# Patient Record
Sex: MALE | Race: WHITE | NOT HISPANIC OR LATINO | Employment: OTHER | URBAN - METROPOLITAN AREA
[De-identification: names, ages, dates, MRNs, and addresses within clinical notes are randomized per-mention and may not be internally consistent; named-entity substitution may affect disease eponyms.]

---

## 2017-01-04 ENCOUNTER — PATIENT MESSAGE (OUTPATIENT)
Dept: FAMILY MEDICINE | Facility: CLINIC | Age: 32
End: 2017-01-04

## 2017-01-10 ENCOUNTER — TELEPHONE (OUTPATIENT)
Dept: FAMILY MEDICINE | Facility: CLINIC | Age: 32
End: 2017-01-10

## 2017-01-19 ENCOUNTER — OFFICE VISIT (OUTPATIENT)
Dept: NEUROLOGY | Facility: CLINIC | Age: 32
End: 2017-01-19
Payer: MEDICARE

## 2017-01-19 VITALS
SYSTOLIC BLOOD PRESSURE: 134 MMHG | DIASTOLIC BLOOD PRESSURE: 83 MMHG | WEIGHT: 277 LBS | HEIGHT: 71 IN | HEART RATE: 77 BPM | BODY MASS INDEX: 38.78 KG/M2 | RESPIRATION RATE: 20 BRPM

## 2017-01-19 DIAGNOSIS — F84.9 PERVASIVE DEVELOPMENTAL DISORDER, ACTIVE: ICD-10-CM

## 2017-01-19 DIAGNOSIS — R56.9 CONVULSIONS, UNSPECIFIED CONVULSION TYPE: ICD-10-CM

## 2017-01-19 PROCEDURE — 1159F MED LIST DOCD IN RCRD: CPT | Mod: S$GLB,,, | Performed by: PSYCHIATRY & NEUROLOGY

## 2017-01-19 PROCEDURE — 99999 PR PBB SHADOW E&M-EST. PATIENT-LVL III: CPT | Mod: PBBFAC,,, | Performed by: PSYCHIATRY & NEUROLOGY

## 2017-01-19 PROCEDURE — 99215 OFFICE O/P EST HI 40 MIN: CPT | Mod: S$GLB,,, | Performed by: PSYCHIATRY & NEUROLOGY

## 2017-01-19 RX ORDER — OXCARBAZEPINE 300 MG/1
300 TABLET, FILM COATED ORAL 2 TIMES DAILY
Qty: 60 TABLET | Refills: 11 | Status: SHIPPED | OUTPATIENT
Start: 2017-01-19 | End: 2018-01-30 | Stop reason: SDUPTHER

## 2017-01-19 NOTE — PROGRESS NOTES
"Date of service:  1/19/2017    Chief complaint:  Seizures    History of present illness:  The patient is a 31 y.o. male referred for management of epilepsy.  He previously saw Dr. Sunshine.  This is my first time seeing him.  He provides little in the way of history due to his cognitive issues, so most of the history was obtained from his caregiver.  Additional history is as noted below.  Briefly, though, this gentleman suffers from a developmental delay and has a history of a prior GTC seizure.  He had onset of new episodes involving behavioral outbursts.  He was admitted to the EMU, and these were found not to be epileptic in nature.  Since the last time he was seen, he has had no further episodes worrisome for seizures.    Prior history:  "First seizure suspected in 2006, onset was not witnessed but caused him to fall to the ground, period of unresponsiveness, had hit his head. Not clear if there was convulsive activity at that time. Mother also describes episodes where eyes roll back, he falls to the ground, has generalized convulsive activity and is disoriented / confused for a short time following. Frequency is approximately once every 2 months, last episode 2 months ago. More prominent since June 2014.      Over the past few months and in particular 6 weeks, has had increase in behavioral outbursts. Mother is not sure if this is directly result of subclinical seizure or not but describes shouting out, inability to follow through with instructions or direction, eyes rolling (brief, eyes flit back and to right, forceful eye closure lasting 1-2 seconds) without change in muscle tone or consciousness. Concerned his behaviors have been regressing, he mentions name of caregiver from many years ago often. Repeats phrases (you need to have dark hair), appears agitated, unable to sit still. In the past month has had increase in dose of Keppra; had been on 250 BID until 12/4, dose was increased to 500 BID. At the time " "of visit with Dr. Parker on 12/16 was on 1500 mg HS. Family desperate to switch Keppra if this will improve behaviors. "      Past Medical History   Diagnosis Date    Autistic disorder, active 5/6/2013    Early intervention counseling     Gout, chronic 11/12/2015    Grand mal seizure     Impulse control disorder, unspecified 5/6/2013    Mastoiditis     Moderate mental retardation 5/6/2013    Neurogenic bladder     HALEY (obstructive sleep apnea) 11/12/2015     CPAP.  Dr. Garber     Otitis media     Paraplegia     Pervasive developmental disorder, active 12/27/2015    Renal cancer 2010     Right    Scoliosis      paraplegia    Seizure disorder 11/12/2015    Stroke      one week old    Tardive dyskinesia        Past Surgical History   Procedure Laterality Date    Ankle fracture surgery      Back surgery  2000    Inner ear surgery       mastoid    Right partial nephrectomy Right 2010     Sees urology    Tympanostomy tube placement         Family History   Problem Relation Age of Onset    Cancer Father      lymphoma    Anesthesia problems Neg Hx     Clotting disorder Neg Hx        Social History     Social History    Marital status: Single     Spouse name: N/A    Number of children: N/A    Years of education: N/A     Occupational History    Not on file.     Social History Main Topics    Smoking status: Never Smoker    Smokeless tobacco: Never Used    Alcohol use No    Drug use: No    Sexual activity: No     Other Topics Concern    Not on file     Social History Narrative    Lives with his parents.  More dependent with ADLs.  Very active in the community.        New Opportunities jhoan.        Review of Systems:  A 14 system review is limited due to his imperfect reliability as a historian.  His family reports no significant changes since his prior visit.    Physical exam:  Visit Vitals    /83    Pulse 77    Resp 20    Ht 5' 10.5" (1.791 m)    Wt 125.6 kg (277 lb)    BMI " "39.18 kg/m2     Higher Cortical Function:   Patient is a well developed, obese man who is unable to sit still and behaves inappropriately.  He was not hostile, but imperfectly uncooperative.     Cranial Nerves II - XII:   EOMs were intact with normal smooth and no nystagmus.   PERRLA. D/C Funduscopic exam - disc were flat with normal A/V ratio and no exudates or hemorrhages. Visual fields were full to confrontation.   Motor - facial movement was symmetrical and normal.   Facial sensory - Light touch and pin prick sensations were normal.   Hearing was normal to finger rub.  Palate moved well and was symmetrical with normal palatal and oral sensation.   Tongue movement was full & the patient could say "la la la" and "Ka Ka Ka" without  difficulty. Patient repeated Taoism and Bahai without difficulty. Normal power and bulk was found in the massiter and rotator muscles of the neck.  Motor: Power, bulk and tone were normal in all extremities.  Sensory: Light touch, pin prick, vibration and position senses were normal in all extremities.   Coordination:   Rapid alternating movements and rapid finger tapping - normal.   Finger to nose - nl.   Arm roll - symmetrical.   Gait: Station, gait and tandem walking were done without difficulty and Romberg was negative.      Tremor: resting, postural, intentional - none    Data base:  Prior records were reviewed and are as summarized above.    Labs:  Trileptal (5/16): 5    MRI brain (12/15):  "Motion limited MRI of the brain.  There is trace asymmetry of the mesial temporal lobes left slightly smaller than the right allowing for motion limitation.  This may be artifactual or developmental variant with underlying left mesial temporal sclerosis not excluded.  Clinical correlation correlation with EEG analysis is advised.  No evidence for focal parietal signal abnormality, acute infarction or enhancing lesion."    vEEG (1/16):  "FINAL SUMMARY:  ELECTROENCEPHALOGRAM:  Interictal: " "This is a normal EEG during wakefulness, drowsiness, and sleep.  Ictal: During this recording, periods of eye flutter during wakefulness and   myoclonic jerks during sleep were recorded without associated epileptiform   activity on EEG.  CLINICAL SEIZURE:  Classification: Nonepileptic events. Based on clinical history and evaluation   by Psychiatry, these events were likely motor tics during wakefulness and   myoclonic jerks during sleep. There is no evidence of an epileptic process on   this recording. No seizures were recorded"    Assessment and plan:  The patient is a 31 y.o. 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  As far as medications go, we will continue his Trileptal.  We will check labs and a DEXA scan for medication monitoring purposes.  Medication side effects were discussed with the patient's family.  State law as it pertains to driving for individuals with seizures was not discussed as his cognitive issues preclude him from driving regardless of degree of seizure control.  The patient's was counseled on seizure safety. We will plan on seeing the patient back in a few weeks.    "

## 2017-01-19 NOTE — MR AVS SNAPSHOT
[facility]  [address] [place] [address]  Phone: [phone]  Fax: [phone]                  Hussein Nunes   2017 1:00 PM   Office Visit    Description:  Male : 1985   Provider:  Miguel Salinas Jr., MD   Department:  [facility]           Reason for Visit     Seizures           Diagnoses this Visit        Comments    Convulsions, unspecified convulsion type         Pervasive developmental disorder, active                To Do List           Future Appointments        Provider Department Dept Phone    2017 9:20 AM [facility] DEXA1 [facility] [phone]    2017 9:50 AM LAB, [place] [facility] [phone]      Goals (5 Years of Data)     None       These Medications        Disp Refills Start End    oxcarbazepine (TRILEPTAL) 300 MG Tab 60 tablet 11 2017    Take 1 tablet (300 mg total) by mouth 2 (two) times daily. - Oral    Pharmacy: [facility]/pharmacy #[id number] - [place], [place] - [address] Ph #: [phone]       folic acid-vit B6-vit B12 2.5-25-2 mg (FOLBIC) 2.5-25-2 mg Tab 30 tablet 0 2017     Take 1 tablet by mouth once daily. - Oral    Pharmacy: [facility]/pharmacy #[id number] - [place] - [address] Ph #: [phone]         [facility]     [facility] Care [facility] -  Assistance  Registered nurses in the [facility] provide clinical advisement, health education, appointment booking, and other advisory services.  Call for this free service at [phone].             Medications           Message regarding Medications     Verify the changes and/or additions to your medication regime listed below are the same as discussed with your clinician today.  If any of these changes or additions are incorrect, please notify your healthcare provider.             Verify that the below list of medications is an accurate representation of the medications you are currently taking.   "If none reported, the list may be blank. If incorrect, please contact your healthcare provider. Carry this list with you in case of emergency.           Current Medications     allopurinol (ZYLOPRIM) 100 MG tablet TAKE 1 TABLET BY MOUTH TWICE A DAY    busPIRone (BUSPAR) 5 MG Tab Take 1 tablet (5 mg total) by mouth 2 (two) times daily.    escitalopram oxalate (LEXAPRO) 20 MG tablet Take 1 tablet (20 mg total) by mouth once daily.    folic acid-vit B6-vit B12 2.5-25-2 mg (FOLBIC) 2.5-25-2 mg Tab Take 1 tablet by mouth once daily.    L. ACIDOPHILUS/BIFID. ANIMALIS (CHEWABLE PROBIOTIC ORAL) Take by mouth 2 (two) times daily.    lactulose (CHRONULAC) 10 gram/15 mL solution Take 30 mLs (20 g total) by mouth 4 (four) times a week.    oxcarbazepine (TRILEPTAL) 300 MG Tab Take 1 tablet (300 mg total) by mouth 2 (two) times daily.    polyethylene glycol (MIRALAX) 17 gram/dose powder Take 17 g by mouth once daily. No Sig Provided    sulfamethoxazole-trimethoprim 800-160mg (BACTRIM DS) 800-160 mg Tab TAKE 1 TABLET BY ORAL ROUTE DAILY    tamsulosin (FLOMAX) 0.4 mg Cp24 Every day    wheat dextrin 3 gram/3.5 gram PwPk Take by mouth once daily.           Clinical Reference Information           Vital Signs - Last Recorded  Most recent update: 1/19/2017  1:09 PM by Fe Navarrete LPN    BP Pulse Resp Ht Wt BMI    134/83 77 20 5' 10.5" (1.791 m) 125.6 kg (277 lb) 39.18 kg/m2      Blood Pressure          Most Recent Value    BP  134/83      Allergies as of 1/19/2017     Benadryl [Diphenhydramine Hcl]    Keppra [Levetiracetam]    Ativan [Lorazepam]    Abilify  [Aripiprazole]    Clonazepam    Cough Syrup W/decongestant    Diazepam    Haloperidol    Phenytoin Sodium Extended    Quetiapine    Risperidone    Thimerosal    Ziprasidone Hcl      Immunizations Administered on Date of Encounter - 1/19/2017     None      Orders Placed During Today's Visit     Future Labs/Procedures Expected by Expires    CBC auto differential  1/19/2017 " 3/20/2018    Comprehensive metabolic panel  1/19/2017 3/20/2018    DXA Bone Density Spine And Hip_Axial Skeleton  1/19/2017 1/19/2018    Oxcarbazepine level  1/19/2017 3/20/2018

## 2017-01-23 ENCOUNTER — PATIENT MESSAGE (OUTPATIENT)
Dept: FAMILY MEDICINE | Facility: CLINIC | Age: 32
End: 2017-01-23

## 2017-01-30 ENCOUNTER — OFFICE VISIT (OUTPATIENT)
Dept: PSYCHIATRY | Facility: CLINIC | Age: 32
End: 2017-01-30
Payer: COMMERCIAL

## 2017-01-30 VITALS
DIASTOLIC BLOOD PRESSURE: 78 MMHG | SYSTOLIC BLOOD PRESSURE: 138 MMHG | HEIGHT: 71 IN | HEART RATE: 85 BPM | BODY MASS INDEX: 39.26 KG/M2 | WEIGHT: 280.44 LBS

## 2017-01-30 DIAGNOSIS — F79 MENTAL RETARDATION: ICD-10-CM

## 2017-01-30 DIAGNOSIS — F84.0 AUTISTIC DISORDER, ACTIVE: Primary | Chronic | ICD-10-CM

## 2017-01-30 DIAGNOSIS — F84.9 PERVASIVE DEVELOPMENTAL DISORDER, ACTIVE: ICD-10-CM

## 2017-01-30 DIAGNOSIS — G24.01 TARDIVE DYSKINESIA: ICD-10-CM

## 2017-01-30 DIAGNOSIS — F63.9 IMPULSE CONTROL DISORDER: Chronic | ICD-10-CM

## 2017-01-30 DIAGNOSIS — F42.9 OBSESSIVE-COMPULSIVE DISORDER, UNSPECIFIED TYPE: ICD-10-CM

## 2017-01-30 DIAGNOSIS — F63.9 IMPULSE CONTROL DISEASE: ICD-10-CM

## 2017-01-30 DIAGNOSIS — F71 MODERATE MENTAL RETARDATION: Chronic | ICD-10-CM

## 2017-01-30 PROCEDURE — 99214 OFFICE O/P EST MOD 30 MIN: CPT | Mod: S$GLB,,, | Performed by: PSYCHIATRY & NEUROLOGY

## 2017-01-30 PROCEDURE — 99999 PR PBB SHADOW E&M-EST. PATIENT-LVL II: CPT | Mod: PBBFAC,,, | Performed by: PSYCHIATRY & NEUROLOGY

## 2017-01-30 RX ORDER — BUSPIRONE HYDROCHLORIDE 5 MG/1
5 TABLET ORAL 2 TIMES DAILY
Qty: 60 TABLET | Refills: 2 | Status: SHIPPED | OUTPATIENT
Start: 2017-01-30 | End: 2017-07-29 | Stop reason: SDUPTHER

## 2017-01-30 RX ORDER — ESCITALOPRAM OXALATE 20 MG/1
20 TABLET ORAL DAILY
Qty: 30 TABLET | Refills: 2 | Status: SHIPPED | OUTPATIENT
Start: 2017-01-30 | End: 2017-05-08 | Stop reason: SDUPTHER

## 2017-01-30 NOTE — PROGRESS NOTES
"ID: 31yo WM with PDD, Impulse Control d/o, anxiety d/o nos. Last saw RENNY Murillo 5/2016.     CC: anxiety    Interim Hx: presents on time with his mother and caregiver, Helen, who has been with him for 9mos.     Pt is doing well on lexapro alone- has started using the buspar 5mg po daily PRN anxiety- using now on days when he has public outing to limit intrusiveness which had been an issue in the past. Per pt's caregiver, Helen, this is much improved on buspar.     Pt continues to be active, involved in numerous day time activities.     On Psychiatric ROS:    Endorses good sleep- not taking anything for sleep (sleep better on busier days; when bored he tends to nap), denies anhedonia- going to bible study later today, denies feeling helpless/hopeless, denies dec energy, denies dec concentration, dec appetite- trying to lose weight (zyprexa led to 30-40lb wgt gain) through portion control, denies dec PMA, denies thoughts of SI/intent/plan.     Denies feeling +easily overwhelmed- controlled environment with caregivers and schedule, +ruminative thinking- chronic, denies feeling tense/"on edge"    PPHx: Denies h/o self injury- will pick at scabs in sort of obsessive way  Denies inpt psych hospitalization  Denies h/o suicide attempt     Current Psych Meds: lexapro 20mg po qam (anxiety and to suppress sex drive), **Trileptal 300mg po BID through neuro for sz d/o  Past Psych Meds: s/e's to mult prev meds to include zyprexa (wgt gain),     PMHx: obesity, onel w/ cipap, seizure d/o, scoliosis surgery at 11yo led to hemiparalysis- now walking but has neurogenic bladder and frequent catheterization, partial nephrectomy R kidney    SubstHx:   T- none  E- none  D- none  Caffeine- very rare, if ever    FamPHx: mAunt- schizophrenic, mcousin- schizophrenia    MSE: appears stated age, well groomed, obese, appropriate dress- shorts/tshirt, engages well with examiner at a childlike level, remembers my name and says hello mult times, " "perseverates on name. Good e/c. Speech reg rate and vol, nonpressured. Mood is "doing good" Affect congruent- sits calmly in chair, smiles. No physical evidence of emotion. Sensorium fully intact. Oriented to month and season but then wants to discuss birthdays. Narrative memory intact. Intellectual function is below avg based on vocab and basic fund of knowledge- PDD long h/o sp ed. Thought is perseverative on my name and then later on his birthday. No tangentiality or circumstantiality. No FOI/WES. Denies SI/HI. Denies A/VH. No evidence of delusions. Insight lacking and Judgment in keeping with insight.     Suicide Risk Assessment:   Protective- age, no prior attempts, no prior hospitalizations, no family h/o attempts, no ongoing substance abuse, no psychosis, denies SI/intent/plan, no h/o violence, seeking treatment, access to treatment, future oriented, good primary support, no access to firearms    Risk- race, gender, ongoing Axis I sxs    **Pt is at LOW imminent and long term risk of suicide given current risk factors.    Assessment:  30yo WM with PDD, Impulse Control d/o, anxiety d/o nos. Last saw RENNY Murillo 5/2016. Pt presents today with his mother and caregiver Helen who has been with the pt x 8mos. Currently the pt is doing very well. Of note pt is only on lexapro for anxiety, is also on trileptal 300mg po BID for seizure d/o through neuro and has HALEY on cipap. Has great family support, receives 88hrs per week of direct care from outside service provider, has assistance with all ADLs and IADLs but seems content with arrangement. No h/o violent or aggressive behavior. Had manic sxs with psychosis when on keppra but never before or since. Has recently stopped zyprexa due to significant weight gain and there has been no increase in behavior or anxiety. Per parent and caregiver there is some concern that a prev caregiver who was recently fired was motivated for the pt to be more sedated-  they both denied the " pt needed any change in medication. Hussein is doing well, has some instances of intrusive behavior with young attractive women and he becomes difficult to redirect- added a trial of PRN buspar for days he has public outings- this has been effective and helpful. Will cont w/o change. No acute safety concerns. rtc 3mos.     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 Cont lexapro 20mg po qhs   2. Cont Trileptal 300mg po BID per neuro  3. Cont buspar 5mg po daily PRN anxiety (for days with public outings)   4. RTC 4mos  5. rec some dietary changes and cont'd exercise    -Spent 30min face to face with the pt; >50% time spent in counseling   -Supportive therapy and psychoeducation provided  -R/B/SE's of medications discussed with the pt who expresses understanding and chooses to take medications as prescribed.   -Pt instructed to call clinic, 911 or go to nearest emergency room if sxs worsen or pt is in   crisis. The pt expresses understanding.

## 2017-02-01 ENCOUNTER — HOSPITAL ENCOUNTER (OUTPATIENT)
Dept: RADIOLOGY | Facility: HOSPITAL | Age: 32
Discharge: HOME | End: 2017-02-01
Attending: PSYCHIATRY & NEUROLOGY
Payer: MEDICARE

## 2017-02-01 DIAGNOSIS — R56.9 CONVULSIONS, UNSPECIFIED CONVULSION TYPE: ICD-10-CM

## 2017-02-01 PROCEDURE — 77080 DXA BONE DENSITY AXIAL: CPT | Mod: 26,,, | Performed by: RADIOLOGY

## 2017-02-01 PROCEDURE — 77080 DXA BONE DENSITY AXIAL: CPT | Mod: TC,PO

## 2017-02-02 ENCOUNTER — TELEPHONE (OUTPATIENT)
Dept: FAMILY MEDICINE | Facility: CLINIC | Age: 32
End: 2017-02-02

## 2017-05-08 ENCOUNTER — OFFICE VISIT (OUTPATIENT)
Dept: PSYCHIATRY | Facility: CLINIC | Age: 32
End: 2017-05-08
Payer: MEDICARE

## 2017-05-08 VITALS
SYSTOLIC BLOOD PRESSURE: 124 MMHG | WEIGHT: 274.5 LBS | BODY MASS INDEX: 38.43 KG/M2 | HEART RATE: 68 BPM | HEIGHT: 71 IN | DIASTOLIC BLOOD PRESSURE: 68 MMHG

## 2017-05-08 DIAGNOSIS — F63.9 IMPULSE CONTROL DISORDER: Chronic | ICD-10-CM

## 2017-05-08 DIAGNOSIS — G24.01 TARDIVE DYSKINESIA: ICD-10-CM

## 2017-05-08 DIAGNOSIS — F84.0 AUTISTIC DISORDER, ACTIVE: Primary | Chronic | ICD-10-CM

## 2017-05-08 DIAGNOSIS — F71 MODERATE MENTAL RETARDATION: Chronic | ICD-10-CM

## 2017-05-08 DIAGNOSIS — F84.9 PERVASIVE DEVELOPMENTAL DISORDER, ACTIVE: ICD-10-CM

## 2017-05-08 DIAGNOSIS — F63.9 IMPULSE CONTROL DISEASE: ICD-10-CM

## 2017-05-08 DIAGNOSIS — F42.9 OBSESSIVE-COMPULSIVE DISORDER, UNSPECIFIED TYPE: ICD-10-CM

## 2017-05-08 DIAGNOSIS — F79 MENTAL RETARDATION: ICD-10-CM

## 2017-05-08 PROCEDURE — 1160F RVW MEDS BY RX/DR IN RCRD: CPT | Mod: S$GLB,,, | Performed by: PSYCHIATRY & NEUROLOGY

## 2017-05-08 PROCEDURE — 99214 OFFICE O/P EST MOD 30 MIN: CPT | Mod: S$GLB,,, | Performed by: PSYCHIATRY & NEUROLOGY

## 2017-05-08 PROCEDURE — 99999 PR PBB SHADOW E&M-EST. PATIENT-LVL III: CPT | Mod: PBBFAC,,, | Performed by: PSYCHIATRY & NEUROLOGY

## 2017-05-08 RX ORDER — ALPRAZOLAM 0.25 MG/1
0.25 TABLET ORAL DAILY PRN
Qty: 30 TABLET | Refills: 0 | Status: SHIPPED | OUTPATIENT
Start: 2017-05-08 | End: 2018-04-23

## 2017-05-08 RX ORDER — ESCITALOPRAM OXALATE 20 MG/1
20 TABLET ORAL DAILY
Qty: 30 TABLET | Refills: 2 | Status: SHIPPED | OUTPATIENT
Start: 2017-05-08 | End: 2017-08-08 | Stop reason: ALTCHOICE

## 2017-05-08 NOTE — MR AVS SNAPSHOT
[facility]  [address]  [place] [address]  Phone: [phone]                  Hussein Nunes   2017 3:30 PM   Office Visit    Description:  Male : 1985   Provider:  Kathy Valentine MD   Department:  [facility]           Diagnoses this Visit        Comments    Tardive dyskinesia         Pervasive developmental disorder, active         Impulse control disease         Mental retardation                To Do List           Future Appointments        Provider Department Dept Phone    2017 3:30 PM Kathy Valentine MD [facility] [phone]      Goals (5 Years of Data)     None       These Medications        Disp Refills Start End    escitalopram oxalate (LEXAPRO) 20 MG tablet 30 tablet 2 2017     Take 1 tablet (20 mg total) by mouth once daily. - Oral    Pharmacy: [facility]/pharmacy #[id number]  [place] - [address] Ph #: [phone]       alprazolam (XANAX) 0.25 MG tablet 30 tablet 0 2017    Take 1 tablet (0.25 mg total) by mouth daily as needed for Anxiety. - Oral    Pharmacy: [facility]/pharmacy #[id number] [place], [place] - [address] Ph #: [phone]         [facility]     [facility] -  Assistance  Unless otherwise directed by your provider, please contact [facility], our nurse care line that is available for  assistance.     Registered nurses in the [facility] provide: appointment scheduling, clinical advisement, health education, and other advisory services.  Call: [phone] (toll free)               Medications           Message regarding Medications     Verify the changes and/or additions to your medication regime listed below are the same as discussed with your clinician today.  If any of these changes or additions are incorrect, please notify your healthcare provider.        START taking these NEW medications        Refills    alprazolam (XANAX) 0.25 MG  "tablet 0    Sig: Take 1 tablet (0.25 mg total) by mouth daily as needed for Anxiety.    Class: Normal    Route: Oral           Verify that the below list of medications is an accurate representation of the medications you are currently taking.  If none reported, the list may be blank. If incorrect, please contact your healthcare provider. Carry this list with you in case of emergency.           Current Medications     allopurinol (ZYLOPRIM) 100 MG tablet TAKE 1 TABLET BY MOUTH TWICE A DAY    busPIRone (BUSPAR) 5 MG Tab Take 1 tablet (5 mg total) by mouth 2 (two) times daily.    escitalopram oxalate (LEXAPRO) 20 MG tablet Take 1 tablet (20 mg total) by mouth once daily.    L. ACIDOPHILUS/BIFID. ANIMALIS (CHEWABLE PROBIOTIC ORAL) Take by mouth 2 (two) times daily.    lactulose (CHRONULAC) 10 gram/15 mL solution Take 30 mLs (20 g total) by mouth 4 (four) times a week.    oxcarbazepine (TRILEPTAL) 300 MG Tab Take 1 tablet (300 mg total) by mouth 2 (two) times daily.    polyethylene glycol (MIRALAX) 17 gram/dose powder Take 17 g by mouth once daily. No Sig Provided    sulfamethoxazole-trimethoprim 800-160mg (BACTRIM DS) 800-160 mg Tab TAKE 1 TABLET BY ORAL ROUTE DAILY    tamsulosin (FLOMAX) 0.4 mg Cp24 Every day    wheat dextrin 3 gram/3.5 gram PwPk Take by mouth once daily.    alprazolam (XANAX) 0.25 MG tablet Take 1 tablet (0.25 mg total) by mouth daily as needed for Anxiety.           Clinical Reference Information           Your Vitals Were     BP Pulse Height Weight BMI    124/68 68 5' 10.5" (1.791 m) 124.5 kg (274 lb 7.6 oz) 38.83 kg/m2      Blood Pressure          Most Recent Value    BP  124/68      Allergies as of 5/8/2017     Benadryl [Diphenhydramine Hcl]    Keppra [Levetiracetam]    Ativan [Lorazepam]    Abilify  [Aripiprazole]    Clonazepam    Cough Syrup W/decongestant    Diazepam    Haloperidol    Phenytoin Sodium Extended    Quetiapine    Risperidone    Thimerosal    Ziprasidone Hcl      Immunizations " Administered on Date of Encounter - 5/8/2017     None      Language Assistance Services     ATTENTION: Language assistance services are available, free of charge. Please call [phone].      ATENCIÓN: Si habcarlotta garcia, tiene a ragland disposición servicios gratuitos de asistencia lingüística. Llame al [phone].     CHÚ Ý: N?u b?n nói Ti?ng Vi?t, có các d?ch v? h? tr? ngôn ng? mi?n phí dành cho b?n. G?i s? [phone].         [facility] complies with applicable Federal civil rights laws and does not discriminate on the basis of race, color, national origin, age, disability, or sex.

## 2017-05-08 NOTE — PROGRESS NOTES
"ID: 31yo WM with PDD, Impulse Control d/o, anxiety d/o nos. Last saw RENNY Murillo 5/2016.     CC: anxiety    Interim Hx: presents on time with his mother and father whom I have never met.      Per pt's mom there have been "a couple of incidents" out in public when Hussein gets fixated on a female and wants a kiss- can become aggressive in this pursuit, without intent to harm anyone but his size and the nature of the topic make it uncomfortable and "a concern".     Previous trial on benzos were not well tolerated but were also when Hussein was a child- no recent trial per mom. Hussein is also only on 5mg of Buspar and there is room for a increase.     Pt continues to be active, involved in numerous day time activities. Has had recent inc in anxiety due to some movement with his caregivers- Helen currently pregnant with twins and having a difficult pregnancy- also now anticipating a maternity leave, "just hard because of course we're happy for her but we're so dependent on her."     On Psychiatric ROS:    Endorses good sleep- not taking anything for sleep (sleep better on busier days; when bored he tends to nap), denies anhedonia, denies feeling helpless/hopeless, denies dec energy, denies dec concentration, dec appetite- has lost 6lbs through portion control, denies dec PMA, denies thoughts of SI/intent/plan.     Denies feeling +easily overwhelmed- controlled environment with caregivers and schedule, +ruminative thinking- chronic, denies feeling tense/"on edge"    PPHx: Endorses h/o self injury- will pick at scabs in sort of obsessive way  Denies inpt psych hospitalization  Denies h/o suicide attempt     Current Psych Meds: lexapro 20mg po qam (anxiety and to suppress sex drive), **Trileptal 300mg po BID through neuro for sz d/o  Past Psych Meds: s/e's to mult prev meds to include zyprexa (wgt gain),     PMHx: obesity, onel w/ cipap, seizure d/o, scoliosis surgery at 11yo led to hemiparalysis- now walking but has " "neurogenic bladder and frequent catheterization, partial nephrectomy R kidney    SubstHx:   T- none  E- none  D- none  Caffeine- very rare, if ever    FamPHx: mAunt- schizophrenic, mcbetiin- schizophrenia    MSE: appears stated age, well groomed, obese, appropriate dress- shorts/tshirt, engages well with examiner at a childlike level, remembers my name and says hello mult times, perseverates on name. Good e/c. Speech reg rate and vol, nonpressured. Mood is "doing good" Affect congruent- sits calmly in chair, smiles. Oriented to month and season. Narrative memory intact. Intellectual function is below avg based on vocab and basic fund of knowledge- PDD long h/o sp ed. Thought is perseverative on my name and then later on his birthday. No tangentiality or circumstantiality. No FOI/WES. Denies SI/HI. Denies A/VH. No evidence of delusions. Insight lacking and Judgment in keeping with insight.     Suicide Risk Assessment:   Protective- age, no prior attempts, no prior hospitalizations, no family h/o attempts, no ongoing substance abuse, no psychosis, denies SI/intent/plan, no h/o violence, seeking treatment, access to treatment, future oriented, good primary support, no access to firearms    Risk- race, gender, ongoing Axis I sxs    **Pt is at LOW imminent and long term risk of suicide given current risk factors.    Assessment:  30yo WM with PDD, Impulse Control d/o, anxiety d/o nos. Last saw RENNY Murillo 5/2016. Pt presented initially with his mother and caregiver Helen who has been with the pt x 8mos. Currently the pt is doing very well. Of note pt is only on lexapro for anxiety, is also on trileptal 300mg po BID for seizure d/o through neuro and has HALEY on cipap. Has great family support, receives 88hrs per week of direct care from outside service provider, has assistance with all ADLs and IADLs but seems content with arrangement. No h/o violent or aggressive behavior. Had manic sxs with psychosis when on keppra but " never before or since. Has recently stopped zyprexa due to significant weight gain and there has been no increase in behavior or anxiety. Per parent and caregiver there is some concern that a prev caregiver who was recently fired was motivated for the pt to be more sedated-  they both denied the pt needed any change in medication. Hussein is doing well, has some instances of intrusive behavior with young attractive women and he becomes difficult to redirect- added a trial of PRN buspar for days he has public outings- this has been effective and helpful. Today the incidents have become more difficult to redirect- will order a trial of xanax 0.25-0.5mg po daily PRN and also inc buspar PRN to 10mg daily as needed for public outings. No acute safety concerns. rtc 3mos per pt/family preference.     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 Cont lexapro 20mg po qhs   2. Cont Trileptal 300mg po BID per neuro  3. Inc buspar to 10mg po BID PRN anxiety  4. Order xanax 0.25-0.5mg po daily PRN anxiety  5. RTC 3mos per family request  6. rec some dietary changes and cont'd exercise    -Spent 30min face to face with the pt; >50% time spent in counseling   -Supportive therapy and psychoeducation provided  -R/B/SE's of medications discussed with the pt who expresses understanding and chooses to take medications as prescribed.   -Pt instructed to call clinic, 911 or go to nearest emergency room if sxs worsen or pt is in   crisis. The pt expresses understanding.

## 2017-05-19 ENCOUNTER — PATIENT MESSAGE (OUTPATIENT)
Dept: NEUROLOGY | Facility: CLINIC | Age: 32
End: 2017-05-19

## 2017-05-19 ENCOUNTER — TELEPHONE (OUTPATIENT)
Dept: NEUROLOGY | Facility: CLINIC | Age: 32
End: 2017-05-19

## 2017-05-19 NOTE — TELEPHONE ENCOUNTER
----- Message from [place] sent at 5/18/2017  4:17 PM CDT -----  Contact: pt's mom Eli  Pt's mom needs a f/u appt with pt in June  Call Back#[phone]  Thanks

## 2017-05-22 ENCOUNTER — PATIENT MESSAGE (OUTPATIENT)
Dept: NEUROLOGY | Facility: CLINIC | Age: 32
End: 2017-05-22

## 2017-06-23 ENCOUNTER — OFFICE VISIT (OUTPATIENT)
Dept: NEUROLOGY | Facility: CLINIC | Age: 32
End: 2017-06-23
Payer: MEDICARE

## 2017-06-23 VITALS
HEIGHT: 70 IN | BODY MASS INDEX: 39.42 KG/M2 | SYSTOLIC BLOOD PRESSURE: 119 MMHG | HEART RATE: 84 BPM | WEIGHT: 275.38 LBS | DIASTOLIC BLOOD PRESSURE: 76 MMHG

## 2017-06-23 DIAGNOSIS — G40.909 SEIZURE DISORDER: Primary | Chronic | ICD-10-CM

## 2017-06-23 DIAGNOSIS — F84.9 PERVASIVE DEVELOPMENTAL DISORDER, ACTIVE: Chronic | ICD-10-CM

## 2017-06-23 DIAGNOSIS — R74.01 TRANSAMINITIS: ICD-10-CM

## 2017-06-23 DIAGNOSIS — F63.9 IMPULSE CONTROL DISORDER: Chronic | ICD-10-CM

## 2017-06-23 PROCEDURE — 99214 OFFICE O/P EST MOD 30 MIN: CPT | Mod: S$GLB,,, | Performed by: PSYCHIATRY & NEUROLOGY

## 2017-06-23 PROCEDURE — 99999 PR PBB SHADOW E&M-EST. PATIENT-LVL III: CPT | Mod: PBBFAC,,, | Performed by: PSYCHIATRY & NEUROLOGY

## 2017-06-23 NOTE — PROGRESS NOTES
"Date of service:  6/23/2017    Chief complaint:  Seizures    Interval history:  The patient is a 31 y.o. male seen previously for a history of epilepsy who had been suffering from a new event type that was found to be nonepileptic in nature.  Since he was last here, he has had no seizures.    History of present illness:  The patient is a 31 y.o. male referred for management of epilepsy.  He previously saw Dr. Sunshine.  This is my first time seeing him.  He provides little in the way of history due to his cognitive issues, so most of the history was obtained from his caregiver.  Additional history is as noted below.  Briefly, though, this gentleman suffers from a developmental delay and has a history of a prior GTC seizure.  He had onset of new episodes involving behavioral outbursts.  He was admitted to the EMU, and these were found not to be epileptic in nature.  Since the last time he was seen, he has had no further episodes worrisome for seizures.    Prior history:  "First seizure suspected in 2006, onset was not witnessed but caused him to fall to the ground, period of unresponsiveness, had hit his head. Not clear if there was convulsive activity at that time. Mother also describes episodes where eyes roll back, he falls to the ground, has generalized convulsive activity and is disoriented / confused for a short time following. Frequency is approximately once every 2 months, last episode 2 months ago. More prominent since June 2014.      Over the past few months and in particular 6 weeks, has had increase in behavioral outbursts. Mother is not sure if this is directly result of subclinical seizure or not but describes shouting out, inability to follow through with instructions or direction, eyes rolling (brief, eyes flit back and to right, forceful eye closure lasting 1-2 seconds) without change in muscle tone or consciousness. Concerned his behaviors have been regressing, he mentions name of caregiver from " "many years ago often. Repeats phrases (you need to have dark hair), appears agitated, unable to sit still. In the past month has had increase in dose of Keppra; had been on 250 BID until 12/4, dose was increased to 500 BID. At the time of visit with Dr. Parker on 12/16 was on 1500 mg HS. Family desperate to switch Keppra if this will improve behaviors. "      Past Medical History:   Diagnosis Date    Autistic disorder, active 5/6/2013    Early intervention counseling     Gout, chronic 11/12/2015    Grand mal seizure     Impulse control disorder, unspecified 5/6/2013    Mastoiditis     Moderate mental retardation 5/6/2013    Neurogenic bladder     HALEY (obstructive sleep apnea) 11/12/2015    CPAP.  Dr. Garber     Otitis media     Paraplegia     Pervasive developmental disorder, active 12/27/2015    Renal cancer 2010    Right    Scoliosis     paraplegia    Seizure disorder 11/12/2015    Stroke     one week old    Tardive dyskinesia        Past Surgical History:   Procedure Laterality Date    ANKLE FRACTURE SURGERY      BACK SURGERY  2000    INNER EAR SURGERY      mastoid    right partial nephrectomy Right 2010    Sees urology    TYMPANOSTOMY TUBE PLACEMENT         Family History   Problem Relation Age of Onset    Cancer Father      lymphoma    Anesthesia problems Neg Hx     Clotting disorder Neg Hx        Social History     Social History    Marital status: Single     Spouse name: N/A    Number of children: N/A    Years of education: N/A     Occupational History    Not on file.     Social History Main Topics    Smoking status: Never Smoker    Smokeless tobacco: Never Used    Alcohol use No    Drug use: No    Sexual activity: No     Other Topics Concern    Not on file     Social History Narrative    Lives with his parents.  More dependent with ADLs.  Very active in the community.        New Opportunities jhoan.        Review of Systems:  A 14 system review is limited due to his " "imperfect reliability as a historian.  His family reports no significant changes since his prior visit.    Physical exam:  /76   Pulse 84   Ht 5' 10" (1.778 m)   Wt 124.9 kg (275 lb 5.7 oz)   BMI 39.51 kg/m²    Higher Cortical Function:   Patient is a well developed, obese man who is unable to sit still and behaves inappropriately.  He was not hostile, but imperfectly uncooperative.     Cranial Nerves II - XII:   EOMs were intact with normal smooth and no nystagmus.   PERRLA. D/C Funduscopic exam - disc were flat with normal A/V ratio and no exudates or hemorrhages. Visual fields were full to confrontation.   Motor - facial movement was symmetrical and normal.   Facial sensory - Light touch and pin prick sensations were normal.   Hearing was normal to finger rub.  Palate moved well and was symmetrical with normal palatal and oral sensation.   Tongue movement was full & the patient could say "la la la" and "Ka Ka Ka" without  difficulty. Patient repeated Amish and [employer]ar[employer] without difficulty. Normal power and bulk was found in the massiter and rotator muscles of the neck.  Motor: Power, bulk and tone were normal in all extremities.  Sensory: Light touch, pin prick, vibration and position senses were normal in all extremities.   Coordination:   Rapid alternating movements and rapid finger tapping - normal.   Finger to nose - nl.   Arm roll - symmetrical.   Gait: Station, gait and tandem walking were done without difficulty and Romberg was negative.      Tremor: resting, postural, intentional - none    Data base:  Prior records were reviewed and are as summarized above.    Labs (2/17):  CMP: notable for mild transaminitis  CBC: unremarkable  Trileptal: 5    MRI brain (12/15):  "Motion limited MRI of the brain.  There is trace asymmetry of the mesial temporal lobes left slightly smaller than the right allowing for motion limitation.  This may be artifactual or developmental variant with underlying left " "mesial temporal sclerosis not excluded.  Clinical correlation correlation with EEG analysis is advised.  No evidence for focal parietal signal abnormality, acute infarction or enhancing lesion."    vEEG (1/16):  "FINAL SUMMARY:  ELECTROENCEPHALOGRAM:  Interictal: This is a normal EEG during wakefulness, drowsiness, and sleep.  Ictal: During this recording, periods of eye flutter during wakefulness and myoclonic jerks during sleep were recorded without associated epileptiform activity on EEG.  CLINICAL SEIZURE:  Classification: Nonepileptic events. Based on clinical history and evaluation by Psychiatry, these events were likely motor tics during wakefulness and myoclonic jerks during sleep. There is no evidence of an epileptic process on this recording. No seizures were recorded"    DEXA (1/17):  Normal    Assessment and plan:  The patient is a 31 y.o. 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  As far as medications go, we will continue his Trileptal.  We will check labs and a DEXA scan for medication monitoring purposes.  Medication side effects were discussed with the patient's family.  State law as it pertains to driving for individuals with seizures was not discussed as his cognitive issues preclude him from driving regardless of degree of seizure control.  The patient's was counseled on seizure safety.     Note is made of a mild transaminitis which does appear to be chronic.  I have asked that he follow up with his PCP about this.  We will repeat labs.    His parents had questions related to worsening behavioral issues.  They are working with a psychiatrist on this who is actively making medication changes.  I have encouraged them to continue with this process.    We will plan on seeing the patient back in a few months.    "

## 2017-06-28 ENCOUNTER — LAB VISIT (OUTPATIENT)
Dept: LAB | Facility: HOSPITAL | Age: 32
End: 2017-06-28
Attending: PSYCHIATRY & NEUROLOGY
Payer: MEDICARE

## 2017-06-28 DIAGNOSIS — G40.909 SEIZURE DISORDER: Chronic | ICD-10-CM

## 2017-06-28 DIAGNOSIS — R74.01 TRANSAMINITIS: ICD-10-CM

## 2017-06-28 LAB
ALBUMIN SERPL BCP-MCNC: 3.9 G/DL
ALP SERPL-CCNC: 61 U/L
ALT SERPL W/O P-5'-P-CCNC: 41 U/L
ANION GAP SERPL CALC-SCNC: 8 MMOL/L
AST SERPL-CCNC: 51 U/L
BASOPHILS # BLD AUTO: 0.04 K/UL
BASOPHILS NFR BLD: 0.4 %
BILIRUB SERPL-MCNC: 0.7 MG/DL
BUN SERPL-MCNC: 10 MG/DL
CALCIUM SERPL-MCNC: 9.8 MG/DL
CHLORIDE SERPL-SCNC: 102 MMOL/L
CO2 SERPL-SCNC: 29 MMOL/L
CREAT SERPL-MCNC: 0.8 MG/DL
DIFFERENTIAL METHOD: ABNORMAL
EOSINOPHIL # BLD AUTO: 0.2 K/UL
EOSINOPHIL NFR BLD: 1.7 %
ERYTHROCYTE [DISTWIDTH] IN BLOOD BY AUTOMATED COUNT: 12.7 %
EST. GFR  (AFRICAN AMERICAN): >60 ML/MIN/1.73 M^2
EST. GFR  (NON AFRICAN AMERICAN): >60 ML/MIN/1.73 M^2
GLUCOSE SERPL-MCNC: 81 MG/DL
HCT VFR BLD AUTO: 43.5 %
HGB BLD-MCNC: 15 G/DL
LYMPHOCYTES # BLD AUTO: 2.5 K/UL
LYMPHOCYTES NFR BLD: 27.5 %
MCH RBC QN AUTO: 32.9 PG
MCHC RBC AUTO-ENTMCNC: 34.5 %
MCV RBC AUTO: 95 FL
MONOCYTES # BLD AUTO: 0.9 K/UL
MONOCYTES NFR BLD: 10.5 %
NEUTROPHILS # BLD AUTO: 5.3 K/UL
NEUTROPHILS NFR BLD: 59.3 %
PLATELET # BLD AUTO: 242 K/UL
PMV BLD AUTO: 9.5 FL
POTASSIUM SERPL-SCNC: 4.5 MMOL/L
PROT SERPL-MCNC: 7.8 G/DL
RBC # BLD AUTO: 4.56 M/UL
SODIUM SERPL-SCNC: 139 MMOL/L
WBC # BLD AUTO: 8.94 K/UL

## 2017-06-28 PROCEDURE — 85025 COMPLETE CBC W/AUTO DIFF WBC: CPT

## 2017-06-28 PROCEDURE — 80183 DRUG SCRN QUANT OXCARBAZEPIN: CPT

## 2017-06-28 PROCEDURE — 80053 COMPREHEN METABOLIC PANEL: CPT

## 2017-06-28 PROCEDURE — 36415 COLL VENOUS BLD VENIPUNCTURE: CPT | Mod: PO

## 2017-06-29 LAB — OXCARBAZEPINE METABOLITE: 6 MCG/ML

## 2017-07-19 ENCOUNTER — PATIENT MESSAGE (OUTPATIENT)
Dept: FAMILY MEDICINE | Facility: CLINIC | Age: 32
End: 2017-07-19

## 2017-07-19 ENCOUNTER — TELEPHONE (OUTPATIENT)
Dept: FAMILY MEDICINE | Facility: CLINIC | Age: 32
End: 2017-07-19

## 2017-07-19 NOTE — TELEPHONE ENCOUNTER
----- Message from Emily Frausto sent at 7/19/2017  3:34 PM CDT -----  Contact: Eli Arredondo called regarding the patient's bowel being impacted. Need advice. Please contact [phone] (home)

## 2017-07-20 ENCOUNTER — TELEPHONE (OUTPATIENT)
Dept: FAMILY MEDICINE | Facility: CLINIC | Age: 32
End: 2017-07-20

## 2017-07-20 NOTE — TELEPHONE ENCOUNTER
Spoke with mrs. Benitez.   He has history of constipation. No regular bowel movement since Saturday used suppository. He pushed it out before it melted. They gave him an enema Tuesday and just got little pieces out. He is on miralax 3 x a week. And lactulose 4 x a week. Mother gave him a double dose of miralax yesterday.   She has an appointment with emory moon. Today she is hoping to get ultrasound to see if he is impacted.   He has a great appetite.

## 2017-07-20 NOTE — TELEPHONE ENCOUNTER
----- Message from Mateo Guan sent at 7/19/2017  4:30 PM CDT -----  Contact: self   Placed call to pod, patient miss call from your office please call back at [phone] ([facility])

## 2017-07-24 ENCOUNTER — TELEPHONE (OUTPATIENT)
Dept: FAMILY MEDICINE | Facility: CLINIC | Age: 32
End: 2017-07-24

## 2017-07-24 DIAGNOSIS — K59.09 CONSTIPATION, CHRONIC: Primary | ICD-10-CM

## 2017-07-24 NOTE — TELEPHONE ENCOUNTER
----- Message from Claudette Cochran sent at 7/24/2017  4:45 PM CDT -----  Contact: mother Eli Savage  Patient has some health issues last week and not much progress. He has only had a couple of bowel movements since last Wednesday. She wants to know what to try next. She is interested in having a referral put in to GI if possible without being seen again. Or does he have to see a np. Please call [phone] (home)   Thanks!

## 2017-07-24 NOTE — TELEPHONE ENCOUNTER
Spoke with pt. Mother.   We spoke last week of constipation.   Getting the run around about getting an appointment with gastro.   Gave enema and suppository and doubled up on miralax he finally had bm.   Then went 3 days nothing  Last large bm was Sunday with a lot of extra help lot of marcelle mother is concerned something wrong gastro wise.  Can you put a referral to our gastro dept for him.

## 2017-07-25 NOTE — TELEPHONE ENCOUNTER
Spoke with pt mother. She understands and will start taking miraalax every day and lactulose every other day.   Fiber is critical in diet along with adequate water intake.   Exercise daily walking and exercise is important.   Mother will make appointment with GI.

## 2017-07-25 NOTE — TELEPHONE ENCOUNTER
Please be sure he is using the Miralax everyday and the lactulose every other day.    Fiber in the diet is critical, as is adequate water intake.      Regular walking or exercise will also help.    I will put a consult into GI.    ABDIAS

## 2017-07-27 ENCOUNTER — HOSPITAL ENCOUNTER (OUTPATIENT)
Dept: RADIOLOGY | Facility: HOSPITAL | Age: 32
Discharge: HOME | End: 2017-07-27
Attending: EMERGENCY MEDICINE
Payer: MEDICARE

## 2017-07-27 ENCOUNTER — OFFICE VISIT (OUTPATIENT)
Dept: GASTROENTEROLOGY | Facility: CLINIC | Age: 32
End: 2017-07-27
Payer: MEDICARE

## 2017-07-27 VITALS
BODY MASS INDEX: 39.99 KG/M2 | HEART RATE: 76 BPM | SYSTOLIC BLOOD PRESSURE: 132 MMHG | WEIGHT: 279.31 LBS | HEIGHT: 70 IN | DIASTOLIC BLOOD PRESSURE: 74 MMHG

## 2017-07-27 DIAGNOSIS — R74.01 ELEVATED AST (SGOT): ICD-10-CM

## 2017-07-27 DIAGNOSIS — K59.00 CONSTIPATION, UNSPECIFIED CONSTIPATION TYPE: ICD-10-CM

## 2017-07-27 DIAGNOSIS — K59.09 CHRONIC CONSTIPATION: Primary | ICD-10-CM

## 2017-07-27 DIAGNOSIS — K59.00 CONSTIPATION, UNSPECIFIED CONSTIPATION TYPE: Primary | ICD-10-CM

## 2017-07-27 DIAGNOSIS — K92.1 HEMATOCHEZIA: ICD-10-CM

## 2017-07-27 PROCEDURE — 99203 OFFICE O/P NEW LOW 30 MIN: CPT | Mod: S$GLB,,, | Performed by: NURSE PRACTITIONER

## 2017-07-27 PROCEDURE — 74020 XR ABDOMEN FLAT AND ERECT: CPT | Mod: 26,,, | Performed by: RADIOLOGY

## 2017-07-27 PROCEDURE — 74020 XR ABDOMEN FLAT AND ERECT: CPT | Mod: TC,PO

## 2017-07-27 PROCEDURE — 99999 PR PBB SHADOW E&M-EST. PATIENT-LVL IV: CPT | Mod: PBBFAC,,, | Performed by: NURSE PRACTITIONER

## 2017-07-27 NOTE — PROGRESS NOTES
Subjective:       Patient ID: Hussein Nunes is a 31 y.o. male Body mass index is 40.08 kg/m².    Chief Complaint: Constipation    This patient is new to me.  Referring Provider:  Dr. Zendejas for chronic constipation    Patient is here with his mother, whom provided history due to patient's medical history of autism and mental retardation      Constipation   This is a chronic problem. Episode onset: has had his whole life. The problem has been gradually worsening (over the past 2 weeks) since onset. Stool frequency: daily prior to the past 2 weeks; since it worsened has had 3-4 bowel movements. The stool is described as pellet like (now loose). The patient is on a high fiber diet. He exercises regularly (4-5 times a week at the [facility]). There has been adequate water intake. Associated symptoms include bloating and hematochezia (small bright red blood with first bowel movement since constipation worsened, occurred once with that bowel movement; denies bleeding in between bowel movements; has resolved). Pertinent negatives include no abdominal pain, diarrhea, fever, melena, nausea, rectal pain, vomiting or weight loss. Risk factors include obesity. He has tried laxatives and fiber (probiotic, miralax taking daily now (prior was on tuesday, thursday and saturdays), lactulose (takes monday, wednesday, friday, sundays); enema prn and suppositories prn- mild relief) for the symptoms. The treatment provided moderate relief. His past medical history is significant for neurologic disease (history of stroke, austism, mental retardation, and seizures; sees neurology) and psychiatric history (seeing psych). There is no history of endocrine disease, inflammatory bowel disease or irritable bowel syndrome.     Review of Systems   Constitutional: Negative for appetite change, fever, unexpected weight change and weight loss.   HENT: Negative for trouble swallowing.    Respiratory: Negative for shortness of breath.    Cardiovascular:  Negative for chest pain.   Gastrointestinal: Positive for anal bleeding, bloating, constipation and hematochezia (small bright red blood with first bowel movement since constipation worsened, occurred once with that bowel movement; denies bleeding in between bowel movements; has resolved). Negative for abdominal pain, diarrhea, melena, nausea, rectal pain and vomiting.   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  Dr. Garber     Otitis media     Paraplegia     Pervasive developmental disorder, active 12/27/2015    Renal cancer 2010    Right    Scoliosis     paraplegia    Seizure disorder 11/12/2015    Stroke     one week old    Tardive dyskinesia      Past Surgical History:   Procedure Laterality Date    ANKLE FRACTURE SURGERY      BACK SURGERY  2000    [facility]  ~[facility]; [facility] fissure    COL[facility]OSCOPY  prior to 2012    [facility]    INNER EAR SURGERY      mastoid    right partial nephrectomy Right 2010    Sees urology    TYMPANOSTOMY TUBE PLACEMENT       Family History   Problem Relation Age of Onset    Cancer Father      lymphoma    Anesthesia problems Neg Hx     Clotting disorder Neg Hx     Colon cancer Neg Hx     Colon polyps Neg Hx     Crohn's disease Neg Hx     Ulcerative colitis Neg Hx     Stomach cancer Neg Hx     Esophageal cancer Neg Hx      Wt Readings from Last 10 Encounters:   07/27/17 126.7 kg (279 lb 5.2 oz)   06/23/17 124.9 kg (275 lb 5.7 oz)   01/19/17 125.6 kg (277 lb)   11/09/16 126 kg  (277 lb 12.5 oz)   10/03/16 126.4 kg (278 lb 10.6 oz)   08/23/16 126.6 kg (279 lb)   08/02/16 128 kg (282 lb 4.8 oz)   07/13/16 127.2 kg (280 lb 6.8 oz)   05/27/16 128.6 kg (283 lb 8.2 oz)   05/02/16 128.4 kg (283 lb 2.9 oz)     Lab Results   Component Value Date    WBC 8.94 06/28/2017    HGB 15.0 06/28/2017    HCT 43.5 06/28/2017    MCV 95 06/28/2017     06/28/2017     CMP  Sodium   Date Value Ref Range Status   06/28/2017 139 136 - 145 mmol/L Final     Potassium   Date Value Ref Range Status   06/28/2017 4.5 3.5 - 5.1 mmol/L Final     Chloride   Date Value Ref Range Status   06/28/2017 102 95 - 110 mmol/L Final     CO2   Date Value Ref Range Status   06/28/2017 29 23 - 29 mmol/L Final     Glucose   Date Value Ref Range Status   06/28/2017 81 70 - 110 mg/dL Final     BUN, Bld   Date Value Ref Range Status   06/28/2017 10 6 - 20 mg/dL Final     Creatinine   Date Value Ref Range Status   06/28/2017 0.8 0.5 - 1.4 mg/dL Final     Calcium   Date Value Ref Range Status   06/28/2017 9.8 8.7 - 10.5 mg/dL Final     Total Protein   Date Value Ref Range Status   06/28/2017 7.8 6.0 - 8.4 g/dL Final     Albumin   Date Value Ref Range Status   06/28/2017 3.9 3.5 - 5.2 g/dL Final     Total Bilirubin   Date Value Ref Range Status   06/28/2017 0.7 0.1 - 1.0 mg/dL Final     Comment:     For infants and newborns, interpretation of results should be based  on gestational age, weight and in agreement with clinical  observations.  Premature Infant recommended reference ranges:  Up to 24 hours.............<8.0 mg/dL  Up to 48 hours............<12.0 mg/dL  3-5 days..................<15.0 mg/dL  6-29 days.................<15.0 mg/dL       Alkaline Phosphatase   Date Value Ref Range Status   06/28/2017 61 55 - 135 U/L Final     AST ([facility])   Date Value Ref Range Status   03/12/2016 96 (H) 17 - 59 U/L Final     AST   Date Value Ref Range Status   06/28/2017 51 (H) 10 - 40 U/L Final     ALT   Date Value Ref Range Status    06/28/2017 41 10 - 44 U/L Final     Anion Gap   Date Value Ref Range Status   06/28/2017 8 8 - 16 mmol/L Final     eGFR if    Date Value Ref Range Status   06/28/2017 >60.0 >60 mL/min/1.73 m^2 Final     eGFR if non    Date Value Ref Range Status   06/28/2017 >60.0 >60 mL/min/1.73 m^2 Final     Comment:     Calculation used to obtain the estimated glomerular filtration  rate (eGFR) is the CKD-EPI equation. Since race is unknown   in our information system, the eGFR values for   -American and Non--American patients are given   for each creatinine result.       Lab Results   Component Value Date    TSH 1.220 12/27/2015     Objective:      Physical Exam   Constitutional: He is oriented to person, place, and time. He appears well-developed and well-nourished. No distress.   HENT:   Mouth/Throat: Oropharynx is clear and moist and mucous membranes are normal. No oral lesions. No oropharyngeal exudate.   Eyes: Conjunctivae are normal. Pupils are equal, round, and reactive to light. No scleral icterus.   Neck: Neck supple. No tracheal deviation present.   Cardiovascular: Normal rate.    Pulmonary/Chest: Effort normal and breath sounds normal. No respiratory distress. He has no wheezes.   Abdominal: Soft. Normal appearance and bowel sounds are normal. He exhibits no distension, no abdominal bruit and no mass. There is no hepatosplenomegaly. There is no tenderness. There is no rigidity, no rebound, no guarding, no tenderness at McBurney's point and negative Hernandez's sign. No hernia.   Patient and mother both declined rectal exam.   Lymphadenopathy:     He has no cervical adenopathy.   Neurological: He is alert and oriented to person, place, and time.   Skin: Skin is warm and dry. No rash noted. He is not diaphoretic. No erythema. No pallor.   Non-jaundiced   Psychiatric: He is not agitated and not aggressive. Cognition and memory are impaired.   Patient is interactive and kind;  speech understandable, but patient does not answer some questions appropriately and repetitive responses.   Nursing note and vitals reviewed.      Assessment:       1. Chronic constipation    2. Elevated AST (SGOT)    3. Hematochezia        Plan:     Patient agreed to sign medical records release consent for us to obtain records from Dr. Castro and [facility]    Chronic constipation  -     TSH; Future; Expected date: 07/27/2017  -  INCREASE to   lactulose (CHRONULAC) 20 gram/30 mL Soln; Take 30 mLs (20 g total) by mouth 2 (two) times daily.  Dispense: 900 mL; Refill: 0  Recommended daily exercise as tolerated, adequate water intake (six 8-oz glasses of water daily), and high fiber diet. OTC fiber supplements are recommended if diet does not reach daily fiber goal (25 grams daily), such as Metamucil, Citrucel, or FiberCon (take as directed, separate from other oral medications by >2 hours).  -Recommend taking an OTC stool softener such as Colace as directed to avoid hard stools and straining with bowel movements PRN  - can continue OTC MiraLax once daily (17g PO) as directed but if diarrhea occurs recommend to take Miralax only PRN  - If no improvement with above recommendations, try intermittently dosed Dulcolax OTC as directed (every 3-4 days) PRN  to facilitate bowel movements  -If still no improvement with these measures, call/follow-up    Elevated AST (SGOT)  -     Hepatitis panel, acute; Future; Expected date: 07/27/2017  -     Hepatic function panel; Future; Expected date: 07/27/2017  - follow-up with PCP for continued evaluation and management; if specialist is needed, recommend seeing hepatology.    Hematochezia  -     CBC auto differential; Future; Expected date: 07/27/2017  -     Occult blood x 1, stool; Future; Expected date: 07/27/2017  -     Occult blood x 1, stool; Future; Expected date: 07/27/2017  -     Occult blood x 1, stool; Future; Expected date: 07/27/2017  - discussed about different etiologies that  "can cause rectal bleeding, such as diverticulosis, polyps, colon inflammation or infection, anal fissure or hemorrhoids.   - discussed about scheduling Colonoscopy, discussed procedure with the patient and mother, verbalized understanding, but patient and mother declined colonoscopy for now  - You may resume normal activity as long as you feel well.  - Avoid/minimize aspirin and anti-inflammatory drugs such as ibuprofen (Advil, Motrin) and naproxen (Aleve and Naprosyn).  - Avoid alcohol.  - avoid constipation and straining with bowel movements; try using an OTC stool softener as directed and increase fiber in diet (20-30 grams daily)  - recommend SITZ baths    Return in about 1 month (around 8/27/2017), or if symptoms worsen or fail to improve.      If no improvement in symptoms or symptoms worsen, call/follow-up at clinic or go to ER.        "This note will not be shared with the patient."  This note is unavailable for viewing online. Certain specialties, including Behavioral Health and Pain Management, are currently not included in the open progress note program. For notes unavailable online, you can request a copy of your medical record.     "

## 2017-07-27 NOTE — PATIENT INSTRUCTIONS
Constipation (Adult)  Constipation means that you have bowel movements that are less frequent than usual. Stools often become very hard and difficult to pass.  Constipation is very common. At some point in life it affects almost everyone. Since everyone's bowel habits are different, what is constipation to one person may not be to another. Your healthcare provider may do tests to diagnose constipation. It depends on what he or she finds when evaluating you.    Symptoms of constipation include:  · Abdominal pain  · Bloating  · Vomiting  · Painful bowel movements  · Itching, swelling, bleeding, or pain around the anus  Causes  Constipation can have many causes. These include:  · Diet low in fiber  · Too much dairy  · Not drinking enough liquids  · Lack of exercise or physical activity. This is especially true for older adults.  · Changes in lifestyle or daily routine, including pregnancy, aging, work, and travel  · Frequent use or misuse of laxatives  · Ignoring the urge to have a bowel movement or delaying it until later  · Medicines, such as certain prescription pain medicines, iron supplements, antacids, certain antidepressants, and calcium supplements  · Diseases like irritable bowel syndrome, bowel obstructions, stroke, diabetes, thyroid disease, Parkinson disease, hemorrhoids, and colon cancer  Complications  Potential complications of constipation can include:  · Hemorrhoids  · Rectal bleeding from hemorrhoids or anal fissures (skin tears)  · Hernias  · Dependency on laxatives  · Chronic constipation  · Fecal impaction  · Bowel obstruction or perforation  Home care  All treatment should be done after talking with your healthcare provider. This is especially true if you have another medical problems, are taking prescription medicines, or are an older adult. Treatment most often involves lifestyle changes. You may also need medicines. Your healthcare provider will tell you which will work best for you. Follow  the advice below to help avoid this problem in the future.  Lifestyle changes  These lifestyle changes can help prevent constipation:  · Diet. Eat a high-fiber diet, with fresh fruit and vegetables, and reduce dairy intake, meats, and processed foods  · Fluids. It's important to get enough fluids each day. Drink plenty of water when you eat more fiber. If you are on diet that limits the amount of fluid you can have, talk about this with your healthcare provider.  · Regular exercise. Check with your healthcare provider first.  Medications  Take any medicines as directed. Some laxatives are safe to use only every now and then. Others can be taken on a regular basis. Talk with your doctor or pharmacist if you have questions.  Prescription pain medicines can cause constipation. If you are taking this kind of medicine, ask your healthcare provider if you should also take a stool softener.  Medicines you may take to treat constipation include:  · Fiber supplements  · Stool softeners  · Laxatives  · Enemas  · Rectal suppositories  Follow-up care  Follow up with your healthcare provider if symptoms don't get better in the next few days. You may need to have more tests or see a specialist.  Call 911  Call 911 if any of these occur:  · Trouble breathing  · Stiff, rigid abdomen that is severely painful to touch  · Confusion  · Fainting or loss of consciousness  · Rapid heart rate  · Chest pain  When to seek medical advice  Call your healthcare provider right away if any of these occur:  · Fever over 100.4°F (38°C)  · Failure to resume normal bowel movements  · Pain in your abdomen or back gets worse  · Nausea or vomiting  · Swelling in your abdomen  · Blood in the stool  · Black, tarry stool  · Involuntary weight loss  · Weakness  Date Last Reviewed: 12/30/2015  © 9305-7604 [employer]. [address] [place] [address], [place] [address]. All rights reserved. This information is not intended as a substitute for  professional medical care. Always follow your healthcare professional's instructions.

## 2017-07-27 NOTE — LETTER
July 27, 2017      Reji Zendejas Jr., MD  [address] [facility] [address] [place] [address]           [facility]  [address] [facility] [address] [place] [address]  Phone: [phone]          Patient: Hussein Nunes   MR Number: [id number]   YOB: 1985   Date of Visit: 7/27/2017       Dear Dr. Reji Zendejas Jr.:    Thank you for referring Hussein Nunes to me for evaluation. Attached you will find relevant portions of my assessment and plan of care.    If you have questions, please do not hesitate to call me. I look forward to following Hussein Nunes along with you.    Sincerely,    Qiana Mattson, [facility]    Enclosure  CC:  No Recipients    If you would like to receive this communication electronically, please contact [email or web address] or [phone] to request more information on Hello[employer] Messenger Link access.    For providers and/or their staff who would like to refer a patient to [facility], please contact us through our one-stop-shop provider referral line, [facility], at [phone].    If you feel you have received this communication in error or would no longer like to receive these types of communications, please e-mail [email or web address]

## 2017-07-28 RX ORDER — LACTULOSE 10 G/15ML
20 SOLUTION ORAL 2 TIMES DAILY
Qty: 900 ML | Refills: 0 | Status: SHIPPED | OUTPATIENT
Start: 2017-07-28 | End: 2017-08-12

## 2017-07-29 DIAGNOSIS — F42.9 OBSESSIVE-COMPULSIVE DISORDER, UNSPECIFIED TYPE: ICD-10-CM

## 2017-07-31 RX ORDER — BUSPIRONE HYDROCHLORIDE 5 MG/1
TABLET ORAL
Qty: 60 TABLET | Refills: 2 | Status: SHIPPED | OUTPATIENT
Start: 2017-07-31 | End: 2017-08-08

## 2017-08-03 ENCOUNTER — TELEPHONE (OUTPATIENT)
Dept: GASTROENTEROLOGY | Facility: CLINIC | Age: 32
End: 2017-08-03

## 2017-08-03 NOTE — TELEPHONE ENCOUNTER
"Please call to inform & review the results with the patient- blood work showed normal thyroid level, negative hepatitis panel, stable blood counts and mild elevations in liver function levels remain. Recommend follow-up with Primary Care Provider for continued evaluation and management of this finding. Stool studies x 3 were negative for blood.  Continue with previous recommendations.    Thanks,  Qiana GOLD-JASPAL    Reviewed medical records received from Dr. Castro and [facility], summarized below and in medical & surgical history (endoscopies, etc), copies made & given to nurse to be scanned into system:   10/28/08 colonoscopy  7/8/14 barium enema: impression constipation; findings: "the colon is severely tortuous but nondilated. There is moderate stool throughout the colon. Colon otherwise demonstrates normal haustral pattern without evidence of fixed filling defect or stricture. Reflux into the terminal ileum is visualized"  Blood work from 2014 (elevated LFT with AST 75, ALT 57) and 2015- see copy for full results  Visit notes reviewed from Dr. Castro in 2014, 2015 (tried linzess)  Recommendations:  Continue with previous recommendations.    "

## 2017-08-06 NOTE — PROGRESS NOTES
Subjective:   THIS NOTE IS DONE WITH VOICE RECOGNITION        Patient ID: Sherry Nunes is a 31 y.o. male.    Chief Complaint: Austistic disorder and boewl problem      HPI     He is in to follow-up on his autistic disorder, complete paperwork, and describes constipation.    He has recently been seen by GI.  Changes in his MiraLAX have been of minimal benefit.  He is on maximum dose of multiple medications.  His constipation remains a central issue.    His blood work remained stable.      Results for SHERRY KELLOGG (MRN [id number]) as of 8/6/2017 17:05   Ref. Range 3/12/2016 19:44 5/2/2016 17:00 2/1/2017 09:36 6/28/2017 08:57 7/27/2017 15:10   Alkaline Phosphatase Latest Ref Range: 55 - 135 U/L 60 58 65 61 63   Total Protein Latest Ref Range: 6.0 - 8.4 g/dL 7.9 7.6 7.7 7.8 7.9   Albumin Latest Ref Range: 3.5 - 5.2 g/dL 4.5 4.1 3.8 3.9 4.2   Total Bilirubin Latest Ref Range: 0.1 - 1.0 mg/dL 0.6 0.4 0.7 0.7 0.4   Bilirubin, Direct Latest Ref Range: 0.1 - 0.3 mg/dL     0.2   AST Latest Ref Range: 10 - 40 U/L  82 (H) 67 (H) 51 (H) 57 (H)   AST ([place]) Latest Ref Range: 17 - 59 U/L 96 (H)       ALT Latest Ref Range: 10 - 44 U/L 78 (H) 92 (H) 68 (H) 41 52 (H)     The constipation is important, as his behavior deteriorate when he is not feeling well.    There have been no other interval changes.    Paperwork for 90L completed.    Lab Results   Component Value Date    ALT 52 (H) 07/27/2017    AST 57 (H) 07/27/2017    ALKPHOS 63 07/27/2017    BILITOT 0.4 07/27/2017         Current Outpatient Prescriptions   Medication Sig Dispense Refill    allopurinol (ZYLOPRIM) 100 MG tablet TAKE 1 TABLET BY MOUTH TWICE A DAY 60 tablet 11    alprazolam (XANAX) 0.25 MG tablet Take 1 tablet (0.25 mg total) by mouth daily as needed for Anxiety. 30 tablet 0    busPIRone (BUSPAR) 5 MG Tab TAKE 1 TABLET TWICE A DAY 60 tablet 2    escitalopram oxalate (LEXAPRO) 20 MG tablet Take 1 tablet (20 mg total) by mouth once daily. 30 tablet  2    L. ACIDOPHILUS/BIFID. ANIMALIS (CHEWABLE PROBIOTIC ORAL) Take by mouth 2 (two) times daily.      lactulose (CHRONULAC) 10 gram/15 mL solution Take 30 mLs (20 g total) by mouth 4 (four) times a week. (Patient taking differently: Take 20 g by mouth. 4 times a week) 946 mL 3    lactulose (CHRONULAC) 20 gram/30 mL Soln Take 30 mLs (20 g total) by mouth 2 (two) times daily. 900 mL 0    oxcarbazepine (TRILEPTAL) 300 MG Tab Take 1 tablet (300 mg total) by mouth 2 (two) times daily. 60 tablet 11    polyethylene glycol (MIRALAX) 17 gram/dose powder Take 17 g by mouth once daily. No Sig Provided (Patient taking differently: Take 17 g by mouth Daily. No Sig Provided) 1530 g 3    sulfamethoxazole-trimethoprim 800-160mg (BACTRIM DS) 800-160 mg Tab TAKE 1 TABLET BY ORAL ROUTE DAILY  5    tamsulosin (FLOMAX) 0.4 mg Cp24 Every day      wheat dextrin 3 gram/3.5 gram PwPk Take by mouth once daily.       No current facility-administered medications for this visit.          Review of Systems   Constitutional: Negative for activity change, chills, fever and unexpected weight change.   HENT: Negative for hearing loss, nosebleeds and tinnitus.    Eyes: Negative for discharge and itching.   Respiratory: Negative for cough, choking, chest tightness, shortness of breath and wheezing.    Cardiovascular: Negative for chest pain, palpitations and leg swelling.   Gastrointestinal: Negative for abdominal distention.   Genitourinary: Negative for difficulty urinating, dysuria, flank pain, hematuria and urgency.   Musculoskeletal: Negative for arthralgias, back pain and joint swelling.   Neurological: Negative for dizziness, tremors, seizures, numbness and headaches.   Psychiatric/Behavioral: Negative for confusion, dysphoric mood and hallucinations.       Objective:      Physical Exam   Constitutional: He appears well-nourished. No distress.   HENT:   Head: Normocephalic.   Mouth/Throat: Oropharynx is clear and moist.   Eyes:  Conjunctivae are normal. Pupils are equal, round, and reactive to light.   Neck: Neck supple.   Cardiovascular: Normal rate and regular rhythm.    Pulmonary/Chest: Effort normal.   Abdominal: He exhibits no mass. There is tenderness (chronic).   Central obesity   Musculoskeletal: He exhibits no edema.   Neurological: He is alert. He displays normal reflexes. He exhibits normal muscle tone.   Skin: Capillary refill takes less than 2 seconds.   Psychiatric:   All services remain in place.  Behaviors observed today or quite good.  No aggression.  Very appropriate, as well as he was very patient today.   Vitals reviewed.      Assessment:       1. Autistic disorder, active    2. Seizure disorder    3. HALEY (obstructive sleep apnea)    4. Neurogenic bladder    5. Chronic idiopathic constipation        Plan:       1. Autistic disorder, active  Stable  Do not anticipate major changes  Required paperwork completed    2. Seizure disorder  No new activity  No change in planning    3. HALEY (obstructive sleep apnea)  Acceptable  Equipment is in good repair    4. Neurogenic bladder  No changes  Has seen urology  We'll continue self catheterizations, monitored by his family    5. Chronic idiopathic constipation  Perhaps most problematic issue for him today  We'll take an atypical approach by trying the colchicine for a few days and seeing if this makes any difference.    - colchicine 0.6 mg tablet; One daily for three days.  If no response, two tablets daily for three days.  As needed for constipation.  Dispense: 20 tablet; Refill: 0

## 2017-08-07 ENCOUNTER — OFFICE VISIT (OUTPATIENT)
Dept: FAMILY MEDICINE | Facility: CLINIC | Age: 32
End: 2017-08-07
Payer: MEDICARE

## 2017-08-07 VITALS
HEIGHT: 70 IN | SYSTOLIC BLOOD PRESSURE: 142 MMHG | HEART RATE: 63 BPM | OXYGEN SATURATION: 96 % | BODY MASS INDEX: 39.86 KG/M2 | RESPIRATION RATE: 16 BRPM | TEMPERATURE: 99 F | DIASTOLIC BLOOD PRESSURE: 80 MMHG | WEIGHT: 278.44 LBS

## 2017-08-07 DIAGNOSIS — G47.33 OSA (OBSTRUCTIVE SLEEP APNEA): Chronic | ICD-10-CM

## 2017-08-07 DIAGNOSIS — G40.909 SEIZURE DISORDER: Chronic | ICD-10-CM

## 2017-08-07 DIAGNOSIS — N31.9 NEUROGENIC BLADDER: Chronic | ICD-10-CM

## 2017-08-07 DIAGNOSIS — K59.04 CHRONIC IDIOPATHIC CONSTIPATION: ICD-10-CM

## 2017-08-07 DIAGNOSIS — F84.0 AUTISTIC DISORDER, ACTIVE: Primary | Chronic | ICD-10-CM

## 2017-08-07 PROCEDURE — 3008F BODY MASS INDEX DOCD: CPT | Mod: S$GLB,,, | Performed by: EMERGENCY MEDICINE

## 2017-08-07 PROCEDURE — 99999 PR PBB SHADOW E&M-EST. PATIENT-LVL IV: CPT | Mod: PBBFAC,,, | Performed by: EMERGENCY MEDICINE

## 2017-08-07 PROCEDURE — 99214 OFFICE O/P EST MOD 30 MIN: CPT | Mod: S$GLB,,, | Performed by: EMERGENCY MEDICINE

## 2017-08-07 RX ORDER — COLCHICINE 0.6 MG/1
TABLET ORAL
Qty: 20 TABLET | Refills: 0 | Status: SHIPPED | OUTPATIENT
Start: 2017-08-07 | End: 2017-10-09

## 2017-08-08 ENCOUNTER — OFFICE VISIT (OUTPATIENT)
Dept: PSYCHIATRY | Facility: CLINIC | Age: 32
End: 2017-08-08
Payer: COMMERCIAL

## 2017-08-08 VITALS
HEART RATE: 68 BPM | SYSTOLIC BLOOD PRESSURE: 119 MMHG | BODY MASS INDEX: 39.1 KG/M2 | WEIGHT: 273.13 LBS | DIASTOLIC BLOOD PRESSURE: 65 MMHG | HEIGHT: 70 IN

## 2017-08-08 DIAGNOSIS — F71 MODERATE MENTAL RETARDATION: Chronic | ICD-10-CM

## 2017-08-08 DIAGNOSIS — F42.9 OBSESSIVE-COMPULSIVE DISORDER, UNSPECIFIED TYPE: Primary | ICD-10-CM

## 2017-08-08 DIAGNOSIS — F63.9 IMPULSE CONTROL DISORDER: Chronic | ICD-10-CM

## 2017-08-08 DIAGNOSIS — F84.0 AUTISTIC DISORDER, ACTIVE: Chronic | ICD-10-CM

## 2017-08-08 DIAGNOSIS — G47.33 OSA (OBSTRUCTIVE SLEEP APNEA): Chronic | ICD-10-CM

## 2017-08-08 DIAGNOSIS — K59.09 CHRONIC CONSTIPATION: Chronic | ICD-10-CM

## 2017-08-08 DIAGNOSIS — G40.909 SEIZURE DISORDER: Chronic | ICD-10-CM

## 2017-08-08 DIAGNOSIS — F84.9 PERVASIVE DEVELOPMENTAL DISORDER, ACTIVE: Chronic | ICD-10-CM

## 2017-08-08 PROCEDURE — 99999 PR PBB SHADOW E&M-EST. PATIENT-LVL III: CPT | Mod: PBBFAC,,, | Performed by: PSYCHIATRY & NEUROLOGY

## 2017-08-08 PROCEDURE — 99214 OFFICE O/P EST MOD 30 MIN: CPT | Mod: S$GLB,,, | Performed by: PSYCHIATRY & NEUROLOGY

## 2017-08-08 PROCEDURE — 3008F BODY MASS INDEX DOCD: CPT | Mod: S$GLB,,, | Performed by: PSYCHIATRY & NEUROLOGY

## 2017-08-08 RX ORDER — ESCITALOPRAM OXALATE 5 MG/1
5 TABLET ORAL DAILY
Qty: 10 TABLET | Refills: 0 | Status: SHIPPED | OUTPATIENT
Start: 2017-08-08 | End: 2017-10-09

## 2017-08-08 NOTE — PROGRESS NOTES
"ID: 31yo WM with PDD, Impulse Control d/o, anxiety d/o nos. Last saw RENNY Murillo 5/2016.     CC: anxiety    Interim Hx: presents on time with his mother.     Pt's mother appears distraught/tired today. Reports that in he opinion the buspar is not making any difference. She reports that he has had a few "incidents" in public when he becomes fixated on a young female and in public this is not well tolerated if he struggles to get redirected. Not a time when he can take a PRN "because it just happens so fast. You really can't anticipate it...Once he knows he's overstepped he gets very upset. Tremendous guilt and anxiety. He just stretches the mile if I'm not there." trying to implement some behavioral changes for greetings and goodbyes, but it has become a safety concern in public.     Previous trial on benzos were not well tolerated but were also when Hussein was a child- the xanax is helpful but it's after the fact to calm his guilt and level of remorse.     Pt also having significant constipation- mom inquiring if this is due to buspar or lexapro- it is a chronic issue, with recent worsening. Mom also reports that Hussein' "personality seems different. Not as smiley and happy." on further clarification she is referring to him being that way at 12-13 yrs old with clear change in personality on the keppra a few years ago.     I think ongoing level of OC sxs warrant a trial of a new SSRI- perhaps luvox. Will d/c buspar and lexapro and see the pt back in 2 wks.     On Psychiatric ROS:    Endorses good sleep- not taking anything for sleep (sleep better on busier days; when bored he tends to nap), denies anhedonia, denies feeling helpless/hopeless, denies dec energy, denies dec concentration, dec appetite- has lost 7lbs through portion control since 1/2017, denies dec PMA, denies thoughts of SI/intent/plan.     Denies feeling +easily overwhelmed- controlled environment with caregivers and schedule, +ruminative thinking- " "chronic, denies feeling tense/"on edge"  Once "fixated" on a topic, struggles to move on. Has some compulsory behaviors assoc with obsessive thinking.     PPHx: Endorses h/o self injury- will pick at scabs in sort of obsessive way  Denies inpt psych hospitalization  Denies h/o suicide attempt     Current Psych Meds: lexapro 20mg po qam (anxiety and to suppress sex drive), buspar 10mg po BID **Trileptal 300mg po BID through neuro for sz d/o  Past Psych Meds: s/e's to mult prev meds to include zyprexa (wgt gain),     PMHx: obesity, onel w/ cipap, seizure d/o, scoliosis surgery at 11yo led to hemiparalysis- now walking but has neurogenic bladder and frequent catheterization, partial nephrectomy R kidney    SubstHx:   T- none  E- none  D- none  Caffeine- very rare, if ever    FamPHx: mAunt- schizophrenic, mcousin- schizophrenia    MSE: appears stated age, well groomed, obese, appropriate dress- shorts/tshirt, engages well with examiner at a childlike level, remembers my name and says hello mult times, perseverates on name and a topic of planets today. Good e/c. Speech reg rate and vol, nonpressured. Mood is "doing good" Affect congruent- sits calmly in chair, smiles. Oriented to month and season. Narrative memory intact. Intellectual function is below avg based on vocab and basic fund of knowledge- PDD long h/o sp ed. Thought is perseverative on my name and then later on his birthday. No tangentiality or circumstantiality. No FOI/WES. Denies SI/HI. Denies A/VH. No evidence of delusions. Insight lacking and Judgment in keeping with insight.     Suicide Risk Assessment:   Protective- age, no prior attempts, no prior hospitalizations, no family h/o attempts, no ongoing substance abuse, no psychosis, denies SI/intent/plan, no h/o violence, seeking treatment, access to treatment, future oriented, good primary support, no access to firearms    Risk- race, gender, ongoing Axis I sxs    **Pt is at LOW imminent and long term risk " "of suicide given current risk factors.    Assessment:  30yo WM with PDD, Impulse Control d/o, anxiety d/o nos. Last saw RENNY Murillo 5/2016. Pt presented initially with his mother and caregiver Helen who has been with the pt x 8mos. Currently the pt is doing very well. Of note pt is only on lexapro for anxiety, is also on trileptal 300mg po BID for seizure d/o through neuro and has HALEY on cipap. Has great family support, receives 88hrs per week of direct care from outside service provider, has assistance with all ADLs and IADLs but seems content with arrangement. No h/o violent or aggressive behavior. Had manic sxs with psychosis when on keppra but never before or since. Has recently stopped zyprexa due to significant weight gain and there has been no increase in behavior or anxiety. Per parent and caregiver there is some concern that a prev caregiver who was recently fired was motivated for the pt to be more sedated-  they both denied the pt needed any change in medication. Hussein is doing well, has some instances of intrusive behavior with young attractive women and he becomes difficult to redirect- added a trial of PRN buspar for days he has public outings- this has been effective and helpful. Today the incidents have become more difficult to redirect- will order a trial of xanax 0.25-0.5mg po daily PRN and also inc buspar PRN to 10mg daily as needed for public outings. Today on f/u pt's mother does not believe buspar has been effective- xanax effective but cannot be given in the moment as "it just happens so fast"- inability to redirect pt in public has become a safety concern for women and for the pt as authorities are not typically well trained in spectrum disorders (mom working to educate the police force). Warrants a transition to new ssri- will taper off lexapro and likely start luvox at the next appt. No acute safety concerns. rtc 2wks per pt/family preference.     Axis I: OCD, Anxiety d/o NOS, Impulse " control d/o  Axis II: Pervasive Dvpmtl D/O  Axis III: HALEY on cipap, seizure d/o  Axis IV: chronic mental health, dependent for caregiving  Axis V: GAF 50    Plan:   1. Taper lexapro 5mg> none  2. Cont Trileptal 300mg po BID per neuro  3. Discontinue buspar  4. Cont xanax 0.25-0.5mg po daily PRN anxiety  5. RTC 2wks    -Spent 30min face to face with the pt; >50% time spent in counseling   -Supportive therapy and psychoeducation provided  -R/B/SE's of medications discussed with the pt who expresses understanding and chooses to take medications as prescribed.   -Pt instructed to call clinic, 911 or go to nearest emergency room if sxs worsen or pt is in   crisis. The pt expresses understanding.

## 2017-08-14 ENCOUNTER — PATIENT MESSAGE (OUTPATIENT)
Dept: PSYCHIATRY | Facility: CLINIC | Age: 32
End: 2017-08-14

## 2017-08-14 ENCOUNTER — PATIENT MESSAGE (OUTPATIENT)
Dept: FAMILY MEDICINE | Facility: CLINIC | Age: 32
End: 2017-08-14

## 2017-08-16 ENCOUNTER — TELEPHONE (OUTPATIENT)
Dept: FAMILY MEDICINE | Facility: CLINIC | Age: 32
End: 2017-08-16

## 2017-08-16 NOTE — TELEPHONE ENCOUNTER
He is now having some diarrhea.  At times copious amounts.  I've recommended that he hold colchicine.  Leave other modalities in place, and less he continues to have diarrhea, and then we'll back it down a little bit today.    His mother is comfortable with this advice.    ABDIAS

## 2017-08-16 NOTE — TELEPHONE ENCOUNTER
----- Message from Erin Burns sent at 8/16/2017  1:58 PM CDT -----  Contact: [phone] Eli Steward   [phone] Eli Steward please call in regard to adjust the patient medication for constipation, now its going the opposite way

## 2017-08-16 NOTE — TELEPHONE ENCOUNTER
Pt's mother would like to know what adjustments need to be made to pt's medication. Pt is experiencing diarrhea. Please advise.

## 2017-08-22 ENCOUNTER — PATIENT MESSAGE (OUTPATIENT)
Dept: GASTROENTEROLOGY | Facility: CLINIC | Age: 32
End: 2017-08-22

## 2017-08-23 ENCOUNTER — OFFICE VISIT (OUTPATIENT)
Dept: PSYCHIATRY | Facility: CLINIC | Age: 32
End: 2017-08-23
Payer: COMMERCIAL

## 2017-08-23 VITALS — HEART RATE: 80 BPM | HEIGHT: 70 IN | SYSTOLIC BLOOD PRESSURE: 140 MMHG | DIASTOLIC BLOOD PRESSURE: 77 MMHG

## 2017-08-23 DIAGNOSIS — K59.09 CHRONIC CONSTIPATION: Chronic | ICD-10-CM

## 2017-08-23 DIAGNOSIS — F63.9 IMPULSE CONTROL DISORDER: Chronic | ICD-10-CM

## 2017-08-23 DIAGNOSIS — G40.909 SEIZURE DISORDER: Chronic | ICD-10-CM

## 2017-08-23 DIAGNOSIS — F84.9 PERVASIVE DEVELOPMENTAL DISORDER, ACTIVE: Chronic | ICD-10-CM

## 2017-08-23 DIAGNOSIS — F42.2 MIXED OBSESSIONAL THOUGHTS AND ACTS: Primary | ICD-10-CM

## 2017-08-23 DIAGNOSIS — G47.33 OSA (OBSTRUCTIVE SLEEP APNEA): Chronic | ICD-10-CM

## 2017-08-23 DIAGNOSIS — F84.0 AUTISTIC DISORDER, ACTIVE: Chronic | ICD-10-CM

## 2017-08-23 PROCEDURE — 99999 PR PBB SHADOW E&M-EST. PATIENT-LVL III: CPT | Mod: PBBFAC,,, | Performed by: PSYCHIATRY & NEUROLOGY

## 2017-08-23 PROCEDURE — 99214 OFFICE O/P EST MOD 30 MIN: CPT | Mod: S$GLB,,, | Performed by: PSYCHIATRY & NEUROLOGY

## 2017-08-23 PROCEDURE — 3008F BODY MASS INDEX DOCD: CPT | Mod: S$GLB,,, | Performed by: PSYCHIATRY & NEUROLOGY

## 2017-08-23 NOTE — PROGRESS NOTES
"ID: 29yo WM with PDD, Impulse Control d/o, anxiety d/o nos. Last saw RENNY Murillo 5/2016.     CC: anxiety    Interim Hx: presents on time with his mother.     "well he's not any worse. We're having a difficult time right now because his schedule is a mess so that is hard for anyone especially Hussein with autism, but I am not sure I can tell you he's any different."     Pt's consistent caregiver had to go on bedrest last week suddenly (pregnant with twins- not due until Nov)- unclear if she is coming back following maternity leave.     Hiring people through "[employer]" and is in having them processed through the state. Could take another 1-2 weeks. Found them on care.com. Has some concerns about one of them with a record but it was 25-30 yrs ago "and I just wonder if we can't give her a chance." will be putting in cameras which they have wanted to do for many years.     Hussein does still have the xanax and it is helpful but it's after the fact to calm his guilt and level of remorse- there have been no "events" of overly intrusive sexualized behavior since last appt.     Constipation is also resolved now, but now having diarrhea. lexapro may have contributed but now he is on mult meds to treat the diarrhea- mom is decreasing doses.     Mom and I agree that if he is currently "no worse" we will continue to observe without meds. If pt's anxiety increases we will consider Luvox with specific thought about OC sxs OR Paxil with specific consideration about it's ability to decrease libido- concern on paxil is short acting nature and pt compliance with timely med dosing AND with it's anticholinergic properties will it worsen constipation too significantly? Discussed options fully in appt today.     On Psychiatric ROS:    Endorses good sleep- not taking anything for sleep (sleep better on busier days; when bored he tends to nap), denies anhedonia, denies feeling helpless/hopeless, denies dec energy, denies dec " "concentration, dec appetite- has lost 7lbs through portion control since 1/2017, denies dec PMA, denies thoughts of SI/intent/plan.     endorses feeling more easily overwhelmed- b/c environment is less controlled. caregivers and schedule disrupted right now, +ruminative thinking- chronic, denies feeling tense/"on edge"  Once "fixated" on a topic, struggles to move on. Has some compulsory behaviors assoc with obsessive thinking.     PPHx: Endorses h/o self injury- will pick at scabs in sort of obsessive way- this has inc'd in last week with loss of caregiver.  Denies inpt psych hospitalization  Denies h/o suicide attempt     Current Psych Meds: **Trileptal 300mg po BID through neuro for sz d/o  Past Psych Meds: s/e's to mult prev meds to include zyprexa (wgt gain), lexapro 20mg po qam (anxiety and to suppress sex drive), buspar 10mg po BID    PMHx: obesity, onel w/ cipap, seizure d/o, scoliosis surgery at 11yo led to hemiparalysis- now walking but has neurogenic bladder and frequent catheterization, partial nephrectomy R kidney    SubstHx:   T- none  E- none  D- none  Caffeine- very rare, if ever    FamPHx: mAunt- schizophrenic, mcousin- schizophrenia    Musculoskeletal:  Muscle strength/Tone: no dyskinesia/ no tremor  Gait/Station- non antalgic, no assistance needed    MSE: appears stated age, well groomed, obese, appropriate dress- shorts/tshirt, engages well with examiner at a childlike level, remembers my name and says hello mult times. Good e/c. Speech reg rate and vol, nonpressured. Mood is "feeling good" Affect congruent- sits calmly in chair, smiles. Oriented to month and season. Narrative memory intact. Intellectual function is below avg based on vocab and basic fund of knowledge- PDD long h/o sp ed. Thought is perseverative on my name and then later on his birthday. No tangentiality or circumstantiality. No FOI/WES. Denies SI/HI. Denies A/VH. No evidence of delusions. Insight lacking and Judgment in keeping " "with insight.     Blood pressure (!) 140/77, pulse 80, height 5' 10" (1.778 m).    Suicide Risk Assessment:   Protective- age, no prior attempts, no prior hospitalizations, no family h/o attempts, no ongoing substance abuse, no psychosis, denies SI/intent/plan, no h/o violence, seeking treatment, access to treatment, future oriented, good primary support, no access to firearms    Risk- race, gender, ongoing Axis I sxs    **Pt is at LOW imminent and long term risk of suicide given current risk factors.    Assessment:  32yo WM with PDD, Impulse Control d/o, anxiety d/o nos. Last saw RENNY Murillo 5/2016. Pt presented initially with his mother and caregiver Helen who has been with the pt x 8mos. Currently the pt is doing very well. Of note pt is only on lexapro for anxiety, is also on trileptal 300mg po BID for seizure d/o through neuro and has HALEY on cipap. Has great family support, receives 88hrs per week of direct care from outside service provider, has assistance with all ADLs and IADLs but seems content with arrangement. No h/o violent or aggressive behavior. Had manic sxs with psychosis when on keppra but never before or since. Has recently stopped zyprexa due to significant weight gain and there has been no increase in behavior or anxiety. Per parent and caregiver there is some concern that a prev caregiver who was recently fired was motivated for the pt to be more sedated-  they both denied the pt needed any change in medication. Hussein is doing well, has some instances of intrusive behavior with young attractive women and he becomes difficult to redirect- added a trial of PRN buspar for days he has public outings- this has been effective and helpful. Today the incidents have become more difficult to redirect- will order a trial of xanax 0.25-0.5mg po daily PRN and also inc buspar PRN to 10mg daily as needed for public outings. Today on f/u pt's mother does not believe buspar has been effective- xanax effective " "but cannot be given in the moment as "it just happens so fast"- inability to redirect pt in public has become a safety concern for women and for the pt as authorities are not typically well trained in spectrum disorders (mom working to educate the police force). Warrants a transition to new ssri- will taper off lexapro and likely start luvox at the next appt. Today on f/u he is "no worse" without the meds- mom motivated to cont without as they cont to observe. Anxiety mildly inc'd but hard to attribute to lack of med as his primary caregiver just left work abruptly with pregnancy complications- change of schedule and personnel hard for Hussein. Will cont to observe. No acute safety concerns. rtc 2mos per pt/family preference- mom will contact me PRN if sxs worsen.    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 No SSRI mgmt at this time  2. Cont Trileptal 300mg po BID per neuro  3. Cont xanax 0.25-0.5mg po daily PRN anxiety (rare use)  4. RTC 2mos    -Spent 30min face to face with the pt; >50% time spent in counseling   -Supportive therapy and psychoeducation provided  -R/B/SE's of medications discussed with the pt who expresses understanding and chooses to take medications as prescribed.   -Pt instructed to call clinic, 911 or go to nearest emergency room if sxs worsen or pt is in   crisis. The pt expresses understanding.    "

## 2017-08-25 ENCOUNTER — PATIENT MESSAGE (OUTPATIENT)
Dept: GASTROENTEROLOGY | Facility: CLINIC | Age: 32
End: 2017-08-25

## 2017-08-29 ENCOUNTER — OFFICE VISIT (OUTPATIENT)
Dept: GASTROENTEROLOGY | Facility: CLINIC | Age: 32
End: 2017-08-29
Payer: MEDICARE

## 2017-08-29 VITALS — HEIGHT: 70 IN | BODY MASS INDEX: 38.82 KG/M2 | WEIGHT: 271.19 LBS

## 2017-08-29 DIAGNOSIS — K59.04 CHRONIC IDIOPATHIC CONSTIPATION: Primary | ICD-10-CM

## 2017-08-29 PROCEDURE — 99213 OFFICE O/P EST LOW 20 MIN: CPT | Mod: S$GLB,,, | Performed by: INTERNAL MEDICINE

## 2017-08-29 PROCEDURE — 99999 PR PBB SHADOW E&M-EST. PATIENT-LVL II: CPT | Mod: PBBFAC,,, | Performed by: INTERNAL MEDICINE

## 2017-08-29 PROCEDURE — 3008F BODY MASS INDEX DOCD: CPT | Mod: S$GLB,,, | Performed by: INTERNAL MEDICINE

## 2017-08-29 NOTE — PROGRESS NOTES
Pt returns re: chronic constipation. Currently doing well on once daily lactulose. Bowel movement daily or every other day. No bleeding or obvious pain. No N/V. Appetite good. No fever or jaundice. Recent TSH normal. Recent stool heme negative. Stopped Miralax secondary to diarrhea. Prior C-scope several years ago per Gloria.     REVIEW OF SYSTEMS:   Constitutional: Negative for fever, appetite change and unexpected weight change.  HENT: Negative for sore throat and trouble swallowing.  Eyes: Negative for visual disturbance.  Respiratory: Negative for chest tightness, shortness of breath and wheezing.  Cardiovascular: Negative for chest pain.  Gastrointestinal:  as per HPI    PHYSICAL EXAMINATION:                                                        GENERAL:  Comfortable, in no acute distress.      SKIN: Non-jaundiced.                             HEENT EXAM:  Nonicteric.  No adenopathy.  Oropharynx is clear.               NECK:  Supple.                                                               LUNGS:  Clear.                                                               CARDIAC:  Regular rate and rhythm.  S1, S2.  No murmur.                      ABDOMEN:  Soft, positive bowel sounds, nontender.  No hepatosplenomegaly or masses.  No rebound or guarding.                                             EXTREMITIES:  No edema.       IMP: Chronic constipation - stable on Lactulose.    PLAN: Pt's mother concerned re: stool consistency/loose, with occasional urgency. Will decrease Lactulose to 10 ml daily. Pt's mother understands to increase back to 15 ml daily if needed.

## 2017-09-27 ENCOUNTER — TELEPHONE (OUTPATIENT)
Dept: NEUROLOGY | Facility: CLINIC | Age: 32
End: 2017-09-27

## 2017-09-27 NOTE — TELEPHONE ENCOUNTER
----- Message from Jesus Pollack sent at 9/26/2017  4:37 PM CDT -----  Contact: Mom/Eli Benitez called in regarding the attached patient (son ) and would like to schedule an appt (per recall) anytime after 10/21/17.  Eli's call back number is [phone]

## 2017-10-09 ENCOUNTER — OFFICE VISIT (OUTPATIENT)
Dept: NEUROLOGY | Facility: CLINIC | Age: 32
End: 2017-10-09
Payer: MEDICARE

## 2017-10-09 VITALS
SYSTOLIC BLOOD PRESSURE: 126 MMHG | RESPIRATION RATE: 20 BRPM | HEART RATE: 103 BPM | HEIGHT: 70 IN | DIASTOLIC BLOOD PRESSURE: 80 MMHG | BODY MASS INDEX: 39.84 KG/M2 | WEIGHT: 278.31 LBS

## 2017-10-09 DIAGNOSIS — G40.909 SEIZURE DISORDER: Primary | Chronic | ICD-10-CM

## 2017-10-09 PROCEDURE — 99213 OFFICE O/P EST LOW 20 MIN: CPT | Mod: S$GLB,,, | Performed by: PSYCHIATRY & NEUROLOGY

## 2017-10-09 PROCEDURE — 99999 PR PBB SHADOW E&M-EST. PATIENT-LVL III: CPT | Mod: PBBFAC,,, | Performed by: PSYCHIATRY & NEUROLOGY

## 2017-10-09 RX ORDER — LACTULOSE 10 G/15ML
SOLUTION ORAL; RECTAL
COMMUNITY
Start: 2017-08-14 | End: 2017-10-18 | Stop reason: SDUPTHER

## 2017-10-09 RX ORDER — ESCITALOPRAM OXALATE 20 MG/1
20 TABLET ORAL DAILY
COMMUNITY
Start: 2017-08-27 | End: 2018-01-08

## 2017-10-09 RX ORDER — BUSPIRONE HYDROCHLORIDE 5 MG/1
5 TABLET ORAL DAILY
COMMUNITY
Start: 2017-08-26 | End: 2018-01-08

## 2017-10-09 NOTE — PROGRESS NOTES
"Date of service:  10/9/2017    Chief complaint:  Seizures    Interval history:  The patient is a 31 y.o. male seen previously for a history of epilepsy who had been suffering from a new event type that was found to be nonepileptic in nature.  Since he was last here, he has had no seizures.    History of present illness:  The patient is a 31 y.o. male referred for management of epilepsy.  He previously saw Dr. Sunshine.  This is my first time seeing him.  He provides little in the way of history due to his cognitive issues, so most of the history was obtained from his caregiver.  Additional history is as noted below.  Briefly, though, this gentleman suffers from a developmental delay and has a history of a prior GTC seizure.  He had onset of new episodes involving behavioral outbursts.  He was admitted to the EMU, and these were found not to be epileptic in nature.  Since the last time he was seen, he has had no further episodes worrisome for seizures.    Prior history:  "First seizure suspected in 2006, onset was not witnessed but caused him to fall to the ground, period of unresponsiveness, had hit his head. Not clear if there was convulsive activity at that time. Mother also describes episodes where eyes roll back, he falls to the ground, has generalized convulsive activity and is disoriented / confused for a short time following. Frequency is approximately once every 2 months, last episode 2 months ago. More prominent since June 2014.      Over the past few months and in particular 6 weeks, has had increase in behavioral outbursts. Mother is not sure if this is directly result of subclinical seizure or not but describes shouting out, inability to follow through with instructions or direction, eyes rolling (brief, eyes flit back and to right, forceful eye closure lasting 1-2 seconds) without change in muscle tone or consciousness. Concerned his behaviors have been regressing, he mentions name of caregiver from " "many years ago often. Repeats phrases (you need to have dark hair), appears agitated, unable to sit still. In the past month has had increase in dose of Keppra; had been on 250 BID until 12/4, dose was increased to 500 BID. At the time of visit with Dr. Parker on 12/16 was on 1500 mg HS. Family desperate to switch Keppra if this will improve behaviors. "      Past Medical History:   Diagnosis Date    Autistic disorder, active 5/6/2013    Bowel obstruction     Early intervention counseling     Gout, chronic 11/12/2015    Grand mal seizure     Impulse control disorder, unspecified 5/6/2013    Mastoiditis     Moderate mental retardation 5/6/2013    Neurogenic bladder     Neurogenic bladder     HALEY (obstructive sleep apnea) 11/12/2015    CPAP.  Dr. Garber     Otitis media     Paraplegia     Pervasive developmental disorder, active 12/27/2015    Renal cancer 2010    Right    Scoliosis     paraplegia    Seizure disorder 11/12/2015    Stroke     one week old    Tardive dyskinesia        Past Surgical History:   Procedure Laterality Date    ANKLE FRACTURE SURGERY      BACK SURGERY  2000    COLONOSCOPY  10/28/2008    Dr. Arana at [facility]; anal fissure, minimal pinpoint rectal erythema; floppy-type colon with very little tone; biopsy: rectum mild chronic proctitis with few eosinophils sent for scanning    COLONOSCOPY  prior to 2012    [facility]    INNER EAR SURGERY      mastoid    right partial nephrectomy Right 2010    Sees urology    TYMPANOSTOMY TUBE PLACEMENT         Family History   Problem Relation Age of Onset    Cancer Father      lymphoma    Anesthesia problems Neg Hx     Clotting disorder Neg Hx     Colon cancer Neg Hx     Colon polyps Neg Hx     Crohn's disease Neg Hx     Ulcerative colitis Neg Hx     Stomach cancer Neg Hx     Esophageal cancer Neg Hx        Social History     Social History    Marital status: Single     Spouse name: N/A    Number of children: N/A    Years of " "education: N/A     Occupational History    Not on file.     Social History Main Topics    Smoking status: Never Smoker    Smokeless tobacco: Never Used    Alcohol use No    Drug use: No    Sexual activity: No     Other Topics Concern    Not on file     Social History Narrative    Lives with his parents.  More dependent with ADLs.  Very active in the community.        New Opportunities jhoan.        Review of Systems:  A 14 system review is limited due to his imperfect reliability as a historian.  His family reports no significant changes since his prior visit.    Physical exam:  /80   Pulse 103   Resp 20   Ht 5' 10" (1.778 m)   Wt 126.2 kg (278 lb 5.3 oz)   BMI 39.94 kg/m²    Higher Cortical Function:   Patient is a well developed, obese man who is unable to sit still and behaves inappropriately.  He was not hostile, but imperfectly uncooperative.     Cranial Nerves II - XII:   EOMs were intact with normal smooth and no nystagmus.   PERRLA. D/C Funduscopic exam - disc were flat with normal A/V ratio and no exudates or hemorrhages. Visual fields were full to confrontation.   Motor - facial movement was symmetrical and normal.   Facial sensory - Light touch and pin prick sensations were normal.   Hearing was normal to finger rub.  Palate moved well and was symmetrical with normal palatal and oral sensation.   Tongue movement was full & the patient could say "la la la" and "Ka Ka Ka" without  difficulty. Patient repeated Orthodoxy and Denominational without difficulty. Normal power and bulk was found in the massiter and rotator muscles of the neck.  Motor: Power, bulk and tone were normal in all extremities.  Sensory: Light touch, pin prick, vibration and position senses were normal in all extremities.   Coordination:   Rapid alternating movements and rapid finger tapping - normal.   Finger to nose - nl.   Arm roll - symmetrical.   Gait: Station, gait and tandem walking were done without difficulty and " "Romberg was negative.      Tremor: resting, postural, intentional - none    Data base:  Prior records were reviewed and are as summarized above.    Labs (6/17):  CMP: notable for AST=51  CBC: unremarkable  Trileptal: 6    MRI brain (12/15):  "Motion limited MRI of the brain.  There is trace asymmetry of the mesial temporal lobes left slightly smaller than the right allowing for motion limitation.  This may be artifactual or developmental variant with underlying left mesial temporal sclerosis not excluded.  Clinical correlation correlation with EEG analysis is advised.  No evidence for focal parietal signal abnormality, acute infarction or enhancing lesion."    vEEG (1/16):  "FINAL SUMMARY:  ELECTROENCEPHALOGRAM:  Interictal: This is a normal EEG during wakefulness, drowsiness, and sleep.  Ictal: During this recording, periods of eye flutter during wakefulness and myoclonic jerks during sleep were recorded without associated epileptiform activity on EEG.  CLINICAL SEIZURE:  Classification: Nonepileptic events. Based on clinical history and evaluation by Psychiatry, these events were likely motor tics during wakefulness and myoclonic jerks during sleep. There is no evidence of an epileptic process on this recording. No seizures were recorded"    DEXA (1/17):  Normal    Assessment and plan:  The patient is a 31 y.o. 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  As far as medications go, we will continue his Trileptal.  Medication side effects were discussed with the patient's family.  State law as it pertains to driving for individuals with seizures was not discussed as his cognitive issues preclude him from driving regardless of degree of seizure control.  The patient's was counseled on seizure safety.     Note is made of a mild transaminitis which does appear to be chronic.  I have asked that he follow up with his PCP about " this.      We will plan on seeing the patient back in a year.

## 2017-10-13 ENCOUNTER — PATIENT MESSAGE (OUTPATIENT)
Dept: GASTROENTEROLOGY | Facility: CLINIC | Age: 32
End: 2017-10-13

## 2017-10-18 DIAGNOSIS — K59.00 CONSTIPATION, UNSPECIFIED CONSTIPATION TYPE: Primary | ICD-10-CM

## 2017-10-18 RX ORDER — LACTULOSE 10 G/15ML
20 SOLUTION ORAL; RECTAL 2 TIMES DAILY
Qty: 1800 ML | Refills: 6 | Status: SHIPPED | OUTPATIENT
Start: 2017-10-18 | End: 2018-07-17 | Stop reason: SDUPTHER

## 2017-10-19 ENCOUNTER — OFFICE VISIT (OUTPATIENT)
Dept: PSYCHIATRY | Facility: CLINIC | Age: 32
End: 2017-10-19
Payer: COMMERCIAL

## 2017-10-19 VITALS
DIASTOLIC BLOOD PRESSURE: 75 MMHG | HEART RATE: 74 BPM | BODY MASS INDEX: 39.87 KG/M2 | SYSTOLIC BLOOD PRESSURE: 150 MMHG | HEIGHT: 70 IN | WEIGHT: 278.5 LBS

## 2017-10-19 DIAGNOSIS — F84.0 AUTISTIC DISORDER, ACTIVE: Chronic | ICD-10-CM

## 2017-10-19 DIAGNOSIS — G40.909 SEIZURE DISORDER: Chronic | ICD-10-CM

## 2017-10-19 DIAGNOSIS — G47.33 OSA (OBSTRUCTIVE SLEEP APNEA): Chronic | ICD-10-CM

## 2017-10-19 DIAGNOSIS — F71 MODERATE MENTAL RETARDATION: Chronic | ICD-10-CM

## 2017-10-19 DIAGNOSIS — F42.9 OBSESSIVE-COMPULSIVE DISORDER, UNSPECIFIED TYPE: Primary | ICD-10-CM

## 2017-10-19 DIAGNOSIS — F84.9 PERVASIVE DEVELOPMENTAL DISORDER, ACTIVE: Chronic | ICD-10-CM

## 2017-10-19 DIAGNOSIS — F63.9 IMPULSE CONTROL DISORDER: Chronic | ICD-10-CM

## 2017-10-19 PROCEDURE — 99214 OFFICE O/P EST MOD 30 MIN: CPT | Mod: S$GLB,,, | Performed by: PSYCHIATRY & NEUROLOGY

## 2017-10-19 PROCEDURE — 99999 PR PBB SHADOW E&M-EST. PATIENT-LVL III: CPT | Mod: PBBFAC,,, | Performed by: PSYCHIATRY & NEUROLOGY

## 2017-12-06 ENCOUNTER — OFFICE VISIT (OUTPATIENT)
Dept: OPTOMETRY | Facility: CLINIC | Age: 32
End: 2017-12-06
Payer: MEDICARE

## 2017-12-06 DIAGNOSIS — H40.053 BILATERAL OCULAR HYPERTENSION: Primary | ICD-10-CM

## 2017-12-06 PROCEDURE — 92004 COMPRE OPH EXAM NEW PT 1/>: CPT | Mod: S$GLB,,, | Performed by: OPTOMETRIST

## 2017-12-06 PROCEDURE — 99999 PR PBB SHADOW E&M-EST. PATIENT-LVL II: CPT | Mod: PBBFAC,,, | Performed by: OPTOMETRIST

## 2017-12-06 NOTE — PROGRESS NOTES
HPI     Routine eye exam--dle--5 years outside [facility]    Pt and mother, Eli denies blurred vision. Denies eye pain. Not   diabetic. Autistic.  Hypoglycemia. Born with retinopathy. Brain bleed and   stroke.    Last edited by Svetlana E Reji on 12/6/2017 10:15 AM. (History)            Assessment /Plan     For exam results, see Encounter Report.    Bilateral ocular hypertension      1. Tonopen measurement. Difficult to get slit lamp exam. Optic nerve exam normal. No symptoms. Monitor condition. RTC 1 year recheck.

## 2017-12-08 ENCOUNTER — TELEPHONE (OUTPATIENT)
Dept: PHYSICAL MEDICINE AND REHAB | Facility: CLINIC | Age: 32
End: 2017-12-08

## 2017-12-08 NOTE — TELEPHONE ENCOUNTER
Pt. Needs a follow up appt. I have not seen him in a year and a half. His orthotist wants to issue new orthtotics and I need to see him before I can sign for them-pls notify and schedule appt

## 2017-12-13 ENCOUNTER — TELEPHONE (OUTPATIENT)
Dept: PHYSICAL MEDICINE AND REHAB | Facility: CLINIC | Age: 32
End: 2017-12-13

## 2017-12-13 NOTE — TELEPHONE ENCOUNTER
Called Ramona back at [facility] and left msg that we need to see the pt before Dr Sterling will authorize anything for the pt. Let her know I left a msg for the pt to be scheduled and have not heard back from pt for scheduling.

## 2017-12-13 NOTE — TELEPHONE ENCOUNTER
----- Message from Liz Callahan sent at 12/13/2017  1:37 PM CST -----  Contact: Ramona with [facility]   Checking the status of a request for clinic notes requested for patient     Please call back [phone] option 2

## 2018-01-08 ENCOUNTER — OFFICE VISIT (OUTPATIENT)
Dept: PHYSICAL MEDICINE AND REHAB | Facility: CLINIC | Age: 33
End: 2018-01-08
Payer: MEDICARE

## 2018-01-08 VITALS
WEIGHT: 281.63 LBS | DIASTOLIC BLOOD PRESSURE: 79 MMHG | SYSTOLIC BLOOD PRESSURE: 133 MMHG | HEART RATE: 79 BPM | HEIGHT: 70 IN | BODY MASS INDEX: 40.32 KG/M2

## 2018-01-08 DIAGNOSIS — M21.619 BUNION: Primary | ICD-10-CM

## 2018-01-08 PROCEDURE — 99999 PR PBB SHADOW E&M-EST. PATIENT-LVL III: CPT | Mod: PBBFAC,,, | Performed by: PHYSICAL MEDICINE & REHABILITATION

## 2018-01-08 PROCEDURE — 99213 OFFICE O/P EST LOW 20 MIN: CPT | Mod: S$GLB,,, | Performed by: PHYSICAL MEDICINE & REHABILITATION

## 2018-01-08 RX ORDER — SULFAMETHOXAZOLE AND TRIMETHOPRIM 400; 80 MG/1; MG/1
1 TABLET ORAL
COMMUNITY
End: 2018-04-26 | Stop reason: ALTCHOICE

## 2018-01-08 NOTE — PROGRESS NOTES
HPI:  Patient is a 32 y.o. year old male w. Mental retardation and a h/o sci incoplete at age 13 from spine surgery for scoliosis correction resulting and residual b/l foot drop. He has received his new afo's. They did not have time to put them on so he is not wearing them. Caregiver states the new afo's quoc the skin on the back of both calves.        Past Medical History:   Diagnosis Date    Autistic disorder, active 5/6/2013    Bowel obstruction     Early intervention counseling     Gout, chronic 11/12/2015    Grand mal seizure     Impulse control disorder, unspecified 5/6/2013    Mastoiditis     Moderate mental retardation 5/6/2013    Neurogenic bladder     Neurogenic bladder     HALEY (obstructive sleep apnea) 11/12/2015    CPAP.  Dr. Garber     Otitis media     Paraplegia     Pervasive developmental disorder, active 12/27/2015    Renal cancer 2010    Right    Scoliosis     paraplegia    Seizure disorder 11/12/2015    Stroke     one week old    Tardive dyskinesia      Past Surgical History:   Procedure Laterality Date    ANKLE FRACTURE SURGERY      BACK SURGERY  2000    COLONOSCOPY  10/28/2008    Dr. Arana at [facility]; anal fissure, minimal pinpoint rectal erythema; floppy-type colon with very little tone; biopsy: rectum mild chronic proctitis with few eosinophils sent for scanning    COLONOSCOPY  prior to 2012    [facility]    INNER EAR SURGERY      mastoid    right partial nephrectomy Right 2010    Sees urology    TYMPANOSTOMY TUBE PLACEMENT       Family History   Problem Relation Age of Onset    Cancer Father      lymphoma    Cataracts Father     Cataracts Mother     Cataracts Maternal Grandmother     Diabetes Maternal Grandmother     Macular degeneration Maternal Grandmother     Glaucoma Maternal Grandmother     Anesthesia problems Neg Hx     Clotting disorder Neg Hx     Colon cancer Neg Hx     Colon polyps Neg Hx     Crohn's disease Neg Hx     Ulcerative colitis Neg Hx      Stomach cancer Neg Hx     Esophageal cancer Neg Hx      Social History     Social History    Marital status: Single     Spouse name: N/A    Number of children: N/A    Years of education: N/A     Social History Main Topics    Smoking status: Never Smoker    Smokeless tobacco: Never Used    Alcohol use No    Drug use: No    Sexual activity: No     Other Topics Concern    Not on file     Social History Narrative    Lives with his parents.  More dependent with ADLs.  Very active in the community.        New Opportunities jhoan.       Review of patient's allergies indicates:   Allergen Reactions    Benadryl [diphenhydramine hcl] Other (See Comments)     Increases anxiety and more restless    Keppra [levetiracetam] Other (See Comments)     agitation    Ativan [lorazepam] Anxiety    Abilify  [aripiprazole]      Other reaction(s): arrhythmia    Clonazepam      Other reaction(s): unknown    Cough syrup w/decongestant      Other reaction(s): auditory hallucinations    Diazepam      Other reaction(s): tongue swelling    Haloperidol Other (See Comments)    Phenytoin sodium extended      Other reaction(s): unknown    Quetiapine      Other reaction(s): sweating  Other reaction(s): sedation  Other reaction(s): sweating  Other reaction(s): sedation    Risperidone      Other reaction(s): bladder incontinence    Thimerosal      Other reaction(s): seizure    Ziprasidone hcl Other (See Comments)     Other reaction(s): tonic clonic seizure  seizure       Current Outpatient Prescriptions:     allopurinol (ZYLOPRIM) 100 MG tablet, TAKE 1 TABLET BY MOUTH TWICE A DAY, Disp: 60 tablet, Rfl: 11    L. ACIDOPHILUS/BIFID. ANIMALIS (CHEWABLE PROBIOTIC ORAL), Take by mouth 2 (two) times daily., Disp: , Rfl:     lactulose (CHRONULAC) 10 gram/15 mL solution, Take 30 mLs (20 g total) by mouth 2 (two) times daily., Disp: 1800 mL, Rfl: 6    multivitamin capsule, Take 1 capsule by mouth once daily., Disp: , Rfl:      oxcarbazepine (TRILEPTAL) 300 MG Tab, Take 1 tablet (300 mg total) by mouth 2 (two) times daily. (Patient taking differently: Take 300 mg by mouth once daily. ), Disp: 60 tablet, Rfl: 11    sulfamethoxazole-trimethoprim 400-80mg (BACTRIM,SEPTRA) 400-80 mg per tablet, Take 1 tablet by mouth every 12 (twelve) hours., Disp: , Rfl:     tamsulosin (FLOMAX) 0.4 mg Cp24, Every day, Disp: , Rfl:     wheat dextrin 3 gram/3.5 gram PwPk, Take by mouth once daily., Disp: , Rfl:     alprazolam (XANAX) 0.25 MG tablet, Take 1 tablet (0.25 mg total) by mouth daily as needed for Anxiety., Disp: 30 tablet, Rfl: 0        Review of Systems  No nausea, vomiting, fevers, Chills , contipation, diarrhea or sweats      Physical Exam:      Vitals:    01/08/18 1005   BP: 133/79   Pulse: 79     alert and oriented ×4 follows commands answers all questions appropriately,affect wnl  Manual muscle test unchanged, b/l foot drop sensation to light touch grossly intact  No skin break down or open sores  No C/C/E  Worn out shoes    Assessment:  Incomplete sci w. B/l foot drop  Mental retardation    Plan:  Adjustment needed by orthotist for more comfortable fit and to prevent skin breakdown-caregiver verbalized understanding they will make appt  New shoes extra wide extra depth needed as the current ones are worn out

## 2018-01-11 ENCOUNTER — OFFICE VISIT (OUTPATIENT)
Dept: PSYCHIATRY | Facility: CLINIC | Age: 33
End: 2018-01-11
Payer: COMMERCIAL

## 2018-01-11 VITALS
HEIGHT: 70 IN | WEIGHT: 279 LBS | HEART RATE: 76 BPM | BODY MASS INDEX: 39.94 KG/M2 | DIASTOLIC BLOOD PRESSURE: 72 MMHG | SYSTOLIC BLOOD PRESSURE: 153 MMHG

## 2018-01-11 DIAGNOSIS — G47.33 OSA (OBSTRUCTIVE SLEEP APNEA): Chronic | ICD-10-CM

## 2018-01-11 DIAGNOSIS — F42.9 OBSESSIVE-COMPULSIVE DISORDER, UNSPECIFIED TYPE: ICD-10-CM

## 2018-01-11 DIAGNOSIS — F71 MODERATE MENTAL RETARDATION: Chronic | ICD-10-CM

## 2018-01-11 DIAGNOSIS — G40.909 SEIZURE DISORDER: Chronic | ICD-10-CM

## 2018-01-11 DIAGNOSIS — K59.09 CHRONIC CONSTIPATION: Chronic | ICD-10-CM

## 2018-01-11 DIAGNOSIS — F63.9 IMPULSE CONTROL DISORDER: Chronic | ICD-10-CM

## 2018-01-11 DIAGNOSIS — F84.9 PERVASIVE DEVELOPMENTAL DISORDER, ACTIVE: Chronic | ICD-10-CM

## 2018-01-11 DIAGNOSIS — F84.0 AUTISTIC DISORDER, ACTIVE: Primary | Chronic | ICD-10-CM

## 2018-01-11 PROCEDURE — 99214 OFFICE O/P EST MOD 30 MIN: CPT | Mod: S$GLB,,, | Performed by: PSYCHIATRY & NEUROLOGY

## 2018-01-11 PROCEDURE — 99999 PR PBB SHADOW E&M-EST. PATIENT-LVL II: CPT | Mod: PBBFAC,,, | Performed by: PSYCHIATRY & NEUROLOGY

## 2018-01-11 PROCEDURE — 99212 OFFICE O/P EST SF 10 MIN: CPT | Mod: PBBFAC,PO | Performed by: PSYCHIATRY & NEUROLOGY

## 2018-01-11 RX ORDER — SULFAMETHOXAZOLE AND TRIMETHOPRIM 800; 160 MG/1; MG/1
TABLET ORAL
COMMUNITY
Start: 2017-11-29 | End: 2018-04-26 | Stop reason: ALTCHOICE

## 2018-01-11 RX ORDER — FLUVOXAMINE MALEATE 50 MG/1
50 TABLET ORAL 2 TIMES DAILY
Qty: 60 TABLET | Refills: 0 | Status: SHIPPED | OUTPATIENT
Start: 2018-01-11 | End: 2018-01-15 | Stop reason: SDUPTHER

## 2018-01-11 NOTE — PROGRESS NOTES
"ID: 29yo WM with PDD, Impulse Control d/o, anxiety d/o nos. Last saw RENNY Murillo 5/2016.     CC: anxiety    Interim Hx: presents on time with his mother and a new caregiver    Per mom, "i think we need to start something. He's just can't move on from things, gets just attached to certain things topics which we can't say right now"- (spells out holidays and birthdays)      Pt's mother then states, "some of it may be that d-a-d-d-y is not doing well." when I appear surprised and ask she becomes tearful, "he's in the end stages of congestive heart failure. We're just balancing fluids and trying to find that mix between kidneys and heart and fluid and I know uR can tell because he's asleep a lot or sometimes goes to the you know where and doesn't come home that night. I'm sure he knows something."     Again, we discuss the r/b assessment of medxns for ru. Discuss options of luvox with an eye to OCD sxs, or Paxil with an eye on decreasing libido (although today the report is that this has not been a recent issue and with paxil I have concerns about the anticholinergic effects due to chronic constipation in this pt which is already an ongoing issue).     Mom defers to my judgement but does express concern about constipation- will start a titration of luvox.     On Psychiatric ROS:    Endorses good sleep- not taking anything for sleep (sleep better on busier days; when bored he tends to nap), denies anhedonia, denies feeling helpless/hopeless, denies dec energy, denies dec concentration, stable appetite, denies dec PMA, denies thoughts of SI/intent/plan.     Endorses feeling MORE easily overwhelmed. +ruminative thinking- chronic- more difficulty moving on to new topic, denies feeling tense/"on edge"  Once "fixated" on a topic, struggles to move on. Has some compulsory behaviors assoc with obsessive thinking.     PPHx: Endorses h/o self injury- will pick at scabs in sort of obsessive way- this has stabilized  Denies " "inpt psych hospitalization  Denies h/o suicide attempt     Current Psych Meds: **Trileptal 300mg po BID through neuro for sz d/o, xanax 0.25mg po daily PRN anxiety  Past Psych Meds: s/e's to mult prev meds to include zyprexa (wgt gain), lexapro 20mg po qam (anxiety and to suppress sex drive), buspar 10mg po BID    PMHx: obesity, onel w/ cipap, seizure d/o, scoliosis surgery at 11yo led to hemiparalysis- now walking but has neurogenic bladder and frequent catheterization, partial nephrectomy R kidney    SubstHx:   T- none  E- none  D- none  Caffeine- very rare, if ever    FamPHx: mAunt- schizophrenic, mcousin- schizophrenia    Musculoskeletal:  Muscle strength/Tone: no dyskinesia/ no tremor  Gait/Station- non antalgic, no assistance needed    MSE: appears stated age, well groomed, obese, appropriate dress- shorts/tshirt, engages well with examiner at a childlike level.  Good e/c. Speech reg rate and vol, nonpressured. lmtd spontaneous speech today. Mood is "doing good" Affect congruent- sits calmly in chair, smiles and offers spontaneous speech. Oriented to month and season. Narrative memory intact. Intellectual function is below avg based on vocab and basic fund of knowledge- PDD long h/o sp ed. Thought is perseverative on my name and then later on his birthday. No tangentiality or circumstantiality. No FOI/WES. Denies SI/HI. Denies A/VH. No evidence of delusions. Insight lacking and Judgment in keeping with insight.     Blood pressure (!) 153/72, pulse 76, height 5' 10" (1.778 m), weight 126.6 kg (279 lb).    Suicide Risk Assessment:   Protective- age, no prior attempts, no prior hospitalizations, no family h/o attempts, no ongoing substance abuse, no psychosis, denies SI/intent/plan, no h/o violence, seeking treatment, access to treatment, future oriented, good primary support, no access to firearms    Risk- race, gender, ongoing Axis I sxs    **Pt is at LOW imminent and long term risk of suicide given current risk " "factors.    Assessment:  32yo WM with PDD, Impulse Control d/o, anxiety d/o nos. Last saw RENNY Murillo 5/2016. Pt presented initially with his mother and caregiver Helen who has been with the pt x 8mos. Currently the pt is doing very well. Of note pt is only on lexapro for anxiety, is also on trileptal 300mg po BID for seizure d/o through neuro and has HALEY on cipap. Has great family support, receives 88hrs per week of direct care from outside service provider, has assistance with all ADLs and IADLs but seems content with arrangement. No h/o violent or aggressive behavior. Had manic sxs with psychosis when on keppra but never before or since. Has recently stopped zyprexa due to significant weight gain and there has been no increase in behavior or anxiety. Per parent and caregiver there is some concern that a prev caregiver who was recently fired was motivated for the pt to be more sedated-  they both denied the pt needed any change in medication. Hussein is doing well, has some instances of intrusive behavior with young attractive women and he becomes difficult to redirect- added a trial of PRN buspar for days he has public outings- this has been effective and helpful. Today the incidents have become more difficult to redirect- will order a trial of xanax 0.25-0.5mg po daily PRN and also inc buspar PRN to 10mg daily as needed for public outings. Today on f/u pt's mother does not believe buspar has been effective- xanax effective but cannot be given in the moment as "it just happens so fast"- inability to redirect pt in public has become a safety concern for women and for the pt as authorities are not typically well trained in spectrum disorders (mom working to educate the police force). Warrants a transition to new ssri- will taper off lexapro and likely start luvox at the next appt. Last appt on f/u he is "no worse" without the meds- mom motivated to cont without as they cont to observe. Anxiety was mildly inc'd but " hard to attribute to lack of med as his primary caregiver just left work abruptly with pregnancy complications- change of schedule and personnel hard for Ru. Last appt 2 new providers, doing well with both, mom has placed cameras and she has no concerns about the new hires. Routine back in place and ru had anxiety but was functioning well- mom would like to cont w/o meds. Will cont to observe. Today they present and mom believes time to intervene with meds- will start trial of luvox. Pt's father with worsening health- leading to some anxiety. No acute safety concerns. rtc 1mo.     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 Start trial of luvox 50mg po qhs x 1wk, then inc to 50mg po BID  2. Cont Trileptal 300mg po BID per neuro  3. Cont xanax 0.25-0.5mg po daily PRN anxiety (rare use)  4. RTC 1mo    -Spent 30min face to face with the pt; >50% time spent in counseling   -Supportive therapy and psychoeducation provided  -R/B/SE's of medications discussed with the pt who expresses understanding and chooses to take medications as prescribed.   -Pt instructed to call clinic, 911 or go to nearest emergency room if sxs worsen or pt is in   crisis. The pt expresses understanding.

## 2018-01-15 ENCOUNTER — TELEPHONE (OUTPATIENT)
Dept: PSYCHIATRY | Facility: CLINIC | Age: 33
End: 2018-01-15

## 2018-01-15 DIAGNOSIS — F63.9 IMPULSE CONTROL DISORDER: Chronic | ICD-10-CM

## 2018-01-15 DIAGNOSIS — F42.8 OTHER OBSESSIVE-COMPULSIVE DISORDERS: ICD-10-CM

## 2018-01-15 DIAGNOSIS — F84.0 AUTISTIC DISORDER, ACTIVE: Primary | Chronic | ICD-10-CM

## 2018-01-15 RX ORDER — FLUVOXAMINE MALEATE 100 MG/1
50 TABLET, COATED ORAL 2 TIMES DAILY
Qty: 30 TABLET | Refills: 0 | Status: SHIPPED | OUTPATIENT
Start: 2018-01-15 | End: 2018-01-31 | Stop reason: SDUPTHER

## 2018-01-15 RX ORDER — FLUVOXAMINE MALEATE 50 MG/1
50 TABLET ORAL 2 TIMES DAILY
Qty: 30 TABLET | Refills: 0 | Status: SHIPPED | OUTPATIENT
Start: 2018-01-15 | End: 2018-01-15 | Stop reason: SDUPTHER

## 2018-01-15 NOTE — TELEPHONE ENCOUNTER
Patient insurance only covers lovox 1.5 tablets daily. They will not cover two tablets daily  Please advise

## 2018-01-30 DIAGNOSIS — R56.9 CONVULSIONS, UNSPECIFIED CONVULSION TYPE: ICD-10-CM

## 2018-01-30 RX ORDER — OXCARBAZEPINE 300 MG/1
300 TABLET, FILM COATED ORAL 2 TIMES DAILY
Qty: 60 TABLET | Refills: 11 | Status: SHIPPED | OUTPATIENT
Start: 2018-01-30 | End: 2018-06-25 | Stop reason: SDUPTHER

## 2018-01-31 ENCOUNTER — TELEPHONE (OUTPATIENT)
Dept: PSYCHIATRY | Facility: CLINIC | Age: 33
End: 2018-01-31

## 2018-01-31 DIAGNOSIS — F63.9 IMPULSE CONTROL DISORDER: Chronic | ICD-10-CM

## 2018-01-31 DIAGNOSIS — F42.8 OTHER OBSESSIVE-COMPULSIVE DISORDERS: ICD-10-CM

## 2018-01-31 RX ORDER — FLUVOXAMINE MALEATE 100 MG/1
100 TABLET, COATED ORAL 2 TIMES DAILY
Qty: 60 TABLET | Refills: 0 | Status: SHIPPED | OUTPATIENT
Start: 2018-01-31 | End: 2018-03-04 | Stop reason: SDUPTHER

## 2018-01-31 NOTE — TELEPHONE ENCOUNTER
Spoke with patient's mother who states that she started patient off on luvox 1/2 tablet or 50mg BID for 1 week  And then increased medication to luvox 1 tablet or 100 mg BID. Will run out soon and request refill sent to [facility] on file  Please advise

## 2018-02-01 ENCOUNTER — TELEPHONE (OUTPATIENT)
Dept: PSYCHIATRY | Facility: CLINIC | Age: 33
End: 2018-02-01

## 2018-02-01 NOTE — TELEPHONE ENCOUNTER
"Called pt's mother to eval pt's response to the Luvox-    Per pt's mother his anxiety is less on the medicine. "not picking his bobos as much and definitely we have noticed. I'm not sure it's the medicine or not, but he is less anxious lately."    Unclear what else she thinks it could be attributed to, but in r/b assessment, he is tolerating the med well w/o s/e and anxiety is improved.     Will cont and inc dose to 100mg po BID.   F/u as scheduled 2/15    "

## 2018-02-10 ENCOUNTER — PATIENT MESSAGE (OUTPATIENT)
Dept: PSYCHIATRY | Facility: CLINIC | Age: 33
End: 2018-02-10

## 2018-02-15 ENCOUNTER — OFFICE VISIT (OUTPATIENT)
Dept: PSYCHIATRY | Facility: CLINIC | Age: 33
End: 2018-02-15
Payer: MEDICARE

## 2018-02-15 VITALS
BODY MASS INDEX: 40.5 KG/M2 | HEART RATE: 78 BPM | WEIGHT: 282.88 LBS | HEIGHT: 70 IN | SYSTOLIC BLOOD PRESSURE: 132 MMHG | DIASTOLIC BLOOD PRESSURE: 68 MMHG

## 2018-02-15 DIAGNOSIS — F84.9 PERVASIVE DEVELOPMENTAL DISORDER, ACTIVE: Chronic | ICD-10-CM

## 2018-02-15 DIAGNOSIS — G47.33 OSA (OBSTRUCTIVE SLEEP APNEA): Chronic | ICD-10-CM

## 2018-02-15 DIAGNOSIS — F71 MODERATE MENTAL RETARDATION: Chronic | ICD-10-CM

## 2018-02-15 DIAGNOSIS — F63.9 IMPULSE CONTROL DISORDER: Chronic | ICD-10-CM

## 2018-02-15 DIAGNOSIS — F42.2 MIXED OBSESSIONAL THOUGHTS AND ACTS: ICD-10-CM

## 2018-02-15 DIAGNOSIS — K59.09 CHRONIC CONSTIPATION: Chronic | ICD-10-CM

## 2018-02-15 DIAGNOSIS — F84.0 AUTISTIC DISORDER, ACTIVE: Primary | Chronic | ICD-10-CM

## 2018-02-15 PROCEDURE — 90833 PSYTX W PT W E/M 30 MIN: CPT | Mod: PBBFAC,PO | Performed by: PSYCHIATRY & NEUROLOGY

## 2018-02-15 PROCEDURE — 90833 PSYTX W PT W E/M 30 MIN: CPT | Mod: S$GLB,,, | Performed by: PSYCHIATRY & NEUROLOGY

## 2018-02-15 PROCEDURE — 99213 OFFICE O/P EST LOW 20 MIN: CPT | Mod: PBBFAC,PO | Performed by: PSYCHIATRY & NEUROLOGY

## 2018-02-15 PROCEDURE — 3008F BODY MASS INDEX DOCD: CPT | Mod: S$GLB,,, | Performed by: PSYCHIATRY & NEUROLOGY

## 2018-02-15 PROCEDURE — 99214 OFFICE O/P EST MOD 30 MIN: CPT | Mod: S$GLB,,, | Performed by: PSYCHIATRY & NEUROLOGY

## 2018-02-15 PROCEDURE — 99999 PR PBB SHADOW E&M-EST. PATIENT-LVL III: CPT | Mod: PBBFAC,,, | Performed by: PSYCHIATRY & NEUROLOGY

## 2018-02-15 RX ORDER — CEPHALEXIN 500 MG/1
500 CAPSULE ORAL EVERY MORNING
Status: ON HOLD | COMMUNITY
Start: 2018-02-14 | End: 2018-04-28 | Stop reason: HOSPADM

## 2018-02-15 NOTE — PROGRESS NOTES
"ID: 29yo WM with PDD, Impulse Control d/o, anxiety d/o nos. Last saw RENNY Murillo 5/2016.     CC: anxiety    Interim Hx: presents on time with his mother and a new caregiver    In the interim we have dec'd the luvox back to 50mg po BID. At 100mg BID pt had diarrhea and sweating.     Today she reports, "well it's definitely helped because there's no more self injury but he is still sweating and he has these sort of jerks that I'm not sure about. I'm not sure what to do because the diarrhea is gone so now with that under control and the anxiety better, he's better in public."     Will cont at current dose and cont to eval the r/b assessment- I respect autonomy and feedback from mom and caregivers.    Big concern today is that current caregiver may be quitting soon, "there's been a chill in the air after I offered some corrections. She has not taken the feedback well."   Mom has also been fired from one of her 2 jobs and that has been a disappointment. Will begin looking soon.     On Psychiatric ROS:    Endorses good sleep- not taking anything for sleep (sleep better on busier days; when bored he tends to nap), denies anhedonia, denies feeling helpless/hopeless, denies dec energy, denies dec concentration, stable appetite, denies dec PMA, denies thoughts of SI/intent/plan.     Endorses feeling MORE easily overwhelmed. +ruminative thinking- chronic- more difficulty moving on to new topic, denies feeling tense/"on edge"  Once "fixated" on a topic, struggles to move on. Has some compulsory behaviors assoc with obsessive thinking.     PPHx: Endorses h/o self injury- will pick at scabs in sort of obsessive way- this has stabilized  Denies inpt psych hospitalization  Denies h/o suicide attempt     Current Psych Meds: **Trileptal 300mg po BID through neuro for sz d/o, xanax 0.25mg po daily PRN anxiety  Past Psych Meds: s/e's to mult prev meds to include zyprexa (wgt gain), lexapro 20mg po qam (anxiety and to suppress sex drive), " "buspar 10mg po BID    PMHx: obesity, onel w/ cipap, seizure d/o, scoliosis surgery at 11yo led to hemiparalysis- now walking but has neurogenic bladder and frequent catheterization, partial nephrectomy R kidney    SubstHx:   T- none  E- none  D- none  Caffeine- very rare, if ever    FamPHx: mAunt- schizophrenic, mcousin- schizophrenia    Musculoskeletal:  Muscle strength/Tone: no dyskinesia/ no tremor  Gait/Station- non antalgic, no assistance needed    MSE: appears stated age, well groomed, obese, appropriate dress- shorts/tshirt, engages well with examiner at a childlike level.  Good e/c. Speech reg rate and vol, nonpressured. more spontaneous speech today. Mood is "i'm fine" Affect congruent- sits calmly in chair, smiles and offers spontaneous speech- shares a picture from a recent [employer] parade. Oriented to month and season. Narrative memory intact. Intellectual function is below avg based on vocab and basic fund of knowledge- PDD long h/o sp ed. Thought is perseverative on my name and then later on his birthday. No tangentiality or circumstantiality. No FOI/WES. Denies SI/HI. Denies A/VH. No evidence of delusions. Insight lacking and Judgment in keeping with insight.     Blood pressure 132/68, pulse 78, height 5' 10" (1.778 m), weight 128.3 kg (282 lb 14.4 oz).    Suicide Risk Assessment:   Protective- age, no prior attempts, no prior hospitalizations, no family h/o attempts, no ongoing substance abuse, no psychosis, denies SI/intent/plan, no h/o violence, seeking treatment, access to treatment, future oriented, good primary support, no access to firearms    Risk- race, gender, ongoing Axis I sxs    **Pt is at LOW imminent and long term risk of suicide given current risk factors.    Assessment:  30yo WM with PDD, Impulse Control d/o, anxiety d/o nos. Last saw RENNY Murillo 5/2016. Pt presented initially with his mother and caregiver Helen who has been with the pt x 8mos. Currently the pt is doing very well. Of " "note pt is only on lexapro for anxiety, is also on trileptal 300mg po BID for seizure d/o through neuro and has HALEY on cipap. Has great family support, receives 88hrs per week of direct care from outside service provider, has assistance with all ADLs and IADLs but seems content with arrangement. No h/o violent or aggressive behavior. Had manic sxs with psychosis when on keppra but never before or since. Has recently stopped zyprexa due to significant weight gain and there has been no increase in behavior or anxiety. Per parent and caregiver there is some concern that a prev caregiver who was recently fired was motivated for the pt to be more sedated-  they both denied the pt needed any change in medication. Hussein is doing well, has some instances of intrusive behavior with young attractive women and he becomes difficult to redirect- added a trial of PRN buspar for days he has public outings- this has been effective and helpful. Today the incidents have become more difficult to redirect- will order a trial of xanax 0.25-0.5mg po daily PRN and also inc buspar PRN to 10mg daily as needed for public outings. Today on f/u pt's mother does not believe buspar has been effective- xanax effective but cannot be given in the moment as "it just happens so fast"- inability to redirect pt in public has become a safety concern for women and for the pt as authorities are not typically well trained in spectrum disorders (mom working to educate the police force). Warrants a transition to new ssri- will taper off lexapro and likely start luvox at the next appt. Last appt on f/u he is "no worse" without the meds- mom motivated to cont without as they cont to observe. Anxiety was mildly inc'd but hard to attribute to lack of med as his primary caregiver just left work abruptly with pregnancy complications- change of schedule and personnel hard for Hussein. Last appt 2 new providers, doing well with both, mom has placed cameras and she " has no concerns about the new hires. Routine back in place and ru had anxiety but was functioning well- mom would like to cont w/o meds. Will cont to observe. Last appt mom felt iw was time to intervene with meds- started trial of luvox which initially helped at 50mg bid, we then inc'd to 100mg bid with s/e of diarrhea and sweating, now back to 50mg po bid with resolution of most concerning s/e and still improvement in anxiety- will cont at this time but mom will cont to do r/b assessment. Pt's father with worsening health- leading to some anxiety, new caregiver may also not be a good fit. No acute safety concerns. rtc 2mos due to pt scheduled.      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 Cont luvox 50mg po BID  2. Cont Trileptal 300mg po BID per neuro  3. Cont xanax 0.25-0.5mg po daily PRN anxiety (rare use)  4. RTC 2mos    -Spent 30min face to face with the pt; >50% time spent in counseling   -Supportive therapy and psychoeducation provided  -R/B/SE's of medications discussed with the pt who expresses understanding and chooses to take medications as prescribed.   -Pt instructed to call clinic, 911 or go to nearest emergency room if sxs worsen or pt is in   crisis. The pt expresses understanding.

## 2018-03-04 DIAGNOSIS — F63.9 IMPULSE CONTROL DISORDER: Chronic | ICD-10-CM

## 2018-03-04 DIAGNOSIS — F42.8 OTHER OBSESSIVE-COMPULSIVE DISORDERS: ICD-10-CM

## 2018-03-05 RX ORDER — FLUVOXAMINE MALEATE 100 MG/1
50 TABLET, COATED ORAL 2 TIMES DAILY
Qty: 30 TABLET | Refills: 2 | Status: SHIPPED | OUTPATIENT
Start: 2018-03-05 | End: 2018-04-23 | Stop reason: SDUPTHER

## 2018-04-23 ENCOUNTER — OFFICE VISIT (OUTPATIENT)
Dept: PSYCHIATRY | Facility: CLINIC | Age: 33
End: 2018-04-23
Payer: COMMERCIAL

## 2018-04-23 VITALS
DIASTOLIC BLOOD PRESSURE: 81 MMHG | HEART RATE: 67 BPM | BODY MASS INDEX: 40.2 KG/M2 | SYSTOLIC BLOOD PRESSURE: 144 MMHG | HEIGHT: 70 IN | WEIGHT: 280.81 LBS

## 2018-04-23 DIAGNOSIS — F71 MODERATE MENTAL RETARDATION: Chronic | ICD-10-CM

## 2018-04-23 DIAGNOSIS — F63.9 IMPULSE CONTROL DISORDER: Chronic | ICD-10-CM

## 2018-04-23 DIAGNOSIS — G40.909 SEIZURE DISORDER: Chronic | ICD-10-CM

## 2018-04-23 DIAGNOSIS — F42.8 OTHER OBSESSIVE-COMPULSIVE DISORDERS: ICD-10-CM

## 2018-04-23 DIAGNOSIS — F84.0 AUTISTIC DISORDER, ACTIVE: Primary | Chronic | ICD-10-CM

## 2018-04-23 DIAGNOSIS — G47.33 OSA (OBSTRUCTIVE SLEEP APNEA): Chronic | ICD-10-CM

## 2018-04-23 DIAGNOSIS — F84.9 PERVASIVE DEVELOPMENTAL DISORDER, ACTIVE: Chronic | ICD-10-CM

## 2018-04-23 PROCEDURE — 99213 OFFICE O/P EST LOW 20 MIN: CPT | Mod: PBBFAC,PO | Performed by: PSYCHIATRY & NEUROLOGY

## 2018-04-23 PROCEDURE — 99214 OFFICE O/P EST MOD 30 MIN: CPT | Mod: S$GLB,,, | Performed by: PSYCHIATRY & NEUROLOGY

## 2018-04-23 PROCEDURE — 99999 PR PBB SHADOW E&M-EST. PATIENT-LVL III: CPT | Mod: PBBFAC,,, | Performed by: PSYCHIATRY & NEUROLOGY

## 2018-04-23 RX ORDER — FLUVOXAMINE MALEATE 100 MG/1
50 TABLET, COATED ORAL 2 TIMES DAILY
Qty: 30 TABLET | Refills: 2 | Status: SHIPPED | OUTPATIENT
Start: 2018-04-23 | End: 2018-06-25 | Stop reason: SDUPTHER

## 2018-04-23 NOTE — PROGRESS NOTES
"ID: 29yo WM with PDD, Impulse Control d/o, anxiety d/o nos. Last saw RENNY Murillo 5/2016.     CC: anxiety    Interim Hx: presents on time with his mother and a new caregiver, Trista.     "some things changed and the previous caregiver didn't work out." mother speaks broadly as the topic is negative in theme and tries to avoid using names or exact details in front of the pt- despite this, Hussein does become a bit more anxious in appt than I have seen in the past. Previous caregiver had been watching movies with adult content and foul language on ipad in front of Hussein- he would then use bad language and the caregiver would write this down as "inappropriate" or "concerns for the psychiatrist"- no longer working with them.     Per pt's mother self injury has been dec'd- continues dec'd luvox 50mg po BID. At 100mg BID pt had diarrhea and sweating.     Will cont at current dose and cont to eval the r/b assessment- I respect autonomy and feedback from mom and caregivers.    Pt's father's health improved at this time and this consistently leads to improvement for hussein as they spend a good deal of time together.     On Psychiatric ROS:    Endorses good sleep- not taking anything for sleep (sleep better on busier days; when bored he tends to nap), denies anhedonia, denies feeling helpless/hopeless, denies dec energy, denies dec concentration, stable appetite, denies dec PMA, denies thoughts of SI/intent/plan.     Endorses feeling less easily overwhelmed. Less self injury (no scabbing visible in appt- picks at "[employer]" when anxious), +ruminative thinking- chronic- more difficulty moving on to new topic, denies feeling tense/"on edge"  Once "fixated" on a topic, struggles to move on. Has some compulsory behaviors assoc with obsessive thinking.     PPHx: Endorses h/o self injury- will pick at scabs in sort of obsessive way- this has stabilized  Denies inpt psych hospitalization  Denies h/o suicide attempt     Current " "Psych Meds: **Trileptal 300mg po BID through neuro for sz d/o, xanax 0.25mg po daily PRN anxiety, luvox 50mg po BID  Past Psych Meds: s/e's to mult prev meds to include zyprexa (wgt gain), lexapro 20mg po qam (anxiety and to suppress sex drive), buspar 10mg po BID    PMHx: obesity, onel w/ cipap, seizure d/o, scoliosis surgery at 11yo led to hemiparalysis- now walking but has neurogenic bladder and frequent catheterization, partial nephrectomy R kidney    SubstHx:   T- none  E- none  D- none  Caffeine- very rare, if ever    FamPHx: mAunt- schizophrenic, mcousin- schizophrenia    Musculoskeletal:  Muscle strength/Tone: no dyskinesia/ no tremor  Gait/Station- non antalgic, no assistance needed    MSE: appears stated age, well groomed, obese, appropriate dress- shorts/tshirt, engages well with examiner at a childlike level.  Good e/c. Speech reg rate and vol, nonpressured. less spontaneous speech today. Mood is "i'm fine" Affect congruent- sits calmly in chair, smiles and offers spontaneous speech. Oriented to month and season. Narrative memory intact. Intellectual function is below avg based on vocab and basic fund of knowledge- PDD long h/o sp ed. Thought is perseverative on my name and then later on his birthday. No tangentiality or circumstantiality. No FOI/WES. Denies SI/HI. Denies A/VH. No evidence of delusions. Insight lacking and Judgment in keeping with insight.     Blood pressure (!) 144/81, pulse 67, height 5' 10" (1.778 m), weight 127.4 kg (280 lb 12.8 oz).    Suicide Risk Assessment:   Protective- age, no prior attempts, no prior hospitalizations, no family h/o attempts, no ongoing substance abuse, no psychosis, denies SI/intent/plan, no h/o violence, seeking treatment, access to treatment, future oriented, good primary support, no access to firearms    Risk- race, gender, ongoing Axis I sxs    **Pt is at LOW imminent and long term risk of suicide given current risk factors.    Assessment:  32yo WM with " "PDD, Impulse Control d/o, anxiety d/o nos. Last saw RENNY Murillo 5/2016. Pt presented initially with his mother and caregiver Helen who has been with the pt x 8mos. Currently the pt is doing very well. Of note pt is only on lexapro for anxiety, is also on trileptal 300mg po BID for seizure d/o through neuro and has HALEY on cipap. Has great family support, receives 88hrs per week of direct care from outside service provider, has assistance with all ADLs and IADLs but seems content with arrangement. No h/o violent or aggressive behavior. Had manic sxs with psychosis when on keppra but never before or since. Has recently stopped zyprexa due to significant weight gain and there has been no increase in behavior or anxiety. Per parent and caregiver there is some concern that a prev caregiver who was recently fired was motivated for the pt to be more sedated-  they both denied the pt needed any change in medication. Hussein is doing well, has some instances of intrusive behavior with young attractive women and he becomes difficult to redirect- added a trial of PRN buspar for days he has public outings- this has been effective and helpful. Today the incidents have become more difficult to redirect- will order a trial of xanax 0.25-0.5mg po daily PRN and also inc buspar PRN to 10mg daily as needed for public outings. Today on f/u pt's mother does not believe buspar has been effective- xanax effective but cannot be given in the moment as "it just happens so fast"- inability to redirect pt in public has become a safety concern for women and for the pt as authorities are not typically well trained in spectrum disorders (mom working to educate the police force). Warrants a transition to new ssri- will taper off lexapro and likely start luvox at the next appt. Last appt on f/u he is "no worse" without the meds- mom motivated to cont without as they cont to observe. Anxiety was mildly inc'd but hard to attribute to lack of med as " his primary caregiver just left work abruptly with pregnancy complications- change of schedule and personnel hard for uR. Last appt 2 new providers, doing well with both, mom has placed cameras and she has no concerns about the new hires. Routine back in place and ru had anxiety but was functioning well- mom would like to cont w/o meds. Will cont to observe. Last appt mom felt iw was time to intervene with meds- started trial of luvox which initially helped at 50mg bid, we then inc'd to 100mg bid with s/e of diarrhea and sweating, now back to 50mg po bid with resolution of most concerning s/e and still improvement in anxiety- will cont at this time but mom will cont to do r/b assessment. Pt's father with worsening health- leading to some anxiety, new caregiver now gone after some unprofessional behavior. Pt doing well on the dec'd luvox dose. No acute safety concerns. rtc 2mos due to pt schedule.      Axis I: OCD, Anxiety d/o NOS, Impulse control d/o  Axis II: Pervasive Dvpmtl D/O  Axis III: AHLEY on cipap, seizure d/o  Axis IV: chronic mental health, dependent for caregiving  Axis V: GAF 50    Plan:   1. Cont luvox 50mg po BID  2. Cont Trileptal 300mg po BID per neuro  3. Cont xanax 0.25-0.5mg po daily PRN anxiety (rare use)  4. RTC 2mos    -Spent 30min face to face with the pt; >50% time spent in counseling   -Supportive therapy and psychoeducation provided  -R/B/SE's of medications discussed with the pt who expresses understanding and chooses to take medications as prescribed.   -Pt instructed to call clinic, 911 or go to nearest emergency room if sxs worsen or pt is in   crisis. The pt expresses understanding.

## 2018-04-26 PROBLEM — A41.9 SEPSIS: Status: ACTIVE | Noted: 2018-04-26

## 2018-04-26 PROBLEM — N10 ACUTE PYELONEPHRITIS: Status: ACTIVE | Noted: 2018-04-26

## 2018-04-26 PROBLEM — N39.0 UTI (URINARY TRACT INFECTION): Status: ACTIVE | Noted: 2018-04-26

## 2018-04-26 PROBLEM — R73.9 HYPERGLYCEMIA: Status: ACTIVE | Noted: 2018-04-26

## 2018-04-26 PROBLEM — R74.01 TRANSAMINITIS: Status: ACTIVE | Noted: 2018-04-26

## 2018-04-28 ENCOUNTER — PATIENT MESSAGE (OUTPATIENT)
Dept: PSYCHIATRY | Facility: CLINIC | Age: 33
End: 2018-04-28

## 2018-04-29 ENCOUNTER — PATIENT MESSAGE (OUTPATIENT)
Dept: FAMILY MEDICINE | Facility: CLINIC | Age: 33
End: 2018-04-29

## 2018-04-29 ENCOUNTER — PATIENT MESSAGE (OUTPATIENT)
Dept: GASTROENTEROLOGY | Facility: CLINIC | Age: 33
End: 2018-04-29

## 2018-04-30 ENCOUNTER — OFFICE VISIT (OUTPATIENT)
Dept: GASTROENTEROLOGY | Facility: CLINIC | Age: 33
End: 2018-04-30
Payer: MEDICARE

## 2018-04-30 ENCOUNTER — TELEPHONE (OUTPATIENT)
Dept: FAMILY MEDICINE | Facility: CLINIC | Age: 33
End: 2018-04-30

## 2018-04-30 ENCOUNTER — TELEPHONE (OUTPATIENT)
Dept: GASTROENTEROLOGY | Facility: CLINIC | Age: 33
End: 2018-04-30

## 2018-04-30 ENCOUNTER — HOSPITAL ENCOUNTER (OUTPATIENT)
Dept: RADIOLOGY | Facility: HOSPITAL | Age: 33
Discharge: HOME | End: 2018-04-30
Attending: NURSE PRACTITIONER
Payer: MEDICARE

## 2018-04-30 VITALS
HEIGHT: 70 IN | BODY MASS INDEX: 40.3 KG/M2 | SYSTOLIC BLOOD PRESSURE: 119 MMHG | HEART RATE: 80 BPM | WEIGHT: 281.5 LBS | DIASTOLIC BLOOD PRESSURE: 71 MMHG

## 2018-04-30 DIAGNOSIS — K76.0 HEPATIC STEATOSIS: ICD-10-CM

## 2018-04-30 DIAGNOSIS — K59.04 CHRONIC IDIOPATHIC CONSTIPATION: ICD-10-CM

## 2018-04-30 DIAGNOSIS — R79.89 ELEVATED LIVER FUNCTION TESTS: ICD-10-CM

## 2018-04-30 DIAGNOSIS — K59.04 CHRONIC IDIOPATHIC CONSTIPATION: Primary | ICD-10-CM

## 2018-04-30 DIAGNOSIS — K92.1 HEMATOCHEZIA: ICD-10-CM

## 2018-04-30 PROCEDURE — 99999 PR PBB SHADOW E&M-EST. PATIENT-LVL IV: CPT | Mod: PBBFAC,,, | Performed by: NURSE PRACTITIONER

## 2018-04-30 PROCEDURE — 99214 OFFICE O/P EST MOD 30 MIN: CPT | Mod: S$GLB,,, | Performed by: NURSE PRACTITIONER

## 2018-04-30 PROCEDURE — 74019 RADEX ABDOMEN 2 VIEWS: CPT | Mod: TC,FY,PO

## 2018-04-30 PROCEDURE — 3008F BODY MASS INDEX DOCD: CPT | Mod: CPTII,S$GLB,, | Performed by: NURSE PRACTITIONER

## 2018-04-30 PROCEDURE — 74019 RADEX ABDOMEN 2 VIEWS: CPT | Mod: 26,,, | Performed by: RADIOLOGY

## 2018-04-30 RX ORDER — POLYETHYLENE GLYCOL 3350 17 G/17G
17 POWDER, FOR SOLUTION ORAL DAILY PRN
COMMUNITY
End: 2018-10-22

## 2018-04-30 NOTE — PATIENT INSTRUCTIONS
Constipation (Adult)  Constipation means that you have bowel movements that are less frequent than usual. Stools often become very hard and difficult to pass.  Constipation is very common. At some point in life it affects almost everyone. Since everyone's bowel habits are different, what is constipation to one person may not be to another. Your healthcare provider may do tests to diagnose constipation. It depends on what he or she finds when evaluating you.    Symptoms of constipation include:  · Abdominal pain  · Bloating  · Vomiting  · Painful bowel movements  · Itching, swelling, bleeding, or pain around the anus  Causes  Constipation can have many causes. These include:  · Diet low in fiber  · Too much dairy  · Not drinking enough liquids  · Lack of exercise or physical activity. This is especially true for older adults.  · Changes in lifestyle or daily routine, including pregnancy, aging, work, and travel  · Frequent use or misuse of laxatives  · Ignoring the urge to have a bowel movement or delaying it until later  · Medicines, such as certain prescription pain medicines, iron supplements, antacids, certain antidepressants, and calcium supplements  · Diseases like irritable bowel syndrome, bowel obstructions, stroke, diabetes, thyroid disease, Parkinson disease, hemorrhoids, and colon cancer  Complications  Potential complications of constipation can include:  · Hemorrhoids  · Rectal bleeding from hemorrhoids or anal fissures (skin tears)  · Hernias  · Dependency on laxatives  · Chronic constipation  · Fecal impaction  · Bowel obstruction or perforation  Home care  All treatment should be done after talking with your healthcare provider. This is especially true if you have another medical problems, are taking prescription medicines, or are an older adult. Treatment most often involves lifestyle changes. You may also need medicines. Your healthcare provider will tell you which will work best for you. Follow  the advice below to help avoid this problem in the future.  Lifestyle changes  These lifestyle changes can help prevent constipation:  · Diet. Eat a high-fiber diet, with fresh fruit and vegetables, and reduce dairy intake, meats, and processed foods  · Fluids. It's important to get enough fluids each day. Drink plenty of water when you eat more fiber. If you are on diet that limits the amount of fluid you can have, talk about this with your healthcare provider.  · Regular exercise. Check with your healthcare provider first.  Medications  Take any medicines as directed. Some laxatives are safe to use only every now and then. Others can be taken on a regular basis. Talk with your doctor or pharmacist if you have questions.  Prescription pain medicines can cause constipation. If you are taking this kind of medicine, ask your healthcare provider if you should also take a stool softener.  Medicines you may take to treat constipation include:  · Fiber supplements  · Stool softeners  · Laxatives  · Enemas  · Rectal suppositories  Follow-up care  Follow up with your healthcare provider if symptoms don't get better in the next few days. You may need to have more tests or see a specialist.  Call 911  Call 911 if any of these occur:  · Trouble breathing  · Stiff, rigid abdomen that is severely painful to touch  · Confusion  · Fainting or loss of consciousness  · Rapid heart rate  · Chest pain  When to seek medical advice  Call your healthcare provider right away if any of these occur:  · Fever over 100.4°F (38°C)  · Failure to resume normal bowel movements  · Pain in your abdomen or back gets worse  · Nausea or vomiting  · Swelling in your abdomen  · Blood in the stool  · Black, tarry stool  · Involuntary weight loss  · Weakness  Date Last Reviewed: 12/30/2015  © 2504-6907 [employer]. [address] [place] [address], [place] [address]. All rights reserved. This information is not intended as a substitute for  professional medical care. Always follow your healthcare professional's instructions.

## 2018-04-30 NOTE — TELEPHONE ENCOUNTER
----- Message from [place] RENETTA Gordon sent at 4/30/2018  9:42 AM CDT -----  Type:  Sooner Apoointment Request    Caller is requesting a sooner appointment.  Caller declined first available appointment listed below.  Caller will not accept being placed on the waitlist and is requesting a message be sent to doctor.    Name of Caller:  Eli mother  When is the first available appointment?  05/07/18  Symptoms:  Hospital follow up in 2 Weeks,   Best Call Back Number:  [phone]  Additional Information:  Patient was discharged from [facility] on 4/28/18 for acute UTI and sepsis. Also, Patient has also not had a bowel movement since Wednesday of last week and they have given him enema and suppositories and double this for several days.

## 2018-04-30 NOTE — TELEPHONE ENCOUNTER
----- Message from [place] RENETTA Gordon sent at 4/30/2018  9:36 AM CDT -----  Type:  Sooner Apoointment Request    Caller is requesting a sooner appointment.  Caller declined first available appointment listed below.  Caller will not accept being placed on the waitlist and is requesting a message be sent to doctor.    Name of Caller:  Eli mother  When is the first available appointment?  7/8/18  Symptoms:  Hospital follow up in 2 Weeks,   Best Call Back Number:  [phone]  Additional Information:  Patient was discharged from [facility] on 4/28/18 for acute UTI and sepsis. Also, Patient has also not had a bowel movement since Wednesday of last week and they have given him enema and suppositories and double this for several days.

## 2018-04-30 NOTE — TELEPHONE ENCOUNTER
I can not do a disimpaction or any other type of procedure in the clinic. If he has not had any type of BOM since Wednesday then he either needs to see Gi asap or go to the ED.

## 2018-04-30 NOTE — PROGRESS NOTES
"Subjective:       Patient ID: Hussein Nunes is a 32 y.o. male Body mass index is 40.39 kg/m².    Chief Complaint: Constipation    This patient is established with Dr. Villagomez & myself.    Patient is very pleasant and here with his mother, whom provided history due to patient's medical history of autism and mental retardation.  Reviewed discharge summary: "Admission Date: 4/26/2018  Hospital Length of Stay: 2 days  Discharge Date and Time: 4/28/2018 12:35 PM  Attending Physician: No att. providers found   Discharging Provider: Pilo Harrison MD  Primary Care Provider: Reji Zendejas Jr, MD        HPI:   31 yo male patient with PMHx significant for seizure-autism- moderate mental retardation, gout -renal cancer with partial nephrectomy with neurogenic bladder presented to the hospital complaining fever and foul smelling urine - HPI obtained from sitter at bedside -she states that patient is cathed by the mother x 5 times per day,and noticed change in the color  and smell of his urine -reported milky purulent discharge- and has been diaphoretic and sweating yesterday-temp was 102.8 F, she also reports that he has been not eating well-didn't eat his breakfast this am   Dr Schaffer started patient on macrobid yesterday and he is on cipro for suppresive therapy for recurrent UTI  Received rocephine in ED-Urology contacted and hospital medicine consulted to admit the patient   * No surgery found *    Hospital Course:   Patient admitted with fever and UTI. Patient started on broad spectrum antibiotics and cultures drawn. Urology consulted. Cultures remained negative. Patient had been on oral antibiotics at home and it was thought that this was the reason for negative cultures. Fever curve improved. Patient was eventually discharged home on oral antibiotics."      Constipation   This is a chronic problem. The current episode started more than 1 year ago (has had his whole life). The problem has been gradually worsening " (since hospital discharge 4/28/18 for kidney infection) since onset. Stool frequency: gradually worsened over the past 2 weeks; 3 little pieces of stool yesterday after enema and suppository. The stool is described as pellet like, formed and firm. The patient is on a high fiber diet. He exercises regularly (4-5 times a week at the [facility]). There has been adequate water intake. Associated symptoms include bloating, flatus and hematochezia (small bright red/pink tinge blood with yesterday's bowel movement, occurred once with that bowel movement; denies bleeding in between bowel movements). Pertinent negatives include no abdominal pain, diarrhea, fever, melena, nausea, rectal pain, vomiting or weight loss. Associated symptoms comments: borborygmus. Risk factors include obesity. He has tried laxatives, fiber and enemas (probiotic, miralax PRN, lactulose 20 grams BID; enema prn and suppositories prn- mild relief; taking augmentin for kidney infection currently; PAST: linzess believes it caused diarrhea) for the symptoms. The treatment provided moderate relief. His past medical history is significant for neurologic disease (history of stroke, austism, mental retardation, and seizures; sees neurology) and psychiatric history (seeing psych). There is no history of endocrine disease, inflammatory bowel disease or irritable bowel syndrome.     Review of Systems   Constitutional: Positive for appetite change (decreased recently). Negative for fever, unexpected weight change and weight loss.   HENT: Negative for trouble swallowing.    Respiratory: Negative for shortness of breath.    Cardiovascular: Negative for chest pain.   Gastrointestinal: Positive for anal bleeding, bloating, constipation, flatus and hematochezia (small bright red/pink tinge blood with yesterday's bowel movement, occurred once with that bowel movement; denies bleeding in between bowel movements). Negative for abdominal pain, blood in stool, diarrhea, melena,  nausea, rectal pain and vomiting.   Genitourinary:        History of neurogenic bladder and mother reports they catheterize him at home multiple times a day       Past Medical History:   Diagnosis Date    Autistic disorder, active 5/6/2013    Bowel obstruction     Early intervention counseling     Gout, chronic 11/12/2015    Grand mal seizure     Hepatic steatosis     Impulse control disorder, unspecified 5/6/2013    Mastoiditis     Moderate mental retardation 5/6/2013    Neurogenic bladder     Neurogenic bladder     HALEY (obstructive sleep apnea) 11/12/2015    CPAP.  Dr. Garber     Otitis media     Paraplegia     Pervasive developmental disorder, active 12/27/2015    Renal cancer 2010    Right    Scoliosis     paraplegia    Seizure disorder 11/12/2015    Stroke     one week old    Tardive dyskinesia      Past Surgical History:   Procedure Laterality Date    ANKLE FRACTURE SURGERY      BACK SURGERY  2000    COLONOSCOPY  10/28/2008    [facility]ana at [facility]; anal fissure, minimal pinpoint rectal erythema; floppy-type colon with very little tone; biopsy: rectum mild chronic proctitis with few eosinophils sent for scanning    INNER EAR SURGERY      mastoid    right partial nephrectomy Right 2010    Sees urology    TYMPANOSTOMY TUBE PLACEMENT       Family History   Problem Relation Age of Onset    Cancer Father         lymphoma    Cataracts Father     Colon polyps Father     Cataracts Mother     Cataracts Maternal Grandmother     Diabetes Maternal Grandmother     Macular degeneration Maternal Grandmother     Glaucoma Maternal Grandmother     Anesthesia problems Neg Hx     Clotting disorder Neg Hx     Colon cancer Neg Hx     Crohn's disease Neg Hx     Ulcerative colitis Neg Hx     Stomach cancer Neg Hx     Esophageal cancer Neg Hx      Wt Readings from Last 10 Encounters:   04/30/18 127.7 kg (281 lb 8.4 oz)   04/26/18 128.1 kg (282 lb 6.6 oz)   01/08/18 127.7 kg (281 lb 10.2 oz)    10/09/17 126.2 kg (278 lb 5.3 oz)   08/29/17 123 kg (271 lb 2.7 oz)   08/07/17 126.3 kg (278 lb 7.1 oz)   07/27/17 126.7 kg (279 lb 5.2 oz)   06/23/17 124.9 kg (275 lb 5.7 oz)   01/19/17 125.6 kg (277 lb)   11/09/16 126 kg (277 lb 12.5 oz)     Lab Results   Component Value Date    WBC 6.91 04/28/2018    HGB 14.4 04/28/2018    HCT 42.4 04/28/2018    MCV 95 04/28/2018     04/28/2018     CMP  Sodium   Date Value Ref Range Status   04/28/2018 142 136 - 145 mmol/L Final     Potassium   Date Value Ref Range Status   04/28/2018 4.5 3.5 - 5.1 mmol/L Final     Chloride   Date Value Ref Range Status   04/28/2018 103 95 - 110 mmol/L Final     CO2   Date Value Ref Range Status   04/28/2018 27 22 - 31 mmol/L Final     Glucose   Date Value Ref Range Status   04/28/2018 108 70 - 110 mg/dL Final     Comment:     The ADA recommends the following guidelines for fasting glucose:  Normal:       less than 100 mg/dL  Prediabetes:  100 mg/dL to 125 mg/dL  Diabetes:     126 mg/dL or higher       BUN, Bld   Date Value Ref Range Status   04/28/2018 8 (L) 9 - 21 mg/dL Final     Creatinine   Date Value Ref Range Status   04/28/2018 0.57 0.50 - 1.40 mg/dL Final     Calcium   Date Value Ref Range Status   04/28/2018 8.9 8.4 - 10.2 mg/dL Final     Total Protein   Date Value Ref Range Status   04/28/2018 7.1 6.0 - 8.4 g/dL Final     Albumin   Date Value Ref Range Status   04/28/2018 3.7 3.5 - 5.2 g/dL Final     Total Bilirubin   Date Value Ref Range Status   04/28/2018 0.4 0.2 - 1.3 mg/dL Final     Alkaline Phosphatase   Date Value Ref Range Status   04/28/2018 58 38 - 145 U/L Final     AST ([place])   Date Value Ref Range Status   03/12/2016 96 (H) 17 - 59 U/L Final     AST   Date Value Ref Range Status   04/28/2018 140 (H) 17 - 59 U/L Final     ALT   Date Value Ref Range Status   04/28/2018 106 (H) 10 - 44 U/L Final     Anion Gap   Date Value Ref Range Status   04/28/2018 12 8 - 16 mmol/L Final     eGFR if    Date  "Value Ref Range Status   04/28/2018 >60 >60 mL/min/1.73 m^2 Final     eGFR if non    Date Value Ref Range Status   04/28/2018 >60 >60 mL/min/1.73 m^2 Final     Comment:     Calculation used to obtain the estimated glomerular filtration  rate (eGFR) is the CKD-EPI equation.        Lab Results   Component Value Date    TSH 1.730 07/27/2017     Lab Results   Component Value Date    HEPAIGM Negative 07/27/2017    HEPBIGM Negative 07/27/2017    HEPCAB Negative 07/27/2017     Lab Results   Component Value Date    OCCULTBLOOD Negative 07/27/2017    OCCULTBLOOD Negative 07/27/2017    OCCULTBLOOD Negative 07/27/2017     Reviewed prior medical records including radiology report of 4/26/18 ct renal stone abdomen pelvis & 7/27/17 abdominal x-ray.    Previously reviewed medical records received from Dr. Castro and [facility], summarized below and in medical & surgical history (endoscopies, etc), was sent for scanning:   10/28/08 colonoscopy  7/8/14 barium enema: impression constipation; findings: "the colon is severely tortuous but nondilated. There is moderate stool throughout the colon. Colon otherwise demonstrates normal haustral pattern without evidence of fixed filling defect or stricture. Reflux into the terminal ileum is visualized"  Blood work from 2014 (elevated LFT with AST 75, ALT 57) and 2015- see copy for full results  Visit notes reviewed from Dr. Castro in 2014, 2015 (tried linzess)  Objective:      Physical Exam   Constitutional: He is oriented to person, place, and time. He appears well-developed and well-nourished. No distress.   HENT:   Mouth/Throat: Oropharynx is clear and moist and mucous membranes are normal. No oral lesions. No oropharyngeal exudate.   Eyes: Conjunctivae are normal. Pupils are equal, round, and reactive to light. No scleral icterus.   Cardiovascular: Normal rate.    Pulmonary/Chest: Effort normal and breath sounds normal. No respiratory distress. He has no wheezes.   Abdominal: " Soft. Normal appearance and bowel sounds are normal. He exhibits no distension, no abdominal bruit and no mass. There is no hepatosplenomegaly. There is no tenderness. There is no rigidity, no rebound, no guarding, no tenderness at McBurney's point and negative Hernandez's sign. No hernia.   Patient and mother both declined rectal exam.   Neurological: He is alert and oriented to person, place, and time.   Skin: Skin is warm and dry. No rash noted. He is not diaphoretic. No erythema. No pallor.   Non-jaundiced   Psychiatric: He is not agitated and not aggressive. Cognition and memory are impaired.   Patient is interactive and kind; speech understandable, but patient does not answer some questions appropriately and repetitive responses.   Nursing note and vitals reviewed.      Assessment:       1. Chronic idiopathic constipation    2. Elevated liver function tests    3. Hepatic steatosis    4. Hematochezia        Plan:       Chronic idiopathic constipation  -     X-Ray Abdomen Flat And Erect; Future; Expected date: 04/30/2018  -     TSH; Future; Expected date: 04/30/2018  - recommend taking 2 OTC dulcolax tablets and OTC magnesium citrate x1(295ml- take as directed)  tonight and can repeat another OTC magnesium citrate x1(295ml- take as directed) tomorrow  -  continue   lactulose (CHRONULAC) 20 gram/30 mL Soln; Take 30 mLs (20 g total) by mouth 2 (two) times daily.  Dispense: 900 mL; Refill: 0  Recommended daily exercise as tolerated, adequate water intake (six 8-oz glasses of water daily), and high fiber diet. OTC fiber supplements are recommended if diet does not reach daily fiber goal (25 grams daily), such as Metamucil, Citrucel, or FiberCon (take as directed, separate from other oral medications by >2 hours).  -Recommend taking an OTC stool softener such as Colace as directed to avoid hard stools and straining with bowel movements PRN  - can continue OTC MiraLax once daily (17g PO) as directed but if diarrhea  occurs recommend to take Miralax only PRN  - If no improvement with above recommendations, try intermittently dosed Dulcolax OTC as directed (every 3-4 days) PRN  to facilitate bowel movements  -If still no improvement with these measures, call/follow-up    Elevated liver function tests  -     US Abdomen Limited (LIVER); Future; Expected date: 04/30/2018  -     Hepatic function panel; Future; Expected date: 04/30/2018  -     Hepatitis panel, acute; Future; Expected date: 04/30/2018  -     CERULOPLASMIN; Future; Expected date: 04/30/2018  -     Iron and TIBC; Future; Expected date: 04/30/2018  -     Ferritin; Future; Expected date: 04/30/2018  -     KYREE; Future; Expected date: 04/30/2018  -     Antimitochondrial antibody; Future; Expected date: 04/30/2018  -     ALPHA-1-ANTITRYPSIN; Future; Expected date: 04/30/2018  -     Gamma GT; Future; Expected date: 04/30/2018  -     Anti-smooth muscle antibody; Future; Expected date: 04/30/2018    Hepatic steatosis  -     US Abdomen Limited (LIVER); Future; Expected date: 04/30/2018  -     Hepatic function panel; Future; Expected date: 04/30/2018  For fatty liver recommend: low fat, low cholesterol diet, maintain good control of blood sugars and cholesterol levels, exercise, weight loss, minimize/avoid alcohol and tylenol products, & follow-up with PCP for continued evaluation and management; if specialist is needed, recommend seeing hepatology.    Hematochezia  -     CBC auto differential; Future; Expected date: 04/30/2018  - discussed about different etiologies that can cause rectal bleeding, such as diverticulosis, polyps, colon inflammation or infection, anal fissure or hemorrhoids.   - schedule Colonoscopy, discussed procedure with the patient and mother, mother verbalized understanding and will schedule at follow-up  - You may resume normal activity as long as you feel well.  - Avoid/minimize aspirin and anti-inflammatory drugs such as ibuprofen (Advil, Motrin) and  naproxen (Aleve and Naprosyn).  - Avoid alcohol.  - recommend SITZ baths    Follow-up in about 2 weeks (around 5/14/2018), or if symptoms worsen or fail to improve.      If no improvement in symptoms or symptoms worsen, call/follow-up at clinic or go to ER.

## 2018-04-30 NOTE — TELEPHONE ENCOUNTER
Spoke with pt mother appointment made with joel carvalho for hospital follow up.   Pt has not had a BM since Wednesday of last week. She has tried double his dose of miralax. She tried an enema all she got back was water and a lot of gas. She is instructed to take him to the ED for eval of the problem and make sure he doesn't have an obstruction. She understands. She said she has a call into gastro and she wants to wait on their call

## 2018-05-01 ENCOUNTER — PATIENT MESSAGE (OUTPATIENT)
Dept: GASTROENTEROLOGY | Facility: CLINIC | Age: 33
End: 2018-05-01

## 2018-05-01 RX ORDER — POLYETHYLENE GLYCOL 3350, SODIUM SULFATE ANHYDROUS, SODIUM BICARBONATE, SODIUM CHLORIDE, POTASSIUM CHLORIDE 236; 22.74; 6.74; 5.86; 2.97 G/4L; G/4L; G/4L; G/4L; G/4L
4 POWDER, FOR SOLUTION ORAL ONCE
Qty: 4000 ML | Refills: 0 | Status: SHIPPED | OUTPATIENT
Start: 2018-05-01 | End: 2018-05-01

## 2018-05-02 ENCOUNTER — PATIENT MESSAGE (OUTPATIENT)
Dept: GASTROENTEROLOGY | Facility: CLINIC | Age: 33
End: 2018-05-02

## 2018-05-02 DIAGNOSIS — Z87.19 HISTORY OF CHRONIC CONSTIPATION: Primary | ICD-10-CM

## 2018-05-03 ENCOUNTER — PATIENT MESSAGE (OUTPATIENT)
Dept: GASTROENTEROLOGY | Facility: CLINIC | Age: 33
End: 2018-05-03

## 2018-05-03 ENCOUNTER — HOSPITAL ENCOUNTER (OUTPATIENT)
Dept: RADIOLOGY | Facility: HOSPITAL | Age: 33
Discharge: HOME | End: 2018-05-03
Attending: NURSE PRACTITIONER
Payer: MEDICARE

## 2018-05-03 ENCOUNTER — TELEPHONE (OUTPATIENT)
Dept: GASTROENTEROLOGY | Facility: CLINIC | Age: 33
End: 2018-05-03

## 2018-05-03 DIAGNOSIS — R79.89 ELEVATED LFTS: Primary | ICD-10-CM

## 2018-05-03 DIAGNOSIS — Z87.19 HISTORY OF CHRONIC CONSTIPATION: ICD-10-CM

## 2018-05-03 PROCEDURE — 74019 RADEX ABDOMEN 2 VIEWS: CPT | Mod: 26,,, | Performed by: RADIOLOGY

## 2018-05-03 PROCEDURE — 74019 RADEX ABDOMEN 2 VIEWS: CPT | Mod: TC,FY,PO

## 2018-05-03 NOTE — TELEPHONE ENCOUNTER
----- Message from Lorrie Paris sent at 5/3/2018  4:28 PM CDT -----  Contact: mom, Eli  mom, Eli, [phone] of patient Hussein Alvarez is returning the office call.  IM sent to nurse, but no response.  Please advise.  Thanks!

## 2018-05-03 NOTE — TELEPHONE ENCOUNTER
Discussed case & results with Dr. Villagomez. He agreed with abdominal ultrasound and recommended repeat liver function levels in 1 month. Dr. Villagomez recommended scheduling colonoscopy to further evaluate constipation.    Please call to inform & review the results with the patient- blood work showed liver function levels remain elevated and are continuing to increase; elevated GGT and ferritin- recommend scheduling ultrasound as previously ordered and repeating blood work in 1 month; negative hepatitis panel; other labs were negative/normal.  Dr. Villagomez recommended scheduling colonoscopy to further evaluate patient's symptoms & constipation.  Continue with previous recommendations. If no improvement in symptoms or symptoms worsen, call/follow-up at clinic or go to ER.  Please release results to patient's mychart once you have discussed results and recommendations with patient.  Thanks,  Qiana LAUREN

## 2018-05-07 ENCOUNTER — OFFICE VISIT (OUTPATIENT)
Dept: FAMILY MEDICINE | Facility: CLINIC | Age: 33
End: 2018-05-07
Payer: MEDICARE

## 2018-05-07 VITALS
HEART RATE: 77 BPM | BODY MASS INDEX: 39.7 KG/M2 | OXYGEN SATURATION: 97 % | SYSTOLIC BLOOD PRESSURE: 122 MMHG | HEIGHT: 70 IN | DIASTOLIC BLOOD PRESSURE: 72 MMHG | WEIGHT: 277.31 LBS

## 2018-05-07 DIAGNOSIS — N12 PYELONEPHRITIS: Primary | ICD-10-CM

## 2018-05-07 DIAGNOSIS — R73.09 ELEVATED GLUCOSE: ICD-10-CM

## 2018-05-07 DIAGNOSIS — Z13.6 ENCOUNTER FOR SCREENING FOR CARDIOVASCULAR DISORDERS: ICD-10-CM

## 2018-05-07 DIAGNOSIS — R73.09 HEMOGLOBIN A1C ABOVE REFERENCE RANGE: ICD-10-CM

## 2018-05-07 LAB
BACTERIA #/AREA URNS HPF: ABNORMAL /HPF
BILIRUB UR QL STRIP: NEGATIVE
CLARITY UR: CLEAR
COLOR UR: YELLOW
GLUCOSE UR QL STRIP: NEGATIVE
HGB UR QL STRIP: ABNORMAL
KETONES UR QL STRIP: NEGATIVE
LEUKOCYTE ESTERASE UR QL STRIP: ABNORMAL
MICROSCOPIC COMMENT: ABNORMAL
NITRITE UR QL STRIP: NEGATIVE
PH UR STRIP: 7 [PH] (ref 5–8)
PROT UR QL STRIP: ABNORMAL
RBC #/AREA URNS HPF: 2 /HPF (ref 0–4)
SP GR UR STRIP: 1.02 (ref 1–1.03)
URN SPEC COLLECT METH UR: ABNORMAL
WBC #/AREA URNS HPF: 60 /HPF (ref 0–5)
WBC CLUMPS URNS QL MICRO: ABNORMAL

## 2018-05-07 PROCEDURE — 87086 URINE CULTURE/COLONY COUNT: CPT

## 2018-05-07 PROCEDURE — 87077 CULTURE AEROBIC IDENTIFY: CPT

## 2018-05-07 PROCEDURE — 81000 URINALYSIS NONAUTO W/SCOPE: CPT | Mod: PO

## 2018-05-07 PROCEDURE — 3008F BODY MASS INDEX DOCD: CPT | Mod: CPTII,S$GLB,, | Performed by: NURSE PRACTITIONER

## 2018-05-07 PROCEDURE — 99999 PR PBB SHADOW E&M-EST. PATIENT-LVL IV: CPT | Mod: PBBFAC,,, | Performed by: NURSE PRACTITIONER

## 2018-05-07 PROCEDURE — 87088 URINE BACTERIA CULTURE: CPT

## 2018-05-07 PROCEDURE — 99214 OFFICE O/P EST MOD 30 MIN: CPT | Mod: S$GLB,,, | Performed by: NURSE PRACTITIONER

## 2018-05-07 PROCEDURE — 87186 SC STD MICRODIL/AGAR DIL: CPT

## 2018-05-07 RX ORDER — MUPIROCIN 20 MG/G
OINTMENT TOPICAL 3 TIMES DAILY
Qty: 22 G | Refills: 0 | Status: SHIPPED | OUTPATIENT
Start: 2018-05-07 | End: 2019-02-19 | Stop reason: SDUPTHER

## 2018-05-07 NOTE — PROGRESS NOTES
Subjective:       Patient ID: Hussein Doyle is a 32 y.o. male.    Chief Complaint: Results and Insect Bite    Patient is here for hospitak follow up for an episode of pyelonephritis. He is accompanied by his father and a caretaker. They report he has been doing well since discharge. He does have follow up scheudled with urology for neurogenic bladder and recurrent uti. He does have follow up scheduled with GI for chronic constipation. Recent labs showed a mildly elevated hga1c, Lab Results       Component                Value               Date                       HGBA1C                   6.2 (H)             04/26/2018            Hospital Discharge Summary:  Patient Name: Hussein Nunes  MRN: [id number]  Admission Date: 4/26/2018  Hospital Length of Stay: 2 days  Discharge Date and Time: 4/28/2018 12:35 PM  Attending Physician: No att. providers found   Discharging Provider: Pilo Harrison MD  Primary Care Provider: Reji Zendejas Jr, MD        HPI:   31 yo male patient with PMHx significant for seizure-autism- moderate mental retardation, gout -renal cancer with partial nephrectomy with neurogenic bladder presented to the hospital complaining fever and foul smelling urine - HPI obtained from sitter at bedside -she states that patient is cathed by the mother x 5 times per day,and noticed change in the color  and smell of his urine -reported milky purulent discharge- and has been diaphoretic and sweating yesterday-temp was 102.8 F, she also reports that he has been not eating well-didn't eat his breakfast this am   Dr Schaffer started patient on macrobid yesterday and he is on cipro for suppresive therapy for recurrent UTI  Received rocephine in ED-Urology contacted and hospital medicine consulted to admit the patient         * No surgery found *       Hospital Course:   Patient admitted with fever and UTI. Patient started on broad spectrum antibiotics and cultures drawn. Urology consulted. Cultures  remained negative. Patient had been on oral antibiotics at home and it was thought that this was the reason for negative cultures. Fever curve improved. Patient was eventually discharged home on oral antibiotics.       Consults:   Consults         Status Ordering Provider        Inpatient consult to Urology  Once    Provider:  Fredo Schaffer MD   Completed IWONA MCDONALD           No new Assessment & Plan notes have been filed under this hospital service since the last note was generated.  Service: Hospital Medicine     Final Active Diagnoses:    Diagnosis Date Noted POA   PRINCIPAL PROBLEM:  Acute pyelonephritis (N10) 04/26/2018 Yes   Class 3 obesity with body mass index (BMI) of 40.0 to 44.9 in adult (E66.9, Z68.41) 04/27/2018 Not Applicable   UTI (urinary tract infection) (N39.0) 04/26/2018 Yes   Hyperglycemia (R73.9) 04/26/2018 Unknown   Sepsis (A41.9) 04/26/2018 Yes   Transaminitis (R74.0) 04/26/2018 Unknown   Gout, chronic (M1A.9XX0) 11/12/2015 Yes      Chronic     Problems Resolved During this Admission:    Diagnosis Date Noted Date Resolved POA        Discharged Condition: fair     Disposition: Home or Self Care     Follow Up:  Follow-up Information       Reji Zendejas Jr, MD In 2 weeks.   Specialty:  Family Medicine  Contact information:  [address] [place] [id number] [phone]          TETANUS VACCINE due on 11/07/2003    Past Medical History:  Past Medical History:  5/6/2013: Autistic disorder, active  No date: Bowel obstruction  No date: Early intervention counseling  11/12/2015: Gout, chronic  No date: Grand mal seizure  No date: Hepatic steatosis  5/6/2013: Impulse control disorder, unspecified  No date: Mastoiditis  5/6/2013: Moderate mental retardation  No date: Neurogenic bladder  No date: Neurogenic bladder  11/12/2015: HALEY (obstructive sleep apnea)      Comment: CPAP.  Dr. Garber   No date: Otitis media  No date: Paraplegia  12/27/2015: Pervasive developmental disorder,  active  2010: Renal cancer      Comment: Right  No date: Scoliosis      Comment: paraplegia  11/12/2015: Seizure disorder  No date: Stroke      Comment: one week old  No date: Tardive dyskinesia  Past Surgical History:  No date: ANKLE FRACTURE SURGERY  2000: BACK SURGERY  10/28/2008: COLONOSCOPY      Comment: [facility]. Arana at [facility]; anal fissure, minimal                pinpoint rectal erythema; floppy-type colon                with very little tone; biopsy: rectum mild                chronic proctitis with few eosinophils sent for               scanning  No date: INNER EAR SURGERY      Comment: mastoid  2010: right partial nephrectomy Right      Comment: Sees urology  No date: TYMPANOSTOMY TUBE PLACEMENT  Review of patient's allergies indicates:   -- Benadryl (diphenhydramine hcl) -- Other (See Comments)    --  Increases anxiety and more restless   -- Keppra (levetiracetam) -- Other (See Comments)    --  agitation   -- Ativan (lorazepam) -- Anxiety   -- Abilify  (aripiprazole)     --  Other reaction(s): arrhythmia   -- Clonazepam     --  Other reaction(s): unknown   -- Cough syrup w/decongestant     --  Other reaction(s): auditory hallucinations   -- Diazepam     --  Other reaction(s): tongue swelling   -- Haloperidol -- Other (See Comments)   -- Phenytoin sodium extended     --  Other reaction(s): unknown   -- Quetiapine     --  Other reaction(s): sweating             Other reaction(s): sedation             Other reaction(s): sweating             Other reaction(s): sedation   -- Risperidone     --  Other reaction(s): bladder incontinence   -- Thimerosal     --  Other reaction(s): seizure   -- Ziprasidone hcl -- Other (See Comments)    --  Other reaction(s): tonic clonic seizure             seizure  Current Outpatient Prescriptions on File Prior to Visit:  allopurinol (ZYLOPRIM) 100 MG tablet, TAKE 1 TABLET BY MOUTH TWICE A DAY, Disp: 60 tablet, Rfl: 11  fluvoxaMINE (LUVOX) 100 MG tablet, Take 0.5 tablets (50 mg total)  by mouth 2 (two) times daily., Disp: 30 tablet, Rfl: 2  L. ACIDOPHILUS/BIFID. ANIMALIS (CHEWABLE PROBIOTIC ORAL), Take by mouth 2 (two) times daily., Disp: , Rfl:   lactulose (CHRONULAC) 10 gram/15 mL solution, Take 30 mLs (20 g total) by mouth 2 (two) times daily. (Patient taking differently: Take 20 g by mouth 2 (two) times daily. ), Disp: 1800 mL, Rfl: 6  multivitamin capsule, Take 1 capsule by mouth every morning. , Disp: , Rfl:    OXcarbazepine (TRILEPTAL) 300 MG Tab, TAKE 1 TABLET (300 MG TOTAL) BY MOUTH 2 (TWO) TIMES DAILY., Disp: 60 tablet, Rfl: 11  polyethylene glycol (GLYCOLAX) 17 gram/dose powder, Take 17 g by mouth daily as needed., Disp: , Rfl:   tamsulosin (FLOMAX) 0.4 mg Cp24, Take 0.4 mg by mouth every evening. Every day, Disp: , Rfl:   wheat dextrin 3 gram/3.5 gram PwPk, Take 3.5 g by mouth every morning. , Disp: , Rfl:     No current facility-administered medications on file prior to visit.     Social History    Marital status: Single              Spouse name:                       Years of education:                 Number of children:               Occupational History    None on file    Social History Main Topics    Smoking status: Never Smoker                                                                Smokeless tobacco: Never Used                        Alcohol use: No              Drug use: No              Sexual activity: No                   Other Topics            Concern    None on file    Social History Narrative    Lives with his parents.  More dependent with ADLs.  Very active in the community.        New Opportunities jhoan.      Review of patient's family history indicates:  Problem: Cancer      Relation: Father       Age of Onset: (Not Specified)       Comment: lymphoma  Problem: Cataracts      Relation: Father       Age of Onset: (Not Specified)   Problem: Colon polyps      Relation: Father       Age of Onset: (Not Specified)   Problem: Cataracts      Relation: Mother       Age  of Onset: (Not Specified)   Problem: Cataracts      Relation: Maternal Grandmother       Age of Onset: (Not Specified)   Problem: Diabetes      Relation: Maternal Grandmother       Age of Onset: (Not Specified)   Problem: Macular degeneration      Relation: Maternal Grandmother       Age of Onset: (Not Specified)   Problem: Glaucoma      Relation: Maternal Grandmother       Age of Onset: (Not Specified)   Problem: Anesthesia problems      Relation: Neg Hx       Age of Onset: (Not Specified)   Problem: Clotting disorder      Relation: Neg Hx       Age of Onset: (Not Specified)   Problem: Colon cancer      Relation: Neg Hx       Age of Onset: (Not Specified)   Problem: Crohn's disease      Relation: Neg Hx       Age of Onset: (Not Specified)   Problem: Ulcerative colitis      Relation: Neg Hx       Age of Onset: (Not Specified)   Problem: Stomach cancer      Relation: Neg Hx       Age of Onset: (Not Specified)   Problem: Esophageal cancer      Relation: Neg Hx       Age of Onset: (Not Specified)             Review of Systems   Constitutional: Positive for activity change. Negative for unexpected weight change.   HENT: Negative for hearing loss, rhinorrhea and trouble swallowing.    Eyes: Negative for discharge and visual disturbance.   Respiratory: Negative for chest tightness and wheezing.    Cardiovascular: Negative for chest pain and palpitations.   Gastrointestinal: Positive for blood in stool and constipation. Negative for diarrhea and vomiting.   Endocrine: Positive for polyuria. Negative for polydipsia.   Genitourinary: Negative for difficulty urinating, hematuria and urgency.   Musculoskeletal: Negative for arthralgias, joint swelling and neck pain.   Neurological: Positive for weakness. Negative for headaches.   Psychiatric/Behavioral: Positive for dysphoric mood. Negative for confusion.       Objective:      Physical Exam   Constitutional: No distress.   Eyes: Pupils are equal, round, and reactive to light.    Cardiovascular: Normal rate and regular rhythm.  Exam reveals no friction rub.    No murmur heard.  Pulmonary/Chest: Effort normal and breath sounds normal. No respiratory distress. He has no wheezes.   Abdominal: He exhibits no distension. There is no tenderness.   Musculoskeletal: He exhibits no edema.   Neurological: He is alert.   Skin: He is not diaphoretic.       Assessment:       1. Pyelonephritis    2. Elevated glucose    3. Hemoglobin A1c above reference range    4. Encounter for screening for cardiovascular disorders         Plan:     Keep urology follow up and Gi follow up, no medication changes for now. Care takers deniy any need for equipment or services in the home to care for patient. Follow up with PCP as scheduled.   1. Pyelonephritis  Recheck UA and culture to ensure infection slearing, patient currently on keflex 500 mg daily as maintennace.   - CULTURE, URINE  - URINALYSIS    2. Elevated glucose  Repeat Hga1c scheduled, diet and exercsie discussed, hga1c goals discussed.     3. Hemoglobin A1c above reference range  Repeat Hga1c scheduled, diet and exercsie discussed, hga1c goals discussed.       - Hemoglobin A1c; Future    4. Encounter for screening for cardiovascular disorders   Due for lipid screening.   - Lipid panel; Future

## 2018-05-08 ENCOUNTER — PATIENT MESSAGE (OUTPATIENT)
Dept: FAMILY MEDICINE | Facility: CLINIC | Age: 33
End: 2018-05-08

## 2018-05-09 LAB — BACTERIA UR CULT: NORMAL

## 2018-05-16 ENCOUNTER — OFFICE VISIT (OUTPATIENT)
Dept: GASTROENTEROLOGY | Facility: CLINIC | Age: 33
End: 2018-05-16
Payer: MEDICARE

## 2018-05-16 VITALS
HEIGHT: 70 IN | SYSTOLIC BLOOD PRESSURE: 139 MMHG | DIASTOLIC BLOOD PRESSURE: 78 MMHG | BODY MASS INDEX: 40.05 KG/M2 | HEART RATE: 82 BPM | WEIGHT: 279.75 LBS

## 2018-05-16 DIAGNOSIS — K59.09 CHRONIC CONSTIPATION: Primary | ICD-10-CM

## 2018-05-16 DIAGNOSIS — Z87.19 HISTORY OF RECTAL BLEEDING: ICD-10-CM

## 2018-05-16 DIAGNOSIS — R79.89 ELEVATED LIVER FUNCTION TESTS: ICD-10-CM

## 2018-05-16 DIAGNOSIS — K76.0 HEPATIC STEATOSIS: ICD-10-CM

## 2018-05-16 DIAGNOSIS — Z87.19 HISTORY OF COLITIS: ICD-10-CM

## 2018-05-16 PROCEDURE — 3008F BODY MASS INDEX DOCD: CPT | Mod: CPTII,S$GLB,, | Performed by: NURSE PRACTITIONER

## 2018-05-16 PROCEDURE — 99999 PR PBB SHADOW E&M-EST. PATIENT-LVL IV: CPT | Mod: PBBFAC,,, | Performed by: NURSE PRACTITIONER

## 2018-05-16 PROCEDURE — 99214 OFFICE O/P EST MOD 30 MIN: CPT | Mod: S$GLB,,, | Performed by: NURSE PRACTITIONER

## 2018-05-16 NOTE — PROGRESS NOTES
"Subjective:       Patient ID: Hussein Doyle is a 32 y.o. male Body mass index is 40.14 kg/m².    Chief Complaint: Follow-up ( doing well)    This patient is established with Dr. Villagomez & myself.    Patient is very pleasant and here with his mother and caregiver, whom assisted/provided history due to patient's medical history of autism and mental retardation.  Reviewed discharge summary: "Admission Date: 4/26/2018  Hospital Length of Stay: 2 days  Discharge Date and Time: 4/28/2018 12:35 PM  Attending Physician: No att. providers found   Discharging Provider: Pilo Harrison MD  Primary Care Provider: Reji Zendejas Jr, MD        HPI:   33 yo male patient with PMHx significant for seizure-autism- moderate mental retardation, gout -renal cancer with partial nephrectomy with neurogenic bladder presented to the hospital complaining fever and foul smelling urine - HPI obtained from sitter at bedside -she states that patient is cathed by the mother x 5 times per day,and noticed change in the color  and smell of his urine -reported milky purulent discharge- and has been diaphoretic and sweating yesterday-temp was 102.8 F, she also reports that he has been not eating well-didn't eat his breakfast this am   Dr Schfafer started patient on macrobid yesterday and he is on cipro for suppresive therapy for recurrent UTI  Received rocephine in ED-Urology contacted and hospital medicine consulted to admit the patient   * No surgery found *    Hospital Course:   Patient admitted with fever and UTI. Patient started on broad spectrum antibiotics and cultures drawn. Urology consulted. Cultures remained negative. Patient had been on oral antibiotics at home and it was thought that this was the reason for negative cultures. Fever curve improved. Patient was eventually discharged home on oral antibiotics."      Constipation   This is a chronic problem. The current episode started more than 1 year ago (has had his whole life). " The problem has been rapidly improving since onset. Stool frequency: improved since our last visit, has been typically daily to once every two days of formed stool to loose stools. The stool is described as formed and loose. The patient is on a high fiber diet. He exercises regularly (4-5 times a week at the [facility]). There has been adequate water intake. Associated symptoms include bloating (improved) and flatus. Pertinent negatives include no abdominal pain, diarrhea, fever, hematochezia (has resolved since our last visit), melena, nausea, rectal pain, vomiting or weight loss. Associated symptoms comments: Borborygmus- has resolved. Risk factors include obesity. He has tried laxatives, fiber and enemas (probiotic, miralax PRN (hasn't taken recently), lactulose 20 grams BID; enema prn and suppositories prn- mild relief; PAST: linzess believes it caused diarrhea, magnesium citrate, golytely) for the symptoms. The treatment provided moderate relief. His past medical history is significant for neurologic disease (history of stroke, austism, mental retardation, and seizures; sees neurology) and psychiatric history (seeing psych). There is no history of endocrine disease, inflammatory bowel disease or irritable bowel syndrome.     Review of Systems   Constitutional: Negative for appetite change (back to normal), fever, unexpected weight change and weight loss.   HENT: Negative for trouble swallowing.    Respiratory: Negative for shortness of breath.    Cardiovascular: Negative for chest pain.   Gastrointestinal: Positive for bloating (improved), constipation and flatus. Negative for abdominal pain, anal bleeding, blood in stool, diarrhea, hematochezia (has resolved since our last visit), melena, nausea, rectal pain and vomiting.   Genitourinary:        History of neurogenic bladder and mother reports they catheterize him at home multiple times a day       Past Medical History:   Diagnosis Date    Autistic disorder, active  5/6/2013    Bowel obstruction     Early intervention counseling     Gout, chronic 11/12/2015    Grand mal seizure     Hepatic steatosis     Impulse control disorder, unspecified 5/6/2013    Mastoiditis     Moderate mental retardation 5/6/2013    Neurogenic bladder     Neurogenic bladder     HALEY (obstructive sleep apnea) 11/12/2015    CPAP.  Dr. Garber     Otitis media     Paraplegia     Pervasive developmental disorder, active 12/27/2015    Renal cancer 2010    Right    Scoliosis     paraplegia    Seizure disorder 11/12/2015    Stroke     one week old    Tardive dyskinesia      Past Surgical History:   Procedure Laterality Date    ANKLE FRACTURE SURGERY      BACK SURGERY  2000    COLONOSCOPY  10/28/2008    [facility]; anal fissure, minimal pinpoint rectal erythema; floppy-type colon with very little tone; biopsy: rectum mild chronic proctitis with few eosinophils sent for scanning    INNER EAR SURGERY      mastoid    right partial nephrectomy Right 2010    Sees urology    TYMPANOSTOMY TUBE PLACEMENT       Family History   Problem Relation Age of Onset    Cancer Father         lymphoma    Cataracts Father     Colon polyps Father     Cataracts Mother     Cataracts Maternal Grandmother     Diabetes Maternal Grandmother     Macular degeneration Maternal Grandmother     Glaucoma Maternal Grandmother     Anesthesia problems Neg Hx     Clotting disorder Neg Hx     Colon cancer Neg Hx     Crohn's disease Neg Hx     Ulcerative colitis Neg Hx     Stomach cancer Neg Hx     Esophageal cancer Neg Hx      Wt Readings from Last 10 Encounters:   05/16/18 126.9 kg (279 lb 12.2 oz)   05/07/18 125.8 kg (277 lb 5.4 oz)   04/30/18 127.7 kg (281 lb 8.4 oz)   04/26/18 128.1 kg (282 lb 6.6 oz)   01/08/18 127.7 kg (281 lb 10.2 oz)   10/09/17 126.2 kg (278 lb 5.3 oz)   08/29/17 123 kg (271 lb 2.7 oz)   08/07/17 126.3 kg (278 lb 7.1 oz)   07/27/17 126.7 kg (279 lb 5.2 oz)   06/23/17 124.9 kg (275  lb 5.7 oz)     Lab Results   Component Value Date    WBC 9.12 04/30/2018    HGB 15.1 04/30/2018    HCT 43.0 04/30/2018    MCV 93 04/30/2018     04/30/2018     CMP  Sodium   Date Value Ref Range Status   04/28/2018 142 136 - 145 mmol/L Final     Potassium   Date Value Ref Range Status   04/28/2018 4.5 3.5 - 5.1 mmol/L Final     Chloride   Date Value Ref Range Status   04/28/2018 103 95 - 110 mmol/L Final     CO2   Date Value Ref Range Status   04/28/2018 27 22 - 31 mmol/L Final     Glucose   Date Value Ref Range Status   04/28/2018 108 70 - 110 mg/dL Final     Comment:     The ADA recommends the following guidelines for fasting glucose:  Normal:       less than 100 mg/dL  Prediabetes:  100 mg/dL to 125 mg/dL  Diabetes:     126 mg/dL or higher       BUN, Bld   Date Value Ref Range Status   04/28/2018 8 (L) 9 - 21 mg/dL Final     Creatinine   Date Value Ref Range Status   04/28/2018 0.57 0.50 - 1.40 mg/dL Final     Calcium   Date Value Ref Range Status   04/28/2018 8.9 8.4 - 10.2 mg/dL Final     Total Protein   Date Value Ref Range Status   04/30/2018 8.2 6.0 - 8.4 g/dL Final     Albumin   Date Value Ref Range Status   04/30/2018 3.7 3.5 - 5.2 g/dL Final     Total Bilirubin   Date Value Ref Range Status   04/30/2018 0.8 0.1 - 1.0 mg/dL Final     Comment:     For infants and newborns, interpretation of results should be based  on gestational age, weight and in agreement with clinical  observations.  Premature Infant recommended reference ranges:  Up to 24 hours.............<8.0 mg/dL  Up to 48 hours............<12.0 mg/dL  3-5 days..................<15.0 mg/dL  6-29 days.................<15.0 mg/dL       Alkaline Phosphatase   Date Value Ref Range Status   04/30/2018 69 55 - 135 U/L Final     AST ([place])   Date Value Ref Range Status   03/12/2016 96 (H) 17 - 59 U/L Final     AST   Date Value Ref Range Status   04/30/2018 177 (H) 10 - 40 U/L Final     ALT   Date Value Ref Range Status   04/30/2018 136 (H)  "10 - 44 U/L Final     Anion Gap   Date Value Ref Range Status   04/28/2018 12 8 - 16 mmol/L Final     eGFR if    Date Value Ref Range Status   04/28/2018 >60 >60 mL/min/1.73 m^2 Final     eGFR if non    Date Value Ref Range Status   04/28/2018 >60 >60 mL/min/1.73 m^2 Final     Comment:     Calculation used to obtain the estimated glomerular filtration  rate (eGFR) is the CKD-EPI equation.        Lab Results   Component Value Date    TSH 2.333 04/30/2018     Lab Results   Component Value Date    HEPAIGM Negative 04/30/2018    HEPBIGM Negative 04/30/2018    HEPCAB Negative 04/30/2018     Lab Results   Component Value Date    OCCULTBLOOD Negative 07/27/2017    OCCULTBLOOD Negative 07/27/2017    OCCULTBLOOD Negative 07/27/2017     Reviewed prior medical records including radiology report of 4/26/18 ct renal stone abdomen pelvis & 5/3/18 & 4/30/18 abdominal x-rays.    Previously reviewed medical records received from Dr. Castro and [facility], summarized below and in medical & surgical history (endoscopies, etc), was sent for scanning:   10/28/08 colonoscopy  7/8/14 barium enema: impression constipation; findings: "the colon is severely tortuous but nondilated. There is moderate stool throughout the colon. Colon otherwise demonstrates normal haustral pattern without evidence of fixed filling defect or stricture. Reflux into the terminal ileum is visualized"  Blood work from 2014 (elevated LFT with AST 75, ALT 57) and 2015- see copy for full results  Visit notes reviewed from Dr. Castro in 2014, 2015 (tried linzess)  Objective:      Physical Exam   Constitutional: He is oriented to person, place, and time. He appears well-developed and well-nourished. No distress.   HENT:   Mouth/Throat: Oropharynx is clear and moist and mucous membranes are normal. No oral lesions. No oropharyngeal exudate.   Eyes: Conjunctivae are normal. Pupils are equal, round, and reactive to light. No scleral icterus. "   Cardiovascular: Normal rate.    Pulmonary/Chest: Effort normal and breath sounds normal. No respiratory distress. He has no wheezes.   Abdominal: Soft. Normal appearance and bowel sounds are normal. He exhibits no distension, no abdominal bruit and no mass. There is no hepatosplenomegaly. There is no tenderness. There is no rigidity, no rebound, no guarding, no tenderness at McBurney's point and negative Hernandez's sign. No hernia.   Patient and mother both declined rectal exam.   Neurological: He is alert and oriented to person, place, and time.   Skin: Skin is warm and dry. No rash noted. He is not diaphoretic. No erythema. No pallor.   Non-jaundiced   Psychiatric: He is not agitated and not aggressive. Cognition and memory are impaired.   Patient is interactive and kind; speech understandable, but patient does not answer some questions appropriately and repetitive responses.   Nursing note and vitals reviewed.      Assessment:       1. Chronic constipation    2. History of colitis    3. Elevated liver function tests    4. Hepatic steatosis    5. History of rectal bleeding        Plan:       Chronic constipation  -  continue   lactulose (CHRONULAC) 20 gram/30 mL Soln; Take 30 mLs (20 g total) by mouth 2 (two) times daily.  Dispense: 900 mL; Refill: 0  Recommended daily exercise as tolerated, adequate water intake (six 8-oz glasses of water daily), and high fiber diet. CONTINUE OTC fiber supplements, such as Metamucil, Citrucel, or FiberCon (take as directed, separate from other oral medications by >2 hours).  -Recommend taking an OTC stool softener such as Colace as directed to avoid hard stools and straining with bowel movements PRN  - can take OTC MiraLax once daily (17g PO) as directed PRN for persistent constipation  - If no improvement with above recommendations, try intermittently dosed Dulcolax OTC as directed (every 3-4 days) PRN  to facilitate bowel movements  -If still no improvement with these measures,  call/follow-up  - continue with Colonoscopy as scheduled for 6/15/18    History of colitis  - continue with Colonoscopy as scheduled for 6/15/18    Elevated liver function tests & Hepatic steatosis  -     US Abdomen Limited (LIVER); Future; Expected date: 04/30/2018  For fatty liver recommend: low fat, low cholesterol diet, maintain good control of blood sugars and cholesterol levels, exercise, weight loss, minimize/avoid alcohol and tylenol products, & follow-up with PCP for continued evaluation and management; if specialist is needed, recommend seeing hepatology.    History of rectal bleeding  - continue with Colonoscopy as scheduled for 6/15/18  - You may resume normal activity as long as you feel well.  - Avoid/minimize aspirin and anti-inflammatory drugs such as ibuprofen (Advil, Motrin) and naproxen (Aleve and Naprosyn).  - Avoid alcohol.  - recommend SITZ baths    Follow-up in about 1 month (around 6/16/2018), or if symptoms worsen or fail to improve.      If no improvement in symptoms or symptoms worsen, call/follow-up at clinic or go to ER.

## 2018-05-16 NOTE — PATIENT INSTRUCTIONS
Constipation (Adult)  Constipation means that you have bowel movements that are less frequent than usual. Stools often become very hard and difficult to pass.  Constipation is very common. At some point in life it affects almost everyone. Since everyone's bowel habits are different, what is constipation to one person may not be to another. Your healthcare provider may do tests to diagnose constipation. It depends on what he or she finds when evaluating you.    Symptoms of constipation include:  · Abdominal pain  · Bloating  · Vomiting  · Painful bowel movements  · Itching, swelling, bleeding, or pain around the anus  Causes  Constipation can have many causes. These include:  · Diet low in fiber  · Too much dairy  · Not drinking enough liquids  · Lack of exercise or physical activity. This is especially true for older adults.  · Changes in lifestyle or daily routine, including pregnancy, aging, work, and travel  · Frequent use or misuse of laxatives  · Ignoring the urge to have a bowel movement or delaying it until later  · Medicines, such as certain prescription pain medicines, iron supplements, antacids, certain antidepressants, and calcium supplements  · Diseases like irritable bowel syndrome, bowel obstructions, stroke, diabetes, thyroid disease, Parkinson disease, hemorrhoids, and colon cancer  Complications  Potential complications of constipation can include:  · Hemorrhoids  · Rectal bleeding from hemorrhoids or anal fissures (skin tears)  · Hernias  · Dependency on laxatives  · Chronic constipation  · Fecal impaction  · Bowel obstruction or perforation  Home care  All treatment should be done after talking with your healthcare provider. This is especially true if you have another medical problems, are taking prescription medicines, or are an older adult. Treatment most often involves lifestyle changes. You may also need medicines. Your healthcare provider will tell you which will work best for you. Follow  the advice below to help avoid this problem in the future.  Lifestyle changes  These lifestyle changes can help prevent constipation:  · Diet. Eat a high-fiber diet, with fresh fruit and vegetables, and reduce dairy intake, meats, and processed foods  · Fluids. It's important to get enough fluids each day. Drink plenty of water when you eat more fiber. If you are on diet that limits the amount of fluid you can have, talk about this with your healthcare provider.  · Regular exercise. Check with your healthcare provider first.  Medications  Take any medicines as directed. Some laxatives are safe to use only every now and then. Others can be taken on a regular basis. Talk with your doctor or pharmacist if you have questions.  Prescription pain medicines can cause constipation. If you are taking this kind of medicine, ask your healthcare provider if you should also take a stool softener.  Medicines you may take to treat constipation include:  · Fiber supplements  · Stool softeners  · Laxatives  · Enemas  · Rectal suppositories  Follow-up care  Follow up with your healthcare provider if symptoms don't get better in the next few days. You may need to have more tests or see a specialist.  Call 911  Call 911 if any of these occur:  · Trouble breathing  · Stiff, rigid abdomen that is severely painful to touch  · Confusion  · Fainting or loss of consciousness  · Rapid heart rate  · Chest pain  When to seek medical advice  Call your healthcare provider right away if any of these occur:  · Fever over 100.4°F (38°C)  · Failure to resume normal bowel movements  · Pain in your abdomen or back gets worse  · Nausea or vomiting  · Swelling in your abdomen  · Blood in the stool  · Black, tarry stool  · Involuntary weight loss  · Weakness  Date Last Reviewed: 12/30/2015  © 6870-3122 [employer]. [address] [place] [address], [place] [address]. All rights reserved. This information is not intended as a substitute for  professional medical care. Always follow your healthcare professional's instructions.

## 2018-05-23 ENCOUNTER — PATIENT MESSAGE (OUTPATIENT)
Dept: GASTROENTEROLOGY | Facility: CLINIC | Age: 33
End: 2018-05-23

## 2018-05-24 ENCOUNTER — HOSPITAL ENCOUNTER (OUTPATIENT)
Dept: RADIOLOGY | Facility: HOSPITAL | Age: 33
Discharge: HOME | End: 2018-05-24
Attending: NURSE PRACTITIONER
Payer: MEDICARE

## 2018-05-24 DIAGNOSIS — K76.0 HEPATIC STEATOSIS: ICD-10-CM

## 2018-05-24 DIAGNOSIS — R79.89 ELEVATED LIVER FUNCTION TESTS: ICD-10-CM

## 2018-05-24 PROCEDURE — 76705 ECHO EXAM OF ABDOMEN: CPT | Mod: TC,PO

## 2018-05-24 PROCEDURE — 76705 ECHO EXAM OF ABDOMEN: CPT | Mod: 26,,, | Performed by: RADIOLOGY

## 2018-05-28 ENCOUNTER — PATIENT MESSAGE (OUTPATIENT)
Dept: GASTROENTEROLOGY | Facility: CLINIC | Age: 33
End: 2018-05-28

## 2018-05-28 ENCOUNTER — TELEPHONE (OUTPATIENT)
Dept: GASTROENTEROLOGY | Facility: CLINIC | Age: 33
End: 2018-05-28

## 2018-05-28 DIAGNOSIS — K76.0 HEPATIC STEATOSIS: Primary | ICD-10-CM

## 2018-05-28 DIAGNOSIS — R79.89 ELEVATED LFTS: ICD-10-CM

## 2018-05-28 DIAGNOSIS — R16.0 HEPATOMEGALY: ICD-10-CM

## 2018-05-28 NOTE — TELEPHONE ENCOUNTER
Please call to inform & review the results with the patient- radiology report of the liver ultrasound showed enlarged fatty liver (prior history of this). Otherwise, unremarkable findings of the GI organs. Other findings are stable.  Blood work showed mild improvement in liver function levels but they remain elevated. I discussed the results with Dr. Villagomez and he recommends seeing hepatology for continued evaluation and management of findings.  Continue with previous recommendations. If no improvement in symptoms or symptoms worsen, call/follow-up at clinic or go to ER.  Please release results to patient's mychart once you have discussed results and recommendations with patient.  Thanks,  Qiana LAUREN

## 2018-05-29 ENCOUNTER — OFFICE VISIT (OUTPATIENT)
Dept: FAMILY MEDICINE | Facility: CLINIC | Age: 33
End: 2018-05-29
Payer: MEDICARE

## 2018-05-29 VITALS
OXYGEN SATURATION: 98 % | HEIGHT: 70 IN | SYSTOLIC BLOOD PRESSURE: 136 MMHG | BODY MASS INDEX: 40.05 KG/M2 | HEART RATE: 80 BPM | DIASTOLIC BLOOD PRESSURE: 74 MMHG | WEIGHT: 279.75 LBS

## 2018-05-29 DIAGNOSIS — R73.9 HYPERGLYCEMIA: ICD-10-CM

## 2018-05-29 DIAGNOSIS — F84.0 AUTISTIC DISORDER, ACTIVE: Chronic | ICD-10-CM

## 2018-05-29 DIAGNOSIS — K76.0 FATTY LIVER: ICD-10-CM

## 2018-05-29 DIAGNOSIS — G47.33 OSA (OBSTRUCTIVE SLEEP APNEA): Chronic | ICD-10-CM

## 2018-05-29 DIAGNOSIS — R74.01 TRANSAMINITIS: Primary | ICD-10-CM

## 2018-05-29 DIAGNOSIS — F63.9 IMPULSE CONTROL DISORDER: Chronic | ICD-10-CM

## 2018-05-29 DIAGNOSIS — G40.909 SEIZURE DISORDER: Chronic | ICD-10-CM

## 2018-05-29 DIAGNOSIS — N31.9 NEUROGENIC BLADDER: Chronic | ICD-10-CM

## 2018-05-29 DIAGNOSIS — K59.09 CHRONIC CONSTIPATION: ICD-10-CM

## 2018-05-29 PROBLEM — N39.0 UTI (URINARY TRACT INFECTION): Status: RESOLVED | Noted: 2018-04-26 | Resolved: 2018-05-29

## 2018-05-29 PROBLEM — N10 ACUTE PYELONEPHRITIS: Status: RESOLVED | Noted: 2018-04-26 | Resolved: 2018-05-29

## 2018-05-29 PROBLEM — A41.9 SEPSIS: Status: RESOLVED | Noted: 2018-04-26 | Resolved: 2018-05-29

## 2018-05-29 PROCEDURE — 99214 OFFICE O/P EST MOD 30 MIN: CPT | Mod: S$GLB,,, | Performed by: EMERGENCY MEDICINE

## 2018-05-29 PROCEDURE — 3008F BODY MASS INDEX DOCD: CPT | Mod: CPTII,S$GLB,, | Performed by: EMERGENCY MEDICINE

## 2018-05-29 PROCEDURE — 99999 PR PBB SHADOW E&M-EST. PATIENT-LVL III: CPT | Mod: PBBFAC,,, | Performed by: EMERGENCY MEDICINE

## 2018-05-29 RX ORDER — CEPHALEXIN 500 MG/1
500 CAPSULE ORAL DAILY
Refills: 6 | COMMUNITY
Start: 2018-05-23 | End: 2018-08-20

## 2018-05-29 NOTE — PATIENT INSTRUCTIONS
Silvino and others.    1.  We will consolidate labs from November into labs this summer.    2.  Please keep your appointment with Dr. Valentine.    3.  It is time to follow up with Dr. Salinas for your seizure diagnosis.    4.  I would like to see youi in six months.    ABDIAS

## 2018-05-29 NOTE — PROGRESS NOTES
"THIS NOTE IS DONE WITH VOICE RECOGNITION        Patient ID: Hussein Doyle is a 32 y.o. male.    Chief Complaint: Hospital Follow Up      HPI     He was recently hospitalized secondary to urinary tract infection.  His course had begun in the outpatient setting her urine was obtained.  The urine grew an unusual bacterium.    Urine Culture, Routine CEDECEA DAVISAE   >100,000 cfu/ml          Cedecea davisae     CULTURE, URINE     Amikacin <=16 "><=16  Sensitive     Amox/K Clav'ate >16/8  Resistant     Amp/Sulbactam >16/8  Resistant     Ampicillin >16  Resistant     Cefazolin >16  Resistant     Cefepime <=8 "><=8  Sensitive     Ceftriaxone <=8 "><=8  Sensitive     Ciprofloxacin <=1 "><=1  Sensitive     Ertapenem <=2 "><=2  Sensitive     Gentamicin <=4 "><=4  Sensitive     Meropenem <=4 "><=4  Sensitive     Nitrofurantoin <=32 "><=32  Sensitive     Piperacillin/Tazo <=16 "><=16  Sensitive     Tetracycline <=4 "><=4  Sensitive     Tobramycin <=4 "><=4  Sensitive     Trimeth/Sulfa <=2/38 "><=2/38  Sensitive      He had been empirically placed on antibiotics.  He seemed to get worse, if taken to the hospital.  In the hospital he received fluid supplementation, IV antibiotics, and gradually improved.  He was discharged without complications.    There has not been any chest her vision of his seizure disorder.  This minimal less status remained stable.  He is seen by both Urology and Psychiatry, and remains in good control.    He is now home, and routine has returned.  No new problems identified. No residual fever or chills.  No recognized dysuria or other issues.    He does have elevation of AST and ALT.  This is been stable over the last few months, it is trending down.  He has no overt signs of liver related problems.    His sleep apnea appears to be controlled.  He is using CPAP.  This is followed by Pulmonary, Dr. Garber.    Current Outpatient Prescriptions   Medication Sig Dispense Refill    allopurinol " (ZYLOPRIM) 100 MG tablet TAKE 1 TABLET BY MOUTH TWICE A DAY 60 tablet 11    cephALEXin (KEFLEX) 500 MG capsule Take 500 mg by mouth once daily.  6    fluvoxaMINE (LUVOX) 100 MG tablet Take 0.5 tablets (50 mg total) by mouth 2 (two) times daily. 30 tablet 2    inulin (FIBER GUMMIES ORAL) Take by mouth.      L. ACIDOPHILUS/BIFID. ANIMALIS (CHEWABLE PROBIOTIC ORAL) Take by mouth 2 (two) times daily.      lactulose (CHRONULAC) 10 gram/15 mL solution Take 30 mLs (20 g total) by mouth 2 (two) times daily. (Patient taking differently: Take 20 g by mouth 2 (two) times daily. ) 1800 mL 6    multivitamin capsule Take 1 capsule by mouth every morning.       OXcarbazepine (TRILEPTAL) 300 MG Tab TAKE 1 TABLET (300 MG TOTAL) BY MOUTH 2 (TWO) TIMES DAILY. 60 tablet 11    polyethylene glycol (GLYCOLAX) 17 gram/dose powder Take 17 g by mouth daily as needed.      tamsulosin (FLOMAX) 0.4 mg Cp24 Take 0.4 mg by mouth every evening. Every day      wheat dextrin 3 gram/3.5 gram PwPk Take 3.5 g by mouth every morning.        No current facility-administered medications for this visit.          Review of Systems   Unable to perform ROS: Other   Constitutional: Positive for activity change. Negative for unexpected weight change.   HENT: Negative for hearing loss, rhinorrhea and trouble swallowing.    Eyes: Negative for discharge and visual disturbance.   Respiratory: Negative for chest tightness and wheezing.    Cardiovascular: Negative for chest pain and palpitations.   Gastrointestinal: Positive for blood in stool and constipation. Negative for diarrhea and vomiting.   Endocrine: Negative for polydipsia and polyuria.   Genitourinary: Positive for hematuria. Negative for difficulty urinating and urgency.   Musculoskeletal: Negative for arthralgias, joint swelling and neck pain.   Neurological: Negative for weakness and headaches.   Psychiatric/Behavioral: Negative for confusion and dysphoric mood.       Objective:      Physical  Exam   Constitutional: He appears well-nourished. No distress.   Silvino is remarkably calm.  He had a long wait see me, and was very pleasant the entire visit.   HENT:   Head: Normocephalic.   Nose: Nose normal.   Mouth/Throat: Oropharynx is clear and moist.   Eyes: Conjunctivae are normal. Pupils are equal, round, and reactive to light.   Vitals reviewed.      Assessment:       1. Transaminitis    2. Hyperglycemia    3. Chronic constipation    4. Autistic disorder, active    5. Seizure disorder    6. HALEY (obstructive sleep apnea)    7. Neurogenic bladder    8. Impulse control disorder    9. Fatty liver        Plan:       1. Transaminitis  Mild  Will continue to follow  Well could be related to medications    - Comprehensive metabolic panel; Future    2. Hyperglycemia  Update labs  Status a fasting versus random sugars unknown.  Will check hemoglobin A1c    - Comprehensive metabolic panel; Future  - Hemoglobin A1c; Future    3. Chronic constipation  Longstanding problem  Will continue current plan of care which seems to be working.  Combination of stool softeners, gentle stimulants, and occasional laxity.    4. Autistic disorder, active  No issues today  Continue current medications  Continue follow-up with psychiatry      5. Seizure disorder  No recognized seizure disorders  Continue current medications  Follow up with Neurology      6. HALEY (obstructive sleep apnea)  Controlled  Continue CPAP    7. Neurogenic bladder  He will resume regular outpatient catheterizations.  He actually has been continuing these since home.  He is at risk for recurrent urinary tract infection      8. Impulse control disorder  No evidence today  Continue current psychiatric medicines and follow up with Psychiatry    9. Fatty liver  Continue follow update liver function tests  No new interventions today.    - CBC auto differential; Future  - Comprehensive metabolic panel; Future  - Lipid panel; Future

## 2018-05-30 ENCOUNTER — DOCUMENTATION ONLY (OUTPATIENT)
Dept: TRANSPLANT | Facility: CLINIC | Age: 33
End: 2018-05-30

## 2018-05-30 NOTE — NURSING
Pt records reviewed.   Pt will be referred to Hepatology.    Initial referral received  from the [facility]que.   Referring Provider/diagnosis  SHARON ALEXANDER Provider:   Diagnosis: Hepatic steatosis  Elevated LFTs  Hepatomegaly       Referral letter sent to provider and patient.

## 2018-05-30 NOTE — LETTER
May 30, 2018    Hussein Doyle  [address] [facility] [address]      Dear Hussein Doyle:    Your doctor has referred you to the [facility]. You will be contacted by our office and an initial appointment will then be scheduled for you.    We look forward to seeing you soon. If you have any further questions, please contact us at [phone].       Sincerely,        [facility]   [address] [place] [id number]  [phone]

## 2018-06-01 ENCOUNTER — TELEPHONE (OUTPATIENT)
Dept: HEPATOLOGY | Facility: CLINIC | Age: 33
End: 2018-06-01

## 2018-06-01 NOTE — TELEPHONE ENCOUNTER
----- Message from Mena Ware Ishan sent at 5/31/2018 11:02 AM CDT -----  Regarding: Wants to wait to shedule with Dr. Kohler in [place] in a few months      ----- Message -----  From: Rani Phyllis  Sent: 5/30/2018   4:43 PM  To: Hepatology Scheduling    Pt records reviewed.   Pt will be referred to Hepatology.    Initial referral received  from the [facility]que.   Referring Provider/diagnosis  SHARON ALEXANDER Provider:  Diagnosis: Hepatic steatosis  Elevated LFTs  Hepatomegaly      Referral letter sent to provider and patient.

## 2018-06-01 NOTE — TELEPHONE ENCOUNTER
MA attempted to call patient to schedule his Initial visit to see liver specialist he is unable to reached left him Vm to please give us callback.

## 2018-06-15 ENCOUNTER — SURGERY (OUTPATIENT)
Age: 33
End: 2018-06-15

## 2018-06-15 ENCOUNTER — ANESTHESIA (OUTPATIENT)
Dept: ENDOSCOPY | Facility: HOSPITAL | Age: 33
End: 2018-06-15
Payer: MEDICARE

## 2018-06-15 ENCOUNTER — HOSPITAL ENCOUNTER (OUTPATIENT)
Facility: HOSPITAL | Age: 33
Discharge: HOME | End: 2018-06-15
Attending: INTERNAL MEDICINE | Admitting: INTERNAL MEDICINE
Payer: MEDICARE

## 2018-06-15 ENCOUNTER — ANESTHESIA EVENT (OUTPATIENT)
Dept: ENDOSCOPY | Facility: HOSPITAL | Age: 33
End: 2018-06-15
Payer: MEDICARE

## 2018-06-15 VITALS
RESPIRATION RATE: 16 BRPM | SYSTOLIC BLOOD PRESSURE: 121 MMHG | OXYGEN SATURATION: 96 % | DIASTOLIC BLOOD PRESSURE: 70 MMHG | HEART RATE: 68 BPM | TEMPERATURE: 98 F

## 2018-06-15 DIAGNOSIS — R19.4 CHANGE IN BOWEL HABITS: ICD-10-CM

## 2018-06-15 PROCEDURE — D9220A PRA ANESTHESIA: Mod: CRNA,,, | Performed by: NURSE ANESTHETIST, CERTIFIED REGISTERED

## 2018-06-15 PROCEDURE — 63600175 PHARM REV CODE 636 W HCPCS: Mod: PO | Performed by: NURSE ANESTHETIST, CERTIFIED REGISTERED

## 2018-06-15 PROCEDURE — 37000008 HC ANESTHESIA 1ST 15 MINUTES: Mod: PO | Performed by: INTERNAL MEDICINE

## 2018-06-15 PROCEDURE — 37000009 HC ANESTHESIA EA ADD 15 MINS: Mod: PO | Performed by: INTERNAL MEDICINE

## 2018-06-15 PROCEDURE — 45378 DIAGNOSTIC COLONOSCOPY: CPT | Mod: PO | Performed by: INTERNAL MEDICINE

## 2018-06-15 PROCEDURE — 25000003 PHARM REV CODE 250: Mod: PO | Performed by: INTERNAL MEDICINE

## 2018-06-15 PROCEDURE — D9220A PRA ANESTHESIA: Mod: ANES,,, | Performed by: ANESTHESIOLOGY

## 2018-06-15 PROCEDURE — 45378 DIAGNOSTIC COLONOSCOPY: CPT | Mod: ,,, | Performed by: INTERNAL MEDICINE

## 2018-06-15 RX ORDER — SODIUM CHLORIDE, SODIUM LACTATE, POTASSIUM CHLORIDE, CALCIUM CHLORIDE 600; 310; 30; 20 MG/100ML; MG/100ML; MG/100ML; MG/100ML
INJECTION, SOLUTION INTRAVENOUS CONTINUOUS
Status: DISCONTINUED | OUTPATIENT
Start: 2018-06-15 | End: 2018-06-15 | Stop reason: HOSPADM

## 2018-06-15 RX ORDER — POLYETHYLENE GLYCOL 3350 17 G/17G
17 POWDER, FOR SOLUTION ORAL DAILY
Qty: 510 G | Refills: 2 | Status: SHIPPED | OUTPATIENT
Start: 2018-06-15 | End: 2018-06-25 | Stop reason: SDUPTHER

## 2018-06-15 RX ORDER — PROPOFOL 10 MG/ML
VIAL (ML) INTRAVENOUS
Status: DISCONTINUED | OUTPATIENT
Start: 2018-06-15 | End: 2018-06-15

## 2018-06-15 RX ORDER — LIDOCAINE HYDROCHLORIDE 10 MG/ML
1 INJECTION INFILTRATION; PERINEURAL ONCE
Status: COMPLETED | OUTPATIENT
Start: 2018-06-15 | End: 2018-06-15

## 2018-06-15 RX ORDER — LIDOCAINE HCL/PF 100 MG/5ML
SYRINGE (ML) INTRAVENOUS
Status: DISCONTINUED | OUTPATIENT
Start: 2018-06-15 | End: 2018-06-15

## 2018-06-15 RX ADMIN — PROPOFOL 20 MG: 10 INJECTION, EMULSION INTRAVENOUS at 08:06

## 2018-06-15 RX ADMIN — SODIUM CHLORIDE, SODIUM LACTATE, POTASSIUM CHLORIDE, AND CALCIUM CHLORIDE: .6; .31; .03; .02 INJECTION, SOLUTION INTRAVENOUS at 08:06

## 2018-06-15 RX ADMIN — PROPOFOL 100 MG: 10 INJECTION, EMULSION INTRAVENOUS at 08:06

## 2018-06-15 RX ADMIN — LIDOCAINE HYDROCHLORIDE 1 ML: 10 INJECTION, SOLUTION INFILTRATION; PERINEURAL at 09:06

## 2018-06-15 RX ADMIN — PROPOFOL 50 MG: 10 INJECTION, EMULSION INTRAVENOUS at 08:06

## 2018-06-15 RX ADMIN — LIDOCAINE HYDROCHLORIDE 100 MG: 20 INJECTION, SOLUTION INTRAVENOUS at 08:06

## 2018-06-15 NOTE — DISCHARGE SUMMARY
Discharge Note  Short Stay      SUMMARY     Admit Date: 6/15/2018    Attending Physician: Refugio Villagomez MD     Discharge Physician: Refugio Villagomez MD    Discharge Date: 6/15/2018 8:49 AM    Final Diagnosis: Constipation, unspecified constipation type [K59.00]    Disposition: HOME OR SELF CARE    Patient Instructions:   Current Discharge Medication List      CONTINUE these medications which have NOT CHANGED    Details   allopurinol (ZYLOPRIM) 100 MG tablet TAKE 1 TABLET BY MOUTH TWICE A DAY  Qty: 60 tablet, Refills: 11    Associated Diagnoses: Chronic gout without tophus, unspecified cause, unspecified site      cephALEXin (KEFLEX) 500 MG capsule Take 500 mg by mouth once daily.  Refills: 6      fluvoxaMINE (LUVOX) 100 MG tablet Take 0.5 tablets (50 mg total) by mouth 2 (two) times daily.  Qty: 30 tablet, Refills: 2    Associated Diagnoses: Impulse control disorder; Other obsessive-compulsive disorders      inulin (FIBER GUMMIES ORAL) Take by mouth.      L. ACIDOPHILUS/BIFID. ANIMALIS (CHEWABLE PROBIOTIC ORAL) Take by mouth 2 (two) times daily.      lactulose (CHRONULAC) 10 gram/15 mL solution Take 30 mLs (20 g total) by mouth 2 (two) times daily.  Qty: 1800 mL, Refills: 6    Associated Diagnoses: Constipation, unspecified constipation type      multivitamin capsule Take 1 capsule by mouth every morning.       OXcarbazepine (TRILEPTAL) 300 MG Tab TAKE 1 TABLET (300 MG TOTAL) BY MOUTH 2 (TWO) TIMES DAILY.  Qty: 60 tablet, Refills: 11    Associated Diagnoses: Convulsions, unspecified convulsion type      polyethylene glycol (GLYCOLAX) 17 gram/dose powder Take 17 g by mouth daily as needed.      tamsulosin (FLOMAX) 0.4 mg Cp24 Take 0.4 mg by mouth every evening. Every day      wheat dextrin 3 gram/3.5 gram PwPk Take 3.5 g by mouth every morning.              Discharge Procedure Orders (must include Diet, Follow-up, Activity)    Follow Up:  Follow up with PCP as previously scheduled  Resume routine  diet.  Activity as tolerated.    No driving day of procedure.

## 2018-06-15 NOTE — ANESTHESIA PREPROCEDURE EVALUATION
06/15/2018  Hussein Doyle is a 32 y.o., male.    Anesthesia Evaluation    I have reviewed the Patient Summary Reports.    I have reviewed the Nursing Notes.   I have reviewed the Medications.     Review of Systems  Anesthesia Hx:  No problems with previous Anesthesia    Social:  Non-Smoker    Pulmonary:   Sleep Apnea    Renal/:   Chronic Renal Disease    Hepatic/GI:   Liver Disease,    Neurological:   CVA Seizures, well controlled  Denies Cognitive Impairment    Psych:   Psychiatric History          Physical Exam  General:  Morbid Obesity    Airway/Jaw/Neck:  Airway Findings: Mouth Opening: Normal Tongue: Normal  General Airway Assessment: Adult, Good  Mallampati: II  Improves to II with phonation.  TM Distance: 4-6 cm      Dental:  Dental Findings: In tact   Chest/Lungs:  Chest/Lungs Findings: Clear to auscultation, Normal Respiratory Rate     Heart/Vascular:  Heart Findings: Rate: Normal  Rhythm: Regular Rhythm  Sounds: Normal  Heart murmur: negative       Mental Status:  Mental Status Findings:  Cooperative         Anesthesia Plan  Type of Anesthesia, risks & benefits discussed:  Anesthesia Type:  general  Patient's Preference:   Intra-op Monitoring Plan: standard ASA monitors  Intra-op Monitoring Plan Comments:   Post Op Pain Control Plan:   Post Op Pain Control Plan Comments:   Induction:    Beta Blocker:  Patient is not currently on a Beta-Blocker (No further documentation required).       Informed Consent: Patient understands risks and agrees with Anesthesia plan.  Questions answered. Anesthesia consent signed with patient.  ASA Score: 3     Day of Surgery Review of History & Physical: I have interviewed and examined the patient. I have reviewed the patient's H&P dated:  There are no significant changes.          Ready For Surgery From Anesthesia Perspective.

## 2018-06-15 NOTE — H&P
History & Physical - Short Stay  Gastroenterology      SUBJECTIVE:     Procedure: Colonoscopy    Chief Complaint/Indication for Procedure: Change in Bowel Habits    PTA Medications   Medication Sig    allopurinol (ZYLOPRIM) 100 MG tablet TAKE 1 TABLET BY MOUTH TWICE A DAY    cephALEXin (KEFLEX) 500 MG capsule Take 500 mg by mouth once daily.    fluvoxaMINE (LUVOX) 100 MG tablet Take 0.5 tablets (50 mg total) by mouth 2 (two) times daily.    inulin (FIBER GUMMIES ORAL) Take by mouth.    L. ACIDOPHILUS/BIFID. ANIMALIS (CHEWABLE PROBIOTIC ORAL) Take by mouth 2 (two) times daily.    lactulose (CHRONULAC) 10 gram/15 mL solution Take 30 mLs (20 g total) by mouth 2 (two) times daily. (Patient taking differently: Take 20 g by mouth 2 (two) times daily. )    multivitamin capsule Take 1 capsule by mouth every morning.     OXcarbazepine (TRILEPTAL) 300 MG Tab TAKE 1 TABLET (300 MG TOTAL) BY MOUTH 2 (TWO) TIMES DAILY.    polyethylene glycol (GLYCOLAX) 17 gram/dose powder Take 17 g by mouth daily as needed.    tamsulosin (FLOMAX) 0.4 mg Cp24 Take 0.4 mg by mouth every evening. Every day    wheat dextrin 3 gram/3.5 gram PwPk Take 3.5 g by mouth every morning.        Review of patient's allergies indicates:   Allergen Reactions    Benadryl [diphenhydramine hcl] Other (See Comments)     Increases anxiety and more restless    Keppra [levetiracetam] Other (See Comments)     agitation    Ativan [lorazepam] Anxiety    Abilify  [aripiprazole]      Other reaction(s): arrhythmia    Clonazepam      Other reaction(s): unknown    Cough syrup w/decongestant      Other reaction(s): auditory hallucinations    Diazepam      Other reaction(s): tongue swelling    Haloperidol Other (See Comments)    Phenytoin sodium extended      Other reaction(s): unknown    Quetiapine      Other reaction(s): sweating  Other reaction(s): sedation  Other reaction(s): sweating  Other reaction(s): sedation    Risperidone      Other reaction(s):  bladder incontinence    Thimerosal      Other reaction(s): seizure    Ziprasidone hcl Other (See Comments)     Other reaction(s): tonic clonic seizure  seizure        Past Medical History:   Diagnosis Date    Autistic disorder, active 5/6/2013    Bowel obstruction     Early intervention counseling     Gout, chronic 11/12/2015    Grand mal seizure     Hepatic steatosis     Impulse control disorder, unspecified 5/6/2013    Mastoiditis     Moderate mental retardation 5/6/2013    Neurogenic bladder     Neurogenic bladder     HALEY (obstructive sleep apnea) 11/12/2015    CPAP.  Dr. Garber     Otitis media     Paraplegia     Pervasive developmental disorder, active 12/27/2015    Renal cancer 2010    Right    Scoliosis     paraplegia    Seizure disorder 11/12/2015    Stroke     one week old    Tardive dyskinesia      Past Surgical History:   Procedure Laterality Date    ANKLE FRACTURE SURGERY      BACK SURGERY  2000    COLONOSCOPY  10/28/2008    [facility]ana [facility]; anal fissure, minimal pinpoint rectal erythema; floppy-type colon with very little tone; biopsy: rectum mild chronic proctitis with few eosinophils sent for scanning    INNER EAR SURGERY      mastoid    right partial nephrectomy Right 2010    Sees urology    TYMPANOSTOMY TUBE PLACEMENT       Family History   Problem Relation Age of Onset    Cancer Father         lymphoma    Cataracts Father     Colon polyps Father     Cataracts Mother     Cataracts Maternal Grandmother     Diabetes Maternal Grandmother     Macular degeneration Maternal Grandmother     Glaucoma Maternal Grandmother     Anesthesia problems Neg Hx     Clotting disorder Neg Hx     Colon cancer Neg Hx     Crohn's disease Neg Hx     Ulcerative colitis Neg Hx     Stomach cancer Neg Hx     Esophageal cancer Neg Hx      Social History   Substance Use Topics    Smoking status: Never Smoker    Smokeless tobacco: Never Used    Alcohol use No         OBJECTIVE:      Vital Signs (Most Recent)       Physical Exam:                                                       GENERAL:  Comfortable, in no acute distress.                                 HEENT EXAM:  Nonicteric.  No adenopathy.  Oropharynx is clear.               NECK:  Supple.                                                               LUNGS:  Clear.                                                               CARDIAC:  Regular rate and rhythm.  S1, S2.  No murmur.                      ABDOMEN:  Soft, positive bowel sounds, nontender.  No hepatosplenomegaly or masses.  No rebound or guarding.                                             EXTREMITIES:  No edema.     MENTAL STATUS:  Mental retardation.      ASSESSMENT/PLAN:     Assessment: Change in Bowel Habits    Plan: Colonoscopy    Anesthesia Plan: General    ASA Grade: ASA 2 - Patient with mild systemic disease with no functional limitations    MALLAMPATI SCORE:  I (soft palate, uvula, fauces, and tonsillar pillars visible)

## 2018-06-15 NOTE — TRANSFER OF CARE
Anesthesia Transfer of Care Note    Patient: Hussein Doyle    Procedure(s) Performed: Procedure(s) (LRB):  COLONOSCOPY (N/A)    Patient location: PACU    Anesthesia Type: general    Transport from OR: Transported from OR on 2-3 L/min O2 by NC with adequate spontaneous ventilation    Post pain: adequate analgesia    Post assessment: no apparent anesthetic complications    Post vital signs: stable    Level of consciousness: responds to stimulation    Nausea/Vomiting: no nausea/vomiting    Complications: none    Transfer of care protocol was followed      Last vitals:   Visit Vitals  /84 (BP Location: Right arm, Patient Position: Lying)   Pulse 67   Temp 36.6 °C (97.9 °F) (Skin)   Resp 16   SpO2 98%

## 2018-06-15 NOTE — ANESTHESIA POSTPROCEDURE EVALUATION
Anesthesia Post Evaluation    Patient: Hussein Doyle    Procedure(s) Performed: Procedure(s) (LRB):  COLONOSCOPY (N/A)    Final Anesthesia Type: general  Patient location during evaluation: PACU  Patient participation: Yes- Able to Participate  Level of consciousness: awake and alert and oriented  Post-procedure vital signs: reviewed and stable  Pain management: adequate  Airway patency: patent  PONV status at discharge: No PONV  Anesthetic complications: no      Cardiovascular status: blood pressure returned to baseline  Respiratory status: unassisted, spontaneous ventilation and room air  Hydration status: euvolemic  Follow-up not needed.        Visit Vitals  /70   Pulse 68   Temp 36.6 °C (97.9 °F) (Skin)   Resp 16   SpO2 96%       Pain/Jane Score: Pain Assessment Performed: Yes (6/15/2018  9:23 AM)  Presence of Pain: denies (6/15/2018  9:23 AM)  Pain Rating Prior to Med Admin: 0 (6/15/2018  8:53 AM)  Pain Rating Post Med Admin: 0 (6/15/2018  8:53 AM)  Jane Score: 8 (6/15/2018  8:52 AM)

## 2018-06-15 NOTE — DISCHARGE INSTRUCTIONS
Recovery After Procedural Sedation (Adult)  You have been given medicine by vein to make you sleep during your surgery. This may have included both a pain medicine and sleeping medicine. Most of the effects have worn off. But you may still have some drowsiness for the next 6 to 8 hours.  Home care  Follow these guidelines when you get home:  · For the next 8 hours, you should be watched by a responsible adult. This person should make sure your condition is not getting worse.  · Don't drink any alcohol for the next 24 hours.  · Don't drive, operate dangerous machinery, or make important business or personal decisions during the next 24 hours.  Note: Your healthcare provider may tell you not to take any medicine by mouth for pain or sleep in the next 4 hours. These medicines may react with the medicines you were given in the hospital. This could cause a much stronger response than usual.  Follow-up care  Follow up with your healthcare provider if you are not alert and back to your usual level of activity within 12 hours.  When to seek medical advice  Call your healthcare provider right away if any of these occur:  · Drowsiness gets worse  · Weakness or dizziness gets worse  · Repeated vomiting  · You can't be awakened   Date Last Reviewed: 10/18/2016  © 8864-1407 [email or web address]. [address] [place] [address], [place] [address]. All rights reserved. This information is not intended as a substitute for professional medical care. Always follow your healthcare professional's instructions.

## 2018-06-25 ENCOUNTER — OFFICE VISIT (OUTPATIENT)
Dept: PSYCHIATRY | Facility: CLINIC | Age: 33
End: 2018-06-25
Payer: COMMERCIAL

## 2018-06-25 VITALS
HEART RATE: 75 BPM | DIASTOLIC BLOOD PRESSURE: 87 MMHG | WEIGHT: 279 LBS | SYSTOLIC BLOOD PRESSURE: 163 MMHG | BODY MASS INDEX: 40.03 KG/M2

## 2018-06-25 DIAGNOSIS — F84.9 PERVASIVE DEVELOPMENTAL DISORDER, ACTIVE: Chronic | ICD-10-CM

## 2018-06-25 DIAGNOSIS — F42.8 OTHER OBSESSIVE-COMPULSIVE DISORDERS: ICD-10-CM

## 2018-06-25 DIAGNOSIS — F71 MODERATE MENTAL RETARDATION: Chronic | ICD-10-CM

## 2018-06-25 DIAGNOSIS — E66.01 CLASS 3 SEVERE OBESITY DUE TO EXCESS CALORIES WITHOUT SERIOUS COMORBIDITY WITH BODY MASS INDEX (BMI) OF 40.0 TO 44.9 IN ADULT: ICD-10-CM

## 2018-06-25 DIAGNOSIS — G47.33 OSA (OBSTRUCTIVE SLEEP APNEA): Chronic | ICD-10-CM

## 2018-06-25 DIAGNOSIS — R74.01 TRANSAMINITIS: ICD-10-CM

## 2018-06-25 DIAGNOSIS — K59.09 CHRONIC CONSTIPATION: Chronic | ICD-10-CM

## 2018-06-25 DIAGNOSIS — F84.0 AUTISTIC DISORDER, ACTIVE: Primary | Chronic | ICD-10-CM

## 2018-06-25 DIAGNOSIS — G40.909 SEIZURE DISORDER: Chronic | ICD-10-CM

## 2018-06-25 DIAGNOSIS — R56.9 CONVULSIONS, UNSPECIFIED CONVULSION TYPE: ICD-10-CM

## 2018-06-25 DIAGNOSIS — F63.9 IMPULSE CONTROL DISORDER: Chronic | ICD-10-CM

## 2018-06-25 PROCEDURE — 99214 OFFICE O/P EST MOD 30 MIN: CPT | Mod: S$GLB,,, | Performed by: PSYCHIATRY & NEUROLOGY

## 2018-06-25 PROCEDURE — 99213 OFFICE O/P EST LOW 20 MIN: CPT | Mod: PBBFAC,PO | Performed by: PSYCHIATRY & NEUROLOGY

## 2018-06-25 PROCEDURE — 99999 PR PBB SHADOW E&M-EST. PATIENT-LVL III: CPT | Mod: PBBFAC,,, | Performed by: PSYCHIATRY & NEUROLOGY

## 2018-06-25 RX ORDER — OXCARBAZEPINE 300 MG/1
300 TABLET, FILM COATED ORAL 2 TIMES DAILY
Qty: 60 TABLET | Refills: 2 | Status: SHIPPED | OUTPATIENT
Start: 2018-06-25 | End: 2019-04-02 | Stop reason: SDUPTHER

## 2018-06-25 RX ORDER — FLUVOXAMINE MALEATE 100 MG/1
50 TABLET, COATED ORAL 2 TIMES DAILY
Qty: 30 TABLET | Refills: 2 | Status: SHIPPED | OUTPATIENT
Start: 2018-06-25 | End: 2018-09-04 | Stop reason: SDUPTHER

## 2018-06-25 NOTE — PROGRESS NOTES
"ID: 31yo WM with PDD, Impulse Control d/o, anxiety d/o nos. Last saw RENNY Murillo 5/2016.     CC: anxiety    Interim Hx: presents on time with his mother and a new caregiver, Ele Cervantes- favorite caregiver- is back after approx 1 yr of being gone. Had twin boys that are now 8mos. Hussein much happier- less self injury and less anxiety in public. Now with 2 "excellent caregivers"    Some concern for transaminitis although trend is decreasing- will cont to observe- next draw in sept.     Hussein was hospitalized with UTI and sepsis, now "much much better" and mother feeling things are settling down. I have concerns about her self care- support- and today she agrees that this last month-6wks has been particularly stressful with Hussein and 's health concerns. "i'll be ok, though." does attend exercise and [employer]/bible study for self.     On Psychiatric ROS:    Endorses good sleep- not taking anything for sleep (sleep better on busier days; when bored he tends to nap), denies anhedonia, denies feeling helpless/hopeless, denies dec energy, denies dec concentration, stable appetite, denies dec PMA, denies thoughts of SI/intent/plan.     Endorses feeling less easily overwhelmed. Less self injury (no scabbing visible in appt- picks at "galindo crystal" when anxious), +ruminative thinking- chronic- more difficulty moving on to new topic, denies feeling tense/"on edge"  Once "fixated" on a topic, struggles to move on. Has some compulsory behaviors assoc with obsessive thinking.     PPHx: Endorses h/o self injury- will pick at scabs in sort of obsessive way- this has stabilized  Denies inpt psych hospitalization  Denies h/o suicide attempt     Current Psych Meds: **Trileptal 300mg po BID through neuro for sz d/o, xanax 0.25mg po daily PRN anxiety, luvox 50mg po BID  Past Psych Meds: s/e's to mult prev meds to include zyprexa (wgt gain), lexapro 20mg po qam (anxiety and to suppress sex drive), buspar 10mg po BID    PMHx: " "obesity, onel w/ cipap, seizure d/o, scoliosis surgery at 11yo led to hemiparalysis- now walking but has neurogenic bladder and frequent catheterization, partial nephrectomy R kidney    SubstHx:   T- none  E- none  D- none  Caffeine- very rare, if ever    FamPHx: mAunt- schizophrenic, mcousin- schizophrenia    Musculoskeletal:  Muscle strength/Tone: no dyskinesia/ no tremor  Gait/Station- non antalgic, no assistance needed    MSE: appears stated age, well groomed, obese, appropriate dress- shorts/tshirt, engages well with examiner at a childlike level.  Good e/c. Speech reg rate and vol, nonpressured. less spontaneous speech today. Mood is "i'm good. i'm not chatting with you." Affect congruent- sits calmly in chair, offers minimal spontaneous speech. Oriented to month and season. Narrative memory intact. Intellectual function is below avg based on vocab and basic fund of knowledge- PDD long h/o sp ed. Thought is perseverative on my name and then later on his birthday. No tangentiality or circumstantiality. No FOI/WES. Denies SI/HI. Denies A/VH. No evidence of delusions. Insight lacking and Judgment in keeping with insight.     Blood pressure (!) 163/87, pulse 75, weight 126.6 kg (279 lb).    Suicide Risk Assessment:   Protective- age, no prior attempts, no prior hospitalizations, no family h/o attempts, no ongoing substance abuse, no psychosis, denies SI/intent/plan, no h/o violence, seeking treatment, access to treatment, future oriented, good primary support, no access to firearms    Risk- race, gender, ongoing Axis I sxs    **Pt is at LOW imminent and long term risk of suicide given current risk factors.    Assessment:  32yo WM with PDD, Impulse Control d/o, anxiety d/o nos. Last saw RENNY Murillo 5/2016. Pt presented initially with his mother and caregiver Helen who has been with the pt x 8mos. Currently the pt is doing very well. Of note pt is only on lexapro for anxiety, is also on trileptal 300mg po BID for " "seizure d/o through neuro and has HALEY on cipap. Has great family support, receives 88hrs per week of direct care from outside service provider, has assistance with all ADLs and IADLs but seems content with arrangement. No h/o violent or aggressive behavior. Had manic sxs with psychosis when on keppra but never before or since. Has recently stopped zyprexa due to significant weight gain and there has been no increase in behavior or anxiety. Per parent and caregiver there is some concern that a prev caregiver who was recently fired was motivated for the pt to be more sedated-  they both denied the pt needed any change in medication. Ru is doing well, has some instances of intrusive behavior with young attractive women and he becomes difficult to redirect- added a trial of PRN buspar for days he has public outings- this has been effective and helpful. Today the incidents have become more difficult to redirect- will order a trial of xanax 0.25-0.5mg po daily PRN and also inc buspar PRN to 10mg daily as needed for public outings. Today on f/u pt's mother does not believe buspar has been effective- xanax effective but cannot be given in the moment as "it just happens so fast"- inability to redirect pt in public has become a safety concern for women and for the pt as authorities are not typically well trained in spectrum disorders (mom working to educate the police force). Warrants a transition to new ssri- will taper off lexapro and likely start luvox at the next appt. Last appt on f/u he is "no worse" without the meds- mom motivated to cont without as they cont to observe. Anxiety was mildly inc'd but hard to attribute to lack of med as his primary caregiver just left work abruptly with pregnancy complications- change of schedule and personnel hard for Ru. Last appt 2 new providers, doing well with both, mom has placed cameras and she has no concerns about the new hires. Routine back in place and ru had " anxiety but was functioning well- mom would like to cont w/o meds. Will cont to observe. It was time to intervene with meds per mom, started trial of luvox which initially helped at 50mg bid, we then inc'd to 100mg bid with s/e of diarrhea and sweating, now back to 50mg po bid with resolution of most concerning s/e and still improvement in anxiety- will cont at this time but mom will cont to do r/b assessment. Pt's father with worsening health- leading to some anxiety, new caregiver now gone after some unprofessional behavior. Pt doing well on the dec'd luvox dose and favorite caregiver is now back and working 25hrs. Self injury dec'd. Recent hospital stay for uti and sepsis- now back to baseline. Some recent transaminitis- trending down- will cont to follow. No acute safety concerns. rtc 4mos due to pt preference.    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 Cont luvox 50mg po BID  2. Cont Trileptal 300mg po BID per neuro  3. Cont xanax 0.25-0.5mg po daily PRN anxiety (rare use)  4. RTC 2mos    -Spent 30min face to face with the pt; >50% time spent in counseling   -Supportive therapy and psychoeducation provided  -R/B/SE's of medications discussed with the pt who expresses understanding and chooses to take medications as prescribed.   -Pt instructed to call clinic, 911 or go to nearest emergency room if sxs worsen or pt is in   crisis. The pt expresses understanding.

## 2018-06-26 ENCOUNTER — OFFICE VISIT (OUTPATIENT)
Dept: FAMILY MEDICINE | Facility: CLINIC | Age: 33
End: 2018-06-26
Payer: MEDICARE

## 2018-06-26 VITALS
BODY MASS INDEX: 39.2 KG/M2 | OXYGEN SATURATION: 95 % | SYSTOLIC BLOOD PRESSURE: 122 MMHG | DIASTOLIC BLOOD PRESSURE: 66 MMHG | HEIGHT: 70 IN | HEART RATE: 73 BPM | WEIGHT: 273.81 LBS

## 2018-06-26 DIAGNOSIS — H10.9 CONJUNCTIVITIS, UNSPECIFIED CONJUNCTIVITIS TYPE, UNSPECIFIED LATERALITY: Primary | ICD-10-CM

## 2018-06-26 PROCEDURE — 3008F BODY MASS INDEX DOCD: CPT | Mod: CPTII,S$GLB,, | Performed by: NURSE PRACTITIONER

## 2018-06-26 PROCEDURE — 99213 OFFICE O/P EST LOW 20 MIN: CPT | Mod: S$GLB,,, | Performed by: NURSE PRACTITIONER

## 2018-06-26 PROCEDURE — 99999 PR PBB SHADOW E&M-EST. PATIENT-LVL III: CPT | Mod: PBBFAC,,, | Performed by: NURSE PRACTITIONER

## 2018-06-26 RX ORDER — CETIRIZINE HYDROCHLORIDE 10 MG/1
10 TABLET ORAL DAILY
Qty: 7 TABLET | Refills: 0 | Status: SHIPPED | OUTPATIENT
Start: 2018-06-26 | End: 2018-11-19

## 2018-06-26 RX ORDER — POLYMYXIN B SULFATE AND TRIMETHOPRIM 1; 10000 MG/ML; [USP'U]/ML
1 SOLUTION OPHTHALMIC EVERY 6 HOURS
Qty: 10 ML | Refills: 0 | Status: SHIPPED | OUTPATIENT
Start: 2018-06-26 | End: 2018-06-28 | Stop reason: SDUPTHER

## 2018-06-26 NOTE — PROGRESS NOTES
Subjective:       Patient ID: Hussein Doyle is a 32 y.o. male.    Chief Complaint: Conjunctivitis    He woke up this morning with crusting and redness of the left eye, patient has been rubbing it frequently according to care taker. His mother put in an OTC drop, they also tries saline drops. No improvmeent.     TETANUS VACCINE due on 11/07/2003    Past Medical History:  Past Medical History:  5/6/2013: Autistic disorder, active  No date: Bowel obstruction  No date: Early intervention counseling  11/12/2015: Gout, chronic  No date: Grand mal seizure  No date: Hepatic steatosis  5/6/2013: Impulse control disorder, unspecified  No date: Mastoiditis  5/6/2013: Moderate mental retardation  No date: Neurogenic bladder  No date: Neurogenic bladder  11/12/2015: HALEY (obstructive sleep apnea)      Comment: CPAP.  Dr. Garber   No date: Otitis media  No date: Paraplegia  12/27/2015: Pervasive developmental disorder, active  2010: Renal cancer      Comment: Right  No date: Scoliosis      Comment: paraplegia  11/12/2015: Seizure disorder  No date: Stroke      Comment: one week old  No date: Tardive dyskinesia  Past Surgical History:  No date: ANKLE FRACTURE SURGERY  2000: BACK SURGERY  10/28/2008: COLONOSCOPY      Comment: Dr. Arana at [facility]; anal fissure, minimal                pinpoint rectal erythema; floppy-type colon                with very little tone; biopsy: rectum mild                chronic proctitis with few eosinophils sent for               scanning  6/15/2018: COLONOSCOPY N/A      Comment: Procedure: COLONOSCOPY;  Surgeon: Refugio Villagomez MD;  Location: [facility];  Service:                Endoscopy;  Laterality: N/A;  No date: INNER EAR SURGERY      Comment: mastoid  2010: right partial nephrectomy Right      Comment: Sees urology  No date: TYMPANOSTOMY TUBE PLACEMENT  Review of patient's allergies indicates:   -- Benadryl (diphenhydramine hcl) -- Other (See Comments)    --  Increases  anxiety and more restless   -- Keppra (levetiracetam) -- Other (See Comments)    --  agitation   -- Ativan (lorazepam) -- Anxiety   -- Abilify  (aripiprazole)     --  Other reaction(s): arrhythmia   -- Clonazepam     --  Other reaction(s): unknown   -- Cough syrup w/decongestant     --  Other reaction(s): auditory hallucinations   -- Diazepam     --  Other reaction(s): tongue swelling   -- Haloperidol -- Other (See Comments)   -- Phenytoin sodium extended     --  Other reaction(s): unknown   -- Quetiapine     --  Other reaction(s): sweating             Other reaction(s): sedation             Other reaction(s): sweating             Other reaction(s): sedation   -- Risperidone     --  Other reaction(s): bladder incontinence   -- Thimerosal     --  Other reaction(s): seizure   -- Ziprasidone hcl -- Other (See Comments)    --  Other reaction(s): tonic clonic seizure             seizure  Current Outpatient Prescriptions on File Prior to Visit:  allopurinol (ZYLOPRIM) 100 MG tablet, TAKE 1 TABLET BY MOUTH TWICE A DAY, Disp: 60 tablet, Rfl: 11  cephALEXin (KEFLEX) 500 MG capsule, Take 500 mg by mouth once daily., Disp: , Rfl: 6  fluvoxaMINE (LUVOX) 100 MG tablet, Take 0.5 tablets (50 mg total) by mouth 2 (two) times daily., Disp: 30 tablet, Rfl: 2  inulin (FIBER GUMMIES ORAL), Take by mouth., Disp: , Rfl:   L. ACIDOPHILUS/BIFID. ANIMALIS (CHEWABLE PROBIOTIC ORAL), Take by mouth 2 (two) times daily., Disp: , Rfl:   lactulose (CHRONULAC) 10 gram/15 mL solution, Take 30 mLs (20 g total) by mouth 2 (two) times daily. (Patient taking differently: Take 20 g by mouth 2 (two) times daily. ), Disp: 1800 mL, Rfl: 6  multivitamin capsule, Take 1 capsule by mouth every morning. , Disp: , Rfl:    OXcarbazepine (TRILEPTAL) 300 MG Tab, Take 1 tablet (300 mg total) by mouth 2 (two) times daily., Disp: 60 tablet, Rfl: 2  polyethylene glycol (GLYCOLAX) 17 gram/dose powder, Take 17 g by mouth daily as needed., Disp: , Rfl:   tamsulosin  (FLOMAX) 0.4 mg Cp24, Take 0.4 mg by mouth every evening. Every day, Disp: , Rfl:   wheat dextrin 3 gram/3.5 gram PwPk, Take 3.5 g by mouth every morning. , Disp: , Rfl:     No current facility-administered medications on file prior to visit.     Social History    Marital status: Single              Spouse name:                       Years of education:                 Number of children:               Occupational History    None on file    Social History Main Topics    Smoking status: Never Smoker                                                                Smokeless tobacco: Never Used                        Alcohol use: No              Drug use: No              Sexual activity: No                   Other Topics            Concern    None on file    Social History Narrative    Lives with his parents.  More dependent with ADLs.  Very active in the community.        New Opportunities wavier.      Review of patient's family history indicates:  Problem: Cancer      Relation: Father       Age of Onset: (Not Specified)       Comment: lymphoma  Problem: Cataracts      Relation: Father       Age of Onset: (Not Specified)   Problem: Colon polyps      Relation: Father       Age of Onset: (Not Specified)   Problem: Cataracts      Relation: Mother       Age of Onset: (Not Specified)   Problem: Cataracts      Relation: Maternal Grandmother       Age of Onset: (Not Specified)   Problem: Diabetes      Relation: Maternal Grandmother       Age of Onset: (Not Specified)   Problem: Macular degeneration      Relation: Maternal Grandmother       Age of Onset: (Not Specified)   Problem: Glaucoma      Relation: Maternal Grandmother       Age of Onset: (Not Specified)   Problem: Anesthesia problems      Relation: Neg Hx       Age of Onset: (Not Specified)   Problem: Clotting disorder      Relation: Neg Hx       Age of Onset: (Not Specified)   Problem: Colon cancer      Relation: Neg Hx       Age of Onset: (Not Specified)   Problem:  Crohn's disease      Relation: Neg Hx       Age of Onset: (Not Specified)   Problem: Ulcerative colitis      Relation: Neg Hx       Age of Onset: (Not Specified)   Problem: Stomach cancer      Relation: Neg Hx       Age of Onset: (Not Specified)   Problem: Esophageal cancer      Relation: Neg Hx       Age of Onset: (Not Specified)             Review of Systems   Constitutional: Negative for chills and fever.   HENT: Positive for rhinorrhea. Negative for postnasal drip and sinus pain.    Eyes: Positive for discharge, redness and itching.   Respiratory: Negative.  Negative for cough.    Cardiovascular: Negative.    Genitourinary: Negative.    Neurological: Negative.        Objective:      Physical Exam   Constitutional: No distress.   Eyes: EOM are normal. Right eye exhibits no chemosis, no discharge, no exudate and no hordeolum. No foreign body present in the right eye. Left eye exhibits discharge and exudate. Left eye exhibits no chemosis and no hordeolum. No foreign body present in the left eye. Left conjunctiva has no hemorrhage.       Neck: Normal range of motion.   Cardiovascular: Normal rate.    Pulmonary/Chest: Effort normal.   Skin: No rash noted. He is not diaphoretic. No erythema.   Vitals reviewed.      Assessment:       1. Conjunctivitis, unspecified conjunctivitis type, unspecified laterality        Plan:       1. Conjunctivitis, unspecified conjunctivitis type, unspecified laterality  If not improving in the next 48 hours or for any worsening follow up with eye clinic.   - polymyxin B sulf-trimethoprim (POLYTRIM) 10,000 unit- 1 mg/mL Drop; Place 1 drop into the left eye every 6 (six) hours.  Dispense: 10 mL; Refill: 0  - cetirizine (ZYRTEC) 10 MG tablet; Take 1 tablet (10 mg total) by mouth once daily.  Dispense: 7 tablet; Refill: 0

## 2018-06-28 ENCOUNTER — OFFICE VISIT (OUTPATIENT)
Dept: OPTOMETRY | Facility: CLINIC | Age: 33
End: 2018-06-28
Payer: MEDICARE

## 2018-06-28 DIAGNOSIS — H10.9 CONJUNCTIVITIS OF LEFT EYE, UNSPECIFIED CONJUNCTIVITIS TYPE: Primary | ICD-10-CM

## 2018-06-28 PROCEDURE — 92012 INTRM OPH EXAM EST PATIENT: CPT | Mod: S$GLB,,, | Performed by: OPTOMETRIST

## 2018-06-28 PROCEDURE — 99999 PR PBB SHADOW E&M-EST. PATIENT-LVL III: CPT | Mod: PBBFAC,,, | Performed by: OPTOMETRIST

## 2018-06-28 RX ORDER — NEOMYCIN SULFATE, POLYMYXIN B SULFATE AND DEXAMETHASONE 3.5; 10000; 1 MG/ML; [USP'U]/ML; MG/ML
1 SUSPENSION/ DROPS OPHTHALMIC 4 TIMES DAILY
Qty: 5 ML | Refills: 0 | Status: SHIPPED | OUTPATIENT
Start: 2018-06-28 | End: 2018-07-03

## 2018-06-28 RX ORDER — NEOMYCIN SULFATE, POLYMYXIN B SULFATE AND DEXAMETHASONE 3.5; 10000; 1 MG/ML; [USP'U]/ML; MG/ML
1 SUSPENSION/ DROPS OPHTHALMIC
COMMUNITY
End: 2018-06-28 | Stop reason: CLARIF

## 2018-06-28 RX ORDER — NEOMYCIN SULFATE, POLYMYXIN B SULFATE AND DEXAMETHASONE 3.5; 10000; 1 MG/ML; [USP'U]/ML; MG/ML
1 SUSPENSION/ DROPS OPHTHALMIC 4 TIMES DAILY
Qty: 5 ML | Refills: 0 | Status: SHIPPED | OUTPATIENT
Start: 2018-06-28 | End: 2018-06-28 | Stop reason: SDUPTHER

## 2018-06-28 NOTE — PATIENT INSTRUCTIONS
"DRY EYES:  Use Over The Counter artificial tears as needed for dry eye symptoms.  Some common brands include:  Systane, Optive, and Refresh.  These drops can be used as frequently as desired, but may be most helpful use during long periods of concentrated work.  For example, reading / working at the computer.  Avoid drops that "get redness out", as these contain medication that may further irritate the eyes.    ALLERGY EYES / SYMPTOMS:    Over the counter medications include--Zaditor and Alaway  Use as directed 1-2 drops daily for symptoms of itching / watering eyes.  These drops will not help for dry eye or exposure symptoms.    BLEPHARITIS & TREATMENT   Definition  Blepharitis is an inflammation of the eyelid margin, which causes itchy, irritated, and often red eyes. One common cause is staphylococcus, skin bacteria, which can thrive in these conditions and can possibly cause eye damage such as corneal scarring.   Occurrence  Blepharitis is a very common problem seen particularly in people with a tendency toward oily skin, dandruff, or dry eyes. Though most frequently seen later in life, blepharitis can begin in childhood.  It can also be associated with hormonal changes (puberty, menopause), or changes in medications.  It is also common in patients with Rosacea.  Symptoms  Lid Crusting - The lids are often reddened, somewhat swollen and scaly appearing at the base of the lashes. These scales, as they become coarser, form crusts that cause the lids to stick together. This is especially prominent upon waking.   Itching - The inflammation of the lids will cause itching and irritation.   Tearing/ Light Sensitivity -The eyes may tear more frequently and may also be sensitive to bright light.  Treatment  Treatment is aimed at lowering the number of bacteria along the eyelid margin and decreasing the scales by lid hygiene and in some cases antibiotic/steroid medications. The goal is to control this problem and prevent " it from becoming a more serious condition. Treatment is focused on keeping the condition under control by routine daily lid hygiene. Unfortunately, if the treatment is stopped the symptoms often recur.    1. Upon waking, place a clean, warm, wet, washcloth over your closed eyelids (upper and lower) for several minutes.  This may be accomplished by allowing warm shower water to run with eyes closed.  2. Place a small amount of diluted (1/4 strength) Baby Shampoo or a commercial lid cleaning solution on a cotton swab, washcloth, or your clean fingertip. Gently pull down on your lower lid and use a horizontal back and forth motion to scrub the edge of your lid at the base of the eyelashes. Do not scrub the inside of the lid or the skin on the outside. Close the eye and use the cotton swab to scrub along the base of the upper lid lashes. Rinse the shampoo away with warm water.   3. Additionally your doctor may ask that you use an antibiotic/steroid drop or ointment for a few weeks. Use as directed.   Perform this procedure 2 times a day for the first 7 days and then cut back to once a day. (Or per your doctors recommendation.) You may need to continue lid scrubs on a daily basis, though some find they can successfully control their blepharitis symptoms with scrubs done 2 to 3 times a week.    Medication--use as directed if medication is prescribed    ________________________________________________________________________

## 2018-06-28 NOTE — PROGRESS NOTES
HPI     Eye problem- infection OS    Pt and pt mother complain of infection OS x 4 days. Saw NP downstairs, was   polymyxin gtts and zyrtec-no relief. Pt states eye is itching and hurting.       Agree above  Used polytrim x 48 hours without ret  No current illness / no URI  No other exposure      Last edited by DEEJAY Hollingsworth, OD on 6/28/2018  9:28 AM. (History)        ROS     Positive for: Eyes    Negative for: Constitutional, Gastrointestinal, Neurological, Skin,   Genitourinary, Musculoskeletal, HENT, Endocrine, Cardiovascular,   Respiratory, Psychiatric, Allergic/Imm, Heme/Lymph    Last edited by DEEJAY Hollingsworth, OD on 6/28/2018  9:28 AM. (History)        Assessment /Plan     For exam results, see Encounter Report.    Conjunctivitis of left eye, unspecified conjunctivitis type  -     Discontinue: neomycin-polymyxin-dexamethasone (MAXITROL) 3.5mg/mL-10,000 unit/mL-0.1 % DrpS; Place 1 drop into the left eye 4 (four) times daily. for 9 days  Dispense: 5 mL; Refill: 0  -     neomycin-polymyxin-dexamethasone (MAXITROL) 3.5mg/mL-10,000 unit/mL-0.1 % DrpS; Place 1 drop into the left eye 4 (four) times daily. for 5 days  Dispense: 5 mL; Refill: 0      Blepharoconjunctivitis OS>>OD ? Etiology , possible viral but could be exposure / allergy  Patient does use cpap and has had issues w/ exposure in the past    Lid hygiene sheet given, scrubs qhs w/ warm compress and ATs  D/c polytrim for present and begin maxitrol for steroid coverage, 5 days tx, call/ message if not improving  Hand washing / hygiene discussed  F/u prn

## 2018-07-17 DIAGNOSIS — K59.00 CONSTIPATION, UNSPECIFIED CONSTIPATION TYPE: ICD-10-CM

## 2018-07-17 RX ORDER — LACTULOSE 10 G/15ML
SOLUTION ORAL; RECTAL
Qty: 1800 ML | Refills: 5 | Status: SHIPPED | OUTPATIENT
Start: 2018-07-17 | End: 2019-04-01 | Stop reason: ALTCHOICE

## 2018-08-08 ENCOUNTER — OFFICE VISIT (OUTPATIENT)
Dept: NEUROLOGY | Facility: CLINIC | Age: 33
End: 2018-08-08
Payer: MEDICARE

## 2018-08-08 VITALS
DIASTOLIC BLOOD PRESSURE: 73 MMHG | RESPIRATION RATE: 17 BRPM | SYSTOLIC BLOOD PRESSURE: 113 MMHG | HEART RATE: 79 BPM | HEIGHT: 70 IN | BODY MASS INDEX: 39.7 KG/M2 | WEIGHT: 277.31 LBS

## 2018-08-08 DIAGNOSIS — Z79.899 HIGH RISK MEDICATION USE: ICD-10-CM

## 2018-08-08 DIAGNOSIS — G40.909 SEIZURE DISORDER: Primary | Chronic | ICD-10-CM

## 2018-08-08 DIAGNOSIS — F84.9 PERVASIVE DEVELOPMENTAL DISORDER, ACTIVE: Chronic | ICD-10-CM

## 2018-08-08 DIAGNOSIS — R74.01 TRANSAMINITIS: ICD-10-CM

## 2018-08-08 PROCEDURE — 3008F BODY MASS INDEX DOCD: CPT | Mod: CPTII,S$GLB,, | Performed by: PSYCHIATRY & NEUROLOGY

## 2018-08-08 PROCEDURE — 99999 PR PBB SHADOW E&M-EST. PATIENT-LVL III: CPT | Mod: PBBFAC,,, | Performed by: PSYCHIATRY & NEUROLOGY

## 2018-08-08 PROCEDURE — 99214 OFFICE O/P EST MOD 30 MIN: CPT | Mod: S$GLB,,, | Performed by: PSYCHIATRY & NEUROLOGY

## 2018-08-08 NOTE — PROGRESS NOTES
"Date of service:  8/8/2018    Chief complaint:  Seizures    Interval history:  The patient is a 32 y.o. male seen previously for a history of epilepsy who had been suffering from a new event type that was found to be nonepileptic in nature.  Since he was last here, he has had no seizures.  Question related to his Trileptal has been raised regarding a recent increase in his LFTs beyond his chronic transaminitis.  Of note, the patient has previously been on Keppra and had psychiatric side effects.    History of present illness:  The patient is a 32 y.o. male referred for management of epilepsy.  He previously saw Dr. Sunshine.  This is my first time seeing him.  He provides little in the way of history due to his cognitive issues, so most of the history was obtained from his caregiver.  Additional history is as noted below.  Briefly, though, this gentleman suffers from a developmental delay and has a history of a prior GTC seizure.  He had onset of new episodes involving behavioral outbursts.  He was admitted to the EMU, and these were found not to be epileptic in nature.  Since the last time he was seen, he has had no further episodes worrisome for seizures.    Prior history:  "First seizure suspected in 2006, onset was not witnessed but caused him to fall to the ground, period of unresponsiveness, had hit his head. Not clear if there was convulsive activity at that time. Mother also describes episodes where eyes roll back, he falls to the ground, has generalized convulsive activity and is disoriented / confused for a short time following. Frequency is approximately once every 2 months, last episode 2 months ago. More prominent since June 2014.      Over the past few months and in particular 6 weeks, has had increase in behavioral outbursts. Mother is not sure if this is directly result of subclinical seizure or not but describes shouting out, inability to follow through with instructions or direction, eyes rolling " "(brief, eyes flit back and to right, forceful eye closure lasting 1-2 seconds) without change in muscle tone or consciousness. Concerned his behaviors have been regressing, he mentions name of caregiver from many years ago often. Repeats phrases (you need to have dark hair), appears agitated, unable to sit still. In the past month has had increase in dose of Keppra; had been on 250 BID until 12/4, dose was increased to 500 BID. At the time of visit with Dr. Parker on 12/16 was on 1500 mg HS. Family desperate to switch Keppra if this will improve behaviors. "      Past Medical History:   Diagnosis Date    Autistic disorder, active 5/6/2013    Bowel obstruction     Early intervention counseling     Gout, chronic 11/12/2015    Grand mal seizure     Hepatic steatosis     Impulse control disorder, unspecified 5/6/2013    Mastoiditis     Moderate mental retardation 5/6/2013    Neurogenic bladder     Neurogenic bladder     HALEY (obstructive sleep apnea) 11/12/2015    CPAP.  Dr. Garber     Otitis media     Paraplegia     Pervasive developmental disorder, active 12/27/2015    Renal cancer 2010    Right    Scoliosis     paraplegia    Seizure disorder 11/12/2015    Stroke     one week old    Tardive dyskinesia        Past Surgical History:   Procedure Laterality Date    ANKLE FRACTURE SURGERY      BACK SURGERY  2000    COLONOSCOPY  10/28/2008    [facility] Arana at [facility]; anal fissure, minimal pinpoint rectal erythema; floppy-type colon with very little tone; biopsy: rectum mild chronic proctitis with few eosinophils sent for scanning    COLONOSCOPY N/A 6/15/2018    Procedure: COLONOSCOPY;  Surgeon: Refugio Villagomez MD;  Location: [facility];  Service: Endoscopy;  Laterality: N/A;    INNER EAR SURGERY      mastoid    right partial nephrectomy Right 2010    Sees urology    TYMPANOSTOMY TUBE PLACEMENT         Family History   Problem Relation Age of Onset    Cancer Father         lymphoma    Cataracts " "Father     Colon polyps Father     Cataracts Mother     Cataracts Maternal Grandmother     Diabetes Maternal Grandmother     Macular degeneration Maternal Grandmother     Glaucoma Maternal Grandmother     Anesthesia problems Neg Hx     Clotting disorder Neg Hx     Colon cancer Neg Hx     Crohn's disease Neg Hx     Ulcerative colitis Neg Hx     Stomach cancer Neg Hx     Esophageal cancer Neg Hx        Social History     Social History    Marital status: Single     Spouse name: N/A    Number of children: N/A    Years of education: N/A     Occupational History    Not on file.     Social History Main Topics    Smoking status: Never Smoker    Smokeless tobacco: Never Used    Alcohol use No    Drug use: No    Sexual activity: No     Other Topics Concern    Not on file     Social History Narrative    Lives with his parents.  More dependent with ADLs.  Very active in the community.        New Opportunities jhoan.        Review of Systems:  A 14 system review is limited due to his imperfect reliability as a historian.  His family reports no significant changes since his prior visit.    Physical exam:  /73 (BP Location: Right arm, Patient Position: Sitting, BP Method: Large (Automatic))   Pulse 79   Resp 17   Ht 5' 10" (1.778 m)   Wt 125.8 kg (277 lb 4.8 oz)   BMI 39.79 kg/m²    Higher Cortical Function:   Patient is a well developed, obese man who is unable to sit still and behaves inappropriately.  He was not hostile, but imperfectly uncooperative.     Cranial Nerves II - XII:   EOMs were intact with normal smooth and no nystagmus.   PERRLA. D/C Funduscopic exam - disc were flat with normal A/V ratio and no exudates or hemorrhages. Visual fields were full to confrontation.   Motor - facial movement was symmetrical and normal.   Facial sensory - Light touch and pin prick sensations were normal.   Hearing was normal to finger rub.  Palate moved well and was symmetrical with normal palatal " "and oral sensation.   Tongue movement was full & the patient could say "la la la" and "Ka Ka Ka" without  difficulty. Patient repeated Buddhist and Evangelical without difficulty. Normal power and bulk was found in the massiter and rotator muscles of the neck.  Motor: Power, bulk and tone were normal in all extremities.  Sensory: Light touch, pin prick, vibration and position senses were normal in all extremities.   Coordination:   Rapid alternating movements and rapid finger tapping - normal.   Finger to nose - nl.   Arm roll - symmetrical.   Gait: Station, gait and tandem walking were done without difficulty and Romberg was negative.      Tremor: resting, postural, intentional - none    Data base:  Prior records were reviewed and are as summarized above.    Labs (5/18):  LFTs: includes LBQ=401 and ALT=116    MRI brain (12/15):  "Motion limited MRI of the brain.  There is trace asymmetry of the mesial temporal lobes left slightly smaller than the right allowing for motion limitation.  This may be artifactual or developmental variant with underlying left mesial temporal sclerosis not excluded.  Clinical correlation correlation with EEG analysis is advised.  No evidence for focal parietal signal abnormality, acute infarction or enhancing lesion."    vEEG (1/16):  "FINAL SUMMARY:  ELECTROENCEPHALOGRAM:  Interictal: This is a normal EEG during wakefulness, drowsiness, and sleep.  Ictal: During this recording, periods of eye flutter during wakefulness and myoclonic jerks during sleep were recorded without associated epileptiform activity on EEG.  CLINICAL SEIZURE:  Classification: Nonepileptic events. Based on clinical history and evaluation by Psychiatry, these events were likely motor tics during wakefulness and myoclonic jerks during sleep. There is no evidence of an epileptic process on this recording. No seizures were recorded"    DEXA (1/17):  Normal    Assessment and plan:  The patient is a 32 y.o. 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  As far as medications go, we will continue his Trileptal for now.  We will follow his LFTs.  I am hoping that we will not need to change this medication, as he is doing well from a seizure perspective and most of the alternatives could cause hepatic issues as well.  Medication side effects were discussed with the patient's family.  State law as it pertains to driving for individuals with seizures was not discussed as his cognitive issues preclude him from driving regardless of degree of seizure control.  The patient's was counseled on seizure safety.     We will plan on seeing the patient back in a year.

## 2018-09-03 ENCOUNTER — PATIENT MESSAGE (OUTPATIENT)
Dept: PSYCHIATRY | Facility: CLINIC | Age: 33
End: 2018-09-03

## 2018-09-03 ENCOUNTER — PATIENT MESSAGE (OUTPATIENT)
Dept: GASTROENTEROLOGY | Facility: CLINIC | Age: 33
End: 2018-09-03

## 2018-09-04 DIAGNOSIS — F42.8 OTHER OBSESSIVE-COMPULSIVE DISORDERS: ICD-10-CM

## 2018-09-04 DIAGNOSIS — F63.9 IMPULSE CONTROL DISORDER: Chronic | ICD-10-CM

## 2018-09-04 RX ORDER — FLUVOXAMINE MALEATE 100 MG/1
100 TABLET, COATED ORAL 2 TIMES DAILY
Qty: 60 TABLET | Refills: 2 | Status: SHIPPED | OUTPATIENT
Start: 2018-09-04 | End: 2018-10-29 | Stop reason: SDUPTHER

## 2018-09-10 RX ORDER — ALPRAZOLAM 0.25 MG/1
0.25 TABLET ORAL DAILY PRN
Qty: 30 TABLET | Refills: 0 | Status: SHIPPED | OUTPATIENT
Start: 2018-09-10 | End: 2018-10-29 | Stop reason: SDUPTHER

## 2018-10-01 RX ORDER — POLYETHYLENE GLYCOL 3350 17 G/17G
17 POWDER, FOR SOLUTION ORAL DAILY
Qty: 527 G | Refills: 2 | Status: SHIPPED | OUTPATIENT
Start: 2018-10-01 | End: 2018-12-23 | Stop reason: SDUPTHER

## 2018-10-12 ENCOUNTER — PATIENT MESSAGE (OUTPATIENT)
Dept: PSYCHIATRY | Facility: CLINIC | Age: 33
End: 2018-10-12

## 2018-10-22 ENCOUNTER — OFFICE VISIT (OUTPATIENT)
Dept: FAMILY MEDICINE | Facility: CLINIC | Age: 33
End: 2018-10-22
Payer: MEDICARE

## 2018-10-22 VITALS
HEART RATE: 85 BPM | HEIGHT: 70 IN | RESPIRATION RATE: 18 BRPM | WEIGHT: 280.63 LBS | SYSTOLIC BLOOD PRESSURE: 144 MMHG | BODY MASS INDEX: 40.17 KG/M2 | OXYGEN SATURATION: 97 % | DIASTOLIC BLOOD PRESSURE: 84 MMHG

## 2018-10-22 DIAGNOSIS — G47.33 OSA (OBSTRUCTIVE SLEEP APNEA): Chronic | ICD-10-CM

## 2018-10-22 DIAGNOSIS — M21.372 FOOT DROP, BILATERAL: Chronic | ICD-10-CM

## 2018-10-22 DIAGNOSIS — M21.371 FOOT DROP, BILATERAL: Chronic | ICD-10-CM

## 2018-10-22 DIAGNOSIS — F84.0 AUTISTIC DISORDER, ACTIVE: Primary | Chronic | ICD-10-CM

## 2018-10-22 DIAGNOSIS — G40.909 SEIZURE DISORDER: Chronic | ICD-10-CM

## 2018-10-22 DIAGNOSIS — N31.9 NEUROGENIC BLADDER: Chronic | ICD-10-CM

## 2018-10-22 DIAGNOSIS — F63.9 IMPULSE CONTROL DISORDER: Chronic | ICD-10-CM

## 2018-10-22 DIAGNOSIS — K59.09 CHRONIC CONSTIPATION: Chronic | ICD-10-CM

## 2018-10-22 DIAGNOSIS — F71 MODERATE MENTAL RETARDATION: Chronic | ICD-10-CM

## 2018-10-22 PROCEDURE — 90686 IIV4 VACC NO PRSV 0.5 ML IM: CPT | Mod: PBBFAC,PO

## 2018-10-22 PROCEDURE — 3008F BODY MASS INDEX DOCD: CPT | Mod: CPTII,,, | Performed by: EMERGENCY MEDICINE

## 2018-10-22 PROCEDURE — 99999 PR PBB SHADOW E&M-EST. PATIENT-LVL IV: CPT | Mod: PBBFAC,,, | Performed by: EMERGENCY MEDICINE

## 2018-10-22 PROCEDURE — 99213 OFFICE O/P EST LOW 20 MIN: CPT | Mod: S$PBB,,, | Performed by: EMERGENCY MEDICINE

## 2018-10-22 PROCEDURE — 99214 OFFICE O/P EST MOD 30 MIN: CPT | Mod: PBBFAC,PO | Performed by: EMERGENCY MEDICINE

## 2018-10-22 RX ORDER — SULFAMETHOXAZOLE AND TRIMETHOPRIM 400; 80 MG/1; MG/1
1 TABLET ORAL DAILY
Refills: 6 | COMMUNITY
Start: 2018-10-11 | End: 2020-05-14 | Stop reason: ALTCHOICE

## 2018-10-24 NOTE — PROGRESS NOTES
Subjective:   THIS NOTE IS DONE WITH VOICE RECOGNITION        Patient ID: Hussein Doyle is a 32 y.o. male.    Chief Complaint: autistic      HPI   Hussein in his mother or seen today.  She reports things have been going well.  They continue the 5 times a day urethral catheterization.  There are number of people who help with this task.  There have been no infections.  There has been no trip to the emergency room in regards to retention or other problems.    His behaviors have been stable.  No major issues have arisen.  There has been no violence.  He continues to do remarkably well.  There have been no emergency room trips for constipation or fecal impaction.    They do need paperwork completed for his continued disability.  There has been no interval change.  He does not need help with eating.  He does need help with getting dressed, bathing, and other daily living activities.  He is reliant on his family and caregivers for transportation and support.    All medication administration needs to go through his mother or 1 of the aides.    No recent hospitalizations.    Immunization History   Administered Date(s) Administered    Influenza - Quadrivalent - PF 10/22/2018         Current Outpatient Medications   Medication Sig Dispense Refill    allopurinol (ZYLOPRIM) 100 MG tablet TAKE 1 TABLET BY MOUTH TWICE A DAY 60 tablet 11    ALPRAZolam (XANAX) 0.25 MG tablet Take 1 tablet (0.25 mg total) by mouth daily as needed for Anxiety. 30 tablet 0    cetirizine (ZYRTEC) 10 MG tablet Take 1 tablet (10 mg total) by mouth once daily. 7 tablet 0    fluvoxaMINE (LUVOX) 100 MG tablet Take 1 tablet (100 mg total) by mouth 2 (two) times daily. 60 tablet 2    inulin (FIBER GUMMIES ORAL) Take by mouth.      L. ACIDOPHILUS/BIFID. ANIMALIS (CHEWABLE PROBIOTIC ORAL) Take by mouth 2 (two) times daily.      lactulose (CHRONULAC) 10 gram/15 mL solution TAKE 30ML TWICE DAILY 1800 mL 5    multivitamin capsule Take 1 capsule  by mouth every morning.       naproxen (NAPROSYN) 500 MG tablet Take 1 tablet (500 mg total) by mouth 2 (two) times daily with meals. 30 tablet 0    OXcarbazepine (TRILEPTAL) 300 MG Tab Take 1 tablet (300 mg total) by mouth 2 (two) times daily. 60 tablet 2    polyethylene glycol (GLYCOLAX) 17 gram/dose powder TAKE 17 G BY MOUTH ONCE DAILY. 527 g 2    tamsulosin (FLOMAX) 0.4 mg Cp24 Take 0.4 mg by mouth every evening. Every day      sulfamethoxazole-trimethoprim 400-80mg (BACTRIM,SEPTRA) 400-80 mg per tablet Take 1 tablet by mouth once daily.  6     No current facility-administered medications for this visit.          Review of Systems   Constitutional: Negative for activity change, chills, fever and unexpected weight change.   HENT: Negative for hearing loss, nosebleeds and tinnitus.    Eyes: Negative for discharge and itching.   Respiratory: Negative for cough, choking, chest tightness, shortness of breath and wheezing.    Cardiovascular: Negative for chest pain, palpitations and leg swelling.   Gastrointestinal: Positive for constipation. Negative for abdominal distention.   Genitourinary: Negative for difficulty urinating, dysuria, flank pain, hematuria and urgency.        Need for urinary catheterization as noted above.   Musculoskeletal: Negative for arthralgias, back pain and joint swelling.   Neurological: Negative for dizziness, tremors, seizures, numbness and headaches.   Psychiatric/Behavioral: Positive for agitation. Negative for confusion, dysphoric mood, hallucinations, self-injury and sleep disturbance. The patient is hyperactive.        Objective:      Physical Exam   Constitutional: He is oriented to person, place, and time. He appears well-developed and well-nourished. No distress.   Large gentleman.  Appropriately dressed.  Skittish behaviors.   HENT:   Head: Normocephalic and atraumatic.   Right Ear: External ear normal.   Left Ear: External ear normal.   Nose: Nose normal.   Mouth/Throat:  Oropharynx is clear and moist. No oropharyngeal exudate.   Eyes: Conjunctivae and EOM are normal. Pupils are equal, round, and reactive to light. No scleral icterus.   Neck: Normal range of motion. Neck supple. No thyromegaly present.   Cardiovascular: Normal rate, regular rhythm and normal heart sounds.   Pulmonary/Chest: Effort normal and breath sounds normal. He has no wheezes. He has no rales.   Abdominal: Soft. Bowel sounds are normal. He exhibits no distension. There is no tenderness.   Musculoskeletal: Normal range of motion. He exhibits no edema or tenderness.   Lymphadenopathy:     He has no cervical adenopathy.   Neurological: He is alert and oriented to person, place, and time. He has normal reflexes. He exhibits normal muscle tone. Coordination normal.   Skin: Skin is warm and dry. No rash noted.   Psychiatric:   His communication is appropriate, but not on target.  He is very nervous with any touch.    He was easy to examine with gentle conversation and explanations.   Vitals reviewed.      Assessment:       1. Autistic disorder, active    2. Seizure disorder    3. HALEY (obstructive sleep apnea)    4. Neurogenic bladder    5. Chronic constipation    6. Impulse control disorder    7. Moderate mental retardation    8. Foot drop, bilateral        Plan:       1. Autistic disorder, active  Currently extremely well managed  Behaviors have not been problematic for the last several months.  No change in medicines    2. Seizure disorder  Stable  He has been seen by Neurology  Anticonvulsant levels to be measured in the near future.    3. HALEY (obstructive sleep apnea)  Followed by Pulmonary  CPAP in place  No issues    4. Neurogenic bladder  Continue self catheterization, with assistance  Do not expect changes    5. Chronic constipation  Currently well managed with a combination of lactulose and polyethylene glycol    6. Impulse control disorder  Controlled  Continue current medications    7. Moderate mental  retardation  Stable  In need of assistance with caregivers  This need has not diminished.    8. Foot drop, bilateral  No change  Continue current management    All forms completed.  Will plan to see him at 4-6 month intervals.  Will try to consolidate all laboratory studies into a single once a year event, as this is traumatic for him.

## 2018-10-24 NOTE — PATIENT INSTRUCTIONS
Eli,    Things seem to going well.  We will try to consolidate all laboratory diagnostic studies into a single annual event.    No changes in medications or procedures.    Reji Zendejas MD

## 2018-10-29 ENCOUNTER — OFFICE VISIT (OUTPATIENT)
Dept: PSYCHIATRY | Facility: CLINIC | Age: 33
End: 2018-10-29
Payer: COMMERCIAL

## 2018-10-29 VITALS
HEIGHT: 70 IN | SYSTOLIC BLOOD PRESSURE: 137 MMHG | HEART RATE: 75 BPM | WEIGHT: 281.69 LBS | DIASTOLIC BLOOD PRESSURE: 79 MMHG | BODY MASS INDEX: 40.33 KG/M2

## 2018-10-29 DIAGNOSIS — G47.33 OSA (OBSTRUCTIVE SLEEP APNEA): Chronic | ICD-10-CM

## 2018-10-29 DIAGNOSIS — F84.9 PERVASIVE DEVELOPMENTAL DISORDER, ACTIVE: Chronic | ICD-10-CM

## 2018-10-29 DIAGNOSIS — F42.8 OTHER OBSESSIVE-COMPULSIVE DISORDERS: ICD-10-CM

## 2018-10-29 DIAGNOSIS — G40.909 SEIZURE DISORDER: Chronic | ICD-10-CM

## 2018-10-29 DIAGNOSIS — F71 MODERATE MENTAL RETARDATION: Chronic | ICD-10-CM

## 2018-10-29 DIAGNOSIS — F63.9 IMPULSE CONTROL DISORDER: Primary | Chronic | ICD-10-CM

## 2018-10-29 PROCEDURE — 99213 OFFICE O/P EST LOW 20 MIN: CPT | Mod: PBBFAC,PO | Performed by: PSYCHIATRY & NEUROLOGY

## 2018-10-29 PROCEDURE — 99999 PR PBB SHADOW E&M-EST. PATIENT-LVL III: CPT | Mod: PBBFAC,,, | Performed by: PSYCHIATRY & NEUROLOGY

## 2018-10-29 PROCEDURE — 99214 OFFICE O/P EST MOD 30 MIN: CPT | Mod: S$PBB,,, | Performed by: PSYCHIATRY & NEUROLOGY

## 2018-10-29 PROCEDURE — 3008F BODY MASS INDEX DOCD: CPT | Mod: CPTII,,, | Performed by: PSYCHIATRY & NEUROLOGY

## 2018-10-29 RX ORDER — FLUVOXAMINE MALEATE 100 MG/1
100 TABLET, COATED ORAL 2 TIMES DAILY
Qty: 60 TABLET | Refills: 2 | Status: SHIPPED | OUTPATIENT
Start: 2018-10-29 | End: 2019-02-18 | Stop reason: SDUPTHER

## 2018-10-29 RX ORDER — ALPRAZOLAM 0.25 MG/1
0.25 TABLET ORAL DAILY PRN
Qty: 30 TABLET | Refills: 0 | Status: SHIPPED | OUTPATIENT
Start: 2018-10-29 | End: 2019-02-18 | Stop reason: SDUPTHER

## 2018-10-29 NOTE — PROGRESS NOTES
"ID: 31yo WM with PDD, Impulse Control d/o, anxiety d/o nos. Last saw RENNY Murillo 5/2016.     CC: anxiety    Interim Hx: presents on time with his mother and Helen, favorite caregiver.    Hussein doing well today and it is evident- much more talkative with provider and tracks perfectly in conversation. When mom begins to talk about his care becoming difficult due to father's aging process and that sister will be who steps in for caregiving when time comes, pt states, "Don't want to hear about that. You need to be a good doctor and work hard and talk about medicine."     Moves right along into medication conversation- xanax has only used 2-3x since last appt- effective each time. Fluvox now at 100mg po qam and 50mg po qhs "and I really think it's helped. He seems more comfortable."     On Psychiatric ROS:    Endorses good sleep- not taking anything for sleep (sleep better on busier days; when bored he tends to nap), denies anhedonia, denies feeling helpless/hopeless, denies dec energy, denies dec concentration, stable appetite, denies dec PMA, denies thoughts of SI/intent/plan.     Endorses feeling less easily overwhelmed. Less self injury (no scabbing visible in appt- picks at "galindo crystal" when anxious), +ruminative thinking- chronic- more difficulty moving on to new topic, denies feeling tense/"on edge"  Once "fixated" on a topic, struggles to move on. Has some compulsory behaviors assoc with obsessive thinking.     PPHx: Endorses h/o self injury- will pick at scabs in sort of obsessive way- this has stabilized  Denies inpt psych hospitalization  Denies h/o suicide attempt     Current Psych Meds: **Trileptal 300mg po BID through neuro for sz d/o, xanax 0.25mg po daily PRN anxiety, luvox 50mg po BID  Past Psych Meds: s/e's to mult prev meds to include zyprexa (wgt gain), lexapro 20mg po qam (anxiety and to suppress sex drive), buspar 10mg po BID    PMHx: obesity, onel w/ cipap, seizure d/o, scoliosis surgery at 11yo led " "to hemiparalysis- now walking but has neurogenic bladder and frequent catheterization, partial nephrectomy R kidney    SubstHx:   T- none  E- none  D- none  Caffeine- very rare, if ever    FamPHx: mAunt- schizophrenic, mcousin- schizophrenia    Musculoskeletal:  Muscle strength/Tone: no dyskinesia/ no tremor  Gait/Station- non antalgic, no assistance needed    MSE: appears stated age, well groomed, obese, appropriate dress- shorts/tshirt, engages well with examiner at a childlike level.  Good e/c. Speech reg rate and vol, nonpressured. less spontaneous speech today. Mood is "it's holidays. My birthday comes after halloween." Affect congruent- sits calmly in chair, offers much more spontaneous speech. Oriented to month and season. Narrative memory intact. Intellectual function is below avg based on vocab and basic fund of knowledge- PDD long h/o sp ed. Thought is perseverative but he is tracking in conversation. No tangentiality or circumstantiality. No FOI/WES. Denies SI/HI. Denies A/VH. No evidence of delusions. Insight lacking and Judgment in keeping with insight.     Blood pressure 137/79, pulse 75, height 5' 10" (1.778 m), weight 127.8 kg (281 lb 11.2 oz).    Suicide Risk Assessment:   Protective- age, no prior attempts, no prior hospitalizations, no family h/o attempts, no ongoing substance abuse, no psychosis, denies SI/intent/plan, no h/o violence, seeking treatment, access to treatment, future oriented, good primary support, no access to firearms    Risk- race, gender, ongoing Axis I sxs    **Pt is at LOW imminent and long term risk of suicide given current risk factors.    Assessment:  30yo WM with PDD, Impulse Control d/o, anxiety d/o nos. Last saw RENNY Murillo 5/2016. Pt presented initially with his mother and caregiver Helen who has been with the pt x 8mos. Currently the pt is doing very well. Of note pt is only on lexapro for anxiety, is also on trileptal 300mg po BID for seizure d/o through neuro and " "has HALEY on cipap. Has great family support, receives 88hrs per week of direct care from outside service provider, has assistance with all ADLs and IADLs but seems content with arrangement. No h/o violent or aggressive behavior. Had manic sxs with psychosis when on keppra but never before or since. Has recently stopped zyprexa due to significant weight gain and there has been no increase in behavior or anxiety. Per parent and caregiver there is some concern that a prev caregiver who was recently fired was motivated for the pt to be more sedated-  they both denied the pt needed any change in medication. Ru is doing well, has some instances of intrusive behavior with young attractive women and he becomes difficult to redirect- added a trial of PRN buspar for days he has public outings- this has been effective and helpful. Today the incidents have become more difficult to redirect- will order a trial of xanax 0.25-0.5mg po daily PRN and also inc buspar PRN to 10mg daily as needed for public outings. Today on f/u pt's mother does not believe buspar has been effective- xanax effective but cannot be given in the moment as "it just happens so fast"- inability to redirect pt in public has become a safety concern for women and for the pt as authorities are not typically well trained in spectrum disorders (mom working to educate the police force). Warrants a transition to new ssri- will taper off lexapro and likely start luvox at the next appt. Last appt on f/u he is "no worse" without the meds- mom motivated to cont without as they cont to observe. Anxiety was mildly inc'd but hard to attribute to lack of med as his primary caregiver just left work abruptly with pregnancy complications- change of schedule and personnel hard for Ru. Last appt 2 new providers, doing well with both, mom has placed cameras and she has no concerns about the new hires. Routine back in place and ru had anxiety but was functioning " well- mom would like to cont w/o meds. Will cont to observe. It was time to intervene with meds per mom, started trial of luvox which initially helped at 50mg bid, we then inc'd to 100mg bid with s/e of diarrhea and sweating, now back to 50mg po bid with resolution of most concerning s/e and still improvement in anxiety- will cont at this time but mom will cont to do r/b assessment. Pt's father with worsening health- leading to some anxiety, new caregiver now gone after some unprofessional behavior. Pt doing well on the dec'd luvox dose and favorite caregiver is now back and working 25hrs. Self injury dec'd. Recent hospital stay for uti and sepsis- now back to baseline. Some recent transaminitis- trending down- will cont to follow. Today much improved on inc'd fluvox to 100mg po qam and 50mg po qhs- only used xanax 2-3x since last appt and effective each time without oversedation. Will cont. No acute safety concerns. rtc 4mos due to pt preference.    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 Cont luvox 100mg po qam/50mg po qhs daily   2. Cont Trileptal 300mg po BID per neuro  3. Cont xanax 0.25-0.5mg po daily PRN anxiety (rare use)  4. RTC 4mos    -Spent 30min face to face with the pt; >50% time spent in counseling   -Supportive therapy and psychoeducation provided  -R/B/SE's of medications discussed with the pt who expresses understanding and chooses to take medications as prescribed.   -Pt instructed to call clinic, 911 or go to nearest emergency room if sxs worsen or pt is in   crisis. The pt expresses understanding.

## 2018-11-07 ENCOUNTER — TELEPHONE (OUTPATIENT)
Dept: FAMILY MEDICINE | Facility: CLINIC | Age: 33
End: 2018-11-07

## 2018-11-07 ENCOUNTER — LAB VISIT (OUTPATIENT)
Dept: LAB | Facility: HOSPITAL | Age: 33
End: 2018-11-07
Attending: NURSE PRACTITIONER
Payer: MEDICARE

## 2018-11-07 DIAGNOSIS — G40.909 SEIZURE DISORDER: Chronic | ICD-10-CM

## 2018-11-07 DIAGNOSIS — R73.9 HYPERGLYCEMIA: ICD-10-CM

## 2018-11-07 DIAGNOSIS — Z13.6 ENCOUNTER FOR SCREENING FOR CARDIOVASCULAR DISORDERS: ICD-10-CM

## 2018-11-07 DIAGNOSIS — R73.09 HEMOGLOBIN A1C ABOVE REFERENCE RANGE: ICD-10-CM

## 2018-11-07 DIAGNOSIS — R74.01 TRANSAMINITIS: ICD-10-CM

## 2018-11-07 DIAGNOSIS — K76.0 FATTY LIVER: ICD-10-CM

## 2018-11-07 LAB
ALBUMIN SERPL BCP-MCNC: 4 G/DL
ALP SERPL-CCNC: 72 U/L
ALT SERPL W/O P-5'-P-CCNC: 113 U/L
ANION GAP SERPL CALC-SCNC: 7 MMOL/L
AST SERPL-CCNC: 132 U/L
BASOPHILS # BLD AUTO: 0.09 K/UL
BASOPHILS NFR BLD: 1 %
BILIRUB SERPL-MCNC: 0.9 MG/DL
BUN SERPL-MCNC: 13 MG/DL
CALCIUM SERPL-MCNC: 9.6 MG/DL
CHLORIDE SERPL-SCNC: 105 MMOL/L
CHOLEST SERPL-MCNC: 197 MG/DL
CHOLEST/HDLC SERPL: 5.8 {RATIO}
CO2 SERPL-SCNC: 26 MMOL/L
CREAT SERPL-MCNC: 0.8 MG/DL
DIFFERENTIAL METHOD: ABNORMAL
EOSINOPHIL # BLD AUTO: 0.2 K/UL
EOSINOPHIL NFR BLD: 2.1 %
ERYTHROCYTE [DISTWIDTH] IN BLOOD BY AUTOMATED COUNT: 12.3 %
EST. GFR  (AFRICAN AMERICAN): >60 ML/MIN/1.73 M^2
EST. GFR  (NON AFRICAN AMERICAN): >60 ML/MIN/1.73 M^2
ESTIMATED AVG GLUCOSE: 114 MG/DL
GLUCOSE SERPL-MCNC: 105 MG/DL
HBA1C MFR BLD HPLC: 5.6 %
HCT VFR BLD AUTO: 44.2 %
HDLC SERPL-MCNC: 34 MG/DL
HDLC SERPL: 17.3 %
HGB BLD-MCNC: 15.2 G/DL
IMM GRANULOCYTES # BLD AUTO: 0.04 K/UL
IMM GRANULOCYTES NFR BLD AUTO: 0.5 %
LDLC SERPL CALC-MCNC: 136 MG/DL
LYMPHOCYTES # BLD AUTO: 2.8 K/UL
LYMPHOCYTES NFR BLD: 31.4 %
MCH RBC QN AUTO: 32.4 PG
MCHC RBC AUTO-ENTMCNC: 34.4 G/DL
MCV RBC AUTO: 94 FL
MONOCYTES # BLD AUTO: 0.7 K/UL
MONOCYTES NFR BLD: 8 %
NEUTROPHILS # BLD AUTO: 5 K/UL
NEUTROPHILS NFR BLD: 57 %
NONHDLC SERPL-MCNC: 163 MG/DL
NRBC BLD-RTO: 0 /100 WBC
PLATELET # BLD AUTO: 246 K/UL
PMV BLD AUTO: 9.9 FL
POTASSIUM SERPL-SCNC: 4.4 MMOL/L
PROT SERPL-MCNC: 7.8 G/DL
RBC # BLD AUTO: 4.69 M/UL
SODIUM SERPL-SCNC: 138 MMOL/L
TRIGL SERPL-MCNC: 135 MG/DL
WBC # BLD AUTO: 8.75 K/UL

## 2018-11-07 PROCEDURE — 80183 DRUG SCRN QUANT OXCARBAZEPIN: CPT

## 2018-11-07 PROCEDURE — 83036 HEMOGLOBIN GLYCOSYLATED A1C: CPT

## 2018-11-07 PROCEDURE — 80053 COMPREHEN METABOLIC PANEL: CPT

## 2018-11-07 PROCEDURE — 80061 LIPID PANEL: CPT

## 2018-11-07 PROCEDURE — 85025 COMPLETE CBC W/AUTO DIFF WBC: CPT

## 2018-11-07 PROCEDURE — 36415 COLL VENOUS BLD VENIPUNCTURE: CPT | Mod: PO

## 2018-11-07 NOTE — TELEPHONE ENCOUNTER
Did not have attached med list. Needs med list with your signature faxed back to her. This is in your box.

## 2018-11-07 NOTE — TELEPHONE ENCOUNTER
----- Message from Erin Burns sent at 11/7/2018  9:48 AM CST -----  Contact:  [facility] [employer] #[phone]   [employer] #[phone]  Call stated she missing medication form, 90L the second patient of medication list was not attached and need dr deep butts

## 2018-11-09 ENCOUNTER — TELEPHONE (OUTPATIENT)
Dept: FAMILY MEDICINE | Facility: CLINIC | Age: 33
End: 2018-11-09

## 2018-11-09 LAB — OXCARBAZEPINE METABOLITE: 6 MCG/ML

## 2018-11-09 NOTE — TELEPHONE ENCOUNTER
I spoke with mother he is doing well and understands his lab results. She and Silvino saw his labs on line.

## 2018-11-09 NOTE — TELEPHONE ENCOUNTER
----- Message from Jesus Pollack sent at 11/8/2018  3:52 PM CST -----  Contact: Mom/Eli  Type:  Test Results    Who Called:  Mom/Eli  Name of Test (Lab/Mammo/Etc):  Labs  Date of Test:  11/7/18  Ordering Provider:  Vannessa  Where the test was performed:  [address]  Best Call Back Number:  [phone]  Additional Information:  n/a

## 2018-11-14 ENCOUNTER — OFFICE VISIT (OUTPATIENT)
Dept: NEUROLOGY | Facility: CLINIC | Age: 33
End: 2018-11-14
Payer: MEDICARE

## 2018-11-14 VITALS
SYSTOLIC BLOOD PRESSURE: 143 MMHG | BODY MASS INDEX: 40.33 KG/M2 | WEIGHT: 281.13 LBS | HEART RATE: 69 BPM | DIASTOLIC BLOOD PRESSURE: 72 MMHG

## 2018-11-14 DIAGNOSIS — G40.909 SEIZURE DISORDER: Primary | Chronic | ICD-10-CM

## 2018-11-14 DIAGNOSIS — F84.0 AUTISTIC DISORDER, ACTIVE: Chronic | ICD-10-CM

## 2018-11-14 DIAGNOSIS — F84.9 PERVASIVE DEVELOPMENTAL DISORDER, ACTIVE: Chronic | ICD-10-CM

## 2018-11-14 DIAGNOSIS — E66.01 MORBID OBESITY WITH BODY MASS INDEX (BMI) OF 40.0 OR HIGHER: ICD-10-CM

## 2018-11-14 DIAGNOSIS — F71 MODERATE MENTAL RETARDATION: Chronic | ICD-10-CM

## 2018-11-14 DIAGNOSIS — R74.01 TRANSAMINITIS: ICD-10-CM

## 2018-11-14 PROCEDURE — 3008F BODY MASS INDEX DOCD: CPT | Mod: CPTII,S$GLB,, | Performed by: PSYCHIATRY & NEUROLOGY

## 2018-11-14 PROCEDURE — 99999 PR PBB SHADOW E&M-EST. PATIENT-LVL III: CPT | Mod: PBBFAC,,, | Performed by: PSYCHIATRY & NEUROLOGY

## 2018-11-14 PROCEDURE — 99214 OFFICE O/P EST MOD 30 MIN: CPT | Mod: S$GLB,,, | Performed by: PSYCHIATRY & NEUROLOGY

## 2018-11-14 NOTE — PROGRESS NOTES
"Date of service:  11/14/2018    Chief complaint:  Seizures    Interval history:  The patient is a 33 y.o. male seen previously for a history of epilepsy who had been suffering from a new event type that was found to be nonepileptic in nature.  Since he was last here, he has had no seizures.  Question related to his Trileptal has been raised regarding a recent increase in his LFTs beyond his chronic transaminitis.  Of note, the patient has previously been on Keppra and had psychiatric side effects.    History of present illness:  The patient is a 33 y.o. male referred for management of epilepsy.  He previously saw Dr. Sunshine.  This is my first time seeing him.  He provides little in the way of history due to his cognitive issues, so most of the history was obtained from his caregiver.  Additional history is as noted below.  Briefly, though, this gentleman suffers from a developmental delay and has a history of a prior GTC seizure.  He had onset of new episodes involving behavioral outbursts.  He was admitted to the EMU, and these were found not to be epileptic in nature.  Since the last time he was seen, he has had no further episodes worrisome for seizures.    Prior history:  "First seizure suspected in 2006, onset was not witnessed but caused him to fall to the ground, period of unresponsiveness, had hit his head. Not clear if there was convulsive activity at that time. Mother also describes episodes where eyes roll back, he falls to the ground, has generalized convulsive activity and is disoriented / confused for a short time following. Frequency is approximately once every 2 months, last episode 2 months ago. More prominent since June 2014.      Over the past few months and in particular 6 weeks, has had increase in behavioral outbursts. Mother is not sure if this is directly result of subclinical seizure or not but describes shouting out, inability to follow through with instructions or direction, eyes rolling " "(brief, eyes flit back and to right, forceful eye closure lasting 1-2 seconds) without change in muscle tone or consciousness. Concerned his behaviors have been regressing, he mentions name of caregiver from many years ago often. Repeats phrases (you need to have dark hair), appears agitated, unable to sit still. In the past month has had increase in dose of Keppra; had been on 250 BID until 12/4, dose was increased to 500 BID. At the time of visit with Dr. Praker on 12/16 was on 1500 mg HS. Family desperate to switch Keppra if this will improve behaviors. "      Past Medical History:   Diagnosis Date    Autistic disorder, active 5/6/2013    Bowel obstruction     Early intervention counseling     Gout, chronic 11/12/2015    Grand mal seizure     Hepatic steatosis     Impulse control disorder, unspecified 5/6/2013    Mastoiditis     Moderate mental retardation 5/6/2013    Neurogenic bladder     Neurogenic bladder     HALEY (obstructive sleep apnea) 11/12/2015    CPAP.  Dr. Garber     Otitis media     Paraplegia     Pervasive developmental disorder, active 12/27/2015    Renal cancer 2010    Right    Scoliosis     paraplegia    Seizure disorder 11/12/2015    Stroke     one week old    Tardive dyskinesia        Past Surgical History:   Procedure Laterality Date    ANKLE FRACTURE SURGERY      BACK SURGERY  2000    COLONOSCOPY  10/28/2008    [facility]; anal fissure, minimal pinpoint rectal erythema; floppy-type colon with very little tone; biopsy: rectum mild chronic proctitis with few eosinophils sent for scanning    COLONOSCOPY N/A 6/15/2018    Procedure: COLONOSCOPY;  Surgeon: Refugio Villagomez MD;  Location: [facility];  Service: Endoscopy;  Laterality: N/A;    COLONOSCOPY N/A 6/15/2018    Performed [facility]ug[facility] at [facility]O    IMAGING-(MRI) N/A 12/31/2015    [facility]    IMAGING-(MRI) N/A 9/13/2013    [facility] "    INNER EAR SURGERY      mastoid    right partial nephrectomy Right 2010    Sees urology    TYMPANOSTOMY TUBE PLACEMENT         Family History   Problem Relation Age of Onset    Cancer Father         lymphoma    Cataracts Father     Colon polyps Father     Cataracts Mother     Cataracts Maternal Grandmother     Diabetes Maternal Grandmother     Macular degeneration Maternal Grandmother     Glaucoma Maternal Grandmother     Anesthesia problems Neg Hx     Clotting disorder Neg Hx     Colon cancer Neg Hx     Crohn's disease Neg Hx     Ulcerative colitis Neg Hx     Stomach cancer Neg Hx     Esophageal cancer Neg Hx        Social History     Socioeconomic History    Marital status: Single     Spouse name: Not on file    Number of children: Not on file    Years of education: Not on file    Highest education level: Not on file   Social Needs    Financial resource strain: Not on file    Food insecurity - worry: Not on file    Food insecurity - inability: Not on file    Transportation needs - medical: Not on file    Transportation needs - non-medical: Not on file   Occupational History    Not on file   Tobacco Use    Smoking status: Never Smoker    Smokeless tobacco: Never Used   Substance and Sexual Activity    Alcohol use: No    Drug use: No    Sexual activity: No   Other Topics Concern    Not on file   Social History Narrative    Lives with his parents.  More dependent with ADLs.  Very active in the community.        New Opportunities jhoan.        Review of Systems:  A 14 system review is limited due to his imperfect reliability as a historian.  His family reports no significant changes since his prior visit.    Physical exam:  BP (!) 143/72 (BP Location: Right arm, Patient Position: Sitting, BP Method: Large (Automatic))   Pulse 69   Wt 127.5 kg (281 lb 1.6 oz)   BMI 40.33 kg/m²    Higher Cortical Function:   Patient is a well developed, obese man who is unable to sit still and  "behaves inappropriately.  He was not hostile, but imperfectly uncooperative.     Cranial Nerves II - XII:   EOMs were intact with normal smooth and no nystagmus.   PERRLA. D/C Funduscopic exam - disc were flat with normal A/V ratio and no exudates or hemorrhages. Visual fields were full to confrontation.   Motor - facial movement was symmetrical and normal.   Facial sensory - Light touch and pin prick sensations were normal.   Hearing was normal to finger rub.  Palate moved well and was symmetrical with normal palatal and oral sensation.   Tongue movement was full & the patient could say "la la la" and "Ka Ka Ka" without  difficulty. Patient repeated [employer] and [employer] without difficulty. Normal power and bulk was found in the massiter and rotator muscles of the neck.  Motor: Power, bulk and tone were normal in all extremities.  Sensory: Light touch, pin prick, vibration and position senses were normal in all extremities.   Coordination:   Rapid alternating movements and rapid finger tapping - normal.   Finger to nose - nl.   Arm roll - symmetrical.   Gait: Station, gait and tandem walking were done without difficulty and Romberg was negative.      Tremor: resting, postural, intentional - none    Data base:  Prior records were reviewed and are as summarized above.    Labs (11/18):  CMP: includes NWS=697 and ALT=113  CBC: unremarkable  OXC: 6    MRI brain (12/15):  "Motion limited MRI of the brain.  There is trace asymmetry of the mesial temporal lobes left slightly smaller than the right allowing for motion limitation.  This may be artifactual or developmental variant with underlying left mesial temporal sclerosis not excluded.  Clinical correlation correlation with EEG analysis is advised.  No evidence for focal parietal signal abnormality, acute infarction or enhancing lesion."    vEEG (1/16):  "FINAL SUMMARY:  ELECTROENCEPHALOGRAM:  Interictal: This is a normal EEG during wakefulness, drowsiness, and " "sleep.  Ictal: During this recording, periods of eye flutter during wakefulness and myoclonic jerks during sleep were recorded without associated epileptiform activity on EEG.  CLINICAL SEIZURE:  Classification: Nonepileptic events. Based on clinical history and evaluation by Psychiatry, these events were likely motor tics during wakefulness and myoclonic jerks during sleep. There is no evidence of an epileptic process on this recording. No seizures were recorded"    DEXA (1/17):  Normal    Assessment and plan:  The patient is a 33 y.o. 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  As far as medications go, we will continue his Trileptal for now.  We will follow his LFTs.  I am hoping that we will not need to change this medication, as he is doing well from a seizure perspective and most of the alternatives could cause hepatic issues as well.  Medication side effects were discussed with the patient's family.  State law as it pertains to driving for individuals with seizures was not discussed as his cognitive issues preclude him from driving regardless of degree of seizure control.  The patient's family has been counseled on seizure safety.    The patient's mother seems to have forgotten that we had previously done vEEG monitoring related to his staring spells/eye fluttering.  I reminded her that these were found to be nonepileptic in nature and that we do not need to adjust his AEDs to address them.  These are likely behavioral in nature, related to his autism/developmental issues.    Finally, we discussed his LFTs.  It sounds as though his psychiatrist, his PCP, and myself are all reluctant to make changes to his current medication regimen in response to this finding.  In the case of his Trileptal, he is doing well, and other AEDs that we might consider certainly also have some degree of hepatotoxicity associated with them " as well.  I did discuss with his mother that the patient's morbid obesity could be contributing to his elevation of LFTs as well.  We discussed strategies for weight loss, such as strategies by which they can control his portion size and the avoidance of calorically dense foods.  The patient's mother did ask about his suitability for gastric bypass, which she has apparently had and which produced weight loss for her.  I indicated to her that, as the patient would likely have issues, due to a lack of insight on his part, with the behavioral changes needed to succeed following weight loss surgery, my opinion is that he is not an ideal candidate for this procedure.    We will plan on seeing the patient back in a year.    Greater than 50% of the patient's >25 minute clinic visit was spent on counseling and arranging care.  Topics covered include diagnosis, prognosis, testing, and treatment.

## 2018-11-19 ENCOUNTER — OFFICE VISIT (OUTPATIENT)
Dept: FAMILY MEDICINE | Facility: CLINIC | Age: 33
End: 2018-11-19
Payer: MEDICARE

## 2018-11-19 ENCOUNTER — PATIENT MESSAGE (OUTPATIENT)
Dept: FAMILY MEDICINE | Facility: CLINIC | Age: 33
End: 2018-11-19

## 2018-11-19 VITALS
HEART RATE: 94 BPM | SYSTOLIC BLOOD PRESSURE: 158 MMHG | BODY MASS INDEX: 39.58 KG/M2 | WEIGHT: 276.44 LBS | OXYGEN SATURATION: 97 % | DIASTOLIC BLOOD PRESSURE: 86 MMHG | TEMPERATURE: 97 F | HEIGHT: 70 IN

## 2018-11-19 DIAGNOSIS — J40 BRONCHITIS: Primary | ICD-10-CM

## 2018-11-19 LAB
FLUAV AG SPEC QL IA: NEGATIVE
FLUBV AG SPEC QL IA: NEGATIVE
SPECIMEN SOURCE: NORMAL

## 2018-11-19 PROCEDURE — 99213 OFFICE O/P EST LOW 20 MIN: CPT | Mod: S$GLB,,, | Performed by: NURSE PRACTITIONER

## 2018-11-19 PROCEDURE — 87400 INFLUENZA A/B EACH AG IA: CPT | Mod: PO

## 2018-11-19 PROCEDURE — 3008F BODY MASS INDEX DOCD: CPT | Mod: CPTII,S$GLB,, | Performed by: NURSE PRACTITIONER

## 2018-11-19 PROCEDURE — 99999 PR PBB SHADOW E&M-EST. PATIENT-LVL IV: CPT | Mod: PBBFAC,,, | Performed by: NURSE PRACTITIONER

## 2018-11-19 RX ORDER — AZELASTINE 1 MG/ML
1 SPRAY, METERED NASAL 2 TIMES DAILY
Qty: 30 ML | Refills: 0 | Status: SHIPPED | OUTPATIENT
Start: 2018-11-19 | End: 2018-12-11 | Stop reason: SDUPTHER

## 2018-11-19 RX ORDER — NAPROXEN SODIUM 550 MG/1
550 TABLET ORAL 2 TIMES DAILY WITH MEALS
Refills: 2 | COMMUNITY
Start: 2018-10-24 | End: 2020-02-17

## 2018-11-19 RX ORDER — BENZONATATE 200 MG/1
200 CAPSULE ORAL 3 TIMES DAILY PRN
Qty: 30 CAPSULE | Refills: 0 | Status: SHIPPED | OUTPATIENT
Start: 2018-11-19 | End: 2018-11-29

## 2018-11-19 RX ORDER — AMOXICILLIN 875 MG/1
875 TABLET, FILM COATED ORAL EVERY 12 HOURS
Qty: 20 TABLET | Refills: 0 | Status: SHIPPED | OUTPATIENT
Start: 2018-11-19 | End: 2018-12-04 | Stop reason: ALTCHOICE

## 2018-11-19 RX ORDER — FLUTICASONE PROPIONATE 50 MCG
1 SPRAY, SUSPENSION (ML) NASAL DAILY
Qty: 16 G | Refills: 0 | Status: SHIPPED | OUTPATIENT
Start: 2018-11-19 | End: 2018-12-16 | Stop reason: SDUPTHER

## 2018-11-19 NOTE — TELEPHONE ENCOUNTER
Pt running fever of 100.7. Scheduled pt an appt with REGINO Jensen for today. See mychart msg. Would you like to do anything different? Please advise.

## 2018-11-19 NOTE — PROGRESS NOTES
Subjective:       Patient ID: Hussein Doyle is a 33 y.o. male.    Chief Complaint: Cough and Fever    Patient has been having fever and cough for several days, getting worse. Fever was 100.7 yesterday and today, productive cough, greed,. Taking mucinex, he has difficulty coughing up mucus. Has had loose bowels today.     TETANUS VACCINE due on 11/07/2003    Past Medical History:  Past Medical History:  5/6/2013: Autistic disorder, active  No date: Bowel obstruction  No date: Early intervention counseling  11/12/2015: Gout, chronic  No date: Grand mal seizure  No date: Hepatic steatosis  5/6/2013: Impulse control disorder, unspecified  No date: Mastoiditis  5/6/2013: Moderate mental retardation  No date: Neurogenic bladder  No date: Neurogenic bladder  11/12/2015: HALEY (obstructive sleep apnea)      Comment:  CPAP.  Dr. Garber   No date: Otitis media  No date: Paraplegia  12/27/2015: Pervasive developmental disorder, active  2010: Renal cancer      Comment:  Right  No date: Scoliosis      Comment:  paraplegia  11/12/2015: Seizure disorder  No date: Stroke      Comment:  one week old  No date: Tardive dyskinesia  Past Surgical History:  No date: ANKLE FRACTURE SURGERY  2000: BACK SURGERY  10/28/2008: COLONOSCOPY      Comment:  Dr. Arana at [facility]; anal fissure, minimal pinpoint rectal               erythema; floppy-type colon with very little tone;                biopsy: rectum mild chronic proctitis with few                eosinophils sent for scanning  6/15/2018: COLONOSCOPY; N/A      Comment:  Procedure: COLONOSCOPY;  Surgeon: Refugio Villagomez MD;  Location: [facility];  Service: Endoscopy;                 Laterality: N/A;  6/15/2018: COLONOSCOPY; N/A      Comment:  Performed by Ref[facility] MD at [facility] ENDO  12/31/2015: IMAGING-(MRI); N/A      Comment:  Performed by Ananya Surgeon at [facility]  9/13/2013: IMAGING-(MRI); N/A      Comment:  Performed by Ananya Surgeon at [facility]  No date:  INNER EAR SURGERY      Comment:  mastoid  2010: right partial nephrectomy; Right      Comment:  Sees urology  No date: TYMPANOSTOMY TUBE PLACEMENT  Review of patient's allergies indicates:   -- Benadryl (diphenhydramine hcl) -- Other (See Comments)    --  Increases anxiety and more restless   -- Keppra (levetiracetam) -- Other (See Comments)    --  agitation   -- Ativan (lorazepam) -- Anxiety   -- Abilify  (aripiprazole)     --  Other reaction(s): arrhythmia   -- Clonazepam     --  Other reaction(s): unknown   -- Cough syrup w/decongestant     --  Other reaction(s): auditory hallucinations   -- Diazepam     --  Other reaction(s): tongue swelling   -- Haloperidol -- Other (See Comments)   -- Phenytoin sodium extended     --  Other reaction(s): unknown   -- Quetiapine     --  Other reaction(s): sweating             Other reaction(s): sedation             Other reaction(s): sweating             Other reaction(s): sedation   -- Risperidone     --  Other reaction(s): bladder incontinence   -- Thimerosal     --  Other reaction(s): seizure   -- Ziprasidone hcl -- Other (See Comments)    --  Other reaction(s): tonic clonic seizure             seizure  Current Outpatient Medications on File Prior to Visit:  allopurinol (ZYLOPRIM) 100 MG tablet, TAKE 1 TABLET BY MOUTH TWICE A DAY, Disp: 60 tablet, Rfl: 11  ALPRAZolam (XANAX) 0.25 MG tablet, Take 1 tablet (0.25 mg total) by mouth daily as needed for Anxiety., Disp: 30 tablet, Rfl: 0  fluvoxaMINE (LUVOX) 100 MG tablet, Take 1 tablet (100 mg total) by mouth 2 (two) times daily., Disp: 60 tablet, Rfl: 2  inulin (FIBER GUMMIES ORAL), Take by mouth., Disp: , Rfl:   L. ACIDOPHILUS/BIFID. ANIMALIS (CHEWABLE PROBIOTIC ORAL), Take by mouth 2 (two) times daily., Disp: , Rfl:   lactulose (CHRONULAC) 10 gram/15 mL solution, TAKE 30ML TWICE DAILY, Disp: 1800 mL, Rfl: 5  multivitamin capsule, Take 1 capsule by mouth every morning. , Disp: , Rfl:    naproxen (NAPROSYN) 500 MG tablet, Take 1  tablet (500 mg total) by mouth 2 (two) times daily with meals., Disp: 30 tablet, Rfl: 0  naproxen sodium (ANAPROX) 550 MG tablet, Take 550 mg by mouth 2 (two) times daily with meals., Disp: , Rfl: 2  OXcarbazepine (TRILEPTAL) 300 MG Tab, Take 1 tablet (300 mg total) by mouth 2 (two) times daily., Disp: 60 tablet, Rfl: 2  polyethylene glycol (GLYCOLAX) 17 gram/dose powder, TAKE 17 G BY MOUTH ONCE DAILY., Disp: 527 g, Rfl: 2  sulfamethoxazole-trimethoprim 400-80mg (BACTRIM,SEPTRA) 400-80 mg per tablet, Take 1 tablet by mouth once daily., Disp: , Rfl: 6  tamsulosin (FLOMAX) 0.4 mg Cp24, Take 0.4 mg by mouth every evening. Every day, Disp: , Rfl:   (DISCONTINUED) cetirizine (ZYRTEC) 10 MG tablet, Take 1 tablet (10 mg total) by mouth once daily., Disp: 7 tablet, Rfl: 0    No current facility-administered medications on file prior to visit.     Social History    Socioeconomic History      Marital status: Single      Spouse name: Not on file      Number of children: Not on file      Years of education: Not on file      Highest education level: Not on file    Social Needs      Financial resource strain: Not on file      Food insecurity - worry: Not on file      Food insecurity - inability: Not on file      Transportation needs - medical: Not on file      Transportation needs - non-medical: Not on file    Occupational History      Not on file    Tobacco Use      Smoking status: Never Smoker      Smokeless tobacco: Never Used    Substance and Sexual Activity      Alcohol use: No      Drug use: No      Sexual activity: No    Other Topics      Concerns:        Not on file    Social History Narrative      Lives with his parents.  More dependent with ADLs.  Very active in the community.            New Opportunities jhoan.    Review of patient's family history indicates:  Problem: Cancer      Relation: Father          Age of Onset: (Not Specified)          Comment: lymphoma  Problem: Cataracts      Relation: Father          Age  of Onset: (Not Specified)  Problem: Colon polyps      Relation: Father          Age of Onset: (Not Specified)  Problem: Cataracts      Relation: Mother          Age of Onset: (Not Specified)  Problem: Cataracts      Relation: Maternal Grandmother          Age of Onset: (Not Specified)  Problem: Diabetes      Relation: Maternal Grandmother          Age of Onset: (Not Specified)  Problem: Macular degeneration      Relation: Maternal Grandmother          Age of Onset: (Not Specified)  Problem: Glaucoma      Relation: Maternal Grandmother          Age of Onset: (Not Specified)  Problem: Anesthesia problems      Relation: Neg Hx          Age of Onset: (Not Specified)  Problem: Clotting disorder      Relation: Neg Hx          Age of Onset: (Not Specified)  Problem: Colon cancer      Relation: Neg Hx          Age of Onset: (Not Specified)  Problem: Crohn's disease      Relation: Neg Hx          Age of Onset: (Not Specified)  Problem: Ulcerative colitis      Relation: Neg Hx          Age of Onset: (Not Specified)  Problem: Stomach cancer      Relation: Neg Hx          Age of Onset: (Not Specified)  Problem: Esophageal cancer      Relation: Neg Hx          Age of Onset: (Not Specified)            Review of Systems   Constitutional: Positive for chills and fever. Negative for fatigue.   HENT: Positive for postnasal drip, rhinorrhea and voice change. Negative for hearing loss and mouth sores.    Respiratory: Negative for cough and shortness of breath.        Objective:      Physical Exam   Constitutional: He is oriented to person, place, and time. No distress.   HENT:   Head: Normocephalic and atraumatic.   Right Ear: Tympanic membrane is erythematous.   Left Ear: Tympanic membrane is erythematous.   Nose: Mucosal edema and rhinorrhea present.   Mouth/Throat: Posterior oropharyngeal edema and posterior oropharyngeal erythema present.   Eyes: Pupils are equal, round, and reactive to light.   Neck: Normal range of motion.    Cardiovascular: Normal rate and regular rhythm. Exam reveals no friction rub.   No murmur heard.  Pulmonary/Chest: Effort normal. No stridor. No respiratory distress.   Musculoskeletal: He exhibits no edema.   Neurological: He is alert and oriented to person, place, and time.   Skin: He is not diaphoretic.   Psychiatric: He has a normal mood and affect. His behavior is normal.   Vitals reviewed.      Assessment:       1. Bronchitis        Plan:       1. Bronchitis  Follow up if not resolving.   - Influenza antigen Nasopharyngeal Swab  - fluticasone (FLONASE) 50 mcg/actuation nasal spray; 1 spray (50 mcg total) by Each Nare route once daily.  Dispense: 16 g; Refill: 0  - azelastine (ASTELIN) 137 mcg (0.1 %) nasal spray; 1 spray (137 mcg total) by Nasal route 2 (two) times daily.  Dispense: 30 mL; Refill: 0  - amoxicillin (AMOXIL) 875 MG tablet; Take 1 tablet (875 mg total) by mouth every 12 (twelve) hours.  Dispense: 20 tablet; Refill: 0  - benzonatate (TESSALON) 200 MG capsule; Take 1 capsule (200 mg total) by mouth 3 (three) times daily as needed for Cough.  Dispense: 30 capsule; Refill: 0

## 2018-12-03 ENCOUNTER — PATIENT MESSAGE (OUTPATIENT)
Dept: FAMILY MEDICINE | Facility: CLINIC | Age: 33
End: 2018-12-03

## 2018-12-04 ENCOUNTER — OFFICE VISIT (OUTPATIENT)
Dept: FAMILY MEDICINE | Facility: CLINIC | Age: 33
End: 2018-12-04
Payer: MEDICARE

## 2018-12-04 VITALS
BODY MASS INDEX: 39.45 KG/M2 | OXYGEN SATURATION: 97 % | HEART RATE: 83 BPM | DIASTOLIC BLOOD PRESSURE: 84 MMHG | WEIGHT: 275.56 LBS | SYSTOLIC BLOOD PRESSURE: 130 MMHG | HEIGHT: 70 IN | TEMPERATURE: 98 F

## 2018-12-04 DIAGNOSIS — H10.9 CONJUNCTIVITIS, UNSPECIFIED CONJUNCTIVITIS TYPE, UNSPECIFIED LATERALITY: Primary | ICD-10-CM

## 2018-12-04 DIAGNOSIS — R05.9 COUGH: ICD-10-CM

## 2018-12-04 PROCEDURE — 3008F BODY MASS INDEX DOCD: CPT | Mod: CPTII,S$GLB,, | Performed by: NURSE PRACTITIONER

## 2018-12-04 PROCEDURE — 99999 PR PBB SHADOW E&M-EST. PATIENT-LVL IV: CPT | Mod: PBBFAC,,, | Performed by: NURSE PRACTITIONER

## 2018-12-04 PROCEDURE — 99214 OFFICE O/P EST MOD 30 MIN: CPT | Mod: S$GLB,,, | Performed by: NURSE PRACTITIONER

## 2018-12-04 RX ORDER — POLYMYXIN B SULFATE AND TRIMETHOPRIM 1; 10000 MG/ML; [USP'U]/ML
1 SOLUTION OPHTHALMIC EVERY 6 HOURS
Qty: 10 ML | Refills: 0 | Status: SHIPPED | OUTPATIENT
Start: 2018-12-04 | End: 2020-02-17

## 2018-12-04 RX ORDER — MONTELUKAST SODIUM 10 MG/1
10 TABLET ORAL NIGHTLY
Qty: 7 TABLET | Refills: 0 | Status: SHIPPED | OUTPATIENT
Start: 2018-12-04 | End: 2019-01-03

## 2018-12-04 RX ORDER — DOXYCYCLINE HYCLATE 100 MG
100 TABLET ORAL 2 TIMES DAILY
Qty: 20 TABLET | Refills: 0 | Status: SHIPPED | OUTPATIENT
Start: 2018-12-04 | End: 2019-04-01

## 2018-12-04 NOTE — PROGRESS NOTES
Subjective:       Patient ID: Hussein Doyle is a 33 y.o. male.    Chief Complaint: Bronchitis and Eye Problem (redness)    11/19/18 treated with amoxil, tessalon, Flonase, and Astelin for bronchitis and conjunctivitis. Fever resolved, cough has not improved, some improvement with eye drainage but has now worsened. Eyes are crusted, draining. Sometimes sometimes hoarse, generally dry cough. Completed medications.  TETANUS VACCINE due on 11/07/2003    Past Medical History:  Past Medical History:  5/6/2013: Autistic disorder, active  No date: Bowel obstruction  No date: Early intervention counseling  11/12/2015: Gout, chronic  No date: Grand mal seizure  No date: Hepatic steatosis  5/6/2013: Impulse control disorder, unspecified  No date: Mastoiditis  5/6/2013: Moderate mental retardation  No date: Neurogenic bladder  No date: Neurogenic bladder  11/12/2015: HALEY (obstructive sleep apnea)      Comment:  CPAP.  Dr. Garber   No date: Otitis media  No date: Paraplegia  12/27/2015: Pervasive developmental disorder, active  2010: Renal cancer      Comment:  Right  No date: Scoliosis      Comment:  paraplegia  11/12/2015: Seizure disorder  No date: Stroke      Comment:  one week old  No date: Tardive dyskinesia  Past Surgical History:  No date: ANKLE FRACTURE SURGERY  2000: BACK SURGERY  10/28/2008: COLONOSCOPY      Comment:  Dr. Arana at [facility]; anal fissure, minimal pinpoint rectal               erythema; floppy-type colon with very little tone;                biopsy: rectum mild chronic proctitis with few                eosinophils sent for scanning  6/15/2018: COLONOSCOPY; N/A      Comment:  Procedure: COLONOSCOPY;  Surgeon: Refugio Villagomez MD;  Location: [facility];  Service: Endoscopy;                 Laterality: N/A;  6/15/2018: COLONOSCOPY; N/A      Comment:  Performed by Refugio Villagomez MD at [facility]  12/31/2015: IMAGING-(MRI); N/A      Comment:  Performed by Ananya Surgeon at [facility]  ANANYA  9/13/2013: IMAGING-(MRI); N/A      Comment:  Performed by Ananya Surgeon at [facility] ANANYA  No date: INNER EAR SURGERY      Comment:  mastoid  2010: right partial nephrectomy; Right      Comment:  Sees urology  No date: TYMPANOSTOMY TUBE PLACEMENT  Review of patient's allergies indicates:   -- Benadryl (diphenhydramine hcl) -- Other (See Comments)    --  Increases anxiety and more restless   -- Keppra (levetiracetam) -- Other (See Comments)    --  agitation   -- Ativan (lorazepam) -- Anxiety   -- Abilify  (aripiprazole)     --  Other reaction(s): arrhythmia   -- Clonazepam     --  Other reaction(s): unknown   -- Cough syrup w/decongestant     --  Other reaction(s): auditory hallucinations   -- Diazepam     --  Other reaction(s): tongue swelling   -- Haloperidol -- Other (See Comments)   -- Phenytoin sodium extended     --  Other reaction(s): unknown   -- Quetiapine     --  Other reaction(s): sweating             Other reaction(s): sedation             Other reaction(s): sweating             Other reaction(s): sedation   -- Risperidone     --  Other reaction(s): bladder incontinence   -- Thimerosal     --  Other reaction(s): seizure   -- Ziprasidone hcl -- Other (See Comments)    --  Other reaction(s): tonic clonic seizure             seizure  Current Outpatient Medications on File Prior to Visit:  allopurinol (ZYLOPRIM) 100 MG tablet, TAKE 1 TABLET BY MOUTH TWICE A DAY, Disp: 60 tablet, Rfl: 11  azelastine (ASTELIN) 137 mcg (0.1 %) nasal spray, 1 spray (137 mcg total) by Nasal route 2 (two) times daily., Disp: 30 mL, Rfl: 0  fluticasone (FLONASE) 50 mcg/actuation nasal spray, 1 spray (50 mcg total) by Each Nare route once daily., Disp: 16 g, Rfl: 0  fluvoxaMINE (LUVOX) 100 MG tablet, Take 1 tablet (100 mg total) by mouth 2 (two) times daily., Disp: 60 tablet, Rfl: 2  inulin (FIBER GUMMIES ORAL), Take by mouth., Disp: , Rfl:   L. ACIDOPHILUS/BIFID. ANIMALIS (CHEWABLE PROBIOTIC ORAL), Take by mouth 2 (two) times  daily., Disp: , Rfl:   lactulose (CHRONULAC) 10 gram/15 mL solution, TAKE 30ML TWICE DAILY, Disp: 1800 mL, Rfl: 5  multivitamin capsule, Take 1 capsule by mouth every morning. , Disp: , Rfl:    naproxen (NAPROSYN) 500 MG tablet, Take 1 tablet (500 mg total) by mouth 2 (two) times daily with meals., Disp: 30 tablet, Rfl: 0  naproxen sodium (ANAPROX) 550 MG tablet, Take 550 mg by mouth 2 (two) times daily with meals., Disp: , Rfl: 2  OXcarbazepine (TRILEPTAL) 300 MG Tab, Take 1 tablet (300 mg total) by mouth 2 (two) times daily., Disp: 60 tablet, Rfl: 2  polyethylene glycol (GLYCOLAX) 17 gram/dose powder, TAKE 17 G BY MOUTH ONCE DAILY., Disp: 527 g, Rfl: 2  sulfamethoxazole-trimethoprim 400-80mg (BACTRIM,SEPTRA) 400-80 mg per tablet, Take 1 tablet by mouth once daily., Disp: , Rfl: 6  tamsulosin (FLOMAX) 0.4 mg Cp24, Take 0.4 mg by mouth every evening. Every day, Disp: , Rfl:   ALPRAZolam (XANAX) 0.25 MG tablet, Take 1 tablet (0.25 mg total) by mouth daily as needed for Anxiety., Disp: 30 tablet, Rfl: 0  (DISCONTINUED) amoxicillin (AMOXIL) 875 MG tablet, Take 1 tablet (875 mg total) by mouth every 12 (twelve) hours., Disp: 20 tablet, Rfl: 0    No current facility-administered medications on file prior to visit.     Social History    Socioeconomic History      Marital status: Single      Spouse name: Not on file      Number of children: Not on file      Years of education: Not on file      Highest education level: Not on file    Social Needs      Financial resource strain: Not on file      Food insecurity - worry: Not on file      Food insecurity - inability: Not on file      Transportation needs - medical: Not on file      Transportation needs - non-medical: Not on file    Occupational History      Not on file    Tobacco Use      Smoking status: Never Smoker      Smokeless tobacco: Never Used    Substance and Sexual Activity      Alcohol use: No      Drug use: No      Sexual activity: No    Other Topics       Concerns:        Not on file    Social History Narrative      Lives with his parents.  More dependent with ADLs.  Very active in the community.            New Opportunities jhoan.    Review of patient's family history indicates:  Problem: Cancer      Relation: Father          Age of Onset: (Not Specified)          Comment: lymphoma  Problem: Cataracts      Relation: Father          Age of Onset: (Not Specified)  Problem: Colon polyps      Relation: Father          Age of Onset: (Not Specified)  Problem: Cataracts      Relation: Mother          Age of Onset: (Not Specified)  Problem: Cataracts      Relation: Maternal Grandmother          Age of Onset: (Not Specified)  Problem: Diabetes      Relation: Maternal Grandmother          Age of Onset: (Not Specified)  Problem: Macular degeneration      Relation: Maternal Grandmother          Age of Onset: (Not Specified)  Problem: Glaucoma      Relation: Maternal Grandmother          Age of Onset: (Not Specified)  Problem: Anesthesia problems      Relation: Neg Hx          Age of Onset: (Not Specified)  Problem: Clotting disorder      Relation: Neg Hx          Age of Onset: (Not Specified)  Problem: Colon cancer      Relation: Neg Hx          Age of Onset: (Not Specified)  Problem: Crohn's disease      Relation: Neg Hx          Age of Onset: (Not Specified)  Problem: Ulcerative colitis      Relation: Neg Hx          Age of Onset: (Not Specified)  Problem: Stomach cancer      Relation: Neg Hx          Age of Onset: (Not Specified)  Problem: Esophageal cancer      Relation: Neg Hx          Age of Onset: (Not Specified)            Review of Systems   Constitutional: Negative for chills and fever.   HENT: Positive for voice change. Negative for postnasal drip, rhinorrhea, sinus pressure and sinus pain.    Eyes: Positive for discharge and redness. Negative for photophobia, pain, itching and visual disturbance.   Respiratory: Positive for cough. Negative for shortness of breath  and wheezing.    Cardiovascular: Negative.    Gastrointestinal: Negative.    Neurological: Negative.        Objective:      Physical Exam   Constitutional: He is oriented to person, place, and time. No distress.   HENT:   Head: Normocephalic and atraumatic. Head is without raccoon's eyes and without Guerrero's sign.   Right Ear: No middle ear effusion.   Left Ear:  No middle ear effusion.   Nose: Rhinorrhea present.   Mouth/Throat: No posterior oropharyngeal erythema.   Eyes: Right eye exhibits discharge. Left eye exhibits discharge. Right conjunctiva is injected. Right conjunctiva has no hemorrhage. Left conjunctiva is injected. Left conjunctiva has no hemorrhage.   Cardiovascular: Normal rate and regular rhythm. Exam reveals no friction rub.   No murmur heard.  Pulmonary/Chest: Effort normal and breath sounds normal. No stridor. No respiratory distress.   Musculoskeletal: He exhibits no edema.   Neurological: He is alert and oriented to person, place, and time.   Skin: He is not diaphoretic.   Psychiatric: He has a normal mood and affect. His behavior is normal.   Vitals reviewed.      Assessment:       1. Conjunctivitis, unspecified conjunctivitis type, unspecified laterality    2. Cough        Plan:     Follow up if not resolving.   1. Conjunctivitis, unspecified conjunctivitis type, unspecified laterality    - polymyxin B sulf-trimethoprim (POLYTRIM) 10,000 unit- 1 mg/mL Drop; Place 1 drop into both eyes every 6 (six) hours.  Dispense: 10 mL; Refill: 0  - montelukast (SINGULAIR) 10 mg tablet; Take 1 tablet (10 mg total) by mouth every evening.  Dispense: 7 tablet; Refill: 0  - doxycycline (VIBRA-TABS) 100 MG tablet; Take 1 tablet (100 mg total) by mouth 2 (two) times daily.  Dispense: 20 tablet; Refill: 0    2. Cough    - polymyxin B sulf-trimethoprim (POLYTRIM) 10,000 unit- 1 mg/mL Drop; Place 1 drop into both eyes every 6 (six) hours.  Dispense: 10 mL; Refill: 0  - montelukast (SINGULAIR) 10 mg tablet; Take 1  tablet (10 mg total) by mouth every evening.  Dispense: 7 tablet; Refill: 0  - doxycycline (VIBRA-TABS) 100 MG tablet; Take 1 tablet (100 mg total) by mouth 2 (two) times daily.  Dispense: 20 tablet; Refill: 0

## 2018-12-11 DIAGNOSIS — J40 BRONCHITIS: ICD-10-CM

## 2018-12-11 RX ORDER — AZELASTINE 1 MG/ML
SPRAY, METERED NASAL
Qty: 30 ML | Refills: 0 | Status: SHIPPED | OUTPATIENT
Start: 2018-12-11 | End: 2019-01-12 | Stop reason: SDUPTHER

## 2018-12-16 DIAGNOSIS — J40 BRONCHITIS: ICD-10-CM

## 2018-12-17 RX ORDER — FLUTICASONE PROPIONATE 50 MCG
SPRAY, SUSPENSION (ML) NASAL
Qty: 16 ML | Refills: 0 | Status: SHIPPED | OUTPATIENT
Start: 2018-12-17 | End: 2019-01-16 | Stop reason: SDUPTHER

## 2018-12-27 RX ORDER — POLYETHYLENE GLYCOL 3350 17 G/17G
17 POWDER, FOR SOLUTION ORAL DAILY
Qty: 527 G | Refills: 2 | Status: SHIPPED | OUTPATIENT
Start: 2018-12-27 | End: 2019-04-01 | Stop reason: SDUPTHER

## 2019-01-12 DIAGNOSIS — J40 BRONCHITIS: ICD-10-CM

## 2019-01-14 RX ORDER — AZELASTINE 1 MG/ML
SPRAY, METERED NASAL
Qty: 30 ML | Refills: 0 | Status: SHIPPED | OUTPATIENT
Start: 2019-01-14 | End: 2019-02-14 | Stop reason: SDUPTHER

## 2019-01-16 DIAGNOSIS — J40 BRONCHITIS: ICD-10-CM

## 2019-01-16 RX ORDER — FLUTICASONE PROPIONATE 50 MCG
SPRAY, SUSPENSION (ML) NASAL
Qty: 16 ML | Refills: 0 | Status: SHIPPED | OUTPATIENT
Start: 2019-01-16 | End: 2019-02-14 | Stop reason: SDUPTHER

## 2019-02-14 DIAGNOSIS — J40 BRONCHITIS: ICD-10-CM

## 2019-02-14 RX ORDER — FLUTICASONE PROPIONATE 50 MCG
SPRAY, SUSPENSION (ML) NASAL
Qty: 16 ML | Refills: 2 | Status: SHIPPED | OUTPATIENT
Start: 2019-02-14 | End: 2019-04-01 | Stop reason: ALTCHOICE

## 2019-02-14 RX ORDER — AZELASTINE 1 MG/ML
SPRAY, METERED NASAL
Qty: 30 ML | Refills: 2 | Status: SHIPPED | OUTPATIENT
Start: 2019-02-14 | End: 2019-04-01 | Stop reason: ALTCHOICE

## 2019-02-18 ENCOUNTER — OFFICE VISIT (OUTPATIENT)
Dept: PSYCHIATRY | Facility: CLINIC | Age: 34
End: 2019-02-18
Payer: COMMERCIAL

## 2019-02-18 VITALS
DIASTOLIC BLOOD PRESSURE: 69 MMHG | WEIGHT: 275.31 LBS | SYSTOLIC BLOOD PRESSURE: 122 MMHG | HEART RATE: 62 BPM | HEIGHT: 70 IN | BODY MASS INDEX: 39.41 KG/M2

## 2019-02-18 DIAGNOSIS — F63.9 IMPULSE CONTROL DISORDER: Chronic | ICD-10-CM

## 2019-02-18 DIAGNOSIS — F84.9 PERVASIVE DEVELOPMENTAL DISORDER, ACTIVE: Chronic | ICD-10-CM

## 2019-02-18 DIAGNOSIS — F42.8 OTHER OBSESSIVE-COMPULSIVE DISORDERS: ICD-10-CM

## 2019-02-18 DIAGNOSIS — E66.01 MORBID OBESITY WITH BODY MASS INDEX (BMI) OF 40.0 OR HIGHER: ICD-10-CM

## 2019-02-18 DIAGNOSIS — F84.0 AUTISTIC DISORDER, ACTIVE: Primary | Chronic | ICD-10-CM

## 2019-02-18 PROCEDURE — 99499 UNLISTED E&M SERVICE: CPT | Mod: S$GLB,,, | Performed by: PSYCHIATRY & NEUROLOGY

## 2019-02-18 PROCEDURE — 99999 PR PBB SHADOW E&M-EST. PATIENT-LVL III: ICD-10-PCS | Mod: PBBFAC,,, | Performed by: PSYCHIATRY & NEUROLOGY

## 2019-02-18 PROCEDURE — 99214 OFFICE O/P EST MOD 30 MIN: CPT | Mod: S$GLB,,, | Performed by: PSYCHIATRY & NEUROLOGY

## 2019-02-18 PROCEDURE — 99499 RISK ADDL DX/OHS AUDIT: ICD-10-PCS | Mod: S$GLB,,, | Performed by: PSYCHIATRY & NEUROLOGY

## 2019-02-18 PROCEDURE — 99999 PR PBB SHADOW E&M-EST. PATIENT-LVL III: CPT | Mod: PBBFAC,,, | Performed by: PSYCHIATRY & NEUROLOGY

## 2019-02-18 PROCEDURE — 3008F BODY MASS INDEX DOCD: CPT | Mod: CPTII,S$GLB,, | Performed by: PSYCHIATRY & NEUROLOGY

## 2019-02-18 PROCEDURE — 3008F PR BODY MASS INDEX (BMI) DOCUMENTED: ICD-10-PCS | Mod: CPTII,S$GLB,, | Performed by: PSYCHIATRY & NEUROLOGY

## 2019-02-18 PROCEDURE — 99214 PR OFFICE/OUTPT VISIT, EST, LEVL IV, 30-39 MIN: ICD-10-PCS | Mod: S$GLB,,, | Performed by: PSYCHIATRY & NEUROLOGY

## 2019-02-18 RX ORDER — ALPRAZOLAM 0.25 MG/1
0.25 TABLET ORAL DAILY PRN
Qty: 30 TABLET | Refills: 0 | Status: SHIPPED | OUTPATIENT
Start: 2019-02-18 | End: 2019-04-01

## 2019-02-18 RX ORDER — FLUVOXAMINE MALEATE 100 MG/1
100 TABLET, COATED ORAL 2 TIMES DAILY
Qty: 60 TABLET | Refills: 3 | Status: SHIPPED | OUTPATIENT
Start: 2019-02-18 | End: 2019-05-20 | Stop reason: SDUPTHER

## 2019-02-18 NOTE — PROGRESS NOTES
"ID: 29yo WM with PDD, Impulse Control d/o, anxiety d/o nos. Last saw RENNY Murillo 5/2016.     CC: anxiety    Interim Hx: presents on time with his mother and one of his long time caregivers.    Hussein doing well today and it is evident- much more talkative with provider and tracks perfectly in conversation. Went to a [employer] parade yesterday- really enjoyed it and also enjoyed his chinese food "snack" which they got due to his need to use their restroom for catheterization, "so we got to buy chinese food."     xanax has only used 1-2x since last appt- effective each time. Fluvox now at 100mg po qam and 50mg po qhs "and I really think it's helped. He seems more comfortable."     When hussein has left the room his mom hangs back to share with me that his father, Tim, is not doing well "at all"- has progressive CHF struggling to keep fluid intake/output balanced, but now has a rare skin cancer and has a new finding in blood panel- has an appt with hematologist on 2/26. She will keep me informed but she and  have started to discuss end of life planning and this is "mainly because he realizes I have to be able to care for Hussein."     On Psychiatric ROS:    Endorses good sleep- not taking anything for sleep (sleep better on busier days; when bored he tends to nap), denies anhedonia, denies feeling helpless/hopeless, denies dec energy, denies dec concentration, stable appetite, denies dec PMA, denies thoughts of SI/intent/plan.     Endorses feeling less easily overwhelmed. Less self injury (no scabbing visible in appt- picks at "galindo crystal" when anxious), +ruminative thinking- chronic- more difficulty moving on to new topic, denies feeling tense/"on edge"  Once "fixated" on a topic, struggles to move on. Has some compulsory behaviors assoc with obsessive thinking.     PPHx: Endorses h/o self injury- will pick at scabs in sort of obsessive way- this has stabilized  Denies inpt psych hospitalization  Denies h/o " "suicide attempt     Current Psych Meds: **Trileptal 300mg po BID through neuro for sz d/o, xanax 0.25mg po daily PRN anxiety, luvox 50mg po BID  Past Psych Meds: s/e's to mult prev meds to include zyprexa (wgt gain), lexapro 20mg po qam (anxiety and to suppress sex drive), buspar 10mg po BID    PMHx: obesity, onel w/ cipap, seizure d/o, scoliosis surgery at 13yo led to hemiparalysis- now walking but has neurogenic bladder and frequent catheterization, partial nephrectomy R kidney    SubstHx:   T- none  E- none  D- none  Caffeine- very rare, if ever    FamPHx: mAunt- schizophrenic, mcousin- schizophrenia    Musculoskeletal:  Muscle strength/Tone: no dyskinesia/ no tremor  Gait/Station- non antalgic, no assistance needed    MSE: appears stated age, well groomed, obese, appropriate dress- shorts/tshirt, engages well with examiner at a childlike level.  Good e/c. Speech reg rate and vol, nonpressured. less spontaneous speech today. Mood is "i'm feeling better." Affect congruent- sits calmly in chair, offers much more spontaneous speech. Oriented to month and season. Narrative memory intact. Intellectual function is below avg based on vocab and basic fund of knowledge- PDD long h/o sp ed. Thought is perseverative but he is tracking in conversation. No tangentiality or circumstantiality. No FOI/WES. Denies SI/HI. Denies A/VH. No evidence of delusions. Insight lacking and Judgment in keeping with insight.     Blood pressure 122/69, pulse 62, height 5' 10" (1.778 m), weight 124.9 kg (275 lb 4.8 oz).    Suicide Risk Assessment:   Protective- age, no prior attempts, no prior hospitalizations, no family h/o attempts, no ongoing substance abuse, no psychosis, denies SI/intent/plan, no h/o violence, seeking treatment, access to treatment, future oriented, good primary support, no access to firearms    Risk- race, gender, ongoing Axis I sxs    **Pt is at LOW imminent and long term risk of suicide given current risk " "factors.    Assessment:  32yo WM with PDD, Impulse Control d/o, anxiety d/o nos. Last saw RENNY Murillo 5/2016. Pt presented initially with his mother and caregiver Helen who has been with the pt x 8mos. Currently the pt is doing very well. Of note pt is only on lexapro for anxiety, is also on trileptal 300mg po BID for seizure d/o through neuro and has HALEY on cipap. Has great family support, receives 88hrs per week of direct care from outside service provider, has assistance with all ADLs and IADLs but seems content with arrangement. No h/o violent or aggressive behavior. Had manic sxs with psychosis when on keppra but never before or since. Has recently stopped zyprexa due to significant weight gain and there has been no increase in behavior or anxiety. Per parent and caregiver there is some concern that a prev caregiver who was recently fired was motivated for the pt to be more sedated-  they both denied the pt needed any change in medication. Hussein is doing well, has some instances of intrusive behavior with young attractive women and he becomes difficult to redirect- added a trial of PRN buspar for days he has public outings- this has been effective and helpful. Today the incidents have become more difficult to redirect- will order a trial of xanax 0.25-0.5mg po daily PRN and also inc buspar PRN to 10mg daily as needed for public outings. Today on f/u pt's mother does not believe buspar has been effective- xanax effective but cannot be given in the moment as "it just happens so fast"- inability to redirect pt in public has become a safety concern for women and for the pt as authorities are not typically well trained in spectrum disorders (mom working to educate the police force). Warrants a transition to new ssri- will taper off lexapro and likely start luvox at the next appt. Last appt on f/u he is "no worse" without the meds- mom motivated to cont without as they cont to observe. Anxiety was mildly inc'd but " hard to attribute to lack of med as his primary caregiver just left work abruptly with pregnancy complications- change of schedule and personnel hard for Ru. Last appt 2 new providers, doing well with both, mom has placed cameras and she has no concerns about the new hires. Routine back in place and ru had anxiety but was functioning well- mom would like to cont w/o meds. Will cont to observe. It was time to intervene with meds per mom, started trial of luvox which initially helped at 50mg bid, we then inc'd to 100mg bid with s/e of diarrhea and sweating, now back to 50mg po bid with resolution of most concerning s/e and still improvement in anxiety- will cont at this time but mom will cont to do r/b assessment. Pt's father with worsening health- leading to some anxiety, new caregiver now gone after some unprofessional behavior. Pt doing well on the dec'd luvox dose and favorite caregiver is now back and working 25hrs. Self injury dec'd. Recent hospital stay for uti and sepsis- now back to baseline. Some recent transaminitis- trending down- will cont to follow. Today much improved on inc'd fluvox to 100mg po qam and 50mg po qhs- only used xanax 2-3x since last appt and effective each time without oversedation. Will cont. No acute safety concerns. rtc 4mos due to pt preference.    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 Cont luvox 100mg po qam/50mg po qhs daily   2. Cont Trileptal 300mg po BID per neuro  3. Cont xanax 0.25-0.5mg po daily PRN anxiety (rare use)  4. RTC 4mos    -Spent 30min face to face with the pt; >50% time spent in counseling   -Supportive therapy and psychoeducation provided  -R/B/SE's of medications discussed with the pt who expresses understanding and chooses to take medications as prescribed.   -Pt instructed to call  clinic, 911 or go to nearest emergency room if sxs worsen or pt is in   crisis. The pt expresses understanding.

## 2019-02-19 RX ORDER — MUPIROCIN 20 MG/G
OINTMENT TOPICAL
Qty: 22 G | Refills: 0 | Status: SHIPPED | OUTPATIENT
Start: 2019-02-19 | End: 2019-07-12 | Stop reason: SDUPTHER

## 2019-03-11 ENCOUNTER — PATIENT MESSAGE (OUTPATIENT)
Dept: FAMILY MEDICINE | Facility: CLINIC | Age: 34
End: 2019-03-11

## 2019-03-11 DIAGNOSIS — M1A.9XX0 CHRONIC GOUT WITHOUT TOPHUS, UNSPECIFIED CAUSE, UNSPECIFIED SITE: ICD-10-CM

## 2019-03-11 RX ORDER — ALLOPURINOL 100 MG/1
100 TABLET ORAL 2 TIMES DAILY
Qty: 60 TABLET | Refills: 11 | Status: SHIPPED | OUTPATIENT
Start: 2019-03-11 | End: 2020-02-13

## 2019-03-12 ENCOUNTER — PATIENT MESSAGE (OUTPATIENT)
Dept: FAMILY MEDICINE | Facility: CLINIC | Age: 34
End: 2019-03-12

## 2019-04-01 ENCOUNTER — OFFICE VISIT (OUTPATIENT)
Dept: FAMILY MEDICINE | Facility: CLINIC | Age: 34
End: 2019-04-01
Payer: MEDICARE

## 2019-04-01 VITALS
DIASTOLIC BLOOD PRESSURE: 82 MMHG | HEART RATE: 72 BPM | RESPIRATION RATE: 18 BRPM | HEIGHT: 70 IN | OXYGEN SATURATION: 95 % | SYSTOLIC BLOOD PRESSURE: 130 MMHG | BODY MASS INDEX: 39.49 KG/M2 | WEIGHT: 275.81 LBS

## 2019-04-01 DIAGNOSIS — F84.0 AUTISTIC DISORDER, ACTIVE: Chronic | ICD-10-CM

## 2019-04-01 DIAGNOSIS — E66.01 MORBID OBESITY WITH BODY MASS INDEX (BMI) OF 40.0 OR HIGHER: ICD-10-CM

## 2019-04-01 DIAGNOSIS — R79.89 ELEVATED LFTS: ICD-10-CM

## 2019-04-01 DIAGNOSIS — M1A.09X0 IDIOPATHIC CHRONIC GOUT OF MULTIPLE SITES WITHOUT TOPHUS: Primary | ICD-10-CM

## 2019-04-01 DIAGNOSIS — G40.909 SEIZURE DISORDER: Chronic | ICD-10-CM

## 2019-04-01 DIAGNOSIS — K59.00 CONSTIPATION, UNSPECIFIED CONSTIPATION TYPE: ICD-10-CM

## 2019-04-01 DIAGNOSIS — N31.9 NEUROGENIC BLADDER: Chronic | ICD-10-CM

## 2019-04-01 DIAGNOSIS — K59.09 CHRONIC CONSTIPATION: Chronic | ICD-10-CM

## 2019-04-01 DIAGNOSIS — G47.33 OSA (OBSTRUCTIVE SLEEP APNEA): Chronic | ICD-10-CM

## 2019-04-01 PROCEDURE — 99499 RISK ADDL DX/OHS AUDIT: ICD-10-PCS | Mod: S$GLB,,, | Performed by: EMERGENCY MEDICINE

## 2019-04-01 PROCEDURE — 99999 PR PBB SHADOW E&M-EST. PATIENT-LVL V: ICD-10-PCS | Mod: PBBFAC,,, | Performed by: EMERGENCY MEDICINE

## 2019-04-01 PROCEDURE — 99213 OFFICE O/P EST LOW 20 MIN: CPT | Mod: S$GLB,,, | Performed by: EMERGENCY MEDICINE

## 2019-04-01 PROCEDURE — 3008F PR BODY MASS INDEX (BMI) DOCUMENTED: ICD-10-PCS | Mod: CPTII,S$GLB,, | Performed by: EMERGENCY MEDICINE

## 2019-04-01 PROCEDURE — 99499 UNLISTED E&M SERVICE: CPT | Mod: S$GLB,,, | Performed by: EMERGENCY MEDICINE

## 2019-04-01 PROCEDURE — 99999 PR PBB SHADOW E&M-EST. PATIENT-LVL V: CPT | Mod: PBBFAC,,, | Performed by: EMERGENCY MEDICINE

## 2019-04-01 PROCEDURE — 99213 PR OFFICE/OUTPT VISIT, EST, LEVL III, 20-29 MIN: ICD-10-PCS | Mod: S$GLB,,, | Performed by: EMERGENCY MEDICINE

## 2019-04-01 PROCEDURE — 3008F BODY MASS INDEX DOCD: CPT | Mod: CPTII,S$GLB,, | Performed by: EMERGENCY MEDICINE

## 2019-04-01 RX ORDER — POLYETHYLENE GLYCOL 3350 17 G/17G
17 POWDER, FOR SOLUTION ORAL DAILY
Qty: 90 PACKET | Refills: 3 | Status: SHIPPED | OUTPATIENT
Start: 2019-04-01 | End: 2020-06-29 | Stop reason: ALTCHOICE

## 2019-04-01 NOTE — PROGRESS NOTES
Subjective:   THIS NOTE IS DONE WITH VOICE RECOGNITION        Patient ID: Sherry Rodriguez is a 33 y.o. male.    Chief Complaint: autistic and Sleep Apnea      HPI     Silvino and his family are in to review his medical conditions.  He is reading since the seen Dr. Valentine, psychiatry, in her exam was mostly stable.  No new recommendations.     Psychiatry [place]    He did see Dr. Valentine  Axis I: OCD, Anxiety d/o NOS, Impulse control d/o  Axis II: Pervasive development disorder  Axis III:  Obstructive sleep apnea, using CPAP.  Seizure disorder  Axis IV: chronic mental health, dependent for caregiving  Axis V: GAF 50    He has not had any seizure activity.  He has seen Neurology in the past.  No new recommendations and will plan to continue medications as it is, as long as he is control.    He has had some modest elevation of liver function test.  These do need to be followed up.  He is not experiencing any jaundice, change in the color of his urine, abdominal pain, or other worrisome signs..    Results for SHERRY RODRIGUEZ (MRN [id number]) as of 4/1/2019 13:43   Ref. Range 4/27/2018 05:05 4/28/2018 07:22 4/30/2018 15:05 5/24/2018 09:12 11/7/2018 08:59   AST Latest Ref Range: 10 - 40 U/L 90 (H) 140 (H) 177 (H) 114 (H) 132 (H)   ALT Latest Ref Range: 10 - 44 U/L 76 (H) 106 (H) 136 (H) 116 (H) 113 (H)     He is continuing to use his CPAP without problems, with the sole exception is sometimes the mask is crit could on his face.  This morning it was actually rubbing his eyes causing a degree of conjunctival irritation.  No residual.  The efficacy of the CPAP remains good.    He has been using some plain Zyrtec for what sound like mild allergy symptoms.  There has been no change in his urinary issues with the antihistamine.  His neurogenic bladder is well controlled with intermittent casts, for the most part time.  He is now using his Bactrim every other day for prophylaxis.  This is followed by his urologist,   Purhoit.  He does get semiannual ultrasounds of the bladder.    Constipation, chronic has been well controlled with MiraLax.  Having individual packets would work better for him, as more independent could be obtained.  I agree with the desire to moved to this rather than large bottle of MiraLax.    Foot drop has improved with physical therapy.  No orthotics.      His level of functioning with his autism remains stable, on some days much improved.  Today being 1 of those days.    No new healthcare needs are identified..    Immunization History   Administered Date(s) Administered    Influenza - Quadrivalent - PF 10/22/2018             Current Outpatient Medications   Medication Sig Dispense Refill    allopurinol (ZYLOPRIM) 100 MG tablet Take 1 tablet (100 mg total) by mouth 2 (two) times daily. 60 tablet 11    ALPRAZolam (XANAX) 0.25 MG tablet Take 1 tablet (0.25 mg total) by mouth daily as needed for Anxiety. 30 tablet 0    azelastine (ASTELIN) 137 mcg (0.1 %) nasal spray SPRAY 1 SPRAY INTO EACH NOSTRIL TWICE A DAY 30 mL 2    doxycycline (VIBRA-TABS) 100 MG tablet Take 1 tablet (100 mg total) by mouth 2 (two) times daily. 20 tablet 0    fluticasone (FLONASE) 50 mcg/actuation nasal spray SPRAY 1 SPRAY INTO EACH NOSTRIL EVERY DAY 16 mL 2    fluvoxaMINE (LUVOX) 100 MG tablet Take 1 tablet (100 mg total) by mouth 2 (two) times daily. 60 tablet 3    inulin (FIBER GUMMIES ORAL) Take by mouth.      L. ACIDOPHILUS/BIFID. ANIMALIS (CHEWABLE PROBIOTIC ORAL) Take by mouth 2 (two) times daily.      lactulose (CHRONULAC) 10 gram/15 mL solution TAKE 30ML TWICE DAILY 1800 mL 5    multivitamin capsule Take 1 capsule by mouth every morning.       mupirocin (BACTROBAN) 2 % ointment APPLY TO AFFECTED AREA 3 TIMES A DAY 22 g 0    naproxen (NAPROSYN) 500 MG tablet Take 1 tablet (500 mg total) by mouth 2 (two) times daily with meals. 30 tablet 0    naproxen sodium (ANAPROX) 550 MG tablet Take 550 mg by mouth 2 (two) times  daily with meals.  2    OXcarbazepine (TRILEPTAL) 300 MG Tab Take 1 tablet (300 mg total) by mouth 2 (two) times daily. 60 tablet 2    polyethylene glycol (GLYCOLAX) 17 gram/dose powder TAKE 17 G BY MOUTH ONCE DAILY. 527 g 2    polymyxin B sulf-trimethoprim (POLYTRIM) 10,000 unit- 1 mg/mL Drop Place 1 drop into both eyes every 6 (six) hours. 10 mL 0    sulfamethoxazole-trimethoprim 400-80mg (BACTRIM,SEPTRA) 400-80 mg per tablet Take 1 tablet by mouth once daily.  6    tamsulosin (FLOMAX) 0.4 mg Cp24 Take 0.4 mg by mouth every evening. Every day       No current facility-administered medications for this visit.          Review of Systems   Unable to perform ROS: Psychiatric disorder (His caretaker in his mother given excellent review of systems with no new issues being identified.)       Objective:      Physical Exam   Constitutional: He appears well-nourished. No distress.   Today's exam reveals a more relaxed and almost child-like behavior.  He was very appropriate during the exam.   HENT:   Head: Normocephalic and atraumatic.   Mouth/Throat: Oropharynx is clear and moist.   Eyes: Pupils are equal, round, and reactive to light. Conjunctivae are normal.   Neck: Normal range of motion.   Cardiovascular: Normal rate, regular rhythm and normal heart sounds.   No murmur heard.  Pulmonary/Chest: Effort normal. He has no wheezes. He has no rales.   Abdominal: Soft. He exhibits no distension. There is no tenderness.   Musculoskeletal: He exhibits no edema.   Neurological: He is alert. Coordination abnormal.   He did have more focus on the exam.  He did answer some questions.  Most of the questions answered were with simple 1 or 2 word phrases.  His answers were appropriate.    No foot drop appreciated.   Skin: Skin is warm and dry.   Both lower legs, anterior shins with excoriations, caused by his itching.  None of these are infected.   Vitals reviewed.      Assessment:       1. Idiopathic chronic gout of multiple  sites without tophus    2. Constipation, unspecified constipation type    3. Elevated LFTs    4. Chronic constipation    5. Seizure disorder    6. HALEY (obstructive sleep apnea)    7. Neurogenic bladder    8. Autistic disorder, active    9. Morbid obesity with body mass index (BMI) of 40.0 or higher        Plan:     1. Constipation, unspecified constipation type  Controlled  Will move to individual packets for his personal independence, attempts to increase it.    - polyethylene glycol (GLYCOLAX) 17 gram PwPk; Take 17 g by mouth once daily.  Dispense: 90 packet; Refill: 3    2. Idiopathic chronic gout of multiple sites without tophus  Controlled  No change in medicines  Update labs    - CBC auto differential; Future  - Uric acid; Future    3. Elevated LFTs  Minimal elevation of transaminases  Repeat liver functions prior to additional interventions    - Comprehensive metabolic panel; Future    4. Chronic constipation  Controlled, see above.    5. Seizure disorder  Controlled  Continue to follow up with Neurology  Would not change medicines unless absolutely necessary      6. HALEY (obstructive sleep apnea)  CPAP in place  Equipment is in good control  Encouraged to continue to use this device    7. Neurogenic bladder  Continued need for self catheterization, which is actually provided by his family or his caregivers  Followed by Urology, recent reduction in antibiotic prophylaxis, without problems  No new interventions anticipated    8. Autistic disorder, active  Followed by Psychiatry  His mother, his principal caregiver, has an excellent understanding of what helps him having more normal life.  She has done a wonderful job meeting his needs and being his advocate.  Continue with Psychiatry.    9. Morbid obesity with body mass index (BMI) of 40.0 or higher  Do not anticipate major changes  The patient does have issues with caloric intake, frequently over eating.  Not a candidate for surgical intervention.

## 2019-04-02 DIAGNOSIS — R56.9 CONVULSIONS, UNSPECIFIED CONVULSION TYPE: ICD-10-CM

## 2019-04-02 RX ORDER — OXCARBAZEPINE 300 MG/1
TABLET, FILM COATED ORAL
Qty: 60 TABLET | Refills: 0 | Status: SHIPPED | OUTPATIENT
Start: 2019-04-02 | End: 2019-05-01 | Stop reason: SDUPTHER

## 2019-04-03 PROBLEM — R73.9 HYPERGLYCEMIA: Status: RESOLVED | Noted: 2018-04-26 | Resolved: 2019-04-03

## 2019-04-03 PROBLEM — R19.4 CHANGE IN BOWEL HABITS: Status: RESOLVED | Noted: 2018-06-15 | Resolved: 2019-04-03

## 2019-04-04 NOTE — PATIENT INSTRUCTIONS
All,    David is doing remarkably well.  He was better today than any time that I have seen him in the last few years.    I am reluctant to change anything as his behaviors as well as his medical conditions seem to be quite good.    I would like to see maribel in 4 months.    We do need update his blood work secondary to the mild elevation of liver functions.  Will also check his uric acid levels.    Reji Zendejas MD

## 2019-04-15 ENCOUNTER — TELEPHONE (OUTPATIENT)
Dept: GASTROENTEROLOGY | Facility: CLINIC | Age: 34
End: 2019-04-15

## 2019-04-15 NOTE — TELEPHONE ENCOUNTER
----- Message from Genevieve Cheema sent at 4/15/2019 12:51 PM CDT -----  Contact: pt wife  Calling in regards to patient havent had a bowel movement sine the 11th and have tried medications and take miramax daily and have concerns and please advise [phone] (home)

## 2019-04-15 NOTE — TELEPHONE ENCOUNTER
Patient needs to follow-up for continued evaluation and management. Which medications has he tried? I see on his medication list he is taking Miralax 17 grams daily, fiber supplement, and probiotic BID. Has he tried anything else? Our last visit was on 5/16/2018 and he was prescribed lactulose but I don't see this on his current medication list (it appears that it was discontinued on 4/1/19 by Dr. Zendejas with comments of alternate therapy).  Thanks  CARLOS

## 2019-04-16 ENCOUNTER — TELEPHONE (OUTPATIENT)
Dept: GASTROENTEROLOGY | Facility: CLINIC | Age: 34
End: 2019-04-16

## 2019-04-16 NOTE — TELEPHONE ENCOUNTER
----- Message from Tiffany Hayes sent at 4/16/2019  9:04 AM CDT -----  Contact: Patient Mother Eli  Patient mother is returning a call back to Nurse Gissel Arroyo     Please contact to advise [phone]

## 2019-04-26 ENCOUNTER — OFFICE VISIT (OUTPATIENT)
Dept: GASTROENTEROLOGY | Facility: CLINIC | Age: 34
End: 2019-04-26
Payer: MEDICARE

## 2019-04-26 VITALS
WEIGHT: 274.69 LBS | SYSTOLIC BLOOD PRESSURE: 116 MMHG | BODY MASS INDEX: 39.33 KG/M2 | HEART RATE: 72 BPM | DIASTOLIC BLOOD PRESSURE: 75 MMHG | HEIGHT: 70 IN | RESPIRATION RATE: 18 BRPM

## 2019-04-26 DIAGNOSIS — K59.09 CHRONIC CONSTIPATION: Primary | ICD-10-CM

## 2019-04-26 DIAGNOSIS — R79.89 ELEVATED LFTS: ICD-10-CM

## 2019-04-26 DIAGNOSIS — R16.0 HEPATOMEGALY: ICD-10-CM

## 2019-04-26 DIAGNOSIS — K76.0 HEPATIC STEATOSIS: ICD-10-CM

## 2019-04-26 PROCEDURE — 99213 PR OFFICE/OUTPT VISIT, EST, LEVL III, 20-29 MIN: ICD-10-PCS | Mod: S$GLB,,, | Performed by: NURSE PRACTITIONER

## 2019-04-26 PROCEDURE — 99999 PR PBB SHADOW E&M-EST. PATIENT-LVL V: ICD-10-PCS | Mod: PBBFAC,,, | Performed by: NURSE PRACTITIONER

## 2019-04-26 PROCEDURE — 99999 PR PBB SHADOW E&M-EST. PATIENT-LVL V: CPT | Mod: PBBFAC,,, | Performed by: NURSE PRACTITIONER

## 2019-04-26 PROCEDURE — 3008F PR BODY MASS INDEX (BMI) DOCUMENTED: ICD-10-PCS | Mod: CPTII,S$GLB,, | Performed by: NURSE PRACTITIONER

## 2019-04-26 PROCEDURE — 3008F BODY MASS INDEX DOCD: CPT | Mod: CPTII,S$GLB,, | Performed by: NURSE PRACTITIONER

## 2019-04-26 PROCEDURE — 99213 OFFICE O/P EST LOW 20 MIN: CPT | Mod: S$GLB,,, | Performed by: NURSE PRACTITIONER

## 2019-04-26 RX ORDER — LACTULOSE 10 G/15ML
10 SOLUTION ORAL 2 TIMES DAILY
Qty: 900 ML | Refills: 3 | Status: SHIPPED | OUTPATIENT
Start: 2019-04-26 | End: 2019-05-26

## 2019-04-26 RX ORDER — ALPRAZOLAM 0.25 MG/1
TABLET ORAL 3 TIMES DAILY
COMMUNITY
End: 2019-09-16 | Stop reason: SDUPTHER

## 2019-04-26 RX ORDER — LACTULOSE 10 G/15ML
SOLUTION ORAL; RECTAL
Refills: 5 | COMMUNITY
Start: 2019-04-03 | End: 2019-04-26

## 2019-04-26 NOTE — PROGRESS NOTES
Subjective:       Patient ID: Hussein Doyle is a 33 y.o. male Body mass index is 39.41 kg/m².    Chief Complaint: Follow-up (constipation)    This patient is established with Dr. Villagomez & myself.    Patient is very pleasant and here with his mother and caregiver, whom assisted/provided history due to patient's medical history of autism and mental retardation.    Constipation   This is a chronic problem. The current episode started more than 1 year ago (has had his whole life). The problem has been rapidly improving since onset. Stool frequency: had breakthrough constipation about 2 weeks ago; it resolved after a dose of magnesium citrate and dulcolax; has gone back to his normal of once every other day. The stool is described as formed and loose. The patient is on a high fiber diet. He exercises regularly (4-5 times a week at the [facility]). There has been adequate water intake. Associated symptoms include bloating and flatus. Pertinent negatives include no abdominal pain, diarrhea, fever, hematochezia, melena, nausea, rectal pain, vomiting or weight loss. Risk factors include obesity. He has tried laxatives, fiber and enemas (probiotic daily, fiber gummy daily, miralax 17 grams once daily, lactulose 20 grams BID; enema prn and suppositories prn- mild relief; PAST: linzess believes it caused diarrhea, magnesium citrate, golytely) for the symptoms. The treatment provided significant relief. His past medical history is significant for neurologic disease (history of stroke, austism, mental retardation, and seizures; sees neurology) and psychiatric history (seeing psych). There is no history of endocrine disease, inflammatory bowel disease or irritable bowel syndrome.     Review of Systems   Constitutional: Negative for appetite change (back to normal), fever, unexpected weight change and weight loss.   HENT: Negative for trouble swallowing.    Respiratory: Negative for shortness of breath.    Cardiovascular:  Negative for chest pain.   Gastrointestinal: Positive for bloating, constipation and flatus. Negative for abdominal pain, anal bleeding, blood in stool, diarrhea, hematochezia, melena, nausea, rectal pain and vomiting.   Genitourinary:        History of neurogenic bladder and mother reports they catheterize him at home multiple times a day       Past Medical History:   Diagnosis Date    Autistic disorder, active 5/6/2013    Bowel obstruction     Early intervention counseling     Gout, chronic 11/12/2015    Grand mal seizure     Hepatic steatosis     Impulse control disorder, unspecified 5/6/2013    Mastoiditis     Moderate mental retardation 5/6/2013    Neurogenic bladder     Neurogenic bladder     HALEY (obstructive sleep apnea) 11/12/2015    CPAP.  Dr. Garber     Otitis media     Paraplegia     Pervasive developmental disorder, active 12/27/2015    Renal cancer 2010    Right    Scoliosis     paraplegia    Seizure disorder 11/12/2015    Stroke     one week old    Tardive dyskinesia      Past Surgical History:   Procedure Laterality Date    ANKLE FRACTURE SURGERY      BACK SURGERY  2000    COLONOSCOPY  10/28/2008    [facility]; anal fissure, minimal pinpoint rectal erythema; floppy-type colon with very little tone; biopsy: rectum mild chronic proctitis with few eosinophils sent for scanning    COLONOSCOPY N/A 6/15/2018    [facility]    IMAGING-(MRI) N/A 12/31/2015    [facility]    IMAGING-(MRI) N/A 9/13/2013    [facility]    INNER EAR SURGERY      mastoid    right partial nephrectomy Right 2010    Sees urology    TYMPANOSTOMY TUBE PLACEMENT       Family History   Problem Relation Age of Onset    Cancer Father         lymphoma    Cataracts Father     Colon polyps Father     Cataracts Mother     Cataracts Maternal Grandmother     Diabetes Maternal Grandmother     Macular degeneration  Maternal Grandmother     Glaucoma Maternal Grandmother     Anesthesia problems Neg Hx     Clotting disorder Neg Hx     Colon cancer Neg Hx     Crohn's disease Neg Hx     Ulcerative colitis Neg Hx     Stomach cancer Neg Hx     Esophageal cancer Neg Hx      Wt Readings from Last 10 Encounters:   04/26/19 124.6 kg (274 lb 11.1 oz)   04/01/19 125.1 kg (275 lb 12.7 oz)   12/04/18 125 kg (275 lb 9.2 oz)   11/19/18 125.4 kg (276 lb 7.3 oz)   11/14/18 127.5 kg (281 lb 1.6 oz)   10/22/18 127.3 kg (280 lb 10.3 oz)   08/20/18 125.2 kg (276 lb 0.3 oz)   08/08/18 125.8 kg (277 lb 4.8 oz)   06/26/18 124.2 kg (273 lb 13 oz)   05/29/18 126.9 kg (279 lb 12.2 oz)     Lab Results   Component Value Date    WBC 8.75 11/07/2018    HGB 15.2 11/07/2018    HCT 44.2 11/07/2018    MCV 94 11/07/2018     11/07/2018     CMP  Sodium   Date Value Ref Range Status   11/07/2018 138 136 - 145 mmol/L Final     Potassium   Date Value Ref Range Status   11/07/2018 4.4 3.5 - 5.1 mmol/L Final     Chloride   Date Value Ref Range Status   11/07/2018 105 95 - 110 mmol/L Final     CO2   Date Value Ref Range Status   11/07/2018 26 23 - 29 mmol/L Final     Glucose   Date Value Ref Range Status   11/07/2018 105 70 - 110 mg/dL Final     BUN, Bld   Date Value Ref Range Status   11/07/2018 13 6 - 20 mg/dL Final     Creatinine   Date Value Ref Range Status   11/07/2018 0.8 0.5 - 1.4 mg/dL Final     Calcium   Date Value Ref Range Status   11/07/2018 9.6 8.7 - 10.5 mg/dL Final     Total Protein   Date Value Ref Range Status   11/07/2018 7.8 6.0 - 8.4 g/dL Final     Albumin   Date Value Ref Range Status   11/07/2018 4.0 3.5 - 5.2 g/dL Final     Total Bilirubin   Date Value Ref Range Status   11/07/2018 0.9 0.1 - 1.0 mg/dL Final     Comment:     For infants and newborns, interpretation of results should be based  on gestational age, weight and in agreement with clinical  observations.  Premature Infant recommended reference ranges:  Up to 24  "hours.............<8.0 mg/dL  Up to 48 hours............<12.0 mg/dL  3-5 days..................<15.0 mg/dL  6-29 days.................<15.0 mg/dL       Alkaline Phosphatase   Date Value Ref Range Status   11/07/2018 72 55 - 135 U/L Final     AST ([place])   Date Value Ref Range Status   03/12/2016 96 (H) 17 - 59 U/L Final     AST   Date Value Ref Range Status   11/07/2018 132 (H) 10 - 40 U/L Final     ALT   Date Value Ref Range Status   11/07/2018 113 (H) 10 - 44 U/L Final     Anion Gap   Date Value Ref Range Status   11/07/2018 7 (L) 8 - 16 mmol/L Final     eGFR if    Date Value Ref Range Status   11/07/2018 >60.0 >60 mL/min/1.73 m^2 Final     eGFR if non    Date Value Ref Range Status   11/07/2018 >60.0 >60 mL/min/1.73 m^2 Final     Comment:     Calculation used to obtain the estimated glomerular filtration  rate (eGFR) is the CKD-EPI equation.        Lab Results   Component Value Date    TSH 2.333 04/30/2018     Lab Results   Component Value Date    HEPAIGM Negative 04/30/2018    HEPBIGM Negative 04/30/2018    HEPCAB Negative 04/30/2018     Lab Results   Component Value Date    OCCULTBLOOD Negative 07/27/2017    OCCULTBLOOD Negative 07/27/2017    OCCULTBLOOD Negative 07/27/2017     Reviewed prior medical records including radiology report of 4/26/18 ct renal stone abdomen pelvis & 5/3/18 & 4/30/18 abdominal x-rays.    Previously reviewed medical records received from Dr. Castro and MIKI, summarized below and in medical & surgical history (endoscopies, etc), was sent for scanning:   10/28/08 colonoscopy  7/8/14 barium enema: impression constipation; findings: "the colon is severely tortuous but nondilated. There is moderate stool throughout the colon. Colon otherwise demonstrates normal haustral pattern without evidence of fixed filling defect or stricture. Reflux into the terminal ileum is visualized"  Blood work from 2014 (elevated LFT with AST 75, ALT 57) and 2015- see " copy for full results  Visit notes reviewed from Dr. Castro in 2014, 2015 (tried linzess)  Objective:      Physical Exam   Constitutional: He is oriented to person, place, and time. He appears well-developed and well-nourished. No distress.   HENT:   Mouth/Throat: Oropharynx is clear and moist and mucous membranes are normal. No oral lesions. No oropharyngeal exudate.   Eyes: Pupils are equal, round, and reactive to light. Conjunctivae are normal. No scleral icterus.   Cardiovascular: Normal rate.   Pulmonary/Chest: Effort normal and breath sounds normal. No respiratory distress. He has no wheezes.   Abdominal: Soft. Normal appearance and bowel sounds are normal. He exhibits no distension, no abdominal bruit and no mass. There is no hepatosplenomegaly. There is no tenderness. There is no rigidity, no rebound, no guarding, no tenderness at McBurney's point and negative Hernandez's sign. No hernia.   Patient and mother both declined rectal exam.   Neurological: He is alert and oriented to person, place, and time.   Skin: Skin is warm and dry. No rash noted. He is not diaphoretic. No erythema. No pallor.   Non-jaundiced   Psychiatric: He is not agitated and not aggressive. Cognition and memory are impaired.   Patient is interactive and kind; speech understandable, but patient does not answer some questions appropriately and repetitive responses.   Nursing note and vitals reviewed.      Assessment:       1. Chronic constipation    2. Elevated LFTs    3. Hepatic steatosis    4. Hepatomegaly        Plan:       Chronic constipation  - REFILL    lactulose (CHRONULAC) 20 gram/30 mL Soln; Take 15 mLs (10 g total) by mouth 2 (two) times daily.  Dispense: 900 mL; Refill: 3  Recommended daily exercise as tolerated, adequate water intake (six 8-oz glasses of water daily), and high fiber diet. CONTINUE OTC fiber supplements (take as directed, separate from other oral medications by >2 hours).  - continue OTC probiotic as  directed  -Recommend taking an OTC stool softener such as Colace as directed to avoid hard stools and straining with bowel movements PRN  - can take OTC MiraLax once daily (17g PO) as directed PRN for persistent constipation  - If no improvement with above recommendations, try intermittently dosed Dulcolax OTC as directed (every 3-4 days) PRN  to facilitate bowel movements  -If still no improvement with these measures, call/follow-up    Elevated LFTs, Hepatic steatosis, & Hepatomegaly  -     Ambulatory Referral to Hepatology  For fatty liver recommend: low fat, low cholesterol diet, maintain good control of blood sugars and cholesterol levels, exercise, weight loss (if overweight), minimize/avoid alcohol and tylenol products, & recommend seeing hepatology for further evaluation and management.    Follow up in about 3 months (around 7/26/2019), or if symptoms worsen or fail to improve.      If no improvement in symptoms or symptoms worsen, call/follow-up at clinic or go to ER.

## 2019-05-01 DIAGNOSIS — R56.9 CONVULSIONS, UNSPECIFIED CONVULSION TYPE: ICD-10-CM

## 2019-05-01 RX ORDER — OXCARBAZEPINE 300 MG/1
TABLET, FILM COATED ORAL
Qty: 60 TABLET | Refills: 0 | Status: SHIPPED | OUTPATIENT
Start: 2019-05-01 | End: 2019-05-29 | Stop reason: SDUPTHER

## 2019-05-03 ENCOUNTER — LAB VISIT (OUTPATIENT)
Dept: LAB | Facility: HOSPITAL | Age: 34
End: 2019-05-03
Attending: EMERGENCY MEDICINE
Payer: MEDICARE

## 2019-05-03 DIAGNOSIS — M1A.09X0 IDIOPATHIC CHRONIC GOUT OF MULTIPLE SITES WITHOUT TOPHUS: ICD-10-CM

## 2019-05-03 DIAGNOSIS — R79.89 ELEVATED LFTS: ICD-10-CM

## 2019-05-03 LAB
ALBUMIN SERPL BCP-MCNC: 4.1 G/DL (ref 3.5–5.2)
ALP SERPL-CCNC: 82 U/L (ref 55–135)
ALT SERPL W/O P-5'-P-CCNC: 198 U/L (ref 10–44)
ANION GAP SERPL CALC-SCNC: 9 MMOL/L (ref 8–16)
AST SERPL-CCNC: 230 U/L (ref 10–40)
BASOPHILS # BLD AUTO: 0.07 K/UL (ref 0–0.2)
BASOPHILS NFR BLD: 0.9 % (ref 0–1.9)
BILIRUB SERPL-MCNC: 1 MG/DL (ref 0.1–1)
BUN SERPL-MCNC: 9 MG/DL (ref 6–20)
CALCIUM SERPL-MCNC: 10 MG/DL (ref 8.7–10.5)
CHLORIDE SERPL-SCNC: 99 MMOL/L (ref 95–110)
CO2 SERPL-SCNC: 26 MMOL/L (ref 23–29)
CREAT SERPL-MCNC: 0.9 MG/DL (ref 0.5–1.4)
DIFFERENTIAL METHOD: ABNORMAL
EOSINOPHIL # BLD AUTO: 0.1 K/UL (ref 0–0.5)
EOSINOPHIL NFR BLD: 1.1 % (ref 0–8)
ERYTHROCYTE [DISTWIDTH] IN BLOOD BY AUTOMATED COUNT: 12.5 % (ref 11.5–14.5)
EST. GFR  (AFRICAN AMERICAN): >60 ML/MIN/1.73 M^2
EST. GFR  (NON AFRICAN AMERICAN): >60 ML/MIN/1.73 M^2
GLUCOSE SERPL-MCNC: 231 MG/DL (ref 70–110)
HCT VFR BLD AUTO: 47 % (ref 40–54)
HGB BLD-MCNC: 15.4 G/DL (ref 14–18)
IMM GRANULOCYTES # BLD AUTO: 0.02 K/UL (ref 0–0.04)
IMM GRANULOCYTES NFR BLD AUTO: 0.2 % (ref 0–0.5)
LYMPHOCYTES # BLD AUTO: 2.6 K/UL (ref 1–4.8)
LYMPHOCYTES NFR BLD: 32.6 % (ref 18–48)
MCH RBC QN AUTO: 33 PG (ref 27–31)
MCHC RBC AUTO-ENTMCNC: 32.8 G/DL (ref 32–36)
MCV RBC AUTO: 101 FL (ref 82–98)
MONOCYTES # BLD AUTO: 0.6 K/UL (ref 0.3–1)
MONOCYTES NFR BLD: 7 % (ref 4–15)
NEUTROPHILS # BLD AUTO: 4.7 K/UL (ref 1.8–7.7)
NEUTROPHILS NFR BLD: 58.2 % (ref 38–73)
NRBC BLD-RTO: 0 /100 WBC
PLATELET # BLD AUTO: 237 K/UL (ref 150–350)
PMV BLD AUTO: 10.1 FL (ref 9.2–12.9)
POTASSIUM SERPL-SCNC: 4.4 MMOL/L (ref 3.5–5.1)
PROT SERPL-MCNC: 8.2 G/DL (ref 6–8.4)
RBC # BLD AUTO: 4.67 M/UL (ref 4.6–6.2)
SODIUM SERPL-SCNC: 134 MMOL/L (ref 136–145)
URATE SERPL-MCNC: 4.6 MG/DL (ref 3.4–7)
WBC # BLD AUTO: 8.03 K/UL (ref 3.9–12.7)

## 2019-05-03 PROCEDURE — 80053 COMPREHEN METABOLIC PANEL: CPT

## 2019-05-03 PROCEDURE — 85025 COMPLETE CBC W/AUTO DIFF WBC: CPT

## 2019-05-03 PROCEDURE — 84550 ASSAY OF BLOOD/URIC ACID: CPT

## 2019-05-03 PROCEDURE — 36415 COLL VENOUS BLD VENIPUNCTURE: CPT | Mod: PO

## 2019-05-04 DIAGNOSIS — R74.8 ELEVATED LIVER ENZYMES: Primary | ICD-10-CM

## 2019-05-04 DIAGNOSIS — R73.9 HYPERGLYCEMIA: ICD-10-CM

## 2019-05-04 NOTE — PROGRESS NOTES
Will update liver function tests secondary to elevated screening.    Will also add hemoglobin A1c given the level of glucose on screening exam.

## 2019-05-11 DIAGNOSIS — K59.00 CONSTIPATION, UNSPECIFIED CONSTIPATION TYPE: ICD-10-CM

## 2019-05-11 RX ORDER — LACTULOSE 10 G/15ML
SOLUTION ORAL; RECTAL
Qty: 1800 ML | Refills: 5 | Status: SHIPPED | OUTPATIENT
Start: 2019-05-11 | End: 2020-01-22

## 2019-05-16 ENCOUNTER — DOCUMENTATION ONLY (OUTPATIENT)
Dept: TRANSPLANT | Facility: CLINIC | Age: 34
End: 2019-05-16

## 2019-05-16 NOTE — LETTER
May 16, 2019    Qiana Mattson, [facility]  [address] [facility] [address] [place] [address]      Dear Dr. Mattson    Patient: Hussein Doyle   MR Number: [id number]   YOB: 1985     Thank you for the referral of Hussein Doyle to the [facility] program. An initial appointment will be scheduled for your patient with one of our Hepatologists.      Thank you again for your trust in our program.  If there is anything we can do for you or your staff, please feel free to contact us.        Sincerely,        [facility]  [address] [place] [id number]  [phone]

## 2019-05-17 NOTE — NURSING
Pt records reviewed.   Pt will be referred to Hepatology.  Elevated LFTs  Hepatic steatosis  Hepatom egaly  Initial referral received  from the workque.   Referring Provider/diagnosis  ALLA Blair      Referral letter sent to patient.

## 2019-05-20 ENCOUNTER — OFFICE VISIT (OUTPATIENT)
Dept: PSYCHIATRY | Facility: CLINIC | Age: 34
End: 2019-05-20
Payer: COMMERCIAL

## 2019-05-20 ENCOUNTER — TELEPHONE (OUTPATIENT)
Dept: TRANSPLANT | Facility: CLINIC | Age: 34
End: 2019-05-20

## 2019-05-20 VITALS
BODY MASS INDEX: 39.37 KG/M2 | WEIGHT: 275 LBS | HEIGHT: 70 IN | SYSTOLIC BLOOD PRESSURE: 138 MMHG | HEART RATE: 81 BPM | DIASTOLIC BLOOD PRESSURE: 85 MMHG

## 2019-05-20 DIAGNOSIS — F42.8 OTHER OBSESSIVE-COMPULSIVE DISORDERS: ICD-10-CM

## 2019-05-20 DIAGNOSIS — F63.9 IMPULSE CONTROL DISORDER: Primary | Chronic | ICD-10-CM

## 2019-05-20 DIAGNOSIS — E66.01 MORBID OBESITY WITH BODY MASS INDEX (BMI) OF 40.0 OR HIGHER: ICD-10-CM

## 2019-05-20 DIAGNOSIS — F84.9 PERVASIVE DEVELOPMENTAL DISORDER, ACTIVE: Chronic | ICD-10-CM

## 2019-05-20 DIAGNOSIS — F42.2 MIXED OBSESSIONAL THOUGHTS AND ACTS: ICD-10-CM

## 2019-05-20 DIAGNOSIS — F84.0 AUTISTIC DISORDER, ACTIVE: Chronic | ICD-10-CM

## 2019-05-20 PROCEDURE — 3008F BODY MASS INDEX DOCD: CPT | Mod: CPTII,S$GLB,, | Performed by: PSYCHIATRY & NEUROLOGY

## 2019-05-20 PROCEDURE — 99499 UNLISTED E&M SERVICE: CPT | Mod: S$GLB,,, | Performed by: PSYCHIATRY & NEUROLOGY

## 2019-05-20 PROCEDURE — 99999 PR PBB SHADOW E&M-EST. PATIENT-LVL III: ICD-10-PCS | Mod: PBBFAC,,, | Performed by: PSYCHIATRY & NEUROLOGY

## 2019-05-20 PROCEDURE — 3008F PR BODY MASS INDEX (BMI) DOCUMENTED: ICD-10-PCS | Mod: CPTII,S$GLB,, | Performed by: PSYCHIATRY & NEUROLOGY

## 2019-05-20 PROCEDURE — 99214 PR OFFICE/OUTPT VISIT, EST, LEVL IV, 30-39 MIN: ICD-10-PCS | Mod: S$GLB,,, | Performed by: PSYCHIATRY & NEUROLOGY

## 2019-05-20 PROCEDURE — 99499 RISK ADDL DX/OHS AUDIT: ICD-10-PCS | Mod: S$GLB,,, | Performed by: PSYCHIATRY & NEUROLOGY

## 2019-05-20 PROCEDURE — 99214 OFFICE O/P EST MOD 30 MIN: CPT | Mod: S$GLB,,, | Performed by: PSYCHIATRY & NEUROLOGY

## 2019-05-20 PROCEDURE — 99999 PR PBB SHADOW E&M-EST. PATIENT-LVL III: CPT | Mod: PBBFAC,,, | Performed by: PSYCHIATRY & NEUROLOGY

## 2019-05-20 RX ORDER — FLUVOXAMINE MALEATE 100 MG/1
100 TABLET, COATED ORAL 2 TIMES DAILY
Qty: 60 TABLET | Refills: 3 | Status: SHIPPED | OUTPATIENT
Start: 2019-05-20 | End: 2019-09-16

## 2019-05-20 NOTE — TELEPHONE ENCOUNTER
----- Message from Yenny Ware Ishan sent at 5/20/2019  9:31 AM CDT -----  Regarding: REFERRAL  All, I just s/w Hussein Yu's mother and she states that Dr. Zendejas believes that Hussein may not need this referral, because the lab results presenting the problem could be related to Hussein's medications.    Yenny,    ----- Message -----  From: Qiana Oglesby LPN  Sent: 5/16/2019   7:39 PM  To: Hepatology Scheduling    Pt records reviewed.   Pt will be referred to Hepatology.  Elevated LFTs  Hepatic steatosis  Hepatom egaly  Initial referral received  from the workque.   Referring Provider/diagnosis  ALLA Blair

## 2019-05-20 NOTE — PROGRESS NOTES
"ID: 29yo WM with PDD, Impulse Control d/o, anxiety d/o nos. Last saw RENNY Murillo 5/2016.     CC: anxiety    Interim Hx: presents on time with his mother and a new PCA, Rosalino, a college student from [facility]. Pleasant and helpful.     Hussein doing well today and it is evident- much more talkative with provider.      xanax has only used 1-2x since last appt- effective each time. Fluvox now at 100mg po qam and 50mg po qhs - mom continues to think he is doing well.     When hussein has left the room his mom hangs back to share with me that his father, Tim, continues to not do well- has progressive CHF struggling to keep fluid intake/output balanced, now has stage 4 kidney dz. She is also having difficulty with her phys health "likely because of all of this"- acknowledging the stresses in her life. Does try to get the support she needs through [employer] family and arranges respite care as possible.    On Psychiatric ROS:    Endorses good sleep- not taking anything for sleep (sleep better on busier days; when bored he tends to nap), denies anhedonia, denies feeling helpless/hopeless, denies dec energy, denies dec concentration, stable appetite, denies dec PMA, denies thoughts of SI/intent/plan.     Endorses feeling less easily overwhelmed. Less self injury (no scabbing visible in appt- picks at "galindo crystal" when anxious), +ruminative thinking- chronic- more difficulty moving on to new topic, denies feeling tense/"on edge"  Once "fixated" on a topic, struggles to move on. Has some compulsory behaviors assoc with obsessive thinking.     PPHx: Endorses h/o self injury- will pick at scabs in sort of obsessive way- this has stabilized  Denies inpt psych hospitalization  Denies h/o suicide attempt     Current Psych Meds: **Trileptal 300mg po BID through neuro for sz d/o, xanax 0.25mg po daily PRN anxiety, luvox 50mg po BID  Past Psych Meds: s/e's to mult prev meds to include zyprexa (wgt gain), lexapro 20mg po qam (anxiety and to " "suppress sex drive), buspar 10mg po BID    PMHx: obesity, onel w/ cipap, seizure d/o, scoliosis surgery at 13yo led to hemiparalysis- now walking but has neurogenic bladder and frequent catheterization, partial nephrectomy R kidney    SubstHx:   T- none  E- none  D- none  Caffeine- very rare, if ever    FamPHx: mAunt- schizophrenic, mcousin- schizophrenia    Musculoskeletal:  Muscle strength/Tone: no dyskinesia/ no tremor  Gait/Station- non antalgic, no assistance needed    MSE: appears stated age, well groomed, obese, appropriate dress- shorts/tshirt, engages well with examiner at a childlike level.  Good e/c. Speech reg rate and vol, nonpressured. less spontaneous speech today. Mood is "very good" Affect congruent- sits calmly in chair, offers much more spontaneous speech. Oriented to month and season. Narrative memory intact. Intellectual function is below avg based on vocab and basic fund of knowledge- PDD long h/o sp ed. Thought is perseverative but he is tracking in conversation. No tangentiality or circumstantiality. No FOI/WES. Denies SI/HI. Denies A/VH. No evidence of delusions. Insight lacking and Judgment in keeping with insight.     Blood pressure 138/85, pulse 81, height 5' 10" (1.778 m), weight 124.7 kg (275 lb).    Suicide Risk Assessment:   Protective- age, no prior attempts, no prior hospitalizations, no family h/o attempts, no ongoing substance abuse, no psychosis, denies SI/intent/plan, no h/o violence, seeking treatment, access to treatment, future oriented, good primary support, no access to firearms    Risk- race, gender, ongoing Axis I sxs    **Pt is at LOW imminent and long term risk of suicide given current risk factors.    Assessment:  30yo WM with PDD, Impulse Control d/o, anxiety d/o nos. Last saw RENNY Murillo 5/2016. Pt presented initially with his mother and caregiver Helen who has been with the pt x 8mos. Currently the pt is doing very well. Of note pt is only on lexapro for anxiety, is " "also on trileptal 300mg po BID for seizure d/o through neuro and has HALEY on cipap. Has great family support, receives 88hrs per week of direct care from outside service provider, has assistance with all ADLs and IADLs but seems content with arrangement. No h/o violent or aggressive behavior. Had manic sxs with psychosis when on keppra but never before or since. Has recently stopped zyprexa due to significant weight gain and there has been no increase in behavior or anxiety. Per parent and caregiver there is some concern that a prev caregiver who was recently fired was motivated for the pt to be more sedated-  they both denied the pt needed any change in medication. Hussein is doing well, has some instances of intrusive behavior with young attractive women and he becomes difficult to redirect- added a trial of PRN buspar for days he has public outings- this has been effective and helpful. Today the incidents have become more difficult to redirect- will order a trial of xanax 0.25-0.5mg po daily PRN and also inc buspar PRN to 10mg daily as needed for public outings. Today on f/u pt's mother does not believe buspar has been effective- xanax effective but cannot be given in the moment as "it just happens so fast"- inability to redirect pt in public has become a safety concern for women and for the pt as authorities are not typically well trained in spectrum disorders (mom working to educate the police force). Warrants a transition to new ssri- will taper off lexapro and likely start luvox at the next appt. Last appt on f/u he is "no worse" without the meds- mom motivated to cont without as they cont to observe. Anxiety was mildly inc'd but hard to attribute to lack of med as his primary caregiver just left work abruptly with pregnancy complications- change of schedule and personnel hard for Hussein. Last appt 2 new providers, doing well with both, mom has placed cameras and she has no concerns about the new hires. " Routine back in place and ru had anxiety but was functioning well- mom would like to cont w/o meds. Will cont to observe. It was time to intervene with meds per mom, started trial of luvox which initially helped at 50mg bid, we then inc'd to 100mg bid with s/e of diarrhea and sweating, now back to 50mg po bid with resolution of most concerning s/e and still improvement in anxiety- will cont at this time but mom will cont to do r/b assessment. Pt's father with worsening health- leading to some anxiety, new caregiver now gone after some unprofessional behavior. Pt doing well on the dec'd luvox dose and favorite caregiver is now back and working 25hrs. Self injury dec'd. Recent hospital stay for uti and sepsis- now back to baseline. Some recent transaminitis- trending down- will cont to follow. Today much improved on inc'd fluvox to 100mg po qam and 50mg po qhs- only used xanax 2-3x since last appt and effective each time without oversedation. Will cont. No acute safety concerns. rtc 4mos due to pt preference.    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 Cont luvox 100mg po qam/50mg po qhs daily   2. Cont Trileptal 300mg po BID per neuro  3. Cont xanax 0.25-0.5mg po daily PRN anxiety (rare use)  4. RTC 4mos    -Spent 30min face to face with the pt; >50% time spent in counseling   -Supportive therapy and psychoeducation provided  -R/B/SE's of medications discussed with the pt who expresses understanding and chooses to take medications as prescribed.   -Pt instructed to call clinic, 911 or go to nearest emergency room if sxs worsen or pt is in   crisis. The pt expresses understanding.

## 2019-05-29 DIAGNOSIS — R56.9 CONVULSIONS, UNSPECIFIED CONVULSION TYPE: ICD-10-CM

## 2019-05-29 RX ORDER — OXCARBAZEPINE 300 MG/1
TABLET, FILM COATED ORAL
Qty: 60 TABLET | Refills: 0 | Status: SHIPPED | OUTPATIENT
Start: 2019-05-29 | End: 2019-06-27 | Stop reason: SDUPTHER

## 2019-06-14 ENCOUNTER — TELEPHONE (OUTPATIENT)
Dept: FAMILY MEDICINE | Facility: CLINIC | Age: 34
End: 2019-06-14

## 2019-06-14 NOTE — TELEPHONE ENCOUNTER
Spoke with pt's mom he saw pulmonolgist on Wednesday and he signed CPAP orders, and pt does use CPAP machine. Please advise

## 2019-06-27 DIAGNOSIS — R56.9 CONVULSIONS, UNSPECIFIED CONVULSION TYPE: ICD-10-CM

## 2019-06-27 RX ORDER — OXCARBAZEPINE 300 MG/1
TABLET, FILM COATED ORAL
Qty: 60 TABLET | Refills: 0 | Status: SHIPPED | OUTPATIENT
Start: 2019-06-27 | End: 2019-07-27 | Stop reason: SDUPTHER

## 2019-07-14 RX ORDER — MUPIROCIN 20 MG/G
OINTMENT TOPICAL
Qty: 22 G | Refills: 0 | Status: SHIPPED | OUTPATIENT
Start: 2019-07-14 | End: 2019-07-15 | Stop reason: SDUPTHER

## 2019-07-15 RX ORDER — MUPIROCIN 20 MG/G
OINTMENT TOPICAL 3 TIMES DAILY
Qty: 22 G | Refills: 1 | Status: SHIPPED | OUTPATIENT
Start: 2019-07-15 | End: 2020-01-22

## 2019-07-27 DIAGNOSIS — R56.9 CONVULSIONS, UNSPECIFIED CONVULSION TYPE: ICD-10-CM

## 2019-07-27 RX ORDER — OXCARBAZEPINE 300 MG/1
TABLET, FILM COATED ORAL
Qty: 60 TABLET | Refills: 0 | Status: SHIPPED | OUTPATIENT
Start: 2019-07-27 | End: 2019-09-16 | Stop reason: SDUPTHER

## 2019-09-14 ENCOUNTER — PATIENT MESSAGE (OUTPATIENT)
Dept: PSYCHIATRY | Facility: CLINIC | Age: 34
End: 2019-09-14

## 2019-09-16 ENCOUNTER — OFFICE VISIT (OUTPATIENT)
Dept: PSYCHIATRY | Facility: CLINIC | Age: 34
End: 2019-09-16
Payer: COMMERCIAL

## 2019-09-16 VITALS
WEIGHT: 271.19 LBS | BODY MASS INDEX: 38.82 KG/M2 | DIASTOLIC BLOOD PRESSURE: 69 MMHG | HEART RATE: 63 BPM | HEIGHT: 70 IN | SYSTOLIC BLOOD PRESSURE: 119 MMHG

## 2019-09-16 DIAGNOSIS — E66.01 MORBID OBESITY WITH BODY MASS INDEX (BMI) OF 40.0 OR HIGHER: ICD-10-CM

## 2019-09-16 DIAGNOSIS — G40.909 SEIZURE DISORDER: Chronic | ICD-10-CM

## 2019-09-16 DIAGNOSIS — F42.8 OTHER OBSESSIVE-COMPULSIVE DISORDERS: ICD-10-CM

## 2019-09-16 DIAGNOSIS — R56.9 CONVULSIONS, UNSPECIFIED CONVULSION TYPE: ICD-10-CM

## 2019-09-16 DIAGNOSIS — F63.9 IMPULSE CONTROL DISORDER: Primary | Chronic | ICD-10-CM

## 2019-09-16 DIAGNOSIS — F84.9 PERVASIVE DEVELOPMENTAL DISORDER, ACTIVE: Chronic | ICD-10-CM

## 2019-09-16 DIAGNOSIS — R62.50 DEVELOPMENTAL DELAY, MODERATE: ICD-10-CM

## 2019-09-16 DIAGNOSIS — G47.33 OSA (OBSTRUCTIVE SLEEP APNEA): Chronic | ICD-10-CM

## 2019-09-16 DIAGNOSIS — F84.0 AUTISTIC DISORDER, ACTIVE: Chronic | ICD-10-CM

## 2019-09-16 PROCEDURE — 99499 UNLISTED E&M SERVICE: CPT | Mod: S$GLB,,, | Performed by: PSYCHIATRY & NEUROLOGY

## 2019-09-16 PROCEDURE — 99999 PR PBB SHADOW E&M-EST. PATIENT-LVL II: ICD-10-PCS | Mod: PBBFAC,,, | Performed by: PSYCHIATRY & NEUROLOGY

## 2019-09-16 PROCEDURE — 3008F PR BODY MASS INDEX (BMI) DOCUMENTED: ICD-10-PCS | Mod: CPTII,S$GLB,, | Performed by: PSYCHIATRY & NEUROLOGY

## 2019-09-16 PROCEDURE — 99999 PR PBB SHADOW E&M-EST. PATIENT-LVL II: CPT | Mod: PBBFAC,,, | Performed by: PSYCHIATRY & NEUROLOGY

## 2019-09-16 PROCEDURE — 99214 OFFICE O/P EST MOD 30 MIN: CPT | Mod: S$GLB,,, | Performed by: PSYCHIATRY & NEUROLOGY

## 2019-09-16 PROCEDURE — 99214 PR OFFICE/OUTPT VISIT, EST, LEVL IV, 30-39 MIN: ICD-10-PCS | Mod: S$GLB,,, | Performed by: PSYCHIATRY & NEUROLOGY

## 2019-09-16 PROCEDURE — 3008F BODY MASS INDEX DOCD: CPT | Mod: CPTII,S$GLB,, | Performed by: PSYCHIATRY & NEUROLOGY

## 2019-09-16 PROCEDURE — 99499 RISK ADDL DX/OHS AUDIT: ICD-10-PCS | Mod: S$GLB,,, | Performed by: PSYCHIATRY & NEUROLOGY

## 2019-09-16 RX ORDER — FLUVOXAMINE MALEATE 100 MG/1
150 TABLET, COATED ORAL 2 TIMES DAILY
Qty: 90 TABLET | Refills: 0 | Status: SHIPPED | OUTPATIENT
Start: 2019-09-16 | End: 2019-10-17 | Stop reason: SDUPTHER

## 2019-09-16 RX ORDER — OXCARBAZEPINE 300 MG/1
300 TABLET, FILM COATED ORAL 2 TIMES DAILY
Qty: 60 TABLET | Refills: 0 | Status: SHIPPED | OUTPATIENT
Start: 2019-09-16 | End: 2019-11-13 | Stop reason: SDUPTHER

## 2019-09-16 RX ORDER — ALPRAZOLAM 0.25 MG/1
0.25 TABLET ORAL DAILY PRN
Qty: 30 TABLET | Refills: 0 | Status: SHIPPED | OUTPATIENT
Start: 2019-09-16 | End: 2019-10-17

## 2019-09-16 NOTE — PROGRESS NOTES
"ID: 31yo WM with PDD, Impulse Control d/o, anxiety d/o nos. Last saw RENNY Murillo 5/2016.     CC: anxiety    Interim Hx: presents on time with his mother and longer standing attendant, Ashleigh (x2yrs).    Hussein doing well today, but mom has emailed me to report inc'd anxiety recently and they self inc'd the fluvox to 100mg po BID, 2 wks ago. No noted change in anxiety and the use of xanax 0.25mg has recently been ineffective.     During appt Hussein is doing self talk throughout. Narrating the appointment and speaking of himself in 3rd person. Is able to relay to me the past 3 days of his schedule and that he had "fun" at recent family birthday party.     Likely anxiety increased 2/2 to father's failing health, but uncertain. Per mom he also has inc'd concerns this time of year? Nonetheless, there has been inc'd obsessive thinking and perseveration on certain topics. Mom feels changes need to be made.     On Psychiatric ROS:    Endorses a change in sleep- new cpap machine but noting more sedation during the day, denies anhedonia, denies feeling helpless/hopeless, denies dec energy, denies dec concentration, stable appetite, denies dec PMA, denies thoughts of SI/intent/plan.     Endorses feeling less easily overwhelmed. Less self injury (no scabbing visible in appt- picks at "galindo crystal" when anxious), +ruminative thinking- chronic- more difficulty moving on to new topic, denies feeling tense/"on edge"  Once "fixated" on a topic, struggles to move on. Has some compulsory behaviors assoc with obsessive thinking.     PPHx: Endorses h/o self injury- will pick at scabs in sort of obsessive way- this has stabilized  Denies inpt psych hospitalization  Denies h/o suicide attempt     Current Psych Meds: **Trileptal 300mg po BID through neuro for sz d/o, xanax 0.25mg po daily PRN anxiety, luvox 50mg po BID  Past Psych Meds: s/e's to mult prev meds to include zyprexa (wgt gain), lexapro 20mg po qam (anxiety and to suppress sex " Problem: Goal Outcome Summary  Goal: Goal Outcome Summary  Outcome: Therapy, progress towards functional goals is fair  CR: Pt ambulated  100 ft x  2 and completed  5 min on  NuStep needing to stop due to fatigue. Pt BP  113/85  HR   79   h02  Sats 88 %  unnable to obtain Sats wt  oximeter able to obtain with ear lobe Sats 88% on RA with activity. .   Per plan established by the Occupational Therapist, the recommended discharge location is TCU.        "drive), buspar 10mg po BID    PMHx: obesity, onel w/ cipap, seizure d/o, scoliosis surgery at 13yo led to hemiparalysis- now walking but has neurogenic bladder and frequent catheterization, partial nephrectomy R kidney    SubstHx:   T- none  E- none  D- none  Caffeine- very rare, if ever    FamPHx: mAunt- schizophrenic, mcousin- schizophrenia    Musculoskeletal:  Muscle strength/Tone: no dyskinesia/ no tremor  Gait/Station- non antalgic, no assistance needed    MSE: appears stated age, well groomed, obese, appropriate dress- shorts/tshirt, engages well with examiner at a childlike level.  Good e/c. Speech reg rate and vol, nonpressured. less spontaneous speech today. Mood is "I feel better than good." Affect congruent- sits in chair but is self talking throughout, narrating appt, calling himself in 3rd person. Oriented to month and season. Narrative memory intact. Intellectual function is below avg based on vocab and basic fund of knowledge- PDD long h/o sp ed. Thought is perseverative but he is tracking in conversation. No tangentiality or circumstantiality. No FOI/WES. Denies SI/HI. Denies A/VH. No evidence of delusions. Insight lacking and Judgment in keeping with insight.     Blood pressure 119/69, pulse 63, height 5' 10" (1.778 m), weight 123 kg (271 lb 2.7 oz).    Suicide Risk Assessment:   Protective- age, no prior attempts, no prior hospitalizations, no family h/o attempts, no ongoing substance abuse, no psychosis, denies SI/intent/plan, no h/o violence, seeking treatment, access to treatment, future oriented, good primary support, no access to firearms    Risk- race, gender, ongoing Axis I sxs    **Pt is at LOW imminent and long term risk of suicide given current risk factors.    Assessment:  30yo WM with PDD, Impulse Control d/o, anxiety d/o nos. Last saw RENNY Murillo 5/2016. Pt presented initially with his mother and caregiver Helen who has been with the pt x 8mos. Currently the pt is doing very well. Of " "note pt is only on lexapro for anxiety, is also on trileptal 300mg po BID for seizure d/o through neuro and has HALEY on cipap. Has great family support, receives 88hrs per week of direct care from outside service provider, has assistance with all ADLs and IADLs but seems content with arrangement. No h/o violent or aggressive behavior. Had manic sxs with psychosis when on keppra but never before or since. Has recently stopped zyprexa due to significant weight gain and there has been no increase in behavior or anxiety. Per parent and caregiver there is some concern that a prev caregiver who was recently fired was motivated for the pt to be more sedated-  they both denied the pt needed any change in medication. Hussein is doing well, has some instances of intrusive behavior with young attractive women and he becomes difficult to redirect- added a trial of PRN buspar for days he has public outings- this has been effective and helpful. Today the incidents have become more difficult to redirect- will order a trial of xanax 0.25-0.5mg po daily PRN and also inc buspar PRN to 10mg daily as needed for public outings. Today on f/u pt's mother does not believe buspar has been effective- xanax effective but cannot be given in the moment as "it just happens so fast"- inability to redirect pt in public has become a safety concern for women and for the pt as authorities are not typically well trained in spectrum disorders (mom working to educate the police force). Warrants a transition to new ssri- will taper off lexapro and likely start luvox at the next appt. Last appt on f/u he is "no worse" without the meds- mom motivated to cont without as they cont to observe. Anxiety was mildly inc'd but hard to attribute to lack of med as his primary caregiver just left work abruptly with pregnancy complications- change of schedule and personnel hard for Hussein. Last appt 2 new providers, doing well with both, mom has placed cameras and she " has no concerns about the new hires. Routine back in place and ru had anxiety but was functioning well- mom would like to cont w/o meds. Will cont to observe. It was time to intervene with meds per mom, started trial of luvox which initially helped at 50mg bid, we then inc'd to 100mg bid with s/e of diarrhea and sweating, now back to 50mg po bid with resolution of most concerning s/e and still improvement in anxiety- will cont at this time but mom will cont to do r/b assessment. Pt's father with worsening health- leading to some anxiety, new caregiver now gone after some unprofessional behavior. Pt doing well on the dec'd luvox dose and favorite caregiver is now back and working 25hrs. Self injury dec'd. Recent hospital stay for uti and sepsis- now back to baseline. Some recent transaminitis- trending down- will cont to follow. Initially did well on inc'd fluvox to 100mg po qam and 50mg po qhs- but today they are reporting inc'd anxiety and inc'd need for use of xanax and that the xanax 0.25mg has not been effective for relief of sxs recently- will inc fluvox from 100bid (mom titrated the dose) to 150mg po BID. Will also inc xanax to 0.5mg po daily PRN anxiety.  Will follow closely. No acute safety concerns. rtc 4wks.     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 Cont luvox 100mg po qam/50mg po qhs daily   2. Cont Trileptal 300mg po BID per neuro  3. Cont xanax 0.25-0.5mg po daily PRN anxiety (rare use)  4. RTC 4mos    -Spent 30min face to face with the pt; >50% time spent in counseling   -Supportive therapy and psychoeducation provided  -R/B/SE's of medications discussed with the pt who expresses understanding and chooses to take medications as prescribed.   -Pt instructed to call clinic, 911 or go to nearest emergency room if sxs worsen or pt is in    crisis. The pt expresses understanding.

## 2019-10-04 ENCOUNTER — OFFICE VISIT (OUTPATIENT)
Dept: FAMILY MEDICINE | Facility: CLINIC | Age: 34
End: 2019-10-04
Payer: MEDICARE

## 2019-10-04 VITALS
HEIGHT: 70 IN | BODY MASS INDEX: 38.22 KG/M2 | WEIGHT: 267 LBS | SYSTOLIC BLOOD PRESSURE: 130 MMHG | DIASTOLIC BLOOD PRESSURE: 76 MMHG | RESPIRATION RATE: 18 BRPM | HEART RATE: 78 BPM | OXYGEN SATURATION: 98 %

## 2019-10-04 DIAGNOSIS — F84.0 AUTISTIC DISORDER, ACTIVE: Primary | Chronic | ICD-10-CM

## 2019-10-04 DIAGNOSIS — G40.909 SEIZURE DISORDER: Chronic | ICD-10-CM

## 2019-10-04 DIAGNOSIS — F84.9 PERVASIVE DEVELOPMENTAL DISORDER, ACTIVE: Chronic | ICD-10-CM

## 2019-10-04 DIAGNOSIS — N31.9 NEUROGENIC BLADDER: Chronic | ICD-10-CM

## 2019-10-04 DIAGNOSIS — G47.33 OSA (OBSTRUCTIVE SLEEP APNEA): Chronic | ICD-10-CM

## 2019-10-04 DIAGNOSIS — F63.9 IMPULSE CONTROL DISORDER: Chronic | ICD-10-CM

## 2019-10-04 DIAGNOSIS — R62.50 DEVELOPMENTAL DELAY, MODERATE: ICD-10-CM

## 2019-10-04 DIAGNOSIS — K59.09 CHRONIC CONSTIPATION: Chronic | ICD-10-CM

## 2019-10-04 DIAGNOSIS — M1A.9XX0 CHRONIC GOUT WITHOUT TOPHUS, UNSPECIFIED CAUSE, UNSPECIFIED SITE: Chronic | ICD-10-CM

## 2019-10-04 PROCEDURE — 99213 PR OFFICE/OUTPT VISIT, EST, LEVL III, 20-29 MIN: ICD-10-PCS | Mod: 25,S$GLB,, | Performed by: EMERGENCY MEDICINE

## 2019-10-04 PROCEDURE — 3008F BODY MASS INDEX DOCD: CPT | Mod: CPTII,S$GLB,, | Performed by: EMERGENCY MEDICINE

## 2019-10-04 PROCEDURE — 99999 PR PBB SHADOW E&M-EST. PATIENT-LVL IV: CPT | Mod: PBBFAC,,, | Performed by: EMERGENCY MEDICINE

## 2019-10-04 PROCEDURE — G0008 ADMIN INFLUENZA VIRUS VAC: HCPCS | Mod: S$GLB,,, | Performed by: EMERGENCY MEDICINE

## 2019-10-04 PROCEDURE — 99499 UNLISTED E&M SERVICE: CPT | Mod: S$GLB,,, | Performed by: EMERGENCY MEDICINE

## 2019-10-04 PROCEDURE — 90686 FLU VACCINE (QUAD) GREATER THAN OR EQUAL TO 3YO PRESERVATIVE FREE IM: ICD-10-PCS | Mod: S$GLB,,, | Performed by: EMERGENCY MEDICINE

## 2019-10-04 PROCEDURE — G0008 FLU VACCINE (QUAD) GREATER THAN OR EQUAL TO 3YO PRESERVATIVE FREE IM: ICD-10-PCS | Mod: S$GLB,,, | Performed by: EMERGENCY MEDICINE

## 2019-10-04 PROCEDURE — 90686 IIV4 VACC NO PRSV 0.5 ML IM: CPT | Mod: S$GLB,,, | Performed by: EMERGENCY MEDICINE

## 2019-10-04 PROCEDURE — 3008F PR BODY MASS INDEX (BMI) DOCUMENTED: ICD-10-PCS | Mod: CPTII,S$GLB,, | Performed by: EMERGENCY MEDICINE

## 2019-10-04 PROCEDURE — 99999 PR PBB SHADOW E&M-EST. PATIENT-LVL IV: ICD-10-PCS | Mod: PBBFAC,,, | Performed by: EMERGENCY MEDICINE

## 2019-10-04 PROCEDURE — 99213 OFFICE O/P EST LOW 20 MIN: CPT | Mod: 25,S$GLB,, | Performed by: EMERGENCY MEDICINE

## 2019-10-04 PROCEDURE — 99499 RISK ADDL DX/OHS AUDIT: ICD-10-PCS | Mod: S$GLB,,, | Performed by: EMERGENCY MEDICINE

## 2019-10-05 PROBLEM — E66.01 MORBID OBESITY WITH BODY MASS INDEX (BMI) OF 40.0 OR HIGHER: Chronic | Status: ACTIVE | Noted: 2018-04-27

## 2019-10-05 PROBLEM — R74.01 TRANSAMINITIS: Status: RESOLVED | Noted: 2018-04-26 | Resolved: 2019-10-05

## 2019-10-05 NOTE — PATIENT INSTRUCTIONS
EliSilvino seems to be doing very well.      I did not see anything substantially different, from previous exam.      He was very easy to examine today.  He was very cooperative.  He did a very good job with his flu shot.    Forms were completed.  Please review to insure there are no issues.    Reji Zendejas MD

## 2019-10-05 NOTE — PROGRESS NOTES
Subjective:   THIS NOTE IS DONE WITH VOICE RECOGNITION        Patient ID: Hussein Doyle is a 33 y.o. male.    Chief Complaint: autistic      HPI     Silvino is in today.  He is accompanied by his .  His mother is not available.  She does request that we fill out his paperwork for continuation of support.  There have been no interval changes.  He continues to need support for intermittent catheterization, behavioral modification, medication administration, and some help with other daily living activities.  He is not able to provide his own meals.  He does know shopping.    He has been seen by Psychiatry.  He is thought to be stable.  No significant medication changes.    There have been no recent seizures.  His chronic constipation is doing well.  No complaints of any arthralgia or other joint pain suspicious for gout.  He is using his allopurinol.  His medication administration has been flow all us.    There been no recent hospitalizations or other issues.    My understanding is that he is in very good spot right now and things are very stable.    Current Outpatient Medications   Medication Sig Dispense Refill    allopurinol (ZYLOPRIM) 100 MG tablet Take 1 tablet (100 mg total) by mouth 2 (two) times daily. 60 tablet 11    ALPRAZolam (XANAX) 0.25 MG tablet Take 1 tablet (0.25 mg total) by mouth daily as needed for Anxiety. 30 tablet 0    fluvoxaMINE (LUVOX) 100 MG tablet Take 1.5 tablets (150 mg total) by mouth 2 (two) times daily. 90 tablet 0    inulin (FIBER GUMMIES ORAL) Take by mouth once daily.       L. ACIDOPHILUS/BIFID. ANIMALIS (CHEWABLE PROBIOTIC ORAL) Take by mouth 2 (two) times daily.      lactulose (CHRONULAC) 10 gram/15 mL solution TAKE 30 ML (6 TEASPOONS) TWICE DAILY 1800 mL 5    multivitamin capsule Take 1 capsule by mouth every morning.       mupirocin (BACTROBAN) 2 % ointment Apply topically 3 (three) times daily. 22 g 1    OXcarbazepine (TRILEPTAL) 300 MG Tab Take 1 tablet  (300 mg total) by mouth 2 (two) times daily. 60 tablet 0    polyethylene glycol (GLYCOLAX) 17 gram PwPk Take 17 g by mouth once daily. 90 packet 3    polymyxin B sulf-trimethoprim (POLYTRIM) 10,000 unit- 1 mg/mL Drop Place 1 drop into both eyes every 6 (six) hours. 10 mL 0    sulfamethoxazole-trimethoprim 400-80mg (BACTRIM,SEPTRA) 400-80 mg per tablet Take 1 tablet by mouth once daily.  6    tamsulosin (FLOMAX) 0.4 mg Cp24 Take 0.4 mg by mouth every evening. Every day      naproxen sodium (ANAPROX) 550 MG tablet Take 550 mg by mouth 2 (two) times daily with meals.  2     No current facility-administered medications for this visit.          Review of Systems   Reason unable to perform ROS: Review of systems obtained from his mother, via the patient portal.   Constitutional: Negative for activity change and unexpected weight change.   HENT: Negative for hearing loss, rhinorrhea and trouble swallowing.    Eyes: Negative for discharge and visual disturbance.   Respiratory: Negative for chest tightness and wheezing.    Cardiovascular: Negative for chest pain and palpitations.   Gastrointestinal: Negative for blood in stool, constipation, diarrhea and vomiting.   Endocrine: Negative for polydipsia and polyuria.   Genitourinary: Negative for difficulty urinating, hematuria and urgency.   Musculoskeletal: Negative for arthralgias, joint swelling and neck pain.   Neurological: Negative for weakness and headaches.   Psychiatric/Behavioral: Negative for confusion and dysphoric mood.       Objective:      Physical Exam   Constitutional: He appears well-nourished. No distress.   Very easy to examine today.  Very compliant.  Was able to tolerate flu shot with no issues.   HENT:   Head: Normocephalic.   Mouth/Throat: Oropharynx is clear and moist.   Limited oral exam reveals no obviously broken teeth or other problems.   Eyes: Conjunctivae are normal. No scleral icterus.   Neck: Normal range of motion. No thyromegaly present.    Cardiovascular: Normal rate, regular rhythm and normal heart sounds.   No murmur heard.  Pulmonary/Chest: Effort normal and breath sounds normal.   Abdominal: Soft.   He is overweight, but no significant change.   Musculoskeletal: He exhibits no edema or deformity.   Lymphadenopathy:     He has no cervical adenopathy.   Neurological: He is alert.   During this exam he did tend to look away.  He was cooperative.  He does talk to himself during the exam.   Skin: Skin is warm. Capillary refill takes less than 2 seconds.   He does have some contusions on the right knee where he fell.  No suspicious bruising.   Vitals reviewed.      Assessment:       1. Autistic disorder, active    2. Seizure disorder    3. HALEY (obstructive sleep apnea)    4. Neurogenic bladder    5. Chronic constipation    6. Developmental delay, moderate    7. Impulse control disorder    8. Pervasive developmental disorder, active    9. Chronic gout without tophus, unspecified cause, unspecified site        Plan:       1. Autistic disorder, active  Stable, do not anticipate changes.    2. Seizure disorder  No new seizure activity  Continue current medications    3. HALEY (obstructive sleep apnea)  Stable, do not anticipate changes.    4. Neurogenic bladder  Stable, do not anticipate changes.    5. Chronic constipation  Doing very well  Stable, do not anticipate changes.    6. Developmental delay, moderate  No changes    7. Impulse control disorder  Stable, do not anticipate changes.    8. Pervasive developmental disorder, active  Stable, do not anticipate changes.    9. Chronic gout without tophus, unspecified cause, unspecified site  Continue  Blood work done in May was unremarkable.    Forms completed.  There has been no change in the level of services or support that he needs.

## 2019-10-17 ENCOUNTER — OFFICE VISIT (OUTPATIENT)
Dept: PSYCHIATRY | Facility: CLINIC | Age: 34
End: 2019-10-17
Payer: COMMERCIAL

## 2019-10-17 VITALS
WEIGHT: 267.63 LBS | SYSTOLIC BLOOD PRESSURE: 136 MMHG | BODY MASS INDEX: 38.31 KG/M2 | HEIGHT: 70 IN | HEART RATE: 74 BPM | DIASTOLIC BLOOD PRESSURE: 80 MMHG

## 2019-10-17 DIAGNOSIS — F84.0 AUTISTIC DISORDER, ACTIVE: Chronic | ICD-10-CM

## 2019-10-17 DIAGNOSIS — R62.50 DEVELOPMENTAL DELAY, MODERATE: ICD-10-CM

## 2019-10-17 DIAGNOSIS — F42.8 OTHER OBSESSIVE-COMPULSIVE DISORDERS: ICD-10-CM

## 2019-10-17 DIAGNOSIS — E66.01 MORBID OBESITY WITH BODY MASS INDEX (BMI) OF 40.0 OR HIGHER: Chronic | ICD-10-CM

## 2019-10-17 DIAGNOSIS — F63.9 IMPULSE CONTROL DISORDER: Primary | Chronic | ICD-10-CM

## 2019-10-17 DIAGNOSIS — G47.33 OSA (OBSTRUCTIVE SLEEP APNEA): Chronic | ICD-10-CM

## 2019-10-17 DIAGNOSIS — F84.9 PERVASIVE DEVELOPMENTAL DISORDER, ACTIVE: Chronic | ICD-10-CM

## 2019-10-17 PROCEDURE — 99499 RISK ADDL DX/OHS AUDIT: ICD-10-PCS | Mod: S$GLB,,, | Performed by: PSYCHIATRY & NEUROLOGY

## 2019-10-17 PROCEDURE — 3008F BODY MASS INDEX DOCD: CPT | Mod: CPTII,S$GLB,, | Performed by: PSYCHIATRY & NEUROLOGY

## 2019-10-17 PROCEDURE — 99214 OFFICE O/P EST MOD 30 MIN: CPT | Mod: S$GLB,,, | Performed by: PSYCHIATRY & NEUROLOGY

## 2019-10-17 PROCEDURE — 99999 PR PBB SHADOW E&M-EST. PATIENT-LVL II: ICD-10-PCS | Mod: PBBFAC,,, | Performed by: PSYCHIATRY & NEUROLOGY

## 2019-10-17 PROCEDURE — 99499 UNLISTED E&M SERVICE: CPT | Mod: S$GLB,,, | Performed by: PSYCHIATRY & NEUROLOGY

## 2019-10-17 PROCEDURE — 99999 PR PBB SHADOW E&M-EST. PATIENT-LVL II: CPT | Mod: PBBFAC,,, | Performed by: PSYCHIATRY & NEUROLOGY

## 2019-10-17 PROCEDURE — 99214 PR OFFICE/OUTPT VISIT, EST, LEVL IV, 30-39 MIN: ICD-10-PCS | Mod: S$GLB,,, | Performed by: PSYCHIATRY & NEUROLOGY

## 2019-10-17 PROCEDURE — 3008F PR BODY MASS INDEX (BMI) DOCUMENTED: ICD-10-PCS | Mod: CPTII,S$GLB,, | Performed by: PSYCHIATRY & NEUROLOGY

## 2019-10-17 RX ORDER — FLUVOXAMINE MALEATE 100 MG/1
150 TABLET, COATED ORAL 2 TIMES DAILY
Qty: 90 TABLET | Refills: 2 | Status: SHIPPED | OUTPATIENT
Start: 2019-10-17 | End: 2019-11-20 | Stop reason: SDUPTHER

## 2019-10-17 RX ORDER — ALPRAZOLAM 0.5 MG/1
0.5 TABLET ORAL DAILY PRN
Qty: 30 TABLET | Refills: 0 | Status: SHIPPED | OUTPATIENT
Start: 2019-10-17 | End: 2019-11-13 | Stop reason: SDUPTHER

## 2019-10-31 ENCOUNTER — TELEPHONE (OUTPATIENT)
Dept: FAMILY MEDICINE | Facility: CLINIC | Age: 34
End: 2019-10-31

## 2019-10-31 NOTE — TELEPHONE ENCOUNTER
----- Message from Desirae Cheema sent at 10/31/2019  9:58 AM CDT -----  Please call Mae Baxter with [facility]  [phone] or [phone]

## 2019-10-31 NOTE — TELEPHONE ENCOUNTER
Spoke w/ Mae and she needs copy of meds signed by Dr. Zendejas and faxed back to [phone]. Spoke with Cele and she said she is just waiting for Dr. Zendejas to sign meds

## 2019-11-07 ENCOUNTER — TELEPHONE (OUTPATIENT)
Dept: FAMILY MEDICINE | Facility: CLINIC | Age: 34
End: 2019-11-07

## 2019-11-07 NOTE — TELEPHONE ENCOUNTER
----- Message from Katalina Shah sent at 11/7/2019  2:57 PM CST -----  Contact: Beverley PITT/ [facility]  Type: Needs Medical Advice    Who Called:  Beverley Rojas Call Back Number: [phone]  Additional Information: Requesting a call back regarding getting a medication list. She stated Dr. Zendejas fill out a medical for and stated see attached med list but there is no list wanted to know if some can fax it to [phone]  Please Advise ---Thank you

## 2019-11-09 DIAGNOSIS — R56.9 CONVULSIONS, UNSPECIFIED CONVULSION TYPE: ICD-10-CM

## 2019-11-10 RX ORDER — OXCARBAZEPINE 300 MG/1
300 TABLET, FILM COATED ORAL 2 TIMES DAILY
Qty: 60 TABLET | Refills: 0 | OUTPATIENT
Start: 2019-11-10

## 2019-11-13 DIAGNOSIS — R56.9 CONVULSIONS, UNSPECIFIED CONVULSION TYPE: ICD-10-CM

## 2019-11-13 RX ORDER — OXCARBAZEPINE 300 MG/1
300 TABLET, FILM COATED ORAL 2 TIMES DAILY
Qty: 60 TABLET | Refills: 0 | Status: SHIPPED | OUTPATIENT
Start: 2019-11-13 | End: 2019-12-11 | Stop reason: SDUPTHER

## 2019-11-13 RX ORDER — ALPRAZOLAM 0.5 MG/1
TABLET ORAL
Qty: 30 TABLET | Refills: 0 | Status: SHIPPED | OUTPATIENT
Start: 2019-11-13 | End: 2019-12-09 | Stop reason: SDUPTHER

## 2019-11-19 ENCOUNTER — PATIENT MESSAGE (OUTPATIENT)
Dept: FAMILY MEDICINE | Facility: CLINIC | Age: 34
End: 2019-11-19

## 2019-11-20 ENCOUNTER — OFFICE VISIT (OUTPATIENT)
Dept: PSYCHIATRY | Facility: CLINIC | Age: 34
End: 2019-11-20
Payer: COMMERCIAL

## 2019-11-20 VITALS
HEIGHT: 70 IN | SYSTOLIC BLOOD PRESSURE: 132 MMHG | WEIGHT: 271.81 LBS | HEART RATE: 76 BPM | DIASTOLIC BLOOD PRESSURE: 77 MMHG | BODY MASS INDEX: 38.91 KG/M2

## 2019-11-20 DIAGNOSIS — G47.33 OSA (OBSTRUCTIVE SLEEP APNEA): Chronic | ICD-10-CM

## 2019-11-20 DIAGNOSIS — F42.8 OTHER OBSESSIVE-COMPULSIVE DISORDERS: ICD-10-CM

## 2019-11-20 DIAGNOSIS — F63.9 IMPULSE CONTROL DISORDER: Primary | Chronic | ICD-10-CM

## 2019-11-20 DIAGNOSIS — F84.9 PERVASIVE DEVELOPMENTAL DISORDER, ACTIVE: Chronic | ICD-10-CM

## 2019-11-20 DIAGNOSIS — E66.01 MORBID OBESITY WITH BODY MASS INDEX (BMI) OF 40.0 OR HIGHER: Chronic | ICD-10-CM

## 2019-11-20 DIAGNOSIS — F84.0 AUTISTIC DISORDER, ACTIVE: Chronic | ICD-10-CM

## 2019-11-20 PROCEDURE — 99214 OFFICE O/P EST MOD 30 MIN: CPT | Mod: S$GLB,,, | Performed by: PSYCHIATRY & NEUROLOGY

## 2019-11-20 PROCEDURE — 99499 RISK ADDL DX/OHS AUDIT: ICD-10-PCS | Mod: S$GLB,,, | Performed by: PSYCHIATRY & NEUROLOGY

## 2019-11-20 PROCEDURE — 99999 PR PBB SHADOW E&M-EST. PATIENT-LVL III: CPT | Mod: PBBFAC,,, | Performed by: PSYCHIATRY & NEUROLOGY

## 2019-11-20 PROCEDURE — 99999 PR PBB SHADOW E&M-EST. PATIENT-LVL III: ICD-10-PCS | Mod: PBBFAC,,, | Performed by: PSYCHIATRY & NEUROLOGY

## 2019-11-20 PROCEDURE — 99214 PR OFFICE/OUTPT VISIT, EST, LEVL IV, 30-39 MIN: ICD-10-PCS | Mod: S$GLB,,, | Performed by: PSYCHIATRY & NEUROLOGY

## 2019-11-20 PROCEDURE — 99499 UNLISTED E&M SERVICE: CPT | Mod: S$GLB,,, | Performed by: PSYCHIATRY & NEUROLOGY

## 2019-11-20 PROCEDURE — 3008F PR BODY MASS INDEX (BMI) DOCUMENTED: ICD-10-PCS | Mod: CPTII,S$GLB,, | Performed by: PSYCHIATRY & NEUROLOGY

## 2019-11-20 PROCEDURE — 3008F BODY MASS INDEX DOCD: CPT | Mod: CPTII,S$GLB,, | Performed by: PSYCHIATRY & NEUROLOGY

## 2019-11-20 RX ORDER — FLUVOXAMINE MALEATE 100 MG/1
150 TABLET, COATED ORAL 2 TIMES DAILY
Qty: 90 TABLET | Refills: 2 | Status: SHIPPED | OUTPATIENT
Start: 2019-11-20 | End: 2020-02-17 | Stop reason: SDUPTHER

## 2019-11-20 NOTE — PROGRESS NOTES
"ID: 29yo WM with PDD, Impulse Control d/o, anxiety d/o nos. Last saw RENNY Murillo 5/2016.     CC: anxiety    Interim Hx: presents on time with his mother and longer standing attendant, Ashleigh (x2yrs).    Pt has a cold today, not feeling particularly well, but per mom and caregiver the use of xanax has been effective- all prior skin excoriations are now healed and he has escalated less often. 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 Other concern is that the med is short acting and therefore they do find afternoons harder with anxiety than mornings.     Discussed possible increasing trileptal as he is already on max dose luvox and I am hesitant to further titrate the xanax- they agree with this but would rather "just keep observing and maybe revisit the topic after the holidays."     On Psychiatric ROS:    Endorses a change in sleep- new cpap machine but noting more sedation during the day, denies anhedonia, denies feeling helpless/hopeless, denies dec energy, denies dec concentration, stable appetite, denies dec PMA, denies thoughts of SI/intent/plan.     Endorses feeling less easily overwhelmed. Less self injury (no scabbing visible in appt- picks at "galindo crystal" when anxious), +ruminative thinking- chronic- more difficulty moving on to new topic, denies feeling tense/"on edge"  Once "fixated" on a topic, struggles to move on. Has some compulsory behaviors assoc with obsessive thinking.     PPHx: Endorses h/o self injury- will pick at scabs in sort of obsessive way- this has stabilized  Denies inpt psych hospitalization  Denies h/o suicide attempt     Current Psych Meds: **Trileptal 300mg po BID through neuro for sz d/o, xanax 0.5mg po daily PRN anxiety, luvox 150mg po BID  Past Psych Meds: s/e's to mult prev meds to include zyprexa (wgt " "gain), lexapro 20mg po qam (anxiety and to suppress sex drive), buspar 10mg po BID     PMHx: obesity, onel w/ cipap, seizure d/o, scoliosis surgery at 11yo led to hemiparalysis- now walking but has neurogenic bladder and frequent catheterization, partial nephrectomy R kidney    SubstHx:   T- none  E- none  D- none  Caffeine- very rare, if ever    FamPHx: mAunt- schizophrenic, mcousin- schizophrenia    Musculoskeletal:  Muscle strength/Tone: no dyskinesia/ no tremor  Gait/Station- non antalgic, no assistance needed    MSE: appears stated age, well groomed, obese, appropriate dress- shorts/tshirt, engages well with examiner at a childlike level.  Good e/c. Speech reg rate and vol, nonpressured. less spontaneous speech today. Mood is "I do good. I have a cold." Affect euthymic. Oriented to month and season. Narrative memory intact. Intellectual function is below avg based on vocab and basic fund of knowledge- PDD long h/o sp ed. Thought is perseverative but he is tracking in conversation. No tangentiality or circumstantiality. No FOI/WES. Denies SI/HI. Denies A/VH. No evidence of delusions. Insight lacking and Judgment in keeping with insight.     Blood pressure 132/77, pulse 76, height 5' 10" (1.778 m), weight 123.3 kg (271 lb 13.2 oz).    Suicide Risk Assessment:   Protective- age, no prior attempts, no prior hospitalizations, no family h/o attempts, no ongoing substance abuse, no psychosis, denies SI/intent/plan, no h/o violence, seeking treatment, access to treatment, future oriented, good primary support, no access to firearms    Risk- race, gender, ongoing Axis I sxs    **Pt is at LOW imminent and long term risk of suicide given current risk factors.    Assessment:  30yo WM with PDD, Impulse Control d/o, anxiety d/o nos. Last saw RENNY Murillo 5/2016. Pt presented initially with his mother and caregiver Helen who has been with the pt x 8mos. Currently the pt is doing very well. Of note pt is only on lexapro for " "anxiety, is also on trileptal 300mg po BID for seizure d/o through neuro and has HALEY on cipap. Has great family support, receives 88hrs per week of direct care from outside service provider, has assistance with all ADLs and IADLs but seems content with arrangement. No h/o violent or aggressive behavior. Had manic sxs with psychosis when on keppra but never before or since. Has recently stopped zyprexa due to significant weight gain and there has been no increase in behavior or anxiety. Per parent and caregiver there is some concern that a prev caregiver who was recently fired was motivated for the pt to be more sedated-  they both denied the pt needed any change in medication. Hussein is doing well, has some instances of intrusive behavior with young attractive women and he becomes difficult to redirect- added a trial of PRN buspar for days he has public outings- this has been effective and helpful. Today the incidents have become more difficult to redirect- will order a trial of xanax 0.25-0.5mg po daily PRN and also inc buspar PRN to 10mg daily as needed for public outings. Today on f/u pt's mother does not believe buspar has been effective- xanax effective but cannot be given in the moment as "it just happens so fast"- inability to redirect pt in public has become a safety concern for women and for the pt as authorities are not typically well trained in spectrum disorders (mom working to educate the police force). Warrants a transition to new ssri- will taper off lexapro and likely start luvox at the next appt. Last appt on f/u he is "no worse" without the meds- mom motivated to cont without as they cont to observe. Anxiety was mildly inc'd but hard to attribute to lack of med as his primary caregiver just left work abruptly with pregnancy complications- change of schedule and personnel hard for Hussein. Last appt 2 new providers, doing well with both, mom has placed cameras and she has no concerns about the new " hires. Routine back in place and ru had anxiety but was functioning well- mom would like to cont w/o meds. Will cont to observe. It was time to intervene with meds per mom, started trial of luvox which initially helped at 50mg bid, we then inc'd to 100mg bid with s/e of diarrhea and sweating, now back to 50mg po bid with resolution of most concerning s/e and still improvement in anxiety- will cont at this time but mom will cont to do r/b assessment. Pt's father with worsening health- leading to some anxiety, new caregiver now gone after some unprofessional behavior. Pt doing well on the dec'd luvox dose and favorite caregiver is now back and working 25hrs. Self injury dec'd. Recent hospital stay for uti and sepsis- now back to baseline. Some recent transaminitis- trending down- will cont to follow. Initially did well on inc'd fluvox to 100mg po qam and 50mg po qhs- but last appt they reported inc'd anxiety and inc'd need for use of xanax and that the xanax 0.25mg has not been effective for relief of sxs recently- inc'd fluvox from 100bid (mom titrated the dose) to 150mg po BID. Will also inc xanax to 0.5mg po daily PRN anxiety.  Skin picking improved, escalating less frequently. Will cont daily xanax 0.5mg po qam and cont to observe. No acute safety concerns. rtc 3mos.     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 Cont luvox 150mg po BID   2. Cont Trileptal 300mg po BID per neuro  3. Cont xanax 0.5mg po daily PRN anxiety (rare use)  4. RTC 3mos    -Spent 30min face to face with the pt; >50% time spent in counseling   -Supportive therapy and psychoeducation provided  -R/B/SE's of medications discussed with the pt who expresses understanding and chooses to take medications as prescribed.   -Pt instructed to call clinic, 911 or go to nearest emergency  room if sxs worsen or pt is in   crisis. The pt expresses understanding.

## 2019-11-25 ENCOUNTER — TELEPHONE (OUTPATIENT)
Dept: FAMILY MEDICINE | Facility: CLINIC | Age: 34
End: 2019-11-25

## 2019-11-25 NOTE — TELEPHONE ENCOUNTER
Spoke with pt's mom he has had a cough for 2 weeks, cough syrup, Vitamin C, and mucinex is not working. Pt has completed 3 bottles of cough syrup. Pt has clinic appt scheduled with Sylvester 11/26/2019. Please advise

## 2019-11-25 NOTE — TELEPHONE ENCOUNTER
----- Message from Marilynn Yanes sent at 11/25/2019  1:33 PM CST -----  Contact: patient mom Eli Bin  Patient mom requesting something to be called in for cough that patient has to pharmacy at         [facility]/pharmacy #[id number] - [place] - [address]  [id number]  [place] [address]  Phone: [phone] Fax: [phone]      Patient mom contact is [phone] (home)

## 2019-11-26 ENCOUNTER — OFFICE VISIT (OUTPATIENT)
Dept: FAMILY MEDICINE | Facility: CLINIC | Age: 34
End: 2019-11-26
Payer: MEDICARE

## 2019-11-26 VITALS
WEIGHT: 270.5 LBS | OXYGEN SATURATION: 97 % | DIASTOLIC BLOOD PRESSURE: 86 MMHG | HEIGHT: 70 IN | HEART RATE: 85 BPM | TEMPERATURE: 99 F | BODY MASS INDEX: 38.73 KG/M2 | SYSTOLIC BLOOD PRESSURE: 130 MMHG

## 2019-11-26 DIAGNOSIS — J20.8 ACUTE BACTERIAL BRONCHITIS: Primary | ICD-10-CM

## 2019-11-26 DIAGNOSIS — B96.89 ACUTE BACTERIAL BRONCHITIS: Primary | ICD-10-CM

## 2019-11-26 PROCEDURE — 99213 PR OFFICE/OUTPT VISIT, EST, LEVL III, 20-29 MIN: ICD-10-PCS | Mod: S$GLB,,, | Performed by: NURSE PRACTITIONER

## 2019-11-26 PROCEDURE — 3008F BODY MASS INDEX DOCD: CPT | Mod: CPTII,S$GLB,, | Performed by: NURSE PRACTITIONER

## 2019-11-26 PROCEDURE — 99999 PR PBB SHADOW E&M-EST. PATIENT-LVL IV: ICD-10-PCS | Mod: PBBFAC,,, | Performed by: NURSE PRACTITIONER

## 2019-11-26 PROCEDURE — 3008F PR BODY MASS INDEX (BMI) DOCUMENTED: ICD-10-PCS | Mod: CPTII,S$GLB,, | Performed by: NURSE PRACTITIONER

## 2019-11-26 PROCEDURE — 99213 OFFICE O/P EST LOW 20 MIN: CPT | Mod: S$GLB,,, | Performed by: NURSE PRACTITIONER

## 2019-11-26 PROCEDURE — 99999 PR PBB SHADOW E&M-EST. PATIENT-LVL IV: CPT | Mod: PBBFAC,,, | Performed by: NURSE PRACTITIONER

## 2019-11-26 RX ORDER — MONTELUKAST SODIUM 10 MG/1
10 TABLET ORAL NIGHTLY
Qty: 30 TABLET | Refills: 0 | Status: SHIPPED | OUTPATIENT
Start: 2019-11-26 | End: 2019-12-18 | Stop reason: SDUPTHER

## 2019-11-26 RX ORDER — DOXYCYCLINE HYCLATE 100 MG
100 TABLET ORAL 2 TIMES DAILY
Qty: 20 TABLET | Refills: 0 | Status: SHIPPED | OUTPATIENT
Start: 2019-11-26 | End: 2020-02-17

## 2019-11-26 RX ORDER — BENZONATATE 200 MG/1
200 CAPSULE ORAL 3 TIMES DAILY PRN
Qty: 30 CAPSULE | Refills: 0 | Status: SHIPPED | OUTPATIENT
Start: 2019-11-26 | End: 2019-12-06

## 2019-11-26 NOTE — PROGRESS NOTES
Subjective:       Patient ID: Hussein Doyle is a 34 y.o. male.    Chief Complaint: Cough; Fatigue; Sore Throat; and Diarrhea    For 10 days patient has had sinus congestion, sore throat, and chest congestion with productive cough. He is hoarse, having some diarrhea. No fever. He has been taking robitussin, zicam, emergen C, flonase and astelin nasal sprays, mucinex, and Vicks.     Past Medical History:  5/6/2013: Autistic disorder, active  No date: Bowel obstruction  No date: Early intervention counseling  11/12/2015: Gout, chronic  No date: Grand mal seizure  No date: Hepatic steatosis  5/6/2013: Impulse control disorder, unspecified  No date: Mastoiditis  5/6/2013: Moderate mental retardation  No date: Neurogenic bladder  No date: Neurogenic bladder  11/12/2015: HALEY (obstructive sleep apnea)      Comment:  CPAP.  Dr. Garber   No date: Otitis media  No date: Paraplegia  12/27/2015: Pervasive developmental disorder, active  2010: Renal cancer      Comment:  Right  No date: Scoliosis      Comment:  paraplegia  11/12/2015: Seizure disorder  No date: Stroke      Comment:  one week old  No date: Tardive dyskinesia    Past Surgical History:  No date: ANKLE FRACTURE SURGERY  2000: BACK SURGERY  10/28/2008: COLONOSCOPY      Comment:  Dr. Arana at [facility]; anal fissure, minimal pinpoint rectal               erythema; floppy-type colon with very little tone;                biopsy: rectum mild chronic proctitis with few                eosinophils sent for scanning  6/15/2018: COLONOSCOPY; N/A      Comment:  Procedure: COLONOSCOPY;  Surgeon: Refugio Villagomez MD;  Location: [facility];  Service: Endoscopy;                 Laterality: N/A; REPEAT IN 50 YEARS FOR SCREENING  No date: INNER EAR SURGERY      Comment:  mastoid  2010: right partial nephrectomy; Right      Comment:  Sees urology  No date: TYMPANOSTOMY TUBE PLACEMENT    Review of patient's family history indicates:  Problem: Cancer      Relation:  Father          Age of Onset: (Not Specified)          Comment: lymphoma  Problem: Cataracts      Relation: Father          Age of Onset: (Not Specified)  Problem: Colon polyps      Relation: Father          Age of Onset: (Not Specified)  Problem: Cataracts      Relation: Mother          Age of Onset: (Not Specified)  Problem: Cataracts      Relation: Maternal Grandmother          Age of Onset: (Not Specified)  Problem: Diabetes      Relation: Maternal Grandmother          Age of Onset: (Not Specified)  Problem: Macular degeneration      Relation: Maternal Grandmother          Age of Onset: (Not Specified)  Problem: Glaucoma      Relation: Maternal Grandmother          Age of Onset: (Not Specified)  Problem: Anesthesia problems      Relation: Neg Hx          Age of Onset: (Not Specified)  Problem: Clotting disorder      Relation: Neg Hx          Age of Onset: (Not Specified)  Problem: Colon cancer      Relation: Neg Hx          Age of Onset: (Not Specified)  Problem: Crohn's disease      Relation: Neg Hx          Age of Onset: (Not Specified)  Problem: Ulcerative colitis      Relation: Neg Hx          Age of Onset: (Not Specified)  Problem: Stomach cancer      Relation: Neg Hx          Age of Onset: (Not Specified)  Problem: Esophageal cancer      Relation: Neg Hx          Age of Onset: (Not Specified)      Social History    Socioeconomic History      Marital status: Single      Spouse name: Not on file      Number of children: Not on file      Years of education: Not on file      Highest education level: Not on file    Occupational History      Not on file    Social Needs      Financial resource strain: Not on file      Food insecurity:        Worry: Not on file        Inability: Not on file      Transportation needs:        Medical: No        Non-medical: No    Tobacco Use      Smoking status: Never Smoker      Smokeless tobacco: Never Used    Substance and Sexual Activity      Alcohol use: No        Frequency:  Never        Drinks per session: Patient refused        Binge frequency: Never      Drug use: No      Sexual activity: Never    Lifestyle      Physical activity:        Days per week: 5 days        Minutes per session: 40 min      Stress: Not on file    Relationships      Social connections:        Talks on phone: Once a week        Gets together: More than three times a week        Attends Confucianist service: Not on file        Active member of club or organization: Yes        Attends meetings of clubs or organizations: More than 4 times per year        Relationship status: Never     Other Topics      Concerns:        Not on file    Social History Narrative      Lives with his parents.  More dependent with ADLs.  Very active in the community.            New Opportunities jhoan.      Current Outpatient Medications:  allopurinol (ZYLOPRIM) 100 MG tablet, Take 1 tablet (100 mg total) by mouth 2 (two) times daily., Disp: 60 tablet, Rfl: 11  ALPRAZolam (XANAX) 0.5 MG tablet, TAKE 1 TABLET BY MOUTH EVERY DAY AS NEEDED FOR ANXIETY, Disp: 30 tablet, Rfl: 0  fluvoxaMINE (LUVOX) 100 MG tablet, Take 1.5 tablets (150 mg total) by mouth 2 (two) times daily., Disp: 90 tablet, Rfl: 2  inulin (FIBER GUMMIES ORAL), Take by mouth once daily. , Disp: , Rfl:   L. ACIDOPHILUS/BIFID. ANIMALIS (CHEWABLE PROBIOTIC ORAL), Take by mouth 2 (two) times daily., Disp: , Rfl:   lactulose (CHRONULAC) 10 gram/15 mL solution, TAKE 30 ML (6 TEASPOONS) TWICE DAILY, Disp: 1800 mL, Rfl: 5  multivitamin capsule, Take 1 capsule by mouth every morning. , Disp: , Rfl:    mupirocin (BACTROBAN) 2 % ointment, Apply topically 3 (three) times daily., Disp: 22 g, Rfl: 1  naproxen sodium (ANAPROX) 550 MG tablet, Take 550 mg by mouth 2 (two) times daily with meals., Disp: , Rfl: 2  OXcarbazepine (TRILEPTAL) 300 MG Tab, TAKE 1 TABLET (300 MG TOTAL) BY MOUTH 2 (TWO) TIMES DAILY., Disp: 60 tablet, Rfl: 0  polyethylene glycol (GLYCOLAX) 17 gram PwPk, Take 17 g  by mouth once daily., Disp: 90 packet, Rfl: 3  sulfamethoxazole-trimethoprim 400-80mg (BACTRIM,SEPTRA) 400-80 mg per tablet, Take 1 tablet by mouth once daily., Disp: , Rfl: 6  tamsulosin (FLOMAX) 0.4 mg Cp24, Take 0.4 mg by mouth every evening. Every day, Disp: , Rfl:   polymyxin B sulf-trimethoprim (POLYTRIM) 10,000 unit- 1 mg/mL Drop, Place 1 drop into both eyes every 6 (six) hours., Disp: 10 mL, Rfl: 0    No current facility-administered medications for this visit.       Review of patient's allergies indicates:   -- Benadryl (diphenhydramine hcl) -- Other (See Comments)    --  Increases anxiety and more restless   -- Keppra (levetiracetam) -- Other (See Comments)    --  agitation   -- Ativan (lorazepam) -- Anxiety   -- Abilify  (aripiprazole)     --  Other reaction(s): arrhythmia   -- Clonazepam     --  Other reaction(s): unknown   -- Cough syrup w/decongestant     --  Other reaction(s): auditory hallucinations   -- Diazepam     --  Other reaction(s): tongue swelling   -- Haloperidol -- Other (See Comments)   -- Phenytoin sodium extended     --  Other reaction(s): unknown   -- Quetiapine     --  Other reaction(s): sweating             Other reaction(s): sedation             Other reaction(s): sweating             Other reaction(s): sedation   -- Risperidone     --  Other reaction(s): bladder incontinence   -- Thimerosal     --  Other reaction(s): seizure   -- Ziprasidone hcl -- Other (See Comments)    --  Other reaction(s): tonic clonic seizure             seizure    Review of Systems   Constitutional: Positive for appetite change and diaphoresis. Negative for chills.   HENT: Positive for postnasal drip, rhinorrhea, sinus pressure and sinus pain.    Respiratory: Positive for cough and wheezing. Negative for shortness of breath.    Cardiovascular: Negative.    Gastrointestinal: Positive for diarrhea.   Genitourinary: Negative.    Musculoskeletal: Negative.        Objective:      Physical Exam   Constitutional: He  is oriented to person, place, and time. No distress.   HENT:   Head: Normocephalic and atraumatic. Head is without raccoon's eyes and without Guerrero's sign.   Right Ear: No middle ear effusion.   Left Ear:  No middle ear effusion.   Nose: Mucosal edema and rhinorrhea present. Right sinus exhibits no maxillary sinus tenderness and no frontal sinus tenderness. Left sinus exhibits no maxillary sinus tenderness and no frontal sinus tenderness.   Mouth/Throat: Uvula is midline. Posterior oropharyngeal erythema present.   Eyes: Pupils are equal, round, and reactive to light.   Neck: Normal range of motion.   Cardiovascular: Normal rate and regular rhythm. Exam reveals no friction rub.   No murmur heard.  Pulmonary/Chest: Effort normal. He has wheezes.   Neurological: He is alert and oriented to person, place, and time.   Skin: He is not diaphoretic.   Psychiatric: He has a normal mood and affect. His behavior is normal.   Vitals reviewed.      Assessment:       1. Acute bacterial bronchitis        Plan:       1. Acute bacterial bronchitis  Follow up if not resolving.  - doxycycline (VIBRA-TABS) 100 MG tablet; Take 1 tablet (100 mg total) by mouth 2 (two) times daily.  Dispense: 20 tablet; Refill: 0  - benzonatate (TESSALON) 200 MG capsule; Take 1 capsule (200 mg total) by mouth 3 (three) times daily as needed.  Dispense: 30 capsule; Refill: 0  - montelukast (SINGULAIR) 10 mg tablet; Take 1 tablet (10 mg total) by mouth every evening.  Dispense: 30 tablet; Refill: 0

## 2019-12-06 ENCOUNTER — TELEPHONE (OUTPATIENT)
Dept: FAMILY MEDICINE | Facility: CLINIC | Age: 34
End: 2019-12-06

## 2019-12-06 NOTE — TELEPHONE ENCOUNTER
I did try to call Eli.  She did not answer.    Warning signs such as fever, chills, shortness of breath reviewed.    If he is not having these types of things, I would persist with over-the-counter cough syrups.  If he is still coughing in 2-3 weeks, further evaluation is recommended.    Reji Zendejas MD

## 2019-12-06 NOTE — TELEPHONE ENCOUNTER
----- Message from AFAR sent at 12/6/2019 12:56 PM CST -----  Contact: Mother  Type: Needs Medical Advice    Who Called:  Mother    Best Call Back Number:   Additional Information: Requesting a call back from Nurse, regarding pt still has very bad cough been through 3 bottles of cough syrup and finished rx ,please call with advice

## 2019-12-06 NOTE — TELEPHONE ENCOUNTER
Spoke with pt's mom he continues to have a cough, pt was seen 11/26/2019 Sylvester he was given Doxycyline 100 mg po BID, Tessalon 200 mg, Singular. Pt's mom also stated he has been taking Flonase, Robitussin, zicam, Emergen c, Astelin, and Vicks. Pt mom said no fever, pt has been fatigued. Please advise

## 2019-12-09 RX ORDER — ALPRAZOLAM 0.5 MG/1
TABLET ORAL
Qty: 30 TABLET | Refills: 0 | Status: SHIPPED | OUTPATIENT
Start: 2019-12-09 | End: 2020-01-13

## 2019-12-09 RX ORDER — ALPRAZOLAM 0.25 MG/1
TABLET ORAL
Qty: 30 TABLET | Refills: 0 | OUTPATIENT
Start: 2019-12-09

## 2019-12-11 DIAGNOSIS — R56.9 CONVULSIONS, UNSPECIFIED CONVULSION TYPE: ICD-10-CM

## 2019-12-11 RX ORDER — OXCARBAZEPINE 300 MG/1
300 TABLET, FILM COATED ORAL 2 TIMES DAILY
Qty: 60 TABLET | Refills: 0 | Status: SHIPPED | OUTPATIENT
Start: 2019-12-11 | End: 2020-01-09

## 2019-12-18 DIAGNOSIS — B96.89 ACUTE BACTERIAL BRONCHITIS: ICD-10-CM

## 2019-12-18 DIAGNOSIS — J20.8 ACUTE BACTERIAL BRONCHITIS: ICD-10-CM

## 2019-12-18 RX ORDER — MONTELUKAST SODIUM 10 MG/1
TABLET ORAL
Qty: 30 TABLET | Refills: 0 | Status: SHIPPED | OUTPATIENT
Start: 2019-12-18 | End: 2020-01-13

## 2020-01-01 NOTE — PROVATION PATIENT INSTRUCTIONS
Discharge Summary/Instructions after an Endoscopic Procedure  Patient Name: Hussein Yu  Patient MRN: [id number]  Patient YOB: 1985  Friday, Susan 15, 2018  Refugio Villagomez MD  RESTRICTIONS:  During your procedure today, you received medications for sedation.  These   medications may affect your judgment, balance and coordination.  Therefore,   for 24 hours, you have the following restrictions:   - DO NOT drive a car, operate machinery, make legal/financial decisions,   sign important papers or drink alcohol.    ACTIVITY:  Today: no heavy lifting, straining or running due to procedural   sedation/anesthesia.  The following day: return to full activity including work.  DIET:  Eat and drink normally unless instructed otherwise.     TREATMENT FOR COMMON SIDE EFFECTS:  - Mild abdominal pain, nausea, belching, bloating or excessive gas:  rest,   eat lightly and use a heating pad.  - Sore Throat: treat with throat lozenges and/or gargle with warm salt   water.  - Because air was used during the procedure, expelling large amounts of air   from your rectum or belching is normal.  - If a bowel prep was taken, you may not have a bowel movement for 1-3 days.    This is normal.  SYMPTOMS TO WATCH FOR AND REPORT TO YOUR PHYSICIAN:  1. Abdominal pain or bloating, other than gas cramps.  2. Chest pain.  3. Back pain.  4. Signs of infection such as: chills or fever occurring within 24 hours   after the procedure.  5. Rectal bleeding, which would show as bright red, maroon, or black stools.   (A tablespoon of blood from the rectum is not serious, especially if   hemorrhoids are present.)  6. Vomiting.  7. Weakness or dizziness.  GO DIRECTLY TO THE NEAREST EMERGENCY ROOM IF YOU HAVE ANY OF THE FOLLOWING:      Difficulty breathing              Chills and/or fever over 101 F   Persistent vomiting and/or vomiting blood   Severe abdominal pain   Severe chest pain   Black, tarry stools   Bleeding- more than one  tablespoon   Any other symptom or condition that you feel may need urgent attention  Your doctor recommends these additional instructions:  If any biopsies were taken, your doctors clinic will contact you in 1 to 2   weeks with any results.  - High fiber diet. Miralax.  - Repeat colonoscopy at age 50 for screening purposes.   - Discharge patient to home (ambulatory).  For questions, problems or results please call your physician - Refugio Villagomez MD at Work: [phone].  EMERGENCY PHONE NUMBER: [phone], LAB RESULTS: [phone]  IF A COMPLICATION OR EMERGENCY SITUATION ARISES AND YOU ARE UNABLE TO REACH   YOUR PHYSICIAN - GO DIRECTLY TO THE EMERGENCY ROOM.  ___________________________________________  Nurse Signature  ___________________________________________  Patient/Designated Responsible Party Signature  Refugio Villagomez MD  6/15/2018 8:48:38 AM  This report has been verified and signed electronically.  PROVATION   Patient/Caregiver provided printed discharge information.

## 2020-01-09 ENCOUNTER — TELEPHONE (OUTPATIENT)
Dept: NEUROLOGY | Facility: CLINIC | Age: 35
End: 2020-01-09

## 2020-01-09 ENCOUNTER — PATIENT MESSAGE (OUTPATIENT)
Dept: PSYCHIATRY | Facility: CLINIC | Age: 35
End: 2020-01-09

## 2020-01-09 ENCOUNTER — TELEPHONE (OUTPATIENT)
Dept: HEPATOLOGY | Facility: CLINIC | Age: 35
End: 2020-01-09

## 2020-01-09 ENCOUNTER — OFFICE VISIT (OUTPATIENT)
Dept: NEUROLOGY | Facility: CLINIC | Age: 35
End: 2020-01-09
Payer: MEDICARE

## 2020-01-09 ENCOUNTER — PATIENT MESSAGE (OUTPATIENT)
Dept: GASTROENTEROLOGY | Facility: CLINIC | Age: 35
End: 2020-01-09

## 2020-01-09 VITALS
RESPIRATION RATE: 18 BRPM | SYSTOLIC BLOOD PRESSURE: 122 MMHG | HEIGHT: 70 IN | HEART RATE: 71 BPM | BODY MASS INDEX: 38.46 KG/M2 | DIASTOLIC BLOOD PRESSURE: 79 MMHG | WEIGHT: 268.63 LBS

## 2020-01-09 DIAGNOSIS — M89.9 BONE DISORDER: ICD-10-CM

## 2020-01-09 DIAGNOSIS — Z91.89 AT RISK FOR DECREASED BONE DENSITY: ICD-10-CM

## 2020-01-09 DIAGNOSIS — R74.01 TRANSAMINITIS: ICD-10-CM

## 2020-01-09 DIAGNOSIS — G40.909 SEIZURE DISORDER: Primary | ICD-10-CM

## 2020-01-09 DIAGNOSIS — R56.9 CONVULSIONS, UNSPECIFIED CONVULSION TYPE: ICD-10-CM

## 2020-01-09 DIAGNOSIS — M81.8 OTHER OSTEOPOROSIS WITHOUT CURRENT PATHOLOGICAL FRACTURE: ICD-10-CM

## 2020-01-09 DIAGNOSIS — Z79.899 HIGH RISK MEDICATION USE: ICD-10-CM

## 2020-01-09 DIAGNOSIS — G93.49 STATIC ENCEPHALOPATHY: ICD-10-CM

## 2020-01-09 PROCEDURE — 99999 PR PBB SHADOW E&M-EST. PATIENT-LVL IV: ICD-10-PCS | Mod: PBBFAC,,, | Performed by: PSYCHIATRY & NEUROLOGY

## 2020-01-09 PROCEDURE — 3008F BODY MASS INDEX DOCD: CPT | Mod: CPTII,S$GLB,, | Performed by: PSYCHIATRY & NEUROLOGY

## 2020-01-09 PROCEDURE — 99499 RISK ADDL DX/OHS AUDIT: ICD-10-PCS | Mod: S$GLB,,, | Performed by: PSYCHIATRY & NEUROLOGY

## 2020-01-09 PROCEDURE — 99214 PR OFFICE/OUTPT VISIT, EST, LEVL IV, 30-39 MIN: ICD-10-PCS | Mod: S$GLB,,, | Performed by: PSYCHIATRY & NEUROLOGY

## 2020-01-09 PROCEDURE — 99499 UNLISTED E&M SERVICE: CPT | Mod: S$GLB,,, | Performed by: PSYCHIATRY & NEUROLOGY

## 2020-01-09 PROCEDURE — 3008F PR BODY MASS INDEX (BMI) DOCUMENTED: ICD-10-PCS | Mod: CPTII,S$GLB,, | Performed by: PSYCHIATRY & NEUROLOGY

## 2020-01-09 PROCEDURE — 99999 PR PBB SHADOW E&M-EST. PATIENT-LVL IV: CPT | Mod: PBBFAC,,, | Performed by: PSYCHIATRY & NEUROLOGY

## 2020-01-09 PROCEDURE — 99214 OFFICE O/P EST MOD 30 MIN: CPT | Mod: S$GLB,,, | Performed by: PSYCHIATRY & NEUROLOGY

## 2020-01-09 RX ORDER — OXCARBAZEPINE 300 MG/1
300 TABLET, FILM COATED ORAL 2 TIMES DAILY
Qty: 60 TABLET | Refills: 0 | Status: SHIPPED | OUTPATIENT
Start: 2020-01-09 | End: 2020-02-04

## 2020-01-09 NOTE — PROGRESS NOTES
"Date of service:  1/9/2020    Chief complaint:  Seizures    Interval history:  The patient is a 34 y.o. male seen previously for a history of epilepsy who had been suffering from a new event type that was found to be nonepileptic in nature.  I last saw him in 11/18.  Since he was last here, he has had no seizures.  Question related to his Trileptal has previously been raised regarding a recent increase in his LFTs beyond his chronic transaminitis.  Of note, the patient has previously been on Keppra and had psychiatric side effects.  GI had recommended a consult with hepatology; however, I do not see that this has happened.    History of present illness:  The patient is a 34 y.o. male referred for management of epilepsy.  He previously saw Dr. Sunshine.  This is my first time seeing him.  He provides little in the way of history due to his cognitive issues, so most of the history was obtained from his caregiver.  Additional history is as noted below.  Briefly, though, this gentleman suffers from a developmental delay and has a history of a prior GTC seizure.  He had onset of new episodes involving behavioral outbursts.  He was admitted to the EMU, and these were found not to be epileptic in nature.  Since the last time he was seen, he has had no further episodes worrisome for seizures.    Prior history:  "First seizure suspected in 2006, onset was not witnessed but caused him to fall to the ground, period of unresponsiveness, had hit his head. Not clear if there was convulsive activity at that time. Mother also describes episodes where eyes roll back, he falls to the ground, has generalized convulsive activity and is disoriented / confused for a short time following. Frequency is approximately once every 2 months, last episode 2 months ago. More prominent since June 2014.      Over the past few months and in particular 6 weeks, has had increase in behavioral outbursts. Mother is not sure if this is directly result of " "subclinical seizure or not but describes shouting out, inability to follow through with instructions or direction, eyes rolling (brief, eyes flit back and to right, forceful eye closure lasting 1-2 seconds) without change in muscle tone or consciousness. Concerned his behaviors have been regressing, he mentions name of caregiver from many years ago often. Repeats phrases (you need to have dark hair), appears agitated, unable to sit still. In the past month has had increase in dose of Keppra; had been on 250 BID until 12/4, dose was increased to 500 BID. At the time of visit with Dr. Parker on 12/16 was on 1500 mg HS. Family desperate to switch Keppra if this will improve behaviors. "      Past Medical History:   Diagnosis Date    Autistic disorder, active 5/6/2013    Bowel obstruction     Early intervention counseling     Gout, chronic 11/12/2015    Grand mal seizure     Hepatic steatosis     Impulse control disorder, unspecified 5/6/2013    Mastoiditis     Moderate mental retardation 5/6/2013    Neurogenic bladder     Neurogenic bladder     HALEY (obstructive sleep apnea) 11/12/2015    CPAP.  Dr. Garber     Otitis media     Paraplegia     Pervasive developmental disorder, active 12/27/2015    Renal cancer 2010    Right    Scoliosis     paraplegia    Seizure disorder 11/12/2015    Stroke     one week old    Tardive dyskinesia        Past Surgical History:   Procedure Laterality Date    ANKLE FRACTURE SURGERY      BACK SURGERY  2000    COLONOSCOPY  10/28/2008    [facility]ana at [facility]; anal fissure, minimal pinpoint rectal erythema; floppy-type colon with very little tone; biopsy: rectum mild chronic proctitis with few eosinophils sent for scanning    COLONOSCOPY N/A 6/15/2018    Procedure: COLONOSCOPY;  Surgeon: Refugio Villagomez MD;  Location: [facility];  Service: Endoscopy;  Laterality: N/A; REPEAT IN 50 YEARS FOR SCREENING    INNER EAR SURGERY      mastoid    right partial nephrectomy Right " 2010    Sees urology    TYMPANOSTOMY TUBE PLACEMENT         Family History   Problem Relation Age of Onset    Cancer Father         lymphoma    Cataracts Father     Colon polyps Father     Cataracts Mother     Cataracts Maternal Grandmother     Diabetes Maternal Grandmother     Macular degeneration Maternal Grandmother     Glaucoma Maternal Grandmother     Anesthesia problems Neg Hx     Clotting disorder Neg Hx     Colon cancer Neg Hx     Crohn's disease Neg Hx     Ulcerative colitis Neg Hx     Stomach cancer Neg Hx     Esophageal cancer Neg Hx        Social History     Socioeconomic History    Marital status: Single     Spouse name: Not on file    Number of children: Not on file    Years of education: Not on file    Highest education level: Not on file   Occupational History    Not on file   Social Needs    Financial resource strain: Not on file    Food insecurity:     Worry: Not on file     Inability: Not on file    Transportation needs:     Medical: No     Non-medical: No   Tobacco Use    Smoking status: Never Smoker    Smokeless tobacco: Never Used   Substance and Sexual Activity    Alcohol use: No     Frequency: Never     Drinks per session: Patient refused     Binge frequency: Never    Drug use: No    Sexual activity: Never   Lifestyle    Physical activity:     Days per week: 5 days     Minutes per session: 40 min    Stress: Not on file   Relationships    Social connections:     Talks on phone: Once a week     Gets together: More than three times a week     Attends Yazidism service: Not on file     Active member of club or organization: Yes     Attends meetings of clubs or organizations: More than 4 times per year     Relationship status: Never    Other Topics Concern    Not on file   Social History Narrative    Lives with his parents.  More dependent with ADLs.  Very active in the community.        New Opportunities jhoan.        Review of Systems:  A 14 system review  "is limited due to his imperfect reliability as a historian.  His family reports no significant changes since his prior visit.    Physical exam:  /79   Pulse 71   Resp 18   Ht 5' 10" (1.778 m)   Wt 121.9 kg (268 lb 10.1 oz)   BMI 38.54 kg/m²    Higher Cortical Function:   Patient is a well developed, obese man who is unable to sit still and behaves inappropriately.  He was not hostile, but imperfectly uncooperative.     Cranial Nerves II - XII:   EOMs were intact with normal smooth and no nystagmus.   PERRLA. D/C Funduscopic exam - disc were flat with normal A/V ratio and no exudates or hemorrhages. Visual fields were full to confrontation.   Motor - facial movement was symmetrical and normal.   Facial sensory - Light touch and pin prick sensations were normal.   Hearing was normal to finger rub.  Palate moved well and was symmetrical with normal palatal and oral sensation.   Tongue movement was full & the patient could say "la la la" and "Ka Ka Ka" without  difficulty. Patient repeated Mosque and Muslim without difficulty. Normal power and bulk was found in the massiter and rotator muscles of the neck.  Motor: Power, bulk and tone were normal in all extremities.  Sensory: Light touch, pin prick, vibration and position senses were normal in all extremities.   Coordination:   Rapid alternating movements and rapid finger tapping - normal.   Finger to nose - nl.   Arm roll - symmetrical.   Gait: Station, gait and tandem walking were done without difficulty and Romberg was negative.      Tremor: resting, postural, intentional - none    Data base:  Prior records were reviewed and are as summarized above.    Labs (5/19):  CMP: includes QZC=587 and ALT=198  CBC: includes NBZ=165    MRI brain (12/15):  "Motion limited MRI of the brain.  There is trace asymmetry of the mesial temporal lobes left slightly smaller than the right allowing for motion limitation.  This may be artifactual or developmental variant " "with underlying left mesial temporal sclerosis not excluded.  Clinical correlation correlation with EEG analysis is advised.  No evidence for focal parietal signal abnormality, acute infarction or enhancing lesion."    vEEG (1/16):  "FINAL SUMMARY:  ELECTROENCEPHALOGRAM:  Interictal: This is a normal EEG during wakefulness, drowsiness, and sleep.  Ictal: During this recording, periods of eye flutter during wakefulness and myoclonic jerks during sleep were recorded without associated epileptiform activity on EEG.  CLINICAL SEIZURE:  Classification: Nonepileptic events. Based on clinical history and evaluation by Psychiatry, these events were likely motor tics during wakefulness and myoclonic jerks during sleep. There is no evidence of an epileptic process on this recording. No seizures were recorded"    DEXA (1/17):  Normal    Assessment and plan:  The patient is a 34 y.o. 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  As far as medications go, we will continue his Trileptal for now.  We will follow his LFTs.  I am hoping that we will not need to change this medication, as he is doing well from a seizure perspective and most of the alternatives could cause hepatic issues as well.  Medication side effects were discussed with the patient's caregiver.  We will check serologies and a DEXA for medicaction monitoring purposes.  State law as it pertains to driving for individuals with seizures was not discussed as his cognitive issues preclude him from driving regardless of degree of seizure control.  The patient's family has been counseled on seizure safety.    The patient's mother seems to have forgotten that we had previously done vEEG monitoring related to his staring spells/eye fluttering.  I reminded her that these were found to be nonepileptic in nature and that we do not need to adjust his AEDs to address them.  These are " likely behavioral in nature, related to his autism/developmental issues.    Finally, we discussed his LFTs.  It sounds as though his psychiatrist, his PCP, and myself have all reluctant to make changes to his current medication regimen in response to this finding.  In the case of his Trileptal, he is doing well, and other AEDs that we might consider certainly also have some degree of hepatotoxicity associated with them as well.  I did discuss with his caregiver that the patient's morbid obesity could be contributing to his elevation of LFTs as well.  We discussed strategies for weight loss, such as strategies by which they can control his portion size and the avoidance of calorically dense foods.  He has seen GI, who recommended that he be evaluated by hepatology.  That has not happened, so I have put in a referral to them.    We will plan on seeing the patient back in a year.

## 2020-01-09 NOTE — TELEPHONE ENCOUNTER
MA attempted to contact the pt. His main number on his account ended up contacting the mother. She said she does his scheduling and she will call us back to schedule a consult.

## 2020-01-09 NOTE — TELEPHONE ENCOUNTER
Please call the pt to schedule an appt.  Would prefer an appt on the [facility] if possible.

## 2020-01-13 ENCOUNTER — DOCUMENTATION ONLY (OUTPATIENT)
Dept: TRANSPLANT | Facility: CLINIC | Age: 35
End: 2020-01-13

## 2020-01-13 ENCOUNTER — PATIENT MESSAGE (OUTPATIENT)
Dept: GASTROENTEROLOGY | Facility: CLINIC | Age: 35
End: 2020-01-13

## 2020-01-13 ENCOUNTER — PATIENT MESSAGE (OUTPATIENT)
Dept: PSYCHIATRY | Facility: CLINIC | Age: 35
End: 2020-01-13

## 2020-01-13 DIAGNOSIS — J20.8 ACUTE BACTERIAL BRONCHITIS: ICD-10-CM

## 2020-01-13 DIAGNOSIS — B96.89 ACUTE BACTERIAL BRONCHITIS: ICD-10-CM

## 2020-01-13 RX ORDER — MONTELUKAST SODIUM 10 MG/1
TABLET ORAL
Qty: 30 TABLET | Refills: 0 | Status: SHIPPED | OUTPATIENT
Start: 2020-01-13 | End: 2020-02-17

## 2020-01-13 RX ORDER — ALPRAZOLAM 0.5 MG/1
TABLET ORAL
Qty: 30 TABLET | Refills: 0 | Status: SHIPPED | OUTPATIENT
Start: 2020-01-13 | End: 2020-02-07

## 2020-01-13 NOTE — LETTER
January 13, 2020    Hussein Doyle  [address] [facility] [address]      Dear Hussein Doyle:    Your doctor has referred you to the [facility]. We are sending this letter to remind you to make an appointment with us to complete the referral process.     Please call us at [phone] to schedule an appointment. We look forward to seeing you soon.     If you received a call and have been scheduled, please disregard this letter.       Sincerely,        [facility]   [address] [place] [id number]  [phone]

## 2020-01-13 NOTE — TELEPHONE ENCOUNTER
Lab Results   Component Value Date     (H) 05/03/2019     (H) 05/03/2019    GGT 75 (H) 04/30/2018    ALKPHOS 82 05/03/2019    BILITOT 1.0 05/03/2019

## 2020-01-14 ENCOUNTER — TELEPHONE (OUTPATIENT)
Dept: HEMATOLOGY/ONCOLOGY | Facility: CLINIC | Age: 35
End: 2020-01-14

## 2020-01-14 NOTE — NURSING
Pt records reviewed.   Pt will be referred to Hepatology.    Initial referral received  from the workque.   Referring Provider/diagnosis  Diagnosis: Transaminit            Referral letter sent to patient.

## 2020-01-14 NOTE — TELEPHONE ENCOUNTER
LVM for mother to return call if she needs Hematology.  Notes appear that she was trying to call Hepatology----- Message from Mateo Guan sent at 1/14/2020  1:39 PM CST -----  Contact: Mom, Eli Benitez want to speak with a nurse regarding scheduling appointment for patient please call back at [phone]    Case number [id number]

## 2020-01-17 ENCOUNTER — HOSPITAL ENCOUNTER (OUTPATIENT)
Dept: RADIOLOGY | Facility: HOSPITAL | Age: 35
Discharge: HOME | End: 2020-01-17
Attending: PSYCHIATRY & NEUROLOGY
Payer: MEDICARE

## 2020-01-17 ENCOUNTER — TELEPHONE (OUTPATIENT)
Dept: HEPATOLOGY | Facility: CLINIC | Age: 35
End: 2020-01-17

## 2020-01-17 DIAGNOSIS — G40.909 SEIZURE DISORDER: ICD-10-CM

## 2020-01-17 DIAGNOSIS — M81.8 OTHER OSTEOPOROSIS WITHOUT CURRENT PATHOLOGICAL FRACTURE: ICD-10-CM

## 2020-01-17 DIAGNOSIS — Z91.89 AT RISK FOR DECREASED BONE DENSITY: ICD-10-CM

## 2020-01-17 DIAGNOSIS — M89.9 BONE DISORDER: ICD-10-CM

## 2020-01-17 DIAGNOSIS — Z79.899 HIGH RISK MEDICATION USE: ICD-10-CM

## 2020-01-17 PROCEDURE — 77080 DXA BONE DENSITY AXIAL: CPT | Mod: TC,PO

## 2020-01-17 PROCEDURE — 77080 DXA BONE DENSITY AXIAL: CPT | Mod: 26,,, | Performed by: RADIOLOGY

## 2020-01-17 PROCEDURE — 77080 DEXA BONE DENSITY SPINE HIP: ICD-10-PCS | Mod: 26,,, | Performed by: RADIOLOGY

## 2020-01-17 NOTE — TELEPHONE ENCOUNTER
----- Message from Jesús Hayes sent at 1/17/2020 10:55 AM CST -----  Type:  Sooner Apoointment Request    Caller is requesting a sooner appointment.  Caller declined first available appointment listed below.  Caller will not accept being placed on the waitlist and is requesting a message be sent to doctor.    Name of Caller:  Mother -Eli Alvarez   When is the first available appointment?    Symptoms: elevated liver function  levels Best Call Back Number:  [phone]   Additional Information:  Patient's mother requesting to schedule the appointment in [place].

## 2020-01-20 ENCOUNTER — PATIENT MESSAGE (OUTPATIENT)
Dept: HEPATOLOGY | Facility: CLINIC | Age: 35
End: 2020-01-20

## 2020-01-21 ENCOUNTER — TELEPHONE (OUTPATIENT)
Dept: FAMILY MEDICINE | Facility: CLINIC | Age: 35
End: 2020-01-21

## 2020-01-21 DIAGNOSIS — K59.00 CONSTIPATION, UNSPECIFIED CONSTIPATION TYPE: ICD-10-CM

## 2020-01-21 NOTE — TELEPHONE ENCOUNTER
----- Message from Teresa Messer sent at 1/21/2020  4:08 PM CST -----  Contact: Eli Bin (Mother)  Type: Needs Medical Advice    Who Called:  Eli Cristyara (Mother)  Best Call Back Number: [phone] or   Additional Information: Mother advised patient fell and landed on knee and elbow and is not sure if he may have broken or fractured anything. Wanting to know if orders for an x ray can be put in or if patient needs to be seen. Patient has autism and can not tell them if he is in pain or not.

## 2020-01-21 NOTE — TELEPHONE ENCOUNTER
Spoke to pt's mother. Pt's mother states that pt had a fall and states that his L knee looks swollen. Pt denies any pain per mother.Scheduled pt to see NP in the morning. Pt's mother verbalized understanding.

## 2020-01-21 NOTE — TELEPHONE ENCOUNTER
----- Message from Ilana Finn sent at 1/21/2020  1:40 PM CST -----  Contact: Mother  Type:  Needs Medical Advice    Who Called: self  Would the patient rather a call back or a response via [facility]? Call back  Best Call Back Number: [phone]  Additional Information: Would like to speak to nurse in office

## 2020-01-22 ENCOUNTER — HOSPITAL ENCOUNTER (OUTPATIENT)
Dept: RADIOLOGY | Facility: HOSPITAL | Age: 35
Discharge: HOME | End: 2020-01-22
Attending: NURSE PRACTITIONER
Payer: MEDICARE

## 2020-01-22 ENCOUNTER — OFFICE VISIT (OUTPATIENT)
Dept: FAMILY MEDICINE | Facility: CLINIC | Age: 35
End: 2020-01-22
Payer: MEDICARE

## 2020-01-22 VITALS
SYSTOLIC BLOOD PRESSURE: 136 MMHG | WEIGHT: 268.5 LBS | OXYGEN SATURATION: 98 % | HEIGHT: 70 IN | DIASTOLIC BLOOD PRESSURE: 72 MMHG | BODY MASS INDEX: 38.44 KG/M2 | HEART RATE: 64 BPM

## 2020-01-22 DIAGNOSIS — M25.562 LEFT KNEE PAIN, UNSPECIFIED CHRONICITY: ICD-10-CM

## 2020-01-22 DIAGNOSIS — M25.562 LEFT KNEE PAIN, UNSPECIFIED CHRONICITY: Primary | ICD-10-CM

## 2020-01-22 DIAGNOSIS — S80.212A ABRASION OF LEFT KNEE, INITIAL ENCOUNTER: ICD-10-CM

## 2020-01-22 PROCEDURE — 90715 TDAP VACCINE 7 YRS/> IM: CPT | Mod: GA,S$GLB,, | Performed by: NURSE PRACTITIONER

## 2020-01-22 PROCEDURE — 73564 XR KNEE ORTHO BILAT WITH FLEXION: ICD-10-PCS | Mod: 26,50,, | Performed by: RADIOLOGY

## 2020-01-22 PROCEDURE — 3008F PR BODY MASS INDEX (BMI) DOCUMENTED: ICD-10-PCS | Mod: CPTII,S$GLB,, | Performed by: NURSE PRACTITIONER

## 2020-01-22 PROCEDURE — 90715 TDAP VACCINE GREATER THAN OR EQUAL TO 7YO IM: ICD-10-PCS | Mod: GA,S$GLB,, | Performed by: NURSE PRACTITIONER

## 2020-01-22 PROCEDURE — 99213 PR OFFICE/OUTPT VISIT, EST, LEVL III, 20-29 MIN: ICD-10-PCS | Mod: 25,S$GLB,, | Performed by: NURSE PRACTITIONER

## 2020-01-22 PROCEDURE — 73564 X-RAY EXAM KNEE 4 OR MORE: CPT | Mod: TC,50,FY,PO

## 2020-01-22 PROCEDURE — 73564 X-RAY EXAM KNEE 4 OR MORE: CPT | Mod: 26,50,, | Performed by: RADIOLOGY

## 2020-01-22 PROCEDURE — 99999 PR PBB SHADOW E&M-EST. PATIENT-LVL IV: ICD-10-PCS | Mod: PBBFAC,,, | Performed by: NURSE PRACTITIONER

## 2020-01-22 PROCEDURE — 90471 TDAP VACCINE GREATER THAN OR EQUAL TO 7YO IM: ICD-10-PCS | Mod: GA,S$GLB,, | Performed by: NURSE PRACTITIONER

## 2020-01-22 PROCEDURE — 90471 IMMUNIZATION ADMIN: CPT | Mod: GA,S$GLB,, | Performed by: NURSE PRACTITIONER

## 2020-01-22 PROCEDURE — 99999 PR PBB SHADOW E&M-EST. PATIENT-LVL IV: CPT | Mod: PBBFAC,,, | Performed by: NURSE PRACTITIONER

## 2020-01-22 PROCEDURE — 99213 OFFICE O/P EST LOW 20 MIN: CPT | Mod: 25,S$GLB,, | Performed by: NURSE PRACTITIONER

## 2020-01-22 PROCEDURE — 3008F BODY MASS INDEX DOCD: CPT | Mod: CPTII,S$GLB,, | Performed by: NURSE PRACTITIONER

## 2020-01-22 RX ORDER — MUPIROCIN 20 MG/G
OINTMENT TOPICAL 3 TIMES DAILY
Qty: 30 G | Refills: 0 | Status: SHIPPED | OUTPATIENT
Start: 2020-01-22 | End: 2021-04-11 | Stop reason: ALTCHOICE

## 2020-01-22 RX ORDER — LACTULOSE 10 G/15ML
SOLUTION ORAL; RECTAL
Qty: 1800 ML | Refills: 5 | Status: SHIPPED | OUTPATIENT
Start: 2020-01-22 | End: 2020-06-29

## 2020-01-22 NOTE — PROGRESS NOTES
Subjective:       Patient ID: Hussein Doyle is a 34 y.o. male.    Chief Complaint: Fall and Knee Pain    Had a fall yesterday stepping up on a curb and his care taker caught his right side, and he landed on his left knee. There is a brush burn type abrasion on his left knee. Small swelling. No limping today.    Past Medical History:  5/6/2013: Autistic disorder, active  No date: Bowel obstruction  No date: Early intervention counseling  11/12/2015: Gout, chronic  No date: Grand mal seizure  No date: Hepatic steatosis  5/6/2013: Impulse control disorder, unspecified  No date: Mastoiditis  5/6/2013: Moderate mental retardation  No date: Neurogenic bladder  No date: Neurogenic bladder  11/12/2015: HALEY (obstructive sleep apnea)      Comment:  CPAP.  Dr. Garber   No date: Otitis media  No date: Paraplegia  12/27/2015: Pervasive developmental disorder, active  2010: Renal cancer      Comment:  Right  No date: Scoliosis      Comment:  paraplegia  11/12/2015: Seizure disorder  No date: Stroke      Comment:  one week old  No date: Tardive dyskinesia    Past Surgical History:  No date: ANKLE FRACTURE SURGERY  2000: BACK SURGERY  10/28/2008: COLONOSCOPY      Comment:  Dr. Arana at [facility]; anal fissure, minimal pinpoint rectal               erythema; floppy-type colon with very little tone;                biopsy: rectum mild chronic proctitis with few                eosinophils sent for scanning  6/15/2018: COLONOSCOPY; N/A      Comment:  Procedure: COLONOSCOPY;  Surgeon: Refugio Villagomez MD;  Location: [facility];  Service: Endoscopy;                 Laterality: N/A; REPEAT IN 50 YEARS FOR SCREENING  No date: INNER EAR SURGERY      Comment:  mastoid  2010: right partial nephrectomy; Right      Comment:  Sees urology  No date: TYMPANOSTOMY TUBE PLACEMENT    Review of patient's family history indicates:  Problem: Cancer      Relation: Father          Age of Onset: (Not Specified)          Comment:  lymphoma  Problem: Cataracts      Relation: Father          Age of Onset: (Not Specified)  Problem: Colon polyps      Relation: Father          Age of Onset: (Not Specified)  Problem: Cataracts      Relation: Mother          Age of Onset: (Not Specified)  Problem: Cataracts      Relation: Maternal Grandmother          Age of Onset: (Not Specified)  Problem: Diabetes      Relation: Maternal Grandmother          Age of Onset: (Not Specified)  Problem: Macular degeneration      Relation: Maternal Grandmother          Age of Onset: (Not Specified)  Problem: Glaucoma      Relation: Maternal Grandmother          Age of Onset: (Not Specified)  Problem: Anesthesia problems      Relation: Neg Hx          Age of Onset: (Not Specified)  Problem: Clotting disorder      Relation: Neg Hx          Age of Onset: (Not Specified)  Problem: Colon cancer      Relation: Neg Hx          Age of Onset: (Not Specified)  Problem: Crohn's disease      Relation: Neg Hx          Age of Onset: (Not Specified)  Problem: Ulcerative colitis      Relation: Neg Hx          Age of Onset: (Not Specified)  Problem: Stomach cancer      Relation: Neg Hx          Age of Onset: (Not Specified)  Problem: Esophageal cancer      Relation: Neg Hx          Age of Onset: (Not Specified)      Social History    Socioeconomic History      Marital status: Single      Spouse name: Not on file      Number of children: Not on file      Years of education: Not on file      Highest education level: Not on file    Occupational History      Not on file    Social Needs      Financial resource strain: Not on file      Food insecurity:        Worry: Not on file        Inability: Not on file      Transportation needs:        Medical: No        Non-medical: No    Tobacco Use      Smoking status: Never Smoker      Smokeless tobacco: Never Used    Substance and Sexual Activity      Alcohol use: No        Frequency: Never        Drinks per session: Patient refused        Binge  frequency: Never      Drug use: No      Sexual activity: Never    Lifestyle      Physical activity:        Days per week: 5 days        Minutes per session: 40 min      Stress: Not on file    Relationships      Social connections:        Talks on phone: Once a week        Gets together: More than three times a week        Attends Adventist service: Not on file        Active member of club or organization: Yes        Attends meetings of clubs or organizations: More than 4 times per year        Relationship status: Never     Other Topics      Concerns:        Not on file    Social History Narrative      Lives with his parents.  More dependent with ADLs.  Very active in the community.            New Opportunities jhoan.      Current Outpatient Medications:  allopurinol (ZYLOPRIM) 100 MG tablet, Take 1 tablet (100 mg total) by mouth 2 (two) times daily., Disp: 60 tablet, Rfl: 11  ALPRAZolam (XANAX) 0.5 MG tablet, TAKE 1 TABLET BY MOUTH EVERY DAY AS NEEDED FOR ANXIETY, Disp: 30 tablet, Rfl: 0  fluvoxaMINE (LUVOX) 100 MG tablet, Take 1.5 tablets (150 mg total) by mouth 2 (two) times daily., Disp: 90 tablet, Rfl: 2  inulin (FIBER GUMMIES ORAL), Take by mouth once daily. , Disp: , Rfl:   L. ACIDOPHILUS/BIFID. ANIMALIS (CHEWABLE PROBIOTIC ORAL), Take by mouth 2 (two) times daily., Disp: , Rfl:   lactulose (CHRONULAC) 10 gram/15 mL solution, TAKE 30 ML (6 TEASPOONS) TWICE DAILY, Disp: 1800 mL, Rfl: 5  montelukast (SINGULAIR) 10 mg tablet, TAKE 1 TABLET BY MOUTH EVERY DAY IN THE EVENING, Disp: 30 tablet, Rfl: 0  multivitamin capsule, Take 1 capsule by mouth every morning. , Disp: , Rfl:    mupirocin (BACTROBAN) 2 % ointment, Apply topically 3 (three) times daily., Disp: 22 g, Rfl: 1  OXcarbazepine (TRILEPTAL) 300 MG Tab, TAKE 1 TABLET (300 MG TOTAL) BY MOUTH 2 (TWO) TIMES DAILY., Disp: 60 tablet, Rfl: 0  polyethylene glycol (GLYCOLAX) 17 gram PwPk, Take 17 g by mouth once daily., Disp: 90 packet, Rfl:  3  sulfamethoxazole-trimethoprim 400-80mg (BACTRIM,SEPTRA) 400-80 mg per tablet, Take 1 tablet by mouth once daily., Disp: , Rfl: 6  tamsulosin (FLOMAX) 0.4 mg Cp24, Take 0.4 mg by mouth every evening. Every day, Disp: , Rfl:   doxycycline (VIBRA-TABS) 100 MG tablet, Take 1 tablet (100 mg total) by mouth 2 (two) times daily., Disp: 20 tablet, Rfl: 0  naproxen sodium (ANAPROX) 550 MG tablet, Take 550 mg by mouth 2 (two) times daily with meals., Disp: , Rfl: 2  polymyxin B sulf-trimethoprim (POLYTRIM) 10,000 unit- 1 mg/mL Drop, Place 1 drop into both eyes every 6 (six) hours., Disp: 10 mL, Rfl: 0    No current facility-administered medications for this visit.       Review of patient's allergies indicates:   -- Benadryl (diphenhydramine hcl) -- Other (See Comments)    --  Increases anxiety and more restless   -- Keppra (levetiracetam) -- Other (See Comments)    --  agitation   -- Ativan (lorazepam) -- Anxiety   -- Abilify  (aripiprazole)     --  Other reaction(s): arrhythmia   -- Clonazepam     --  Other reaction(s): unknown   -- Cough syrup w/decongestant     --  Other reaction(s): auditory hallucinations   -- Diazepam     --  Other reaction(s): tongue swelling   -- Haloperidol -- Other (See Comments)   -- Phenytoin sodium extended     --  Other reaction(s): unknown   -- Quetiapine     --  Other reaction(s): sweating             Other reaction(s): sedation             Other reaction(s): sweating             Other reaction(s): sedation   -- Risperidone     --  Other reaction(s): bladder incontinence   -- Thimerosal     --  Other reaction(s): seizure   -- Ziprasidone hcl -- Other (See Comments)    --  Other reaction(s): tonic clonic seizure             seizure    Review of Systems   Constitutional: Negative for chills and fever.   Musculoskeletal: Negative for arthralgias and myalgias.   Skin: Positive for wound.       Objective:      Physical Exam   Constitutional: He is oriented to person, place, and time.    Neurological: He is alert and oriented to person, place, and time.       Assessment:       1. Left knee pain, unspecified chronicity    2. Abrasion of left knee, initial encounter        Plan:       1. Left knee pain, unspecified chronicity  Bactroban twice daily, monitor for signs of infection, xray today to rule out injury, monitor for new signs and symptoms. TDAP today.  - X-ray Knee Ortho Bilateral with Flexion; Future

## 2020-02-04 DIAGNOSIS — R56.9 CONVULSIONS, UNSPECIFIED CONVULSION TYPE: ICD-10-CM

## 2020-02-04 RX ORDER — OXCARBAZEPINE 300 MG/1
300 TABLET, FILM COATED ORAL 2 TIMES DAILY
Qty: 60 TABLET | Refills: 0 | Status: SHIPPED | OUTPATIENT
Start: 2020-02-04 | End: 2020-03-05

## 2020-02-07 RX ORDER — ALPRAZOLAM 0.5 MG/1
TABLET ORAL
Qty: 30 TABLET | Refills: 0 | Status: SHIPPED | OUTPATIENT
Start: 2020-02-07 | End: 2020-03-11

## 2020-02-11 DIAGNOSIS — M1A.9XX0 CHRONIC GOUT WITHOUT TOPHUS, UNSPECIFIED CAUSE, UNSPECIFIED SITE: ICD-10-CM

## 2020-02-13 NOTE — TELEPHONE ENCOUNTER
Refill Authorization Note     is requesting a refill authorization.    Brief assessment and rationale for refill: REVIEW: abnormal labs          Medication Therapy Plan: AST and ALT elevated but has decreased over the last 9 months; FOVS with Hepatology                              Comments:   Requested Prescriptions   Pending Prescriptions Disp Refills    allopurinoL (ZYLOPRIM) 100 MG tablet [Pharmacy Med Name: ALLOPURINOL 100 MG TABLET] 180 tablet 0     Sig: TAKE 1 TABLET BY MOUTH TWICE A DAY       Endocrinology:  Gout Agents - allopurinol Failed - 2/11/2020 11:32 AM        Failed - RBC in normal range and within 360 days     RBC   Date Value Ref Range Status   01/17/2020 4.58 (L) 4.60 - 6.20 M/uL Final   05/03/2019 4.67 4.60 - 6.20 M/uL Final   11/07/2018 4.69 4.60 - 6.20 M/uL Final              Failed - ALT is 94 or below and within 360 days     ALT   Date Value Ref Range Status   01/17/2020 157 (H) 10 - 44 U/L Final   05/03/2019 198 (H) 10 - 44 U/L Final   11/07/2018 113 (H) 10 - 44 U/L Final              Failed - AST is 54 or below and within 360 days     AST ([place])   Date Value Ref Range Status   03/12/2016 96 (H) 17 - 59 U/L Final     AST   Date Value Ref Range Status   01/17/2020 172 (H) 10 - 40 U/L Final   05/03/2019 230 (H) 10 - 40 U/L Final   11/07/2018 132 (H) 10 - 40 U/L Final              Passed - Patient is at least 18 years old        Passed - Office visit in past 12 months or future 90 days.     Recent Outpatient Visits            3 weeks ago Left knee pain, unspecified chronicity    [facility] Camila Cabrera NP    1 month ago Seizure disorder    [place] - Neurology Miguel Salinas Jr., MD    2 months ago Acute bacterial bronchitis    [facility] Camila Cabrera NP    2 months ago Impulse control disorder    [place] - Psychiatry Kathy Valentine MD    3 months ago Impulse control disorder    [place] - Psychiatry Kathy Valentine MD           Future Appointments              In 2 weeks [facility] - [facility]atology, Francesco Garcia                Passed - Uric Acid is 5.9 or below and within 360 days     Uric Acid   Date Value Ref Range Status   05/03/2019 4.6 3.4 - 7.0 mg/dL Final              Passed - Cr is 1.3 or below and within 360 days     Creatinine   Date Value Ref Range Status   01/17/2020 0.8 0.5 - 1.4 mg/dL Final   05/03/2019 0.9 0.5 - 1.4 mg/dL Final   11/07/2018 0.8 0.5 - 1.4 mg/dL Final              Passed - WBC in normal range and within 360 days     WBC   Date Value Ref Range Status   01/17/2020 6.93 3.90 - 12.70 K/uL Final   05/03/2019 8.03 3.90 - 12.70 K/uL Final   11/07/2018 8.75 3.90 - 12.70 K/uL Final              Passed - HGB in normal range and within 360 days     Hemoglobin   Date Value Ref Range Status   01/17/2020 15.2 14.0 - 18.0 g/dL Final   05/03/2019 15.4 14.0 - 18.0 g/dL Final   11/07/2018 15.2 14.0 - 18.0 g/dL Final              Passed - HCT in normal range and within 360 days     Hematocrit   Date Value Ref Range Status   01/17/2020 45.6 40.0 - 54.0 % Final   05/03/2019 47.0 40.0 - 54.0 % Final   11/07/2018 44.2 40.0 - 54.0 % Final              Passed - PLT in normal range and within 360 days     Platelets   Date Value Ref Range Status   01/17/2020 176 150 - 350 K/uL Final   05/03/2019 237 150 - 350 K/uL Final   11/07/2018 246 150 - 350 K/uL Final              Passed - eGFR is 60 or above and within 360 days     eGFR if non    Date Value Ref Range Status   01/17/2020 >60.0 >60 mL/min/1.73 m^2 Final     Comment:     Calculation used to obtain the estimated glomerular filtration  rate (eGFR) is the CKD-EPI equation.      05/03/2019 >60.0 >60 mL/min/1.73 m^2 Final     Comment:     Calculation used to obtain the estimated glomerular filtration  rate (eGFR) is the CKD-EPI equation.      11/07/2018 >60.0 >60 mL/min/1.73 m^2 Final     Comment:     Calculation used to obtain the estimated glomerular  filtration  rate (eGFR) is the CKD-EPI equation.        eGFR if    Date Value Ref Range Status   01/17/2020 >60.0 >60 mL/min/1.73 m^2 Final   05/03/2019 >60.0 >60 mL/min/1.73 m^2 Final   11/07/2018 >60.0 >60 mL/min/1.73 m^2 Final

## 2020-02-14 RX ORDER — ALLOPURINOL 100 MG/1
TABLET ORAL
Qty: 180 TABLET | Refills: 0 | Status: SHIPPED | OUTPATIENT
Start: 2020-02-14 | End: 2020-05-18 | Stop reason: SDUPTHER

## 2020-02-14 NOTE — TELEPHONE ENCOUNTER
I have reviewed and agree with the assessment below.  LFTs consistently elevated but stable; will see Hep soon.  Thanks

## 2020-02-17 ENCOUNTER — OFFICE VISIT (OUTPATIENT)
Dept: PSYCHIATRY | Facility: CLINIC | Age: 35
End: 2020-02-17
Payer: COMMERCIAL

## 2020-02-17 VITALS
SYSTOLIC BLOOD PRESSURE: 122 MMHG | BODY MASS INDEX: 38.52 KG/M2 | DIASTOLIC BLOOD PRESSURE: 74 MMHG | WEIGHT: 269.06 LBS | HEART RATE: 84 BPM | HEIGHT: 70 IN

## 2020-02-17 DIAGNOSIS — E66.01 MORBID OBESITY WITH BODY MASS INDEX (BMI) OF 40.0 OR HIGHER: Chronic | ICD-10-CM

## 2020-02-17 DIAGNOSIS — F63.9 IMPULSE CONTROL DISORDER: Primary | Chronic | ICD-10-CM

## 2020-02-17 DIAGNOSIS — R62.50 DEVELOPMENTAL DELAY, MODERATE: ICD-10-CM

## 2020-02-17 DIAGNOSIS — F84.0 AUTISTIC DISORDER, ACTIVE: Chronic | ICD-10-CM

## 2020-02-17 DIAGNOSIS — G47.33 OSA (OBSTRUCTIVE SLEEP APNEA): Chronic | ICD-10-CM

## 2020-02-17 DIAGNOSIS — F84.9 PERVASIVE DEVELOPMENTAL DISORDER, ACTIVE: Chronic | ICD-10-CM

## 2020-02-17 DIAGNOSIS — F42.8 OTHER OBSESSIVE-COMPULSIVE DISORDERS: ICD-10-CM

## 2020-02-17 PROCEDURE — 3008F BODY MASS INDEX DOCD: CPT | Mod: CPTII,S$GLB,, | Performed by: PSYCHIATRY & NEUROLOGY

## 2020-02-17 PROCEDURE — 99999 PR PBB SHADOW E&M-EST. PATIENT-LVL III: CPT | Mod: PBBFAC,,, | Performed by: PSYCHIATRY & NEUROLOGY

## 2020-02-17 PROCEDURE — 90833 PSYTX W PT W E/M 30 MIN: CPT | Mod: S$GLB,,, | Performed by: PSYCHIATRY & NEUROLOGY

## 2020-02-17 PROCEDURE — 99999 PR PBB SHADOW E&M-EST. PATIENT-LVL III: ICD-10-PCS | Mod: PBBFAC,,, | Performed by: PSYCHIATRY & NEUROLOGY

## 2020-02-17 PROCEDURE — 3008F PR BODY MASS INDEX (BMI) DOCUMENTED: ICD-10-PCS | Mod: CPTII,S$GLB,, | Performed by: PSYCHIATRY & NEUROLOGY

## 2020-02-17 PROCEDURE — 99499 UNLISTED E&M SERVICE: CPT | Mod: S$GLB,,, | Performed by: PSYCHIATRY & NEUROLOGY

## 2020-02-17 PROCEDURE — 99499 RISK ADDL DX/OHS AUDIT: ICD-10-PCS | Mod: S$GLB,,, | Performed by: PSYCHIATRY & NEUROLOGY

## 2020-02-17 PROCEDURE — 99214 OFFICE O/P EST MOD 30 MIN: CPT | Mod: S$GLB,,, | Performed by: PSYCHIATRY & NEUROLOGY

## 2020-02-17 PROCEDURE — 90833 PR PSYCHOTHERAPY W/PATIENT W/E&M, 30 MIN (ADD ON): ICD-10-PCS | Mod: S$GLB,,, | Performed by: PSYCHIATRY & NEUROLOGY

## 2020-02-17 PROCEDURE — 99214 PR OFFICE/OUTPT VISIT, EST, LEVL IV, 30-39 MIN: ICD-10-PCS | Mod: S$GLB,,, | Performed by: PSYCHIATRY & NEUROLOGY

## 2020-02-17 RX ORDER — PNV NO.95/FERROUS FUM/FOLIC AC 28MG-0.8MG
1 TABLET ORAL 2 TIMES DAILY
COMMUNITY
Start: 2020-02-11

## 2020-02-17 RX ORDER — FLUVOXAMINE MALEATE 100 MG/1
150 TABLET, COATED ORAL 2 TIMES DAILY
Qty: 270 TABLET | Refills: 0 | Status: SHIPPED | OUTPATIENT
Start: 2020-02-17 | End: 2020-05-11 | Stop reason: SDUPTHER

## 2020-02-27 ENCOUNTER — PATIENT OUTREACH (OUTPATIENT)
Dept: ADMINISTRATIVE | Facility: OTHER | Age: 35
End: 2020-02-27

## 2020-02-28 ENCOUNTER — OFFICE VISIT (OUTPATIENT)
Dept: HEPATOLOGY | Facility: CLINIC | Age: 35
End: 2020-02-28
Payer: MEDICARE

## 2020-02-28 VITALS
OXYGEN SATURATION: 96 % | HEIGHT: 70 IN | WEIGHT: 263.44 LBS | SYSTOLIC BLOOD PRESSURE: 119 MMHG | BODY MASS INDEX: 37.71 KG/M2 | DIASTOLIC BLOOD PRESSURE: 71 MMHG | HEART RATE: 83 BPM

## 2020-02-28 DIAGNOSIS — K76.0 NAFLD (NONALCOHOLIC FATTY LIVER DISEASE): ICD-10-CM

## 2020-02-28 DIAGNOSIS — R74.8 ELEVATED LIVER ENZYMES: Primary | ICD-10-CM

## 2020-02-28 DIAGNOSIS — R94.5 ABNORMAL RESULTS OF LIVER FUNCTION STUDIES: ICD-10-CM

## 2020-02-28 PROCEDURE — 99999 PR PBB SHADOW E&M-EST. PATIENT-LVL III: CPT | Mod: PBBFAC,,, | Performed by: NURSE PRACTITIONER

## 2020-02-28 PROCEDURE — 3008F BODY MASS INDEX DOCD: CPT | Mod: CPTII,S$GLB,, | Performed by: NURSE PRACTITIONER

## 2020-02-28 PROCEDURE — 3008F PR BODY MASS INDEX (BMI) DOCUMENTED: ICD-10-PCS | Mod: CPTII,S$GLB,, | Performed by: NURSE PRACTITIONER

## 2020-02-28 PROCEDURE — 99204 OFFICE O/P NEW MOD 45 MIN: CPT | Mod: S$GLB,,, | Performed by: NURSE PRACTITIONER

## 2020-02-28 PROCEDURE — 99999 PR PBB SHADOW E&M-EST. PATIENT-LVL III: ICD-10-PCS | Mod: PBBFAC,,, | Performed by: NURSE PRACTITIONER

## 2020-02-28 PROCEDURE — 99204 PR OFFICE/OUTPT VISIT, NEW, LEVL IV, 45-59 MIN: ICD-10-PCS | Mod: S$GLB,,, | Performed by: NURSE PRACTITIONER

## 2020-02-28 NOTE — LETTER
March 1, 2020      Miguel Salinas Jr., MD  [address] [facility] [address] [place] [address]           [facility] - Hepatology  [address] [facility] [address] [address]  Phone: [phone]  Fax: [phone]          Patient: Hussein Doyle   MR Number: [id number]   YOB: 1985   Date of Visit: 2/28/2020       Dear Dr. Miguel Salinas Jr.:    Thank you for referring Hussein Doyle to me for evaluation. Attached you will find relevant portions of my assessment and plan of care.    If you have questions, please do not hesitate to call me. I look forward to following Hussein Doyle along with you.    Sincerely,    Amelia Armendariz, REGIN[facility]    Enclosure  CC:  No Recipients    If you would like to receive this communication electronically, please contact [email or web address] or [phone] to request more information on [facility] [employer] Link access.    For providers and/or their staff who would like to refer a patient to [facility], please contact us through our one-stop-shop provider referral line, [facility], at [phone].    If you feel you have received this communication in error or would no longer like to receive these types of communications, please e-mail [email or web address]

## 2020-02-28 NOTE — PROGRESS NOTES
[facility] - New Patient    Referring Provider: Dr. Miguel Salinas Jr.    CHIEF COMPLAINT: Elevated liver enzymes    HPI: This is a 34 y.o. White male referred for evaluation of elevated liver enzymes.  He is here today with caregiver and mother who are able to provide all history.     Noted history: autism and MR, tardive dyskinesia, seizures, renal CA, gout    Review of labs:  - Transaminases consistently elevated since 11/2015, AST>ALT. Most recentL ,   - Alk phos normal  - Synthetic liver function normal  - Platelets normal    Workup thus far:  Viral hepatitis panel negative  Negative KYREE, ASMA, AMA  A1AT level normal  Ceruloplasmin normal  Ferritin elevated, 1525. Iron sat normal.     Abd u/s 5/2018 - liver enlarged, fatty liver, no liver masses, no biliary dilation.  Prior imaging with splenomegaly at 14 cm      His fatty liver risk factors:   · Obesity/overweight -- Body mass index is 37.8 kg/m².   *Per family, food has always been an issue. Eats large portions and not always healthy options. Trying to make changes in the house, limiting carbs. He exercises at the gym at least 3 days per week. Weight is down ~10 lb over the last year.                          · Dyslipidemia -- no                               · Insulin resistance / diabetes -- pre-diabetes         · Hypertension -- no  · Alcohol use -- none  · Family history -- DM    Meds:  -Rx: Bactrim for UTI prophylaxis (catheterized for neurogenic bladder), trileptal, allopurinol  -OTC: none concerning  -Herbs/supplements: none  *No recent medication changes     No family history of liver disease.     Patient's mother/caregiver report he feels well lately. They have noticed a new gag reflex that has happened a few times. No difficulty swallowing. He does not complain of abdominal or muscle pains. Noted epigastric/chest pain one day that resolved spontaneously.       Review of patient's allergies indicates:   Allergen Reactions     Benadryl [diphenhydramine hcl] Other (See Comments)     Increases anxiety and more restless    Keppra [levetiracetam] Other (See Comments)     agitation    Ativan [lorazepam] Anxiety    Abilify  [aripiprazole]      Other reaction(s): arrhythmia    Clonazepam      Other reaction(s): unknown    Cough syrup w/decongestant      Other reaction(s): auditory hallucinations    Diazepam      Other reaction(s): tongue swelling    Haloperidol Other (See Comments)    Phenytoin sodium extended      Other reaction(s): unknown    Quetiapine      Other reaction(s): sweating  Other reaction(s): sedation  Other reaction(s): sweating  Other reaction(s): sedation    Risperidone      Other reaction(s): bladder incontinence    Thimerosal      Other reaction(s): seizure    Ziprasidone hcl Other (See Comments)     Other reaction(s): tonic clonic seizure  seizure        Current Outpatient Medications on File Prior to Visit   Medication Sig Dispense Refill    allopurinoL (ZYLOPRIM) 100 MG tablet TAKE 1 TABLET BY MOUTH TWICE A  tablet 0    ALPRAZolam (XANAX) 0.5 MG tablet TAKE 1 TABLET BY MOUTH EVERY DAY AS NEEDED FOR ANXIETY 30 tablet 0    calcium carbonate-vitamin D3 (CALCIUM 500 WITH D) 500 mg(1,250mg) -400 unit Tab       fluvoxaMINE (LUVOX) 100 MG tablet Take 1.5 tablets (150 mg total) by mouth 2 (two) times daily. 270 tablet 0    inulin (FIBER GUMMIES ORAL) Take by mouth once daily.       L. ACIDOPHILUS/BIFID. ANIMALIS (CHEWABLE PROBIOTIC ORAL) Take by mouth 2 (two) times daily.      lactulose (CHRONULAC) 10 gram/15 mL solution TAKE 30 ML (6 TEASPOONS) TWICE DAILY 1800 mL 5    multivitamin capsule Take 1 capsule by mouth every morning.       mupirocin (BACTROBAN) 2 % ointment Apply topically 3 (three) times daily. 30 g 0    OXcarbazepine (TRILEPTAL) 300 MG Tab TAKE 1 TABLET (300 MG TOTAL) BY MOUTH 2 (TWO) TIMES DAILY. 60 tablet 0    polyethylene glycol (GLYCOLAX) 17 gram PwPk Take 17 g by mouth once  daily. 90 packet 3    sulfamethoxazole-trimethoprim 400-80mg (BACTRIM,SEPTRA) 400-80 mg per tablet Take 1 tablet by mouth once daily.  6    tamsulosin (FLOMAX) 0.4 mg Cp24 Take 0.4 mg by mouth every evening. Every day       No current facility-administered medications on file prior to visit.          PMHX:  has a past medical history of Autistic disorder, active (5/6/2013), Bowel obstruction, Early intervention counseling, Gout, chronic (11/12/2015), Grand mal seizure, Hepatic steatosis, Impulse control disorder, unspecified (5/6/2013), Mastoiditis, Moderate mental retardation (5/6/2013), Neurogenic bladder, Neurogenic bladder, HALEY (obstructive sleep apnea) (11/12/2015), Otitis media, Paraplegia, Pervasive developmental disorder, active (12/27/2015), Renal cancer (2010), Scoliosis, Seizure disorder (11/12/2015), Stroke, and Tardive dyskinesia.    PSHX:  has a past surgical history that includes Ankle fracture surgery; Back surgery (2000); Inner ear surgery; right partial nephrectomy (Right, 2010); Tympanostomy tube placement; Colonoscopy (10/28/2008); and Colonoscopy (N/A, 6/15/2018).    FAMILY HISTORY:   Family History   Problem Relation Age of Onset    Cancer Father         lymphoma    Cataracts Father     Colon polyps Father     Cataracts Mother     Cataracts Maternal Grandmother     Diabetes Maternal Grandmother     Macular degeneration Maternal Grandmother     Glaucoma Maternal Grandmother     Anesthesia problems Neg Hx     Clotting disorder Neg Hx     Colon cancer Neg Hx     Crohn's disease Neg Hx     Ulcerative colitis Neg Hx     Stomach cancer Neg Hx     Esophageal cancer Neg Hx     Cirrhosis Neg Hx        SOCIAL HISTORY:   Social History     Tobacco Use   Smoking Status Never Smoker   Smokeless Tobacco Never Used       Social History     Substance and Sexual Activity   Alcohol Use No    Frequency: Never    Drinks per session: Patient refused    Binge frequency: Never       Social  "History     Substance and Sexual Activity   Drug Use No         Review of Systems (per patient's mother/caregiver)  Constitutional: Negative for appetite change, fever and unexpected weight change.   Eyes: Negative for visual disturbance.   Respiratory: Negative for cough and shortness of breath.    Cardiovascular: Negative for chest pain and leg swelling.   Gastrointestinal: Negative for abdominal distention, abdominal pain. (+) constipation (uses lactulose). No diarrhea, nausea or vomiting.  Genitourinary: Negative for hematuria. Denies dark urine. Catheterized 5 x day for neurogenic bladder.   Musculoskeletal: Negative for arthralgias, gait problem, or myalgias.   Skin: Negative for color change, rash and wound. Denies itching.   Neurological: Negative for dizziness, tremors, and headaches.   Hematological: Does not bruise/bleed easily.   Psychiatric/Behavioral: The patient is not nervous/anxious.        Physical Exam   Constitutional: Well-nourished. No distress. Alert and oriented.  Eyes: No scleral icterus.   Cardiovascular: Normal rate, regular rhythm and normal heart sounds. No murmur heard.  Pulmonary/Chest: Respiratory effort and breath sounds normal. No respiratory distress.   Abdominal: Soft, non-tender. No distension; no ascites appreciated. There is no palpable hepatosplenomegaly. No hernia or mass.   Musculoskeletal: No edema.   Neurological: No tremor. Coordination and gait normal. No asterixis.    Skin: Skin is warm and dry. No rash or erythema. No jaundice. No telangiectasias or palmar erythema noted.  Psychiatric: Mood and affect at baseline per family. Speech, behavior, and thought content at baseline per family. No anxiety noted.       /71 (BP Location: Right arm, Patient Position: Sitting, BP Method: Large (Automatic))   Pulse 83   Ht 5' 10" (1.778 m)   Wt 119.5 kg (263 lb 7.2 oz)   SpO2 96%   BMI 37.80 kg/m²       LABS:  Lab Results   Component Value Date    WBC 6.97 02/29/2020    " HGB 14.7 02/29/2020    HCT 45.5 02/29/2020     02/29/2020     02/29/2020    K 4.2 02/29/2020    CREATININE 0.9 02/29/2020     (H) 02/29/2020     (H) 02/29/2020    ALKPHOS 69 02/29/2020    BILITOT 0.6 02/29/2020    BILIDIR 0.4 (H) 05/24/2018    ALBUMIN 3.9 02/29/2020    INR 1.1 02/29/2020    SMOOTHMUSCAB Negative 1:40 04/30/2018    AMAIFA Negative 1:40 04/30/2018    IGGSERUM 1677 (H) 02/29/2020    ANASCREEN Negative <1:160 04/30/2018    FERRITIN 893 (H) 02/29/2020    FESATURATED 26 04/30/2018    CERULOPLSM 30.0 04/30/2018    CHOL 197 11/07/2018    TRIG 135 11/07/2018    LDLCALC 136.0 11/07/2018    HGBA1C 5.6 11/07/2018       No results found for: HEPAIGG    Hepatitis B Surface Ag   Date Value Ref Range Status   04/30/2018 Negative  Final     No results found for: HEPBCAB  No results found for: HEPBSAB  Hepatitis C Ab   Date Value Ref Range Status   04/30/2018 Negative  Final     Immunization History   Administered Date(s) Administered    Influenza - Quadrivalent - PF (6 months and older) 10/22/2018, 10/04/2019    Tdap 01/22/2020          DIAGNOSTIC STUDIES:  ABD. U/S  Results for orders placed during the hospital encounter of 05/24/18   US Abdomen Limited (LIVER)    Narrative EXAMINATION:  US ABDOMEN LIMITED    CLINICAL HISTORY:  Other specified abnormal findings of blood chemistry    TECHNIQUE:  Limited ultrasound of the right upper quadrant of the abdomen (including pancreas, liver, gallbladder, common bile duct, and right kidney) was performed.    COMPARISON:  CT renal stone study-04/26/2018    FINDINGS:  The pancreas is obscured by bowel gas.  The abdominal aorta is obscured by bowel gas.  The IVC is obscured by bowel gas.    The liver measures 18.2 cm in maximal sagittal dimension.  There is diffuse increased attenuation of the ultrasound beam by the liver compatible with diffuse fatty infiltration of the liver.  No solid hepatic mass appreciated.  The portal vein shows normal  hepatopetal flow.  No intra or extrahepatic biliary dilatation, and the common bile duct measures 4 mm.  The gallbladder shows no evidence of shadowing stones, distention, or sonographic Hernandez sign.    The right kidney measures 12.5 cm in craniocaudad dimension.  There is a lobular contour of the right renal midpole as demonstrated the time of the patient's recent CT examination.  No discrete mass appreciated, and this appearance has been stable dating back to 10/14/2015.  A CT renal mass protocol or MRI of the kidneys without and with contrast could characterize this area to better advantage and could be performed as deemed clinically appropriate.  No hydronephrosis or stones in the right kidney.  No ascites.      Impression 1. Hepatomegaly and diffuse fatty infiltration of the liver.  2. Lobular contour of the right renal midpole, stable dating back to 2015 and most likely representative of lobulation in the renal cortex/previous scarring.  If additional characterization is desired, this could be performed with CT or MRI of the kidneys utilizing the renal mass protocol.      Electronically signed by: Reji Coley MD  Date:    05/24/2018  Time:    10:40         ASSESSMENT / PLAN:  34 y.o. White male with:      1. Elevated liver enzymes  -- May be due to fatty liver/TRAYLOR though liver enzymes more significantly elevated for the last 2 years. ? Medication related.  -- Serologic workup unrevealing so far. Ferritin significantly elevated though may be inflammatory. Check HFE gene to assess for hemochromatosis. IgG to assess for AIH.    -- Discussed that we may likely recommend diagnostic liver biopsy    2. NAFLD  -- Defer Fibroscan today given degree of elevated enzymes. Can get this if liver tests improve though may need biopsy and can get fibrosis staging at that time.   -- Discussed importance of lifestyle modifications including healthy diet and adequate physical activity to achieve and maintain ideal body weight.    -- Low carbohydrate, low sugar diet.  -- Maintain control of blood pressure, cholesterol, and blood sugar as these are risk factors for fatty liver    *If statins are being considered for HLD, statins are safe in patients with liver disease. Statins can be used to treat dyslipidemia in patients with both NAFLD and TRAYLOR.    3. Body mass index is 37.8 kg/m².      Return to clinic pending above results.       Orders Placed This Encounter   Procedures    CK    Ferritin    IgG    Hemochromatosis DNA Analysis (PCR)    CBC auto differential    Comprehensive metabolic panel    Protime-INR           EDUCATION / DISCUSSION:   We discussed the manifestations of fatty liver. At this time there is no FDA approved therapy for fatty liver. The patient and I discussed the importance of lifestyle modifications such as diet, exercise, and weight loss for management of fatty liver. We reviewed modification of risk factors such as hypertension, hyperlipidemia, and diabetes mellitus / impaired fasting glucose. Discussed that excessive alcohol consumption can also cause fatty liver. We discussed a low carb, low sugar diet and a goal of 30 minutes of exercise 5 times per week. Patient is aware that fatty liver can progress to steatohepatitis and possibly to cirrhosis, putting one at increased risk for liver cancer or liver failure.           Thank you for allowing me to participate in the care of Hussein Armendariz, FNP-C  Hepatology and Liver Transplant

## 2020-02-29 ENCOUNTER — LAB VISIT (OUTPATIENT)
Dept: LAB | Facility: HOSPITAL | Age: 35
End: 2020-02-29
Attending: EMERGENCY MEDICINE
Payer: MEDICARE

## 2020-02-29 DIAGNOSIS — R94.5 ABNORMAL RESULTS OF LIVER FUNCTION STUDIES: ICD-10-CM

## 2020-02-29 DIAGNOSIS — R74.8 ELEVATED LIVER ENZYMES: ICD-10-CM

## 2020-02-29 LAB
ALBUMIN SERPL BCP-MCNC: 3.9 G/DL (ref 3.5–5.2)
ALP SERPL-CCNC: 69 U/L (ref 55–135)
ALT SERPL W/O P-5'-P-CCNC: 132 U/L (ref 10–44)
ANION GAP SERPL CALC-SCNC: 10 MMOL/L (ref 8–16)
AST SERPL-CCNC: 160 U/L (ref 10–40)
BASOPHILS # BLD AUTO: 0.04 K/UL (ref 0–0.2)
BASOPHILS NFR BLD: 0.6 % (ref 0–1.9)
BILIRUB SERPL-MCNC: 0.6 MG/DL (ref 0.1–1)
BUN SERPL-MCNC: 8 MG/DL (ref 6–20)
CALCIUM SERPL-MCNC: 9.6 MG/DL (ref 8.7–10.5)
CHLORIDE SERPL-SCNC: 102 MMOL/L (ref 95–110)
CK SERPL-CCNC: 65 U/L (ref 20–200)
CO2 SERPL-SCNC: 26 MMOL/L (ref 23–29)
CREAT SERPL-MCNC: 0.9 MG/DL (ref 0.5–1.4)
DIFFERENTIAL METHOD: ABNORMAL
EOSINOPHIL # BLD AUTO: 0.1 K/UL (ref 0–0.5)
EOSINOPHIL NFR BLD: 1.4 % (ref 0–8)
ERYTHROCYTE [DISTWIDTH] IN BLOOD BY AUTOMATED COUNT: 12.8 % (ref 11.5–14.5)
EST. GFR  (AFRICAN AMERICAN): >60 ML/MIN/1.73 M^2
EST. GFR  (NON AFRICAN AMERICAN): >60 ML/MIN/1.73 M^2
FERRITIN SERPL-MCNC: 893 NG/ML (ref 20–300)
GLUCOSE SERPL-MCNC: 244 MG/DL (ref 70–110)
HCT VFR BLD AUTO: 45.5 % (ref 40–54)
HGB BLD-MCNC: 14.7 G/DL (ref 14–18)
IGG SERPL-MCNC: 1677 MG/DL (ref 650–1600)
IMM GRANULOCYTES # BLD AUTO: 0.02 K/UL (ref 0–0.04)
IMM GRANULOCYTES NFR BLD AUTO: 0.3 % (ref 0–0.5)
INR PPP: 1.1 (ref 0.8–1.2)
LYMPHOCYTES # BLD AUTO: 2.1 K/UL (ref 1–4.8)
LYMPHOCYTES NFR BLD: 29.6 % (ref 18–48)
MCH RBC QN AUTO: 32.9 PG (ref 27–31)
MCHC RBC AUTO-ENTMCNC: 32.3 G/DL (ref 32–36)
MCV RBC AUTO: 102 FL (ref 82–98)
MONOCYTES # BLD AUTO: 0.5 K/UL (ref 0.3–1)
MONOCYTES NFR BLD: 7 % (ref 4–15)
NEUTROPHILS # BLD AUTO: 4.3 K/UL (ref 1.8–7.7)
NEUTROPHILS NFR BLD: 61.1 % (ref 38–73)
NRBC BLD-RTO: 0 /100 WBC
PLATELET # BLD AUTO: 179 K/UL (ref 150–350)
PMV BLD AUTO: 10.2 FL (ref 9.2–12.9)
POTASSIUM SERPL-SCNC: 4.2 MMOL/L (ref 3.5–5.1)
PROT SERPL-MCNC: 8 G/DL (ref 6–8.4)
PROTHROMBIN TIME: 11.6 SEC (ref 9–12.5)
RBC # BLD AUTO: 4.47 M/UL (ref 4.6–6.2)
SODIUM SERPL-SCNC: 138 MMOL/L (ref 136–145)
WBC # BLD AUTO: 6.97 K/UL (ref 3.9–12.7)

## 2020-02-29 PROCEDURE — 82728 ASSAY OF FERRITIN: CPT

## 2020-02-29 PROCEDURE — 80053 COMPREHEN METABOLIC PANEL: CPT

## 2020-02-29 PROCEDURE — 81256 HFE GENE: CPT

## 2020-02-29 PROCEDURE — 85025 COMPLETE CBC W/AUTO DIFF WBC: CPT

## 2020-02-29 PROCEDURE — 36415 COLL VENOUS BLD VENIPUNCTURE: CPT | Mod: PO

## 2020-02-29 PROCEDURE — 82550 ASSAY OF CK (CPK): CPT

## 2020-02-29 PROCEDURE — 82784 ASSAY IGA/IGD/IGG/IGM EACH: CPT

## 2020-02-29 PROCEDURE — 85610 PROTHROMBIN TIME: CPT | Mod: PO

## 2020-03-01 PROBLEM — K76.0 NAFLD (NONALCOHOLIC FATTY LIVER DISEASE): Status: ACTIVE | Noted: 2020-03-01

## 2020-03-05 DIAGNOSIS — R56.9 CONVULSIONS, UNSPECIFIED CONVULSION TYPE: ICD-10-CM

## 2020-03-05 RX ORDER — OXCARBAZEPINE 300 MG/1
300 TABLET, FILM COATED ORAL 2 TIMES DAILY
Qty: 60 TABLET | Refills: 0 | Status: SHIPPED | OUTPATIENT
Start: 2020-03-05 | End: 2020-04-03

## 2020-03-09 LAB
GENETICIST REVIEW: NORMAL
HFE GENE MUT ANL BLD/T: NORMAL
HFE RELEASED BY: NORMAL
HFE RESULT SUMMARY: NEGATIVE
REF LAB TEST METHOD: NORMAL
SPECIMEN SOURCE: NORMAL
SPECIMEN,  HEMOCHROMATOSIS: NORMAL

## 2020-03-11 RX ORDER — ALPRAZOLAM 0.5 MG/1
TABLET ORAL
Qty: 30 TABLET | Refills: 0 | Status: SHIPPED | OUTPATIENT
Start: 2020-03-11 | End: 2020-04-14

## 2020-04-03 DIAGNOSIS — R56.9 CONVULSIONS, UNSPECIFIED CONVULSION TYPE: ICD-10-CM

## 2020-04-03 RX ORDER — OXCARBAZEPINE 300 MG/1
300 TABLET, FILM COATED ORAL 2 TIMES DAILY
Qty: 60 TABLET | Refills: 0 | Status: SHIPPED | OUTPATIENT
Start: 2020-04-03 | End: 2020-04-28

## 2020-04-14 RX ORDER — ALPRAZOLAM 0.5 MG/1
TABLET ORAL
Qty: 30 TABLET | Refills: 0 | Status: SHIPPED | OUTPATIENT
Start: 2020-04-14 | End: 2020-05-11 | Stop reason: SDUPTHER

## 2020-04-28 DIAGNOSIS — R56.9 CONVULSIONS, UNSPECIFIED CONVULSION TYPE: ICD-10-CM

## 2020-04-28 RX ORDER — OXCARBAZEPINE 300 MG/1
300 TABLET, FILM COATED ORAL 2 TIMES DAILY
Qty: 60 TABLET | Refills: 0 | Status: SHIPPED | OUTPATIENT
Start: 2020-04-28 | End: 2020-05-21

## 2020-05-10 ENCOUNTER — PATIENT MESSAGE (OUTPATIENT)
Dept: PSYCHIATRY | Facility: CLINIC | Age: 35
End: 2020-05-10

## 2020-05-11 ENCOUNTER — OFFICE VISIT (OUTPATIENT)
Dept: PSYCHIATRY | Facility: CLINIC | Age: 35
End: 2020-05-11
Payer: COMMERCIAL

## 2020-05-11 VITALS
DIASTOLIC BLOOD PRESSURE: 72 MMHG | HEART RATE: 73 BPM | SYSTOLIC BLOOD PRESSURE: 122 MMHG | HEIGHT: 70 IN | WEIGHT: 273.5 LBS | BODY MASS INDEX: 39.16 KG/M2 | TEMPERATURE: 97 F

## 2020-05-11 DIAGNOSIS — G40.909 SEIZURE DISORDER: Chronic | ICD-10-CM

## 2020-05-11 DIAGNOSIS — F84.0 AUTISTIC DISORDER, ACTIVE: Chronic | ICD-10-CM

## 2020-05-11 DIAGNOSIS — F84.9 PERVASIVE DEVELOPMENTAL DISORDER, ACTIVE: Primary | Chronic | ICD-10-CM

## 2020-05-11 DIAGNOSIS — F63.9 IMPULSE CONTROL DISORDER: Chronic | ICD-10-CM

## 2020-05-11 DIAGNOSIS — F42.8 OTHER OBSESSIVE-COMPULSIVE DISORDERS: ICD-10-CM

## 2020-05-11 DIAGNOSIS — G47.33 OSA (OBSTRUCTIVE SLEEP APNEA): Chronic | ICD-10-CM

## 2020-05-11 PROCEDURE — 99214 OFFICE O/P EST MOD 30 MIN: CPT | Mod: S$GLB,,, | Performed by: PSYCHIATRY & NEUROLOGY

## 2020-05-11 PROCEDURE — 99214 PR OFFICE/OUTPT VISIT, EST, LEVL IV, 30-39 MIN: ICD-10-PCS | Mod: S$GLB,,, | Performed by: PSYCHIATRY & NEUROLOGY

## 2020-05-11 PROCEDURE — 99499 UNLISTED E&M SERVICE: CPT | Mod: S$GLB,,, | Performed by: PSYCHIATRY & NEUROLOGY

## 2020-05-11 PROCEDURE — 99999 PR PBB SHADOW E&M-EST. PATIENT-LVL III: ICD-10-PCS | Mod: PBBFAC,,, | Performed by: PSYCHIATRY & NEUROLOGY

## 2020-05-11 PROCEDURE — 90833 PSYTX W PT W E/M 30 MIN: CPT | Mod: S$GLB,,, | Performed by: PSYCHIATRY & NEUROLOGY

## 2020-05-11 PROCEDURE — 3008F BODY MASS INDEX DOCD: CPT | Mod: CPTII,S$GLB,, | Performed by: PSYCHIATRY & NEUROLOGY

## 2020-05-11 PROCEDURE — 3008F PR BODY MASS INDEX (BMI) DOCUMENTED: ICD-10-PCS | Mod: CPTII,S$GLB,, | Performed by: PSYCHIATRY & NEUROLOGY

## 2020-05-11 PROCEDURE — 99999 PR PBB SHADOW E&M-EST. PATIENT-LVL III: CPT | Mod: PBBFAC,,, | Performed by: PSYCHIATRY & NEUROLOGY

## 2020-05-11 PROCEDURE — 90833 PR PSYCHOTHERAPY W/PATIENT W/E&M, 30 MIN (ADD ON): ICD-10-PCS | Mod: S$GLB,,, | Performed by: PSYCHIATRY & NEUROLOGY

## 2020-05-11 PROCEDURE — 99499 RISK ADDL DX/OHS AUDIT: ICD-10-PCS | Mod: S$GLB,,, | Performed by: PSYCHIATRY & NEUROLOGY

## 2020-05-11 RX ORDER — FLUVOXAMINE MALEATE 100 MG/1
150 TABLET, COATED ORAL 2 TIMES DAILY
Qty: 270 TABLET | Refills: 0 | Status: SHIPPED | OUTPATIENT
Start: 2020-05-11 | End: 2020-09-08 | Stop reason: SDUPTHER

## 2020-05-11 RX ORDER — ALPRAZOLAM 0.5 MG/1
0.5 TABLET ORAL DAILY PRN
Qty: 40 TABLET | Refills: 2 | Status: SHIPPED | OUTPATIENT
Start: 2020-05-11 | End: 2020-07-29

## 2020-05-11 NOTE — PROGRESS NOTES
"ID: 29yo WM with PDD, Impulse Control d/o, anxiety d/o nos. Last saw RENNY Murillo 5/2016.     CC: anxiety    Interim Hx: presents on time with his mother and longer standing attendant, Cynthia (x2yrs).    Pt and mom come today without attendant. Per mom she is concerned because cynthia refuses to wear a mask amidst covid- she reports claustrophobia- this is concerning to Eli because she  and Hussein could all be considered high risk. Cynthia is long standing and good with Hussein but has said that if she has to wear a mask she will "just have to lose the job"- difficult impasse for this patient/mom.     Hussein has been doing well with less self injury and has tolerated wearing a mask better than anyone expected. Despite these things the self talk has increased and is evident in the appt. Refers to self in 3rd person and talks to himself throughout about how he needs to be quiet so that mom and  can visit. When asked directly Hussein reports feeling "happy", and denies any discomfort.     On Psychiatric ROS:    Endorses stable sleep, denies anhedonia, denies feeling helpless/hopeless, denies dec energy, denies dec concentration, stable appetite, denies dec PMA, denies thoughts of SI/intent/plan.     Endorses feeling less easily overwhelmed. Less self injury (no scabbing visible in appt- picks at "galindo crystal" when anxious), +ruminative thinking- chronic- more difficulty moving on to new topic, denies feeling tense/"on edge"  Once "fixated" on a topic, struggles to move on. Has some compulsory behaviors assoc with obsessive thinking.     PSYCHOTHERAPY ADD-ON   30 (16-37*) minutes    Time: 20 minutes  Participants: Met with patient and mother/caregiver    Therapeutic Intervention Type: insight oriented psychotherapy, behavior modifying psychotherapy, supportive psychotherapy  Why chosen therapy is appropriate versus another modality: relevant to diagnosis, patient responds to this modality, evidence " "based practice    Target symptoms: adjustment, grief  Primary focus: caregiving needs with father's failing health  Psychotherapeutic techniques: support, validation, reframing    Outcome monitoring methods: self-report, observation, feedback from family    Patient's response to intervention:  The patient's response to intervention is accepting, motivated.    Progress toward goals:  The patient's progress toward goals is good.      PPHx: Endorses h/o self injury- will pick at scabs in sort of obsessive way- this has stabilized  Denies inpt psych hospitalization  Denies h/o suicide attempt     Current Psych Meds: **Trileptal 300mg po BID through neuro for sz d/o, xanax 0.5mg po daily PRN anxiety, luvox 150mg po BID  Past Psych Meds: s/e's to mult prev meds to include zyprexa (wgt gain), lexapro 20mg po qam (anxiety and to suppress sex drive), buspar 10mg po BID     PMHx: obesity, onel w/ cipap, seizure d/o, scoliosis surgery at 11yo led to hemiparalysis- now walking but has neurogenic bladder and frequent catheterization, partial nephrectomy R kidney    SubstHx:   T- none  E- none  D- none  Caffeine- very rare, if ever    FamPHx: mAunt- schizophrenic, mcousin- schizophrenia    Musculoskeletal:  Muscle strength/Tone: no dyskinesia/ no tremor  Gait/Station- non antalgic, no assistance needed    MSE: appears stated age, well groomed, obese, appropriate dress- shorts/tshirt, engages well with examiner at a childlike level.  Good e/c. Speech reg rate and vol, nonpressured. less spontaneous speech today. Mood is "happy." Affect euthymic. Oriented to month and season. Narrative memory intact. Intellectual function is below avg based on vocab and basic fund of knowledge- PDD long h/o sp ed. Thought is perseverative but he is tracking in conversation. No tangentiality or circumstantiality. No FOI/WES. Denies SI/HI. Denies A/VH. No evidence of delusions. Insight lacking and Judgment in keeping with insight.     Blood pressure " "122/72, pulse 73, temperature 97.4 °F (36.3 °C), temperature source Oral, height 5' 10" (1.778 m), weight 124 kg (273 lb 7.7 oz).    Suicide Risk Assessment:   Protective- age, no prior attempts, no prior hospitalizations, no family h/o attempts, no ongoing substance abuse, no psychosis, denies SI/intent/plan, no h/o violence, seeking treatment, access to treatment, future oriented, good primary support, no access to firearms    Risk- race, gender, ongoing Axis I sxs    **Pt is at LOW imminent and long term risk of suicide given current risk factors.    Assessment:  30yo WM with PDD, Impulse Control d/o, anxiety d/o nos. Last saw RENNY Murillo 5/2016. Pt presented initially with his mother and caregiver Helen who has been with the pt x 8mos. Currently the pt is doing very well. Of note pt is only on lexapro for anxiety, is also on trileptal 300mg po BID for seizure d/o through neuro and has HALEY on cipap. Has great family support, receives 88hrs per week of direct care from outside service provider, has assistance with all ADLs and IADLs but seems content with arrangement. No h/o violent or aggressive behavior. Had manic sxs with psychosis when on keppra but never before or since. Has recently stopped zyprexa due to significant weight gain and there has been no increase in behavior or anxiety. Per parent and caregiver there is some concern that a prev caregiver who was recently fired was motivated for the pt to be more sedated-  they both denied the pt needed any change in medication. Hussein is doing well, has some instances of intrusive behavior with young attractive women and he becomes difficult to redirect- added a trial of PRN buspar for days he has public outings- this has been effective and helpful. Today the incidents have become more difficult to redirect- will order a trial of xanax 0.25-0.5mg po daily PRN and also inc buspar PRN to 10mg daily as needed for public outings. Today on f/u pt's mother does not " "believe buspar has been effective- xanax effective but cannot be given in the moment as "it just happens so fast"- inability to redirect pt in public has become a safety concern for women and for the pt as authorities are not typically well trained in spectrum disorders (mom working to educate the police force). Warrants a transition to new ssri- will taper off lexapro and likely start luvox at the next appt. Last appt on f/u he is "no worse" without the meds- mom motivated to cont without as they cont to observe. Anxiety was mildly inc'd but hard to attribute to lack of med as his primary caregiver just left work abruptly with pregnancy complications- change of schedule and personnel hard for Ru. Last appt 2 new providers, doing well with both, mom has placed cameras and she has no concerns about the new hires. Routine back in place and ru had anxiety but was functioning well- mom would like to cont w/o meds. Will cont to observe. It was time to intervene with meds per mom, started trial of luvox which initially helped at 50mg bid, we then inc'd to 100mg bid with s/e of diarrhea and sweating, now back to 50mg po bid with resolution of most concerning s/e and still improvement in anxiety- will cont at this time but mom will cont to do r/b assessment. Pt's father with worsening health- leading to some anxiety, new caregiver now gone after some unprofessional behavior. Pt doing well on the dec'd luvox dose and favorite caregiver is now back and working 25hrs. Self injury dec'd. Recent hospital stay for uti and sepsis- now back to baseline. Some recent transaminitis- trending down- will cont to follow. Initially did well on inc'd fluvox to 100mg po qam and 50mg po qhs- but last appt they reported inc'd anxiety and inc'd need for use of xanax and that the xanax 0.25mg has not been effective for relief of sxs recently- inc'd fluvox from 100bid (mom titrated the dose) to 150mg po BID. Will also inc xanax to " 0.5mg po daily PRN anxiety.  Skin picking improved, escalating less frequently. Will cont daily xanax 0.5mg po qam and cont to observe. No acute safety concerns. rtc 3mos.     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 Cont luvox 150mg po BID   2. Cont Trileptal 300mg po BID per neuro  3. Cont xanax 0.5mg po daily PRN anxiety (may take additional tab PRN breakthrough anxiety)  4. RTC 3mos    -Spent 30min face to face with the pt; >50% time spent in counseling   -Supportive therapy and psychoeducation provided  -R/B/SE's of medications discussed with the pt who expresses understanding and chooses to take medications as prescribed.   -Pt instructed to call clinic, 911 or go to nearest emergency room if sxs worsen or pt is in   crisis. The pt expresses understanding.

## 2020-05-13 ENCOUNTER — PATIENT MESSAGE (OUTPATIENT)
Dept: FAMILY MEDICINE | Facility: CLINIC | Age: 35
End: 2020-05-13

## 2020-05-14 ENCOUNTER — OFFICE VISIT (OUTPATIENT)
Dept: URGENT CARE | Facility: CLINIC | Age: 35
End: 2020-05-14
Payer: MEDICARE

## 2020-05-14 ENCOUNTER — NURSE TRIAGE (OUTPATIENT)
Dept: ADMINISTRATIVE | Facility: CLINIC | Age: 35
End: 2020-05-14

## 2020-05-14 VITALS — OXYGEN SATURATION: 97 % | HEART RATE: 79 BPM | TEMPERATURE: 98 F

## 2020-05-14 DIAGNOSIS — R35.0 URINE FREQUENCY: Primary | ICD-10-CM

## 2020-05-14 DIAGNOSIS — R50.9 FEVER, UNSPECIFIED FEVER CAUSE: ICD-10-CM

## 2020-05-14 LAB
BILIRUB UR QL STRIP: NEGATIVE
GLUCOSE UR QL STRIP: NEGATIVE
KETONES UR QL STRIP: NEGATIVE
LEUKOCYTE ESTERASE UR QL STRIP: POSITIVE
PH, POC UA: 6.5 (ref 5–8)
POC BLOOD, URINE: POSITIVE
POC NITRATES, URINE: NEGATIVE
PROT UR QL STRIP: POSITIVE
SP GR UR STRIP: 1.01 (ref 1–1.03)
UROBILINOGEN UR STRIP-ACNC: NORMAL (ref 0.3–2.2)

## 2020-05-14 PROCEDURE — U0002 COVID-19 LAB TEST NON-CDC: HCPCS

## 2020-05-14 PROCEDURE — 99214 OFFICE O/P EST MOD 30 MIN: CPT | Mod: 25,S$GLB,, | Performed by: FAMILY MEDICINE

## 2020-05-14 PROCEDURE — 81003 URINALYSIS AUTO W/O SCOPE: CPT | Mod: QW,S$GLB,, | Performed by: FAMILY MEDICINE

## 2020-05-14 PROCEDURE — 81003 POCT URINALYSIS, DIPSTICK, AUTOMATED, W/O SCOPE: ICD-10-PCS | Mod: QW,S$GLB,, | Performed by: FAMILY MEDICINE

## 2020-05-14 PROCEDURE — 99214 PR OFFICE/OUTPT VISIT, EST, LEVL IV, 30-39 MIN: ICD-10-PCS | Mod: 25,S$GLB,, | Performed by: FAMILY MEDICINE

## 2020-05-14 RX ORDER — NITROFURANTOIN 25; 75 MG/1; MG/1
100 CAPSULE ORAL 2 TIMES DAILY
Qty: 10 CAPSULE | Refills: 1 | Status: SHIPPED | OUTPATIENT
Start: 2020-05-14 | End: 2020-05-19

## 2020-05-14 NOTE — TELEPHONE ENCOUNTER
Temp last night 101.5, poor appetite. Now its 99.7 via ear. Red eye last night. Catherize 5 times a day. Haven't notice a change in urine. Cg would like pt tested for corona virus due to patient and cg weak immune systems. Care advice recommend cg receives a call within 1 hour. Please call and advise.     Reason for Disposition   [1] Fever > 100.0 F (37.8 C) AND [2] bedridden (e.g., nursing home patient, CVA, chronic illness, recovering from surgery)    Additional Information   Negative: SEVERE difficulty breathing (e.g., struggling for each breath, speaks in single words)   Negative: Difficult to awaken or acting confused (e.g., disoriented, slurred speech)   Negative: Bluish (or gray) lips or face now   Negative: Shock suspected (e.g., cold/pale/clammy skin, too weak to stand, low BP, rapid pulse)   Negative: Sounds like a life-threatening emergency to the triager   Negative: SEVERE or constant chest pain (Exception: mild central chest pain, present only when coughing)   Negative: MODERATE difficulty breathing (e.g., speaks in phrases, SOB even at rest, pulse 100-120)   Negative: MILD difficulty breathing (e.g., minimal/no SOB at rest, SOB with walking, pulse <100)   Negative: Chest pain   Negative: Patient sounds very sick or weak to the triager   Negative: Fever > 103 F (39.4 C)   Negative: [1] Fever > 101 F (38.3 C) AND [2] age > 60    Protocols used: CORONAVIRUS (COVID-19) DIAGNOSED OR [id number]

## 2020-05-14 NOTE — PROGRESS NOTES
Subjective:       Patient ID: Hussein Doyle is a 34 y.o. male.    Vitals:  temperature is 98.4 °F (36.9 °C). His pulse is 79. His oxygen saturation is 97%.     Chief Complaint: Fever    Pt mother states that yesterday the pt was not eating like he normally does. When she kissed him rayshawn she felt that he felt hot and took his temp and it was 101.5 and then later 101.7. She has been treating at home with the meds helping his fever. She states that his urine is a lot darker then normal and has sediment in the urine also. Mother also reports that the pt diapar is wet and he normally does not pass urine on his own.     Fever    This is a new problem. The current episode started yesterday. The maximum temperature noted was 101 to 101.9 F. Pertinent negatives include no abdominal pain, chest pain, congestion, coughing, diarrhea, headaches, nausea, rash, sore throat, urinary pain or vomiting. He has tried acetaminophen for the symptoms. The treatment provided moderate relief.       Constitution: Positive for fever. Negative for chills and fatigue.   HENT: Negative for congestion and sore throat.    Neck: Negative for painful lymph nodes.   Cardiovascular: Negative for chest pain and leg swelling.   Eyes: Negative for double vision and blurred vision.   Respiratory: Negative for cough and shortness of breath.    Gastrointestinal: Negative for abdominal pain, nausea, vomiting and diarrhea.   Genitourinary: Negative for dysuria, frequency, urgency, urine decreased, hematuria, history of kidney stones, genital trauma, painful intercourse, genital sore, penile discharge, painful ejaculation, penile pain, penile swelling, scrotal swelling and testicular pain.   Musculoskeletal: Negative for joint pain, joint swelling, back pain, muscle cramps and muscle ache.   Skin: Negative for color change, pale, rash and lesion.   Allergic/Immunologic: Negative for seasonal allergies.   Neurological: Negative for dizziness,  history of vertigo, light-headedness, passing out and headaches.   Hematologic/Lymphatic: Negative for swollen lymph nodes, easy bruising/bleeding and history of blood clots. Does not bruise/bleed easily.   Psychiatric/Behavioral: Negative for nervous/anxious, sleep disturbance and depression. The patient is not nervous/anxious.        Objective:      Physical Exam    Physical Exam   Constitutional: She is oriented to person, place, and time. She appears well-developed and well-nourished.   HENT:   Head: Normocephalic and atraumatic.   Nose: Nose normal.   Eyes: Conjunctivae, EOM and lids are normal.   Neck: Trachea normal, full passive range of motion without pain and phonation normal. Neck supple.   Cardiovascular: Normal rate.   Pulmonary/Chest: Effort normal and breath sounds normal.   Musculoskeletal: Normal range of motion.   Neurological: She is alert and oriented to person, place, and time.   Skin: Skin is intact and no rash.   Psychiatric: pt with devp disability  Nursing note and vitals reviewed.    Assessment:       1. Urine frequency    2. Fever, unspecified fever cause        Plan:       Mom cath'savanah pt in clinic for sample  Urine frequency  -     POCT Urinalysis, Dipstick, Automated, W/O Scope  -     Urine culture    Fever, unspecified fever cause  -     COVID-19 Routine Screening    Other orders  -     nitrofurantoin, macrocrystal-monohydrate, (MACROBID) 100 MG capsule; Take 1 capsule (100 mg total) by mouth 2 (two) times daily. for 5 days  Dispense: 10 capsule; Refill: 1

## 2020-05-15 ENCOUNTER — TELEPHONE (OUTPATIENT)
Dept: URGENT CARE | Facility: CLINIC | Age: 35
End: 2020-05-15

## 2020-05-15 LAB — SARS-COV-2 RNA RESP QL NAA+PROBE: NOT DETECTED

## 2020-05-15 NOTE — TELEPHONE ENCOUNTER
Called patient/parent to discuss NEGATIVE COVID-19 results. Parent reports patient is taking Macrobid RX as prescribed and feeling much better. Urine Culture results still pending and will call patient/parent as soon as we receive results. Parent aware, verbalized understanding and agreed with patient's plan of care.

## 2020-05-18 DIAGNOSIS — M1A.9XX0 CHRONIC GOUT WITHOUT TOPHUS, UNSPECIFIED CAUSE, UNSPECIFIED SITE: ICD-10-CM

## 2020-05-18 RX ORDER — ALLOPURINOL 100 MG/1
100 TABLET ORAL 2 TIMES DAILY
Qty: 180 TABLET | Refills: 3 | Status: SHIPPED | OUTPATIENT
Start: 2020-05-18 | End: 2020-12-19

## 2020-05-18 NOTE — TELEPHONE ENCOUNTER
Care Due:                  Date            Visit Type   Department     Provider  --------------------------------------------------------------------------------    Last Visit: None Found      None         None Found  Next Visit: None Scheduled  None         None Found                                                            Last  Test          Frequency    Reason                     Performed    Due Date  --------------------------------------------------------------------------------    Office Visit  6 months...  ALPRAZolam,                Not Found    Overdue                             OXcarbazepine,                             fluvoxaMINE..............    Powered by mention. Reference number: [id number]. 5/18/2020 10:38:12 AM   CDT

## 2020-05-19 ENCOUNTER — TELEPHONE (OUTPATIENT)
Dept: URGENT CARE | Facility: CLINIC | Age: 35
End: 2020-05-19

## 2020-05-19 LAB
BACTERIA UR CULT: ABNORMAL
BACTERIA UR CULT: ABNORMAL
OTHER ANTIBIOTIC SUSC ISLT: ABNORMAL

## 2020-05-19 NOTE — TELEPHONE ENCOUNTER
Discussed urine culture results with patient's mother who is his  to autistic spectrum disorder.  Patient's mother states that there is no more cloudiness in the urine and patient seems to be doing better.  Patient was treated appropriately.

## 2020-05-21 DIAGNOSIS — R56.9 CONVULSIONS, UNSPECIFIED CONVULSION TYPE: ICD-10-CM

## 2020-05-21 RX ORDER — OXCARBAZEPINE 300 MG/1
300 TABLET, FILM COATED ORAL 2 TIMES DAILY
Qty: 60 TABLET | Refills: 0 | Status: SHIPPED | OUTPATIENT
Start: 2020-05-21 | End: 2020-06-14

## 2020-06-15 ENCOUNTER — TELEPHONE (OUTPATIENT)
Dept: FAMILY MEDICINE | Facility: CLINIC | Age: 35
End: 2020-06-15

## 2020-06-15 ENCOUNTER — LAB VISIT (OUTPATIENT)
Dept: LAB | Facility: HOSPITAL | Age: 35
End: 2020-06-15
Attending: EMERGENCY MEDICINE
Payer: MEDICARE

## 2020-06-15 DIAGNOSIS — R50.9 FEVER, UNSPECIFIED FEVER CAUSE: Primary | ICD-10-CM

## 2020-06-15 DIAGNOSIS — N30.00 ACUTE CYSTITIS WITHOUT HEMATURIA: ICD-10-CM

## 2020-06-15 DIAGNOSIS — N30.00 ACUTE CYSTITIS WITHOUT HEMATURIA: Primary | ICD-10-CM

## 2020-06-15 LAB
AMORPH CRY URNS QL MICRO: ABNORMAL
BACTERIA #/AREA URNS HPF: ABNORMAL /HPF
BILIRUB UR QL STRIP: NEGATIVE
CLARITY UR: CLEAR
COLOR UR: YELLOW
GLUCOSE UR QL STRIP: NEGATIVE
HGB UR QL STRIP: NEGATIVE
HYALINE CASTS #/AREA URNS LPF: 1 /LPF
KETONES UR QL STRIP: NEGATIVE
LEUKOCYTE ESTERASE UR QL STRIP: ABNORMAL
MICROSCOPIC COMMENT: ABNORMAL
NITRITE UR QL STRIP: NEGATIVE
PH UR STRIP: 7 [PH] (ref 5–8)
PROT UR QL STRIP: NEGATIVE
SP GR UR STRIP: 1.02 (ref 1–1.03)
SQUAMOUS #/AREA URNS HPF: 3 /HPF
URN SPEC COLLECT METH UR: ABNORMAL
WBC #/AREA URNS HPF: 8 /HPF (ref 0–5)

## 2020-06-15 PROCEDURE — 81000 URINALYSIS NONAUTO W/SCOPE: CPT | Mod: PO

## 2020-06-15 NOTE — TELEPHONE ENCOUNTER
----- Message from Marita Hernandez sent at 6/15/2020  4:04 PM CDT -----  Type: Needs Medical Advice  Who Called:  Ashleigh / sister  Best Call Back Number: [phone]  Additional Information: requesting orders for UA today, prior to appt tomorrow. She wants to have labs done today. Please give call back

## 2020-06-15 NOTE — TELEPHONE ENCOUNTER
Spoke with sister, cynthia, she said that he knows he is having UTI he is having sweats and shakes.   Running of low grade fever. She will bring him in for UA.

## 2020-06-16 ENCOUNTER — LAB VISIT (OUTPATIENT)
Dept: LAB | Facility: HOSPITAL | Age: 35
End: 2020-06-16
Attending: EMERGENCY MEDICINE
Payer: MEDICARE

## 2020-06-16 ENCOUNTER — OFFICE VISIT (OUTPATIENT)
Dept: FAMILY MEDICINE | Facility: CLINIC | Age: 35
End: 2020-06-16
Payer: MEDICARE

## 2020-06-16 VITALS
HEART RATE: 100 BPM | TEMPERATURE: 100 F | OXYGEN SATURATION: 97 % | DIASTOLIC BLOOD PRESSURE: 80 MMHG | SYSTOLIC BLOOD PRESSURE: 118 MMHG | RESPIRATION RATE: 14 BRPM

## 2020-06-16 DIAGNOSIS — N31.9 NEUROGENIC BLADDER: Chronic | ICD-10-CM

## 2020-06-16 DIAGNOSIS — K76.0 NAFLD (NONALCOHOLIC FATTY LIVER DISEASE): ICD-10-CM

## 2020-06-16 DIAGNOSIS — F84.0 AUTISTIC DISORDER, ACTIVE: Chronic | ICD-10-CM

## 2020-06-16 DIAGNOSIS — G40.909 SEIZURE DISORDER: Chronic | ICD-10-CM

## 2020-06-16 DIAGNOSIS — R61 DIAPHORESIS: Primary | ICD-10-CM

## 2020-06-16 DIAGNOSIS — R62.50 DEVELOPMENTAL DELAY, MODERATE: ICD-10-CM

## 2020-06-16 LAB
ALBUMIN SERPL BCP-MCNC: 4.2 G/DL (ref 3.5–5.2)
ALP SERPL-CCNC: 70 U/L (ref 55–135)
ALT SERPL W/O P-5'-P-CCNC: 178 U/L (ref 10–44)
ANION GAP SERPL CALC-SCNC: 8 MMOL/L (ref 8–16)
AST SERPL-CCNC: 187 U/L (ref 10–40)
BASOPHILS # BLD AUTO: 0.05 K/UL (ref 0–0.2)
BASOPHILS NFR BLD: 0.5 % (ref 0–1.9)
BILIRUB SERPL-MCNC: 1.1 MG/DL (ref 0.1–1)
BUN SERPL-MCNC: 13 MG/DL (ref 6–20)
CALCIUM SERPL-MCNC: 9.7 MG/DL (ref 8.7–10.5)
CHLORIDE SERPL-SCNC: 102 MMOL/L (ref 95–110)
CO2 SERPL-SCNC: 27 MMOL/L (ref 23–29)
CREAT SERPL-MCNC: 0.8 MG/DL (ref 0.5–1.4)
DIFFERENTIAL METHOD: ABNORMAL
EOSINOPHIL # BLD AUTO: 0.1 K/UL (ref 0–0.5)
EOSINOPHIL NFR BLD: 1.2 % (ref 0–8)
ERYTHROCYTE [DISTWIDTH] IN BLOOD BY AUTOMATED COUNT: 12.5 % (ref 11.5–14.5)
EST. GFR  (AFRICAN AMERICAN): >60 ML/MIN/1.73 M^2
EST. GFR  (NON AFRICAN AMERICAN): >60 ML/MIN/1.73 M^2
GLUCOSE SERPL-MCNC: 146 MG/DL (ref 70–110)
HCT VFR BLD AUTO: 46.6 % (ref 40–54)
HGB BLD-MCNC: 15.5 G/DL (ref 14–18)
IMM GRANULOCYTES # BLD AUTO: 0.03 K/UL (ref 0–0.04)
IMM GRANULOCYTES NFR BLD AUTO: 0.3 % (ref 0–0.5)
LYMPHOCYTES # BLD AUTO: 2.6 K/UL (ref 1–4.8)
LYMPHOCYTES NFR BLD: 26.4 % (ref 18–48)
MCH RBC QN AUTO: 34.4 PG (ref 27–31)
MCHC RBC AUTO-ENTMCNC: 33.3 G/DL (ref 32–36)
MCV RBC AUTO: 103 FL (ref 82–98)
MONOCYTES # BLD AUTO: 0.8 K/UL (ref 0.3–1)
MONOCYTES NFR BLD: 8.3 % (ref 4–15)
NEUTROPHILS # BLD AUTO: 6.2 K/UL (ref 1.8–7.7)
NEUTROPHILS NFR BLD: 63.3 % (ref 38–73)
NRBC BLD-RTO: 0 /100 WBC
PLATELET # BLD AUTO: 181 K/UL (ref 150–350)
PMV BLD AUTO: 10.2 FL (ref 9.2–12.9)
POTASSIUM SERPL-SCNC: 4.1 MMOL/L (ref 3.5–5.1)
PROT SERPL-MCNC: 8.8 G/DL (ref 6–8.4)
RBC # BLD AUTO: 4.51 M/UL (ref 4.6–6.2)
SODIUM SERPL-SCNC: 137 MMOL/L (ref 136–145)
WBC # BLD AUTO: 9.73 K/UL (ref 3.9–12.7)

## 2020-06-16 PROCEDURE — 85025 COMPLETE CBC W/AUTO DIFF WBC: CPT

## 2020-06-16 PROCEDURE — 80053 COMPREHEN METABOLIC PANEL: CPT

## 2020-06-16 PROCEDURE — 99213 PR OFFICE/OUTPT VISIT, EST, LEVL III, 20-29 MIN: ICD-10-PCS | Mod: 95,,, | Performed by: EMERGENCY MEDICINE

## 2020-06-16 PROCEDURE — 99213 OFFICE O/P EST LOW 20 MIN: CPT | Mod: 95,,, | Performed by: EMERGENCY MEDICINE

## 2020-06-16 PROCEDURE — 36415 COLL VENOUS BLD VENIPUNCTURE: CPT | Mod: PO

## 2020-06-16 NOTE — PROGRESS NOTES
Subjective:   THIS NOTE IS DONE WITH VOICE RECOGNITION    The patient location is: [facility]  The chief complaint leading to consultation is: Sweating and tremor    Visit type: audiovisual    Face to Face time with patient: 20 minutes of total time spent on the encounter, which includes face to face time and non-face to face time preparing to see the patient (eg, review of tests), Obtaining and/or reviewing separately obtained history, Documenting clinical information in the electronic or other health record, Independently interpreting results (not separately reported) and communicating results to the patient/family/caregiver, or Care coordination (not separately reported).         Each patient to whom he or she provides medical services by telemedicine is:  (1) informed of the relationship between the physician and patient and the respective role of any other health care provider with respect to management of the patient; and (2) notified that he or she may decline to receive medical services by telemedicine and may withdraw from such care at any time.       Patient ID: Hussein Doyle is a 34 y.o. male.    Chief Complaint: Tremors      HPI    The patient is at home with his mother and his caregiver.  Over the last few days there has been concern about urinary tract infection.  This has not proven to be case.  He is not himself.  Both these individuals have good insight into window does not feel well.  Other than the sweating the tremor and he does not act like himself no other objective signs.  He case knee is had a fever, nothing persistent.  His fever today is 99.5.    They have noticed tremor in both hands.  No overt seizure activity.    He is not having any gastrointestinal symptoms.    No significant cough.    The urine volume, with catheterization, is less than they normally would see.      Current Outpatient Medications   Medication Sig Dispense Refill    allopurinoL (ZYLOPRIM) 100 MG tablet Take 1  tablet (100 mg total) by mouth 2 (two) times daily. 180 tablet 3    ALPRAZolam (XANAX) 0.5 MG tablet Take 1 tablet (0.5 mg total) by mouth daily as needed for Anxiety (may take an additional tab PRN for breakthrough anxiety). 40 tablet 2    calcium carbonate-vitamin D3 (CALCIUM 500 WITH D) 500 mg(1,250mg) -400 unit Tab       fluvoxaMINE (LUVOX) 100 MG tablet Take 1.5 tablets (150 mg total) by mouth 2 (two) times daily. 270 tablet 0    inulin (FIBER GUMMIES ORAL) Take by mouth once daily.       L. ACIDOPHILUS/BIFID. ANIMALIS (CHEWABLE PROBIOTIC ORAL) Take by mouth 2 (two) times daily.      lactulose (CHRONULAC) 10 gram/15 mL solution TAKE 30 ML (6 TEASPOONS) TWICE DAILY 1800 mL 5    multivitamin capsule Take 1 capsule by mouth every morning.       mupirocin (BACTROBAN) 2 % ointment Apply topically 3 (three) times daily. 30 g 0    OXcarbazepine (TRILEPTAL) 300 MG Tab TAKE 1 TABLET BY MOUTH 2 TIMES DAILY. 60 tablet 0    polyethylene glycol (GLYCOLAX) 17 gram PwPk Take 17 g by mouth once daily. 90 packet 3    tamsulosin (FLOMAX) 0.4 mg Cp24 Take 0.4 mg by mouth every evening. Every day       No current facility-administered medications for this visit.          Review of Systems   Reason unable to perform ROS: Patient has significant mental retardation.  His family and caregivers give a review of systems.   Constitutional: Positive for fever.        Diaphoresis, particularly facial   HENT: Negative for trouble swallowing.    Eyes: Negative for discharge.   Respiratory: Negative for cough.    Gastrointestinal: Negative.    Genitourinary: Negative.    Skin: Negative for rash and wound.   Neurological: Positive for tremors.   Psychiatric/Behavioral: The patient is nervous/anxious.        Objective:      Physical Exam  Vitals signs (Vital signs obtained from his mother) reviewed.   Constitutional:       General: He is not in acute distress.     Appearance: He is diaphoretic.   HENT:      Head: Atraumatic.   Eyes:       Conjunctiva/sclera: Conjunctivae normal.   Pulmonary:      Effort: Pulmonary effort is normal.   Abdominal:      General: There is no distension.   Skin:     Coloration: Skin is not jaundiced or pale.   Neurological:      Mental Status: He is alert.         Assessment:       1. Diaphoresis    2. Autistic disorder, active    3. Seizure disorder    4. Neurogenic bladder    5. Developmental delay, moderate    6. NAFLD (nonalcoholic fatty liver disease)        Plan:       1. Diaphoresis  Unexplained    -CBC auto differential; Future  - Comprehensive metabolic panel; Future    2. Autistic disorder, active  Behavior different today, nonspecific  Stable for the most part.    3. Seizure disorder  Stable  Tremors noted, no seizure activity.    4. Neurogenic bladder  Recent UA unremarkable      5. Developmental delay, moderate  No significant change    6. NAFLD (nonalcoholic fatty liver disease)  Will uses opportunity to update his liver functions.  -

## 2020-06-19 ENCOUNTER — TELEPHONE (OUTPATIENT)
Dept: PSYCHIATRY | Facility: CLINIC | Age: 35
End: 2020-06-19

## 2020-06-19 DIAGNOSIS — Z79.899 HIGH RISK MEDICATIONS (NOT ANTICOAGULANTS) LONG-TERM USE: Primary | ICD-10-CM

## 2020-06-19 NOTE — TELEPHONE ENCOUNTER
"Called pt's mom back regarding message-   "Last week he started blinking his eyes a lot, just acting weird, but it's progressed through the week. We spoke to  about it on Monday and he did labs but you saw them and he didn't think anything looked too suspicious. But now he doesn't have the strength to peddle his bike, has to hold onto me and the house to steady himself, to access the computer he uses his birthday and he can't do that. I have to remind him his birthday each digit. i'm very concerned. We gave him an extra xanax and he's much calmer and that's helped but he's not himself. A little detached."    Does report that despite the results of u/a he is not fully voiding by cath and also reports "concentrated" urine. and she really suspects a UTI- sxs are similar to what they have been in the past.     -Encouraged her to reach out to the pt's urologist with this report.   -I will also obtain a oxcarbazepine level tomorrow am (has already taken dose this morning and level will be falsely elevated)>> will contact family with result  -May cont with additional xanax as needed at this time, but she is aware that he should not receive more than 2 tabs total daily and my try 1/2 tab initially for anxiety/agitation    "

## 2020-06-19 NOTE — TELEPHONE ENCOUNTER
"Patient mom called requesting a return call from Dr. Valentine.    States, "Hussein is having a real meltdown." First it seems physical, sweating profusely." out of routine with he daily task." not eating, especially his night time snack that he ask for every evening after dinner"     "he is really unstable when walking, coordination is off." wondering around the house passing." looks like he is going out of it"  Talking a lot about things in the pass." not sure if he is remembering things or living in it?"     States, since Dr. Valentine has acces to his labs to please view them. The ones ordered by Dr. Zendejas  Please advice Ms. Benitez [phone]  Liat    "

## 2020-06-20 ENCOUNTER — LAB VISIT (OUTPATIENT)
Dept: LAB | Facility: HOSPITAL | Age: 35
End: 2020-06-20
Attending: PSYCHIATRY & NEUROLOGY
Payer: MEDICARE

## 2020-06-20 DIAGNOSIS — Z79.899 HIGH RISK MEDICATIONS (NOT ANTICOAGULANTS) LONG-TERM USE: ICD-10-CM

## 2020-06-20 PROCEDURE — 36415 COLL VENOUS BLD VENIPUNCTURE: CPT | Mod: PO

## 2020-06-20 PROCEDURE — 80183 DRUG SCRN QUANT OXCARBAZEPIN: CPT

## 2020-06-22 RX ORDER — OXCARBAZEPINE 150 MG/1
150 TABLET, FILM COATED ORAL 2 TIMES DAILY
Qty: 30 TABLET | Refills: 0 | Status: SHIPPED | OUTPATIENT
Start: 2020-06-22 | End: 2020-07-06

## 2020-06-24 ENCOUNTER — TELEPHONE (OUTPATIENT)
Dept: PSYCHIATRY | Facility: CLINIC | Age: 35
End: 2020-06-24

## 2020-06-24 LAB — OXCARBAZEPINE METABOLITE: 7 MCG/ML (ref 3–35)

## 2020-06-24 NOTE — TELEPHONE ENCOUNTER
"Called to speak with the pt's mother regarding his trileptal level- perfect/low range at 7.     "I don't know what's going on. He's now regressed to the point that he's soiling himself..uncontrolled bowels really and the urine is getting worse."     Reports he's been diaphoretic, seemingly "distant", blinking (which is anxiety driven per pt's mother)- she does offer that at other times "he's connecting, but I know he doesn't feel good."     "we can't figure it out. He lays down a lot, but tosses and turns, I don't know what's wrong and i'm very concerned. i'm scared really."     I encourage the pt to reach out to PCP,  and/or urologist- pt likely has a uti not being addressed fully by the current cipro, may need another u/a. May be a viral infection? Of some sort, but I do not feel this is psychiatric origin- I think ongoing anxiety is from not feeling well and not having language to communicate with mom what's wrong- NOT toxic on trileptal.     She expresses understanding- gratitude.           "

## 2020-06-26 ENCOUNTER — TELEPHONE (OUTPATIENT)
Dept: GASTROENTEROLOGY | Facility: CLINIC | Age: 35
End: 2020-06-26

## 2020-06-26 ENCOUNTER — TELEPHONE (OUTPATIENT)
Dept: FAMILY MEDICINE | Facility: CLINIC | Age: 35
End: 2020-06-26

## 2020-06-26 ENCOUNTER — TELEPHONE (OUTPATIENT)
Dept: HEPATOLOGY | Facility: CLINIC | Age: 35
End: 2020-06-26

## 2020-06-26 NOTE — TELEPHONE ENCOUNTER
----- Message from Erin Burns sent at 6/26/2020  2:52 PM CDT -----  Regarding: rtc  Contact: patient mother chu  patient mother chu # [phone], returning phone call.  Please call upon request.

## 2020-06-26 NOTE — TELEPHONE ENCOUNTER
Patient's mom called with an update on patient's health status she states that patient was seen by his urologist to rule out possible UTI in which she states came back negative, his urine was then sent for a culture to be tested for C.Diff, she then states patient's urologist think his current issues are possibly G.I related. Mom then states after patient saw Psychologist his 'trileptal' was decreased in which she states has show slight improvement but patient still isn't seeming like him self. Patient is also still having perfuse sweating and a decrease in weight. Patient's mom states this update was requested by provider. Please advise

## 2020-06-26 NOTE — TELEPHONE ENCOUNTER
----- Message from Erin Burns sent at 6/26/2020  2:55 PM CDT -----  Regarding: requesting phone call  Contact: patient mother chu  patient mother chu ph# [phone], want to know up with scheduling liver biopsy.  Please call upon request.     FMLA for COPD intermittent.  Form needed to be updated for calendar year (previous form only good until 7/31/2019).  Previous copy printed and routed to Dr. Mariee for signature and date update.

## 2020-06-26 NOTE — TELEPHONE ENCOUNTER
Left message for pt's mom, Eli to return call to clinic if she was still having concerns she would like to discuss about pt. Number provided.

## 2020-06-26 NOTE — TELEPHONE ENCOUNTER
----- Message from Mateo Guan sent at 6/25/2020  4:44 PM CDT -----  Regarding: Update on patient  Contact: mom, chu  Placed call to pod, patient want to speak with a nurse regarding giving update on patient please call back at [phone]    Case number [id number]

## 2020-06-26 NOTE — TELEPHONE ENCOUNTER
Spoke with pt's mom, Eli. Mom states pt is having tremors, sweating, weight loss, not sleeping well. Dr. Valentine & Dr. Mancini believe pt's issues are GI related per mom. Scheduled appointment for Monday. Eli verbalized understanding.

## 2020-06-26 NOTE — TELEPHONE ENCOUNTER
----- Message from Miguel Castle sent at 6/26/2020 10:58 AM CDT -----  Regarding: pt mayi sage  Contact: mom @ [phone]  Type: Needs Medical Advice    Who Called:  pt    Best Call Back Number: [phone]   Additional Information: please call to discuss pt health concerns.

## 2020-06-28 ENCOUNTER — PATIENT OUTREACH (OUTPATIENT)
Dept: ADMINISTRATIVE | Facility: OTHER | Age: 35
End: 2020-06-28

## 2020-06-28 NOTE — PROGRESS NOTES
Requested updates within Care Everywhere.  Patient's chart was reviewed for overdue EMILY topics.  Immunizations reconciled.

## 2020-06-29 ENCOUNTER — TELEPHONE (OUTPATIENT)
Dept: HEPATOLOGY | Facility: CLINIC | Age: 35
End: 2020-06-29

## 2020-06-29 ENCOUNTER — TELEPHONE (OUTPATIENT)
Dept: GASTROENTEROLOGY | Facility: CLINIC | Age: 35
End: 2020-06-29

## 2020-06-29 ENCOUNTER — TELEPHONE (OUTPATIENT)
Dept: FAMILY MEDICINE | Facility: CLINIC | Age: 35
End: 2020-06-29

## 2020-06-29 ENCOUNTER — PATIENT MESSAGE (OUTPATIENT)
Dept: GASTROENTEROLOGY | Facility: CLINIC | Age: 35
End: 2020-06-29

## 2020-06-29 ENCOUNTER — OFFICE VISIT (OUTPATIENT)
Dept: GASTROENTEROLOGY | Facility: CLINIC | Age: 35
End: 2020-06-29
Payer: MEDICARE

## 2020-06-29 VITALS — BODY MASS INDEX: 35.79 KG/M2 | HEIGHT: 70 IN | WEIGHT: 250 LBS

## 2020-06-29 DIAGNOSIS — R19.7 DIARRHEA, UNSPECIFIED TYPE: ICD-10-CM

## 2020-06-29 DIAGNOSIS — R15.9 FULL INCONTINENCE OF FECES: ICD-10-CM

## 2020-06-29 DIAGNOSIS — K76.0 HEPATIC STEATOSIS: ICD-10-CM

## 2020-06-29 DIAGNOSIS — R79.89 ELEVATED LFTS: ICD-10-CM

## 2020-06-29 DIAGNOSIS — R16.0 HEPATOMEGALY: ICD-10-CM

## 2020-06-29 DIAGNOSIS — R74.8 ELEVATED LIVER ENZYMES: Primary | ICD-10-CM

## 2020-06-29 DIAGNOSIS — R63.4 WEIGHT LOSS: Primary | ICD-10-CM

## 2020-06-29 DIAGNOSIS — R46.89 CHANGE IN BEHAVIOR: ICD-10-CM

## 2020-06-29 DIAGNOSIS — R13.14 PHARYNGOESOPHAGEAL DYSPHAGIA: ICD-10-CM

## 2020-06-29 DIAGNOSIS — R34 DECREASED URINE OUTPUT: ICD-10-CM

## 2020-06-29 DIAGNOSIS — R25.1 TREMOR: ICD-10-CM

## 2020-06-29 DIAGNOSIS — R61 DIAPHORESIS: ICD-10-CM

## 2020-06-29 PROCEDURE — 99214 OFFICE O/P EST MOD 30 MIN: CPT | Mod: 95,,, | Performed by: NURSE PRACTITIONER

## 2020-06-29 PROCEDURE — 3008F PR BODY MASS INDEX (BMI) DOCUMENTED: ICD-10-PCS | Mod: CPTII,95,, | Performed by: NURSE PRACTITIONER

## 2020-06-29 PROCEDURE — 99214 PR OFFICE/OUTPT VISIT, EST, LEVL IV, 30-39 MIN: ICD-10-PCS | Mod: 95,,, | Performed by: NURSE PRACTITIONER

## 2020-06-29 PROCEDURE — 3008F BODY MASS INDEX DOCD: CPT | Mod: CPTII,95,, | Performed by: NURSE PRACTITIONER

## 2020-06-29 RX ORDER — AMOXICILLIN 500 MG
1 CAPSULE ORAL DAILY
COMMUNITY

## 2020-06-29 NOTE — TELEPHONE ENCOUNTER
Called patient/mother to discuss liver biopsy. No answer, left VM.  Message sent via [facility] portal.

## 2020-06-29 NOTE — TELEPHONE ENCOUNTER
Spoke with pt mother and helped get pt signed in to virtual visit. Pt mother signed in and verbalized understanding.

## 2020-06-29 NOTE — PROGRESS NOTES
"Subjective:       Patient ID: Hussein Doyle is a 34 y.o. male Body mass index is 35.87 kg/m².    Chief Complaint: Constipation (follow-up)    This patient is established with KATHY Ellis NP with hepatology, & myself.    The patient location is: home in [place]. I verified patient name and date of birth. Patient provided current height and weight measurements.  The chief complaint leading to consultation is: weight loss, diarrhea and change in behavior; Patient is with his mother, whom provided history due to patient's medical history of autism and mental retardation.    Visit type: audiovisual    Face to Face time with patient: 28 minutes  46 minutes of total time spent on the encounter, which includes face to face time and non-face to face time preparing to see the patient (eg, review of tests), Obtaining and/or reviewing separately obtained history, Documenting clinical information in the electronic or other health record, Independently interpreting results (not separately reported) and communicating results to the patient/family/caregiver, or Care coordination (not separately reported).     Each patient to whom he or she provides medical services by telemedicine is:  (1) informed of the relationship between the physician and patient and the respective role of any other health care provider with respect to management of the patient; and (2) notified that he or she may decline to receive medical services by telemedicine and may withdraw from such care at any time.    Constipation  Chronicity: FOLLOW-UP. The current episode started more than 1 year ago (has had his whole life). The problem has been resolved (now having soft stools and "wet farts" for the past 2 weeks) since onset. Stool frequency: bowel movements are 2-3 "wet farts" a day and every other day large loose stools with incontinence. The stool is described as loose. The patient is on a high fiber diet. He does not exercise " regularly (not lately). There has not been adequate water intake (patient has had decreased appetite lately). Associated symptoms include bloating, diarrhea, flatus and weight loss (decreased appetite; lost ~10-15 lbs since 5/2020)). Pertinent negatives include no abdominal pain, fever, hematochezia, melena, nausea, rectal pain or vomiting. Risk factors include obesity. He has tried laxatives, fiber and enemas (probiotic BID, fiber gummy daily; PAST: linzess believes it caused diarrhea, magnesium citrate, golytely, enema, suppositories prn- mild relief) for the symptoms. The treatment provided significant relief. His past medical history is significant for neurologic disease (history of stroke, austism, mental retardation, and seizures; sees neurology) and psychiatric history (seeing psych). There is no history of endocrine disease, inflammatory bowel disease or irritable bowel syndrome.     Review of Systems   Constitutional: Positive for appetite change (decreased food and fluid intake), diaphoresis (sweating a lot lately) and weight loss (decreased appetite; lost ~10-15 lbs since 5/2020)). Negative for fever.   HENT: Positive for trouble swallowing (occasional with food; denies problems wiht liquids or pills).    Respiratory: Negative for shortness of breath.    Cardiovascular: Negative for chest pain.   Gastrointestinal: Positive for bloating, diarrhea and flatus. Negative for abdominal pain, anal bleeding, blood in stool, constipation (resolved; mother reports she stopped lactulose and glycolax today), hematochezia, melena, nausea, rectal pain and vomiting.   Genitourinary: Positive for decreased urine volume ( reports typically he has 700 ml every few hours with catherization but lately having 300 ml or less per catherization; seeing Dr. Schaffer, urology, and reports patient is taking bactrim 3 times a week to help prevent UTIs).        History of neurogenic bladder and mother reports they catheterize him at  "home multiple times a day   Neurological: Positive for tremors (per mother's report).   Psychiatric/Behavioral: Positive for behavioral problems (Change in behavior (not doing independent tasks that he was doing previously); mother reports intermittent tremors of eye twitching (seeing PCP for this); she also reports he has been "zoning out" lately). The patient is nervous/anxious (increased anxiety lately; seeing Dr. Valentine, psych, for it and she has adjusted his medications lately (trileptal)).        Past Medical History:   Diagnosis Date    Autistic disorder, active 5/6/2013    Bowel obstruction     Early intervention counseling     Gout, chronic 11/12/2015    Grand mal seizure     Hepatic steatosis     Impulse control disorder, unspecified 5/6/2013    Mastoiditis     Moderate mental retardation 5/6/2013    Neurogenic bladder     Neurogenic bladder     HALEY (obstructive sleep apnea) 11/12/2015    CPAP.  Dr. Garber     Otitis media     Paraplegia     Pervasive developmental disorder, active 12/27/2015    Renal cancer 2010    Right    Scoliosis     paraplegia    Seizure disorder 11/12/2015    Stroke     one week old    Tardive dyskinesia      Past Surgical History:   Procedure Laterality Date    ANKLE FRACTURE SURGERY      BACK SURGERY  2000    COLONOSCOPY  [facility]/28/[facility]    [facility]; anal fissure, minimal pinpoint rectal erythema; floppy-type colon with very little tone; biopsy: rectum mild chronic proctitis with few eosinophils sent for scanning    COLONOSCOPY N/A 6/15/2018    Procedure: COLONOSCOPY;  Surgeon: Refugio Villagomez MD;  Location[facility];  Service: Endoscopy;  Laterality: N/A; Preparation of the colon was fair, unremarkable; REPEAT at 50 YEARS old FOR SCREENING    INNER EAR SURGERY      mastoid    right partial nephrectomy Right 2010    Sees urology    TYMPANOSTOMY TUBE PLACEMENT       Family History   Problem Relation Age of Onset    Cancer Father         lymphoma "    Cataracts Father     Colon polyps Father     Cataracts Mother     Cataracts Maternal Grandmother     Diabetes Maternal Grandmother     Macular degeneration Maternal Grandmother     Glaucoma Maternal Grandmother     Anesthesia problems Neg Hx     Clotting disorder Neg Hx     Colon cancer Neg Hx     Crohn's disease Neg Hx     Ulcerative colitis Neg Hx     Stomach cancer Neg Hx     Esophageal cancer Neg Hx     Cirrhosis Neg Hx      Wt Readings from Last 10 Encounters:   06/29/20 113.4 kg (250 lb)   02/28/20 119.5 kg (263 lb 7.2 oz)   01/22/20 121.8 kg (268 lb 8.3 oz)   01/09/20 121.9 kg (268 lb 10.1 oz)   11/26/19 122.7 kg (270 lb 8.1 oz)   10/04/19 121.1 kg (266 lb 15.6 oz)   04/26/19 124.6 kg (274 lb 11.1 oz)   04/01/19 125.1 kg (275 lb 12.7 oz)   12/04/18 125 kg (275 lb 9.2 oz)   11/19/18 125.4 kg (276 lb 7.3 oz)     Lab Results   Component Value Date    WBC 9.73 06/16/2020    HGB 15.5 06/16/2020    HCT 46.6 06/16/2020     (H) 06/16/2020     06/16/2020     CMP  Sodium   Date Value Ref Range Status   06/16/2020 137 136 - 145 mmol/L Final     Potassium   Date Value Ref Range Status   06/16/2020 4.1 3.5 - 5.1 mmol/L Final     Chloride   Date Value Ref Range Status   06/16/2020 102 95 - 110 mmol/L Final     CO2   Date Value Ref Range Status   06/16/2020 27 23 - 29 mmol/L Final     Glucose   Date Value Ref Range Status   06/16/2020 146 (H) 70 - 110 mg/dL Final     BUN, Bld   Date Value Ref Range Status   06/16/2020 13 6 - 20 mg/dL Final     Creatinine   Date Value Ref Range Status   06/16/2020 0.8 0.5 - 1.4 mg/dL Final     Calcium   Date Value Ref Range Status   06/16/2020 9.7 8.7 - 10.5 mg/dL Final     Total Protein   Date Value Ref Range Status   06/16/2020 8.8 (H) 6.0 - 8.4 g/dL Final     Albumin   Date Value Ref Range Status   06/16/2020 4.2 3.5 - 5.2 g/dL Final     Total Bilirubin   Date Value Ref Range Status   06/16/2020 1.1 (H) 0.1 - 1.0 mg/dL Final     Comment:     For infants and  "newborns, interpretation of results should be based  on gestational age, weight and in agreement with clinical  observations.  Premature Infant recommended reference ranges:  Up to 24 hours.............<8.0 mg/dL  Up to 48 hours............<12.0 mg/dL  3-5 days..................<15.0 mg/dL  6-29 days.................<15.0 mg/dL       Alkaline Phosphatase   Date Value Ref Range Status   06/16/2020 70 55 - 135 U/L Final     AST ([place])   Date Value Ref Range Status   03/12/2016 96 (H) 17 - 59 U/L Final     AST   Date Value Ref Range Status   06/16/2020 187 (H) 10 - 40 U/L Final     ALT   Date Value Ref Range Status   06/16/2020 178 (H) 10 - 44 U/L Final     Anion Gap   Date Value Ref Range Status   06/16/2020 8 8 - 16 mmol/L Final     eGFR if    Date Value Ref Range Status   06/16/2020 >60.0 >60 mL/min/1.73 m^2 Final     eGFR if non    Date Value Ref Range Status   06/16/2020 >60.0 >60 mL/min/1.73 m^2 Final     Comment:     Calculation used to obtain the estimated glomerular filtration  rate (eGFR) is the CKD-EPI equation.        Lab Results   Component Value Date    TSH 2.333 04/30/2018     Lab Results   Component Value Date    HEPAIGM Negative 04/30/2018    HEPBIGM Negative 04/30/2018    HEPCAB Negative 04/30/2018     Lab Results   Component Value Date    OCCULTBLOOD Negative 07/27/2017    OCCULTBLOOD Negative 07/27/2017    OCCULTBLOOD Negative 07/27/2017 5/14/2020 COVID-19 negative    Reviewed prior medical records including radiology report of 5/24/18 limited abdominal ultrasound; 4/26/18 ct renal stone abdomen pelvis; & 5/3/18 abdominal x-ray.    Previously reviewed medical records received from Dr. Castro and [facility], summarized below and in medical & surgical history (endoscopies, etc), was sent for scanning:   10/28/08 colonoscopy  7/8/14 barium enema: impression constipation; findings: "the colon is severely tortuous but nondilated. There is moderate stool throughout " "the colon. Colon otherwise demonstrates normal haustral pattern without evidence of fixed filling defect or stricture. Reflux into the terminal ileum is visualized"  Blood work from 2014 (elevated LFT with AST 75, ALT 57) and 2015- see copy for full results  Visit notes reviewed from Dr. Castro in 2014, 2015 (tried linzess)  Objective:      Physical Exam  Constitutional:       General: He is awake. He is not in acute distress.     Appearance: He is well-groomed. He is obese.   Pulmonary:      Effort: Pulmonary effort is normal. No respiratory distress.   Skin:     Coloration: Skin is not pale.   Neurological:      Mental Status: He is alert.   Psychiatric:         Attention and Perception: He is inattentive.         Mood and Affect: Affect is flat.         Behavior: Behavior is uncooperative.      Comments: Patient did not listen to mother's commands to answer questions. Patient said hello to me and then he mumbled some words but it was not comprehensible         Assessment:       1. Weight loss    2. Diarrhea, unspecified type    3. Pharyngoesophageal dysphagia    4. Elevated LFTs    5. Hepatic steatosis    6. Hepatomegaly    7. Tremor    8. Diaphoresis    9. Change in behavior    10. Full incontinence of feces    11. Decreased urine output        Plan:       Weight loss  -     CT Abdomen Pelvis With Contrast; Future; Expected date: 06/29/2020  - schedule EGD, discussed procedure with patient's mother, including risks and benefits, patient's mother verbalized understanding  - encouraged PO intake and daily calorie counts to ensure adequate nutrition is taken in, recommend at least 2,000 calories a day  - recommend nutritional drinks, such as Boost, Ensure or Glucerna, to supplement nutrition needs    Diarrhea, unspecified type & Full incontinence of feces  -     CT Abdomen Pelvis With Contrast; Future; Expected date: 06/29/2020  -     Pancreatic elastase, fecal; Future; Expected date: 06/29/2020  -     Stool " Exam-Ova,Cysts,Parasites; Future; Expected date: 06/29/2020  -     Fecal fat, qualitative; Future; Expected date: 06/29/2020  -     Giardia / Cryptosporidum, EIA; Future; Expected date: 06/29/2020  -     Occult blood x 1, stool; Future; Expected date: 06/29/2020  -     pH, stool; Future; Expected date: 06/29/2020  -     Rotavirus antigen, stool; Future; Expected date: 06/29/2020  -     WBC, Stool; Future; Expected date: 06/29/2020  -     Stool culture; Future; Expected date: 06/29/2020  -     Clostridium difficile EIA; Future; Expected date: 06/29/2020  -     Adenovirus Molecular Detection, PCR, Non-Blood Stool; Future; Expected date: 06/29/2020  - DISCONTINUE GLYCOLAX AND LACTULOSE- THERAPY COMPLETED  - schedule EGD, discussed procedure with patient, including risks and benefits, patient verbalized understanding  - CONTINUE OTC probiotic as directed  - recommend high fiber diet to help bulk up the stool, especially soluble fiber since this can help bulk up the stool consistency and may help to slow down how fast the stool goes through the colon and can prevent diarrhea  Recommend high fiber diet (20-30 grams of fiber daily)/OTC fiber supplements daily as directed, such as Benefiber.    Pharyngoesophageal dysphagia  -     CT Abdomen Pelvis With Contrast; Future; Expected date: 06/29/2020  - schedule EGD, discussed procedure with patient's mother and possible esophageal dilation may be performed during procedure if indicated, patient's mother verbalized understanding  - educated patient to eat smaller more frequent meals and to eat slowly and advised to eat a soft diet.  - possible UGI/esophagram/esophageal manometry if symptoms persist    Elevated LFTs, Hepatic steatosis, & Hepatomegaly  -     CT Abdomen Pelvis With Contrast; Future; Expected date: 06/29/2020  - avoid alcohol and tylenol products, & recommend follow-up with hepatology for further evaluation and management (KATHY Armendariz NP had recommended liver biopsy,  which has not be done yet).    Decreased urine output, Tremor, Diaphoresis, & Change in behavior  Patient's mother reported she has been thinking of bringing him to the ER for further evaluation and management; I discussed my concerns about dehydration and concern for change in patient behavior and agree that patient should go to the ER for further evaluation and management.  Recommend follow-up with Primary Care Provider, urology, & psych for continued evaluation and management for outpatient management, once emergent/urgent conditions are evaluated and treated in the ER. Patient's mother reports patient is scheduled to see his PCP team tomorrow.    Follow up in about 2 weeks (around 7/13/2020), or if symptoms worsen or fail to improve.      If no improvement in symptoms or symptoms worsen, call/follow-up at clinic or go to ER.

## 2020-06-29 NOTE — TELEPHONE ENCOUNTER
----- Message from Taylor Faulkner sent at 6/29/2020  2:10 PM CDT -----  Regarding: Need medical advice  Contact: Pt mom  Type: Need medical advice      Who Called:  Pt mom  Best Call Back Number:   [phone]  Additional Information:   Pt mom would like a call back (that's all the info pt mom would provide).

## 2020-06-29 NOTE — TELEPHONE ENCOUNTER
I spoke with pt Hussein' mother. She said he is declining.   His overnight urine output was 350cc, he ususally has a full urinal.   He had 25cc's at 12 noon   400cc at 3 pm.   She has a call into the urologist.   He saw santo kylah and his laxative has been stopped. Due to him having fecal incontinence.   He is not feeling well at all. She has a call into MD that is doing Liver bx. To see when it is done.   I did tell her if he is getting worse he might need to go to ED.   And he has an appt for tomorrow with kathleen. She understands you are out of clinic but hoping to get a call back

## 2020-07-01 ENCOUNTER — TELEPHONE (OUTPATIENT)
Dept: GASTROENTEROLOGY | Facility: CLINIC | Age: 35
End: 2020-07-01

## 2020-07-01 ENCOUNTER — LAB VISIT (OUTPATIENT)
Dept: LAB | Facility: HOSPITAL | Age: 35
End: 2020-07-01
Attending: EMERGENCY MEDICINE
Payer: MEDICARE

## 2020-07-01 DIAGNOSIS — R19.7 DIARRHEA, UNSPECIFIED TYPE: ICD-10-CM

## 2020-07-01 LAB
C DIFF GDH STL QL: NEGATIVE
C DIFF TOX A+B STL QL IA: NEGATIVE
OB PNL STL: NEGATIVE
WBC #/AREA STL HPF: NORMAL /[HPF]

## 2020-07-01 PROCEDURE — 87045 FECES CULTURE AEROBIC BACT: CPT

## 2020-07-01 PROCEDURE — 82656 EL-1 FECAL QUAL/SEMIQ: CPT

## 2020-07-01 PROCEDURE — 87425 ROTAVIRUS AG IA: CPT

## 2020-07-01 PROCEDURE — 87798 DETECT AGENT NOS DNA AMP: CPT

## 2020-07-01 PROCEDURE — 87209 SMEAR COMPLEX STAIN: CPT

## 2020-07-01 PROCEDURE — 89125 SPECIMEN FAT STAIN: CPT

## 2020-07-01 PROCEDURE — 87427 SHIGA-LIKE TOXIN AG IA: CPT

## 2020-07-01 PROCEDURE — 82272 OCCULT BLD FECES 1-3 TESTS: CPT

## 2020-07-01 PROCEDURE — 87329 GIARDIA AG IA: CPT

## 2020-07-01 PROCEDURE — 87046 STOOL CULTR AEROBIC BACT EA: CPT

## 2020-07-01 PROCEDURE — 89055 LEUKOCYTE ASSESSMENT FECAL: CPT

## 2020-07-01 PROCEDURE — 87449 NOS EACH ORGANISM AG IA: CPT

## 2020-07-01 PROCEDURE — 83986 ASSAY PH BODY FLUID NOS: CPT

## 2020-07-01 PROCEDURE — 87324 CLOSTRIDIUM AG IA: CPT

## 2020-07-01 NOTE — TELEPHONE ENCOUNTER
Spoke with pt's mom. Mom wanted to let clinic know pt did CT scan in ER yesterday & that she dropped stool off today.   Also scheduled EGD & covid test. Prep instructions sent to pt's jhonatant. Mom verbalized understanding.

## 2020-07-01 NOTE — TELEPHONE ENCOUNTER
----- Message from Taylor Faulkner sent at 7/1/2020  4:13 PM CDT -----  Regarding: Need medical advice  Contact: pt mom  Type: Need medical advice      Who Called:  Pt  Best Call Back Number:  [phone]  Additional Information: Pt mom would like a call back in regards to test. Please advise

## 2020-07-02 LAB
CRYPTOSP AG STL QL IA: NEGATIVE
FAT STL SUDAN IV STN: NORMAL
G LAMBLIA AG STL QL IA: NEGATIVE
PH STL: NORMAL [PH]
RV AG STL QL IA.RAPID: NEGATIVE

## 2020-07-04 LAB
BACTERIA STL CULT: NORMAL
BACTERIA STL CULT: NORMAL

## 2020-07-05 LAB
HADV DNA SERPL QL NAA+PROBE: NEGATIVE
SPECIMEN SOURCE: NORMAL

## 2020-07-06 LAB — O+P STL MICRO: NORMAL

## 2020-07-07 DIAGNOSIS — Z01.818 PRE-PROCEDURAL EXAMINATION: ICD-10-CM

## 2020-07-08 ENCOUNTER — TELEPHONE (OUTPATIENT)
Dept: GASTROENTEROLOGY | Facility: CLINIC | Age: 35
End: 2020-07-08

## 2020-07-08 ENCOUNTER — PATIENT MESSAGE (OUTPATIENT)
Dept: GASTROENTEROLOGY | Facility: CLINIC | Age: 35
End: 2020-07-08

## 2020-07-08 ENCOUNTER — TELEPHONE (OUTPATIENT)
Dept: HEPATOLOGY | Facility: CLINIC | Age: 35
End: 2020-07-08

## 2020-07-08 DIAGNOSIS — K59.09 CHRONIC CONSTIPATION: Primary | ICD-10-CM

## 2020-07-08 RX ORDER — LACTULOSE 10 G/15ML
10 SOLUTION ORAL 2 TIMES DAILY
Qty: 900 ML | Refills: 0 | Status: SHIPPED | OUTPATIENT
Start: 2020-07-08 | End: 2020-08-07

## 2020-07-08 RX ORDER — POLYETHYLENE GLYCOL 3350, SODIUM SULFATE ANHYDROUS, SODIUM BICARBONATE, SODIUM CHLORIDE, POTASSIUM CHLORIDE 236; 22.74; 6.74; 5.86; 2.97 G/4L; G/4L; G/4L; G/4L; G/4L
4 POWDER, FOR SOLUTION ORAL ONCE
Qty: 4000 ML | Refills: 0 | Status: SHIPPED | OUTPATIENT
Start: 2020-07-08 | End: 2020-07-08

## 2020-07-08 NOTE — TELEPHONE ENCOUNTER
Spoke with pt mother and advised that her message was sent to provider and as soon as she gives her recommendations I will let her know. Pt mother verbalized understanding.

## 2020-07-08 NOTE — TELEPHONE ENCOUNTER
Received call from Cornelius. The patient will be set up to do the US Guided Liver Biopsy on her requested date of Monday 7/20/20 at 10:30 am.    Eli called. Date and time is acceptable. Return to clinic visit for the results will be on 8/10/20 for 11 am. Patient presently Holding Omega 3, Fatty Acid and Fish Oil until after the biopsy.  Voiced understanding all of the Instructions.  Appt letters placed in the mail.

## 2020-07-08 NOTE — TELEPHONE ENCOUNTER
Received message from Amelia Armendariz NP to arrange for a Percutaneous Liver Biopsy for the patient.    Call and spoke with his mother Eli.  She received the Liver Biopsy instructions in the mail. Allowed to verbalize concerns. Questions answered.    Will start Holding the Omega 3 Fatty Acid and Fish Oil vitamin now until after the biopsy.  Pre and post procedure teaching done.    Would like to do the Liver Biopsy on Mon 7/20/20 of Fri 7/24/20 around 10 am.   Return visit for the Liver Biopsy results will be on Mon 8/10 for 11 am. Waiting for the schedule to be corrected with the new times.    Will send message to IR.

## 2020-07-08 NOTE — TELEPHONE ENCOUNTER
----- Message from Pam Gomez sent at 7/8/2020  1:32 PM CDT -----  Contact: mother  Type: Needs Medical Advice  Who Called:  Eli - mother  Symptoms (please be specific):  no bowel movement since 07/03/2020,     Best Call Back Number: [phone]  Additional Information: taking medication to assist in bowel movement given 2 doses of Lactalose daily,  given suppository about 1 hour ago, no movement yet, please contact to advise.

## 2020-07-09 ENCOUNTER — PATIENT MESSAGE (OUTPATIENT)
Dept: FAMILY MEDICINE | Facility: CLINIC | Age: 35
End: 2020-07-09

## 2020-07-09 ENCOUNTER — TELEPHONE (OUTPATIENT)
Dept: GASTROENTEROLOGY | Facility: CLINIC | Age: 35
End: 2020-07-09

## 2020-07-09 ENCOUNTER — PATIENT MESSAGE (OUTPATIENT)
Dept: GASTROENTEROLOGY | Facility: CLINIC | Age: 35
End: 2020-07-09

## 2020-07-09 ENCOUNTER — NURSE TRIAGE (OUTPATIENT)
Dept: ADMINISTRATIVE | Facility: CLINIC | Age: 35
End: 2020-07-09

## 2020-07-09 LAB — ELASTASE 1, FECAL: >500 MCG/G

## 2020-07-09 NOTE — TELEPHONE ENCOUNTER
----- Message from Rona Dalton sent at 7/9/2020  3:01 PM CDT -----  Regarding: Patient Message  Type: Needs Medical Advice  Who Called:  Pt Mom   Best Call Back Number: [phone]  Additional Information: needing to urgently speak with a nurse in regards to patient's protocol about pt impact balls. Transferred call to triage nurse

## 2020-07-09 NOTE — TELEPHONE ENCOUNTER
Patient hasn't had a BM since 7/3, per the patient's mother. The patient has had 85% of Golytely, has also been eating prunes, taking in plenty of fluids, and has used suppositories. Patient's mother also reports that the patient's abdomen is more swollen than usual. Patient is autistic and unable to communicate about pain and doesn't demonstrate pain. Per patient's mother, the patient's behavior has changed and pt is easily irritated. Denies any vomiting. Patient's mother plans to take him to [facility].     Reason for Disposition   Abdomen is more swollen than usual    Additional Information   Negative: Abdomen pain is the main symptom and adult male   Negative: Abdomen pain is the main symptom and adult female   Negative: Rectal bleeding or blood in stool is the main symptom   Negative: Patient sounds very sick or weak to the triager   Negative: Constant abdominal pain lasting > 2 hours     Unsure, patient is autistic   Negative: Vomiting bile (green color)   Negative: Vomiting and abdomen looks much more swollen than usual   Negative: Rectal pain or fullness from fecal impaction (rectum full of stool) and NOT better after SITZ bath, suppository or enema    Protocols used: CONSTIPATION-A-[place]

## 2020-07-09 NOTE — TELEPHONE ENCOUNTER
If pt tolerated the golytely, would see if it has effect tonight. If no output tonight, pt will need to go to ER or urgent care tomorrow to be checked for fecal impaction and disimpaction.

## 2020-07-10 ENCOUNTER — LAB VISIT (OUTPATIENT)
Dept: FAMILY MEDICINE | Facility: CLINIC | Age: 35
End: 2020-07-10
Payer: MEDICARE

## 2020-07-10 ENCOUNTER — TELEPHONE (OUTPATIENT)
Dept: GASTROENTEROLOGY | Facility: CLINIC | Age: 35
End: 2020-07-10

## 2020-07-10 DIAGNOSIS — Z01.818 PRE-PROCEDURAL EXAMINATION: ICD-10-CM

## 2020-07-10 PROCEDURE — U0003 INFECTIOUS AGENT DETECTION BY NUCLEIC ACID (DNA OR RNA); SEVERE ACUTE RESPIRATORY SYNDROME CORONAVIRUS 2 (SARS-COV-2) (CORONAVIRUS DISEASE [COVID-19]), AMPLIFIED PROBE TECHNIQUE, MAKING USE OF HIGH THROUGHPUT TECHNOLOGIES AS DESCRIBED BY CMS-2020-01-R: HCPCS

## 2020-07-10 RX ORDER — SULFAMETHOXAZOLE AND TRIMETHOPRIM 400; 80 MG/1; MG/1
1 TABLET ORAL
Status: ON HOLD | COMMUNITY
End: 2021-03-19 | Stop reason: HOSPADM

## 2020-07-10 NOTE — TELEPHONE ENCOUNTER
Please call to inform & review the results with the patient- stool studies showed acidic stool & no enteric sherrie; otherwise, negative/normal results. Acidic stool can indicate difficulty digesting certain dietary items. Recommend to avoid lactose products.  - recommended OTC probiotic, such as florastor or Culturelle, as directed    Continue with previous recommendations. If no improvement in symptoms or symptoms worsen, call/follow-up at clinic or go to ER.  Please release results to patient's mychart once you have discussed results and recommendations with patient.  Thanks,

## 2020-07-11 ENCOUNTER — PATIENT MESSAGE (OUTPATIENT)
Dept: NEUROLOGY | Facility: CLINIC | Age: 35
End: 2020-07-11

## 2020-07-11 LAB — SARS-COV-2 RNA RESP QL NAA+PROBE: NOT DETECTED

## 2020-07-13 ENCOUNTER — PATIENT MESSAGE (OUTPATIENT)
Dept: NEUROLOGY | Facility: CLINIC | Age: 35
End: 2020-07-13

## 2020-07-13 ENCOUNTER — OFFICE VISIT (OUTPATIENT)
Dept: NEUROLOGY | Facility: CLINIC | Age: 35
End: 2020-07-13
Payer: MEDICARE

## 2020-07-13 ENCOUNTER — PATIENT OUTREACH (OUTPATIENT)
Dept: ADMINISTRATIVE | Facility: OTHER | Age: 35
End: 2020-07-13

## 2020-07-13 ENCOUNTER — ANESTHESIA (OUTPATIENT)
Dept: ENDOSCOPY | Facility: HOSPITAL | Age: 35
End: 2020-07-13
Payer: MEDICARE

## 2020-07-13 ENCOUNTER — HOSPITAL ENCOUNTER (OUTPATIENT)
Facility: HOSPITAL | Age: 35
Discharge: HOME | End: 2020-07-13
Attending: INTERNAL MEDICINE | Admitting: INTERNAL MEDICINE
Payer: MEDICARE

## 2020-07-13 ENCOUNTER — TELEPHONE (OUTPATIENT)
Dept: NEUROLOGY | Facility: CLINIC | Age: 35
End: 2020-07-13

## 2020-07-13 ENCOUNTER — ANESTHESIA EVENT (OUTPATIENT)
Dept: ENDOSCOPY | Facility: HOSPITAL | Age: 35
End: 2020-07-13
Payer: MEDICARE

## 2020-07-13 VITALS
WEIGHT: 248 LBS | RESPIRATION RATE: 12 BRPM | HEART RATE: 57 BPM | HEIGHT: 71 IN | BODY MASS INDEX: 34.72 KG/M2 | TEMPERATURE: 98 F | SYSTOLIC BLOOD PRESSURE: 144 MMHG | OXYGEN SATURATION: 100 % | DIASTOLIC BLOOD PRESSURE: 73 MMHG

## 2020-07-13 VITALS
TEMPERATURE: 97 F | DIASTOLIC BLOOD PRESSURE: 65 MMHG | WEIGHT: 253.88 LBS | SYSTOLIC BLOOD PRESSURE: 137 MMHG | BODY MASS INDEX: 36.35 KG/M2 | HEIGHT: 70 IN | HEART RATE: 64 BPM | RESPIRATION RATE: 20 BRPM

## 2020-07-13 DIAGNOSIS — F84.0 AUTISTIC DISORDER, ACTIVE: Chronic | ICD-10-CM

## 2020-07-13 DIAGNOSIS — G40.909 SEIZURE DISORDER: Chronic | ICD-10-CM

## 2020-07-13 DIAGNOSIS — N31.9 NEUROGENIC BLADDER: Primary | Chronic | ICD-10-CM

## 2020-07-13 DIAGNOSIS — R13.10 DYSPHAGIA: ICD-10-CM

## 2020-07-13 PROCEDURE — 43248 PR EGD, FLEX, W/DILATION OVER GUIDEWIRE: ICD-10-PCS | Mod: ,,, | Performed by: INTERNAL MEDICINE

## 2020-07-13 PROCEDURE — 43239 EGD BIOPSY SINGLE/MULTIPLE: CPT | Mod: PO | Performed by: INTERNAL MEDICINE

## 2020-07-13 PROCEDURE — 3008F BODY MASS INDEX DOCD: CPT | Mod: CPTII,S$GLB,, | Performed by: NURSE PRACTITIONER

## 2020-07-13 PROCEDURE — 63600175 PHARM REV CODE 636 W HCPCS: Mod: PO | Performed by: INTERNAL MEDICINE

## 2020-07-13 PROCEDURE — 25000003 PHARM REV CODE 250: Mod: PO | Performed by: NURSE ANESTHETIST, CERTIFIED REGISTERED

## 2020-07-13 PROCEDURE — D9220A PRA ANESTHESIA: Mod: ANES,,, | Performed by: ANESTHESIOLOGY

## 2020-07-13 PROCEDURE — 99213 PR OFFICE/OUTPT VISIT, EST, LEVL III, 20-29 MIN: ICD-10-PCS | Mod: S$GLB,,, | Performed by: NURSE PRACTITIONER

## 2020-07-13 PROCEDURE — D9220A PRA ANESTHESIA: ICD-10-PCS | Mod: ANES,,, | Performed by: ANESTHESIOLOGY

## 2020-07-13 PROCEDURE — 27201012 HC FORCEPS, HOT/COLD, DISP: Mod: PO | Performed by: INTERNAL MEDICINE

## 2020-07-13 PROCEDURE — 3008F PR BODY MASS INDEX (BMI) DOCUMENTED: ICD-10-PCS | Mod: CPTII,S$GLB,, | Performed by: NURSE PRACTITIONER

## 2020-07-13 PROCEDURE — 37000009 HC ANESTHESIA EA ADD 15 MINS: Mod: PO | Performed by: INTERNAL MEDICINE

## 2020-07-13 PROCEDURE — D9220A PRA ANESTHESIA: Mod: CRNA,,, | Performed by: NURSE ANESTHETIST, CERTIFIED REGISTERED

## 2020-07-13 PROCEDURE — 99999 PR PBB SHADOW E&M-EST. PATIENT-LVL V: ICD-10-PCS | Mod: PBBFAC,,, | Performed by: NURSE PRACTITIONER

## 2020-07-13 PROCEDURE — 43239 PR EGD, FLEX, W/BIOPSY, SGL/MULTI: ICD-10-PCS | Mod: 59,,, | Performed by: INTERNAL MEDICINE

## 2020-07-13 PROCEDURE — 88305 TISSUE EXAM BY PATHOLOGIST: CPT | Mod: 59 | Performed by: PATHOLOGY

## 2020-07-13 PROCEDURE — 63600175 PHARM REV CODE 636 W HCPCS: Mod: PO | Performed by: NURSE ANESTHETIST, CERTIFIED REGISTERED

## 2020-07-13 PROCEDURE — 88305 TISSUE EXAM BY PATHOLOGIST: CPT | Mod: 26,,, | Performed by: PATHOLOGY

## 2020-07-13 PROCEDURE — 43248 EGD GUIDE WIRE INSERTION: CPT | Mod: PO | Performed by: INTERNAL MEDICINE

## 2020-07-13 PROCEDURE — 37000008 HC ANESTHESIA 1ST 15 MINUTES: Mod: PO | Performed by: INTERNAL MEDICINE

## 2020-07-13 PROCEDURE — D9220A PRA ANESTHESIA: ICD-10-PCS | Mod: CRNA,,, | Performed by: NURSE ANESTHETIST, CERTIFIED REGISTERED

## 2020-07-13 PROCEDURE — 43239 EGD BIOPSY SINGLE/MULTIPLE: CPT | Mod: 59,,, | Performed by: INTERNAL MEDICINE

## 2020-07-13 PROCEDURE — 43248 EGD GUIDE WIRE INSERTION: CPT | Mod: ,,, | Performed by: INTERNAL MEDICINE

## 2020-07-13 PROCEDURE — 99213 OFFICE O/P EST LOW 20 MIN: CPT | Mod: S$GLB,,, | Performed by: NURSE PRACTITIONER

## 2020-07-13 PROCEDURE — 99999 PR PBB SHADOW E&M-EST. PATIENT-LVL V: CPT | Mod: PBBFAC,,, | Performed by: NURSE PRACTITIONER

## 2020-07-13 PROCEDURE — 88305 TISSUE EXAM BY PATHOLOGIST: ICD-10-PCS | Mod: 26,,, | Performed by: PATHOLOGY

## 2020-07-13 RX ORDER — SODIUM CHLORIDE, SODIUM LACTATE, POTASSIUM CHLORIDE, CALCIUM CHLORIDE 600; 310; 30; 20 MG/100ML; MG/100ML; MG/100ML; MG/100ML
INJECTION, SOLUTION INTRAVENOUS CONTINUOUS
Status: DISCONTINUED | OUTPATIENT
Start: 2020-07-13 | End: 2020-07-13 | Stop reason: HOSPADM

## 2020-07-13 RX ORDER — FENTANYL CITRATE 50 UG/ML
INJECTION, SOLUTION INTRAMUSCULAR; INTRAVENOUS
Status: DISCONTINUED | OUTPATIENT
Start: 2020-07-13 | End: 2020-07-13

## 2020-07-13 RX ORDER — LIDOCAINE HCL/PF 100 MG/5ML
SYRINGE (ML) INTRAVENOUS
Status: DISCONTINUED | OUTPATIENT
Start: 2020-07-13 | End: 2020-07-13

## 2020-07-13 RX ORDER — SODIUM CHLORIDE 0.9 % (FLUSH) 0.9 %
10 SYRINGE (ML) INJECTION
Status: DISCONTINUED | OUTPATIENT
Start: 2020-07-13 | End: 2020-07-13 | Stop reason: HOSPADM

## 2020-07-13 RX ORDER — PANTOPRAZOLE SODIUM 40 MG/1
40 TABLET, DELAYED RELEASE ORAL DAILY
Qty: 30 TABLET | Refills: 2 | Status: SHIPPED | OUTPATIENT
Start: 2020-07-13 | End: 2020-09-08 | Stop reason: SDUPTHER

## 2020-07-13 RX ORDER — PROPOFOL 10 MG/ML
VIAL (ML) INTRAVENOUS
Status: DISCONTINUED | OUTPATIENT
Start: 2020-07-13 | End: 2020-07-13

## 2020-07-13 RX ADMIN — PROPOFOL 100 MG: 10 INJECTION, EMULSION INTRAVENOUS at 07:07

## 2020-07-13 RX ADMIN — PROPOFOL 25 MG: 10 INJECTION, EMULSION INTRAVENOUS at 07:07

## 2020-07-13 RX ADMIN — FENTANYL CITRATE 50 MCG: 50 INJECTION, SOLUTION INTRAMUSCULAR; INTRAVENOUS at 07:07

## 2020-07-13 RX ADMIN — SODIUM CHLORIDE, SODIUM LACTATE, POTASSIUM CHLORIDE, AND CALCIUM CHLORIDE: .6; .31; .03; .02 INJECTION, SOLUTION INTRAVENOUS at 06:07

## 2020-07-13 RX ADMIN — LIDOCAINE HYDROCHLORIDE 100 MG: 20 INJECTION, SOLUTION INTRAVENOUS at 07:07

## 2020-07-13 NOTE — PLAN OF CARE
VSS, all mother's questions answered. Denies recent fever. Pt being treated for UTI. Pt able to spell first name and then with continuous babbling. Mother to be at bedside and sign consents. Pts mother states ready for procedure.

## 2020-07-13 NOTE — PROGRESS NOTES
"    NEUROLOGY  Outpatient Follow Up    [facility]  [address], [place] [address]  [phone] (office) / [phone] (fax)    Patient Name:  Hussein Doyle  :  1985  MR #:  [id number]  Acct #:  [id number]    Date of Neurology Visit: 2020  Name of Provider: XIN Peterson    Other Physicians:  Reji Zendejas Jr, MD (Primary Care Physician); Miguel Salinas Jr., MD (Referring)      Chief Complaint: Seizures      History of Present Illness (HPI):  Per Dr. Salinas's note:  The patient is a 34 y.o. male referred for management of epilepsy.  He previously saw Dr. Sunshine.  This is my first time seeing him.  He provides little in the way of history due to his cognitive issues, so most of the history was obtained from his caregiver.  Additional history is as noted below.  Briefly, though, this gentleman suffers from a developmental delay and has a history of a prior GTC seizure.  He had onset of new episodes involving behavioral outbursts.  He was admitted to the EMU, and these were found not to be epileptic in nature.  Since the last time he was seen, he has had no further episodes worrisome for seizures.    Prior history:  "First seizure suspected in , onset was not witnessed but caused him to fall to the ground, period of unresponsiveness, had hit his head. Not clear if there was convulsive activity at that time. Mother also describes episodes where eyes roll back, he falls to the ground, has generalized convulsive activity and is disoriented / confused for a short time following. Frequency is approximately once every 2 months, last episode 2 months ago. More prominent since 2014.      Over the past few months and in particular 6 weeks, has had increase in behavioral outbursts. Mother is not sure if this is directly result of subclinical seizure or not but describes shouting out, inability to follow through with instructions or direction, eyes rolling " "(brief, eyes flit back and to right, forceful eye closure lasting 1-2 seconds) without change in muscle tone or consciousness. Concerned his behaviors have been regressing, he mentions name of caregiver from many years ago often. Repeats phrases (you need to have dark hair), appears agitated, unable to sit still. In the past month has had increase in dose of Keppra; had been on 250 BID until 12/4, dose was increased to 500 BID. At the time of visit with Dr. Parker on 12/16 was on 1500 mg HS. Family desperate to switch Keppra if this will improve behaviors. "        Interval Hx 7/13/2020:   Patient is new to me    Patient here today with mother (caretaker) who also supplies all history. Mother states patient has been dealing with multiple symptoms including diarrhea, excessive sweating, more outburst, constipation, generalized weakness and states "just not his usual self". Patient was recently hospitalized for UTI and constipation. Mother states patient gets UTIs often as he gets self-catheterizations from neurogenic bladder. Patient does have new active bladder infection currently and will be seeing his PCP tomorrow to make sure he is on the correct antibiotics. Patient had endoscopic procedure this morning.   Mother is frustrated as she states a lot of testing has been completed but she still has no answers.     On further note, patient has been seizure free and tolerating the Trileptal well (300mg BID). His latest LFTs are still elevated but have improved.   As previously recommended, patient has seen Hepatology in Feb 2020 and is scheduled for liver biopsy 7/20/2020.   Patient has also lost weight as previously recommended.       Last seizure: ~ 2014 as per report        AEDs:  Trileptal 300mg BID        Treatment to date:    Endoscopy 7/13/2020  Liver Biopsy scheduled for 7/20/2020            Review of Systems:    Completed by Mother at time of appt    General: Weight gain: No, Weight Loss: Yes, Fatigue: yes, "   Respiratory:  Cough: No, Shortness of Breath: No,   Endocrine: Heat Intolerance: No, Cold Intolerance: No,  Eyes:  Blurred Vision: No, Double Vision: No,  Musculoskeletal: Muscle Aches/Pain: No, Joint Pain/Swelling: No, Muscle Cramps: No, Muscle Weakness: Yes,   Neurological: Difficulty Walking/Falls: No, Headache Migraine: No, Dizziness/Vertigo: No, Fainting: No, Difficulty with Speech: No, Weakness: Yes, Tingling/Numbness: No, Tremors: No, Seizures: Yes, Difficulty Swallowing: No,   Cardiovascular: Chest Pain: No, Shortness of Breath: No,   Gastrointestinal: Nausea/Vomiting: No, Constipation: Yes, Diarrhea: Yes,   Psych/Cog:  Depression: Yes, Anxiety: No, Problems Concentrating: Yes  :  Incontinence: No,  Urinary Infections: No  ENT:Hearing Loss: No, Earache: No, Ringing in Ears: Yes,   The remainder of the review of twelve body systems was reviewed and normal.    Past Medical, Surgical, Family & Social History:   Reviewed and updated.    Home Medications:     Current Outpatient Medications:     allopurinoL (ZYLOPRIM) 100 MG tablet, Take 1 tablet (100 mg total) by mouth 2 (two) times daily., Disp: 180 tablet, Rfl: 3    ALPRAZolam (XANAX) 0.5 MG tablet, Take 1 tablet (0.5 mg total) by mouth daily as needed for Anxiety (may take an additional tab PRN for breakthrough anxiety)., Disp: 40 tablet, Rfl: 2    calcium carbonate-vitamin D3 (CALCIUM 500 WITH D) 500 mg(1,250mg) -400 unit Tab, 2 (two) times a day. , Disp: , Rfl:     fluvoxaMINE (LUVOX) 100 MG tablet, Take 1.5 tablets (150 mg total) by mouth 2 (two) times daily., Disp: 270 tablet, Rfl: 0    inulin (FIBER GUMMIES ORAL), Take by mouth once daily. , Disp: , Rfl:     L. ACIDOPHILUS/BIFID. ANIMALIS (CHEWABLE PROBIOTIC ORAL), Take by mouth 2 (two) times daily., Disp: , Rfl:     lactulose (CHRONULAC) 20 gram/30 mL Soln, Take 15 mLs (10 g total) by mouth 2 (two) times daily., Disp: 900 mL, Rfl: 0    multivitamin capsule, Take 1 capsule by mouth every  "morning. , Disp: , Rfl:     mupirocin (BACTROBAN) 2 % ointment, Apply topically 3 (three) times daily. (Patient taking differently: Apply topically 3 (three) times daily as needed. ), Disp: 30 g, Rfl: 0    nitrofurantoin, macrocrystal-monohydrate, (MACROBID) 100 MG capsule, Take 1 capsule (100 mg total) by mouth 2 (two) times daily. for 7 days, Disp: 14 capsule, Rfl: 0    omega-3 fatty acids/fish oil (FISH OIL-OMEGA-3 FATTY ACIDS) 300-1,000 mg capsule, Take by mouth once daily., Disp: , Rfl:     OXcarbazepine (TRILEPTAL) 300 MG Tab, TAKE 1 TABLET BY MOUTH TWICE A DAY, Disp: 60 tablet, Rfl: 0    pantoprazole (PROTONIX) 40 MG tablet, Take 1 tablet (40 mg total) by mouth once daily., Disp: 30 tablet, Rfl: 2    tamsulosin (FLOMAX) 0.4 mg Cp24, Take 0.4 mg by mouth every evening. Every day, Disp: , Rfl:     ondansetron (ZOFRAN-ODT) 4 MG TbDL, Take 1 tablet (4 mg total) by mouth every 6 (six) hours as needed. (Patient not taking: Reported on 7/10/2020), Disp: 10 tablet, Rfl: 0    sulfamethoxazole-trimethoprim 400-80mg (BACTRIM,SEPTRA) 400-80 mg per tablet, Take 1 tablet by mouth every 12 (twelve) hours., Disp: , Rfl:   No current facility-administered medications for this visit.     Physical Examination:  /65   Pulse 64   Temp 97.1 °F (36.2 °C)   Resp 20   Ht 5' 10" (1.778 m)   Wt 115.2 kg (253 lb 13.8 oz)   BMI 36.43 kg/m²     GENERAL:  General appearance: Well, non-toxic appearing.  No apparent distress.  Neck: supple.  Inattentive      MENTAL STATUS:  Alertness, attention span & concentration: normal.  Language: mumbles words  Does not follow commands      GROSS MOTOR:  Gait & station: normal.  Tone: normal.  Moves all extremities equally                Diagnostic Data Reviewed:   Component      Latest Ref Rng & Units 7/9/2020 6/30/2020   WBC      3.90 - 12.70 K/uL  8.86   RBC      4.60 - 6.20 M/uL  4.13 (L)   Hemoglobin      14.0 - 18.0 g/dL  13.9 (L)   Hematocrit      40.0 - 54.0 %  39.4 (L) "   MCV      82 - 98 fL  95   MCH      27.0 - 31.0 pg  33.7 (H)   MCHC      32.0 - 36.0 g/dL  35.3   RDW      11.5 - 14.5 %  12.2   Platelets      150 - 350 K/uL  197   MPV      9.2 - 12.9 fL  9.6   Immature Granulocytes      0.0 - 0.5 %  0.1   Gran # (ANC)      1.8 - 7.7 K/uL  4.8   Immature Grans (Abs)      0.00 - 0.04 K/uL  0.01   Lymph #      1.0 - 4.8 K/uL  3.3   Mono #      0.3 - 1.0 K/uL  0.6   Eos #      0.0 - 0.5 K/uL  0.1   Baso #      0.00 - 0.20 K/uL  0.05   nRBC      0 /100 WBC  0   Gran%      38.0 - 73.0 %  54.2   Lymph%      18.0 - 48.0 %  36.8   Mono%      4.0 - 15.0 %  6.9   Eosinophil%      0.0 - 8.0 %  1.4   Basophil%      0.0 - 1.9 %  0.6   Differential Method        Automated   Sodium      136 - 145 mmol/L  140   Potassium      3.5 - 5.1 mmol/L  4.1   Chloride      95 - 110 mmol/L  108   CO2      22 - 31 mmol/L  25   Glucose      70 - 110 mg/dL  84   BUN, Bld      9 - 21 mg/dL  15   Creatinine      0.50 - 1.40 mg/dL  0.64   Calcium      8.4 - 10.2 mg/dL  9.9   PROTEIN TOTAL      6.0 - 8.4 g/dL  8.6 (H)   Albumin      3.5 - 5.2 g/dL  4.6   BILIRUBIN TOTAL      0.2 - 1.3 mg/dL  0.6   Alkaline Phosphatase      38 - 145 U/L  65   AST      17 - 59 U/L  151 (H)   ALT      0 - 50 U/L  119 (H)   Anion Gap      8 - 16 mmol/L  7 (L)   eGFR if African American      >60 mL/min/1.73 m:2  >60   eGFR if non African American      >60 mL/min/1.73 m:2  >60   Specimen UA       Urine, Catheterized    Color, UA      Yellow, Straw, Dee Yellow    Appearance, UA      Clear Cloudy (A)    pH, UA      5.0 - 8.0 7.0    Specific Gravity, UA      1.005 - 1.030 1.010    Protein, UA      Negative Negative    Glucose, UA      Negative Negative    Ketones, UA      Negative Negative    Bilirubin (UA)      Negative Negative    Occult Blood UA      Negative Negative    NITRITE UA      Negative Positive (A)    UROBILINOGEN UA      <2.0 EU/dL 0.2    Leukocytes, UA      Negative 3+ (A)    Squam Epithel, UA      /hpf 0    WBC, UA       0 - 5 /hpf >100 (H)    Hyaline Casts, UA      0 - 1 /lpf 6 (A)    Bacteria, UA      None-Occ /hpf Few (A)      Component      Latest Ref Rng & Units 6/20/2020   OXCARBAZEPINE      3 - 35 mcg/mL 7     Abd u/s 5/2018 - liver enlarged, fatty liver, no liver masses, no biliary dilation.  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 LFTs have improved.   Informed mother his UTI needs to be treated appropriately and await liver biopsy results.             Renal/    Neurogenic bladder - Primary (Chronic)    Overview     Doing self cath 5 times a day         Current Assessment & Plan     Bactrim for UTI prophylaxis discontinued (catheterized for neurogenic bladder)  H/o multiple UTIs                                   Important to note, also  has a past medical history of Autistic disorder, active (5/6/2013), Bowel obstruction, Early intervention counseling, Gout, chronic (11/12/2015), Grand mal seizure, Hepatic steatosis, Impulse control disorder, unspecified (5/6/2013), Mastoiditis, Moderate mental retardation (5/6/2013), Neurogenic bladder, Neurogenic bladder, HALEY (obstructive sleep apnea) (11/12/2015), Otitis media, Paraplegia, Pervasive developmental disorder, active (12/27/2015), Renal cancer (2010), Scoliosis, Seizure disorder (11/12/2015), Stroke, and Tardive dyskinesia.          The patient will return to clinic in 3 months.    All questions were answered and patient is comfortable with  the plan.         Thank you very much for the opportunity to assist in this patient's care.    If you have any questions or concerns, please do not hesitate to contact me at any time.    Sincerely,   XIN Peterson

## 2020-07-13 NOTE — H&P
History & Physical - Short Stay  Gastroenterology      SUBJECTIVE:     Procedure: EGD    Chief Complaint/Indication for Procedure: Dysphagia and Weight Loss    PTA Medications   Medication Sig    allopurinoL (ZYLOPRIM) 100 MG tablet Take 1 tablet (100 mg total) by mouth 2 (two) times daily.    ALPRAZolam (XANAX) 0.5 MG tablet Take 1 tablet (0.5 mg total) by mouth daily as needed for Anxiety (may take an additional tab PRN for breakthrough anxiety).    calcium carbonate-vitamin D3 (CALCIUM 500 WITH D) 500 mg(1,250mg) -400 unit Tab 2 (two) times a day.     fluvoxaMINE (LUVOX) 100 MG tablet Take 1.5 tablets (150 mg total) by mouth 2 (two) times daily.    inulin (FIBER GUMMIES ORAL) Take by mouth once daily.     L. ACIDOPHILUS/BIFID. ANIMALIS (CHEWABLE PROBIOTIC ORAL) Take by mouth 2 (two) times daily.    lactulose (CHRONULAC) 20 gram/30 mL Soln Take 15 mLs (10 g total) by mouth 2 (two) times daily.    multivitamin capsule Take 1 capsule by mouth every morning.     mupirocin (BACTROBAN) 2 % ointment Apply topically 3 (three) times daily. (Patient taking differently: Apply topically 3 (three) times daily as needed. )    nitrofurantoin, macrocrystal-monohydrate, (MACROBID) 100 MG capsule Take 1 capsule (100 mg total) by mouth 2 (two) times daily. for 7 days    omega-3 fatty acids/fish oil (FISH OIL-OMEGA-3 FATTY ACIDS) 300-1,000 mg capsule Take by mouth once daily.    OXcarbazepine (TRILEPTAL) 300 MG Tab TAKE 1 TABLET BY MOUTH TWICE A DAY    sulfamethoxazole-trimethoprim 400-80mg (BACTRIM,SEPTRA) 400-80 mg per tablet Take 1 tablet by mouth every 12 (twelve) hours.    tamsulosin (FLOMAX) 0.4 mg Cp24 Take 0.4 mg by mouth every evening. Every day    ondansetron (ZOFRAN-ODT) 4 MG TbDL Take 1 tablet (4 mg total) by mouth every 6 (six) hours as needed. (Patient not taking: Reported on 7/10/2020)       Review of patient's allergies indicates:   Allergen Reactions    Benadryl [diphenhydramine hcl] Other (See  Comments)     Increases anxiety and more restless    Keppra [levetiracetam] Other (See Comments)     agitation    Ativan [lorazepam] Anxiety    Abilify  [aripiprazole]      Other reaction(s): arrhythmia    Clonazepam      Other reaction(s): unknown    Cough syrup w/decongestant      Other reaction(s): auditory hallucinations    Diazepam      Other reaction(s): tongue swelling    Haloperidol Other (See Comments)    Phenytoin sodium extended      Other reaction(s): unknown    Quetiapine      Other reaction(s): sweating  Other reaction(s): sedation  Other reaction(s): sweating  Other reaction(s): sedation    Risperidone      Other reaction(s): bladder incontinence    Thimerosal      Other reaction(s): seizure    Ziprasidone hcl Other (See Comments)     Other reaction(s): tonic clonic seizure  seizure        Past Medical History:   Diagnosis Date    Autistic disorder, active 5/6/2013    Bowel obstruction     pt mother denies    Early intervention counseling     Gout, chronic 11/12/2015    Grand mal seizure     Hepatic steatosis     Impulse control disorder, unspecified 5/6/2013    Mastoiditis     Moderate mental retardation 5/6/2013    Neurogenic bladder     Neurogenic bladder     HALEY (obstructive sleep apnea) 11/12/2015    BIPAP.  Dr. Garber     Otitis media     Paraplegia     Pervasive developmental disorder, active 12/27/2015    Renal cancer 2010    Right    Scoliosis     paraplegia    Seizure disorder 11/12/2015    Stroke     one week old    Tardive dyskinesia      Past Surgical History:   Procedure Laterality Date    ANKLE FRACTURE SURGERY      BACK SURGERY  2000    COLONOSCOPY  10/28/2008    Dr. Arana at [facility]; anal fissure, minimal pinpoint rectal erythema; floppy-type colon with very little tone; biopsy: rectum mild chronic proctitis with few eosinophils sent for scanning    COLONOSCOPY N/A 6/15/2018    Procedure: COLONOSCOPY;  Surgeon: Refugio Villagomez MD;  Location: [facility]  ENDO;  Service: Endoscopy;  Laterality: N/A; Preparation of the colon was fair, unremarkable; REPEAT at 50 YEARS old FOR SCREENING    INNER EAR SURGERY      mastoid    right partial nephrectomy Right 2010    Sees urology    TYMPANOSTOMY TUBE PLACEMENT       Family History   Problem Relation Age of Onset    Cancer Father         lymphoma    Cataracts Father     Colon polyps Father     Cataracts Mother     Cataracts Maternal Grandmother     Diabetes Maternal Grandmother     Macular degeneration Maternal Grandmother     Glaucoma Maternal Grandmother     Anesthesia problems Neg Hx     Clotting disorder Neg Hx     Colon cancer Neg Hx     Crohn's disease Neg Hx     Ulcerative colitis Neg Hx     Stomach cancer Neg Hx     Esophageal cancer Neg Hx     Cirrhosis Neg Hx      Social History     Tobacco Use    Smoking status: Never Smoker    Smokeless tobacco: Never Used   Substance Use Topics    Alcohol use: No     Frequency: Never     Drinks per session: Patient refused     Binge frequency: Never    Drug use: No         OBJECTIVE:     Vital Signs (Most Recent)  Temp: 97.9 °F (36.6 °C) (07/13/20 0637)  Pulse: 61 (07/13/20 0637)  Resp: 16 (07/13/20 0637)  BP: 138/73 (07/13/20 0637)  SpO2: 95 % (07/13/20 0637)    Physical Exam:                                                       GENERAL:  Comfortable, in no acute distress.                                 HEENT EXAM:  Nonicteric.  No adenopathy.  Oropharynx is clear.               NECK:  Supple.                                                               LUNGS:  Clear.                                                               CARDIAC:  Regular rate and rhythm.  S1, S2.  No murmur.                      ABDOMEN:  Soft, positive bowel sounds, nontender.  No hepatosplenomegaly or masses.  No rebound or guarding.                                             EXTREMITIES:  No edema.     MENTAL STATUS:  Autistic, mental delay.      ASSESSMENT/PLAN:      Assessment: Dysphagia and Weight Loss    Plan: EGD    Anesthesia Plan: General    ASA Grade: ASA 3 - Patient with moderate systemic disease with functional limitations    MALLAMPATI SCORE:  I (soft palate, uvula, fauces, and tonsillar pillars visible)     In my medical opinion and judgment, this medical or surgical procedure was not able to be safely postponed in accordance with [place] Department [facility], Healthcare Facility Notice #[id number].

## 2020-07-13 NOTE — DISCHARGE SUMMARY
Discharge Note  Short Stay      SUMMARY     Admit Date: 7/13/2020    Attending Physician: Refugio Villagomez MD     Discharge Physician: Refugio Villagomez MD    Discharge Date: 7/13/2020 7:22 AM    Final Diagnosis: Diarrhea, unspecified type [R19.7]  Weight loss [R63.4]  Dysphagia, unspecified type [R13.10]    Disposition: HOME OR SELF CARE    Patient Instructions:   Current Discharge Medication List      START taking these medications    Details   pantoprazole (PROTONIX) 40 MG tablet Take 1 tablet (40 mg total) by mouth once daily.  Qty: 30 tablet, Refills: 2         CONTINUE these medications which have NOT CHANGED    Details   allopurinoL (ZYLOPRIM) 100 MG tablet Take 1 tablet (100 mg total) by mouth 2 (two) times daily.  Qty: 180 tablet, Refills: 3    Comments: Please inactivate all prior scripts with same name and strength including on holds.  Associated Diagnoses: Chronic gout without tophus, unspecified cause, unspecified site      ALPRAZolam (XANAX) 0.5 MG tablet Take 1 tablet (0.5 mg total) by mouth daily as needed for Anxiety (may take an additional tab PRN for breakthrough anxiety).  Qty: 40 tablet, Refills: 2    Comments: Not to exceed 5 additional fills before 09/07/2020      calcium carbonate-vitamin D3 (CALCIUM 500 WITH D) 500 mg(1,250mg) -400 unit Tab 2 (two) times a day.       fluvoxaMINE (LUVOX) 100 MG tablet Take 1.5 tablets (150 mg total) by mouth 2 (two) times daily.  Qty: 270 tablet, Refills: 0    Associated Diagnoses: Impulse control disorder; Other obsessive-compulsive disorders      inulin (FIBER GUMMIES ORAL) Take by mouth once daily.       L. ACIDOPHILUS/BIFID. ANIMALIS (CHEWABLE PROBIOTIC ORAL) Take by mouth 2 (two) times daily.      lactulose (CHRONULAC) 20 gram/30 mL Soln Take 15 mLs (10 g total) by mouth 2 (two) times daily.  Qty: 900 mL, Refills: 0    Associated Diagnoses: Chronic constipation      multivitamin capsule Take 1 capsule by mouth every morning.       mupirocin  (BACTROBAN) 2 % ointment Apply topically 3 (three) times daily.  Qty: 30 g, Refills: 0      nitrofurantoin, macrocrystal-monohydrate, (MACROBID) 100 MG capsule Take 1 capsule (100 mg total) by mouth 2 (two) times daily. for 7 days  Qty: 14 capsule, Refills: 0      omega-3 fatty acids/fish oil (FISH OIL-OMEGA-3 FATTY ACIDS) 300-1,000 mg capsule Take by mouth once daily.      OXcarbazepine (TRILEPTAL) 300 MG Tab TAKE 1 TABLET BY MOUTH TWICE A DAY  Qty: 60 tablet, Refills: 0    Associated Diagnoses: Unspecified convulsions      sulfamethoxazole-trimethoprim 400-80mg (BACTRIM,SEPTRA) 400-80 mg per tablet Take 1 tablet by mouth every 12 (twelve) hours.      tamsulosin (FLOMAX) 0.4 mg Cp24 Take 0.4 mg by mouth every evening. Every day      ondansetron (ZOFRAN-ODT) 4 MG TbDL Take 1 tablet (4 mg total) by mouth every 6 (six) hours as needed.  Qty: 10 tablet, Refills: 0             Discharge Procedure Orders (must include Diet, Follow-up, Activity)    Follow Up:  Follow up with PCP as previously scheduled  Resume routine diet.  Activity as tolerated.    No driving day of procedure.

## 2020-07-13 NOTE — ANESTHESIA PREPROCEDURE EVALUATION
07/13/2020  Hussein Doyle is a 34 y.o., male.    Pre-op Assessment    I have reviewed the Patient Summary Reports.     I have reviewed the Nursing Notes. I have reviewed the NPO Status.   I have reviewed the Medications.     Review of Systems  Anesthesia Hx:  No problems with previous Anesthesia  Denies Family Hx of Anesthesia complications.   Denies Personal Hx of Anesthesia complications.   Social:  Non-Smoker    Pulmonary:   Sleep Apnea    Renal/:   Chronic Renal Disease    Hepatic/GI:   Liver Disease,    Neurological:   CVA Seizures, well controlled  Denies Cognitive Impairment    Psych:   Psychiatric History          Physical Exam  General:  Morbid Obesity    Airway/Jaw/Neck:  Airway Findings: Mouth Opening: Normal Tongue: Normal  General Airway Assessment: Adult, Good  Mallampati: II  Improves to II with phonation.  TM Distance: 4-6 cm      Dental:  Dental Findings: In tact   Chest/Lungs:  Chest/Lungs Findings: Clear to auscultation, Normal Respiratory Rate     Heart/Vascular:  Heart Findings: Rate: Normal  Rhythm: Regular Rhythm  Sounds: Normal  Heart murmur: negative       Mental Status:  Mental Status Findings:  Cooperative         Anesthesia Plan  Type of Anesthesia, risks & benefits discussed:  Anesthesia Type:  general  Patient's Preference:   Intra-op Monitoring Plan: standard ASA monitors  Intra-op Monitoring Plan Comments:   Post Op Pain Control Plan:   Post Op Pain Control Plan Comments:   Induction:    Beta Blocker:  Patient is not currently on a Beta-Blocker (No further documentation required).       Informed Consent: Patient understands risks and agrees with Anesthesia plan.  Questions answered. Anesthesia consent signed with patient.  ASA Score: 3     Day of Surgery Review of History & Physical:    H&P update referred to the provider.         Ready For Surgery From Anesthesia  Perspective.

## 2020-07-13 NOTE — ASSESSMENT & PLAN NOTE
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 LFTs have improved.   Informed mother his UTI needs to be treated appropriately and await liver biopsy results.

## 2020-07-13 NOTE — [employer]OVATION PATIENT INSTRUCTIONS
Discharge Summary/Instructions after an Endoscopic Procedure  Patient Name: Hussein Yu  Patient MRN: [id number]  Patient YOB: 1985 Monday, July 13, 2020  Refugio Villagomez MD  RESTRICTIONS:  During your procedure today, you received medications for sedation.  These   medications may affect your judgment, balance and coordination.  Therefore,   for 24 hours, you have the following restrictions:   - DO NOT drive a car, operate machinery, make legal/financial decisions,   sign important papers or drink alcohol.    ACTIVITY:  Today: no heavy lifting, straining or running due to procedural   sedation/anesthesia.  The following day: return to full activity including work.  DIET:  Eat and drink normally unless instructed otherwise.     TREATMENT FOR COMMON SIDE EFFECTS:  - Mild abdominal pain, nausea, belching, bloating or excessive gas:  rest,   eat lightly and use a heating pad.  - Sore Throat: treat with throat lozenges and/or gargle with warm salt   water.  - Because air was used during the procedure, expelling large amounts of air   from your rectum or belching is normal.  - If a bowel prep was taken, you may not have a bowel movement for 1-3 days.    This is normal.  SYMPTOMS TO WATCH FOR AND REPORT TO YOUR PHYSICIAN:  1. Abdominal pain or bloating, other than gas cramps.  2. Chest pain.  3. Back pain.  4. Signs of infection such as: chills or fever occurring within 24 hours   after the procedure.  5. Rectal bleeding, which would show as bright red, maroon, or black stools.   (A tablespoon of blood from the rectum is not serious, especially if   hemorrhoids are present.)  6. Vomiting.  7. Weakness or dizziness.  GO DIRECTLY TO THE NEAREST EMERGENCY ROOM IF YOU HAVE ANY OF THE FOLLOWING:      Difficulty breathing              Chills and/or fever over 101 F   Persistent vomiting and/or vomiting blood   Severe abdominal pain   Severe chest pain   Black, tarry stools   Bleeding- more than one  tablespoon   Any other symptom or condition that you feel may need urgent attention  Your doctor recommends these additional instructions:  If any biopsies were taken, your doctors clinic will contact you in 1 to 2   weeks with any results.  We are waiting for your pathology results.   Continue your present medications.   You are being discharged to home.  For questions, problems or results please call your physician - Refugio Villagomez MD at Work:  [phone].  EMERGENCY PHONE NUMBER: [phone], LAB RESULTS: [phone]  IF A COMPLICATION OR EMERGENCY SITUATION ARISES AND YOU ARE UNABLE TO REACH   YOUR PHYSICIAN - GO DIRECTLY TO THE EMERGENCY ROOM.  ___________________________________________  Nurse Signature  ___________________________________________  Patient/Designated Responsible Party Signature  Refugio Villagomez MD  7/13/2020 7:21:04 AM  This report has been verified and signed electronically.  [employer]

## 2020-07-13 NOTE — TELEPHONE ENCOUNTER
----- Message from Heladio Bergeron sent at 7/13/2020  9:51 AM CDT -----  Type:  Patient Returning Call    Who Called:  Eli Steward (Mother)  Who Left Message for Patient:  Fe  Does the patient know what this is regarding?:    Best Call Back Number:  [phone]  Additional Information:

## 2020-07-13 NOTE — ANESTHESIA POSTPROCEDURE EVALUATION
Anesthesia Post Evaluation    Patient: Hussein Doyle    Procedure(s) Performed: Procedure(s) (LRB):  EGD (ESOPHAGOGASTRODUODENOSCOPY) (N/A)    Final Anesthesia Type: general    Patient location during evaluation: PACU  Patient participation: Yes- Able to Participate  Level of consciousness: awake and alert  Post-procedure vital signs: reviewed and stable  Pain management: adequate  Airway patency: patent    PONV status at discharge: No PONV  Anesthetic complications: no      Cardiovascular status: blood pressure returned to baseline  Respiratory status: unassisted  Hydration status: euvolemic  Follow-up not needed.          Vitals Value Taken Time   /73 07/13/20 0740   Temp 36.8 °C (98.2 °F) 07/13/20 0722   Pulse 57 07/13/20 0740   Resp 12 07/13/20 0740   SpO2 100 % 07/13/20 0740         Event Time   Out of Recovery 07:54:49         Pain/Jane Score: Jane Score: 10 (7/13/2020  7:55 AM)

## 2020-07-13 NOTE — LETTER
July 13, 2020      Miguel Salinas Jr., MD  [address] [facility] [address] [place] [address]           [facility]  [address] [place] [address]  Phone: [phone]  Fax: [phone]          Patient: Hussein Doyle   MR Number: [id number]   YOB: 1985   Date of Visit: 7/13/2020       Dear Dr. Migule Salinas Jr.:    Thank you for referring Hussein Doyle to me for evaluation. Attached you will find relevant portions of my assessment and plan of care.    If you have questions, please do not hesitate to call me. I look forward to following Hussein Doyle along with you.    Sincerely,    Josselyn Red, [facility]    Enclosure  CC:  No Recipients    If you would like to receive this communication electronically, please contact [email or web address] or [phone] to request more information on [employer] access.    For providers and/or their staff who would like to refer a patient to [facility], please contact us through our one-stop-shop provider referral line, [facility], at [phone].    If you feel you have received this communication in error or would no longer like to receive these types of communications, please e-mail [email or web address]

## 2020-07-13 NOTE — TRANSFER OF CARE
"Anesthesia Transfer of Care Note    Patient: Hussein Doyle    Procedure(s) Performed: Procedure(s) (LRB):  EGD (ESOPHAGOGASTRODUODENOSCOPY) (N/A)    Patient location: PACU    Anesthesia Type: general    Transport from OR: Transported from OR on room air with adequate spontaneous ventilation    Post pain: adequate analgesia    Post assessment: no apparent anesthetic complications and tolerated procedure well    Post vital signs: stable    Level of consciousness: awake    Nausea/Vomiting: no nausea/vomiting    Complications: none    Transfer of care protocol was followed      Last vitals:   Visit Vitals  /73   Pulse 61   Temp 36.6 °C (97.9 °F) (Skin)   Resp 16   Ht 5' 10.5" (1.791 m)   Wt 112.5 kg (248 lb)   SpO2 95%   BMI 35.08 kg/m²     "

## 2020-07-13 NOTE — TELEPHONE ENCOUNTER
Pt mom is c/o weight loss 25 lb since May. He has had diarrhea on and off.  He did not have BM for 6 days in July. He has a decreased appetite.  He is seeing GI for those issues.  He has had recurrent UTI's.  Two in the last 2 weeks. C/o weak and sweating all day long.  Mom thinks his cognitive level has declined.

## 2020-07-13 NOTE — DISCHARGE INSTRUCTIONS
Esophagitis     With esophagitis, the lining of the esophagus is inflamed.   Do you often have burning pain in your chest? You may have esophagitis. This is when the lining of the esophagus becomes red and swollen (inflamed). The esophagus is the tube that connects your throat to your stomach. This sheet tells you more about esophagitis. It also explains your treatment options.  Main types of esophagitis  Reflux esophagitis. This is the more common type. It is caused by GERD (gastroesophageal reflux disease). Stomach contents with stomach acid flow back up into the esophagus. This happens over and over. It leads to inflammation. Risk factors can include:  · Being overweight  · Asthma  · Smoking  · Pregnancy  · Frequent vomiting  · Certain medicines (such as aspirin and other anti-inflammatories)  · Hiatal hernia  Infectious esophagitis. This is caused by an infection. You are more at risk for this if you have a weakened immune system and poor nutrition. Antibiotic use can also be a factor. The infection is often due to the following:  · A type of fungus (typically candida)  · A virus, such as herpes simplex virus 1 (HSV-1) or cytomegalovirus (CMV)  Eosinophilic esophagitis. Foods or other things around you can give you an allergic reaction. This triggers an immune response and leads to esophagitis.  Pill-induced esophagitis. Certain types of medicines can cause inflammation and ulcers in the esophagus. These include doxycycline, aspirin, NSAIDs, alendronate, potassium, quinidine, iron.  Symptoms of esophagitis  The following symptoms can occur with esophagitis:  · Pain when swallowing, or trouble swallowing  · Pain behind your breastbone (heartburn)  · Acid regurgitation  · Chronic sore throat  · Gum Inflammation  · Cavities  · Bad breath  · Nausea  · Pain in your upper belly (abdomen)  · Bleeding (indicated by bright red vomit or black, tarry stool)  These symptoms occur more often with reflux  esophagitis:  · Coughing, wheezing, or asthma  · Hoarseness  Diagnosis of esophagitis  Your healthcare provider will ask about your health history and symptoms. Youll also be examined. Sometimes certain tests are needed. These may include:  · Upper endoscopy. A thin, flexible tube with a tiny light and camera is used. It is inserted through the mouth down into the esophagus. This lets the provider look for damage. A small sample of tissue (biopsy) may also be removed. The sample is sent to a lab for testing.  · Upper GI X-ray with barium. An X-ray is done after you drink a substance called barium. Barium may make problems in the esophagus easier to see on an x-ray.  · Esophageal pH. A soft, thin tube is passed into the esophagus through the nose or mouth for 24 hours. It measures the acid level in the esophagus.  · Esophageal manometry. A soft, thin tube is passed into the esophagus through the nose or mouth. It measures muscle contractions in the esophagus.  Treatment of esophagitis  Medicines. Different medicines can help treat esophagitis. The medicine used will depend on the type of esophagitis you have. Talk with your healthcare provider.  Lifestyle changes. Making the following changes can help reduce irritation and ease your symptoms:  · Avoid spicy foods (pepper, chili powder, bonds). Also avoid hard foods (nuts, crackers, raw vegetables) and acidic foods and drinks (tomatoes, citrus fruits and juices). Other problem foods include chocolate, peppermint, nutmeg, and foods high in fat.  · Until you can swallow without pain, follow a combined liquid and soft diet. Try foods such as cooked cereals, mashed potatoes, and soups.  · Take small bites and chew your food thoroughly.  · Avoid large meals and heavy evening meals. Don't lie down within 2 to 3 hours of eating.  · Get to or stay at a healthy weight.  · Avoid alcohol, caffeine, and smoking or tobacco products.  · Brush and floss your teeth  · Raise your  upper body by 4 to 6 inches when lying in bed. This can be done using a foam wedge. Or put blocks under the legs at the head of your bed.  Surgery. This may be needed for severe reflux esophagitis. Other noninvasive procedures to treat GERD and esophagitis are being studied. Your provider can tell you more.  Why treatment Is important  Without treatment, esophagitis can get worse. This is especially true with severe reflux esophagitis. For instance, continued symptoms can cause scarring of the esophagus. Over time, this can cause a narrowing the esophagus (stricture). This can make it hard to pass food down to the stomach. As symptoms go on they can also cause changes in the lining of the esophagus. These changes can put you at a slightly higher risk of cancer of the esophagus.   Date Last Reviewed: 7/1/2016  © 2931-8522 [employer]. [address] [place] [address], [place] [address]. All rights reserved. This information is not intended as a substitute for professional medical care. Always follow your healthcare professional's instructions.

## 2020-07-13 NOTE — PATIENT INSTRUCTIONS
Seizure education.    No driving for now, no swimming alone, no climbing high areas, no operation of heavy machinery or working with high risk electricity equipment.    Continue to take AEDs as prescribed. If any major side effects from neurological medications go to the ED including mood changes and severe depression.      Patient and/or next of kin informed.      Medication side effects discussed with the patient and/or caregiver.         Trileptal   What is this drug used for?    It is used to help control certain kinds of seizures.    It may be given to you for other reasons. Talk with the doctor.  What do I need to tell my doctor BEFORE I take this drug?    If you have an allergy to oxcarbazepine or any other part of this drug.    If you are allergic to this drug; any part of this drug; or any other drugs, foods, or substances. Tell your doctor about the allergy and what signs you had.    If you have liver disease.    If you have been tested and know that you have a gene type called HLA-B*1502.   This is not a list of all drugs or health problems that interact with this drug.   Tell your doctor and pharmacist about all of your drugs (prescription or OTC, natural products, vitamins) and health problems. You must check to make sure that it is safe for you to take this drug with all of your drugs and health problems. Do not start, stop, or change the dose of any drug without checking with your doctor.  What are some things I need to know or do while I take this drug?    Tell all of your health care providers that you take this drug. This includes your doctors, nurses, pharmacists, and dentists.    Avoid driving and doing other tasks or actions that call for you to be alert until you see how this drug affects you.    Have blood work checked as you have been told by the doctor. Talk with the doctor.    Talk with your doctor before you drink alcohol or use other drugs and natural products that  slow your actions.    Patients who take this drug may be at a greater risk of having thoughts or actions of suicide. The risk may be greater in people who have had these thoughts or actions in the past. Call the doctor right away if signs like low mood (depression), nervousness, restlessness, grouchiness, panic attacks, or changes in mood or actions are new or worse. Call the doctor right away if any thoughts or actions of suicide occur.    A very bad and sometimes deadly effect has happened in people taking drugs for seizures like this drug. Call your doctor right away if you have swollen glands; fever; rash; chest pain; unable to pass urine or change in the amount of urine passed; or signs of liver problems like dark urine, feeling tired, not hungry, upset stomach or stomach pain, light-colored stools, throwing up, or yellow skin or eyes.    A very bad skin reaction (Armendariz-Carl syndrome/toxic epidermal necrolysis) may happen. It can cause very bad health problems that may not go away, and sometimes death. Get medical help right away if you have signs like red, swollen, blistered, or peeling skin (with or without fever); red or irritated eyes; or sores in your mouth, throat, nose, or eyes.    People with a certain gene (HLA-B*1502) have a higher chance of severe and sometimes deadly skin reactions with this drug. This gene is more common in  people. If you have questions, talk with the doctor.    A very bad reaction called angioedema has happened with this drug. Sometimes, this may be life-threatening. Signs may include swelling of the hands, face, lips, eyes, tongue, or throat; trouble breathing; trouble swallowing; or unusual hoarseness. Get medical help right away if you have any of these signs.    If you are 65 or older, use this drug with care. You could have more side effects.    Birth control pills and other hormone-based birth control may not work as well to prevent pregnancy. Use some  other kind of birth control also like a condom when taking this drug.    This drug may cause harm to the unborn baby if you take it while you are pregnant. If you are pregnant or you get pregnant while taking this drug, call your doctor right away.    This drug may not work as well during pregnancy. Talk with the doctor.    Tell your doctor if you are breast-feeding. You will need to talk about any risks to your baby.  What are some side effects that I need to call my doctor about right away?   WARNING/CAUTION: Even though it may be rare, some people may have very bad and sometimes deadly side effects when taking a drug. Tell your doctor or get medical help right away if you have any of the following signs or symptoms that may be related to a very bad side effect:    Signs of an allergic reaction, like rash; hives; itching; red, swollen, blistered, or peeling skin with or without fever; wheezing; tightness in the chest or throat; trouble breathing, swallowing, or talking; unusual hoarseness; or swelling of the mouth, face, lips, tongue, or throat.    Signs of low sodium levels like headache, trouble focusing, memory problems, feeling confused, weakness, seizures, or change in balance.    Shortness of breath, a big weight gain, or swelling in the arms or legs.    Very bad muscle pain or weakness.    Very bad joint pain or swelling.    Feeling confused.    Not able to focus.    Change in speech.    Change in eyesight.    If seizures are worse or not the same after starting this drug.    Not able to control eye movements.    Memory problems or loss.    Trouble walking.    Change in balance.    Rarely, low blood cell counts have happened with this drug. Call your doctor right away if you have any unexplained bruising or bleeding; signs of infection like fever, chills, or sore throat; or feel very tired or weak.  What are some other side effects of this drug?   All drugs may cause side effects. However,  many people have no side effects or only have minor side effects. Call your doctor or get medical help if any of these side effects or any other side effects bother you or do not go away:    Headache.    Feeling tired or weak.    Upset stomach or throwing up.    Not hungry.    Belly pain.    Dizziness.    Feeling sleepy.    Shakiness.    Feeling nervous and excitable.    Trouble sleeping.    Change in taste.    Diarrhea.    Constipation.    Signs of a common cold.   These are not all of the side effects that may occur. If you have questions about side effects, call your doctor. Call your doctor for medical advice about side effects.   You may report side effects to your national health agency.

## 2020-07-14 ENCOUNTER — OFFICE VISIT (OUTPATIENT)
Dept: FAMILY MEDICINE | Facility: CLINIC | Age: 35
End: 2020-07-14
Payer: MEDICARE

## 2020-07-14 VITALS
WEIGHT: 249.31 LBS | DIASTOLIC BLOOD PRESSURE: 84 MMHG | TEMPERATURE: 99 F | OXYGEN SATURATION: 96 % | HEART RATE: 60 BPM | BODY MASS INDEX: 35.69 KG/M2 | HEIGHT: 70 IN | SYSTOLIC BLOOD PRESSURE: 122 MMHG

## 2020-07-14 DIAGNOSIS — G40.909 SEIZURE DISORDER: Chronic | ICD-10-CM

## 2020-07-14 DIAGNOSIS — K59.09 CHRONIC CONSTIPATION: Chronic | ICD-10-CM

## 2020-07-14 DIAGNOSIS — R53.83 LETHARGY: Primary | ICD-10-CM

## 2020-07-14 DIAGNOSIS — R62.50 DEVELOPMENTAL DELAY, MODERATE: ICD-10-CM

## 2020-07-14 DIAGNOSIS — N31.9 NEUROGENIC BLADDER: Chronic | ICD-10-CM

## 2020-07-14 PROCEDURE — 99999 PR PBB SHADOW E&M-EST. PATIENT-LVL V: ICD-10-PCS | Mod: PBBFAC,,, | Performed by: PHYSICIAN ASSISTANT

## 2020-07-14 PROCEDURE — 99999 PR PBB SHADOW E&M-EST. PATIENT-LVL V: CPT | Mod: PBBFAC,,, | Performed by: PHYSICIAN ASSISTANT

## 2020-07-14 PROCEDURE — 99214 PR OFFICE/OUTPT VISIT, EST, LEVL IV, 30-39 MIN: ICD-10-PCS | Mod: S$GLB,,, | Performed by: PHYSICIAN ASSISTANT

## 2020-07-14 PROCEDURE — 99214 OFFICE O/P EST MOD 30 MIN: CPT | Mod: S$GLB,,, | Performed by: PHYSICIAN ASSISTANT

## 2020-07-14 PROCEDURE — 3008F BODY MASS INDEX DOCD: CPT | Mod: CPTII,S$GLB,, | Performed by: PHYSICIAN ASSISTANT

## 2020-07-14 PROCEDURE — 3008F PR BODY MASS INDEX (BMI) DOCUMENTED: ICD-10-PCS | Mod: CPTII,S$GLB,, | Performed by: PHYSICIAN ASSISTANT

## 2020-07-14 NOTE — Clinical Note
Jacek Schaffer,  Can you please advise whether this patient should be continued on Bactrim prophylaxis for UTIs?  Thanks,  Samantha Frye PA-C

## 2020-07-14 NOTE — PROGRESS NOTES
"Subjective:      Patient ID: Hussein Doyle is a 34 y.o. male.    Chief Complaint: Hospital Follow Up  Patient is here with mother.    HPI   Patient went to [facility] ED 7/9/2020 with constipation and UTI and treated with enema and macrobid BID x 7 days.  He had a EGD yesterday.  His appetite has decreased since May.  His urine output has increased, and urine doesn't have odor anymore.    No seizures since 2014    EGD yesterday-dilated throat and three biopsies-waiting on pathology results.  Liver biopsy soon.  Patient has been losing weight-242lbs today, 268lbs 1/22/2020    Taking Xanax in the mornings.  Rarely at night.    Patient is developmentally regressing, so mother is concerned with this.    Review of Systems   Constitutional: Positive for diaphoresis (6 weeks of episodic). Negative for appetite change, chills and fever.   Gastrointestinal: Positive for constipation (two bowel movements in pullups). Negative for abdominal pain, diarrhea, nausea and vomiting.   Skin: Positive for rash (on chest after showers).   Neurological: Negative for seizures.   Psychiatric/Behavioral: Negative for sleep disturbance.       Objective:   /84   Pulse 60   Temp 98.6 °F (37 °C) (Oral)   Ht 5' 10" (1.778 m)   Wt 113.1 kg (249 lb 5.4 oz)   SpO2 96%   BMI 35.78 kg/m²      Physical Exam  Vitals signs reviewed.   Constitutional:       General: He is sleeping. He is not in acute distress.     Appearance: Normal appearance. He is well-developed and well-groomed.   HENT:      Head: Normocephalic and atraumatic.      Right Ear: Hearing, tympanic membrane, ear canal and external ear normal.      Left Ear: Hearing, tympanic membrane, ear canal and external ear normal.      Nose: Nose normal.      Right Sinus: No maxillary sinus tenderness or frontal sinus tenderness.      Left Sinus: No maxillary sinus tenderness or frontal sinus tenderness.      Mouth/Throat:      Mouth: Mucous membranes are moist.      Pharynx: " Oropharynx is clear.   Eyes:      General: Lids are normal.      Conjunctiva/sclera: Conjunctivae normal.      Pupils: Pupils are equal, round, and reactive to light.   Neck:      Musculoskeletal: Normal range of motion.   Cardiovascular:      Rate and Rhythm: Normal rate and regular rhythm.      Heart sounds: Normal heart sounds. No murmur. No friction rub. No gallop.    Pulmonary:      Effort: Pulmonary effort is normal. No respiratory distress.      Breath sounds: Normal breath sounds. No decreased breath sounds, wheezing, rhonchi or rales.   Abdominal:      General: Bowel sounds are normal.      Palpations: Abdomen is soft.      Tenderness: There is no abdominal tenderness.   Musculoskeletal: Normal range of motion.   Lymphadenopathy:      Head:      Right side of head: No submental, submandibular, tonsillar, preauricular, posterior auricular or occipital adenopathy.      Left side of head: No submental, submandibular, tonsillar, preauricular, posterior auricular or occipital adenopathy.      Cervical: No cervical adenopathy.   Skin:     General: Skin is warm and dry.      Findings: No rash.   Neurological:      Mental Status: He is easily aroused. He is lethargic.      Comments: Patient responds when aroused.   Psychiatric:         Speech: He is noncommunicative.         Behavior: Behavior is slowed.         Cognition and Memory: Cognition is impaired.        Assessment:      1. Lethargy    2. Developmental delay, moderate    3. Seizure disorder    4. Neurogenic bladder    5. Chronic constipation       Plan:   1. Lethargy  Suspect that patient is exhausted from the EGD and neuro appt the day before.  Patient's mother instructed to let us know if this lethargy continues tomorrow, and then we will have to do bloodwork if so.  Advised to give Xanax in the afternoons/evenings if the patient is not agitated in the mornings.  Try Ensure or protein shake daily to help with weight loss.    2. Developmental delay,  moderate  - Ambulatory referral/consult to Home Health; Future    3. Seizure disorder  Controlled currently.    4. Neurogenic bladder  Urine output has increased appropriately.  Will check with Dr. Burch to see if patient should continue on Bactrim for prophylaxis of UTIs.    5. Chronic constipation  Controlled currently.    Follow up in one month with Dr. Zendejas.  Patient agreed with plan and expressed understanding.    Thank you for allowing me to serve you,

## 2020-07-16 DIAGNOSIS — R74.8 ELEVATED LIVER ENZYMES: Primary | ICD-10-CM

## 2020-07-16 DIAGNOSIS — Z01.818 PRE-PROCEDURAL EXAMINATION: ICD-10-CM

## 2020-07-17 ENCOUNTER — LAB VISIT (OUTPATIENT)
Dept: URGENT CARE | Facility: CLINIC | Age: 35
End: 2020-07-17
Payer: MEDICARE

## 2020-07-17 DIAGNOSIS — R79.1 ABNORMAL COAGULATION PROFILE: ICD-10-CM

## 2020-07-17 DIAGNOSIS — Z01.818 PRE-PROCEDURAL EXAMINATION: ICD-10-CM

## 2020-07-17 DIAGNOSIS — R74.8 ELEVATED LIVER ENZYMES: Primary | ICD-10-CM

## 2020-07-17 LAB
FINAL PATHOLOGIC DIAGNOSIS: NORMAL
GROSS: NORMAL

## 2020-07-17 PROCEDURE — U0003 INFECTIOUS AGENT DETECTION BY NUCLEIC ACID (DNA OR RNA); SEVERE ACUTE RESPIRATORY SYNDROME CORONAVIRUS 2 (SARS-COV-2) (CORONAVIRUS DISEASE [COVID-19]), AMPLIFIED PROBE TECHNIQUE, MAKING USE OF HIGH THROUGHPUT TECHNOLOGIES AS DESCRIBED BY CMS-2020-01-R: HCPCS

## 2020-07-17 NOTE — PROGRESS NOTES

## 2020-07-18 ENCOUNTER — PATIENT MESSAGE (OUTPATIENT)
Dept: PSYCHIATRY | Facility: CLINIC | Age: 35
End: 2020-07-18

## 2020-07-18 LAB — SARS-COV-2 RNA RESP QL NAA+PROBE: NOT DETECTED

## 2020-07-20 ENCOUNTER — ANESTHESIA EVENT (OUTPATIENT)
Dept: ENDOSCOPY | Facility: HOSPITAL | Age: 35
End: 2020-07-20
Payer: MEDICARE

## 2020-07-20 ENCOUNTER — HOSPITAL ENCOUNTER (OUTPATIENT)
Facility: HOSPITAL | Age: 35
Discharge: HOME | End: 2020-07-20
Attending: RADIOLOGY | Admitting: RADIOLOGY
Payer: MEDICARE

## 2020-07-20 ENCOUNTER — TELEPHONE (OUTPATIENT)
Dept: INTERVENTIONAL RADIOLOGY/VASCULAR | Facility: CLINIC | Age: 35
End: 2020-07-20

## 2020-07-20 ENCOUNTER — ANESTHESIA (OUTPATIENT)
Dept: ENDOSCOPY | Facility: HOSPITAL | Age: 35
End: 2020-07-20
Payer: MEDICARE

## 2020-07-20 VITALS
RESPIRATION RATE: 22 BRPM | HEIGHT: 70 IN | BODY MASS INDEX: 34.65 KG/M2 | SYSTOLIC BLOOD PRESSURE: 144 MMHG | HEART RATE: 57 BPM | TEMPERATURE: 98 F | OXYGEN SATURATION: 100 % | WEIGHT: 242 LBS | DIASTOLIC BLOOD PRESSURE: 80 MMHG

## 2020-07-20 DIAGNOSIS — R74.8 ELEVATED LIVER ENZYMES: ICD-10-CM

## 2020-07-20 PROCEDURE — D9220A PRA ANESTHESIA: Mod: ANES,,, | Performed by: ANESTHESIOLOGY

## 2020-07-20 PROCEDURE — 88307 TISSUE EXAM BY PATHOLOGIST: CPT | Mod: 26,,, | Performed by: PATHOLOGY

## 2020-07-20 PROCEDURE — D9220A PRA ANESTHESIA: Mod: CRNA,,, | Performed by: NURSE ANESTHETIST, CERTIFIED REGISTERED

## 2020-07-20 PROCEDURE — 25000003 PHARM REV CODE 250: Performed by: PHYSICIAN ASSISTANT

## 2020-07-20 PROCEDURE — 71000044 HC DOSC ROUTINE RECOVERY FIRST HOUR

## 2020-07-20 PROCEDURE — 37000009 HC ANESTHESIA EA ADD 15 MINS

## 2020-07-20 PROCEDURE — 71000045 HC DOSC ROUTINE RECOVERY EA ADD'L HR

## 2020-07-20 PROCEDURE — 88313 SPECIAL STAINS GROUP 2: CPT | Performed by: PATHOLOGY

## 2020-07-20 PROCEDURE — 88313 PR  SPECIAL STAINS,GROUP II: ICD-10-PCS | Mod: 26,,, | Performed by: PATHOLOGY

## 2020-07-20 PROCEDURE — D9220A PRA ANESTHESIA: ICD-10-PCS | Mod: CRNA,,, | Performed by: NURSE ANESTHETIST, CERTIFIED REGISTERED

## 2020-07-20 PROCEDURE — 88313 SPECIAL STAINS GROUP 2: CPT | Mod: 26,,, | Performed by: PATHOLOGY

## 2020-07-20 PROCEDURE — D9220A PRA ANESTHESIA: ICD-10-PCS | Mod: ANES,,, | Performed by: ANESTHESIOLOGY

## 2020-07-20 PROCEDURE — 63600175 PHARM REV CODE 636 W HCPCS: Performed by: NURSE ANESTHETIST, CERTIFIED REGISTERED

## 2020-07-20 PROCEDURE — 88307 TISSUE EXAM BY PATHOLOGIST: CPT | Performed by: PATHOLOGY

## 2020-07-20 PROCEDURE — 88307 PR  SURG PATH,LEVEL V: ICD-10-PCS | Mod: 26,,, | Performed by: PATHOLOGY

## 2020-07-20 PROCEDURE — 37000008 HC ANESTHESIA 1ST 15 MINUTES

## 2020-07-20 RX ORDER — LIDOCAINE HYDROCHLORIDE 20 MG/ML
INJECTION INTRAVENOUS
Status: DISCONTINUED | OUTPATIENT
Start: 2020-07-20 | End: 2020-07-20

## 2020-07-20 RX ORDER — FENTANYL CITRATE 50 UG/ML
50 INJECTION, SOLUTION INTRAMUSCULAR; INTRAVENOUS
Status: DISCONTINUED | OUTPATIENT
Start: 2020-07-20 | End: 2020-07-20 | Stop reason: HOSPADM

## 2020-07-20 RX ORDER — PROPOFOL 10 MG/ML
VIAL (ML) INTRAVENOUS CONTINUOUS PRN
Status: DISCONTINUED | OUTPATIENT
Start: 2020-07-20 | End: 2020-07-20

## 2020-07-20 RX ORDER — SODIUM CHLORIDE 0.9 % (FLUSH) 0.9 %
3 SYRINGE (ML) INJECTION
Status: DISCONTINUED | OUTPATIENT
Start: 2020-07-20 | End: 2020-07-20 | Stop reason: HOSPADM

## 2020-07-20 RX ORDER — PROPOFOL 10 MG/ML
VIAL (ML) INTRAVENOUS
Status: DISCONTINUED | OUTPATIENT
Start: 2020-07-20 | End: 2020-07-20

## 2020-07-20 RX ORDER — MIDAZOLAM HYDROCHLORIDE 1 MG/ML
INJECTION, SOLUTION INTRAMUSCULAR; INTRAVENOUS
Status: DISCONTINUED | OUTPATIENT
Start: 2020-07-20 | End: 2020-07-20

## 2020-07-20 RX ORDER — SODIUM CHLORIDE 0.9 % (FLUSH) 0.9 %
10 SYRINGE (ML) INJECTION
Status: DISCONTINUED | OUTPATIENT
Start: 2020-07-20 | End: 2020-07-20 | Stop reason: HOSPADM

## 2020-07-20 RX ORDER — SODIUM CHLORIDE 9 MG/ML
INJECTION, SOLUTION INTRAVENOUS CONTINUOUS
Status: DISCONTINUED | OUTPATIENT
Start: 2020-07-20 | End: 2020-07-20 | Stop reason: HOSPADM

## 2020-07-20 RX ORDER — HYDROMORPHONE HYDROCHLORIDE 1 MG/ML
0.2 INJECTION, SOLUTION INTRAMUSCULAR; INTRAVENOUS; SUBCUTANEOUS EVERY 5 MIN PRN
Status: DISCONTINUED | OUTPATIENT
Start: 2020-07-20 | End: 2020-07-20 | Stop reason: HOSPADM

## 2020-07-20 RX ADMIN — PROPOFOL 60 MG: 10 INJECTION, EMULSION INTRAVENOUS at 10:07

## 2020-07-20 RX ADMIN — MIDAZOLAM HYDROCHLORIDE 2 MG: 1 INJECTION, SOLUTION INTRAMUSCULAR; INTRAVENOUS at 10:07

## 2020-07-20 RX ADMIN — SODIUM CHLORIDE: 0.9 INJECTION, SOLUTION INTRAVENOUS at 10:07

## 2020-07-20 RX ADMIN — PROPOFOL 200 MCG/KG/MIN: 10 INJECTION, EMULSION INTRAVENOUS at 10:07

## 2020-07-20 RX ADMIN — LIDOCAINE HYDROCHLORIDE 100 MG: 20 INJECTION, SOLUTION INTRAVENOUS at 10:07

## 2020-07-20 NOTE — TELEPHONE ENCOUNTER
"Called the pt's mother due to her email over the weekend.     Pt doing well today- had liver bx earlier today which he tolerated well. "he's calm and quiet so I think we're ok."     Trying to identify what has caused all the recent reported regression. Remains unclear. Has now completed anbx for UTI (last dose 7/18). "I don't know anymore." continues to have some regressed behaviors and on 7/18 had an incident of agitation that led to him being a bit aggressive with mother which has never happened before?    We discuss a trial of tapering fluvox "but he's never had trouble with that from the very beginning."     On Saturday the pt was able to calm after xanax 1mg. Typically takes 0.5mg. pt's mother inquires about ability to give 1mg in moments of agitation. Yes, approved.     Has documented s/e's to previous trials of valium, ativan and klonopin.     Will cont to follow and chart check. Pt's mother expresses understanding.   "

## 2020-07-20 NOTE — DISCHARGE SUMMARY
Radiology Discharge Summary      Hospital Course: No complications    Admit Date: 7/20/2020  Discharge Date: 07/20/2020     Instructions Given to Patient: Yes  Diet: Resume prior diet  Activity: activity as tolerated and no driving for today    Description of Condition on Discharge: Stable  Vital Signs (Most Recent): Temp: 98.7 °F (37.1 °C) (07/20/20 1008)  Pulse: (!) 53 (07/20/20 1008)  Resp: 18 (07/20/20 1008)  BP: (!) 132/96 (07/20/20 1008)  SpO2: 96 % (07/20/20 1008)    Discharge Disposition: Home    Discharge Diagnosis: Elevated LFTs s/p random liver biopsy. Follow up as scheduled.    Arturo Pedraza M.D.  Diagnostic and Interventional Radiologist  Department of Radiology  Pager: [phone]

## 2020-07-20 NOTE — ANESTHESIA POSTPROCEDURE EVALUATION
Anesthesia Post Evaluation    Patient: Hussein Doyle    Procedure(s) Performed: Procedure(s) (LRB):  BIOPSY, LIVER (N/A)    Final Anesthesia Type: general    Patient location during evaluation: PACU  Level of consciousness: awake  Post-procedure vital signs: reviewed and stable  Pain management: adequate  Airway patency: patent    PONV status at discharge: No PONV  Anesthetic complications: no      Cardiovascular status: hemodynamically stable  Respiratory status: unassisted and spontaneous ventilation  Hydration status: euvolemic  Follow-up not needed.          Vitals Value Taken Time   /80 07/20/20 1344   Temp 36.7 °C (98.1 °F) 07/20/20 1342   Pulse 56 07/20/20 1347   Resp 26 07/20/20 1347   SpO2 99 % 07/20/20 1345   Vitals shown include unvalidated device data.      No case tracking events are documented in the log.      Pain/Jane Score: Jane Score: 10 (7/20/2020  2:08 PM)

## 2020-07-20 NOTE — TELEPHONE ENCOUNTER
Test results delivered to mother/POA:    Your test was NEGATIVE for COVID-19 (coronavirus).  If you still have symptoms, treat with rest, fluids, and over-the-counter medications.  Continue to follow local guidelines and practice proper handwashing.     If your symptoms worsen or if you have any other concerns, please contact [facility] On Call at [phone].     Sincerely,    Qiana Serna PA-C

## 2020-07-20 NOTE — TRANSFER OF CARE
"Anesthesia Transfer of Care Note    Patient: Hussein Doyle    Procedure(s) Performed: Procedure(s) (LRB):  BIOPSY, LIVER (N/A)    Patient location: [facility]    Anesthesia Type: general    Transport from OR: Transported from OR on room air with adequate spontaneous ventilation    Post pain: adequate analgesia    Post assessment: no apparent anesthetic complications and tolerated procedure well    Post vital signs: stable    Level of consciousness: awake    Nausea/Vomiting: no nausea/vomiting    Complications: none    Transfer of care protocol was followed      Last vitals:   Visit Vitals  BP (!) 132/96 (BP Location: Right arm, Patient Position: Lying)   Pulse (!) 53   Temp 37.1 °C (98.7 °F) (Oral)   Resp 18   Ht 5' 10" (1.778 m)   Wt 109.8 kg (242 lb)   SpO2 96%   BMI 34.72 kg/m²     "

## 2020-07-20 NOTE — H&P
Radiology History & Physical      SUBJECTIVE:     History of Present Illness:  Hussein Doyle is a 34 y.o. male with autism and elevated LFTs here for perc liver biopsy    Past Medical History:   Diagnosis Date    Autistic disorder, active 5/6/2013    Bowel obstruction     pt mother denies    Early intervention counseling     Gout, chronic 11/12/2015    Grand mal seizure     Hepatic steatosis     Impulse control disorder, unspecified 5/6/2013    Mastoiditis     Moderate mental retardation 5/6/2013    Neurogenic bladder     Neurogenic bladder     HALEY (obstructive sleep apnea) 11/12/2015    BIPAP.  Dr. Garber     Otitis media     Paraplegia     Pervasive developmental disorder, active 12/27/2015    Renal cancer 2010    Right    Scoliosis     paraplegia    Seizure disorder 11/12/2015    Stroke     one week old    Tardive dyskinesia      Past Surgical History:   Procedure Laterality Date    ANKLE FRACTURE SURGERY      BACK SURGERY  2000    COLONOSCOPY  10/28/2008    [facility] Arana at [facility]; anal fissure, minimal pinpoint rectal erythema; floppy-type colon with very little tone; biopsy: rectum mild chronic proctitis with few eosinophils sent for scanning    COLONOSCOPY N/A 6/15/2018    Procedure: COLONOSCOPY;  Surgeon: Refugio Villagomez MD;  Location: [facility];  Service: Endoscopy;  Laterality: N/A; Preparation of the colon was fair, unremarkable; REPEAT at 50 YEARS old FOR SCREENING    ESOPHAGOGASTRODUODENOSCOPY N/A 7/13/2020    Procedure: EGD (ESOPHAGOGASTRODUODENOSCOPY);  Surgeon: Refugio Villagomez MD;  Location: [facility];  Service: Endoscopy;  Laterality: N/A;    INNER EAR SURGERY      mastoid    right partial nephrectomy Right 2010    Sees urology    TYMPANOSTOMY TUBE PLACEMENT         Home Meds:   Prior to Admission medications    Medication Sig Start Date End Date Taking? Authorizing Provider   allopurinoL (ZYLOPRIM) 100 MG tablet Take 1 tablet (100 mg total) by mouth 2 (two)  times daily. 5/18/20   Reji Zendejas Jr., MD   ALPRAZolam (XANAX) 0.5 MG tablet Take 1 tablet (0.5 mg total) by mouth daily as needed for Anxiety (may take an additional tab PRN for breakthrough anxiety). 5/11/20   Kathy Valentine MD   calcium carbonate-vitamin D3 (CALCIUM 500 WITH D) 500 mg(1,250mg) -400 unit Tab 2 (two) times a day.  2/11/20   Historical Provider, MD   fluvoxaMINE (LUVOX) 100 MG tablet Take 1.5 tablets (150 mg total) by mouth 2 (two) times daily. 5/11/20 5/11/21  Kathy Valentine MD   inulin (FIBER GUMMIES ORAL) Take by mouth once daily.     Historical Provider, MD   L. ACIDOPHILUS/BIFID. ANIMALIS (CHEWABLE PROBIOTIC ORAL) Take by mouth 2 (two) times daily.    Historical Provider, MD   lactulose (CHRONULAC) 20 gram/30 mL Soln Take 15 mLs (10 g total) by mouth 2 (two) times daily. 7/8/20 8/7/20  ALLA Blair   multivitamin capsule Take 1 capsule by mouth every morning.     Historical Provider, MD   mupirocin (BACTROBAN) 2 % ointment Apply topically 3 (three) times daily.  Patient taking differently: Apply topically 3 (three) times daily as needed.  1/22/20   Camila Cabrera, REGINO   omega-3 fatty acids/fish oil (FISH OIL-OMEGA-3 FATTY ACIDS) 300-1,000 mg capsule Take by mouth once daily.    Historical Provider, MD   ondansetron (ZOFRAN-ODT) 4 MG TbDL Take 1 tablet (4 mg total) by mouth every 6 (six) hours as needed.  Patient not taking: Reported on 7/10/2020 6/30/20   Reji Le MD   OXcarbazepine (TRILEPTAL) 300 MG Tab TAKE 1 TABLET BY MOUTH TWICE A DAY 7/12/20   Kathy Valentine MD   pantoprazole (PROTONIX) 40 MG tablet Take 1 tablet (40 mg total) by mouth once daily. 7/13/20 7/13/21  Refugio Villagomez MD   sulfamethoxazole-trimethoprim 400-80mg (BACTRIM,SEPTRA) 400-80 mg per tablet Take 1 tablet by mouth every 12 (twelve) hours.    Historical Provider, MD   tamsulosin (FLOMAX) 0.4 mg Cp24 Take 0.4 mg by mouth every evening. Every day 10/19/11   Historical Provider, MD      Anticoagulants/Antiplatelets: no anticoagulation    Allergies:   Review of patient's allergies indicates:   Allergen Reactions    Benadryl [diphenhydramine hcl] Other (See Comments)     Increases anxiety and more restless    Keppra [levetiracetam] Other (See Comments)     agitation    Ativan [lorazepam] Anxiety    Abilify  [aripiprazole]      Other reaction(s): arrhythmia    Clonazepam      Other reaction(s): unknown    Cough syrup w/decongestant      Other reaction(s): auditory hallucinations    Diazepam      Other reaction(s): tongue swelling    Haloperidol Other (See Comments)    Phenytoin sodium extended      Other reaction(s): unknown    Quetiapine      Other reaction(s): sweating  Other reaction(s): sedation  Other reaction(s): sweating  Other reaction(s): sedation    Risperidone      Other reaction(s): bladder incontinence    Thimerosal      Other reaction(s): seizure    Ziprasidone hcl Other (See Comments)     Other reaction(s): tonic clonic seizure  seizure     Sedation History:  no adverse reactions    Review of Systems:   Hematological: no known coagulopathies  Respiratory: no shortness of breath  Cardiovascular: no chest pain  Gastrointestinal: no abdominal pain  Genito-Urinary: no dysuria  Musculoskeletal: negative  Neurological: no TIA or stroke symptoms  Autistic, limited interation         OBJECTIVE:     Vital Signs (Most Recent)       Physical Exam:  ASA: 2  Mallampati: 2    General: no acute distress  Mental Status: alert and oriented to person, place and time  HEENT: normocephalic, atraumatic  Chest: unlabored breathing  Heart: regular heart rate  Abdomen: nondistended  Extremity: moves all extremities    Laboratory  Lab Results   Component Value Date    INR 1.1 02/29/2020       Lab Results   Component Value Date    WBC 8.86 06/30/2020    HGB 13.9 (L) 06/30/2020    HCT 39.4 (L) 06/30/2020    MCV 95 06/30/2020     06/30/2020      Lab Results   Component Value Date    GLU 84  06/30/2020     06/30/2020    K 4.1 06/30/2020     06/30/2020    CO2 25 06/30/2020    BUN 15 06/30/2020    CREATININE 0.64 06/30/2020    CALCIUM 9.9 06/30/2020    MG 2.0 06/30/2020     (H) 06/30/2020     (H) 06/30/2020    ALBUMIN 4.6 06/30/2020    BILITOT 0.6 06/30/2020    BILIDIR 0.4 (H) 05/24/2018       ASSESSMENT/PLAN:     Sedation Plan: per anesthesia    Patient will undergo liver biopsy.      Marcin Cotton MD, MS  Radiology  PGY-3    Pager: [phone]

## 2020-07-20 NOTE — ANESTHESIA PREPROCEDURE EVALUATION
07/20/2020  Hussein Doyle is a 34 y.o., male.  Past Medical History:   Diagnosis Date    Autistic disorder, active 5/6/2013    Bowel obstruction     pt mother denies    Early intervention counseling     Gout, chronic 11/12/2015    Grand mal seizure     Hepatic steatosis     Impulse control disorder, unspecified 5/6/2013    Mastoiditis     Moderate mental retardation 5/6/2013    Neurogenic bladder     Neurogenic bladder     HALEY (obstructive sleep apnea) 11/12/2015    BIPAP.  Dr. Garber     Otitis media     Paraplegia     Pervasive developmental disorder, active 12/27/2015    Renal cancer 2010    Right    Scoliosis     paraplegia    Seizure disorder 11/12/2015    Stroke     one week old    Tardive dyskinesia      Patient Active Problem List   Diagnosis    Autistic disorder, active    Impulse control disorder    Developmental delay, moderate    Neurogenic bladder    HALEY (obstructive sleep apnea)    Gout, chronic    Seizure disorder    Pervasive developmental disorder, active    Chronic constipation    OCD (obsessive compulsive disorder)    Hallux, valgus, congenital    BMI 37.0-37.9, adult    NAFLD (nonalcoholic fatty liver disease)    Dysphagia    Elevated liver enzymes     Past Surgical History:   Procedure Laterality Date    ANKLE FRACTURE SURGERY      BACK SURGERY  2000    COLONOSCOPY  10/28/2008    [facility]; anal fissure, minimal pinpoint rectal erythema; floppy-type colon with very little tone; biopsy: rectum mild chronic proctitis with few eosinophils sent for scanning    COLONOSCOPY N/A 6/15/2018    Procedure: COLONOSCOPY;  Surgeon: Refugio Villagomez MD;  Location: [facility];  Service: Endoscopy;  Laterality: N/A; Preparation of the colon was fair, unremarkable; REPEAT at 50 YEARS old FOR SCREENING    ESOPHAGOGASTRODUODENOSCOPY N/A 7/13/2020     Procedure: EGD (ESOPHAGOGASTRODUODENOSCOPY);  Surgeon: Refugio Villagomez MD;  Location: [facility];  Service: Endoscopy;  Laterality: N/A;    INNER EAR SURGERY      mastoid    right partial nephrectomy Right 2010    Sees urology    TYMPANOSTOMY TUBE PLACEMENT       Review of patient's allergies indicates:   Allergen Reactions    Benadryl [diphenhydramine hcl] Other (See Comments)     Increases anxiety and more restless    Keppra [levetiracetam] Other (See Comments)     agitation    Ativan [lorazepam] Anxiety    Abilify  [aripiprazole]      Other reaction(s): arrhythmia    Clonazepam      Other reaction(s): unknown    Cough syrup w/decongestant      Other reaction(s): auditory hallucinations    Diazepam      Other reaction(s): tongue swelling    Haloperidol Other (See Comments)    Phenytoin sodium extended      Other reaction(s): unknown    Quetiapine      Other reaction(s): sweating  Other reaction(s): sedation  Other reaction(s): sweating  Other reaction(s): sedation    Risperidone      Other reaction(s): bladder incontinence    Thimerosal      Other reaction(s): seizure    Ziprasidone hcl Other (See Comments)     Other reaction(s): tonic clonic seizure  seizure       Anesthesia Evaluation    I have reviewed the Patient Summary Reports.    I have reviewed the Nursing Notes. I have reviewed the NPO Status.      Review of Systems  Anesthesia Hx:  No problems with previous Anesthesia    Pulmonary:   Sleep Apnea    Renal/:   Chronic Renal Disease    Hepatic/GI:   Liver Disease,    Neurological:   CVA Seizures        Physical Exam  General:  Well nourished    Airway/Jaw/Neck:  Airway Findings: Mouth Opening: Small, but > 3cm Tongue: Normal  General Airway Assessment: Adult  TM Distance: Normal, at least 6 cm  Jaw/Neck Findings:  Neck ROM: Normal ROM      Dental:  Dental Findings: In tact        Mental Status:  Mental Status Findings:  Cooperative         Anesthesia Plan  Type of Anesthesia, risks &  benefits discussed:  Anesthesia Type:  general  Patient's Preference:   Intra-op Monitoring Plan:   Intra-op Monitoring Plan Comments:   Post Op Pain Control Plan: multimodal analgesia  Post Op Pain Control Plan Comments:   Induction:   IV  Beta Blocker:  Patient is not currently on a Beta-Blocker (No further documentation required).       Informed Consent: Patient understands risks and agrees with Anesthesia plan.  Questions answered. Anesthesia consent signed with patient.  ASA Score: 3     Day of Surgery Review of History & Physical:    H&P update referred to the surgeon.         Ready For Surgery From Anesthesia Perspective.

## 2020-07-20 NOTE — DISCHARGE INSTRUCTIONS
Discharge Instructions for Liver Biopsy  You had a procedure called liver biopsy. A healthcare provider used a special needle to remove a small piece of tissue from your liver. Then it was examined for signs of damage or disease. A liver biopsy is ordered after other tests have shown that your liver is not working properly. You may also have a liver biopsy when liver disease is suspected, to determine whether there is too much iron in the liver, or to rule out cancer.  Home care  Recommendations include the following:   · Because you had anesthesia, you should not drive until the day after your biopsy.   · Remove the bandage covering the biopsy site 48 hours after the procedure.  · Rest for 6 hours and take it easy when you arrive home.  · Dont shower for 24 hours after the biopsy. If you wish, you may wash yourself with a sponge or washcloth. When you are able to shower, dont scrub the site. Gently wash the area and pat it dry.  · Dont lift anything heavier than 10 pounds for up to 1 week after the procedure, or as advised by your healthcare provider.  · Don't do strenuous activities or exercises for up to 1 week after the procedure.  · Ask your healthcare provider when you can return to work.  · Do not start taking blood thinners without clear instructions from your healthcare provider.  Follow-up care  Make a follow-up appointment as directed by our staff.     When to call your healthcare provider  Call your healthcare provider immediately if you have any of the following:  · Bleeding from the biopsy site  · Dizziness or lightheadedness  · Sudden or increased shortness of breath  · Sudden chest pain  · Fever of 100.4°F (38.0°C) or higher, or as directed by your healthcare provider  · Shaking chills  · Yellow eyes or skin  · Increasing redness, tenderness, or swelling at the biopsy site  · Drainage from the biopsy site  · Opening of the biopsy site  · Vomiting blood  · Rectal bleeding or bloody  stools  · Increasing pain, with or without activity, in the liver or belly area, or pain shooting to the right shoulder   Date Last Reviewed: 7/1/2016  © 8102-8519 [employer] [address] [place] [address], [place] [address]. All rights reserved. This information is not intended as a substitute for professional medical care. Always follow your healthcare professional's instructions.

## 2020-07-20 NOTE — PROCEDURES
Radiology Post-Procedure Note    Pre Op Diagnosis: Elevated LFTs    Post Op Diagnosis: Same    Procedure: Random liver biopsy    Procedure performed by: Arturo Pedraza MD    Written Informed Consent Obtained: Yes  Specimen Removed: YES 2 x 18 gauge cores  Estimated Blood Loss: Minimal    Findings:   Under US guidance, a 17/18 gauge biopsy system used to obtain 2 samples from right liver lobe. No complications.    Patient tolerated procedure well.    Arturo Pedraza M.D.  Diagnostic and Interventional Radiologist  Department of Radiology  Pager: [phone]

## 2020-07-21 LAB
COMMENT: NORMAL
FINAL PATHOLOGIC DIAGNOSIS: NORMAL
GROSS: NORMAL

## 2020-07-21 PROCEDURE — G0180 MD CERTIFICATION HHA PATIENT: HCPCS | Mod: ,,, | Performed by: PHYSICIAN ASSISTANT

## 2020-07-21 PROCEDURE — G0180 PR HOME HEALTH MD CERTIFICATION: ICD-10-PCS | Mod: ,,, | Performed by: PHYSICIAN ASSISTANT

## 2020-07-27 ENCOUNTER — TELEPHONE (OUTPATIENT)
Dept: HEPATOLOGY | Facility: CLINIC | Age: 35
End: 2020-07-27

## 2020-07-27 PROBLEM — K76.0 NAFLD (NONALCOHOLIC FATTY LIVER DISEASE): Chronic | Status: ACTIVE | Noted: 2020-03-01

## 2020-07-27 NOTE — TELEPHONE ENCOUNTER
Received message from Amelia Armendariz NP. Liver Biopsy results are in from 7/20/20. Check with the patients mother if she wants to wait until scheduled appt on 8/10/20 for the Liver Biopsy results or do a Virtual Visit this week.    Spoke with Eli. She wants to do a Video Visit this week. Do you have the My Chart Priscila. Yes, I believe I did it once before.  We will hang up and you check. I will call you back.  Eli has the My Chart Priscila.  Virtual/Video Appt scheduled for tomorrow 7/28/20 at 9:30 am. Will go in and do the ePre-Check today.  Verbalized understanding.

## 2020-07-28 ENCOUNTER — TELEPHONE (OUTPATIENT)
Dept: HEPATOLOGY | Facility: CLINIC | Age: 35
End: 2020-07-28

## 2020-07-28 ENCOUNTER — OFFICE VISIT (OUTPATIENT)
Dept: HEPATOLOGY | Facility: CLINIC | Age: 35
End: 2020-07-28
Payer: MEDICARE

## 2020-07-28 DIAGNOSIS — R74.8 ELEVATED LIVER ENZYMES: ICD-10-CM

## 2020-07-28 DIAGNOSIS — K76.0 NAFLD (NONALCOHOLIC FATTY LIVER DISEASE): Chronic | ICD-10-CM

## 2020-07-28 DIAGNOSIS — K74.60 CIRRHOSIS OF LIVER WITHOUT ASCITES, UNSPECIFIED HEPATIC CIRRHOSIS TYPE: Primary | ICD-10-CM

## 2020-07-28 PROCEDURE — 99214 OFFICE O/P EST MOD 30 MIN: CPT | Mod: 95,,, | Performed by: NURSE PRACTITIONER

## 2020-07-28 PROCEDURE — 99214 PR OFFICE/OUTPT VISIT, EST, LEVL IV, 30-39 MIN: ICD-10-PCS | Mod: 95,,, | Performed by: NURSE PRACTITIONER

## 2020-07-28 NOTE — PROGRESS NOTES
[facility] - Established Patient     Last Clinic Visit: 2/28/20    The patient location is: Home, [place]  The chief complaint leading to consultation is: F/u for liver biopsy results    Visit type: audiovisual    Face to Face time with patient: 25 min  30 minutes of total time spent on the encounter, which includes face to face time and non-face to face time preparing to see the patient (eg, review of tests), Obtaining and/or reviewing separately obtained history, Documenting clinical information in the electronic or other health record, Independently interpreting results (not separately reported) and communicating results to the patient/family/caregiver, or Care coordination (not separately reported).     Each patient to whom he or she provides medical services by telemedicine is:  (1) informed of the relationship between the physician and patient and the respective role of any other health care provider with respect to management of the patient; and (2) notified that he or she may decline to receive medical services by telemedicine and may withdraw from such care at any time.      HPI: This is a 34 y.o. White male here for follow-up to review liver biopsy results. Mother and father on video visit.      Noted history: autism and MR, tardive dyskinesia, seizures, renal CA, gout, neurogenic bladder    Initial referral for elevated liver enzymes.  Transaminases consistently elevated since 11/2015, AST>ALT. Most recently: ,   Synthetic liver function normal.    Serological workup has been negative for Kel's, alpha-1 antitrypsin deficiency, hemochromatosis, and viral hepatitis. Ferritin elevated, 800-1500 though iron sat normal. No copies of C282Y or H63D to suggest HH. IgG mildly elevated (1677) though other autoimmune markers negative.     Imaging has shown hepatomegaly, fatty liver, splenomegaly; no liver masses or biliary dilation    Liver biopsy obtained 7/20/20:  - Cirrhotic liver   -  Steatosis with steatohepatitis   - Macrovesicular steatosis 20-30% and microvesicular steatosis 20%   - Etiology:  Nonalcoholic fatty liver disease (NAFLD)   - Trichrome: cirrhosis (Stage 4/4)   - Iron: No deposit (Grade 0/4)   GARCIA grading 4/8   FAT, 1 (6 to 33%)   Ballooned hepatocytes, 2 (many)   Lobular inflammation 1 (<2 foci)     Interval history:  Mother is concerned that he continues to regress clinically. Having uncontrollable diarrhea and unable to make it to the restroom in time. He is not sleeping well and has had mood changes. Also recently treated for UTI.     Had EGD this month - reflux esophagitis, biopsies pending    Continues to lose weight though family has been adjusting portions. Mother reports he is currently 235 lb (max weight 280 lb in 2018).                     Current symptoms of hepatic decompensation:              Ascites: no              LE edema: denies               Hepatic encephalopathy: ?, having changes in sleep/mood              GI bleeding: denies              Jaundice: no    Cirrhosis Health Maintenance:  -- HCC screening: CT 6/2020 with no focal hepatic lesions  -- Variceal screening: EGD 7/2020 without varices  -- Hepatitis A & B vaccination: immunity unknown          Review of patient's allergies indicates:   Allergen Reactions    Benadryl [diphenhydramine hcl] Other (See Comments)     Increases anxiety and more restless    Keppra [levetiracetam] Other (See Comments)     agitation    Ativan [lorazepam] Anxiety    Abilify  [aripiprazole]      Other reaction(s): arrhythmia    Clonazepam      Other reaction(s): unknown    Cough syrup w/decongestant      Other reaction(s): auditory hallucinations    Diazepam      Other reaction(s): tongue swelling    Haloperidol Other (See Comments)    Phenytoin sodium extended      Other reaction(s): unknown    Quetiapine      Other reaction(s): sweating  Other reaction(s): sedation  Other reaction(s): sweating  Other reaction(s):  sedation    Risperidone      Other reaction(s): bladder incontinence    Thimerosal      Other reaction(s): seizure    Ziprasidone hcl Other (See Comments)     Other reaction(s): tonic clonic seizure  seizure        Current Outpatient Medications on File Prior to Visit   Medication Sig Dispense Refill    allopurinoL (ZYLOPRIM) 100 MG tablet Take 1 tablet (100 mg total) by mouth 2 (two) times daily. 180 tablet 3    ALPRAZolam (XANAX) 0.5 MG tablet Take 1 tablet (0.5 mg total) by mouth daily as needed for Anxiety (may take an additional tab PRN for breakthrough anxiety). 40 tablet 2    calcium carbonate-vitamin D3 (CALCIUM 500 WITH D) 500 mg(1,250mg) -400 unit Tab 2 (two) times a day.       fluvoxaMINE (LUVOX) 100 MG tablet Take 1.5 tablets (150 mg total) by mouth 2 (two) times daily. 270 tablet 0    inulin (FIBER GUMMIES ORAL) Take by mouth once daily.       L. ACIDOPHILUS/BIFID. ANIMALIS (CHEWABLE PROBIOTIC ORAL) Take by mouth 2 (two) times daily.      lactulose (CHRONULAC) 20 gram/30 mL Soln Take 15 mLs (10 g total) by mouth 2 (two) times daily. 900 mL 0    multivitamin capsule Take 1 capsule by mouth every morning.       mupirocin (BACTROBAN) 2 % ointment Apply topically 3 (three) times daily. (Patient taking differently: Apply topically 3 (three) times daily as needed. ) 30 g 0    omega-3 fatty acids/fish oil (FISH OIL-OMEGA-3 FATTY ACIDS) 300-1,000 mg capsule Take by mouth once daily.      ondansetron (ZOFRAN-ODT) 4 MG TbDL Take 1 tablet (4 mg total) by mouth every 6 (six) hours as needed. (Patient not taking: Reported on 7/10/2020) 10 tablet 0    OXcarbazepine (TRILEPTAL) 300 MG Tab TAKE 1 TABLET BY MOUTH TWICE A DAY 60 tablet 0    pantoprazole (PROTONIX) 40 MG tablet Take 1 tablet (40 mg total) by mouth once daily. 30 tablet 2    sulfamethoxazole-trimethoprim 400-80mg (BACTRIM,SEPTRA) 400-80 mg per tablet Take 1 tablet by mouth every 12 (twelve) hours.      tamsulosin (FLOMAX) 0.4 mg Cp24 Take  0.4 mg by mouth every evening. Every day       No current facility-administered medications on file prior to visit.          PMHX:  has a past medical history of Autistic disorder, active (5/6/2013), Bowel obstruction, Early intervention counseling, Gout, chronic (11/12/2015), Grand mal seizure, Hepatic steatosis, Impulse control disorder, unspecified (5/6/2013), Mastoiditis, Moderate mental retardation (5/6/2013), Neurogenic bladder, Neurogenic bladder, HALEY (obstructive sleep apnea) (11/12/2015), Otitis media, Paraplegia, Pervasive developmental disorder, active (12/27/2015), Renal cancer (2010), Scoliosis, Seizure disorder (11/12/2015), Stroke, and Tardive dyskinesia.    PSHX:  has a past surgical history that includes Ankle fracture surgery; Back surgery (2000); Inner ear surgery; right partial nephrectomy (Right, 2010); Tympanostomy tube placement; Colonoscopy (10/28/2008); Colonoscopy (N/A, 6/15/2018); Esophagogastroduodenoscopy (N/A, 7/13/2020); and Liver biopsy (N/A, 7/20/2020).    FAMILY HISTORY:   Family History   Problem Relation Age of Onset    Cancer Father         lymphoma    Cataracts Father     Colon polyps Father     Cataracts Mother     Cataracts Maternal Grandmother     Diabetes Maternal Grandmother     Macular degeneration Maternal Grandmother     Glaucoma Maternal Grandmother     Anesthesia problems Neg Hx     Clotting disorder Neg Hx     Colon cancer Neg Hx     Crohn's disease Neg Hx     Ulcerative colitis Neg Hx     Stomach cancer Neg Hx     Esophageal cancer Neg Hx     Cirrhosis Neg Hx        SOCIAL HISTORY:   Social History     Tobacco Use   Smoking Status Never Smoker   Smokeless Tobacco Never Used       Social History     Substance and Sexual Activity   Alcohol Use No    Frequency: Never    Drinks per session: Patient refused    Binge frequency: Never       Social History     Substance and Sexual Activity   Drug Use No         Review of Systems   Constitutional: Negative  for appetite change, chills, fatigue, fever. (+) weight loss  Eyes: Negative for visual disturbance.   Respiratory: Negative for cough and shortness of breath.    Cardiovascular: Negative for chest pain, palpitations and leg swelling.   Gastrointestinal: Negative for abdominal distention, abdominal pain, blood in stool, constipation, nausea and vomiting. No change in stool color. (+) diarrhea  Genitourinary: Negative for dysuria and hematuria. Denies dark urine.   Musculoskeletal: Negative for arthralgias, gait problem, joint swelling and myalgias.   Skin: Negative for color change, rash and wound. Denies itching.   Neurological: Negative for dizziness, tremors, speech difficulty, and headaches.   Hematological: Does not bruise/bleed easily.   Psychiatric/Behavioral: Negative for confusion, decreased concentration. Denies memory loss. Denies depression. The patient is not nervous/anxious. (+) mood changes, sleep disturbance       Physical Exam   Limited by video visit  Constitutional: No distress.  Pulmonary/Chest: No respiratory distress.   Skin: No jaundice.   Psychiatric: At baseline today per family.         LABS:  Lab Results   Component Value Date    WBC 8.86 06/30/2020    HGB 13.9 (L) 06/30/2020    HCT 39.4 (L) 06/30/2020     06/30/2020     06/30/2020    K 4.1 06/30/2020    CREATININE 0.64 06/30/2020     (H) 06/30/2020     (H) 06/30/2020    ALKPHOS 65 06/30/2020    BILITOT 0.6 06/30/2020    BILIDIR 0.4 (H) 05/24/2018    ALBUMIN 4.6 06/30/2020    INR 1.1 07/20/2020    SMOOTHMUSCAB Negative 1:40 04/30/2018    AMAIFA Negative 1:40 04/30/2018    IGGSERUM 1677 (H) 02/29/2020    ANASCREEN Negative <1:160 04/30/2018    FERRITIN 893 (H) 02/29/2020    FESATURATED 26 04/30/2018    CERULOPLSM 30.0 04/30/2018    CHOL 197 11/07/2018    TRIG 135 11/07/2018    LDLCALC 136.0 11/07/2018    HGBA1C 5.6 11/07/2018       No results found for: HEPAIGG    Hepatitis B Surface Ag   Date Value Ref Range  Status   04/30/2018 Negative  Final     No results found for: HEPBCAB  No results found for: HEPBSAB  Hepatitis C Ab   Date Value Ref Range Status   04/30/2018 Negative  Final     Immunization History   Administered Date(s) Administered    Influenza - Quadrivalent - PF (6 months and older) 10/22/2018, 10/04/2019    Tdap 01/22/2020          DIAGNOSTIC STUDIES:  CT - 6/30/20  FINDINGS:  There is atelectasis at the lung bases.  There is diffuse hepatic steatosis.  The gallbladder, pancreas, and adrenal glands are unremarkable.  Spleen is mildly prominent.  Kidneys demonstrate no hydronephrosis or obstructing calculus.  There is no bowel obstruction.  The appendix is normal.  There is moderate colonic stool demonstrated within the rectosigmoid.  There is a narrowed colonic segment demonstrated within the sigmoid colon best seen series 5, image 218.  This would favor peristalsing segment.  Abdominal aorta is not aneurysmal.  There is no intraperitoneal free air or free fluid.  There is mildly prominent portacaval lymph node measuring 14 mm short axis unchanged.  There is mild circumferential urinary bladder wall thickening.  This is nonspecific could be seen with cystitis.  There is scoliosis.  There is partial visualization of Serrano rods.  There is multilevel osseous fusion of the vertebral bodies and posterior elements...     Impression:     1. Mild circumferential urinary bladder wall thickening may reflect cystitis.  2. There is moderate rectosigmoid colonic stool.  There is narrowed sigmoid colonic segment identified would favor peristalsing segment.  Correlate clinically.  3. Diffuse hepatic steatosis.  4. Additional findings as above.      EGD - 7/13/20  Findings:        Savary-Hernandez Grade I (single erosion or exudate, oval or linear,        single fold) distal reflux esophagitis with no bleeding was found.        Biopsies were taken with a cold forceps for histology. A guidewire        was placed and the  scope was withdrawn. Dilation was performed with        a Savary dilator with mild resistance at 51 Fr.        The entire examined stomach was normal. Biopsies were taken with a        cold forceps for histology.        The examined duodenum was normal. Biopsies were taken with a cold        forceps for histology.   Impression:   - Kristi Grade I reflux esophagitis.                        Biopsied. Dilated.                        - Normal stomach. Biopsied.                        - Normal examined duodenum. Biopsied.           ASSESSMENT / PLAN:  34 y.o. White male with:    1. Cirrhosis, due to TRAYLOR, well compensated  -- MELD-Na score: 7 at 2/29/2020 10:12 AM  MELD score: 7 at 2/29/2020 10:12 AM  Calculated from:  Serum Creatinine: 0.9 mg/dL (Rounded to 1 mg/dL) at 2/29/2020 10:12 AM  Serum Sodium: 138 mmol/L (Rounded to 137 mmol/L) at 2/29/2020 10:12 AM  Total Bilirubin: 0.6 mg/dL (Rounded to 1 mg/dL) at 2/29/2020 10:12 AM  INR(ratio): 1.1 at 2/29/2020 10:12 AM  Age: 34 years 3 months  -- Labs reviewed with patient. MELD remains <15, too early for transplant evaluation at this time.  -- Monitor MELD labs every 6 months  -- Continue HCC screening every 6 months with ultrasound and AFP, next due 12/2020  -- Will check immunity markers for hepatitis A/B and arrange for vaccination if needed  -- EGD for varices screening due 7/2022    2. Fatty liver/TRAYLOR  -- Discussed importance of lifestyle modifications including healthy diet and adequate physical activity to achieve and maintain ideal body weight.   -- Low carbohydrate, low sugar diet.  -- Maintain control of blood pressure, cholesterol, and blood sugar as these are risk factors for fatty liver  -- If statins are being considered for HLD, statins are safe in patients with liver disease. Statins can be used to treat dyslipidemia in patients with both NAFLD and TRAYLOR.    3. Mood changes, impaired sleep  -- Difficult to assess for HE given autism and  developmental delay. Can check ammonia. If elevated, can give trial of xifaxan. Would hold off on lactulose given current diarrhea    4. Diarrhea  -- Discussed that this is not due to cirrhosis, recommend f/u with GI          Return to clinic in Dec with labs (CBC, CMP, INR, AFP) and ultrasound prior.           Orders Placed This Encounter   Procedures    US Abdomen Limited    CBC Without Differential    Comprehensive metabolic panel    Protime-INR    AFP tumor marker    Ammonia    Hepatitis A antibody, IgG    Hepatitis B Surface Ab, Qualitative    Hepatitis B Core Antibody, Total           EDUCATION / DISCUSSION:   The disease process and manifestations of cirrhosis were discussed. Signs and symptoms of hepatic decompensation were reviewed, including jaundice, ascites, and slowed mentation due to hepatic encephalopathy. The patient should seek medical attention if any of these things occur.  We discussed the potential for bleeding from esophageal varices with symptoms of hematemesis and melena. The patient should report to the Emergency Department for these symptoms.    We discussed the increased risk of hepatocellular carcinoma (HCC) due to cirrhosis. Continued HCC screening every six months with ultrasound and AFP tumor marker is recommended.     Cirrhosis Counseling  -- Strict abstinence from alcohol (includes beer, wine, and/or liquor)  -- Avoid non-steroidal anti-inflammatory drugs (NSAIDs) such as ibuprofen (Motrin, Advil), naproxen (Naprosyn, Aleve)  -- Tylenol/acetaminophen as needed for pain, no more than 2000 mg per day  -- No raw shellfish due to the risk of Vibrio vulnificus infection  -- Low sodium diet, less than 2000 mg per day   -- High protein diet to prevent muscle mass loss. Can drink protein shakes (Premier Protein is a great option because it is very high protein and low sugar)  -- Periodic upper endoscopy to screen for varices (enlarged blood vessels) in the stomach and esophagus  which can burst and cause bleeding  -- Ultrasound of the liver every 6 months for liver cancer screening          Thank you for allowing me to participate in the care of Hussein Armendariz, FNP-C  Hepatology

## 2020-07-28 NOTE — PATIENT INSTRUCTIONS
Signs and symptoms of worsening liver disease include jaundice (yellow skin/eyes), fluid in the abdomen (ascites), and confusion/disorientation/slowed thought processes due to hepatic encephalopathy (toxins building up when the liver is not working properly). You should seek medical attention if any of these things occur. Also, possible bleeding from esophageal varices (blood vessels in the stomach and foodpipe that can burst and cause bleeding). If you have symptoms of vomiting blood, blood in your stool, maroon or black stools, or coffee ground vomit, you should go to the emergency room.     Cirrhosis Counseling  -- Strict abstinence from alcohol (includes beer, wine, and/or liquor)  -- Avoid non-steroidal anti-inflammatory drugs (NSAIDs) such as ibuprofen (Motrin, Advil), naproxen (Naprosyn, Aleve)  -- Tylenol/acetaminophen as needed for pain, no more than 2000 mg per day  -- No raw shellfish due to the risk of Vibrio vulnificus infection  -- Low sodium diet, less than 2000 mg per day   -- High protein diet to prevent muscle mass loss. Can drink protein shakes (Premier Protein is a great option because it is very high protein and low sugar)  -- Periodic upper endoscopy (EGD) to screen for varices (enlarged blood vessels) in the stomach and esophagus which can burst and cause bleeding  -- Ultrasound of the liver every 6 months for liver cancer screening.

## 2020-07-28 NOTE — TELEPHONE ENCOUNTER
Called patient mother schedule his appt on December. Per mother she will give us a callback to schedule the rest of the blood test after she talk to the rest of patient MD so lab can be drawn at the same time.

## 2020-07-28 NOTE — Clinical Note
1. Please schedule labs whenever patient's mother wants - ammonia, hep A IgG, hep B surface Ab, hep B core Ab, AFP    2. Labs (CBC, CMP, INR, AFP), US, and f/u visit in Dec. Thanks!

## 2020-07-28 NOTE — TELEPHONE ENCOUNTER
----- Message from Amelia Armendariz NP sent at 7/28/2020 11:12 AM CDT -----  1. Please schedule labs whenever patient's mother wants - ammonia, hep A IgG, hep B surface Ab, hep B core Ab, AFP2. Labs (CBC, CMP, INR, AFP), US, and f/u visit in Dec. Thanks!

## 2020-07-29 ENCOUNTER — PATIENT MESSAGE (OUTPATIENT)
Dept: GASTROENTEROLOGY | Facility: CLINIC | Age: 35
End: 2020-07-29

## 2020-07-29 ENCOUNTER — TELEPHONE (OUTPATIENT)
Dept: PSYCHIATRY | Facility: CLINIC | Age: 35
End: 2020-07-29

## 2020-07-29 ENCOUNTER — PATIENT MESSAGE (OUTPATIENT)
Dept: NEUROLOGY | Facility: CLINIC | Age: 35
End: 2020-07-29

## 2020-07-29 ENCOUNTER — PATIENT MESSAGE (OUTPATIENT)
Dept: PSYCHIATRY | Facility: CLINIC | Age: 35
End: 2020-07-29

## 2020-07-29 DIAGNOSIS — R25.2 MUSCLE CRAMPS: ICD-10-CM

## 2020-07-29 DIAGNOSIS — G40.909 SEIZURE DISORDER: Primary | ICD-10-CM

## 2020-07-29 DIAGNOSIS — R41.82 ALTERED MENTAL STATUS, UNSPECIFIED ALTERED MENTAL STATUS TYPE: ICD-10-CM

## 2020-07-29 RX ORDER — ALPRAZOLAM 0.5 MG/1
TABLET ORAL
Start: 2020-07-29 | End: 2020-08-17

## 2020-07-29 NOTE — TELEPHONE ENCOUNTER
I spoke to the patient's mother in response to [email or web address] message sent to Dr. Valentine (coverage).  She reports Silvino has been agitated, not sleeping at night, ripping his CPAP mask off, having issues with sweating.  She suspects his medications are working against him.  She reports Silvino has a history of intolerance to benzodiazepines, and is also concerned about medication interactions with his fluvoxamine.  I reviewed Dr. Valentine notes, and discussed plan to further reduce alprazolam to 0.25 mg daily. He has been taking alprazolam 0.5 mg daily at least 1 week.    She did not note any worsening in his behaviors with dose reduction, nor significant improvement.      Silvino did sleep well last night.  She notes that his mouth has appeared caked and dry looking in the morning, she has been encouraging p.o. intake.  She has been monitoring urine output, states his fluid intake does not seem to significantly impact his urine output.    Mother is monitoring him closely.  He was seen yesterday by hepatology, he has also been having uncontrollable diarrhea.  He was recently treated for UTI.  Lab orders were placed, including ammonia level.  Mother will lower Silvino's alprazolam to 0.25 mg daily.  I have asked her to call back with any further worsening in symptoms or persistent sleep issues.  Will notify Dr. Valentine of this message upon her return.

## 2020-07-30 ENCOUNTER — EXTERNAL HOME HEALTH (OUTPATIENT)
Dept: HOME HEALTH SERVICES | Facility: HOSPITAL | Age: 35
End: 2020-07-30
Payer: MEDICARE

## 2020-07-30 ENCOUNTER — DOCUMENT SCAN (OUTPATIENT)
Dept: HOME HEALTH SERVICES | Facility: HOSPITAL | Age: 35
End: 2020-07-30
Payer: MEDICARE

## 2020-08-02 ENCOUNTER — PATIENT MESSAGE (OUTPATIENT)
Dept: NEUROLOGY | Facility: CLINIC | Age: 35
End: 2020-08-02

## 2020-08-03 NOTE — TELEPHONE ENCOUNTER
"Called and spoke to pt's mother, Eli.     "He did alright at [facility] and at [facility]  meeting yesterday. He's down to xanax 0.25mg daily. I video'd his major melt down at home and they are horrible. They are horrible. I will show you on Wednesday at our appt. But I want to get him off of this and just watch and wait. He's so sensitive to changes so let's just leave this out of his system and revisit."     Offers additional information but not of clinical relevance- discussing care needs and a current situation with a previous care provider. pt's mother his main advocate and has so much genuine concern and knowledge of Hussein. I find many of his appts are in supportive therapy of her.       "

## 2020-08-05 ENCOUNTER — OFFICE VISIT (OUTPATIENT)
Dept: PSYCHIATRY | Facility: CLINIC | Age: 35
End: 2020-08-05
Payer: COMMERCIAL

## 2020-08-05 VITALS
HEART RATE: 81 BPM | DIASTOLIC BLOOD PRESSURE: 72 MMHG | SYSTOLIC BLOOD PRESSURE: 122 MMHG | BODY MASS INDEX: 34.12 KG/M2 | HEIGHT: 70 IN | WEIGHT: 238.31 LBS

## 2020-08-05 DIAGNOSIS — R62.50 DEVELOPMENTAL DELAY, MODERATE: ICD-10-CM

## 2020-08-05 DIAGNOSIS — G40.909 SEIZURE DISORDER: Chronic | ICD-10-CM

## 2020-08-05 DIAGNOSIS — F42.2 MIXED OBSESSIONAL THOUGHTS AND ACTS: ICD-10-CM

## 2020-08-05 DIAGNOSIS — F84.0 AUTISTIC DISORDER, ACTIVE: Primary | Chronic | ICD-10-CM

## 2020-08-05 DIAGNOSIS — F84.9 PERVASIVE DEVELOPMENTAL DISORDER, ACTIVE: Chronic | ICD-10-CM

## 2020-08-05 DIAGNOSIS — G47.33 OSA (OBSTRUCTIVE SLEEP APNEA): Chronic | ICD-10-CM

## 2020-08-05 DIAGNOSIS — K74.60 CIRRHOSIS OF LIVER WITHOUT ASCITES, UNSPECIFIED HEPATIC CIRRHOSIS TYPE: ICD-10-CM

## 2020-08-05 PROCEDURE — 90833 PR PSYCHOTHERAPY W/PATIENT W/E&M, 30 MIN (ADD ON): ICD-10-PCS | Mod: S$GLB,,, | Performed by: PSYCHIATRY & NEUROLOGY

## 2020-08-05 PROCEDURE — 99999 PR PBB SHADOW E&M-EST. PATIENT-LVL III: CPT | Mod: PBBFAC,,, | Performed by: PSYCHIATRY & NEUROLOGY

## 2020-08-05 PROCEDURE — 90833 PSYTX W PT W E/M 30 MIN: CPT | Mod: S$GLB,,, | Performed by: PSYCHIATRY & NEUROLOGY

## 2020-08-05 PROCEDURE — 99215 OFFICE O/P EST HI 40 MIN: CPT | Mod: S$GLB,,, | Performed by: PSYCHIATRY & NEUROLOGY

## 2020-08-05 PROCEDURE — 99499 UNLISTED E&M SERVICE: CPT | Mod: S$GLB,,, | Performed by: PSYCHIATRY & NEUROLOGY

## 2020-08-05 PROCEDURE — 99215 PR OFFICE/OUTPT VISIT, EST, LEVL V, 40-54 MIN: ICD-10-PCS | Mod: S$GLB,,, | Performed by: PSYCHIATRY & NEUROLOGY

## 2020-08-05 PROCEDURE — 3008F BODY MASS INDEX DOCD: CPT | Mod: CPTII,S$GLB,, | Performed by: PSYCHIATRY & NEUROLOGY

## 2020-08-05 PROCEDURE — 99499 RISK ADDL DX/OHS AUDIT: ICD-10-PCS | Mod: S$GLB,,, | Performed by: PSYCHIATRY & NEUROLOGY

## 2020-08-05 PROCEDURE — 99999 PR PBB SHADOW E&M-EST. PATIENT-LVL III: ICD-10-PCS | Mod: PBBFAC,,, | Performed by: PSYCHIATRY & NEUROLOGY

## 2020-08-05 PROCEDURE — 3008F PR BODY MASS INDEX (BMI) DOCUMENTED: ICD-10-PCS | Mod: CPTII,S$GLB,, | Performed by: PSYCHIATRY & NEUROLOGY

## 2020-08-05 RX ORDER — POLYETHYLENE GLYCOL 3350 17 G/17G
17 POWDER, FOR SOLUTION ORAL DAILY
COMMUNITY

## 2020-08-05 NOTE — PROGRESS NOTES
"ID: 29yo WM with PDD, Impulse Control d/o, anxiety d/o nos.     CC: anxiety    Interim Hx: presents on time with his mother and more recent attendant, Rancho. Pt has not been stable recently. Mom shares video of him being agitated at home. Mom feels this may be due to the xanax which progressed from rare PRN use to daily use at the 0.5mg dose. Hussein has had s/e's to previous trials of benzo and she feels that perhaps once built in his system it led to some disinhibition (not her word, but by her description)- this may be accurate but it would not explain the ongoing medical concerns. Recent liver bx confirmed cirrhosis (progression from fatty liver).     Not sleeping well- historically does not require any sleep aid, 35lbs lost, decreased appetite, BMs in pull up- used to go on toilet, talking a lot about school recently (hasn't been in school 20+ yrs), also referring to a girl who lived across the street when in his childhood home. In general, mom is describing a regression.     Despite this report, Hussein seems very much baseline in the appt today. Is warm when he greets me. Calms for the appt. Allows mom and sitter to report his status. When tires of the appt and chatter begins his own self talk which i've always interpreted as his own angst and readiness to be done sitting and being quiet for appt time. When I reflect this mom states, "I do think he's a little better. I do."      Of note, no skin picking which is often his anxiety behavior.     Wants to stop the xanax. Has now been on 0.25mg x 1wk. Will stop and cont to closely monitor for 1-2 wks.     On Psychiatric ROS:    See above.     PSYCHOTHERAPY ADD-ON   30 (16-37*) minutes    Time: 20 minutes  Participants: Met with patient and mother/caregiver    Therapeutic Intervention Type: insight oriented psychotherapy, behavior modifying psychotherapy, supportive psychotherapy  Why chosen therapy is appropriate versus another modality: relevant to diagnosis, " "patient responds to this modality, evidence based practice    Target symptoms: adjustment, grief  Primary focus: caregiving needs with father's failing health and ru' regression  Psychotherapeutic techniques: support, validation, reframing    Outcome monitoring methods: self-report, observation, feedback from family    Patient's response to intervention:  The patient's response to intervention is accepting, motivated.    Progress toward goals:  The patient's progress toward goals is good.      PPHx: Endorses h/o self injury- will pick at scabs in sort of obsessive way- this has stabilized  Denies inpt psych hospitalization  Denies h/o suicide attempt     Current Psych Meds: **Trileptal 300mg po BID through neuro for sz d/o, xanax 0.5mg po daily PRN anxiety, luvox 150mg po BID  Past Psych Meds: s/e's to mult prev meds to include zyprexa (wgt gain), lexapro 20mg po qam (anxiety and to suppress sex drive), buspar 10mg po BID     PMHx: obesity, onel w/ cipap, seizure d/o, scoliosis surgery at 11yo led to hemiparalysis- now walking but has neurogenic bladder and frequent catheterization, partial nephrectomy R kidney    SubstHx:   T- none  E- none  D- none  Caffeine- very rare, if ever    FamPHx: mAunt- schizophrenic, mcousin- schizophrenia    Musculoskeletal:  Muscle strength/Tone: no dyskinesia/ no tremor  Gait/Station- non antalgic, no assistance needed    MSE: appears stated age, well groomed, obese, appropriate dress- shorts/tshirt, engages well with examiner at a childlike level.  Good e/c. Speech reg rate and vol, nonpressured. less spontaneous speech today. Mood is "I feel good, " Affect euthymic. Oriented to month and season. Narrative memory intact. Intellectual function is below avg based on vocab and basic fund of knowledge- PDD long h/o sp ed. Thought is perseverative but he is tracking in conversation. No tangentiality or circumstantiality. No FOI/WES. Denies SI/HI. Denies A/VH. No evidence of " "delusions. Insight lacking and Judgment in keeping with insight.     Blood pressure 122/72, pulse 81, height 5' 10" (1.778 m), weight 108.1 kg (238 lb 5.1 oz).    Suicide Risk Assessment:   Protective- age, no prior attempts, no prior hospitalizations, no family h/o attempts, no ongoing substance abuse, no psychosis, denies SI/intent/plan, no h/o violence, seeking treatment, access to treatment, future oriented, good primary support, no access to firearms    Risk- race, gender, ongoing Axis I sxs    **Pt is at LOW imminent and long term risk of suicide given current risk factors.    Assessment:  30yo WM with PDD, Impulse Control d/o, anxiety d/o nos. Last saw RENNY Murillo 5/2016. Pt presented initially with his mother and caregiver Helen who has been with the pt x 8mos. Currently the pt is doing very well. Of note pt is only on lexapro for anxiety, is also on trileptal 300mg po BID for seizure d/o through neuro and has HALEY on cipap. Has great family support, receives 88hrs per week of direct care from outside service provider, has assistance with all ADLs and IADLs but seems content with arrangement. No h/o violent or aggressive behavior. Had manic sxs with psychosis when on keppra but never before or since. Has recently stopped zyprexa due to significant weight gain and there has been no increase in behavior or anxiety. Per parent and caregiver there is some concern that a prev caregiver who was recently fired was motivated for the pt to be more sedated-  they both denied the pt needed any change in medication. Hussein is doing well, has some instances of intrusive behavior with young attractive women and he becomes difficult to redirect- added a trial of PRN buspar for days he has public outings- this has been effective and helpful. Today the incidents have become more difficult to redirect- will order a trial of xanax 0.25-0.5mg po daily PRN and also inc buspar PRN to 10mg daily as needed for public outings. Today " "on f/u pt's mother does not believe buspar has been effective- xanax effective but cannot be given in the moment as "it just happens so fast"- inability to redirect pt in public has become a safety concern for women and for the pt as authorities are not typically well trained in spectrum disorders (mom working to educate the police force). Warrants a transition to new ssri- will taper off lexapro and likely start luvox at the next appt. Last appt on f/u he is "no worse" without the meds- mom motivated to cont without as they cont to observe. Anxiety was mildly inc'd but hard to attribute to lack of med as his primary caregiver just left work abruptly with pregnancy complications- change of schedule and personnel hard for Ru. Last appt 2 new providers, doing well with both, mom has placed cameras and she has no concerns about the new hires. Routine back in place and ru had anxiety but was functioning well- mom would like to cont w/o meds. Will cont to observe. It was time to intervene with meds per mom, started trial of luvox which initially helped at 50mg bid, we then inc'd to 100mg bid with s/e of diarrhea and sweating, now back to 50mg po bid with resolution of most concerning s/e and still improvement in anxiety- will cont at this time but mom will cont to do r/b assessment. Pt's father with worsening health- leading to some anxiety, new caregiver now gone after some unprofessional behavior. Pt doing well on the dec'd luvox dose and favorite caregiver is now back and working 25hrs. Self injury dec'd. Recent hospital stay for uti and sepsis- now back to baseline. Some recent transaminitis- trending down- will cont to follow. Initially did well on inc'd fluvox to 100mg po qam and 50mg po qhs- but last appt they reported inc'd anxiety and inc'd need for use of xanax and that the xanax 0.25mg has not been effective for relief of sxs recently- inc'd fluvox from 100bid (mom titrated the dose) to 150mg po BID. " Will also inc xanax to 0.5mg po daily PRN anxiety.  Skin picking improved, escalating less frequently. Will cont daily xanax 0.5mg po qam and cont to observe. No acute safety concerns. rtc 3mos.     Pt now weaning off xanax. Has had some regression along with some medical concerns- uncertain of cause for either. Mom feels perhaps due to slow titration of the xanax use which became daily- may have led to disinhibition. Will d/c the now 0.25mg daily dose and we will observe for 1-2 wks. May then begin adjusting luvox? Although i'm uncertain this is playing a role.     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 Cont luvox 150mg po BID   2. Cont Trileptal 300mg po BID per neuro  3. D/c xanax  4. RTC 1mo    -Spent 50min face to face with the pt; >50% time spent in counseling   -Supportive therapy and psychoeducation provided  -R/B/SE's of medications discussed with the pt who expresses understanding and chooses to take medications as prescribed.   -Pt instructed to call clinic, 911 or go to nearest emergency room if sxs worsen or pt is in   crisis. The pt expresses understanding.

## 2020-08-11 ENCOUNTER — PATIENT OUTREACH (OUTPATIENT)
Dept: ADMINISTRATIVE | Facility: OTHER | Age: 35
End: 2020-08-11

## 2020-08-11 NOTE — PROGRESS NOTES
Requested updates within Care Everywhere.  Patient's chart was reviewed for overdue EMILY topics.  LINKS triggered.   Immunizations not able to be reconciled. LINKS delayed.

## 2020-08-12 ENCOUNTER — DOCUMENT SCAN (OUTPATIENT)
Dept: HOME HEALTH SERVICES | Facility: HOSPITAL | Age: 35
End: 2020-08-12
Payer: MEDICARE

## 2020-08-13 ENCOUNTER — OFFICE VISIT (OUTPATIENT)
Dept: GASTROENTEROLOGY | Facility: CLINIC | Age: 35
End: 2020-08-13
Payer: MEDICARE

## 2020-08-13 VITALS — HEIGHT: 70 IN | RESPIRATION RATE: 17 BRPM | BODY MASS INDEX: 33.89 KG/M2 | WEIGHT: 236.75 LBS

## 2020-08-13 DIAGNOSIS — Z01.812 PRE-PROCEDURE LAB EXAM: ICD-10-CM

## 2020-08-13 DIAGNOSIS — R93.3 ABNORMAL CT SCAN, COLON: ICD-10-CM

## 2020-08-13 DIAGNOSIS — R19.4 CHANGE IN BOWEL HABITS: Primary | ICD-10-CM

## 2020-08-13 PROCEDURE — 3008F BODY MASS INDEX DOCD: CPT | Mod: CPTII,S$GLB,, | Performed by: INTERNAL MEDICINE

## 2020-08-13 PROCEDURE — 99213 OFFICE O/P EST LOW 20 MIN: CPT | Mod: S$GLB,,, | Performed by: INTERNAL MEDICINE

## 2020-08-13 PROCEDURE — 99999 PR PBB SHADOW E&M-EST. PATIENT-LVL III: CPT | Mod: PBBFAC,,, | Performed by: INTERNAL MEDICINE

## 2020-08-13 PROCEDURE — 3008F PR BODY MASS INDEX (BMI) DOCUMENTED: ICD-10-PCS | Mod: CPTII,S$GLB,, | Performed by: INTERNAL MEDICINE

## 2020-08-13 PROCEDURE — 99213 PR OFFICE/OUTPT VISIT, EST, LEVL III, 20-29 MIN: ICD-10-PCS | Mod: S$GLB,,, | Performed by: INTERNAL MEDICINE

## 2020-08-13 PROCEDURE — 99999 PR PBB SHADOW E&M-EST. PATIENT-LVL III: ICD-10-PCS | Mod: PBBFAC,,, | Performed by: INTERNAL MEDICINE

## 2020-08-13 NOTE — PROGRESS NOTES
"Pt returns re: change bowel habits. Increased frequency and incontinence. Stopped Miralax. Several loose or semi-formed stools daily. No constipation. No bleeding. No obvious abd pain, although mother feels pt is "agitated". No fever or jaundice. Tolerating diet, although has lost weight. Last C-scope 2018 sub-optimal prep, unremarkable. Recent CT scan suggestive of spasm or narrowing left colon.     REVIEW OF SYSTEMS:   Constitutional: Negative for fever, appetite change and unexpected weight change.  HENT: Negative for sore throat and trouble swallowing.  Eyes: Negative for visual disturbance.  Respiratory: Negative for chest tightness, shortness of breath and wheezing.  Cardiovascular: Negative for chest pain.  Gastrointestinal:  as per HPI    PHYSICAL EXAMINATION:                                                        GENERAL:  Comfortable, in no acute distress.      SKIN: Non-jaundiced.                             HEENT EXAM:  Nonicteric.  No adenopathy.  Oropharynx is clear.               NECK:  Supple.                                                               LUNGS:  Clear.                                                               CARDIAC:  Regular rate and rhythm.  S1, S2.  No murmur.                      ABDOMEN:  Soft, positive bowel sounds, nontender.  No hepatosplenomegaly or masses.  No rebound or guarding.                                             EXTREMITIES:  No edema.       IMP: 1. Change bowel habits          2. Abnormal CT of colon  In view of above # 1 and # 2 above, combined with sub-optimal prep on 2018 C-scope, I recommended repeat C-scope, to which mother is in agreement.    PLAN: C-scope  "

## 2020-08-17 ENCOUNTER — OFFICE VISIT (OUTPATIENT)
Dept: FAMILY MEDICINE | Facility: CLINIC | Age: 35
End: 2020-08-17
Payer: MEDICARE

## 2020-08-17 VITALS
BODY MASS INDEX: 33.56 KG/M2 | WEIGHT: 233.94 LBS | OXYGEN SATURATION: 96 % | SYSTOLIC BLOOD PRESSURE: 122 MMHG | HEART RATE: 62 BPM | RESPIRATION RATE: 16 BRPM | DIASTOLIC BLOOD PRESSURE: 70 MMHG

## 2020-08-17 DIAGNOSIS — M1A.9XX0 CHRONIC GOUT WITHOUT TOPHUS, UNSPECIFIED CAUSE, UNSPECIFIED SITE: Chronic | ICD-10-CM

## 2020-08-17 DIAGNOSIS — G40.909 SEIZURE DISORDER: Chronic | ICD-10-CM

## 2020-08-17 DIAGNOSIS — G47.33 OSA (OBSTRUCTIVE SLEEP APNEA): Chronic | ICD-10-CM

## 2020-08-17 DIAGNOSIS — N31.9 NEUROGENIC BLADDER: Chronic | ICD-10-CM

## 2020-08-17 DIAGNOSIS — F84.0 AUTISTIC DISORDER, ACTIVE: Primary | Chronic | ICD-10-CM

## 2020-08-17 DIAGNOSIS — F84.9 PERVASIVE DEVELOPMENTAL DISORDER, ACTIVE: Chronic | ICD-10-CM

## 2020-08-17 PROCEDURE — 99999 PR PBB SHADOW E&M-EST. PATIENT-LVL III: ICD-10-PCS | Mod: PBBFAC,,, | Performed by: EMERGENCY MEDICINE

## 2020-08-17 PROCEDURE — 99214 OFFICE O/P EST MOD 30 MIN: CPT | Mod: S$GLB,,, | Performed by: EMERGENCY MEDICINE

## 2020-08-17 PROCEDURE — 3008F PR BODY MASS INDEX (BMI) DOCUMENTED: ICD-10-PCS | Mod: CPTII,S$GLB,, | Performed by: EMERGENCY MEDICINE

## 2020-08-17 PROCEDURE — 99214 PR OFFICE/OUTPT VISIT, EST, LEVL IV, 30-39 MIN: ICD-10-PCS | Mod: S$GLB,,, | Performed by: EMERGENCY MEDICINE

## 2020-08-17 PROCEDURE — 99999 PR PBB SHADOW E&M-EST. PATIENT-LVL III: CPT | Mod: PBBFAC,,, | Performed by: EMERGENCY MEDICINE

## 2020-08-17 PROCEDURE — 3008F BODY MASS INDEX DOCD: CPT | Mod: CPTII,S$GLB,, | Performed by: EMERGENCY MEDICINE

## 2020-08-17 NOTE — PROGRESS NOTES
Subjective:   THIS NOTE IS DONE WITH VOICE RECOGNITION        Patient ID: Hussein Doyle is a 34 y.o. male.    Chief Complaint: Follow-up (fill out 90-L)      HPI  He is in today accompanied by his mother.  There is no aide.    The disruption caused by the current infectious environment has been significant for David.  He has been acting out more.  He has been seen by a variety of people secondary to his somatic complaints and his constipation.  These are gradually subsiding, particularly the bowel issues.    He has been seen by his psychiatrist.  He has been weaned os of Xanax that was being used temporarily.  He is anticipating starting some long-acting melatonin in the next few days.    His mother remains his staunch supporter.  She is doing all of his catheterizations.      His activity levels have not returned normal secondary to restrictions.  As a result he spending much more time at home which has escalated some of the issues behavior early.    He was thought to gaining weight because the Zyprexa.  This was discontinued, and he is losing weight.  The behavioral changes by stopping the Zyprexa seem to be minimal.  His mother is very much encouraged, and hopes that we do not have to go back to this medication.    The 90 L forms completed with the help of his .    Current Outpatient Medications   Medication Sig Dispense Refill    allopurinoL (ZYLOPRIM) 100 MG tablet Take 1 tablet (100 mg total) by mouth 2 (two) times daily. 180 tablet 3    calcium carbonate-vitamin D3 (CALCIUM 500 WITH D) 500 mg(1,250mg) -400 unit Tab 2 (two) times a day.       fluvoxaMINE (LUVOX) 100 MG tablet Take 1.5 tablets (150 mg total) by mouth 2 (two) times daily. 270 tablet 0    inulin (FIBER GUMMIES ORAL) Take by mouth once daily.       L. ACIDOPHILUS/BIFID. ANIMALIS (CHEWABLE PROBIOTIC ORAL) Take by mouth 2 (two) times daily.      multivitamin capsule Take 1 capsule by mouth every morning.        mupirocin (BACTROBAN) 2 % ointment Apply topically 3 (three) times daily. (Patient taking differently: Apply topically 3 (three) times daily as needed. ) 30 g 0    omega-3 fatty acids/fish oil (FISH OIL-OMEGA-3 FATTY ACIDS) 300-1,000 mg capsule Take by mouth once daily.      OXcarbazepine (TRILEPTAL) 300 MG Tab TAKE 1 TABLET BY MOUTH TWICE A DAY 60 tablet 0    pantoprazole (PROTONIX) 40 MG tablet Take 1 tablet (40 mg total) by mouth once daily. 30 tablet 2    polyethylene glycol (GLYCOLAX) 17 gram PwPk Take by mouth.      sulfamethoxazole-trimethoprim 400-80mg (BACTRIM,SEPTRA) 400-80 mg per tablet Take 1 tablet by mouth every 12 (twelve) hours.      tamsulosin (FLOMAX) 0.4 mg Cp24 Take 0.4 mg by mouth every evening. Every day      ondansetron (ZOFRAN-ODT) 4 MG TbDL Take 1 tablet (4 mg total) by mouth every 6 (six) hours as needed. (Patient not taking: Reported on 8/17/2020) 10 tablet 0     No current facility-administered medications for this visit.          Review of Systems   Unable to perform ROS: Patient nonverbal       Objective:      Physical Exam  Vitals signs reviewed.   HENT:      Head: Normocephalic.      Right Ear: External ear normal.      Left Ear: External ear normal.      Mouth/Throat:      Mouth: Mucous membranes are moist.   Eyes:      Extraocular Movements: Extraocular movements intact.      Conjunctiva/sclera: Conjunctivae normal.      Comments: During the conversation, 1 of his behaviors is to roll is eyes backward.  This is very much present today.   Cardiovascular:      Rate and Rhythm: Normal rate and regular rhythm.   Pulmonary:      Effort: Pulmonary effort is normal.      Breath sounds: Normal breath sounds.   Abdominal:      General: Abdomen is flat. There is no distension.      Tenderness: There is no abdominal tenderness.   Musculoskeletal:      Right lower leg: No edema.      Left lower leg: No edema.   Skin:     Capillary Refill: Capillary refill takes less than 2 seconds.    Neurological:      Mental Status: He is alert. Mental status is at baseline.   Psychiatric:      Comments: He continues to manifest behaviors consistent with his autism.  Minimally verbal.  He is not threatening verbally or physically with me.  He will answer some questions.         Assessment:       1. Autistic disorder, active    2. Seizure disorder    3. HALEY (obstructive sleep apnea)    4. Neurogenic bladder    5. Pervasive developmental disorder, active    6. Chronic gout without tophus, unspecified cause, unspecified site        Plan:       1. Autistic disorder, active  Kelvin and abnormal behavior  Likely result of his change in routine and more restriction and is travel  Off Zyprexa  Off alprazolam  Slow improvement  90 L completed    2. Seizure disorder  No recent seizure activity  Continue current medications    3. HALEY (obstructive sleep apnea)  He is losing weight, which should help his sleep apnea  Not ready to discontinue CPAP at this time.    4. Neurogenic bladder  Stable with multiple self-catheterizations per day.  These are done by his mother.    5. Pervasive developmental disorder, active  No significant changes.    6. Chronic gout without tophus, unspecified cause, unspecified site  As reported by his mother, no active joint complaints.  Physical exam today does not reveal any active synovitis.    Diagnostic tests including laboratory in rib x-rays from the last few months reviewed with his mother.

## 2020-08-18 NOTE — PATIENT INSTRUCTIONS
Silvino Benitez seems to be getting gradually better.  I am sure that stopping the Zyprexa has helped with his weight loss.  Hopefully will not have to restart this.    I do not recall having discussed any new seizure activity, but I am not aware of any.  If so please let me know.     You are a special.  Not many people can do what you do.  Thank you.    Respectfully,    Reji Zendejas MD

## 2020-09-02 ENCOUNTER — OFFICE VISIT (OUTPATIENT)
Dept: PSYCHIATRY | Facility: CLINIC | Age: 35
End: 2020-09-02
Payer: COMMERCIAL

## 2020-09-02 VITALS
HEIGHT: 70 IN | BODY MASS INDEX: 33.09 KG/M2 | DIASTOLIC BLOOD PRESSURE: 73 MMHG | HEART RATE: 85 BPM | WEIGHT: 231.13 LBS | SYSTOLIC BLOOD PRESSURE: 126 MMHG

## 2020-09-02 DIAGNOSIS — G47.33 OSA (OBSTRUCTIVE SLEEP APNEA): Chronic | ICD-10-CM

## 2020-09-02 DIAGNOSIS — F84.9 PERVASIVE DEVELOPMENTAL DISORDER, ACTIVE: Primary | Chronic | ICD-10-CM

## 2020-09-02 DIAGNOSIS — K74.60 CIRRHOSIS OF LIVER WITHOUT ASCITES, UNSPECIFIED HEPATIC CIRRHOSIS TYPE: ICD-10-CM

## 2020-09-02 DIAGNOSIS — R62.50 DEVELOPMENTAL DELAY, MODERATE: ICD-10-CM

## 2020-09-02 DIAGNOSIS — G40.909 SEIZURE DISORDER: Chronic | ICD-10-CM

## 2020-09-02 DIAGNOSIS — F84.0 AUTISTIC DISORDER, ACTIVE: Chronic | ICD-10-CM

## 2020-09-02 DIAGNOSIS — F42.2 MIXED OBSESSIONAL THOUGHTS AND ACTS: ICD-10-CM

## 2020-09-02 PROCEDURE — 99999 PR PBB SHADOW E&M-EST. PATIENT-LVL III: CPT | Mod: PBBFAC,,, | Performed by: PSYCHIATRY & NEUROLOGY

## 2020-09-02 PROCEDURE — 90833 PSYTX W PT W E/M 30 MIN: CPT | Mod: S$GLB,,, | Performed by: PSYCHIATRY & NEUROLOGY

## 2020-09-02 PROCEDURE — 99214 OFFICE O/P EST MOD 30 MIN: CPT | Mod: S$GLB,,, | Performed by: PSYCHIATRY & NEUROLOGY

## 2020-09-02 PROCEDURE — 3008F PR BODY MASS INDEX (BMI) DOCUMENTED: ICD-10-PCS | Mod: CPTII,S$GLB,, | Performed by: PSYCHIATRY & NEUROLOGY

## 2020-09-02 PROCEDURE — 90833 PR PSYCHOTHERAPY W/PATIENT W/E&M, 30 MIN (ADD ON): ICD-10-PCS | Mod: S$GLB,,, | Performed by: PSYCHIATRY & NEUROLOGY

## 2020-09-02 PROCEDURE — 99214 PR OFFICE/OUTPT VISIT, EST, LEVL IV, 30-39 MIN: ICD-10-PCS | Mod: S$GLB,,, | Performed by: PSYCHIATRY & NEUROLOGY

## 2020-09-02 PROCEDURE — 99999 PR PBB SHADOW E&M-EST. PATIENT-LVL III: ICD-10-PCS | Mod: PBBFAC,,, | Performed by: PSYCHIATRY & NEUROLOGY

## 2020-09-02 PROCEDURE — 3008F BODY MASS INDEX DOCD: CPT | Mod: CPTII,S$GLB,, | Performed by: PSYCHIATRY & NEUROLOGY

## 2020-09-02 NOTE — PROGRESS NOTES
"ID: 29yo WM with PDD, Impulse Control d/o, anxiety d/o nos.     CC: anxiety    Interim Hx: presents on time with his mother. More agitated than I have ever seen him in history and mom says this is due to the mask requirement. I allow him to take off and he does in fact self soothe with a digit spinner and whistling to himself.     I ask mom if he is any better at all since d/c of xanax and she reports, "yes. I do think so. He still escalates but he descalates faster and easier without it." but the behavior in the clinic appears to be anxiety. When in the mask cont'd to repeat "it's not safe here. Not safe. Quit saying that. Not safe."     Has cont'd to lose weight- mom says unknown why although she then offers that what is unknown is why his appetite is so decreased. Now eating a salad plate of food rather than dinner portion. The weight loss is welcomed but the uncertainty of cause is of concern and they cont some work up around that.     Per mom, "but I am exhausted. He is exhausting recently and my  is exhausting and i'm not sure." she appears affect congruent to report. Tearful today which is uncommon for her. She inquires about trial of cbd oil through [facility]. We discuss our lack of research about this but that certainly in ru' case a trial may be warranted and while I am not recommending this treatment I want her to feel able to honestly report if they are using it and dosing and results. She expresses understanding.     He continues not sleeping well- historically does not require any sleep aid, 40lbs lost, decreased appetite, BMs in pull up- used to go on toilet, talking a lot about school recently (hasn't been in school 20+ yrs), also referring to a girl who lived across the street when in his childhood home. In general, mom is describing a regression.     Once ru has calmed he is warm with me per typical baseline.    Of note, no skin picking which is often his anxiety behavior. "     Mom will add trial of cbd and report back- we may need to taper off luvox and start a trial of alternate ssri? Difficult decision and mom wants to consider.     On Psychiatric ROS:    See above.     PSYCHOTHERAPY ADD-ON   30 (16-37*) minutes    Time: 20 minutes  Participants: Met with patient and mother/caregiver    Therapeutic Intervention Type: insight oriented psychotherapy, behavior modifying psychotherapy, supportive psychotherapy  Why chosen therapy is appropriate versus another modality: relevant to diagnosis, patient responds to this modality, evidence based practice    Target symptoms: adjustment, grief  Primary focus: caregiving needs with father's failing health and ru' regression  Psychotherapeutic techniques: support, validation, reframing    Outcome monitoring methods: self-report, observation, feedback from family    Patient's response to intervention:  The patient's response to intervention is accepting, motivated.    Progress toward goals:  The patient's progress toward goals is good.      PPHx: Endorses h/o self injury- will pick at scabs in sort of obsessive way- this has stabilized  Denies inpt psych hospitalization  Denies h/o suicide attempt     Current Psych Meds: **Trileptal 300mg po BID through neuro for sz d/o, xanax 0.5mg po daily PRN anxiety, luvox 150mg po BID  Past Psych Meds: s/e's to mult prev meds to include zyprexa (wgt gain), lexapro 20mg po qam (anxiety and to suppress sex drive), buspar 10mg po BID     PMHx: obesity, onel w/ cipap, seizure d/o, scoliosis surgery at 13yo led to hemiparalysis- now walking but has neurogenic bladder and frequent catheterization, partial nephrectomy R kidney    SubstHx:   T- none  E- none  D- none  Caffeine- very rare, if ever    FamPHx: mAunt- schizophrenic, mcousin- schizophrenia    Musculoskeletal:  Muscle strength/Tone: no dyskinesia/ no tremor  Gait/Station- non antalgic, no assistance needed    MSE: appears stated age, well groomed,  "obese, appropriate dress- shorts/tshirt, engages well with examiner at a childlike level.  Good e/c. Speech reg rate and vol, nonpressured. less spontaneous speech today. Mood is (pt does not respond today to this question). Affect euthymic. Oriented to month and season. Narrative memory intact. Intellectual function is below avg based on vocab and basic fund of knowledge- PDD long h/o sp ed. Thought is perseverative but he is tracking in conversation. No tangentiality or circumstantiality. No FOI/WES. Denies SI/HI. Denies A/VH. No evidence of delusions. Insight lacking and Judgment in keeping with insight.     Blood pressure 126/73, pulse 85, height 5' 10" (1.778 m), weight 104.9 kg (231 lb 2.4 oz).    Suicide Risk Assessment:   Protective- age, no prior attempts, no prior hospitalizations, no family h/o attempts, no ongoing substance abuse, no psychosis, denies SI/intent/plan, no h/o violence, seeking treatment, access to treatment, future oriented, good primary support, no access to firearms    Risk- race, gender, ongoing Axis I sxs    **Pt is at LOW imminent and long term risk of suicide given current risk factors.    Assessment:  30yo WM with PDD, Impulse Control d/o, anxiety d/o nos. Last saw RENNY Murillo 5/2016. Pt presented initially with his mother and caregiver Helen who has been with the pt x 8mos. Currently the pt is doing very well. Of note pt is only on lexapro for anxiety, is also on trileptal 300mg po BID for seizure d/o through neuro and has HALEY on cipap. Has great family support, receives 88hrs per week of direct care from outside service provider, has assistance with all ADLs and IADLs but seems content with arrangement. No h/o violent or aggressive behavior. Had manic sxs with psychosis when on keppra but never before or since. Has recently stopped zyprexa due to significant weight gain and there has been no increase in behavior or anxiety. Per parent and caregiver there is some concern that a " "prev caregiver who was recently fired was motivated for the pt to be more sedated-  they both denied the pt needed any change in medication. Ru is doing well, has some instances of intrusive behavior with young attractive women and he becomes difficult to redirect- added a trial of PRN buspar for days he has public outings- this has been effective and helpful. Today the incidents have become more difficult to redirect- will order a trial of xanax 0.25-0.5mg po daily PRN and also inc buspar PRN to 10mg daily as needed for public outings. Today on f/u pt's mother does not believe buspar has been effective- xanax effective but cannot be given in the moment as "it just happens so fast"- inability to redirect pt in public has become a safety concern for women and for the pt as authorities are not typically well trained in spectrum disorders (mom working to educate the police force). Warrants a transition to new ssri- will taper off lexapro and likely start luvox at the next appt. Last appt on f/u he is "no worse" without the meds- mom motivated to cont without as they cont to observe. Anxiety was mildly inc'd but hard to attribute to lack of med as his primary caregiver just left work abruptly with pregnancy complications- change of schedule and personnel hard for Ru. Last appt 2 new providers, doing well with both, mom has placed cameras and she has no concerns about the new hires. Routine back in place and ru had anxiety but was functioning well- mom would like to cont w/o meds. Will cont to observe. It was time to intervene with meds per mom, started trial of luvox which initially helped at 50mg bid, we then inc'd to 100mg bid with s/e of diarrhea and sweating, now back to 50mg po bid with resolution of most concerning s/e and still improvement in anxiety- will cont at this time but mom will cont to do r/b assessment. Pt's father with worsening health- leading to some anxiety, new caregiver now gone " after some unprofessional behavior. Pt doing well on the dec'd luvox dose and favorite caregiver is now back and working 25hrs. Self injury dec'd. Recent hospital stay for uti and sepsis- now back to baseline. Some recent transaminitis- trending down- will cont to follow. Initially did well on inc'd fluvox to 100mg po qam and 50mg po qhs- but last appt they reported inc'd anxiety and inc'd need for use of xanax and that the xanax 0.25mg has not been effective for relief of sxs recently- inc'd fluvox from 100bid (mom titrated the dose) to 150mg po BID. Will also inc xanax to 0.5mg po daily PRN anxiety.  Skin picking improved, escalating less frequently. Will cont daily xanax 0.5mg po qam and cont to observe. No acute safety concerns. rtc 3mos.     Pt now weaning off xanax. Has had some regression along with some medical concerns- uncertain of cause for either. Mom feels perhaps due to slow titration of the xanax use which became daily- may have led to disinhibition. Will d/c the now 0.25mg daily dose and we will observe for 1-2 wks. May then begin adjusting luvox? Although i'm uncertain this is playing a role. Mom interested in trial of cbd oil and while I did not recommend I appreciate her honest report of trial of use- we will monitor. May have to ultimately taper off luvox.      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 Cont luvox 150mg po BID   2. Cont Trileptal 300mg po BID per neuro  3. D/c xanax  4. RTC 2mos    -Spent 30min face to face with the pt; >50% time spent in counseling   -Supportive therapy and psychoeducation provided  -R/B/SE's of medications discussed with the pt who expresses understanding and chooses to take medications as prescribed.   -Pt instructed to call clinic, 911 or go to nearest emergency room if sxs worsen or pt is in    crisis. The pt expresses understanding.

## 2020-09-04 ENCOUNTER — TELEPHONE (OUTPATIENT)
Dept: FAMILY MEDICINE | Facility: CLINIC | Age: 35
End: 2020-09-04

## 2020-09-04 NOTE — TELEPHONE ENCOUNTER
Spoke with mother. She said that she is using boudreauxs butt paste. In the groin area. She feels that it is from pull up on all the time. It is on the inner groin area that is red and lraw and looks like bleeding but no bloody. Not bleeding, just really red.   The butt paste helped the left side but the right side is still inflammed. Is there something else she can use for this.   She is not sure what to do.

## 2020-09-04 NOTE — TELEPHONE ENCOUNTER
----- Message from Joelle Haji sent at 9/4/2020  9:47 AM CDT -----  Regarding: advice  Contact: Eli Steward , mother  Type: Needs Medical Advice  Who Called:  Eli Steward   Symptoms (please be specific):  red and raw on private area from pull ups  How long has patient had these symptoms:    Pharmacy name and phone #:    [id number] - [place] - [address]  [address]  [place] [address]  Phone: [phone] Fax: [phone]  Best Call Back Number: [phone] (home)   Additional Information: Mother is asking if there is something stronger than Deidre Butt paste she can use. Patient other conditions show slight improvement.  Please call mother. Thanks!

## 2020-09-06 ENCOUNTER — LAB VISIT (OUTPATIENT)
Dept: FAMILY MEDICINE | Facility: CLINIC | Age: 35
End: 2020-09-06
Payer: MEDICARE

## 2020-09-06 DIAGNOSIS — Z01.812 PRE-PROCEDURE LAB EXAM: ICD-10-CM

## 2020-09-06 PROCEDURE — U0003 INFECTIOUS AGENT DETECTION BY NUCLEIC ACID (DNA OR RNA); SEVERE ACUTE RESPIRATORY SYNDROME CORONAVIRUS 2 (SARS-COV-2) (CORONAVIRUS DISEASE [COVID-19]), AMPLIFIED PROBE TECHNIQUE, MAKING USE OF HIGH THROUGHPUT TECHNOLOGIES AS DESCRIBED BY CMS-2020-01-R: HCPCS

## 2020-09-07 LAB — SARS-COV-2 RNA RESP QL NAA+PROBE: NOT DETECTED

## 2020-09-08 DIAGNOSIS — F42.8 OTHER OBSESSIVE-COMPULSIVE DISORDERS: ICD-10-CM

## 2020-09-08 DIAGNOSIS — R13.10 DYSPHAGIA, UNSPECIFIED TYPE: Primary | ICD-10-CM

## 2020-09-08 DIAGNOSIS — F63.9 IMPULSE CONTROL DISORDER: Chronic | ICD-10-CM

## 2020-09-08 RX ORDER — LACTULOSE 10 G/15ML
SOLUTION ORAL; RECTAL 3 TIMES DAILY
COMMUNITY
End: 2020-12-16 | Stop reason: SDUPTHER

## 2020-09-08 RX ORDER — PANTOPRAZOLE SODIUM 40 MG/1
40 TABLET, DELAYED RELEASE ORAL DAILY
Qty: 90 TABLET | Refills: 2 | Status: SHIPPED | OUTPATIENT
Start: 2020-09-08 | End: 2021-05-17

## 2020-09-08 RX ORDER — FLUVOXAMINE MALEATE 100 MG/1
150 TABLET, COATED ORAL 2 TIMES DAILY
Qty: 270 TABLET | Refills: 1 | Status: SHIPPED | OUTPATIENT
Start: 2020-09-08 | End: 2020-11-10 | Stop reason: SDUPTHER

## 2020-09-08 RX ORDER — MAGNESIUM 30 MG
1 TABLET ORAL NIGHTLY
COMMUNITY

## 2020-09-08 NOTE — TELEPHONE ENCOUNTER
Care Due:                  Date            Visit Type   Department     Provider  --------------------------------------------------------------------------------                                ESTABLISHED   [facility] FAMILY  Last Visit: 08-      PATIENT      MEDICINE       CLARICE ARIAS  Next Visit: None Scheduled  None         None Found                                                            Last  Test          Frequency    Reason                     Performed    Due Date  --------------------------------------------------------------------------------    Uric Acid...  12 months..  allopurinoL..............  05- 04-    Powered by [employer]. Reference number: [id number]. 9/08/2020 2:55:16 PM CDT

## 2020-09-08 NOTE — TELEPHONE ENCOUNTER
----- Message from Micaela Ramon sent at 9/8/2020 12:45 PM CDT -----  Regarding: Rx  Contact: [facility]  Type: Needs Medical Advice    Who Called:      Best Call Back Number:   Additional Information: Requesting a call back from Nurse, regarding Need Rx     pantoprazole (PROTONIX) 40 MG tablet 30 tablet   fluvoxaMINE (LUVOX) 100 MG tablet 270 tablet

## 2020-09-09 ENCOUNTER — ANESTHESIA (OUTPATIENT)
Dept: ENDOSCOPY | Facility: HOSPITAL | Age: 35
End: 2020-09-09
Payer: MEDICARE

## 2020-09-09 ENCOUNTER — HOSPITAL ENCOUNTER (OUTPATIENT)
Facility: HOSPITAL | Age: 35
Discharge: HOME | End: 2020-09-09
Attending: INTERNAL MEDICINE | Admitting: INTERNAL MEDICINE
Payer: MEDICARE

## 2020-09-09 ENCOUNTER — ANESTHESIA EVENT (OUTPATIENT)
Dept: ENDOSCOPY | Facility: HOSPITAL | Age: 35
End: 2020-09-09
Payer: MEDICARE

## 2020-09-09 VITALS
OXYGEN SATURATION: 98 % | RESPIRATION RATE: 16 BRPM | WEIGHT: 225 LBS | HEIGHT: 71 IN | SYSTOLIC BLOOD PRESSURE: 116 MMHG | HEART RATE: 62 BPM | DIASTOLIC BLOOD PRESSURE: 63 MMHG | BODY MASS INDEX: 31.5 KG/M2 | TEMPERATURE: 98 F

## 2020-09-09 DIAGNOSIS — R19.4 CHANGE IN BOWEL HABITS: ICD-10-CM

## 2020-09-09 PROCEDURE — 37000009 HC ANESTHESIA EA ADD 15 MINS: Mod: PO | Performed by: INTERNAL MEDICINE

## 2020-09-09 PROCEDURE — 88305 TISSUE EXAM BY PATHOLOGIST: CPT | Mod: 26,,, | Performed by: PATHOLOGY

## 2020-09-09 PROCEDURE — 45380 COLONOSCOPY AND BIOPSY: CPT | Mod: ,,, | Performed by: INTERNAL MEDICINE

## 2020-09-09 PROCEDURE — 63600175 PHARM REV CODE 636 W HCPCS: Mod: PO | Performed by: NURSE ANESTHETIST, CERTIFIED REGISTERED

## 2020-09-09 PROCEDURE — 88305 TISSUE EXAM BY PATHOLOGIST: ICD-10-PCS | Mod: 26,,, | Performed by: PATHOLOGY

## 2020-09-09 PROCEDURE — D9220A PRA ANESTHESIA: Mod: ANES,,, | Performed by: ANESTHESIOLOGY

## 2020-09-09 PROCEDURE — 27201012 HC FORCEPS, HOT/COLD, DISP: Mod: PO | Performed by: INTERNAL MEDICINE

## 2020-09-09 PROCEDURE — D9220A PRA ANESTHESIA: ICD-10-PCS | Mod: ANES,,, | Performed by: ANESTHESIOLOGY

## 2020-09-09 PROCEDURE — D9220A PRA ANESTHESIA: Mod: CRNA,,, | Performed by: NURSE ANESTHETIST, CERTIFIED REGISTERED

## 2020-09-09 PROCEDURE — 88305 TISSUE EXAM BY PATHOLOGIST: CPT | Performed by: PATHOLOGY

## 2020-09-09 PROCEDURE — 37000008 HC ANESTHESIA 1ST 15 MINUTES: Mod: PO | Performed by: INTERNAL MEDICINE

## 2020-09-09 PROCEDURE — 45380 COLONOSCOPY AND BIOPSY: CPT | Mod: PO | Performed by: INTERNAL MEDICINE

## 2020-09-09 PROCEDURE — 45380 PR COLONOSCOPY,BIOPSY: ICD-10-PCS | Mod: ,,, | Performed by: INTERNAL MEDICINE

## 2020-09-09 PROCEDURE — 25000003 PHARM REV CODE 250: Mod: PO | Performed by: NURSE ANESTHETIST, CERTIFIED REGISTERED

## 2020-09-09 PROCEDURE — D9220A PRA ANESTHESIA: ICD-10-PCS | Mod: CRNA,,, | Performed by: NURSE ANESTHETIST, CERTIFIED REGISTERED

## 2020-09-09 PROCEDURE — 63600175 PHARM REV CODE 636 W HCPCS: Mod: PO | Performed by: INTERNAL MEDICINE

## 2020-09-09 RX ORDER — SODIUM CHLORIDE 0.9 % (FLUSH) 0.9 %
10 SYRINGE (ML) INJECTION
Status: DISCONTINUED | OUTPATIENT
Start: 2020-09-09 | End: 2020-09-09 | Stop reason: HOSPADM

## 2020-09-09 RX ORDER — SODIUM CHLORIDE, SODIUM LACTATE, POTASSIUM CHLORIDE, CALCIUM CHLORIDE 600; 310; 30; 20 MG/100ML; MG/100ML; MG/100ML; MG/100ML
INJECTION, SOLUTION INTRAVENOUS CONTINUOUS
Status: DISCONTINUED | OUTPATIENT
Start: 2020-09-09 | End: 2020-09-09 | Stop reason: HOSPADM

## 2020-09-09 RX ORDER — LIDOCAINE HCL/PF 100 MG/5ML
SYRINGE (ML) INTRAVENOUS
Status: DISCONTINUED | OUTPATIENT
Start: 2020-09-09 | End: 2020-09-09

## 2020-09-09 RX ORDER — PROPOFOL 10 MG/ML
VIAL (ML) INTRAVENOUS
Status: DISCONTINUED | OUTPATIENT
Start: 2020-09-09 | End: 2020-09-09

## 2020-09-09 RX ADMIN — PROPOFOL 50 MG: 10 INJECTION, EMULSION INTRAVENOUS at 08:09

## 2020-09-09 RX ADMIN — LIDOCAINE HYDROCHLORIDE 100 MG: 20 INJECTION, SOLUTION INTRAVENOUS at 08:09

## 2020-09-09 RX ADMIN — SODIUM CHLORIDE, SODIUM LACTATE, POTASSIUM CHLORIDE, AND CALCIUM CHLORIDE: .6; .31; .03; .02 INJECTION, SOLUTION INTRAVENOUS at 08:09

## 2020-09-09 RX ADMIN — PROPOFOL 50 MG: 10 INJECTION, EMULSION INTRAVENOUS at 09:09

## 2020-09-09 NOTE — PLAN OF CARE
VSS, all questions answered. Pt mother denies recent fever or illness. Pt and mother states ready for procedure.

## 2020-09-09 NOTE — DISCHARGE INSTRUCTIONS

## 2020-09-09 NOTE — H&P
History & Physical - Short Stay  Gastroenterology      SUBJECTIVE:     Procedure: Colonoscopy    Chief Complaint/Indication for Procedure: Change in Bowel Habits and Abnormal CT    PTA Medications   Medication Sig    allopurinoL (ZYLOPRIM) 100 MG tablet Take 1 tablet (100 mg total) by mouth 2 (two) times daily.    calcium carbonate-vitamin D3 (CALCIUM 500 WITH D) 500 mg(1,250mg) -400 unit Tab 2 (two) times a day.     inulin (FIBER GUMMIES ORAL) Take by mouth once daily.     L. ACIDOPHILUS/BIFID. ANIMALIS (CHEWABLE PROBIOTIC ORAL) Take by mouth 2 (two) times daily.    lactulose (CHRONULAC) 10 gram/15 mL solution Take by mouth 3 (three) times daily.    magnesium 30 mg Tab Take by mouth once. Pt takes 125mg per mother    multivitamin capsule Take 1 capsule by mouth every morning.     mupirocin (BACTROBAN) 2 % ointment Apply topically 3 (three) times daily. (Patient taking differently: Apply topically 3 (three) times daily as needed. )    omega-3 fatty acids/fish oil (FISH OIL-OMEGA-3 FATTY ACIDS) 300-1,000 mg capsule Take by mouth once daily.    OXcarbazepine (TRILEPTAL) 300 MG Tab Take 1 tablet (300 mg total) by mouth 2 (two) times daily.    polyethylene glycol (GLYCOLAX) 17 gram PwPk Take by mouth.    sulfamethoxazole-trimethoprim 400-80mg (BACTRIM,SEPTRA) 400-80 mg per tablet Take 1 tablet by mouth every 12 (twelve) hours.    tamsulosin (FLOMAX) 0.4 mg Cp24 Take 0.4 mg by mouth every evening. Every day    UNABLE TO FIND Nexstim Basil - OTC from [facility], takes BID    UNABLE TO FIND CBD oil    fluvoxaMINE (LUVOX) 100 MG tablet Take 1.5 tablets (150 mg total) by mouth 2 (two) times daily.    pantoprazole (PROTONIX) 40 MG tablet Take 1 tablet (40 mg total) by mouth once daily.       Review of patient's allergies indicates:   Allergen Reactions    Benadryl [diphenhydramine hcl] Other (See Comments)     Increases anxiety and more restless    Keppra [levetiracetam] Other (See Comments)      agitation    Ativan [lorazepam] Anxiety    Xanax [alprazolam] Anxiety     Worsens anxiety and agitation    Abilify  [aripiprazole]      Other reaction(s): arrhythmia    Clonazepam      Other reaction(s): unknown    Cough syrup w/decongestant      Other reaction(s): auditory hallucinations    Diazepam      Other reaction(s): tongue swelling    Haloperidol Other (See Comments)    Phenytoin sodium extended      Other reaction(s): unknown    Quetiapine      Other reaction(s): sweating  Other reaction(s): sedation  Other reaction(s): sweating  Other reaction(s): sedation    Risperidone      Other reaction(s): bladder incontinence    Thimerosal      Other reaction(s): seizure    Ziprasidone hcl Other (See Comments)     Other reaction(s): tonic clonic seizure  seizure        Past Medical History:   Diagnosis Date    Autistic disorder, active 5/6/2013    Bowel obstruction     pt mother denies    Early intervention counseling     Gout, chronic 11/12/2015    Grand mal seizure     Hepatic steatosis     Impulse control disorder, unspecified 5/6/2013    Mastoiditis     Moderate mental retardation 5/6/2013    Neurogenic bladder     Neurogenic bladder     HALEY (obstructive sleep apnea) 11/12/2015    BIPAP.  Dr. Garber     Otitis media     Paraplegia     Pervasive developmental disorder, active 12/27/2015    Renal cancer 2010    Right    Scoliosis     paraplegia    Seizure disorder 11/12/2015    Stroke     one week old    Tardive dyskinesia      Past Surgical History:   Procedure Laterality Date    ANKLE FRACTURE SURGERY      BACK SURGERY  2000    COLONOSCOPY  10/28/2008    Dr. Arana at [facility]; anal fissure, minimal pinpoint rectal erythema; floppy-type colon with very little tone; biopsy: rectum mild chronic proctitis with few eosinophils sent for scanning    COLONOSCOPY N/A 6/15/2018    Procedure: COLONOSCOPY;  Surgeon: Refugio Villagomez MD;  [facility];  Service: Endoscopy;  Laterality:  N/A; Preparation of the colon was fair, unremarkable; REPEAT at 50 YEARS old FOR SCREENING    ESOPHAGOGASTRODUODENOSCOPY N/A 7/13/2020    Procedure: EGD (ESOPHAGOGASTRODUODENOSCOPY);  Surgeon: Refugio Villagomez MD;  Location: [facility];  Service: Endoscopy;  Laterality: N/A;    INNER EAR SURGERY      mastoid    LIVER BIOPSY N/A 7/20/2020    Procedure: BIOPSY, LIVER;  Surgeon: Ananya Surgeon;  Location: [facility];  Service: Anesthesiology;  Laterality: N/A;  189 liver bx/Ultrasound anes 1.5 hours    right partial nephrectomy Right 2010    Sees urology    TYMPANOSTOMY TUBE PLACEMENT       Family History   Problem Relation Age of Onset    Cancer Father         lymphoma    Cataracts Father     Colon polyps Father     Cataracts Mother     Cataracts Maternal Grandmother     Diabetes Maternal Grandmother     Macular degeneration Maternal Grandmother     Glaucoma Maternal Grandmother     Anesthesia problems Neg Hx     Clotting disorder Neg Hx     Colon cancer Neg Hx     Crohn's disease Neg Hx     Ulcerative colitis Neg Hx     Stomach cancer Neg Hx     Esophageal cancer Neg Hx     Cirrhosis Neg Hx      Social History     Tobacco Use    Smoking status: Never Smoker    Smokeless tobacco: Never Used   Substance Use Topics    Alcohol use: No     Frequency: Never     Drinks per session: Patient refused     Binge frequency: Never    Drug use: No         OBJECTIVE:     Vital Signs (Most Recent)       Physical Exam:                                                       GENERAL:  Comfortable, in no acute distress.                                 HEENT EXAM:  Nonicteric.  No adenopathy.  Oropharynx is clear.               NECK:  Supple.                                                               LUNGS:  Clear.                                                               CARDIAC:  Regular rate and rhythm.  S1, S2.  No murmur.                      ABDOMEN:  Soft, positive bowel sounds, nontender.  No  hepatosplenomegaly or masses.  No rebound or guarding.                               EXTREMITIES:  No edema.     MENTAL STATUS:  Autistic.      ASSESSMENT/PLAN:     Assessment: Change in Bowel Habits and Abnormal CT    Plan: Colonoscopy    Anesthesia Plan: General    ASA Grade: ASA 3    MALLAMPATI SCORE:  I (soft palate, uvula, fauces, and tonsillar pillars visible)     In my medical opinion and judgment, this medical or surgical procedure was not able to be safely postponed in accordance with [place] Department of Health, Healthcare Facility Notice #2020-COVID19-ALL-007.

## 2020-09-09 NOTE — ANESTHESIA PREPROCEDURE EVALUATION
09/09/2020  Hussein Doyle is a 34 y.o., male.    Pre-op Assessment    I have reviewed the Patient Summary Reports.     I have reviewed the Nursing Notes. I have reviewed the NPO Status.   I have reviewed the Medications.     Review of Systems  Anesthesia Hx:  No problems with previous Anesthesia  Denies Family Hx of Anesthesia complications.   Denies Personal Hx of Anesthesia complications.   Social:  Non-Smoker    Hematology/Oncology:         -- Cancer in past history: Other (see Oncology comments) surgery    Pulmonary:   Sleep Apnea    Renal/:   Chronic Renal Disease    Hepatic/GI:   Bowel Prep. Liver Disease,    Neurological:   CVA Seizures, well controlled  Denies Cognitive Impairment    Psych:   Psychiatric History Autism          Physical Exam  General:  Obesity    Airway/Jaw/Neck:  Airway Findings: Mouth Opening: Normal Tongue: Normal  General Airway Assessment: Adult, Good  Mallampati: II  Improves to II with phonation.  TM Distance: 4-6 cm      Dental:  Dental Findings: In tact   Chest/Lungs:  Chest/Lungs Findings: Clear to auscultation, Normal Respiratory Rate     Heart/Vascular:  Heart Findings: Rate: Normal  Rhythm: Regular Rhythm  Sounds: Normal  Heart murmur: negative       Mental Status:  Mental Status Findings:  Cooperative         Anesthesia Plan  Type of Anesthesia, risks & benefits discussed:  Anesthesia Type:  general  Patient's Preference:   Intra-op Monitoring Plan: standard ASA monitors  Intra-op Monitoring Plan Comments:   Post Op Pain Control Plan:   Post Op Pain Control Plan Comments:   Induction:   IV  Beta Blocker:  Patient is not currently on a Beta-Blocker (No further documentation required).       Informed Consent: Patient representative understands risks and agrees with Anesthesia plan.  Questions answered. Anesthesia consent signed with patient representative.  ASA  Score: 3     Day of Surgery Review of History & Physical: I have interviewed and examined the patient. I have reviewed the patient's H&P dated:    H&P update referred to the provider.         Ready For Surgery From Anesthesia Perspective.

## 2020-09-09 NOTE — OR NURSING
While talking with patient's mother in pre-op, she states patient has had 2 bowel movements since taking prep last night. Last BM was liquid brown. Patient mother would like to talk to Dr. Villagomez prior to leaving, will continue to monitor.

## 2020-09-09 NOTE — ANESTHESIA POSTPROCEDURE EVALUATION
Anesthesia Post Evaluation    Patient: Hussein Doyle    Procedure(s) Performed: Procedure(s) (LRB):  COLONOSCOPY (N/A)    Final Anesthesia Type: general    Patient location during evaluation: PACU  Patient participation: Yes- Able to Participate  Level of consciousness: awake and alert  Post-procedure vital signs: reviewed and stable  Pain management: adequate  Airway patency: patent    PONV status at discharge: No PONV  Anesthetic complications: no      Cardiovascular status: hemodynamically stable  Respiratory status: unassisted and room air  Hydration status: euvolemic  Follow-up not needed.          Vitals Value Taken Time   /63 09/09/20 0934   Temp 36.5 °C (97.7 °F) 09/09/20 0934   Pulse 62 09/09/20 0934   Resp 16 09/09/20 0934   SpO2 98 % 09/09/20 0934         Event Time   Out of Recovery 09:57:00         Pain/Jane Score: Jane Score: 10 (9/9/2020  9:34 AM)

## 2020-09-09 NOTE — [employer] PATIENT INSTRUCTIONS
Discharge Summary/Instructions after an Endoscopic Procedure  Patient Name: Hussein Yu  Patient MRN: [id number]  Patient YOB: 1985 Wednesday, September 9, 2020  Refugio Villagomez MD  RESTRICTIONS:  During your procedure today, you received medications for sedation.  These   medications may affect your judgment, balance and coordination.  Therefore,   for 24 hours, you have the following restrictions:   - DO NOT drive a car, operate machinery, make legal/financial decisions,   sign important papers or drink alcohol.    ACTIVITY:  Today: no heavy lifting, straining or running due to procedural   sedation/anesthesia.  The following day: return to full activity including work.  DIET:  Eat and drink normally unless instructed otherwise.     TREATMENT FOR COMMON SIDE EFFECTS:  - Mild abdominal pain, nausea, belching, bloating or excessive gas:  rest,   eat lightly and use a heating pad.  - Sore Throat: treat with throat lozenges and/or gargle with warm salt   water.  - Because air was used during the procedure, expelling large amounts of air   from your rectum or belching is normal.  - If a bowel prep was taken, you may not have a bowel movement for 1-3 days.    This is normal.  SYMPTOMS TO WATCH FOR AND REPORT TO YOUR PHYSICIAN:  1. Abdominal pain or bloating, other than gas cramps.  2. Chest pain.  3. Back pain.  4. Signs of infection such as: chills or fever occurring within 24 hours   after the procedure.  5. Rectal bleeding, which would show as bright red, maroon, or black stools.   (A tablespoon of blood from the rectum is not serious, especially if   hemorrhoids are present.)  6. Vomiting.  7. Weakness or dizziness.  GO DIRECTLY TO THE NEAREST EMERGENCY ROOM IF YOU HAVE ANY OF THE FOLLOWING:      Difficulty breathing              Chills and/or fever over 101 F   Persistent vomiting and/or vomiting blood   Severe abdominal pain   Severe chest pain   Black, tarry stools   Bleeding- more than one  tablespoon   Any other symptom or condition that you feel may need urgent attention  Your doctor recommends these additional instructions:  If any biopsies were taken, your doctors clinic will contact you in 1 to 2   weeks with any results.  We are waiting for your pathology results.   Your physician has recommended a repeat colonoscopy at age 50 (please   contact the clinic prior to your 50th birthday to schedule) for screening   purposes.   You are being discharged to home.  For questions, problems or results please call your physician - Refugio Villagomez MD at Work:  [phone].  EMERGENCY PHONE NUMBER: [phone], LAB RESULTS: [phone]  IF A COMPLICATION OR EMERGENCY SITUATION ARISES AND YOU ARE UNABLE TO REACH   YOUR PHYSICIAN - GO DIRECTLY TO THE EMERGENCY ROOM.  ___________________________________________  Nurse Signature  ___________________________________________  Patient/Designated Responsible Party Signature  Refugio Villagomez MD  9/9/2020 9:12:13 AM  This report has been verified and signed electronically.  [employer]

## 2020-09-09 NOTE — DISCHARGE SUMMARY
Discharge Note  Short Stay      SUMMARY     Admit Date: 9/9/2020    Attending Physician: Refugio Villagomez MD     Discharge Physician: Refugio Villagomez MD    Discharge Date: 9/9/2020 9:13 AM    Final Diagnosis: Change in bowel habits [R19.4]  Abnormal CT scan [R93.89]    Disposition: HOME OR SELF CARE    Patient Instructions:   Current Discharge Medication List      CONTINUE these medications which have NOT CHANGED    Details   allopurinoL (ZYLOPRIM) 100 MG tablet Take 1 tablet (100 mg total) by mouth 2 (two) times daily.  Qty: 180 tablet, Refills: 3    Comments: Please inactivate all prior scripts with same name and strength including on holds.  Associated Diagnoses: Chronic gout without tophus, unspecified cause, unspecified site      calcium carbonate-vitamin D3 (CALCIUM 500 WITH D) 500 mg(1,250mg) -400 unit Tab 2 (two) times a day.       fluvoxaMINE (LUVOX) 100 MG tablet Take 1.5 tablets (150 mg total) by mouth 2 (two) times daily.  Qty: 270 tablet, Refills: 1    Associated Diagnoses: Impulse control disorder; Other obsessive-compulsive disorders      inulin (FIBER GUMMIES ORAL) Take by mouth once daily.       L. ACIDOPHILUS/BIFID. ANIMALIS (CHEWABLE PROBIOTIC ORAL) Take by mouth 2 (two) times daily.      lactulose (CHRONULAC) 10 gram/15 mL solution Take by mouth 3 (three) times daily.      magnesium 30 mg Tab Take by mouth once. Pt takes 125mg per mother      multivitamin capsule Take 1 capsule by mouth every morning.       mupirocin (BACTROBAN) 2 % ointment Apply topically 3 (three) times daily.  Qty: 30 g, Refills: 0      omega-3 fatty acids/fish oil (FISH OIL-OMEGA-3 FATTY ACIDS) 300-1,000 mg capsule Take by mouth once daily.      OXcarbazepine (TRILEPTAL) 300 MG Tab Take 1 tablet (300 mg total) by mouth 2 (two) times daily.  Qty: 60 tablet, Refills: 0    Associated Diagnoses: Unspecified convulsions      pantoprazole (PROTONIX) 40 MG tablet Take 1 tablet (40 mg total) by mouth once daily.  Qty: 90  tablet, Refills: 2    Associated Diagnoses: Dysphagia, unspecified type      polyethylene glycol (GLYCOLAX) 17 gram PwPk Take by mouth.      sulfamethoxazole-trimethoprim 400-80mg (BACTRIM,SEPTRA) 400-80 mg per tablet Take 1 tablet by mouth every 12 (twelve) hours.      tamsulosin (FLOMAX) 0.4 mg Cp24 Take 0.4 mg by mouth every evening. Every day      !! UNABLE TO FIND Holy Basil - OTC from [employer] takes BID      !! UNABLE TO FIND CBD oil       !! - Potential duplicate medications found. Please discuss with provider.          Discharge Procedure Orders (must include Diet, Follow-up, Activity)    Follow Up:  Follow up with PCP as previously scheduled  Resume routine diet.  Activity as tolerated.    No driving day of procedure.

## 2020-09-09 NOTE — TRANSFER OF CARE
"Anesthesia Transfer of Care Note    Patient: Hussein Doyle    Procedure(s) Performed: Procedure(s) (LRB):  COLONOSCOPY (N/A)    Patient location: PACU    Anesthesia Type: general    Transport from OR: Transported from OR on 2-3 L/min O2 by NC with adequate spontaneous ventilation    Post pain: adequate analgesia    Post assessment: no apparent anesthetic complications    Post vital signs: stable    Level of consciousness: awake and sedated    Nausea/Vomiting: no nausea/vomiting    Complications: none    Transfer of care protocol was followed      Last vitals:   Visit Vitals  /70 (BP Location: Right arm, Patient Position: Lying)   Pulse 62   Temp 36.5 °C (97.7 °F) (Skin)   Resp 18   Ht 5' 10.5" (1.791 m)   Wt 102.1 kg (225 lb)   SpO2 98%   BMI 31.83 kg/m²     "

## 2020-09-15 ENCOUNTER — PATIENT MESSAGE (OUTPATIENT)
Dept: FAMILY MEDICINE | Facility: CLINIC | Age: 35
End: 2020-09-15

## 2020-09-15 ENCOUNTER — PATIENT MESSAGE (OUTPATIENT)
Dept: GASTROENTEROLOGY | Facility: CLINIC | Age: 35
End: 2020-09-15

## 2020-09-16 LAB
FINAL PATHOLOGIC DIAGNOSIS: NORMAL
GROSS: NORMAL

## 2020-09-16 NOTE — TELEPHONE ENCOUNTER
I have made neva an appt with you for Monday. I left a message to mrs sage about the appt and to please call me back if she wants him seen sooner we can get him in with tony.

## 2020-09-18 ENCOUNTER — PATIENT MESSAGE (OUTPATIENT)
Dept: FAMILY MEDICINE | Facility: CLINIC | Age: 35
End: 2020-09-18

## 2020-09-21 ENCOUNTER — OFFICE VISIT (OUTPATIENT)
Dept: FAMILY MEDICINE | Facility: CLINIC | Age: 35
End: 2020-09-21
Payer: MEDICARE

## 2020-09-21 ENCOUNTER — TELEPHONE (OUTPATIENT)
Dept: FAMILY MEDICINE | Facility: CLINIC | Age: 35
End: 2020-09-21

## 2020-09-21 VITALS
WEIGHT: 228.38 LBS | SYSTOLIC BLOOD PRESSURE: 110 MMHG | HEART RATE: 97 BPM | BODY MASS INDEX: 32.31 KG/M2 | OXYGEN SATURATION: 96 % | DIASTOLIC BLOOD PRESSURE: 60 MMHG

## 2020-09-21 DIAGNOSIS — N31.9 NEUROGENIC BLADDER: Chronic | ICD-10-CM

## 2020-09-21 DIAGNOSIS — F84.0 AUTISTIC DISORDER, ACTIVE: Primary | Chronic | ICD-10-CM

## 2020-09-21 DIAGNOSIS — G40.909 SEIZURE DISORDER: Chronic | ICD-10-CM

## 2020-09-21 DIAGNOSIS — R25.8 NOCTURNAL LEG MOVEMENTS: ICD-10-CM

## 2020-09-21 DIAGNOSIS — G47.33 OSA (OBSTRUCTIVE SLEEP APNEA): Chronic | ICD-10-CM

## 2020-09-21 PROCEDURE — 99999 PR PBB SHADOW E&M-EST. PATIENT-LVL III: ICD-10-PCS | Mod: PBBFAC,,, | Performed by: EMERGENCY MEDICINE

## 2020-09-21 PROCEDURE — 3008F BODY MASS INDEX DOCD: CPT | Mod: CPTII,S$GLB,, | Performed by: EMERGENCY MEDICINE

## 2020-09-21 PROCEDURE — 3008F PR BODY MASS INDEX (BMI) DOCUMENTED: ICD-10-PCS | Mod: CPTII,S$GLB,, | Performed by: EMERGENCY MEDICINE

## 2020-09-21 PROCEDURE — 99214 PR OFFICE/OUTPT VISIT, EST, LEVL IV, 30-39 MIN: ICD-10-PCS | Mod: S$GLB,,, | Performed by: EMERGENCY MEDICINE

## 2020-09-21 PROCEDURE — 99999 PR PBB SHADOW E&M-EST. PATIENT-LVL III: CPT | Mod: PBBFAC,,, | Performed by: EMERGENCY MEDICINE

## 2020-09-21 PROCEDURE — 99214 OFFICE O/P EST MOD 30 MIN: CPT | Mod: S$GLB,,, | Performed by: EMERGENCY MEDICINE

## 2020-09-21 RX ORDER — PRAMIPEXOLE DIHYDROCHLORIDE 0.12 MG/1
0.12 TABLET ORAL 3 TIMES DAILY
Qty: 90 TABLET | Refills: 1 | Status: SHIPPED | OUTPATIENT
Start: 2020-09-21 | End: 2020-11-17

## 2020-09-21 NOTE — PROGRESS NOTES
Subjective:   THIS NOTE IS DONE WITH VOICE RECOGNITION        Patient ID: Hussein Doyle is a 34 y.o. male.    Chief Complaint: Leg Problem (eye drooping involuntary movement, spasm of leg )      HPI   While some better, ivon continues to struggle with a number of medical issues.  He is seen today with his mother who is his caretaker.  There has been significant stress in the family with his father being ill, and Silvino having less support secondary to staffing issues.  His primary caregiver, his mother, looks exhausted.    The changes as a result COVID continue it to impact his daily routine.  Limitation of activities outside the home continues to be a source of anxiety for the family.    His mother has been able to can continue catheterizations secondary to his neurogenic bladder.    He has not able to tolerate CPAP face mask.  Will hold for now.  Dr. Garber, pulmonary,  Manages.    Trouble with leg movements in the evenings.  OTC medications have not helped.  Dog is not able to explain having feels, but his mother does describe restless leg symptomatology.    He is using CBD oil, for the last week.  His mother feels he is better.  No recognized side effects.    No seizures.    His constipation has improved.  This continues to be episodically problematic.  Workup has been unrewarding in regards to any type of mass or other medical issues.    Immunization History   Administered Date(s) Administered    Influenza - Quadrivalent - PF *Preferred* (6 months and older) 10/22/2018, 10/04/2019    Tdap 01/22/2020     Influenza vaccine recommended.      Current Outpatient Medications   Medication Sig Dispense Refill    allopurinoL (ZYLOPRIM) 100 MG tablet Take 1 tablet (100 mg total) by mouth 2 (two) times daily. 180 tablet 3    fluvoxaMINE (LUVOX) 100 MG tablet Take 1.5 tablets (150 mg total) by mouth 2 (two) times daily. 270 tablet 1    inulin (FIBER GUMMIES ORAL) Take by mouth once daily.       L.  ACIDOPHILUS/BIFID. ANIMALIS (CHEWABLE PROBIOTIC ORAL) Take by mouth 2 (two) times daily.      lactulose (CHRONULAC) 10 gram/15 mL solution Take by mouth 3 (three) times daily.      magnesium 30 mg Tab Take by mouth once. Pt takes 125mg per mother      multivitamin capsule Take 1 capsule by mouth every morning.       mupirocin (BACTROBAN) 2 % ointment Apply topically 3 (three) times daily. (Patient taking differently: Apply topically 3 (three) times daily as needed. ) 30 g 0    omega-3 fatty acids/fish oil (FISH OIL-OMEGA-3 FATTY ACIDS) 300-1,000 mg capsule Take by mouth once daily.      OXcarbazepine (TRILEPTAL) 300 MG Tab TAKE 1 TABLET BY MOUTH TWICE A DAY 60 tablet 0    pantoprazole (PROTONIX) 40 MG tablet Take 1 tablet (40 mg total) by mouth once daily. 90 tablet 2    polyethylene glycol (GLYCOLAX) 17 gram PwPk Take by mouth.      sulfamethoxazole-trimethoprim 400-80mg (BACTRIM,SEPTRA) 400-80 mg per tablet Take 1 tablet by mouth 3 (three) times a week.       tamsulosin (FLOMAX) 0.4 mg Cp24 Take 0.4 mg by mouth every evening. Every day      UNABLE TO FIND Holy Basil - OTC from [employer], takes BID      UNABLE TO FIND CBD oil      calcium carbonate-vitamin D3 (CALCIUM 500 WITH D) 500 mg(1,250mg) -400 unit Tab 2 (two) times a day.        No current facility-administered medications for this visit.          Review of Systems   Constitutional: Negative for activity change, chills, fever and unexpected weight change.   HENT: Negative for hearing loss, nosebleeds and tinnitus.    Eyes: Negative for discharge and itching.        Frequently closing left eye.  Appears voluntary.   Respiratory: Negative for cough, choking, chest tightness, shortness of breath and wheezing.    Cardiovascular: Negative for chest pain, palpitations and leg swelling.   Gastrointestinal: Positive for constipation. Negative for abdominal distention.        Increasing issues with defecation in his pull ups.   Genitourinary:  Negative for difficulty urinating, dysuria, flank pain, hematuria and urgency.   Musculoskeletal: Negative for arthralgias, back pain and joint swelling.   Neurological: Negative for tremors, seizures and numbness.        Leg movements getting worse.  Known sleep apnea.   Psychiatric/Behavioral: Positive for behavioral problems, confusion and sleep disturbance. Negative for dysphoric mood and hallucinations. The patient is nervous/anxious.        Objective:      Physical Exam  Constitutional:       General: He is not in acute distress.     Appearance: He is obese. He is not toxic-appearing.   HENT:      Head: Normocephalic.      Right Ear: Tympanic membrane, ear canal and external ear normal.      Left Ear: Tympanic membrane, ear canal and external ear normal.      Nose: Nose normal.      Mouth/Throat:      Mouth: Mucous membranes are moist.   Eyes:      Extraocular Movements: Extraocular movements intact.      Conjunctiva/sclera: Conjunctivae normal.      Comments: He does have twitching of the upper lids, more pronounced on the right than left.  This appears to be involuntary.  No nystagmus or ocular imbalances appreciated.  No obvious visual compromise.   Neck:      Musculoskeletal: Normal range of motion. No muscular tenderness.   Cardiovascular:      Rate and Rhythm: Normal rate and regular rhythm.   Pulmonary:      Effort: Pulmonary effort is normal.   Abdominal:      General: Abdomen is flat. Bowel sounds are normal.      Tenderness: There is no abdominal tenderness.   Lymphadenopathy:      Cervical: No cervical adenopathy.   Skin:     Capillary Refill: Capillary refill takes less than 2 seconds.      Coloration: Skin is not jaundiced.      Findings: No rash.   Neurological:      Mental Status: He is alert. Mental status is at baseline.         Assessment:       1. Autistic disorder, active    2. HALEY (obstructive sleep apnea)    3. Seizure disorder    4. Neurogenic bladder    5. Nocturnal leg movements         Plan:       1. Autistic disorder, active  Social situation influx  The changes in his behavior or thought to reflect this social situation  Efforts to try to stabilize staffing and activities outside the home are being pursued by his caregiver    2. HALEY (obstructive sleep apnea)  Currently not using CPAP  The possibility this could be contributing to some of his leg movement disorder was discussed  His mother does not feel that he will use CPAP at this time.    3. Seizure disorder  No new seizure activity  He does have an appointment with neurology within the next week or 2 to follow  Continue current medications    4. Neurogenic bladder  Continue self-catheterizations  No recent infections.    5. Nocturnal leg movements  His major complaint today is these nocturnal leg movements.  Will try low-dose Mirapex, and have him follow-up with his neurologist.    - pramipexole (MIRAPEX) 0.125 MG tablet; Take 1 tablet (0.125 mg total) by mouth 3 (three) times daily.  Dispense: 90 tablet; Refill: 1    He has been seen by hepatology, and is thought to have non alcoholic fatty liver disease.  Will continue to monitor liver functions.  No change in medications recommended at this time.

## 2020-09-21 NOTE — PATIENT INSTRUCTIONS
Eli,    Lets try Mirapex an hour before bedtime.  If not side effects, he can increase this every 7-10 days.  Dr. Salinas can check this at his upcoming visit.    Reji Zendejas MD

## 2020-09-23 ENCOUNTER — DOCUMENTATION ONLY (OUTPATIENT)
Dept: FAMILY MEDICINE | Facility: CLINIC | Age: 35
End: 2020-09-23

## 2020-09-23 NOTE — PROGRESS NOTES
PA completed. Message below sent to pharmacy.      Additional Information Required  This medication or product is on your plan's list of covered drugs. Prior authorization is not required at this time. If your pharmacy has questions regarding the processing of your prescription, please have them call the [facility] pharmacy help desk at [phone]. **Please note: Formulary lowering, tiering exception, cost reduction and/or pre-benefit determination review (including prospective Medicare hospice reviews) requests cannot be requested using this method of submission. Please contact us at [phone] instead.

## 2020-09-28 ENCOUNTER — PATIENT OUTREACH (OUTPATIENT)
Dept: ADMINISTRATIVE | Facility: OTHER | Age: 35
End: 2020-09-28

## 2020-09-28 NOTE — PROGRESS NOTES
Health Maintenance Due   Topic Date Due    HIV Screening  11/07/2000    Pneumococcal Vaccine (Medium Risk) (1 of 1 - PPSV23) 11/07/2004    Influenza Vaccine (1) 08/01/2020     Updates were requested from care everywhere.  Chart was reviewed for overdue Proactive [facility] Encounters ([facility]) topics (CRS, Breast Cancer Screening, Eye exam)  Health Maintenance has been updated.  LINKS immunization registry triggered.  Immunizations were reconciled.

## 2020-09-29 ENCOUNTER — OFFICE VISIT (OUTPATIENT)
Dept: NEUROLOGY | Facility: CLINIC | Age: 35
End: 2020-09-29
Payer: MEDICARE

## 2020-09-29 VITALS
SYSTOLIC BLOOD PRESSURE: 116 MMHG | HEART RATE: 80 BPM | DIASTOLIC BLOOD PRESSURE: 76 MMHG | BODY MASS INDEX: 32.31 KG/M2 | RESPIRATION RATE: 16 BRPM | HEIGHT: 71 IN | TEMPERATURE: 97 F

## 2020-09-29 DIAGNOSIS — R41.82 ALTERED MENTAL STATUS, UNSPECIFIED ALTERED MENTAL STATUS TYPE: ICD-10-CM

## 2020-09-29 DIAGNOSIS — G40.909 SEIZURE DISORDER: Primary | ICD-10-CM

## 2020-09-29 DIAGNOSIS — F84.0 AUTISM: ICD-10-CM

## 2020-09-29 DIAGNOSIS — R46.89 BEHAVIORAL CHANGE: ICD-10-CM

## 2020-09-29 PROCEDURE — 99215 PR OFFICE/OUTPT VISIT, EST, LEVL V, 40-54 MIN: ICD-10-PCS | Mod: S$GLB,,, | Performed by: PSYCHIATRY & NEUROLOGY

## 2020-09-29 PROCEDURE — 3008F PR BODY MASS INDEX (BMI) DOCUMENTED: ICD-10-PCS | Mod: CPTII,S$GLB,, | Performed by: PSYCHIATRY & NEUROLOGY

## 2020-09-29 PROCEDURE — 99999 PR PBB SHADOW E&M-EST. PATIENT-LVL V: CPT | Mod: PBBFAC,,, | Performed by: PSYCHIATRY & NEUROLOGY

## 2020-09-29 PROCEDURE — 99499 UNLISTED E&M SERVICE: CPT | Mod: S$GLB,,, | Performed by: PSYCHIATRY & NEUROLOGY

## 2020-09-29 PROCEDURE — 3008F BODY MASS INDEX DOCD: CPT | Mod: CPTII,S$GLB,, | Performed by: PSYCHIATRY & NEUROLOGY

## 2020-09-29 PROCEDURE — 99999 PR PBB SHADOW E&M-EST. PATIENT-LVL V: ICD-10-PCS | Mod: PBBFAC,,, | Performed by: PSYCHIATRY & NEUROLOGY

## 2020-09-29 PROCEDURE — 99499 RISK ADDL DX/OHS AUDIT: ICD-10-PCS | Mod: S$GLB,,, | Performed by: PSYCHIATRY & NEUROLOGY

## 2020-09-29 PROCEDURE — 99215 OFFICE O/P EST HI 40 MIN: CPT | Mod: S$GLB,,, | Performed by: PSYCHIATRY & NEUROLOGY

## 2020-09-29 RX ORDER — CETIRIZINE HYDROCHLORIDE 10 MG/1
10 TABLET ORAL DAILY
COMMUNITY
End: 2021-02-18

## 2020-09-29 NOTE — PROGRESS NOTES
"Date of service:  9/29/2020    Chief complaint:  Seizures    Interval history:  The patient is a 34 y.o. male seen previously for a history of epilepsy who also has events that are nonepileptic in nature.  I last saw him in 1/20.  He did see Josselyn Red in 7/20.  Since he was last here, he has had no seizures.  Question related to his Trileptal has previously been raised regarding a recent increase in his LFTs beyond his chronic transaminitis.  Of note, the patient has previously been on Keppra and had psychiatric side effects.  He has seen hepatology, and liver biopsy is pending.  His mother's primary concerns center around behavioral issues, sweating, right eye twitching, and restlessness of the legs.    History of present illness:  The patient is a 34 y.o. male referred for management of epilepsy.  He previously saw Dr. Sunshine.  This is my first time seeing him.  He provides little in the way of history due to his cognitive issues, so most of the history was obtained from his caregiver.  Additional history is as noted below.  Briefly, though, this gentleman suffers from a developmental delay and has a history of a prior GTC seizure.  He had onset of new episodes involving behavioral outbursts.  He was admitted to the EMU, and these were found not to be epileptic in nature.  Since the last time he was seen, he has had no further episodes worrisome for seizures.    Prior history:  "First seizure suspected in 2006, onset was not witnessed but caused him to fall to the ground, period of unresponsiveness, had hit his head. Not clear if there was convulsive activity at that time. Mother also describes episodes where eyes roll back, he falls to the ground, has generalized convulsive activity and is disoriented / confused for a short time following. Frequency is approximately once every 2 months, last episode 2 months ago. More prominent since June 2014.      Over the past few months and in particular 6 weeks, has " "had increase in behavioral outbursts. Mother is not sure if this is directly result of subclinical seizure or not but describes shouting out, inability to follow through with instructions or direction, eyes rolling (brief, eyes flit back and to right, forceful eye closure lasting 1-2 seconds) without change in muscle tone or consciousness. Concerned his behaviors have been regressing, he mentions name of caregiver from many years ago often. Repeats phrases (you need to have dark hair), appears agitated, unable to sit still. In the past month has had increase in dose of Keppra; had been on 250 BID until 12/4, dose was increased to 500 BID. At the time of visit with Dr. Parker on 12/16 was on 1500 mg HS. Family desperate to switch Keppra if this will improve behaviors. "      Past Medical History:   Diagnosis Date    Autistic disorder, active 5/6/2013    Bowel obstruction     pt mother denies    Early intervention counseling     Gout, chronic 11/12/2015    Grand mal seizure     Hepatic steatosis     Impulse control disorder, unspecified 5/6/2013    Mastoiditis     Moderate mental retardation 5/6/2013    Neurogenic bladder     Neurogenic bladder     HALEY (obstructive sleep apnea) 11/12/2015    BIPAP.  Dr. Garber     Otitis media     Paraplegia     Pervasive developmental disorder, active 12/27/2015    Renal cancer 2010    Right    Scoliosis     paraplegia    Seizure disorder 11/12/2015    Stroke     one week old    Tardive dyskinesia        Past Surgical History:   Procedure Laterality Date    ANKLE FRACTURE SURGERY      BACK SURGERY  2000    COLONOSCOPY  10/28/2008    Dr. Arana at [facility]; anal fissure, minimal pinpoint rectal erythema; floppy-type colon with very little tone; biopsy: rectum mild chronic proctitis with few eosinophils sent for scanning    COLONOSCOPY N/A 6/15/2018    Procedure: COLONOSCOPY;  Surgeon: Refugio Villagomez MD;  Location: [facility];  Service: Endoscopy;  Laterality: " N/A; Preparation of the colon was fair, unremarkable; REPEAT at 50 YEARS old FOR SCREENING    COLONOSCOPY N/A 9/9/2020    Procedure: COLONOSCOPY;  Surgeon: Refugio Villagomez MD;  Location: [facility] ENDO;  Service: Endoscopy;  Laterality: N/A;    ESOPHAGOGASTRODUODENOSCOPY N/A 7/13/2020    Procedure: EGD (ESOPHAGOGASTRODUODENOSCOPY);  Surgeon: Refugio Villagomez MD;  Location: [facility] ENDO;  Service: Endoscopy;  Laterality: N/A;    INNER EAR SURGERY      mastoid    LIVER BIOPSY N/A 7/20/2020    Procedure: BIOPSY, LIVER;  Surgeon: Ananya Surgeon;  Location[facility];  Service: Anesthesiology;  Laterality: N/A;  189 liver bx/Ultrasound anes 1.5 hours    right partial nephrectomy Right 2010    Sees urology    TYMPANOSTOMY TUBE PLACEMENT         Family History   Problem Relation Age of Onset    Cancer Father         lymphoma    Cataracts Father     Colon polyps Father     Cataracts Mother     Cataracts Maternal Grandmother     Diabetes Maternal Grandmother     Macular degeneration Maternal Grandmother     Glaucoma Maternal Grandmother     Anesthesia problems Neg Hx     Clotting disorder Neg Hx     Colon cancer Neg Hx     Crohn's disease Neg Hx     Ulcerative colitis Neg Hx     Stomach cancer Neg Hx     Esophageal cancer Neg Hx     Cirrhosis Neg Hx        Social History     Socioeconomic History    Marital status: Single     Spouse name: Not on file    Number of children: Not on file    Years of education: Not on file    Highest education level: Not on file   Occupational History    Not on file   Social Needs    Financial resource strain: Not on file    Food insecurity     Worry: Not on file     Inability: Not on file    Transportation needs     Medical: No     Non-medical: No   Tobacco Use    Smoking status: Never Smoker    Smokeless tobacco: Never Used   Substance and Sexual Activity    Alcohol use: No     Frequency: Never     Drinks per session: Patient refused     Binge frequency: Never     "Drug use: No    Sexual activity: Never   Lifestyle    Physical activity     Days per week: 5 days     Minutes per session: 40 min    Stress: Not on file   Relationships    Social connections     Talks on phone: Once a week     Gets together: More than three times a week     Attends Gnosticist service: Not on file     Active member of club or organization: Yes     Attends meetings of clubs or organizations: More than 4 times per year     Relationship status: Never    Other Topics Concern    Not on file   Social History Narrative    Lives with his parents.  More dependent with ADLs.  Very active in the community.        New Opportunities jhoan.        Review of Systems:  A 14 system review is limited due to his imperfect reliability as a historian.  His family reports no significant changes since his prior visit.    Physical exam:  /76 (BP Location: Left arm, Patient Position: Sitting, BP Method: Medium (Automatic))   Pulse 80   Temp 97 °F (36.1 °C)   Resp 16   Ht 5' 10.5" (1.791 m)   BMI 32.31 kg/m²    Higher Cortical Function:   Patient is a well developed, obese man who is unable to sit still and behaves inappropriately.  He was not hostile, but imperfectly uncooperative.     Cranial Nerves II - XII:   EOMs were intact with normal smooth and no nystagmus.   PERRLA. D/C Funduscopic exam - disc were flat with normal A/V ratio and no exudates or hemorrhages. Visual fields were full to confrontation.   Motor - facial movement was symmetrical and normal.   Facial sensory - Light touch and pin prick sensations were normal.   Hearing was normal to finger rub.  Palate moved well and was symmetrical with normal palatal and oral sensation.   Tongue movement was full & the patient could say "la la la" and "Ka Ka Ka" without  difficulty. Patient repeated Gnosticist and Denominational without difficulty. Normal power and bulk was found in the massiter and rotator muscles of the neck.  Motor: Power, bulk and " "tone were normal in all extremities.  Sensory: Light touch, pin prick, vibration and position senses were normal in all extremities.   Coordination:   Rapid alternating movements and rapid finger tapping - normal.   Finger to nose - nl.   Arm roll - symmetrical.   Gait: Station, gait and tandem walking were done without difficulty and Romberg was negative.      Tremor: resting, postural, intentional - none    Data base:  Prior records were reviewed and are as summarized above.    Labs (5/19):  CMP: includes EBC=666 and ALT=198  CBC: includes QQM=131    MRI brain (12/15):  "Motion limited MRI of the brain.  There is trace asymmetry of the mesial temporal lobes left slightly smaller than the right allowing for motion limitation.  This may be artifactual or developmental variant with underlying left mesial temporal sclerosis not excluded.  Clinical correlation correlation with EEG analysis is advised.  No evidence for focal parietal signal abnormality, acute infarction or enhancing lesion."    vEEG (1/16):  "FINAL SUMMARY:  ELECTROENCEPHALOGRAM:  Interictal: This is a normal EEG during wakefulness, drowsiness, and sleep.  Ictal: During this recording, periods of eye flutter during wakefulness and myoclonic jerks during sleep were recorded without associated epileptiform activity on EEG.  CLINICAL SEIZURE:  Classification: Nonepileptic events. Based on clinical history and evaluation by Psychiatry, these events were likely motor tics during wakefulness and myoclonic jerks during sleep. There is no evidence of an epileptic process on this recording. No seizures were recorded"    DEXA (1/17):  Normal    Assessment and plan:  The patient is a 34 y.o. 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  As far as medications go, we will continue his Trileptal for now.  He was found to have cirrhosis.  I am hoping that we will not need to " change this medication, as he is doing well from a seizure perspective and most of the alternatives could cause hepatic issues as well.  I will clarify this with hepatology (Addendum: Subsequent discussion with hepatology indicated they do not feel that the Trileptal played a significant role in his liver disease and that it does not need to be changed).  Medication side effects were discussed with the patient's caregiver.  We will check serologies and a DEXA for medicaction monitoring purposes.  State law as it pertains to driving for individuals with seizures was not discussed as his cognitive issues preclude him from driving regardless of degree of seizure control.  The patient's family has been counseled on seizure safety.    The patient's mother's primary concerns today relate to behavioral changes.  The etiology of these is unclear to me.  Potential contributory factors include disruption of routine by COVID, issues stemming from his emotional state with his father's illness, his newly diagnosed cirrhosis, potential neurodegenerative processes, acute medical issues, and so forth.  We will check serologies and MRI of the brain.  I will reach out to his psychiatrist in an effort to improve symptomatic control of these issues.    Greater than 50% of the patient's >40 minute clinic visit was spent on counseling and arranging care.  Topics covered include diagnosis, prognosis, testing, and treatment.

## 2020-09-30 ENCOUNTER — TELEPHONE (OUTPATIENT)
Dept: NEUROLOGY | Facility: CLINIC | Age: 35
End: 2020-09-30

## 2020-09-30 DIAGNOSIS — R19.7 DIARRHEA: ICD-10-CM

## 2020-09-30 NOTE — TELEPHONE ENCOUNTER
----- Message from Steph Thapa sent at 9/30/2020  2:37 PM CDT -----  Regarding: advice  Contact: [facility]/[phone]  Type: Needs Medical Advice  Who Called:  [facility]/[phone]    Additional Information: Needs to talk to Rose concerning getting patient scheduled for a MRI with sedation. She needs to know something before scheduling. Please call to advise.

## 2020-09-30 NOTE — TELEPHONE ENCOUNTER
Patient is scheduled for MRI with sedation. Prior to the MRI patient will need a covid test. Waiting for the patient to return call to schedule.

## 2020-10-01 ENCOUNTER — PATIENT MESSAGE (OUTPATIENT)
Dept: NEUROLOGY | Facility: CLINIC | Age: 35
End: 2020-10-01

## 2020-10-01 ENCOUNTER — TELEPHONE (OUTPATIENT)
Dept: NEUROLOGY | Facility: CLINIC | Age: 35
End: 2020-10-01

## 2020-10-01 NOTE — TELEPHONE ENCOUNTER
Called patient to advise them about the MRI/Covid test. We lost connection. Attempted to contact patient again. ARIANM

## 2020-10-01 NOTE — TELEPHONE ENCOUNTER
----- Message from Suni Piedra sent at 10/1/2020  9:53 AM CDT -----  Contact: mother  Type:  Patient Returning Call    Who Called:  Mother-chu  Who Left Message for Patient:  Rose clay  Does the patient know what this is regarding?:  does not know  Best Call Back Number:  [phone] (home)     Additional Information:

## 2020-10-05 ENCOUNTER — LAB VISIT (OUTPATIENT)
Dept: FAMILY MEDICINE | Facility: CLINIC | Age: 35
End: 2020-10-05
Payer: MEDICARE

## 2020-10-05 DIAGNOSIS — R19.7 DIARRHEA: ICD-10-CM

## 2020-10-05 PROCEDURE — U0003 INFECTIOUS AGENT DETECTION BY NUCLEIC ACID (DNA OR RNA); SEVERE ACUTE RESPIRATORY SYNDROME CORONAVIRUS 2 (SARS-COV-2) (CORONAVIRUS DISEASE [COVID-19]), AMPLIFIED PROBE TECHNIQUE, MAKING USE OF HIGH THROUGHPUT TECHNOLOGIES AS DESCRIBED BY CMS-2020-01-R: HCPCS

## 2020-10-06 LAB — SARS-COV-2 RNA RESP QL NAA+PROBE: NOT DETECTED

## 2020-10-08 ENCOUNTER — TELEPHONE (OUTPATIENT)
Dept: NEUROLOGY | Facility: CLINIC | Age: 35
End: 2020-10-08

## 2020-10-08 NOTE — TELEPHONE ENCOUNTER
----- Message from Princess DAVID Arroyo sent at 10/8/2020  4:28 PM CDT -----  Contact: mother  Type: Needs Medical Advice  Who Called: Eli Steward ([facility]   Best Call Back Number: [phone] (home)     Additional Information: requesting a call in regards to appt on tomorrow. Advise mother the appt was good to go  but she wanted to confirm with the Nurse. Please advise

## 2020-10-16 ENCOUNTER — LAB VISIT (OUTPATIENT)
Dept: LAB | Facility: HOSPITAL | Age: 35
End: 2020-10-16
Attending: PSYCHIATRY & NEUROLOGY
Payer: MEDICARE

## 2020-10-16 DIAGNOSIS — R46.89 BEHAVIORAL CHANGE: ICD-10-CM

## 2020-10-16 DIAGNOSIS — R25.2 MUSCLE CRAMPS: ICD-10-CM

## 2020-10-16 DIAGNOSIS — G40.909 SEIZURE DISORDER: ICD-10-CM

## 2020-10-16 DIAGNOSIS — R41.82 ALTERED MENTAL STATUS, UNSPECIFIED ALTERED MENTAL STATUS TYPE: ICD-10-CM

## 2020-10-16 LAB
ALBUMIN SERPL BCP-MCNC: 4.2 G/DL (ref 3.5–5.2)
ALP SERPL-CCNC: 75 U/L (ref 55–135)
ALT SERPL W/O P-5'-P-CCNC: 28 U/L (ref 10–44)
AMMONIA PLAS-SCNC: 51 UMOL/L (ref 10–50)
ANION GAP SERPL CALC-SCNC: 9 MMOL/L (ref 8–16)
AST SERPL-CCNC: 37 U/L (ref 10–40)
BASOPHILS # BLD AUTO: 0.07 K/UL (ref 0–0.2)
BASOPHILS NFR BLD: 0.7 % (ref 0–1.9)
BILIRUB SERPL-MCNC: 0.6 MG/DL (ref 0.1–1)
BUN SERPL-MCNC: 11 MG/DL (ref 6–20)
CALCIUM SERPL-MCNC: 9.8 MG/DL (ref 8.7–10.5)
CHLORIDE SERPL-SCNC: 105 MMOL/L (ref 95–110)
CK SERPL-CCNC: 50 U/L (ref 20–200)
CO2 SERPL-SCNC: 27 MMOL/L (ref 23–29)
CREAT SERPL-MCNC: 0.7 MG/DL (ref 0.5–1.4)
DIFFERENTIAL METHOD: ABNORMAL
EOSINOPHIL # BLD AUTO: 0.2 K/UL (ref 0–0.5)
EOSINOPHIL NFR BLD: 1.8 % (ref 0–8)
ERYTHROCYTE [DISTWIDTH] IN BLOOD BY AUTOMATED COUNT: 12.9 % (ref 11.5–14.5)
EST. GFR  (AFRICAN AMERICAN): >60 ML/MIN/1.73 M^2
EST. GFR  (NON AFRICAN AMERICAN): >60 ML/MIN/1.73 M^2
FOLATE SERPL-MCNC: 13.6 NG/ML (ref 4–24)
FOLATE SERPL-MCNC: 13.6 NG/ML (ref 4–24)
GLUCOSE SERPL-MCNC: 86 MG/DL (ref 70–110)
HCT VFR BLD AUTO: 48.2 % (ref 40–54)
HGB BLD-MCNC: 15.7 G/DL (ref 14–18)
IMM GRANULOCYTES # BLD AUTO: 0.03 K/UL (ref 0–0.04)
IMM GRANULOCYTES NFR BLD AUTO: 0.3 % (ref 0–0.5)
LYMPHOCYTES # BLD AUTO: 2.5 K/UL (ref 1–4.8)
LYMPHOCYTES NFR BLD: 26.1 % (ref 18–48)
MCH RBC QN AUTO: 32.4 PG (ref 27–31)
MCHC RBC AUTO-ENTMCNC: 32.6 G/DL (ref 32–36)
MCV RBC AUTO: 100 FL (ref 82–98)
MONOCYTES # BLD AUTO: 0.8 K/UL (ref 0.3–1)
MONOCYTES NFR BLD: 7.8 % (ref 4–15)
NEUTROPHILS # BLD AUTO: 6.1 K/UL (ref 1.8–7.7)
NEUTROPHILS NFR BLD: 63.3 % (ref 38–73)
NRBC BLD-RTO: 0 /100 WBC
PLATELET # BLD AUTO: 181 K/UL (ref 150–350)
PMV BLD AUTO: 10.3 FL (ref 9.2–12.9)
POTASSIUM SERPL-SCNC: 4.2 MMOL/L (ref 3.5–5.1)
PROT SERPL-MCNC: 8.2 G/DL (ref 6–8.4)
RBC # BLD AUTO: 4.84 M/UL (ref 4.6–6.2)
SODIUM SERPL-SCNC: 141 MMOL/L (ref 136–145)
TSH SERPL DL<=0.005 MIU/L-ACNC: 0.88 UIU/ML (ref 0.4–4)
TSH SERPL DL<=0.005 MIU/L-ACNC: 0.88 UIU/ML (ref 0.4–4)
VIT B12 SERPL-MCNC: 930 PG/ML (ref 210–950)
VIT B12 SERPL-MCNC: 930 PG/ML (ref 210–950)
WBC # BLD AUTO: 9.69 K/UL (ref 3.9–12.7)

## 2020-10-16 PROCEDURE — 84425 ASSAY OF VITAMIN B-1: CPT

## 2020-10-16 PROCEDURE — 80053 COMPREHEN METABOLIC PANEL: CPT

## 2020-10-16 PROCEDURE — 85025 COMPLETE CBC W/AUTO DIFF WBC: CPT

## 2020-10-16 PROCEDURE — 82550 ASSAY OF CK (CPK): CPT

## 2020-10-16 PROCEDURE — 86592 SYPHILIS TEST NON-TREP QUAL: CPT

## 2020-10-16 PROCEDURE — 82746 ASSAY OF FOLIC ACID SERUM: CPT

## 2020-10-16 PROCEDURE — 82140 ASSAY OF AMMONIA: CPT

## 2020-10-16 PROCEDURE — 82607 VITAMIN B-12: CPT

## 2020-10-16 PROCEDURE — 84443 ASSAY THYROID STIM HORMONE: CPT

## 2020-10-16 PROCEDURE — 80183 DRUG SCRN QUANT OXCARBAZEPIN: CPT

## 2020-10-16 PROCEDURE — 36415 COLL VENOUS BLD VENIPUNCTURE: CPT | Mod: PO

## 2020-10-17 LAB — RPR SER QL: NORMAL

## 2020-10-19 DIAGNOSIS — E72.20 HYPERAMMONEMIA: Primary | ICD-10-CM

## 2020-10-20 ENCOUNTER — TELEPHONE (OUTPATIENT)
Dept: NEUROLOGY | Facility: CLINIC | Age: 35
End: 2020-10-20

## 2020-10-20 LAB — OXCARBAZEPINE METABOLITE: 9 MCG/ML (ref 3–35)

## 2020-10-21 LAB
VIT B1 BLD-MCNC: 57 UG/L (ref 38–122)
VIT B1 BLD-MCNC: 57 UG/L (ref 38–122)

## 2020-10-26 ENCOUNTER — PATIENT MESSAGE (OUTPATIENT)
Dept: FAMILY MEDICINE | Facility: CLINIC | Age: 35
End: 2020-10-26

## 2020-10-26 ENCOUNTER — PATIENT MESSAGE (OUTPATIENT)
Dept: PSYCHIATRY | Facility: CLINIC | Age: 35
End: 2020-10-26

## 2020-11-10 ENCOUNTER — OFFICE VISIT (OUTPATIENT)
Dept: PSYCHIATRY | Facility: CLINIC | Age: 35
End: 2020-11-10
Payer: COMMERCIAL

## 2020-11-10 VITALS
HEIGHT: 70 IN | SYSTOLIC BLOOD PRESSURE: 131 MMHG | HEART RATE: 75 BPM | BODY MASS INDEX: 33.66 KG/M2 | WEIGHT: 235.13 LBS | DIASTOLIC BLOOD PRESSURE: 71 MMHG

## 2020-11-10 DIAGNOSIS — G40.909 SEIZURE DISORDER: Chronic | ICD-10-CM

## 2020-11-10 DIAGNOSIS — R62.50 DEVELOPMENTAL DELAY, MODERATE: ICD-10-CM

## 2020-11-10 DIAGNOSIS — F42.2 MIXED OBSESSIONAL THOUGHTS AND ACTS: ICD-10-CM

## 2020-11-10 DIAGNOSIS — E66.9 OBESITY (BMI 30.0-34.9): ICD-10-CM

## 2020-11-10 DIAGNOSIS — F63.9 IMPULSE CONTROL DISORDER: Chronic | ICD-10-CM

## 2020-11-10 DIAGNOSIS — G47.33 OSA (OBSTRUCTIVE SLEEP APNEA): Chronic | ICD-10-CM

## 2020-11-10 DIAGNOSIS — R56.9 UNSPECIFIED CONVULSIONS: ICD-10-CM

## 2020-11-10 DIAGNOSIS — F84.0 AUTISTIC DISORDER, ACTIVE: Chronic | ICD-10-CM

## 2020-11-10 DIAGNOSIS — F42.8 OTHER OBSESSIVE-COMPULSIVE DISORDERS: ICD-10-CM

## 2020-11-10 DIAGNOSIS — F84.9 PERVASIVE DEVELOPMENTAL DISORDER, ACTIVE: Primary | Chronic | ICD-10-CM

## 2020-11-10 PROBLEM — E66.811 OBESITY (BMI 30.0-34.9): Status: ACTIVE | Noted: 2020-11-10

## 2020-11-10 PROCEDURE — 90833 PSYTX W PT W E/M 30 MIN: CPT | Mod: S$GLB,,, | Performed by: PSYCHIATRY & NEUROLOGY

## 2020-11-10 PROCEDURE — 99999 PR PBB SHADOW E&M-EST. PATIENT-LVL III: CPT | Mod: PBBFAC,,, | Performed by: PSYCHIATRY & NEUROLOGY

## 2020-11-10 PROCEDURE — 99214 OFFICE O/P EST MOD 30 MIN: CPT | Mod: S$GLB,,, | Performed by: PSYCHIATRY & NEUROLOGY

## 2020-11-10 PROCEDURE — 99999 PR PBB SHADOW E&M-EST. PATIENT-LVL III: ICD-10-PCS | Mod: PBBFAC,,, | Performed by: PSYCHIATRY & NEUROLOGY

## 2020-11-10 PROCEDURE — 3008F BODY MASS INDEX DOCD: CPT | Mod: CPTII,S$GLB,, | Performed by: PSYCHIATRY & NEUROLOGY

## 2020-11-10 PROCEDURE — 3008F PR BODY MASS INDEX (BMI) DOCUMENTED: ICD-10-PCS | Mod: CPTII,S$GLB,, | Performed by: PSYCHIATRY & NEUROLOGY

## 2020-11-10 PROCEDURE — 99499 UNLISTED E&M SERVICE: CPT | Mod: S$GLB,,, | Performed by: PSYCHIATRY & NEUROLOGY

## 2020-11-10 PROCEDURE — 90833 PR PSYCHOTHERAPY W/PATIENT W/E&M, 30 MIN (ADD ON): ICD-10-PCS | Mod: S$GLB,,, | Performed by: PSYCHIATRY & NEUROLOGY

## 2020-11-10 PROCEDURE — 99214 PR OFFICE/OUTPT VISIT, EST, LEVL IV, 30-39 MIN: ICD-10-PCS | Mod: S$GLB,,, | Performed by: PSYCHIATRY & NEUROLOGY

## 2020-11-10 PROCEDURE — 99499 RISK ADDL DX/OHS AUDIT: ICD-10-PCS | Mod: S$GLB,,, | Performed by: PSYCHIATRY & NEUROLOGY

## 2020-11-10 RX ORDER — FLUVOXAMINE MALEATE 100 MG/1
150 TABLET, COATED ORAL 2 TIMES DAILY
Qty: 270 TABLET | Refills: 1 | Status: SHIPPED | OUTPATIENT
Start: 2020-11-10 | End: 2021-02-18 | Stop reason: SDUPTHER

## 2020-11-10 RX ORDER — OXCARBAZEPINE 300 MG/1
300 TABLET, FILM COATED ORAL 2 TIMES DAILY
Qty: 180 TABLET | Refills: 0 | Status: SHIPPED | OUTPATIENT
Start: 2020-11-10 | End: 2021-04-08

## 2020-11-10 NOTE — PROGRESS NOTES
"ID: 31yo WM with PDD, Impulse Control d/o, anxiety d/o nos.     CC: anxiety    Interim Hx: presents on time with his mother. Pt's mom emailed me last week to let me know that Tim (pt's dad)  end of October. Had been ill with numerous medical concerns over course of last year.     Pt walks into room and based on his affect and greeting it is immediately clear he is back to baseline. "luis Longo. we have to leave our masks on."     Pt's mother agrees. "he's back to himself. Probably a combination of things but he's off the xanax and going to the bathroom on the toilet again and much much calmer. i'd like to get him off the cbd but not right now. He's doing so well and we've got some transition coming up." chu acknowledges that while Tim's loss is difficult, it has also led to some relief- both for her as she had been caregiver to him but also to some tension and heaviness in the home as his health worsened.     Due to covid, chu was allowed to be ru' caregiver, this expires on 2020 so she is now seeking help and planning to go back to work- trying to identify if financially she "has to" work or if it can just be a "lagniappe" job that allow her to get our from the house a couple of days a week.    Denies ongoing agitation, no need for PRN- no longer using any xanax, sleep intact- no longer providing melatonin. 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     Will cont med's as is without change today. F/u 3mos.     On Psychiatric ROS:    See above for pertinents.    PSYCHOTHERAPY ADD-ON   30 (16-37*) minutes    Time: 20 minutes  Participants: Met with patient and mother/caregiver    Therapeutic Intervention Type: insight oriented psychotherapy, behavior modifying psychotherapy, supportive psychotherapy  Why chosen therapy is appropriate versus another modality: relevant to diagnosis, patient responds to this modality, evidence based practice    Target symptoms: adjustment, " "grief  Primary focus: father's passing.  Psychotherapeutic techniques: support, validation, reframing    Outcome monitoring methods: self-report, observation, feedback from family    Patient's response to intervention:  The patient's response to intervention is accepting, motivated.    Progress toward goals:  The patient's progress toward goals is good.    PPHx: Endorses h/o self injury- will pick at scabs in sort of obsessive way- this has stabilized  Denies inpt psych hospitalization  Denies h/o suicide attempt     Current Psych Meds: **Trileptal 300mg po BID through neuro for sz d/o, luvox 150mg po BID  Past Psych Meds: s/e's to mult prev meds to include zyprexa (wgt gain), lexapro 20mg po qam (anxiety and to suppress sex drive), buspar 10mg po BID, xanax 0.5mg po daily PRN anxiety     PMHx: obesity, onel w/ cipap, seizure d/o, scoliosis surgery at 13yo led to hemiparalysis- now walking but has neurogenic bladder and frequent catheterization, partial nephrectomy R kidney    SubstHx:   T- none  E- none  D- none  Caffeine- very rare, if ever    FamPHx: mAunt- schizophrenic, mcousin- schizophrenia    Musculoskeletal:  Muscle strength/Tone: no dyskinesia/ no tremor  Gait/Station- non antalgic, no assistance needed    MSE: appears stated age, well groomed, obese, appropriate dress- shorts/tshirt, engages well with examiner at a childlike level.  Good e/c. Speech reg rate and vol, nonpressured. less spontaneous speech today. Mood is (pt does not respond today to this question). Affect euthymic. Oriented to month and season. Narrative memory intact. Intellectual function is below avg based on vocab and basic fund of knowledge- PDD long h/o sp ed. Thought is perseverative but he is tracking in conversation. No tangentiality or circumstantiality. No FOI/WES. Denies SI/HI. Denies A/VH. No evidence of delusions. Insight lacking and Judgment in keeping with insight.     Blood pressure 131/71, pulse 75, height 5' 10" (1.778 " "m), weight 106.7 kg (235 lb 1.9 oz).    Suicide Risk Assessment:   Protective- age, no prior attempts, no prior hospitalizations, no family h/o attempts, no ongoing substance abuse, no psychosis, denies SI/intent/plan, no h/o violence, seeking treatment, access to treatment, future oriented, good primary support, no access to firearms    Risk- race, gender, ongoing Axis I sxs    **Pt is at LOW imminent and long term risk of suicide given current risk factors.    Assessment:  30yo WM with PDD, Impulse Control d/o, anxiety d/o nos. Last saw RENNY Murillo 5/2016. Pt presented initially with his mother and caregiver Helen who has been with the pt x 8mos. Currently the pt is doing very well. Of note pt is only on lexapro for anxiety, is also on trileptal 300mg po BID for seizure d/o through neuro and has HALEY on cipap. Has great family support, receives 88hrs per week of direct care from outside service provider, has assistance with all ADLs and IADLs but seems content with arrangement. No h/o violent or aggressive behavior. Had manic sxs with psychosis when on keppra but never before or since. Has recently stopped zyprexa due to significant weight gain and there has been no increase in behavior or anxiety. Per parent and caregiver there is some concern that a prev caregiver who was recently fired was motivated for the pt to be more sedated-  they both denied the pt needed any change in medication. Hussein is doing well, has some instances of intrusive behavior with young attractive women and he becomes difficult to redirect- added a trial of PRN buspar for days he has public outings- this has been effective and helpful. Today the incidents have become more difficult to redirect- will order a trial of xanax 0.25-0.5mg po daily PRN and also inc buspar PRN to 10mg daily as needed for public outings. Today on f/u pt's mother does not believe buspar has been effective- xanax effective but cannot be given in the moment as "it " "just happens so fast"- inability to redirect pt in public has become a safety concern for women and for the pt as authorities are not typically well trained in spectrum disorders (mom working to educate the police force). Warrants a transition to new ssri- will taper off lexapro and likely start luvox at the next appt. Last appt on f/u he is "no worse" without the meds- mom motivated to cont without as they cont to observe. Anxiety was mildly inc'd but hard to attribute to lack of med as his primary caregiver just left work abruptly with pregnancy complications- change of schedule and personnel hard for Ru. Last appt 2 new providers, doing well with both, mom has placed cameras and she has no concerns about the new hires. Routine back in place and ru had anxiety but was functioning well- mom would like to cont w/o meds. Will cont to observe. It was time to intervene with meds per mom, started trial of luvox which initially helped at 50mg bid, we then inc'd to 100mg bid with s/e of diarrhea and sweating, now back to 50mg po bid with resolution of most concerning s/e and still improvement in anxiety- will cont at this time but mom will cont to do r/b assessment. Pt's father with worsening health- leading to some anxiety, new caregiver now gone after some unprofessional behavior. Pt doing well on the dec'd luvox dose and favorite caregiver is now back and working 25hrs. Self injury dec'd. Recent hospital stay for uti and sepsis- now back to baseline. Some recent transaminitis- trending down- will cont to follow. Initially did well on inc'd fluvox to 100mg po qam and 50mg po qhs- but last appt they reported inc'd anxiety and inc'd need for use of xanax and that the xanax 0.25mg has not been effective for relief of sxs recently- inc'd fluvox from 100bid (mom titrated the dose) to 150mg po BID. Will also inc xanax to 0.5mg po daily PRN anxiety.  Skin picking improved, escalating less frequently. Will cont daily " xanax 0.5mg po qam and cont to observe. No acute safety concerns. rtc 3mos.     Pt now weaning off xanax. Has had some regression along with some medical concerns- uncertain of cause for either. Mom feels perhaps due to slow titration of the xanax use which became daily- may have led to disinhibition. Will d/c the now 0.25mg daily dose and we will observe for 1-2 wks. May then begin adjusting luvox? Although i'm uncertain this is playing a role. Mom interested in trial of cbd oil and while I did not recommend I appreciate her honest report of trial of use- we will monitor. Pt back to baseline today- uncertain of cause of recent decompensation but grateful for stabilization. Pt's father has passed- it has brought some relief to home environment which could be contributing to pt's improvement. Will cont meds w/o change today-     Axis I: OCD, Anxiety d/o NOS, Impulse control d/o  Axis II: Pervasive Dvpmtl D/O  Axis III: HALEY on cipap, seizure d/o  Axis IV: chronic mental health, dependent for caregiving  Axis V: GAF 50    Plan:   1. Cont luvox 150mg po BID   2. Cont Trileptal 300mg po BID per neuro  3. RTC 3mos    -Spent 30min face to face with the pt; >50% time spent in counseling   -Supportive therapy and psychoeducation provided  -R/B/SE's of medications discussed with the pt who expresses understanding and chooses to take medications as prescribed.   -Pt instructed to call clinic, 911 or go to nearest emergency room if sxs worsen or pt is in   crisis. The pt expresses understanding.

## 2020-11-17 DIAGNOSIS — R25.8 NOCTURNAL LEG MOVEMENTS: ICD-10-CM

## 2020-11-17 RX ORDER — PRAMIPEXOLE DIHYDROCHLORIDE 0.12 MG/1
0.12 TABLET ORAL 3 TIMES DAILY
Qty: 90 TABLET | Refills: 1 | Status: SHIPPED | OUTPATIENT
Start: 2020-11-17 | End: 2021-01-13

## 2020-11-17 NOTE — PROGRESS NOTES
Refill Routing Note   Medication(s) are not appropriate for processing by [facility] for the following reason(s):     - Outside of Protocol  [facility] action(s):   Route          Medication reconciliation completed: No   Automatic Epic Generated Protocol Data:        Requested Prescriptions   Pending Prescriptions Disp Refills    pramipexole (MIRAPEX) 0.125 MG tablet [Pharmacy Med Name: pramipexole 0.125 mg tablet] 90 tablet 1     Sig: Take 1 tablet (0.125 mg total) by mouth 3 (three) times daily.       There is no refill protocol information for this order           Appointments  past 12m or future 3m with PCP    Date Provider   Last Visit   9/21/2020 Reji Zendejas Jr., MD   Next Visit   12/14/2020 Reji Zendejas Jr., MD   ED visits in past 90 days: 0        Note composed:5:58 PM 11/17/2020

## 2020-12-07 ENCOUNTER — PATIENT MESSAGE (OUTPATIENT)
Dept: HEPATOLOGY | Facility: CLINIC | Age: 35
End: 2020-12-07

## 2020-12-14 ENCOUNTER — OFFICE VISIT (OUTPATIENT)
Dept: FAMILY MEDICINE | Facility: CLINIC | Age: 35
End: 2020-12-14
Payer: MEDICARE

## 2020-12-14 VITALS
HEIGHT: 70 IN | DIASTOLIC BLOOD PRESSURE: 68 MMHG | OXYGEN SATURATION: 98 % | BODY MASS INDEX: 33.58 KG/M2 | WEIGHT: 234.56 LBS | SYSTOLIC BLOOD PRESSURE: 108 MMHG | HEART RATE: 83 BPM

## 2020-12-14 DIAGNOSIS — N31.9 NEUROGENIC BLADDER: Chronic | ICD-10-CM

## 2020-12-14 DIAGNOSIS — F84.0 AUTISTIC DISORDER, ACTIVE: Primary | Chronic | ICD-10-CM

## 2020-12-14 DIAGNOSIS — G47.33 OSA (OBSTRUCTIVE SLEEP APNEA): Chronic | ICD-10-CM

## 2020-12-14 DIAGNOSIS — G40.909 SEIZURE DISORDER: Chronic | ICD-10-CM

## 2020-12-14 PROCEDURE — 99999 PR PBB SHADOW E&M-EST. PATIENT-LVL IV: ICD-10-PCS | Mod: PBBFAC,,, | Performed by: EMERGENCY MEDICINE

## 2020-12-14 PROCEDURE — 99213 OFFICE O/P EST LOW 20 MIN: CPT | Mod: S$GLB,,, | Performed by: EMERGENCY MEDICINE

## 2020-12-14 PROCEDURE — 3008F PR BODY MASS INDEX (BMI) DOCUMENTED: ICD-10-PCS | Mod: CPTII,S$GLB,, | Performed by: EMERGENCY MEDICINE

## 2020-12-14 PROCEDURE — 3008F BODY MASS INDEX DOCD: CPT | Mod: CPTII,S$GLB,, | Performed by: EMERGENCY MEDICINE

## 2020-12-14 PROCEDURE — 99213 PR OFFICE/OUTPT VISIT, EST, LEVL III, 20-29 MIN: ICD-10-PCS | Mod: S$GLB,,, | Performed by: EMERGENCY MEDICINE

## 2020-12-14 PROCEDURE — 99999 PR PBB SHADOW E&M-EST. PATIENT-LVL IV: CPT | Mod: PBBFAC,,, | Performed by: EMERGENCY MEDICINE

## 2020-12-14 NOTE — PROGRESS NOTES
Subjective:   THIS NOTE IS DONE WITH VOICE RECOGNITION        Patient ID: Hussein Doyle is a 35 y.o. male.    Chief Complaint: Weight Loss      HPI     During this time of COVID, he has had significant disruption of his schedule, with significant behavioral outcomes, all negative.  His father also  during this time period in the past 6 or so weeks, his mother has been able to stabilize his routines.  She has helped back.  She has tried delegate more to the caregivers and being more of a mother rather than a caregiver.  All of these things together seemed to have helped dug stabilize his environment.    His mother also started using some CBD oil.  She feels that things are substantially improved with this approach.  He is currently using 600 units (?).  Half a dropper full.    He is off all Xanax.    His constipation has totally resolved.    During all changes, his mother had noticed left eye twitching.  She feels it still twitching.  No other focal neurological symptomatology identified.  There have not been an increase in seizures.    He is NOT using his CPAP.  Problems with tubing.  He is planning to see Dr. Garber to address this issue.  I have reminded his mother that any weight loss would help with sleep related issues.  She has not noticed a significant increasing any apneic spells.    COVID vaccine encouraged.  Continued use of masks, avoiding crowds, and good hand washing recommended.    Immunization History   Administered Date(s) Administered    Influenza - Quadrivalent - PF *Preferred* (6 months and older) 10/22/2018, 10/04/2019, 2020    Tdap 2020     Current Outpatient Medications   Medication Sig Dispense Refill    allopurinoL (ZYLOPRIM) 100 MG tablet Take 1 tablet (100 mg total) by mouth 2 (two) times daily. 180 tablet 3    calcium carbonate-vitamin D3 (CALCIUM 500 WITH D) 500 mg(1,250mg) -400 unit Tab 2 (two) times a day.       fluvoxaMINE (LUVOX) 100 MG tablet Take 1.5  tablets (150 mg total) by mouth 2 (two) times daily. 270 tablet 1    inulin (FIBER GUMMIES ORAL) Take by mouth once daily.       L. ACIDOPHILUS/BIFID. ANIMALIS (CHEWABLE PROBIOTIC ORAL) Take by mouth 2 (two) times daily.      lactulose (CHRONULAC) 10 gram/15 mL solution Take by mouth 3 (three) times daily.      magnesium 30 mg Tab Take by mouth once. Pt takes 125mg per mother      multivitamin capsule Take 1 capsule by mouth every morning.       mupirocin (BACTROBAN) 2 % ointment Apply topically 3 (three) times daily. (Patient taking differently: Apply topically 3 (three) times daily as needed. ) 30 g 0    omega-3 fatty acids/fish oil (FISH OIL-OMEGA-3 FATTY ACIDS) 300-1,000 mg capsule Take by mouth once daily.      OXcarbazepine (TRILEPTAL) 300 MG Tab Take 1 tablet (300 mg total) by mouth 2 (two) times daily. 180 tablet 0    pantoprazole (PROTONIX) 40 MG tablet Take 1 tablet (40 mg total) by mouth once daily. 90 tablet 2    polyethylene glycol (GLYCOLAX) 17 gram PwPk Take by mouth.      pramipexole (MIRAPEX) 0.125 MG tablet Take 1 tablet (0.125 mg total) by mouth 3 (three) times daily. 90 tablet 1    sulfamethoxazole-trimethoprim 400-80mg (BACTRIM,SEPTRA) 400-80 mg per tablet Take 1 tablet by mouth 3 (three) times a week.       tamsulosin (FLOMAX) 0.4 mg Cp24 Take 0.4 mg by mouth every evening. Every day      UNABLE TO FIND CBD oil      cetirizine (ZYRTEC) 10 MG tablet Take 10 mg by mouth once daily.      UNABLE TO FIND Holy Basil - OTC from [employer], takes BID       No current facility-administered medications for this visit.          Review of Systems   Constitutional: Positive for activity change. Negative for unexpected weight change.   HENT: Negative for hearing loss, rhinorrhea and trouble swallowing.    Eyes: Negative for discharge and visual disturbance.   Respiratory: Negative for cough, chest tightness and wheezing.    Cardiovascular: Negative for chest pain and palpitations.    Gastrointestinal: Positive for constipation ( currently well controlled). Negative for blood in stool, diarrhea and vomiting.   Endocrine: Negative for polydipsia and polyuria.   Genitourinary: Positive for difficulty urinating. Negative for hematuria.        Neurogenic bladder management with self-catheterizations working well.   Musculoskeletal: Negative for arthralgias, joint swelling and neck pain.   Skin: Negative.    Neurological: Negative for weakness and headaches.   Psychiatric/Behavioral: Positive for agitation (When upset) and self-injury ( when upset). Negative for dysphoric mood and hallucinations.        In the last few weeks his behavior has improved substantially.       Objective:      Physical Exam  Vitals signs reviewed.   Constitutional:       General: He is not in acute distress.     Appearance: Normal appearance. He is well-developed.   HENT:      Head: Normocephalic and atraumatic.      Right Ear: Tympanic membrane and external ear normal.      Left Ear: Tympanic membrane and external ear normal.      Nose: Nose normal.      Mouth/Throat:      Mouth: Mucous membranes are moist.      Pharynx: No oropharyngeal exudate.   Eyes:      General: No scleral icterus.     Conjunctiva/sclera: Conjunctivae normal.      Pupils: Pupils are equal, round, and reactive to light.      Comments: No twitch.   Neck:      Musculoskeletal: Normal range of motion and neck supple.      Thyroid: No thyromegaly.   Cardiovascular:      Rate and Rhythm: Normal rate and regular rhythm.      Pulses: Normal pulses.      Heart sounds: Normal heart sounds. No murmur.   Pulmonary:      Effort: Pulmonary effort is normal.      Breath sounds: Normal breath sounds. No wheezing or rales.   Abdominal:      General: Bowel sounds are normal. There is no distension.      Palpations: Abdomen is soft.      Tenderness: There is no abdominal tenderness.   Musculoskeletal: Normal range of motion.         General: No tenderness.    Lymphadenopathy:      Cervical: No cervical adenopathy.   Skin:     General: Skin is warm and dry.      Capillary Refill: Capillary refill takes less than 2 seconds.      Findings: No rash.   Neurological:      Mental Status: He is alert. Mental status is at baseline.      Motor: No abnormal muscle tone.      Coordination: Coordination normal.      Deep Tendon Reflexes: Reflexes are normal and symmetric.   Psychiatric:      Comments: His behavior was excellent today.  No agitation.  Very cooperative with physical exam.    No major changes in his communication.    Was able to examine with out difficulty.         Assessment:       1. Autistic disorder, active    2. Seizure disorder    3. Neurogenic bladder    4. HALEY (obstructive sleep apnea)        Plan:       1. Autistic disorder, active  Stable  Much improved with consistent caregivers and more routine.  He will continue to follow-up with psychiatry for comorbid diagnoses    2. Seizure disorder  No new seizures  Continue current management    3. Neurogenic bladder  Intermittent cathing is working well.  No signs of infection  No changes    4. HALEY (obstructive sleep apnea)  CPAP is been discontinued secondary to his refusing to wear the mask  He does have an appointment with Dr. Garber to re-evaluate interventions

## 2020-12-15 NOTE — PATIENT INSTRUCTIONS
This communication is generated by voice recognition.  Syntax and spelling can be problematic.    Silvino Benitez looked wonderful today.    Given all the chaos in the family this last 6 months, it is hard to believe that you have been able to navigate this very difficult time with such success.  A large part of Silvino doing so well is your management.    Will plan to see him in 4 months, and the something new develops.    I would encourage him to see Dr. Garber in regards to his CPAP machine.    Respectfully,    Reji Zendejas MD

## 2020-12-16 NOTE — TELEPHONE ENCOUNTER
----- Message from Joelle Haji sent at 12/16/2020 10:46 AM CST -----  Regarding: pharmacy  Contact: Brenda with [facility]  Type:  Pharmacy Calling to Clarify an RX    Name of Caller:  Brenda   Pharmacy Name:  [facility]  Prescription Name:  lactulose (CHRONULAC) 10 gram/15 mL solution   What do they need to clarify?:  new prescription  Best Call Back Number:  [phone]  Additional Information:  Patient would like to use [facility] pharmacy instead of [facility]. Please call Brenda       

## 2020-12-17 DIAGNOSIS — M1A.9XX0 CHRONIC GOUT WITHOUT TOPHUS, UNSPECIFIED CAUSE, UNSPECIFIED SITE: ICD-10-CM

## 2020-12-17 RX ORDER — LACTULOSE 10 G/15ML
10 SOLUTION ORAL; RECTAL 3 TIMES DAILY
Qty: 473 ML | Refills: 2 | Status: SHIPPED | OUTPATIENT
Start: 2020-12-17 | End: 2021-02-04

## 2020-12-17 NOTE — TELEPHONE ENCOUNTER
Care Due:                  Date            Visit Type   Department     Provider  --------------------------------------------------------------------------------                                MYCHART                              FOLLOWUP/OF  [facility] FAMILY  Last Visit: 12-      FICE VISIT   MEDICINE       CLARICE ARIAS  Next Visit: None Scheduled  None         None Found                                                            Last  Test          Frequency    Reason                     Performed    Due Date  --------------------------------------------------------------------------------    Uric Acid...  12 months..  allopurinoL..............  05- 04-    Powered by [employer]. Reference number: [id number]. 12/17/2020 9:37:55 AM   CST

## 2020-12-17 NOTE — PROGRESS NOTES
Refill Routing Note   Medication(s) are not appropriate for processing by [facility] for the following reason(s):     - Outside of protocol  - Medication not previously prescribed by PCP  [facility] action(s):  Route        Medication reconciliation completed: No   Automatic Epic Generated Protocol Data:        Requested Prescriptions   Pending Prescriptions Disp Refills    lactulose (CHRONULAC) 10 gram/15 mL solution 473 mL 2     Sig: Take 15 mLs (10 g total) by mouth 3 (three) times daily.       Laxative Protocol Failed - 12/16/2020 11:33 AM        Failed - Up to date with colon cancer screening Health Maintenance        Passed - Recent visit with authorizing provider              Appointments  past 12m or future 3m with PCP    Date Provider   Last Visit   12/14/2020 Reji Zendejas Jr., MD   Next Visit   12/17/2020 Reji Zendejas Jr., MD   ED visits in past 90 days: 0        Note composed:3:58 PM 12/17/2020

## 2020-12-19 RX ORDER — ALLOPURINOL 100 MG/1
TABLET ORAL
Qty: 180 TABLET | Refills: 0 | Status: SHIPPED | OUTPATIENT
Start: 2020-12-19 | End: 2021-03-31

## 2020-12-19 NOTE — PROGRESS NOTES
Refill Authorization Note   Hussein Doyle  is requesting a refill authorization.  Brief Assessment and Rationale for Refill:  Approve    -Medication-Related Problems Identified: Requires labs  Medication Therapy Plan:  CDMR. labs(Uric Acid); approve     Medication Reconciliation Completed: No   Comments:       Requested Prescriptions   Pending Prescriptions Disp Refills    allopurinoL (ZYLOPRIM) 100 MG tablet [Pharmacy Med Name: allopurinol 100 mg tablet] 180 tablet 0     Sig: TAKE ONE TABLET BY MOUTH TWICE DAILY       Endocrinology:  Gout Agents - allopurinol Failed - 12/19/2020  3:01 PM        Failed - Uric Acid within 360 days     Uric Acid   Date Value Ref Range Status   05/03/2019 4.6 3.4 - 7.0 mg/dL Final              Passed - Patient is at least 18 years old        Passed - Office visit in past 12 months or future 90 days     Recent Outpatient Visits            5 days ago Autistic disorder, active    [place] [facility] Reji Zendejas Jr., MD    1 month ago Pervasive developmental disorder, active    [place] - Psychiatry Kathy Valentine MD    2 months ago Seizure disorder    [place] - Neurology Miguel Salinas Jr., MD    2 months ago Autistic disorder, active    [place] [facility] Medicine Reji Zendejas Jr., MD    3 months ago Autistic disorder, active    [facility] Reji Garber MD          Future Appointments              In 3 days [facility]    In 4 days [facility] - [facility] Fl, Francesco Garcia                Passed - Cr is 1.4 or below and within 360 days     Creatinine   Date Value Ref Range Status   10/16/2020 0.7 0.5 - 1.4 mg/dL Final   06/30/2020 0.64 0.50 - 1.40 mg/dL Final   06/16/2020 0.8 0.5 - 1.4 mg/dL Final              Passed - WBC within 360 days     WBC   Date Value Ref Range Status   10/16/2020 9.69 3.90 - 12.70 K/uL Final   06/30/2020 8.86 3.90 - 12.70 K/uL  Final   06/16/2020 9.73 3.90 - 12.70 K/uL Final              Passed - RBC within 360 days     RBC   Date Value Ref Range Status   10/16/2020 4.84 4.60 - 6.20 M/uL Final   06/30/2020 4.13 (L) 4.60 - 6.20 M/uL Final   06/16/2020 4.51 (L) 4.60 - 6.20 M/uL Final              Passed - HGB within 360 days     Hemoglobin   Date Value Ref Range Status   10/16/2020 15.7 14.0 - 18.0 g/dL Final   06/30/2020 13.9 (L) 14.0 - 18.0 g/dL Final   06/16/2020 15.5 14.0 - 18.0 g/dL Final              Passed - HCT within 360 days     Hematocrit   Date Value Ref Range Status   10/16/2020 48.2 40.0 - 54.0 % Final   06/30/2020 39.4 (L) 40.0 - 54.0 % Final   06/16/2020 46.6 40.0 - 54.0 % Final              Passed - PLT within 360 days     Platelets   Date Value Ref Range Status   10/16/2020 181 150 - 350 K/uL Final   06/30/2020 197 150 - 350 K/uL Final   06/16/2020 181 150 - 350 K/uL Final              Passed - ALT is 94 or below and within 360 days     ALT   Date Value Ref Range Status   10/16/2020 28 10 - 44 U/L Final   06/30/2020 119 (H) 0 - 50 U/L Final   06/16/2020 178 (H) 10 - 44 U/L Final              Passed - AST is 54 or below and within 360 days     AST ([place])   Date Value Ref Range Status   03/12/2016 96 (H) 17 - 59 U/L Final     AST   Date Value Ref Range Status   10/16/2020 37 10 - 40 U/L Final   06/30/2020 151 (H) 17 - 59 U/L Final   06/16/2020 187 (H) 10 - 40 U/L Final              Passed - eGFR is 60 or above and within 360 days     eGFR if non    Date Value Ref Range Status   10/16/2020 >60.0 >60 mL/min/1.73 m^2 Final     Comment:     Calculation used to obtain the estimated glomerular filtration  rate (eGFR) is the CKD-EPI equation.      06/30/2020 >60 >60 mL/min/1.73 m^2 Final     Comment:     Calculation used to obtain the estimated glomerular filtration  rate (eGFR) is the CKD-EPI equation.      06/16/2020 >60.0 >60 mL/min/1.73 m^2 Final     Comment:     Calculation used to obtain the estimated  glomerular filtration  rate (eGFR) is the CKD-EPI equation.        eGFR if    Date Value Ref Range Status   10/16/2020 >60.0 >60 mL/min/1.73 m^2 Final   06/30/2020 >60 >60 mL/min/1.73 m^2 Final   06/16/2020 >60.0 >60 mL/min/1.73 m^2 Final                  Appointments  past 12m or future 3m with PCP    Date Provider   Last Visit   12/14/2020 Reji Zendejas Jr., MD   Next Visit   Visit date not found Reji Zendejas Jr., MD   ED visits in past 90 days: 0     Note composed:3:03 PM 12/19/2020

## 2020-12-19 NOTE — TELEPHONE ENCOUNTER
Provider Staff:     Action is required for this patient.     Please schedule patient for Labs (URIC ACID)    Thanks!  [facility]     Appointments  past 12m or future 3m with PCP    Date Provider   Last Visit   12/14/2020 Reji Zendejas Jr., MD   Next Visit   Visit date not found Reij Zendejas Jr., MD     Note composed:3:03 PM 12/19/2020

## 2020-12-20 ENCOUNTER — PATIENT MESSAGE (OUTPATIENT)
Dept: FAMILY MEDICINE | Facility: CLINIC | Age: 35
End: 2020-12-20

## 2020-12-22 ENCOUNTER — PATIENT OUTREACH (OUTPATIENT)
Dept: ADMINISTRATIVE | Facility: OTHER | Age: 35
End: 2020-12-22

## 2020-12-22 ENCOUNTER — HOSPITAL ENCOUNTER (OUTPATIENT)
Dept: RADIOLOGY | Facility: HOSPITAL | Age: 35
Discharge: HOME | End: 2020-12-22
Attending: NURSE PRACTITIONER
Payer: MEDICARE

## 2020-12-22 DIAGNOSIS — K74.60 CIRRHOSIS OF LIVER WITHOUT ASCITES, UNSPECIFIED HEPATIC CIRRHOSIS TYPE: ICD-10-CM

## 2020-12-22 PROCEDURE — 76705 US ABDOMEN LIMITED: ICD-10-PCS | Mod: 26,,, | Performed by: RADIOLOGY

## 2020-12-22 PROCEDURE — 76705 ECHO EXAM OF ABDOMEN: CPT | Mod: 26,,, | Performed by: RADIOLOGY

## 2020-12-22 PROCEDURE — 76705 ECHO EXAM OF ABDOMEN: CPT | Mod: TC,PO

## 2020-12-23 ENCOUNTER — PATIENT MESSAGE (OUTPATIENT)
Dept: HEPATOLOGY | Facility: CLINIC | Age: 35
End: 2020-12-23

## 2020-12-23 ENCOUNTER — OFFICE VISIT (OUTPATIENT)
Dept: HEPATOLOGY | Facility: CLINIC | Age: 35
End: 2020-12-23
Payer: MEDICARE

## 2020-12-23 DIAGNOSIS — K74.60 CIRRHOSIS OF LIVER WITHOUT ASCITES, UNSPECIFIED HEPATIC CIRRHOSIS TYPE: Primary | ICD-10-CM

## 2020-12-23 DIAGNOSIS — K75.81 NASH (NONALCOHOLIC STEATOHEPATITIS): ICD-10-CM

## 2020-12-23 PROBLEM — R74.8 ELEVATED LIVER ENZYMES: Status: RESOLVED | Noted: 2020-07-20 | Resolved: 2020-12-23

## 2020-12-23 PROCEDURE — 99214 OFFICE O/P EST MOD 30 MIN: CPT | Mod: 95,,, | Performed by: NURSE PRACTITIONER

## 2020-12-23 PROCEDURE — 99214 PR OFFICE/OUTPT VISIT, EST, LEVL IV, 30-39 MIN: ICD-10-PCS | Mod: 95,,, | Performed by: NURSE PRACTITIONER

## 2020-12-23 PROCEDURE — 99499 RISK ADDL DX/OHS AUDIT: ICD-10-PCS | Mod: 95,,, | Performed by: NURSE PRACTITIONER

## 2020-12-23 PROCEDURE — 99499 UNLISTED E&M SERVICE: CPT | Mod: 95,,, | Performed by: NURSE PRACTITIONER

## 2020-12-23 NOTE — Clinical Note
1. Please schedule labs over the next few weeks - AFP, uric acid (PCP orders), ammonia, hep A/B labs  2. Labs (CBC, CMP, INR, AFP) and US in 6 months. Video visit a few days later. Thanks!  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 She is s/p intubation in the ED on 4/1.    CNS: Dr Louise is her primary neurologist and is aware of her admission. She is s/p VNS placement, first in Nov 2012 and then replaced a second time last year. Last tonic-clonic seizure noted at 10:30pm 4/1.  - neurology consulted and following  - 1000mg IV solumedrol q24hr  - versed drip @ 0.6, wean today q1hr in anticipation of extubation  - 250mg BID ethosuxamide, will increase doses on 4/5 per neurology recs   - on 4/5: 250mg in am and 500mg in pm   - on 4/9: increase to 500mg BID  - s/p Vimpat 200mg x1 loading dose, then continue 100mg BID   - 24hr VEEG ordered for this AM  - continue home medications:   - lamictal 150mg BID   - Onfi 10mg qAM and 20mg qHS    CV: no issues  - on continuous tele    Resp: s/p intubation in the ED for airway management during status epilepticus  - adjusting PS settings as required  - VBG q6hr and PRN  - CXR in AM    FENGI:   - HOLD enteral feeds  - continue TPN, at maintenance rate  - GI ppx  - strict I+Os  - AM labs    Heme/ID: no active issues    Social: Mother and maternal aunt at bedside. All questions asked and answered.  Dispo: Pending extubation and clinical improvement.

## 2020-12-23 NOTE — PROGRESS NOTES
[facility] - Established Patient     Last Clinic Visit: 7/28/20    The patient location is: Home, [facility]   The chief complaint leading to consultation is: F/u for cirrhosis    Visit type: audiovisual    Face to Face time with patient: 20 min   30 minutes of total time spent on the encounter, which includes face to face time and non-face to face time preparing to see the patient (eg, review of tests), Obtaining and/or reviewing separately obtained history, Documenting clinical information in the electronic or other health record, Independently interpreting results (not separately reported) and communicating results to the patient/family/caregiver, or Care coordination (not separately reported).     Each patient to whom he or she provides medical services by telemedicine is:  (1) informed of the relationship between the physician and patient and the respective role of any other health care provider with respect to management of the patient; and (2) notified that he or she may decline to receive medical services by telemedicine and may withdraw from such care at any time.        HPI: This is a 35 y.o. 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 Mother present for video visit.      Noted history: autism and MR, tardive dyskinesia, seizures, renal CA, gout, neurogenic bladder     Transaminases consistently elevated since 11/2015, AST>ALT.  Synthetic liver function WNL.    Serological workup has been negative for Kel's, alpha-1 antitrypsin deficiency, hemochromatosis, and viral hepatitis. Ferritin elevated, 800-1500 though iron sat normal. No copies of C282Y or H63D to suggest HH. IgG mildly elevated (1677) though other autoimmune markers negative.     Imaging has shown hepatomegaly, fatty liver, splenomegaly; no liver masses or biliary dilation    Liver biopsy obtained 7/2020 which confirmed TRAYLOR cirrhosis.     Interval history:  Since our last visit, his father has passed  away.    His mother has been cutting portions and making dietary changes. He has lost a significant amount of weight from this and likely stress. Max weight 280 lb in 2018 and now 230s.    With weight loss, liver enzymes have normalized.     Current symptoms of hepatic decompensation:              Ascites: no              LE edema: denies               Hepatic encephalopathy: still having some confusion; mother thinks this is different from baseline. Ammonia checked, slightly above normal range (51); lactulose added by PCP               GI bleeding: denies              Jaundice: no    Cirrhosis Health Maintenance:  -- HCC screening: abd US 12/22 with no focal hepatic lesions  -- Variceal screening: EGD 7/2020 without varices  -- Hepatitis A & B vaccination: immunity unknown          Review of patient's allergies indicates:   Allergen Reactions    Benadryl [diphenhydramine hcl] Other (See Comments)     Increases anxiety and more restless    Keppra [levetiracetam] Other (See Comments)     agitation    Ativan [lorazepam] Anxiety    Xanax [alprazolam] Anxiety     Worsens anxiety and agitation    Abilify  [aripiprazole]      Other reaction(s): arrhythmia    Clonazepam      Other reaction(s): unknown    Cough syrup w/decongestant      Other reaction(s): auditory hallucinations    Diazepam      Other reaction(s): tongue swelling    Haloperidol Other (See Comments)    Phenytoin sodium extended      Other reaction(s): unknown    Quetiapine      Other reaction(s): sweating  Other reaction(s): sedation  Other reaction(s): sweating  Other reaction(s): sedation    Risperidone      Other reaction(s): bladder incontinence    Thimerosal      Other reaction(s): seizure    Ziprasidone hcl Other (See Comments)     Other reaction(s): tonic clonic seizure  seizure        Current Outpatient Medications on File Prior to Visit   Medication Sig Dispense Refill    allopurinoL (ZYLOPRIM) 100 MG tablet TAKE ONE TABLET BY MOUTH  TWICE DAILY 180 tablet 0    calcium carbonate-vitamin D3 (CALCIUM 500 WITH D) 500 mg(1,250mg) -400 unit Tab 2 (two) times a day.       cetirizine (ZYRTEC) 10 MG tablet Take 10 mg by mouth once daily.      fluvoxaMINE (LUVOX) 100 MG tablet Take 1.5 tablets (150 mg total) by mouth 2 (two) times daily. 270 tablet 1    inulin (FIBER GUMMIES ORAL) Take by mouth once daily.       L. ACIDOPHILUS/BIFID. ANIMALIS (CHEWABLE PROBIOTIC ORAL) Take by mouth 2 (two) times daily.      lactulose (CHRONULAC) 10 gram/15 mL solution Take 15 mLs (10 g total) by mouth 3 (three) times daily. 473 mL 2    magnesium 30 mg Tab Take by mouth once. Pt takes 125mg per mother      multivitamin capsule Take 1 capsule by mouth every morning.       mupirocin (BACTROBAN) 2 % ointment Apply topically 3 (three) times daily. (Patient taking differently: Apply topically 3 (three) times daily as needed. ) 30 g 0    omega-3 fatty acids/fish oil (FISH OIL-OMEGA-3 FATTY ACIDS) 300-1,000 mg capsule Take by mouth once daily.      OXcarbazepine (TRILEPTAL) 300 MG Tab Take 1 tablet (300 mg total) by mouth 2 (two) times daily. 180 tablet 0    pantoprazole (PROTONIX) 40 MG tablet Take 1 tablet (40 mg total) by mouth once daily. 90 tablet 2    polyethylene glycol (GLYCOLAX) 17 gram PwPk Take by mouth.      pramipexole (MIRAPEX) 0.125 MG tablet Take 1 tablet (0.125 mg total) by mouth 3 (three) times daily. 90 tablet 1    sulfamethoxazole-trimethoprim 400-80mg (BACTRIM,SEPTRA) 400-80 mg per tablet Take 1 tablet by mouth 3 (three) times a week.       tamsulosin (FLOMAX) 0.4 mg Cp24 Take 0.4 mg by mouth every evening. Every day      UNABLE TO FIND Holy Basil - OTC from [employer], takes BID      UNABLE TO FIND CBD oil       No current facility-administered medications on file prior to visit.          PMHX:  has a past medical history of Autistic disorder, active (5/6/2013), Bowel obstruction, Early intervention counseling, Gout, chronic  (11/12/2015), Grand mal seizure, Hepatic steatosis, Impulse control disorder, unspecified (5/6/2013), Mastoiditis, Moderate mental retardation (5/6/2013), Neurogenic bladder, Neurogenic bladder, HALEY (obstructive sleep apnea) (11/12/2015), Otitis media, Paraplegia, Pervasive developmental disorder, active (12/27/2015), Renal cancer (2010), Scoliosis, Seizure disorder (11/12/2015), Stroke, and Tardive dyskinesia.    PSHX:  has a past surgical history that includes Ankle fracture surgery; Back surgery (2000); Inner ear surgery; right partial nephrectomy (Right, 2010); Tympanostomy tube placement; Colonoscopy (10/28/2008); Colonoscopy (N/A, 6/15/2018); Esophagogastroduodenoscopy (N/A, 7/13/2020); Liver biopsy (N/A, 7/20/2020); Colonoscopy (N/A, 9/9/2020); Esophagogastroduodenoscopy (EGD) with dilation (N/A, 2020); and Magnetic resonance imaging (N/A, 10/9/2020).    FAMILY HISTORY:   Family History   Problem Relation Age of Onset    Cancer Father         lymphoma    Cataracts Father     Colon polyps Father     Cataracts Mother     Cataracts Maternal Grandmother     Diabetes Maternal Grandmother     Macular degeneration Maternal Grandmother     Glaucoma Maternal Grandmother     Anesthesia problems Neg Hx     Clotting disorder Neg Hx     Colon cancer Neg Hx     Crohn's disease Neg Hx     Ulcerative colitis Neg Hx     Stomach cancer Neg Hx     Esophageal cancer Neg Hx     Cirrhosis Neg Hx        SOCIAL HISTORY:   Social History     Tobacco Use   Smoking Status Never Smoker   Smokeless Tobacco Never Used       Social History     Substance and Sexual Activity   Alcohol Use No    Frequency: Never    Drinks per session: Patient refused    Binge frequency: Never       Social History     Substance and Sexual Activity   Drug Use No         Review of Systems   Constitutional: Negative for appetite change, chills, fatigue, fever. (+) weight loss  Eyes: Negative for visual disturbance.   Respiratory: Negative for  cough and shortness of breath.    Cardiovascular: Negative for chest pain, palpitations and leg swelling.   Gastrointestinal: Negative for abdominal distention, abdominal pain, blood in stool, constipation, diarrhea, nausea and vomiting. No change in stool color.   Genitourinary: Negative for dysuria and hematuria. Denies dark urine.   Musculoskeletal: Negative for arthralgias, gait problem, joint swelling and myalgias.   Skin: Negative for color change, rash and wound. Denies itching.   Neurological: Negative for dizziness, tremors, speech difficulty, and headaches.   Hematological: Does not bruise/bleed easily.   Psychiatric/Behavioral: Negative for decreased concentration. Denies memory loss. Denies depression. The patient is not nervous/anxious. (+) intermittent confusion (per mother)       Physical Exam   Limited by video visit  Constitutional: No distress.  Pulmonary/Chest: No respiratory distress.   Skin: No jaundice.   Psychiatric: At baseline today per mother.         LABS:  Lab Results   Component Value Date    WBC 9.69 10/16/2020    HGB 15.7 10/16/2020    HCT 48.2 10/16/2020     10/16/2020     10/16/2020    K 4.2 10/16/2020    CREATININE 0.7 10/16/2020    ALT 28 10/16/2020    AST 37 10/16/2020    ALKPHOS 75 10/16/2020    BILITOT 0.6 10/16/2020    BILIDIR 0.4 (H) 05/24/2018    ALBUMIN 4.2 10/16/2020    INR 1.1 07/20/2020    SMOOTHMUSCAB Negative 1:40 04/30/2018    AMAIFA Negative 1:40 04/30/2018    IGGSERUM 1677 (H) 02/29/2020    ANASCREEN Negative <1:160 04/30/2018    FERRITIN 893 (H) 02/29/2020    FESATURATED 26 04/30/2018    CERULOPLSM 30.0 04/30/2018    CHOL 197 11/07/2018    TRIG 135 11/07/2018    LDLCALC 136.0 11/07/2018    HGBA1C 5.6 11/07/2018       No results found for: HEPAIGG    Hepatitis B Surface Ag   Date Value Ref Range Status   04/30/2018 Negative  Final     No results found for: HEPBCAB  No results found for: HEPBSAB  Hepatitis C Ab   Date Value Ref Range Status   04/30/2018  Negative  Final     Immunization History   Administered Date(s) Administered    Influenza - Quadrivalent - PF *Preferred* (6 months and older) 10/22/2018, 10/04/2019, 09/29/2020    Tdap 01/22/2020          DIAGNOSTIC STUDIES:  Abd US - 12/22/20  FINDINGS:  Liver: Enlarged, measuring 18.2 cm. Hepatic parenchyma is diffusely increased in echogenicity with an elevated hepato renal index of 1.4, consistent with hepatic steatosis.  No focal hepatic lesions.     Gallbladder: There are layering nonmobile gallbladder stones/sludge versus polyps.  There is no gallbladder wall thickening or pericholecystic fluid.  No sonographic Hernandez's sign.     Biliary system: The common duct is not dilated, measuring 4 mm.  No intrahepatic ductal dilatation.     Spleen: Normal in size and echotexture, measuring 12.0 x 5.6 cm.     Miscellaneous: No upper abdominal ascites.     Impression:     1. Hepatomegaly and sonographic findings suggestive of hepatic steatosis.  No focal hepatic lesion.  2. Nonmobile gallbladder stones/sludge versus polyps.  No evidence of acute cholecystitis.         EGD - 7/13/20  Findings:        Savary-Hernandez Grade I (single erosion or exudate, oval or linear,        single fold) distal reflux esophagitis with no bleeding was found.        Biopsies were taken with a cold forceps for histology. A guidewire        was placed and the scope was withdrawn. Dilation was performed with        a Savary dilator with mild resistance at 51 Fr.        The entire examined stomach was normal. Biopsies were taken with a        cold forceps for histology.        The examined duodenum was normal. Biopsies were taken with a cold        forceps for histology.   Impression:   - Savary-Hernandez Grade I reflux esophagitis.                        Biopsied. Dilated.                        - Normal stomach. Biopsied.                        - Normal examined duodenum. Biopsied.           ASSESSMENT / PLAN:  35 y.o. White male with:    1.  Cirrhosis, due to TRAYLOR, well compensated  -- MELD-Na score: 7 at 2/29/2020 10:12 AM  MELD score: 7 at 2/29/2020 10:12 AM  Calculated from:  Serum Creatinine: 0.9 mg/dL (Rounded to 1 mg/dL) at 2/29/2020 10:12 AM  Serum Sodium: 138 mmol/L (Rounded to 137 mmol/L) at 2/29/2020 10:12 AM  Total Bilirubin: 0.6 mg/dL (Rounded to 1 mg/dL) at 2/29/2020 10:12 AM  INR(ratio): 1.1 at 2/29/2020 10:12 AM  Age: 34 years 3 months   -- MELD remains <15, no indication for liver transplant at this time  -- Monitor MELD labs every 6 months  -- Continue HCC screening every 6 months with ultrasound and AFP, next due 6/2021. Check AFP now.   -- EGD for varices screening due 7/2022   -- Will check immunity markers for hepatitis A/B and arrange for vaccination if needed.    2. TRAYLOR, BMI 33  -- Commended on weight loss efforts. He has lost almost 50 lb and transaminases have normalized.   -- Maintain control of blood pressure, cholesterol, and blood sugar as these are risk factors for fatty liver    3. Confusion  -- Difficult to assess for HE given autism and developmental delay. Ammonia has been normal to minimally elevated. Lactulose recently added by PCP.         Labs (CBC, CMP, INR, AFP) and US in 6 months.  F/u with video visit a few days later.         Orders Placed This Encounter   Procedures    US Abdomen Limited    CBC Without Differential    Comprehensive Metabolic Panel    Protime-INR    AFP Tumor Marker    Hepatitis A antibody, IgG    Hepatitis B Surface Ab, Qualitative    Hepatitis B Core Antibody, Total           Thank you for allowing me to participate in the care of Hussein Armendariz, FNP-C  Hepatology

## 2020-12-24 ENCOUNTER — TELEPHONE (OUTPATIENT)
Dept: HEPATOLOGY | Facility: CLINIC | Age: 35
End: 2020-12-24

## 2020-12-24 NOTE — TELEPHONE ENCOUNTER
----- Message from Amelia Armendariz NP sent at 12/23/2020  5:05 PM CST -----  1. Please schedule labs over the next few weeks - AFP, uric acid (PCP orders), ammonia, hep A/B labs2. Labs (CBC, CMP, INR, AFP) and US in 6 months. Video visit a few days later. Thanks!

## 2020-12-24 NOTE — TELEPHONE ENCOUNTER
Messaged on my chart and left voice message for patient to return out call to schedule labs in 2 weeks or so, and then 6 month U/S with labs and video visit with Mrs. Cisneros

## 2021-01-06 ENCOUNTER — TELEPHONE (OUTPATIENT)
Dept: HEPATOLOGY | Facility: CLINIC | Age: 36
End: 2021-01-06

## 2021-01-06 ENCOUNTER — PATIENT MESSAGE (OUTPATIENT)
Dept: HEPATOLOGY | Facility: CLINIC | Age: 36
End: 2021-01-06

## 2021-01-06 ENCOUNTER — PATIENT MESSAGE (OUTPATIENT)
Dept: FAMILY MEDICINE | Facility: CLINIC | Age: 36
End: 2021-01-06

## 2021-01-07 ENCOUNTER — PATIENT MESSAGE (OUTPATIENT)
Dept: HEPATOLOGY | Facility: CLINIC | Age: 36
End: 2021-01-07

## 2021-01-08 ENCOUNTER — TELEPHONE (OUTPATIENT)
Dept: HEPATOLOGY | Facility: CLINIC | Age: 36
End: 2021-01-08

## 2021-01-08 ENCOUNTER — PATIENT MESSAGE (OUTPATIENT)
Dept: HEPATOLOGY | Facility: CLINIC | Age: 36
End: 2021-01-08

## 2021-01-12 ENCOUNTER — TELEPHONE (OUTPATIENT)
Dept: HEPATOLOGY | Facility: CLINIC | Age: 36
End: 2021-01-12

## 2021-01-13 DIAGNOSIS — R25.8 NOCTURNAL LEG MOVEMENTS: ICD-10-CM

## 2021-01-13 RX ORDER — PRAMIPEXOLE DIHYDROCHLORIDE 0.12 MG/1
0.12 TABLET ORAL 3 TIMES DAILY
Qty: 90 TABLET | Refills: 1 | Status: SHIPPED | OUTPATIENT
Start: 2021-01-13 | End: 2021-03-13

## 2021-01-19 ENCOUNTER — PATIENT MESSAGE (OUTPATIENT)
Dept: UROLOGY | Facility: CLINIC | Age: 36
End: 2021-01-19

## 2021-01-27 ENCOUNTER — TELEPHONE (OUTPATIENT)
Dept: NEUROLOGY | Facility: CLINIC | Age: 36
End: 2021-01-27

## 2021-02-02 ENCOUNTER — LAB VISIT (OUTPATIENT)
Dept: LAB | Facility: HOSPITAL | Age: 36
End: 2021-02-02
Attending: EMERGENCY MEDICINE
Payer: MEDICARE

## 2021-02-02 DIAGNOSIS — M1A.9XX0 CHRONIC GOUT WITHOUT TOPHUS, UNSPECIFIED CAUSE, UNSPECIFIED SITE: ICD-10-CM

## 2021-02-02 DIAGNOSIS — K74.60 CIRRHOSIS OF LIVER WITHOUT ASCITES, UNSPECIFIED HEPATIC CIRRHOSIS TYPE: ICD-10-CM

## 2021-02-02 LAB — URATE SERPL-MCNC: 4.5 MG/DL (ref 3.4–7)

## 2021-02-02 PROCEDURE — 86704 HEP B CORE ANTIBODY TOTAL: CPT

## 2021-02-02 PROCEDURE — 82105 ALPHA-FETOPROTEIN SERUM: CPT

## 2021-02-02 PROCEDURE — 82140 ASSAY OF AMMONIA: CPT

## 2021-02-02 PROCEDURE — 86790 VIRUS ANTIBODY NOS: CPT

## 2021-02-02 PROCEDURE — 86706 HEP B SURFACE ANTIBODY: CPT

## 2021-02-02 PROCEDURE — 84550 ASSAY OF BLOOD/URIC ACID: CPT

## 2021-02-03 LAB
AFP SERPL-MCNC: 1.5 NG/ML (ref 0–8.4)
AMMONIA PLAS-SCNC: 54 UMOL/L (ref 10–50)
HBV CORE AB SERPL QL IA: NEGATIVE
HBV SURFACE AB SER-ACNC: NEGATIVE M[IU]/ML
HEPATITIS A ANTIBODY, IGG: NEGATIVE

## 2021-02-04 ENCOUNTER — TELEPHONE (OUTPATIENT)
Dept: HEPATOLOGY | Facility: CLINIC | Age: 36
End: 2021-02-04

## 2021-02-04 DIAGNOSIS — K74.60 CIRRHOSIS OF LIVER WITHOUT ASCITES, UNSPECIFIED HEPATIC CIRRHOSIS TYPE: Primary | ICD-10-CM

## 2021-02-04 DIAGNOSIS — Z23 NEED FOR HEPATITIS A AND B VACCINATION: ICD-10-CM

## 2021-02-04 RX ORDER — LACTULOSE 10 G/15ML
SOLUTION ORAL; RECTAL
Qty: 473 ML | Refills: 2 | Status: SHIPPED | OUTPATIENT
Start: 2021-02-04 | End: 2021-04-09 | Stop reason: SDUPTHER

## 2021-02-05 DIAGNOSIS — Z23 NEED FOR HEPATITIS A AND B VACCINATION: ICD-10-CM

## 2021-02-05 DIAGNOSIS — K74.60 CIRRHOSIS OF LIVER WITHOUT ASCITES, UNSPECIFIED HEPATIC CIRRHOSIS TYPE: Primary | ICD-10-CM

## 2021-02-18 ENCOUNTER — PATIENT OUTREACH (OUTPATIENT)
Dept: ADMINISTRATIVE | Facility: OTHER | Age: 36
End: 2021-02-18

## 2021-02-18 ENCOUNTER — OFFICE VISIT (OUTPATIENT)
Dept: PSYCHIATRY | Facility: CLINIC | Age: 36
End: 2021-02-18
Payer: COMMERCIAL

## 2021-02-18 VITALS
BODY MASS INDEX: 34.7 KG/M2 | SYSTOLIC BLOOD PRESSURE: 122 MMHG | HEART RATE: 79 BPM | DIASTOLIC BLOOD PRESSURE: 78 MMHG | HEIGHT: 70 IN | WEIGHT: 242.38 LBS

## 2021-02-18 DIAGNOSIS — F42.8 OTHER OBSESSIVE-COMPULSIVE DISORDERS: ICD-10-CM

## 2021-02-18 DIAGNOSIS — F63.9 IMPULSE CONTROL DISORDER: Chronic | ICD-10-CM

## 2021-02-18 DIAGNOSIS — R62.50 DEVELOPMENTAL DELAY, MODERATE: ICD-10-CM

## 2021-02-18 DIAGNOSIS — E66.9 OBESITY (BMI 30.0-34.9): ICD-10-CM

## 2021-02-18 DIAGNOSIS — F84.0 AUTISTIC DISORDER, ACTIVE: Chronic | ICD-10-CM

## 2021-02-18 DIAGNOSIS — G47.33 OSA (OBSTRUCTIVE SLEEP APNEA): Chronic | ICD-10-CM

## 2021-02-18 DIAGNOSIS — F84.9 PERVASIVE DEVELOPMENTAL DISORDER, ACTIVE: Primary | Chronic | ICD-10-CM

## 2021-02-18 PROCEDURE — 1126F PR PAIN SEVERITY QUANTIFIED, NO PAIN PRESENT: ICD-10-PCS | Mod: S$GLB,,, | Performed by: PSYCHIATRY & NEUROLOGY

## 2021-02-18 PROCEDURE — 99214 PR OFFICE/OUTPT VISIT, EST, LEVL IV, 30-39 MIN: ICD-10-PCS | Mod: S$GLB,,, | Performed by: PSYCHIATRY & NEUROLOGY

## 2021-02-18 PROCEDURE — 99214 OFFICE O/P EST MOD 30 MIN: CPT | Mod: S$GLB,,, | Performed by: PSYCHIATRY & NEUROLOGY

## 2021-02-18 PROCEDURE — 99999 PR PBB SHADOW E&M-EST. PATIENT-LVL III: CPT | Mod: PBBFAC,,, | Performed by: PSYCHIATRY & NEUROLOGY

## 2021-02-18 PROCEDURE — 99499 RISK ADDL DX/OHS AUDIT: ICD-10-PCS | Mod: S$GLB,,, | Performed by: PSYCHIATRY & NEUROLOGY

## 2021-02-18 PROCEDURE — 99499 UNLISTED E&M SERVICE: CPT | Mod: S$GLB,,, | Performed by: PSYCHIATRY & NEUROLOGY

## 2021-02-18 PROCEDURE — 90833 PSYTX W PT W E/M 30 MIN: CPT | Mod: S$GLB,,, | Performed by: PSYCHIATRY & NEUROLOGY

## 2021-02-18 PROCEDURE — 1126F AMNT PAIN NOTED NONE PRSNT: CPT | Mod: S$GLB,,, | Performed by: PSYCHIATRY & NEUROLOGY

## 2021-02-18 PROCEDURE — 90833 PR PSYCHOTHERAPY W/PATIENT W/E&M, 30 MIN (ADD ON): ICD-10-PCS | Mod: S$GLB,,, | Performed by: PSYCHIATRY & NEUROLOGY

## 2021-02-18 PROCEDURE — 99999 PR PBB SHADOW E&M-EST. PATIENT-LVL III: ICD-10-PCS | Mod: PBBFAC,,, | Performed by: PSYCHIATRY & NEUROLOGY

## 2021-02-18 PROCEDURE — 3008F BODY MASS INDEX DOCD: CPT | Mod: CPTII,S$GLB,, | Performed by: PSYCHIATRY & NEUROLOGY

## 2021-02-18 PROCEDURE — 3008F PR BODY MASS INDEX (BMI) DOCUMENTED: ICD-10-PCS | Mod: CPTII,S$GLB,, | Performed by: PSYCHIATRY & NEUROLOGY

## 2021-02-18 RX ORDER — FLUVOXAMINE MALEATE 100 MG/1
150 TABLET, COATED ORAL 2 TIMES DAILY
Qty: 270 TABLET | Refills: 1 | Status: SHIPPED | OUTPATIENT
Start: 2021-02-18 | End: 2021-07-13

## 2021-02-22 ENCOUNTER — OFFICE VISIT (OUTPATIENT)
Dept: NEUROLOGY | Facility: CLINIC | Age: 36
End: 2021-02-22
Payer: MEDICARE

## 2021-02-22 VITALS
RESPIRATION RATE: 16 BRPM | TEMPERATURE: 98 F | SYSTOLIC BLOOD PRESSURE: 124 MMHG | DIASTOLIC BLOOD PRESSURE: 74 MMHG | BODY MASS INDEX: 33.93 KG/M2 | HEART RATE: 84 BPM | HEIGHT: 70 IN | WEIGHT: 237 LBS

## 2021-02-22 DIAGNOSIS — G40.909 SEIZURE DISORDER: Primary | ICD-10-CM

## 2021-02-22 DIAGNOSIS — K74.60 HEPATIC CIRRHOSIS, UNSPECIFIED HEPATIC CIRRHOSIS TYPE, UNSPECIFIED WHETHER ASCITES PRESENT: ICD-10-CM

## 2021-02-22 PROCEDURE — 99214 OFFICE O/P EST MOD 30 MIN: CPT | Mod: S$GLB,,, | Performed by: PSYCHIATRY & NEUROLOGY

## 2021-02-22 PROCEDURE — 3008F BODY MASS INDEX DOCD: CPT | Mod: CPTII,S$GLB,, | Performed by: PSYCHIATRY & NEUROLOGY

## 2021-02-22 PROCEDURE — 3008F PR BODY MASS INDEX (BMI) DOCUMENTED: ICD-10-PCS | Mod: CPTII,S$GLB,, | Performed by: PSYCHIATRY & NEUROLOGY

## 2021-02-22 PROCEDURE — 99499 UNLISTED E&M SERVICE: CPT | Mod: S$GLB,,, | Performed by: PSYCHIATRY & NEUROLOGY

## 2021-02-22 PROCEDURE — 1126F PR PAIN SEVERITY QUANTIFIED, NO PAIN PRESENT: ICD-10-PCS | Mod: S$GLB,,, | Performed by: PSYCHIATRY & NEUROLOGY

## 2021-02-22 PROCEDURE — 99499 RISK ADDL DX/OHS AUDIT: ICD-10-PCS | Mod: S$GLB,,, | Performed by: PSYCHIATRY & NEUROLOGY

## 2021-02-22 PROCEDURE — 99999 PR PBB SHADOW E&M-EST. PATIENT-LVL V: CPT | Mod: PBBFAC,,, | Performed by: PSYCHIATRY & NEUROLOGY

## 2021-02-22 PROCEDURE — 1126F AMNT PAIN NOTED NONE PRSNT: CPT | Mod: S$GLB,,, | Performed by: PSYCHIATRY & NEUROLOGY

## 2021-02-22 PROCEDURE — 99999 PR PBB SHADOW E&M-EST. PATIENT-LVL V: ICD-10-PCS | Mod: PBBFAC,,, | Performed by: PSYCHIATRY & NEUROLOGY

## 2021-02-22 PROCEDURE — 99214 PR OFFICE/OUTPT VISIT, EST, LEVL IV, 30-39 MIN: ICD-10-PCS | Mod: S$GLB,,, | Performed by: PSYCHIATRY & NEUROLOGY

## 2021-03-07 ENCOUNTER — PATIENT MESSAGE (OUTPATIENT)
Dept: FAMILY MEDICINE | Facility: CLINIC | Age: 36
End: 2021-03-07

## 2021-03-12 DIAGNOSIS — R25.8 NOCTURNAL LEG MOVEMENTS: ICD-10-CM

## 2021-03-13 RX ORDER — PRAMIPEXOLE DIHYDROCHLORIDE 0.12 MG/1
TABLET ORAL
Qty: 90 TABLET | Refills: 1 | Status: SHIPPED | OUTPATIENT
Start: 2021-03-13 | End: 2021-05-17

## 2021-03-16 ENCOUNTER — DOCUMENTATION ONLY (OUTPATIENT)
Dept: FAMILY MEDICINE | Facility: CLINIC | Age: 36
End: 2021-03-16

## 2021-03-18 ENCOUNTER — TELEPHONE (OUTPATIENT)
Dept: FAMILY MEDICINE | Facility: CLINIC | Age: 36
End: 2021-03-18

## 2021-03-18 PROBLEM — R78.81 BACTEREMIA: Status: ACTIVE | Noted: 2021-03-18

## 2021-03-24 ENCOUNTER — TELEPHONE (OUTPATIENT)
Dept: FAMILY MEDICINE | Facility: CLINIC | Age: 36
End: 2021-03-24

## 2021-03-29 DIAGNOSIS — M1A.9XX0 CHRONIC GOUT WITHOUT TOPHUS, UNSPECIFIED CAUSE, UNSPECIFIED SITE: ICD-10-CM

## 2021-03-31 RX ORDER — ALLOPURINOL 100 MG/1
TABLET ORAL
Qty: 180 TABLET | Refills: 2 | Status: SHIPPED | OUTPATIENT
Start: 2021-03-31 | End: 2021-11-26

## 2021-04-06 ENCOUNTER — OFFICE VISIT (OUTPATIENT)
Dept: FAMILY MEDICINE | Facility: CLINIC | Age: 36
End: 2021-04-06
Payer: MEDICARE

## 2021-04-06 ENCOUNTER — PATIENT MESSAGE (OUTPATIENT)
Dept: FAMILY MEDICINE | Facility: CLINIC | Age: 36
End: 2021-04-06

## 2021-04-06 VITALS
OXYGEN SATURATION: 97 % | HEART RATE: 73 BPM | WEIGHT: 241.38 LBS | HEIGHT: 70 IN | SYSTOLIC BLOOD PRESSURE: 140 MMHG | BODY MASS INDEX: 34.56 KG/M2 | DIASTOLIC BLOOD PRESSURE: 90 MMHG

## 2021-04-06 DIAGNOSIS — G47.33 OSA (OBSTRUCTIVE SLEEP APNEA): Chronic | ICD-10-CM

## 2021-04-06 DIAGNOSIS — K59.09 CHRONIC CONSTIPATION: Chronic | ICD-10-CM

## 2021-04-06 DIAGNOSIS — G40.909 SEIZURE DISORDER: Chronic | ICD-10-CM

## 2021-04-06 DIAGNOSIS — N31.9 NEUROGENIC BLADDER: Primary | Chronic | ICD-10-CM

## 2021-04-06 DIAGNOSIS — F84.0 AUTISTIC DISORDER, ACTIVE: Chronic | ICD-10-CM

## 2021-04-06 PROCEDURE — 99999 PR PBB SHADOW E&M-EST. PATIENT-LVL III: ICD-10-PCS | Mod: PBBFAC,,, | Performed by: EMERGENCY MEDICINE

## 2021-04-06 PROCEDURE — 99213 OFFICE O/P EST LOW 20 MIN: CPT | Mod: S$GLB,,, | Performed by: EMERGENCY MEDICINE

## 2021-04-06 PROCEDURE — 3008F BODY MASS INDEX DOCD: CPT | Mod: CPTII,S$GLB,, | Performed by: EMERGENCY MEDICINE

## 2021-04-06 PROCEDURE — 99999 PR PBB SHADOW E&M-EST. PATIENT-LVL III: CPT | Mod: PBBFAC,,, | Performed by: EMERGENCY MEDICINE

## 2021-04-06 PROCEDURE — 99213 PR OFFICE/OUTPT VISIT, EST, LEVL III, 20-29 MIN: ICD-10-PCS | Mod: S$GLB,,, | Performed by: EMERGENCY MEDICINE

## 2021-04-06 PROCEDURE — 3008F PR BODY MASS INDEX (BMI) DOCUMENTED: ICD-10-PCS | Mod: CPTII,S$GLB,, | Performed by: EMERGENCY MEDICINE

## 2021-04-07 ENCOUNTER — PATIENT MESSAGE (OUTPATIENT)
Dept: FAMILY MEDICINE | Facility: CLINIC | Age: 36
End: 2021-04-07

## 2021-04-09 RX ORDER — LACTULOSE 10 G/15ML
SOLUTION ORAL; RECTAL
Qty: 473 ML | Refills: 2 | Status: SHIPPED | OUTPATIENT
Start: 2021-04-09 | End: 2021-06-10

## 2021-04-11 PROBLEM — N39.0 UTI (URINARY TRACT INFECTION): Status: RESOLVED | Noted: 2018-04-26 | Resolved: 2021-04-11

## 2021-04-11 PROBLEM — R78.81 BACTEREMIA: Status: RESOLVED | Noted: 2021-03-18 | Resolved: 2021-04-11

## 2021-04-21 ENCOUNTER — OFFICE VISIT (OUTPATIENT)
Dept: URGENT CARE | Facility: CLINIC | Age: 36
End: 2021-04-21
Payer: MEDICARE

## 2021-04-21 ENCOUNTER — TELEPHONE (OUTPATIENT)
Dept: NEUROLOGY | Facility: CLINIC | Age: 36
End: 2021-04-21

## 2021-04-21 ENCOUNTER — OFFICE VISIT (OUTPATIENT)
Dept: OPTOMETRY | Facility: CLINIC | Age: 36
End: 2021-04-21
Payer: MEDICARE

## 2021-04-21 VITALS
OXYGEN SATURATION: 97 % | WEIGHT: 230 LBS | TEMPERATURE: 97 F | DIASTOLIC BLOOD PRESSURE: 76 MMHG | SYSTOLIC BLOOD PRESSURE: 125 MMHG | RESPIRATION RATE: 16 BRPM | HEART RATE: 77 BPM | HEIGHT: 70 IN | BODY MASS INDEX: 32.93 KG/M2

## 2021-04-21 DIAGNOSIS — H10.503 BLEPHAROCONJUNCTIVITIS OF BOTH EYES, UNSPECIFIED BLEPHAROCONJUNCTIVITIS TYPE: Primary | ICD-10-CM

## 2021-04-21 DIAGNOSIS — H11.423: Primary | ICD-10-CM

## 2021-04-21 PROCEDURE — 99214 OFFICE O/P EST MOD 30 MIN: CPT | Mod: S$GLB,,, | Performed by: FAMILY MEDICINE

## 2021-04-21 PROCEDURE — 3008F BODY MASS INDEX DOCD: CPT | Mod: CPTII,S$GLB,, | Performed by: FAMILY MEDICINE

## 2021-04-21 PROCEDURE — 99214 PR OFFICE/OUTPT VISIT, EST, LEVL IV, 30-39 MIN: ICD-10-PCS | Mod: S$GLB,,, | Performed by: FAMILY MEDICINE

## 2021-04-21 PROCEDURE — 99999 PR PBB SHADOW E&M-EST. PATIENT-LVL II: ICD-10-PCS | Mod: PBBFAC,,, | Performed by: OPTOMETRIST

## 2021-04-21 PROCEDURE — 3008F PR BODY MASS INDEX (BMI) DOCUMENTED: ICD-10-PCS | Mod: CPTII,S$GLB,, | Performed by: FAMILY MEDICINE

## 2021-04-21 PROCEDURE — 92012 PR EYE EXAM, EST PATIENT,INTERMED: ICD-10-PCS | Mod: S$GLB,,, | Performed by: OPTOMETRIST

## 2021-04-21 PROCEDURE — 92012 INTRM OPH EXAM EST PATIENT: CPT | Mod: S$GLB,,, | Performed by: OPTOMETRIST

## 2021-04-21 PROCEDURE — 99999 PR PBB SHADOW E&M-EST. PATIENT-LVL II: CPT | Mod: PBBFAC,,, | Performed by: OPTOMETRIST

## 2021-04-21 RX ORDER — NEOMYCIN SULFATE, POLYMYXIN B SULFATE AND DEXAMETHASONE 3.5; 10000; 1 MG/ML; [USP'U]/ML; MG/ML
1 SUSPENSION/ DROPS OPHTHALMIC 4 TIMES DAILY
Qty: 5 ML | Refills: 0 | Status: SHIPPED | OUTPATIENT
Start: 2021-04-21 | End: 2021-04-26

## 2021-04-21 RX ORDER — OLOPATADINE HYDROCHLORIDE 2 MG/ML
1 SOLUTION/ DROPS OPHTHALMIC DAILY
Qty: 5 ML | Refills: 0 | Status: SHIPPED | OUTPATIENT
Start: 2021-04-21 | End: 2021-06-11

## 2021-04-29 ENCOUNTER — LAB VISIT (OUTPATIENT)
Dept: LAB | Facility: HOSPITAL | Age: 36
End: 2021-04-29
Attending: PSYCHIATRY & NEUROLOGY
Payer: MEDICARE

## 2021-04-29 DIAGNOSIS — K74.60 HEPATIC CIRRHOSIS, UNSPECIFIED HEPATIC CIRRHOSIS TYPE, UNSPECIFIED WHETHER ASCITES PRESENT: ICD-10-CM

## 2021-04-29 DIAGNOSIS — G40.909 SEIZURE DISORDER: ICD-10-CM

## 2021-04-29 LAB
BASOPHILS # BLD AUTO: 0.05 K/UL (ref 0–0.2)
BASOPHILS NFR BLD: 0.7 % (ref 0–1.9)
DIFFERENTIAL METHOD: ABNORMAL
EOSINOPHIL # BLD AUTO: 0.1 K/UL (ref 0–0.5)
EOSINOPHIL NFR BLD: 1.6 % (ref 0–8)
ERYTHROCYTE [DISTWIDTH] IN BLOOD BY AUTOMATED COUNT: 13.4 % (ref 11.5–14.5)
HCT VFR BLD AUTO: 46.4 % (ref 40–54)
HGB BLD-MCNC: 15.8 G/DL (ref 14–18)
IMM GRANULOCYTES # BLD AUTO: 0.03 K/UL (ref 0–0.04)
IMM GRANULOCYTES NFR BLD AUTO: 0.4 % (ref 0–0.5)
LYMPHOCYTES # BLD AUTO: 2.3 K/UL (ref 1–4.8)
LYMPHOCYTES NFR BLD: 33.7 % (ref 18–48)
MCH RBC QN AUTO: 32.2 PG (ref 27–31)
MCHC RBC AUTO-ENTMCNC: 34.1 G/DL (ref 32–36)
MCV RBC AUTO: 95 FL (ref 82–98)
MONOCYTES # BLD AUTO: 0.6 K/UL (ref 0.3–1)
MONOCYTES NFR BLD: 8.3 % (ref 4–15)
NEUTROPHILS # BLD AUTO: 3.8 K/UL (ref 1.8–7.7)
NEUTROPHILS NFR BLD: 55.3 % (ref 38–73)
NRBC BLD-RTO: 0 /100 WBC
PLATELET # BLD AUTO: 193 K/UL (ref 150–450)
PMV BLD AUTO: 10.4 FL (ref 9.2–12.9)
RBC # BLD AUTO: 4.91 M/UL (ref 4.6–6.2)
WBC # BLD AUTO: 6.89 K/UL (ref 3.9–12.7)

## 2021-04-29 PROCEDURE — 36415 COLL VENOUS BLD VENIPUNCTURE: CPT | Mod: PO | Performed by: PSYCHIATRY & NEUROLOGY

## 2021-04-29 PROCEDURE — 80183 DRUG SCRN QUANT OXCARBAZEPIN: CPT | Performed by: PSYCHIATRY & NEUROLOGY

## 2021-04-29 PROCEDURE — 85025 COMPLETE CBC W/AUTO DIFF WBC: CPT | Performed by: PSYCHIATRY & NEUROLOGY

## 2021-05-01 LAB — OXCARBAZEPINE METABOLITE: 8 MCG/ML (ref 10–35)

## 2021-05-14 DIAGNOSIS — R13.10 DYSPHAGIA, UNSPECIFIED TYPE: ICD-10-CM

## 2021-05-14 DIAGNOSIS — R25.8 NOCTURNAL LEG MOVEMENTS: ICD-10-CM

## 2021-05-17 RX ORDER — PANTOPRAZOLE SODIUM 40 MG/1
TABLET, DELAYED RELEASE ORAL
Qty: 90 TABLET | Refills: 3 | Status: SHIPPED | OUTPATIENT
Start: 2021-05-17 | End: 2022-05-04 | Stop reason: SDUPTHER

## 2021-05-17 RX ORDER — PRAMIPEXOLE DIHYDROCHLORIDE 0.12 MG/1
TABLET ORAL
Qty: 90 TABLET | Refills: 5 | Status: SHIPPED | OUTPATIENT
Start: 2021-05-17 | End: 2021-11-08

## 2021-05-20 ENCOUNTER — OFFICE VISIT (OUTPATIENT)
Dept: PSYCHIATRY | Facility: CLINIC | Age: 36
End: 2021-05-20
Payer: COMMERCIAL

## 2021-05-20 VITALS
BODY MASS INDEX: 34.53 KG/M2 | WEIGHT: 241.19 LBS | HEART RATE: 66 BPM | DIASTOLIC BLOOD PRESSURE: 76 MMHG | HEIGHT: 70 IN | SYSTOLIC BLOOD PRESSURE: 123 MMHG

## 2021-05-20 DIAGNOSIS — F63.9 IMPULSE CONTROL DISORDER: Primary | Chronic | ICD-10-CM

## 2021-05-20 DIAGNOSIS — R62.50 DEVELOPMENTAL DELAY, MODERATE: ICD-10-CM

## 2021-05-20 DIAGNOSIS — G40.909 SEIZURE DISORDER: Chronic | ICD-10-CM

## 2021-05-20 DIAGNOSIS — G47.33 OSA (OBSTRUCTIVE SLEEP APNEA): Chronic | ICD-10-CM

## 2021-05-20 DIAGNOSIS — F42.2 MIXED OBSESSIONAL THOUGHTS AND ACTS: ICD-10-CM

## 2021-05-20 DIAGNOSIS — E66.9 OBESITY (BMI 30.0-34.9): ICD-10-CM

## 2021-05-20 DIAGNOSIS — F84.9 PERVASIVE DEVELOPMENTAL DISORDER, ACTIVE: Chronic | ICD-10-CM

## 2021-05-20 DIAGNOSIS — F84.0 AUTISTIC DISORDER, ACTIVE: Chronic | ICD-10-CM

## 2021-05-20 PROCEDURE — 99499 RISK ADDL DX/OHS AUDIT: ICD-10-PCS | Mod: S$GLB,,, | Performed by: PSYCHIATRY & NEUROLOGY

## 2021-05-20 PROCEDURE — 99214 OFFICE O/P EST MOD 30 MIN: CPT | Mod: S$GLB,,, | Performed by: PSYCHIATRY & NEUROLOGY

## 2021-05-20 PROCEDURE — 99214 PR OFFICE/OUTPT VISIT, EST, LEVL IV, 30-39 MIN: ICD-10-PCS | Mod: S$GLB,,, | Performed by: PSYCHIATRY & NEUROLOGY

## 2021-05-20 PROCEDURE — 99499 UNLISTED E&M SERVICE: CPT | Mod: S$GLB,,, | Performed by: PSYCHIATRY & NEUROLOGY

## 2021-05-20 PROCEDURE — 1126F AMNT PAIN NOTED NONE PRSNT: CPT | Mod: S$GLB,,, | Performed by: PSYCHIATRY & NEUROLOGY

## 2021-05-20 PROCEDURE — 1126F PR PAIN SEVERITY QUANTIFIED, NO PAIN PRESENT: ICD-10-PCS | Mod: S$GLB,,, | Performed by: PSYCHIATRY & NEUROLOGY

## 2021-05-20 PROCEDURE — 3008F PR BODY MASS INDEX (BMI) DOCUMENTED: ICD-10-PCS | Mod: CPTII,S$GLB,, | Performed by: PSYCHIATRY & NEUROLOGY

## 2021-05-20 PROCEDURE — 99999 PR PBB SHADOW E&M-EST. PATIENT-LVL II: CPT | Mod: PBBFAC,,, | Performed by: PSYCHIATRY & NEUROLOGY

## 2021-05-20 PROCEDURE — 90833 PR PSYCHOTHERAPY W/PATIENT W/E&M, 30 MIN (ADD ON): ICD-10-PCS | Mod: S$GLB,,, | Performed by: PSYCHIATRY & NEUROLOGY

## 2021-05-20 PROCEDURE — 3008F BODY MASS INDEX DOCD: CPT | Mod: CPTII,S$GLB,, | Performed by: PSYCHIATRY & NEUROLOGY

## 2021-05-20 PROCEDURE — 90833 PSYTX W PT W E/M 30 MIN: CPT | Mod: S$GLB,,, | Performed by: PSYCHIATRY & NEUROLOGY

## 2021-05-20 PROCEDURE — 99999 PR PBB SHADOW E&M-EST. PATIENT-LVL II: ICD-10-PCS | Mod: PBBFAC,,, | Performed by: PSYCHIATRY & NEUROLOGY

## 2021-05-31 ENCOUNTER — PATIENT MESSAGE (OUTPATIENT)
Dept: HEPATOLOGY | Facility: CLINIC | Age: 36
End: 2021-05-31

## 2021-06-03 ENCOUNTER — HOSPITAL ENCOUNTER (OUTPATIENT)
Dept: RADIOLOGY | Facility: HOSPITAL | Age: 36
Discharge: HOME | End: 2021-06-03
Attending: NURSE PRACTITIONER
Payer: MEDICARE

## 2021-06-03 DIAGNOSIS — K74.60 CIRRHOSIS OF LIVER WITHOUT ASCITES, UNSPECIFIED HEPATIC CIRRHOSIS TYPE: ICD-10-CM

## 2021-06-03 PROCEDURE — 76705 ECHO EXAM OF ABDOMEN: CPT | Mod: TC,PO

## 2021-06-03 PROCEDURE — 76705 ECHO EXAM OF ABDOMEN: CPT | Mod: 26,,, | Performed by: RADIOLOGY

## 2021-06-03 PROCEDURE — 76705 US ABDOMEN LIMITED: ICD-10-PCS | Mod: 26,,, | Performed by: RADIOLOGY

## 2021-06-10 ENCOUNTER — PATIENT MESSAGE (OUTPATIENT)
Dept: HEPATOLOGY | Facility: CLINIC | Age: 36
End: 2021-06-10

## 2021-06-10 ENCOUNTER — OFFICE VISIT (OUTPATIENT)
Dept: HEPATOLOGY | Facility: CLINIC | Age: 36
End: 2021-06-10
Payer: MEDICARE

## 2021-06-10 ENCOUNTER — PATIENT MESSAGE (OUTPATIENT)
Dept: FAMILY MEDICINE | Facility: CLINIC | Age: 36
End: 2021-06-10

## 2021-06-10 DIAGNOSIS — E66.9 OBESITY (BMI 30.0-34.9): ICD-10-CM

## 2021-06-10 DIAGNOSIS — K74.60 CIRRHOSIS OF LIVER WITHOUT ASCITES, UNSPECIFIED HEPATIC CIRRHOSIS TYPE: Primary | ICD-10-CM

## 2021-06-10 DIAGNOSIS — K75.81 NASH (NONALCOHOLIC STEATOHEPATITIS): ICD-10-CM

## 2021-06-10 PROCEDURE — 99214 OFFICE O/P EST MOD 30 MIN: CPT | Mod: 95,,, | Performed by: NURSE PRACTITIONER

## 2021-06-10 PROCEDURE — 99499 RISK ADDL DX/OHS AUDIT: ICD-10-PCS | Mod: 95,,, | Performed by: NURSE PRACTITIONER

## 2021-06-10 PROCEDURE — 99214 PR OFFICE/OUTPT VISIT, EST, LEVL IV, 30-39 MIN: ICD-10-PCS | Mod: 95,,, | Performed by: NURSE PRACTITIONER

## 2021-06-10 PROCEDURE — 99499 UNLISTED E&M SERVICE: CPT | Mod: 95,,, | Performed by: NURSE PRACTITIONER

## 2021-06-10 RX ORDER — LACTULOSE 10 G/15ML
SOLUTION ORAL; RECTAL
Qty: 473 ML | Refills: 2 | Status: SHIPPED | OUTPATIENT
Start: 2021-06-10 | End: 2021-07-16

## 2021-06-11 ENCOUNTER — TELEPHONE (OUTPATIENT)
Dept: HEPATOLOGY | Facility: CLINIC | Age: 36
End: 2021-06-11

## 2021-07-16 RX ORDER — LACTULOSE 10 G/15ML
SOLUTION ORAL; RECTAL
Qty: 473 ML | Refills: 2 | Status: SHIPPED | OUTPATIENT
Start: 2021-07-16 | End: 2021-09-11

## 2021-07-28 ENCOUNTER — PATIENT OUTREACH (OUTPATIENT)
Dept: ADMINISTRATIVE | Facility: OTHER | Age: 36
End: 2021-07-28

## 2021-08-02 ENCOUNTER — OFFICE VISIT (OUTPATIENT)
Dept: DERMATOLOGY | Facility: CLINIC | Age: 36
End: 2021-08-02
Payer: MEDICARE

## 2021-08-02 DIAGNOSIS — L29.9 PRURITUS: ICD-10-CM

## 2021-08-02 DIAGNOSIS — I78.1 TELANGIECTASIAS: Primary | ICD-10-CM

## 2021-08-02 PROCEDURE — 1160F RVW MEDS BY RX/DR IN RCRD: CPT | Mod: CPTII,S$GLB,, | Performed by: STUDENT IN AN ORGANIZED HEALTH CARE EDUCATION/TRAINING PROGRAM

## 2021-08-02 PROCEDURE — 99999 PR PBB SHADOW E&M-EST. PATIENT-LVL III: ICD-10-PCS | Mod: PBBFAC,,, | Performed by: STUDENT IN AN ORGANIZED HEALTH CARE EDUCATION/TRAINING PROGRAM

## 2021-08-02 PROCEDURE — 99999 PR PBB SHADOW E&M-EST. PATIENT-LVL III: CPT | Mod: PBBFAC,,, | Performed by: STUDENT IN AN ORGANIZED HEALTH CARE EDUCATION/TRAINING PROGRAM

## 2021-08-02 PROCEDURE — 1126F PR PAIN SEVERITY QUANTIFIED, NO PAIN PRESENT: ICD-10-PCS | Mod: CPTII,S$GLB,, | Performed by: STUDENT IN AN ORGANIZED HEALTH CARE EDUCATION/TRAINING PROGRAM

## 2021-08-02 PROCEDURE — 99203 OFFICE O/P NEW LOW 30 MIN: CPT | Mod: S$GLB,,, | Performed by: STUDENT IN AN ORGANIZED HEALTH CARE EDUCATION/TRAINING PROGRAM

## 2021-08-02 PROCEDURE — 99203 PR OFFICE/OUTPT VISIT, NEW, LEVL III, 30-44 MIN: ICD-10-PCS | Mod: S$GLB,,, | Performed by: STUDENT IN AN ORGANIZED HEALTH CARE EDUCATION/TRAINING PROGRAM

## 2021-08-02 PROCEDURE — 1126F AMNT PAIN NOTED NONE PRSNT: CPT | Mod: CPTII,S$GLB,, | Performed by: STUDENT IN AN ORGANIZED HEALTH CARE EDUCATION/TRAINING PROGRAM

## 2021-08-02 PROCEDURE — 1159F PR MEDICATION LIST DOCUMENTED IN MEDICAL RECORD: ICD-10-PCS | Mod: CPTII,S$GLB,, | Performed by: STUDENT IN AN ORGANIZED HEALTH CARE EDUCATION/TRAINING PROGRAM

## 2021-08-02 PROCEDURE — 1159F MED LIST DOCD IN RCRD: CPT | Mod: CPTII,S$GLB,, | Performed by: STUDENT IN AN ORGANIZED HEALTH CARE EDUCATION/TRAINING PROGRAM

## 2021-08-02 PROCEDURE — 1160F PR REVIEW ALL MEDS BY PRESCRIBER/CLIN PHARMACIST DOCUMENTED: ICD-10-PCS | Mod: CPTII,S$GLB,, | Performed by: STUDENT IN AN ORGANIZED HEALTH CARE EDUCATION/TRAINING PROGRAM

## 2021-08-09 ENCOUNTER — OFFICE VISIT (OUTPATIENT)
Dept: FAMILY MEDICINE | Facility: CLINIC | Age: 36
End: 2021-08-09
Payer: MEDICARE

## 2021-08-09 VITALS
HEIGHT: 70 IN | HEART RATE: 65 BPM | BODY MASS INDEX: 35.24 KG/M2 | WEIGHT: 246.13 LBS | OXYGEN SATURATION: 97 % | DIASTOLIC BLOOD PRESSURE: 80 MMHG | SYSTOLIC BLOOD PRESSURE: 132 MMHG

## 2021-08-09 DIAGNOSIS — K59.09 CHRONIC CONSTIPATION: Chronic | ICD-10-CM

## 2021-08-09 DIAGNOSIS — K75.81 NASH (NONALCOHOLIC STEATOHEPATITIS): ICD-10-CM

## 2021-08-09 DIAGNOSIS — G47.33 OSA (OBSTRUCTIVE SLEEP APNEA): Chronic | ICD-10-CM

## 2021-08-09 DIAGNOSIS — G40.909 SEIZURE DISORDER: Chronic | ICD-10-CM

## 2021-08-09 DIAGNOSIS — M1A.9XX0 CHRONIC GOUT WITHOUT TOPHUS, UNSPECIFIED CAUSE, UNSPECIFIED SITE: Chronic | ICD-10-CM

## 2021-08-09 DIAGNOSIS — N31.9 NEUROGENIC BLADDER: Chronic | ICD-10-CM

## 2021-08-09 DIAGNOSIS — F84.0 AUTISTIC DISORDER, ACTIVE: Primary | Chronic | ICD-10-CM

## 2021-08-09 PROCEDURE — 1126F PR PAIN SEVERITY QUANTIFIED, NO PAIN PRESENT: ICD-10-PCS | Mod: CPTII,S$GLB,, | Performed by: EMERGENCY MEDICINE

## 2021-08-09 PROCEDURE — 99214 OFFICE O/P EST MOD 30 MIN: CPT | Mod: S$GLB,,, | Performed by: EMERGENCY MEDICINE

## 2021-08-09 PROCEDURE — 3008F PR BODY MASS INDEX (BMI) DOCUMENTED: ICD-10-PCS | Mod: CPTII,S$GLB,, | Performed by: EMERGENCY MEDICINE

## 2021-08-09 PROCEDURE — 99499 RISK ADDL DX/OHS AUDIT: ICD-10-PCS | Mod: S$GLB,,, | Performed by: EMERGENCY MEDICINE

## 2021-08-09 PROCEDURE — 1159F MED LIST DOCD IN RCRD: CPT | Mod: CPTII,S$GLB,, | Performed by: EMERGENCY MEDICINE

## 2021-08-09 PROCEDURE — 99499 UNLISTED E&M SERVICE: CPT | Mod: S$GLB,,, | Performed by: EMERGENCY MEDICINE

## 2021-08-09 PROCEDURE — 1159F PR MEDICATION LIST DOCUMENTED IN MEDICAL RECORD: ICD-10-PCS | Mod: CPTII,S$GLB,, | Performed by: EMERGENCY MEDICINE

## 2021-08-09 PROCEDURE — 3075F PR MOST RECENT SYSTOLIC BLOOD PRESS GE 130-139MM HG: ICD-10-PCS | Mod: CPTII,S$GLB,, | Performed by: EMERGENCY MEDICINE

## 2021-08-09 PROCEDURE — 99214 PR OFFICE/OUTPT VISIT, EST, LEVL IV, 30-39 MIN: ICD-10-PCS | Mod: S$GLB,,, | Performed by: EMERGENCY MEDICINE

## 2021-08-09 PROCEDURE — 3079F DIAST BP 80-89 MM HG: CPT | Mod: CPTII,S$GLB,, | Performed by: EMERGENCY MEDICINE

## 2021-08-09 PROCEDURE — 99999 PR PBB SHADOW E&M-EST. PATIENT-LVL IV: ICD-10-PCS | Mod: PBBFAC,,, | Performed by: EMERGENCY MEDICINE

## 2021-08-09 PROCEDURE — 3079F PR MOST RECENT DIASTOLIC BLOOD PRESSURE 80-89 MM HG: ICD-10-PCS | Mod: CPTII,S$GLB,, | Performed by: EMERGENCY MEDICINE

## 2021-08-09 PROCEDURE — 1126F AMNT PAIN NOTED NONE PRSNT: CPT | Mod: CPTII,S$GLB,, | Performed by: EMERGENCY MEDICINE

## 2021-08-09 PROCEDURE — 99999 PR PBB SHADOW E&M-EST. PATIENT-LVL IV: CPT | Mod: PBBFAC,,, | Performed by: EMERGENCY MEDICINE

## 2021-08-09 PROCEDURE — 3008F BODY MASS INDEX DOCD: CPT | Mod: CPTII,S$GLB,, | Performed by: EMERGENCY MEDICINE

## 2021-08-09 PROCEDURE — 3075F SYST BP GE 130 - 139MM HG: CPT | Mod: CPTII,S$GLB,, | Performed by: EMERGENCY MEDICINE

## 2021-08-09 RX ORDER — ZINC GLUCONATE 50 MG
50 TABLET ORAL DAILY
COMMUNITY

## 2021-08-09 RX ORDER — SULFAMETHOXAZOLE AND TRIMETHOPRIM 800; 160 MG/1; MG/1
1 TABLET ORAL EVERY 12 HOURS
COMMUNITY
Start: 2021-05-21 | End: 2021-08-27

## 2021-08-09 RX ORDER — ASCORBIC ACID 250 MG
TABLET,CHEWABLE ORAL DAILY
COMMUNITY

## 2021-08-16 ENCOUNTER — OFFICE VISIT (OUTPATIENT)
Dept: PSYCHIATRY | Facility: CLINIC | Age: 36
End: 2021-08-16
Payer: COMMERCIAL

## 2021-08-16 DIAGNOSIS — F42.2 MIXED OBSESSIONAL THOUGHTS AND ACTS: ICD-10-CM

## 2021-08-16 DIAGNOSIS — K59.09 CHRONIC CONSTIPATION: Chronic | ICD-10-CM

## 2021-08-16 DIAGNOSIS — F84.0 AUTISTIC DISORDER, ACTIVE: Chronic | ICD-10-CM

## 2021-08-16 DIAGNOSIS — R62.50 DEVELOPMENTAL DELAY, MODERATE: ICD-10-CM

## 2021-08-16 DIAGNOSIS — F84.9 PERVASIVE DEVELOPMENTAL DISORDER, ACTIVE: Primary | Chronic | ICD-10-CM

## 2021-08-16 DIAGNOSIS — F63.9 IMPULSE CONTROL DISORDER: Chronic | ICD-10-CM

## 2021-08-16 DIAGNOSIS — G47.33 OSA (OBSTRUCTIVE SLEEP APNEA): Chronic | ICD-10-CM

## 2021-08-16 PROCEDURE — 1159F MED LIST DOCD IN RCRD: CPT | Mod: CPTII,95,, | Performed by: PSYCHIATRY & NEUROLOGY

## 2021-08-16 PROCEDURE — 99214 OFFICE O/P EST MOD 30 MIN: CPT | Mod: 95,,, | Performed by: PSYCHIATRY & NEUROLOGY

## 2021-08-16 PROCEDURE — 1160F RVW MEDS BY RX/DR IN RCRD: CPT | Mod: CPTII,95,, | Performed by: PSYCHIATRY & NEUROLOGY

## 2021-08-16 PROCEDURE — 99214 PR OFFICE/OUTPT VISIT, EST, LEVL IV, 30-39 MIN: ICD-10-PCS | Mod: 95,,, | Performed by: PSYCHIATRY & NEUROLOGY

## 2021-08-16 PROCEDURE — 99499 UNLISTED E&M SERVICE: CPT | Mod: 95,,, | Performed by: PSYCHIATRY & NEUROLOGY

## 2021-08-16 PROCEDURE — 90833 PSYTX W PT W E/M 30 MIN: CPT | Mod: 95,,, | Performed by: PSYCHIATRY & NEUROLOGY

## 2021-08-16 PROCEDURE — 1160F PR REVIEW ALL MEDS BY PRESCRIBER/CLIN PHARMACIST DOCUMENTED: ICD-10-PCS | Mod: CPTII,95,, | Performed by: PSYCHIATRY & NEUROLOGY

## 2021-08-16 PROCEDURE — 90833 PR PSYCHOTHERAPY W/PATIENT W/E&M, 30 MIN (ADD ON): ICD-10-PCS | Mod: 95,,, | Performed by: PSYCHIATRY & NEUROLOGY

## 2021-08-16 PROCEDURE — 99499 RISK ADDL DX/OHS AUDIT: ICD-10-PCS | Mod: 95,,, | Performed by: PSYCHIATRY & NEUROLOGY

## 2021-08-16 PROCEDURE — 1159F PR MEDICATION LIST DOCUMENTED IN MEDICAL RECORD: ICD-10-PCS | Mod: CPTII,95,, | Performed by: PSYCHIATRY & NEUROLOGY

## 2021-09-01 ENCOUNTER — TELEPHONE (OUTPATIENT)
Dept: UROLOGY | Facility: CLINIC | Age: 36
End: 2021-09-01

## 2021-09-02 ENCOUNTER — TELEPHONE (OUTPATIENT)
Dept: FAMILY MEDICINE | Facility: CLINIC | Age: 36
End: 2021-09-02

## 2021-09-08 ENCOUNTER — PATIENT MESSAGE (OUTPATIENT)
Dept: PSYCHIATRY | Facility: CLINIC | Age: 36
End: 2021-09-08

## 2021-09-11 RX ORDER — LACTULOSE 10 G/15ML
SOLUTION ORAL; RECTAL
Qty: 473 ML | Refills: 2 | Status: SHIPPED | OUTPATIENT
Start: 2021-09-11 | End: 2021-11-29 | Stop reason: SDUPTHER

## 2021-09-15 NOTE — PLAN OF CARE
Situation:  Writer encountered and visited briefly with Patient while rounding the infusion clinic. Assessment:  Patient acknowledged writer. She shared how she was feeling. She declined talking further about it. She asked writer to save her a certain lunch option. She was receptive to a copy of \"Guideposts. \"    Intervention:  Writer greeted Patient and inquired about how she was doing; offered words of support and encouragement; gave Pt a copy of \"Guideposts. \"    Outcome:  Ms. Cassi Walton thanked Hernandez Junito. She declined a visit at this time. Plan:  Writer will give Pt a lunch tray. Writer will attempt to visit with Patient at another time and is available for follow up support as needed. 09/15/21 1141   Encounter Summary   Services provided to: Patient   Referral/Consult From: [address] Family members; Children   Continue Visiting   (9/15/21)   Complexity of Encounter Low   Length of Encounter 15 minutes   Routine   Type Follow up   Assessment Calm; Approachable   Intervention Active listening;Explored feelings, thoughts, concerns;Explored coping resources;Sustaining presence/ Ministry of presence   Outcome Expressed gratitude;Coping     Electronically signed by Emeterio Matthews, Oncology [facility], [address] [facility]  9/15/2021  11:43 AM vss tolerated procedure well

## 2021-10-05 ENCOUNTER — TELEPHONE (OUTPATIENT)
Dept: FAMILY MEDICINE | Facility: CLINIC | Age: 36
End: 2021-10-05

## 2021-10-05 DIAGNOSIS — L08.9 SKIN INFECTION: Primary | ICD-10-CM

## 2021-10-05 RX ORDER — MUPIROCIN CALCIUM 20 MG/G
CREAM TOPICAL 3 TIMES DAILY
Qty: 15 G | Refills: 1 | Status: SHIPPED | OUTPATIENT
Start: 2021-10-05 | End: 2021-11-02

## 2021-11-01 DIAGNOSIS — L08.9 SKIN INFECTION: ICD-10-CM

## 2021-11-02 RX ORDER — MUPIROCIN CALCIUM 20 MG/G
CREAM TOPICAL 3 TIMES DAILY
Qty: 15 G | Refills: 1 | Status: SHIPPED | OUTPATIENT
Start: 2021-11-02 | End: 2023-02-02 | Stop reason: SDUPTHER

## 2021-11-05 ENCOUNTER — PATIENT MESSAGE (OUTPATIENT)
Dept: FAMILY MEDICINE | Facility: CLINIC | Age: 36
End: 2021-11-05
Payer: MEDICARE

## 2021-11-05 ENCOUNTER — PATIENT MESSAGE (OUTPATIENT)
Dept: NEUROLOGY | Facility: CLINIC | Age: 36
End: 2021-11-05
Payer: MEDICARE

## 2021-11-05 ENCOUNTER — PATIENT MESSAGE (OUTPATIENT)
Dept: HEPATOLOGY | Facility: CLINIC | Age: 36
End: 2021-11-05
Payer: MEDICARE

## 2021-11-08 DIAGNOSIS — R25.8 NOCTURNAL LEG MOVEMENTS: ICD-10-CM

## 2021-11-08 RX ORDER — PRAMIPEXOLE DIHYDROCHLORIDE 0.12 MG/1
TABLET ORAL
Qty: 90 TABLET | Refills: 5 | Status: SHIPPED | OUTPATIENT
Start: 2021-11-08 | End: 2022-04-12

## 2021-11-10 ENCOUNTER — OFFICE VISIT (OUTPATIENT)
Dept: NEUROLOGY | Facility: CLINIC | Age: 36
End: 2021-11-10
Payer: MEDICARE

## 2021-11-10 VITALS
WEIGHT: 241.19 LBS | HEART RATE: 72 BPM | DIASTOLIC BLOOD PRESSURE: 65 MMHG | SYSTOLIC BLOOD PRESSURE: 117 MMHG | TEMPERATURE: 97 F | RESPIRATION RATE: 18 BRPM | BODY MASS INDEX: 34.61 KG/M2

## 2021-11-10 DIAGNOSIS — Z79.899 HIGH RISK MEDICATION USE: ICD-10-CM

## 2021-11-10 DIAGNOSIS — G40.909 SEIZURE DISORDER: Primary | ICD-10-CM

## 2021-11-10 DIAGNOSIS — K74.60 HEPATIC CIRRHOSIS, UNSPECIFIED HEPATIC CIRRHOSIS TYPE, UNSPECIFIED WHETHER ASCITES PRESENT: ICD-10-CM

## 2021-11-10 PROCEDURE — 99499 UNLISTED E&M SERVICE: CPT | Mod: S$GLB,,, | Performed by: PSYCHIATRY & NEUROLOGY

## 2021-11-10 PROCEDURE — 3008F PR BODY MASS INDEX (BMI) DOCUMENTED: ICD-10-PCS | Mod: CPTII,S$GLB,, | Performed by: PSYCHIATRY & NEUROLOGY

## 2021-11-10 PROCEDURE — 3074F PR MOST RECENT SYSTOLIC BLOOD PRESSURE < 130 MM HG: ICD-10-PCS | Mod: CPTII,S$GLB,, | Performed by: PSYCHIATRY & NEUROLOGY

## 2021-11-10 PROCEDURE — 3078F PR MOST RECENT DIASTOLIC BLOOD PRESSURE < 80 MM HG: ICD-10-PCS | Mod: CPTII,S$GLB,, | Performed by: PSYCHIATRY & NEUROLOGY

## 2021-11-10 PROCEDURE — 1159F PR MEDICATION LIST DOCUMENTED IN MEDICAL RECORD: ICD-10-PCS | Mod: CPTII,S$GLB,, | Performed by: PSYCHIATRY & NEUROLOGY

## 2021-11-10 PROCEDURE — 3074F SYST BP LT 130 MM HG: CPT | Mod: CPTII,S$GLB,, | Performed by: PSYCHIATRY & NEUROLOGY

## 2021-11-10 PROCEDURE — 99999 PR PBB SHADOW E&M-EST. PATIENT-LVL V: ICD-10-PCS | Mod: PBBFAC,,, | Performed by: PSYCHIATRY & NEUROLOGY

## 2021-11-10 PROCEDURE — 3008F BODY MASS INDEX DOCD: CPT | Mod: CPTII,S$GLB,, | Performed by: PSYCHIATRY & NEUROLOGY

## 2021-11-10 PROCEDURE — 99999 PR PBB SHADOW E&M-EST. PATIENT-LVL V: CPT | Mod: PBBFAC,,, | Performed by: PSYCHIATRY & NEUROLOGY

## 2021-11-10 PROCEDURE — 1159F MED LIST DOCD IN RCRD: CPT | Mod: CPTII,S$GLB,, | Performed by: PSYCHIATRY & NEUROLOGY

## 2021-11-10 PROCEDURE — 3078F DIAST BP <80 MM HG: CPT | Mod: CPTII,S$GLB,, | Performed by: PSYCHIATRY & NEUROLOGY

## 2021-11-10 PROCEDURE — 99499 RISK ADDL DX/OHS AUDIT: ICD-10-PCS | Mod: S$GLB,,, | Performed by: PSYCHIATRY & NEUROLOGY

## 2021-11-10 PROCEDURE — 99214 OFFICE O/P EST MOD 30 MIN: CPT | Mod: S$GLB,,, | Performed by: PSYCHIATRY & NEUROLOGY

## 2021-11-10 PROCEDURE — 1160F RVW MEDS BY RX/DR IN RCRD: CPT | Mod: CPTII,S$GLB,, | Performed by: PSYCHIATRY & NEUROLOGY

## 2021-11-10 PROCEDURE — 99214 PR OFFICE/OUTPT VISIT, EST, LEVL IV, 30-39 MIN: ICD-10-PCS | Mod: S$GLB,,, | Performed by: PSYCHIATRY & NEUROLOGY

## 2021-11-10 PROCEDURE — 1160F PR REVIEW ALL MEDS BY PRESCRIBER/CLIN PHARMACIST DOCUMENTED: ICD-10-PCS | Mod: CPTII,S$GLB,, | Performed by: PSYCHIATRY & NEUROLOGY

## 2021-11-10 RX ORDER — SULFAMETHOXAZOLE AND TRIMETHOPRIM 400; 80 MG/1; MG/1
1 TABLET ORAL DAILY
COMMUNITY
Start: 2021-11-04 | End: 2021-12-07 | Stop reason: ALTCHOICE

## 2021-11-10 RX ORDER — CEFDINIR 300 MG/1
300 CAPSULE ORAL EVERY 12 HOURS
COMMUNITY
Start: 2021-11-03 | End: 2021-12-07 | Stop reason: ALTCHOICE

## 2021-11-10 RX ORDER — CIPROFLOXACIN 500 MG/1
500 TABLET ORAL 2 TIMES DAILY
COMMUNITY
Start: 2021-11-05 | End: 2021-12-07 | Stop reason: ALTCHOICE

## 2021-11-16 ENCOUNTER — OFFICE VISIT (OUTPATIENT)
Dept: PSYCHIATRY | Facility: CLINIC | Age: 36
End: 2021-11-16
Payer: COMMERCIAL

## 2021-11-16 VITALS
HEIGHT: 70 IN | WEIGHT: 242.38 LBS | DIASTOLIC BLOOD PRESSURE: 85 MMHG | SYSTOLIC BLOOD PRESSURE: 136 MMHG | HEART RATE: 81 BPM | BODY MASS INDEX: 34.7 KG/M2

## 2021-11-16 DIAGNOSIS — F84.0 AUTISTIC DISORDER, ACTIVE: Chronic | ICD-10-CM

## 2021-11-16 DIAGNOSIS — F84.9 PERVASIVE DEVELOPMENTAL DISORDER, ACTIVE: Primary | Chronic | ICD-10-CM

## 2021-11-16 DIAGNOSIS — G47.33 OSA (OBSTRUCTIVE SLEEP APNEA): Chronic | ICD-10-CM

## 2021-11-16 DIAGNOSIS — F42.9 OBSESSIVE-COMPULSIVE DISORDER, UNSPECIFIED TYPE: ICD-10-CM

## 2021-11-16 DIAGNOSIS — F63.9 IMPULSE CONTROL DISORDER: Chronic | ICD-10-CM

## 2021-11-16 PROCEDURE — 3075F SYST BP GE 130 - 139MM HG: CPT | Mod: CPTII,S$GLB,, | Performed by: PSYCHIATRY & NEUROLOGY

## 2021-11-16 PROCEDURE — 3008F PR BODY MASS INDEX (BMI) DOCUMENTED: ICD-10-PCS | Mod: CPTII,S$GLB,, | Performed by: PSYCHIATRY & NEUROLOGY

## 2021-11-16 PROCEDURE — 99214 PR OFFICE/OUTPT VISIT, EST, LEVL IV, 30-39 MIN: ICD-10-PCS | Mod: S$GLB,,, | Performed by: PSYCHIATRY & NEUROLOGY

## 2021-11-16 PROCEDURE — 3008F BODY MASS INDEX DOCD: CPT | Mod: CPTII,S$GLB,, | Performed by: PSYCHIATRY & NEUROLOGY

## 2021-11-16 PROCEDURE — 99499 RISK ADDL DX/OHS AUDIT: ICD-10-PCS | Mod: S$GLB,,, | Performed by: PSYCHIATRY & NEUROLOGY

## 2021-11-16 PROCEDURE — 90833 PSYTX W PT W E/M 30 MIN: CPT | Mod: S$GLB,,, | Performed by: PSYCHIATRY & NEUROLOGY

## 2021-11-16 PROCEDURE — 99214 OFFICE O/P EST MOD 30 MIN: CPT | Mod: S$GLB,,, | Performed by: PSYCHIATRY & NEUROLOGY

## 2021-11-16 PROCEDURE — 1159F PR MEDICATION LIST DOCUMENTED IN MEDICAL RECORD: ICD-10-PCS | Mod: CPTII,S$GLB,, | Performed by: PSYCHIATRY & NEUROLOGY

## 2021-11-16 PROCEDURE — 3079F PR MOST RECENT DIASTOLIC BLOOD PRESSURE 80-89 MM HG: ICD-10-PCS | Mod: CPTII,S$GLB,, | Performed by: PSYCHIATRY & NEUROLOGY

## 2021-11-16 PROCEDURE — 99499 UNLISTED E&M SERVICE: CPT | Mod: S$GLB,,, | Performed by: PSYCHIATRY & NEUROLOGY

## 2021-11-16 PROCEDURE — 99999 PR PBB SHADOW E&M-EST. PATIENT-LVL III: CPT | Mod: PBBFAC,,, | Performed by: PSYCHIATRY & NEUROLOGY

## 2021-11-16 PROCEDURE — 1159F MED LIST DOCD IN RCRD: CPT | Mod: CPTII,S$GLB,, | Performed by: PSYCHIATRY & NEUROLOGY

## 2021-11-16 PROCEDURE — 90833 PR PSYCHOTHERAPY W/PATIENT W/E&M, 30 MIN (ADD ON): ICD-10-PCS | Mod: S$GLB,,, | Performed by: PSYCHIATRY & NEUROLOGY

## 2021-11-16 PROCEDURE — 3079F DIAST BP 80-89 MM HG: CPT | Mod: CPTII,S$GLB,, | Performed by: PSYCHIATRY & NEUROLOGY

## 2021-11-16 PROCEDURE — 99999 PR PBB SHADOW E&M-EST. PATIENT-LVL III: ICD-10-PCS | Mod: PBBFAC,,, | Performed by: PSYCHIATRY & NEUROLOGY

## 2021-11-16 PROCEDURE — 3075F PR MOST RECENT SYSTOLIC BLOOD PRESS GE 130-139MM HG: ICD-10-PCS | Mod: CPTII,S$GLB,, | Performed by: PSYCHIATRY & NEUROLOGY

## 2021-11-23 ENCOUNTER — LAB VISIT (OUTPATIENT)
Dept: LAB | Facility: HOSPITAL | Age: 36
End: 2021-11-23
Attending: PSYCHIATRY & NEUROLOGY
Payer: MEDICARE

## 2021-11-23 DIAGNOSIS — Z79.899 HIGH RISK MEDICATION USE: ICD-10-CM

## 2021-11-23 DIAGNOSIS — K74.60 HEPATIC CIRRHOSIS, UNSPECIFIED HEPATIC CIRRHOSIS TYPE, UNSPECIFIED WHETHER ASCITES PRESENT: ICD-10-CM

## 2021-11-23 DIAGNOSIS — G40.909 SEIZURE DISORDER: ICD-10-CM

## 2021-11-23 LAB
ALBUMIN SERPL BCP-MCNC: 4.2 G/DL (ref 3.5–5.2)
ALP SERPL-CCNC: 65 U/L (ref 55–135)
ALT SERPL W/O P-5'-P-CCNC: 82 U/L (ref 10–44)
ANION GAP SERPL CALC-SCNC: 7 MMOL/L (ref 8–16)
AST SERPL-CCNC: 84 U/L (ref 10–40)
BASOPHILS # BLD AUTO: 0.06 K/UL (ref 0–0.2)
BASOPHILS NFR BLD: 0.8 % (ref 0–1.9)
BILIRUB SERPL-MCNC: 0.8 MG/DL (ref 0.1–1)
BUN SERPL-MCNC: 10 MG/DL (ref 6–20)
CALCIUM SERPL-MCNC: 9.9 MG/DL (ref 8.7–10.5)
CHLORIDE SERPL-SCNC: 103 MMOL/L (ref 95–110)
CO2 SERPL-SCNC: 30 MMOL/L (ref 23–29)
CREAT SERPL-MCNC: 0.8 MG/DL (ref 0.5–1.4)
DIFFERENTIAL METHOD: ABNORMAL
EOSINOPHIL # BLD AUTO: 0.1 K/UL (ref 0–0.5)
EOSINOPHIL NFR BLD: 1.3 % (ref 0–8)
ERYTHROCYTE [DISTWIDTH] IN BLOOD BY AUTOMATED COUNT: 12.5 % (ref 11.5–14.5)
EST. GFR  (AFRICAN AMERICAN): >60 ML/MIN/1.73 M^2
EST. GFR  (NON AFRICAN AMERICAN): >60 ML/MIN/1.73 M^2
GLUCOSE SERPL-MCNC: 92 MG/DL (ref 70–110)
HCT VFR BLD AUTO: 47 % (ref 40–54)
HGB BLD-MCNC: 15.4 G/DL (ref 14–18)
IMM GRANULOCYTES # BLD AUTO: 0.02 K/UL (ref 0–0.04)
IMM GRANULOCYTES NFR BLD AUTO: 0.3 % (ref 0–0.5)
LYMPHOCYTES # BLD AUTO: 2.5 K/UL (ref 1–4.8)
LYMPHOCYTES NFR BLD: 32.4 % (ref 18–48)
MCH RBC QN AUTO: 31.9 PG (ref 27–31)
MCHC RBC AUTO-ENTMCNC: 32.8 G/DL (ref 32–36)
MCV RBC AUTO: 97 FL (ref 82–98)
MONOCYTES # BLD AUTO: 0.6 K/UL (ref 0.3–1)
MONOCYTES NFR BLD: 7.7 % (ref 4–15)
NEUTROPHILS # BLD AUTO: 4.4 K/UL (ref 1.8–7.7)
NEUTROPHILS NFR BLD: 57.5 % (ref 38–73)
NRBC BLD-RTO: 0 /100 WBC
PLATELET # BLD AUTO: 196 K/UL (ref 150–450)
PMV BLD AUTO: 10.3 FL (ref 9.2–12.9)
POTASSIUM SERPL-SCNC: 4.5 MMOL/L (ref 3.5–5.1)
PROT SERPL-MCNC: 8.1 G/DL (ref 6–8.4)
RBC # BLD AUTO: 4.83 M/UL (ref 4.6–6.2)
SODIUM SERPL-SCNC: 140 MMOL/L (ref 136–145)
WBC # BLD AUTO: 7.68 K/UL (ref 3.9–12.7)

## 2021-11-23 PROCEDURE — 80183 DRUG SCRN QUANT OXCARBAZEPIN: CPT | Performed by: PSYCHIATRY & NEUROLOGY

## 2021-11-23 PROCEDURE — 85025 COMPLETE CBC W/AUTO DIFF WBC: CPT | Performed by: PSYCHIATRY & NEUROLOGY

## 2021-11-23 PROCEDURE — 80053 COMPREHEN METABOLIC PANEL: CPT | Performed by: PSYCHIATRY & NEUROLOGY

## 2021-11-23 PROCEDURE — 36415 COLL VENOUS BLD VENIPUNCTURE: CPT | Mod: PO | Performed by: PSYCHIATRY & NEUROLOGY

## 2021-11-26 LAB — OXCARBAZEPINE METABOLITE: 1 MCG/ML (ref 10–35)

## 2021-11-29 RX ORDER — LACTULOSE 10 G/15ML
SOLUTION ORAL; RECTAL
Qty: 473 ML | Refills: 3 | Status: SHIPPED | OUTPATIENT
Start: 2021-11-29 | End: 2022-01-24

## 2021-11-30 ENCOUNTER — TELEPHONE (OUTPATIENT)
Dept: NEUROLOGY | Facility: CLINIC | Age: 36
End: 2021-11-30
Payer: MEDICARE

## 2021-11-30 DIAGNOSIS — G40.909 SEIZURE DISORDER: Primary | ICD-10-CM

## 2021-12-06 ENCOUNTER — PATIENT MESSAGE (OUTPATIENT)
Dept: NEUROLOGY | Facility: CLINIC | Age: 36
End: 2021-12-06
Payer: MEDICARE

## 2021-12-06 ENCOUNTER — PATIENT MESSAGE (OUTPATIENT)
Dept: PSYCHIATRY | Facility: CLINIC | Age: 36
End: 2021-12-06
Payer: MEDICARE

## 2021-12-07 ENCOUNTER — OFFICE VISIT (OUTPATIENT)
Dept: FAMILY MEDICINE | Facility: CLINIC | Age: 36
End: 2021-12-07
Payer: MEDICARE

## 2021-12-07 VITALS
DIASTOLIC BLOOD PRESSURE: 82 MMHG | BODY MASS INDEX: 34.63 KG/M2 | WEIGHT: 241.88 LBS | OXYGEN SATURATION: 96 % | HEIGHT: 70 IN | HEART RATE: 81 BPM | SYSTOLIC BLOOD PRESSURE: 120 MMHG

## 2021-12-07 DIAGNOSIS — R82.90 CLOUDY URINE: Primary | ICD-10-CM

## 2021-12-07 DIAGNOSIS — N31.9 NEUROGENIC BLADDER: Chronic | ICD-10-CM

## 2021-12-07 DIAGNOSIS — G40.909 SEIZURE DISORDER: Chronic | ICD-10-CM

## 2021-12-07 DIAGNOSIS — G47.33 OSA (OBSTRUCTIVE SLEEP APNEA): Chronic | ICD-10-CM

## 2021-12-07 DIAGNOSIS — F84.0 AUTISTIC DISORDER, ACTIVE: Chronic | ICD-10-CM

## 2021-12-07 LAB
BACTERIA #/AREA URNS HPF: ABNORMAL /HPF
BILIRUB UR QL STRIP: NEGATIVE
CLARITY UR: ABNORMAL
COLOR UR: YELLOW
GLUCOSE UR QL STRIP: NEGATIVE
HGB UR QL STRIP: ABNORMAL
KETONES UR QL STRIP: NEGATIVE
LEUKOCYTE ESTERASE UR QL STRIP: ABNORMAL
MICROSCOPIC COMMENT: ABNORMAL
NITRITE UR QL STRIP: NEGATIVE
NON-SQ EPI CELLS #/AREA URNS HPF: 2 /HPF
PH UR STRIP: 8 [PH] (ref 5–8)
PROT UR QL STRIP: NEGATIVE
RBC #/AREA URNS HPF: 0 /HPF (ref 0–4)
SP GR UR STRIP: 1.01 (ref 1–1.03)
SQUAMOUS #/AREA URNS HPF: 2 /HPF
URN SPEC COLLECT METH UR: ABNORMAL
WBC #/AREA URNS HPF: >100 /HPF (ref 0–5)

## 2021-12-07 PROCEDURE — 87186 SC STD MICRODIL/AGAR DIL: CPT | Performed by: EMERGENCY MEDICINE

## 2021-12-07 PROCEDURE — 99213 OFFICE O/P EST LOW 20 MIN: CPT | Mod: S$GLB,,, | Performed by: EMERGENCY MEDICINE

## 2021-12-07 PROCEDURE — 99999 PR PBB SHADOW E&M-EST. PATIENT-LVL IV: ICD-10-PCS | Mod: PBBFAC,,, | Performed by: EMERGENCY MEDICINE

## 2021-12-07 PROCEDURE — 87077 CULTURE AEROBIC IDENTIFY: CPT | Performed by: EMERGENCY MEDICINE

## 2021-12-07 PROCEDURE — 99999 PR PBB SHADOW E&M-EST. PATIENT-LVL IV: CPT | Mod: PBBFAC,,, | Performed by: EMERGENCY MEDICINE

## 2021-12-07 PROCEDURE — 99213 PR OFFICE/OUTPT VISIT, EST, LEVL III, 20-29 MIN: ICD-10-PCS | Mod: S$GLB,,, | Performed by: EMERGENCY MEDICINE

## 2021-12-07 PROCEDURE — 81000 URINALYSIS NONAUTO W/SCOPE: CPT | Mod: PO | Performed by: EMERGENCY MEDICINE

## 2021-12-07 PROCEDURE — 87086 URINE CULTURE/COLONY COUNT: CPT | Performed by: EMERGENCY MEDICINE

## 2021-12-07 PROCEDURE — 87088 URINE BACTERIA CULTURE: CPT | Performed by: EMERGENCY MEDICINE

## 2021-12-07 RX ORDER — NITROFURANTOIN (MACROCRYSTALS) 100 MG/1
100 CAPSULE ORAL DAILY
COMMUNITY
Start: 2021-11-23 | End: 2022-04-26

## 2021-12-09 ENCOUNTER — TELEPHONE (OUTPATIENT)
Dept: FAMILY MEDICINE | Facility: CLINIC | Age: 36
End: 2021-12-09
Payer: MEDICARE

## 2021-12-09 ENCOUNTER — TELEPHONE (OUTPATIENT)
Dept: INFECTIOUS DISEASES | Facility: CLINIC | Age: 36
End: 2021-12-09
Payer: MEDICARE

## 2021-12-09 ENCOUNTER — PATIENT MESSAGE (OUTPATIENT)
Dept: FAMILY MEDICINE | Facility: CLINIC | Age: 36
End: 2021-12-09
Payer: MEDICARE

## 2021-12-09 DIAGNOSIS — T83.511A URINARY TRACT INFECTION ASSOCIATED WITH CATHETERIZATION OF URINARY TRACT, UNSPECIFIED INDWELLING URINARY CATHETER TYPE, INITIAL ENCOUNTER: Primary | ICD-10-CM

## 2021-12-09 DIAGNOSIS — N39.0 URINARY TRACT INFECTION ASSOCIATED WITH CATHETERIZATION OF URINARY TRACT, UNSPECIFIED INDWELLING URINARY CATHETER TYPE, INITIAL ENCOUNTER: Primary | ICD-10-CM

## 2021-12-09 LAB — BACTERIA UR CULT: ABNORMAL

## 2021-12-09 RX ORDER — CIPROFLOXACIN 500 MG/1
500 TABLET ORAL EVERY 12 HOURS
Qty: 20 TABLET | Refills: 0 | Status: SHIPPED | OUTPATIENT
Start: 2021-12-09 | End: 2021-12-19

## 2021-12-10 ENCOUNTER — PATIENT OUTREACH (OUTPATIENT)
Dept: ADMINISTRATIVE | Facility: OTHER | Age: 36
End: 2021-12-10
Payer: MEDICARE

## 2021-12-10 ENCOUNTER — TELEPHONE (OUTPATIENT)
Dept: INFECTIOUS DISEASES | Facility: CLINIC | Age: 36
End: 2021-12-10
Payer: MEDICARE

## 2021-12-10 ENCOUNTER — HOSPITAL ENCOUNTER (OUTPATIENT)
Dept: RADIOLOGY | Facility: HOSPITAL | Age: 36
Discharge: HOME | End: 2021-12-10
Attending: NURSE PRACTITIONER
Payer: MEDICARE

## 2021-12-10 DIAGNOSIS — K74.60 CIRRHOSIS OF LIVER WITHOUT ASCITES, UNSPECIFIED HEPATIC CIRRHOSIS TYPE: ICD-10-CM

## 2021-12-10 PROCEDURE — 76705 US ABDOMEN LIMITED: ICD-10-PCS | Mod: 26,,, | Performed by: RADIOLOGY

## 2021-12-10 PROCEDURE — 76705 ECHO EXAM OF ABDOMEN: CPT | Mod: 26,,, | Performed by: RADIOLOGY

## 2021-12-10 PROCEDURE — 76705 ECHO EXAM OF ABDOMEN: CPT | Mod: TC,PO

## 2021-12-13 ENCOUNTER — PATIENT MESSAGE (OUTPATIENT)
Dept: INFECTIOUS DISEASES | Facility: CLINIC | Age: 36
End: 2021-12-13
Payer: MEDICARE

## 2021-12-13 ENCOUNTER — PATIENT MESSAGE (OUTPATIENT)
Dept: FAMILY MEDICINE | Facility: CLINIC | Age: 36
End: 2021-12-13
Payer: MEDICARE

## 2021-12-13 ENCOUNTER — OFFICE VISIT (OUTPATIENT)
Dept: HEPATOLOGY | Facility: CLINIC | Age: 36
End: 2021-12-13
Payer: MEDICARE

## 2021-12-13 DIAGNOSIS — E66.9 OBESITY (BMI 30.0-34.9): ICD-10-CM

## 2021-12-13 DIAGNOSIS — K75.81 NASH (NONALCOHOLIC STEATOHEPATITIS): ICD-10-CM

## 2021-12-13 DIAGNOSIS — K74.60 CIRRHOSIS OF LIVER WITHOUT ASCITES, UNSPECIFIED HEPATIC CIRRHOSIS TYPE: Primary | ICD-10-CM

## 2021-12-13 PROCEDURE — 99499 UNLISTED E&M SERVICE: CPT | Mod: 95,,, | Performed by: NURSE PRACTITIONER

## 2021-12-13 PROCEDURE — 99214 PR OFFICE/OUTPT VISIT, EST, LEVL IV, 30-39 MIN: ICD-10-PCS | Mod: 95,,, | Performed by: NURSE PRACTITIONER

## 2021-12-13 PROCEDURE — 99499 RISK ADDL DX/OHS AUDIT: ICD-10-PCS | Mod: 95,,, | Performed by: NURSE PRACTITIONER

## 2021-12-13 PROCEDURE — 99214 OFFICE O/P EST MOD 30 MIN: CPT | Mod: 95,,, | Performed by: NURSE PRACTITIONER

## 2021-12-15 ENCOUNTER — PATIENT MESSAGE (OUTPATIENT)
Dept: HEPATOLOGY | Facility: CLINIC | Age: 36
End: 2021-12-15
Payer: MEDICARE

## 2021-12-17 ENCOUNTER — LAB VISIT (OUTPATIENT)
Dept: LAB | Facility: HOSPITAL | Age: 36
End: 2021-12-17
Attending: PSYCHIATRY & NEUROLOGY
Payer: MEDICARE

## 2021-12-17 DIAGNOSIS — K74.60 CIRRHOSIS OF LIVER WITHOUT ASCITES, UNSPECIFIED HEPATIC CIRRHOSIS TYPE: ICD-10-CM

## 2021-12-17 DIAGNOSIS — G40.909 SEIZURE DISORDER: ICD-10-CM

## 2021-12-17 LAB
AFP SERPL-MCNC: <2 NG/ML (ref 0–8.4)
ALBUMIN SERPL BCP-MCNC: 4.2 G/DL (ref 3.5–5.2)
ALP SERPL-CCNC: 69 U/L (ref 55–135)
ALT SERPL W/O P-5'-P-CCNC: 70 U/L (ref 10–44)
ANION GAP SERPL CALC-SCNC: 12 MMOL/L (ref 8–16)
AST SERPL-CCNC: 72 U/L (ref 10–40)
BILIRUB SERPL-MCNC: 0.7 MG/DL (ref 0.1–1)
BUN SERPL-MCNC: 9 MG/DL (ref 6–20)
CALCIUM SERPL-MCNC: 9.5 MG/DL (ref 8.7–10.5)
CHLORIDE SERPL-SCNC: 101 MMOL/L (ref 95–110)
CO2 SERPL-SCNC: 24 MMOL/L (ref 23–29)
CREAT SERPL-MCNC: 0.8 MG/DL (ref 0.5–1.4)
EST. GFR  (AFRICAN AMERICAN): >60 ML/MIN/1.73 M^2
EST. GFR  (NON AFRICAN AMERICAN): >60 ML/MIN/1.73 M^2
GLUCOSE SERPL-MCNC: 88 MG/DL (ref 70–110)
INR PPP: 1 (ref 0.8–1.2)
POTASSIUM SERPL-SCNC: 4.2 MMOL/L (ref 3.5–5.1)
PROT SERPL-MCNC: 8.2 G/DL (ref 6–8.4)
PROTHROMBIN TIME: 11.1 SEC (ref 9–12.5)
SODIUM SERPL-SCNC: 137 MMOL/L (ref 136–145)

## 2021-12-17 PROCEDURE — 85610 PROTHROMBIN TIME: CPT | Mod: PO | Performed by: NURSE PRACTITIONER

## 2021-12-17 PROCEDURE — 80183 DRUG SCRN QUANT OXCARBAZEPIN: CPT | Performed by: PSYCHIATRY & NEUROLOGY

## 2021-12-17 PROCEDURE — 82105 ALPHA-FETOPROTEIN SERUM: CPT | Performed by: NURSE PRACTITIONER

## 2021-12-17 PROCEDURE — 80053 COMPREHEN METABOLIC PANEL: CPT | Performed by: NURSE PRACTITIONER

## 2021-12-21 LAB — OXCARBAZEPINE METABOLITE: 9 MCG/ML (ref 10–35)

## 2021-12-23 ENCOUNTER — PATIENT MESSAGE (OUTPATIENT)
Dept: INFECTIOUS DISEASES | Facility: CLINIC | Age: 36
End: 2021-12-23
Payer: MEDICARE

## 2022-02-08 ENCOUNTER — TELEPHONE (OUTPATIENT)
Dept: FAMILY MEDICINE | Facility: CLINIC | Age: 37
End: 2022-02-08
Payer: MEDICARE

## 2022-02-08 NOTE — TELEPHONE ENCOUNTER
----- Message from Jesús Hayes sent at 2/8/2022  8:55 AM CST -----  Regarding: advice  Contact: mother  Type: Needs Medical Advice  Who Called:  mother-Eli   Symptoms (please be specific):    How long has patient had these symptoms:    Pharmacy name and phone #:    Best Call Back Number:  [phone]  Additional Information: The mother test positive for covid home kit test. The mother asking for advice for the patient.

## 2022-02-15 ENCOUNTER — OFFICE VISIT (OUTPATIENT)
Dept: PSYCHIATRY | Facility: CLINIC | Age: 37
End: 2022-02-15
Payer: COMMERCIAL

## 2022-02-15 DIAGNOSIS — F63.9 IMPULSE CONTROL DISORDER: Chronic | ICD-10-CM

## 2022-02-15 DIAGNOSIS — R62.50 DEVELOPMENTAL DELAY, MODERATE: ICD-10-CM

## 2022-02-15 DIAGNOSIS — G47.33 OSA (OBSTRUCTIVE SLEEP APNEA): Chronic | ICD-10-CM

## 2022-02-15 DIAGNOSIS — F84.0 AUTISTIC DISORDER, ACTIVE: Chronic | ICD-10-CM

## 2022-02-15 DIAGNOSIS — F84.9 PERVASIVE DEVELOPMENTAL DISORDER, ACTIVE: Primary | Chronic | ICD-10-CM

## 2022-02-15 DIAGNOSIS — R56.9 CONVULSIONS, UNSPECIFIED CONVULSION TYPE: ICD-10-CM

## 2022-02-15 DIAGNOSIS — F42.8 OTHER OBSESSIVE-COMPULSIVE DISORDERS: ICD-10-CM

## 2022-02-15 PROCEDURE — 90833 PSYTX W PT W E/M 30 MIN: CPT | Mod: 95,,, | Performed by: PSYCHIATRY & NEUROLOGY

## 2022-02-15 PROCEDURE — 90833 PR PSYCHOTHERAPY W/PATIENT W/E&M, 30 MIN (ADD ON): ICD-10-PCS | Mod: 95,,, | Performed by: PSYCHIATRY & NEUROLOGY

## 2022-02-15 PROCEDURE — 99214 OFFICE O/P EST MOD 30 MIN: CPT | Mod: 95,,, | Performed by: PSYCHIATRY & NEUROLOGY

## 2022-02-15 PROCEDURE — 99214 PR OFFICE/OUTPT VISIT, EST, LEVL IV, 30-39 MIN: ICD-10-PCS | Mod: 95,,, | Performed by: PSYCHIATRY & NEUROLOGY

## 2022-02-15 RX ORDER — FLUVOXAMINE MALEATE 100 MG/1
TABLET, COATED ORAL
Qty: 270 TABLET | Refills: 1 | Status: SHIPPED | OUTPATIENT
Start: 2022-02-15 | End: 2022-04-26 | Stop reason: SDUPTHER

## 2022-02-15 RX ORDER — OXCARBAZEPINE 300 MG/1
TABLET, FILM COATED ORAL
Qty: 180 TABLET | Refills: 1 | Status: SHIPPED | OUTPATIENT
Start: 2022-02-15 | End: 2022-04-26 | Stop reason: SDUPTHER

## 2022-02-15 NOTE — PROGRESS NOTES
"The patient location is: home, [facility]  The chief complaint leading to consultation is: anxiety f/u    Visit type: audiovisual    Face to Face time with patient: 20 mins  20 minutes of total time spent on the encounter, which includes face to face time and non-face to face time preparing to see the patient (eg, review of tests), Obtaining and/or reviewing separately obtained history, Documenting clinical information in the electronic or other health record, Independently interpreting results (not separately reported) and communicating results to the patient/family/caregiver, or Care coordination (not separately reported).     Each patient to whom he or she provides medical services by telemedicine is:  (1) informed of the relationship between the physician and patient and the respective role of any other health care provider with respect to management of the patient; and (2) notified that he or she may decline to receive medical services by telemedicine and may withdraw from such care at any time.    ID: 29yo WM with PDD, Impulse Control d/o, anxiety d/o nos.     CC: anxiety    Interim Hx: presents on time with his mother and new aid, kristy.     Pt is doing well today. Per mom, "we're doing some great work on some of those social issues we were having and he's now making some progress."- has been working with  through [facility] (psychologist) and they're also working to get a biday for him to give more independence for his bms/cleaning. Trying to get through the waiver, but the initial bid wasn't written correctly for approval. Also ru'  of 15yrs has left, but she was helpful in guiding how it needed to be rewritten. "but for now we're tabling it."     Pt's mother has had covid. "working hard" to not give it to anyone else in family. Although ru and kristy are there in the room for the appt- they have been going out more. Eli not preparing meals, etc.     Pt has noticed recently " that he seems to be gaining some skills and strengths. Working on some speech therapy, as well. This is exciting news.     On Psychiatric ROS:    See above for pertinents.    PSYCHOTHERAPY ADD-ON   30 (16-37*) minutes    Time: 20 minutes  Participants: Met with patient and mother     Therapeutic Intervention Type: insight oriented psychotherapy, behavior modifying psychotherapy, supportive psychotherapy  Why chosen therapy is appropriate versus another modality: relevant to diagnosis, patient responds to this modality, evidence based practice    Target symptoms: anxiety, caregiver support to mom   Primary focus: accessing appropriate resources  Psychotherapeutic techniques: support, validation, reframing    Outcome monitoring methods: self-report, observation, feedback from family    Patient's response to intervention:  The patient's response to intervention is accepting, motivated.    Progress toward goals:  The patient's progress toward goals is good.    PPHx: Endorses h/o self injury- will pick at scabs in sort of obsessive way- this has stabilized  Denies inpt psych hospitalization  Denies h/o suicide attempt     Current Psych Meds: **Trileptal 300mg po BID through neuro for sz d/o, luvox 150mg po BID  Past Psych Meds: s/e's to mult prev meds to include zyprexa (wgt gain), lexapro 20mg po qam (anxiety and to suppress sex drive), buspar 10mg po BID, xanax 0.5mg po daily PRN anxiety     PMHx: obesity, onel w/ cipap, seizure d/o, scoliosis surgery at 11yo led to hemiparalysis- now walking but has neurogenic bladder and frequent catheterization, partial nephrectomy R kidney    SubstHx:   T- none  E- none  D- none  Caffeine- very rare, if ever    FamPHx: mAunt- schizophrenic, mcousin- schizophrenia    Musculoskeletal:  Muscle strength/Tone: no dyskinesia/ no tremor  Gait/Station- non antalgic, no assistance needed    MSE: appears stated age, well groomed, obese, appropriate dress- shorts/tshirt, engages well with  "examiner at a childlike level.  Good e/c. Speech reg rate and vol, nonpressured. less spontaneous speech today. Mood is "i'm good". Affect euthymic. Oriented to month and season. Narrative memory intact. Intellectual function is below avg based on vocab and basic fund of knowledge- PDD long h/o sp ed. Thought is perseverative but he is tracking in conversation. No tangentiality or circumstantiality. No FOI/WES. Denies SI/HI. Denies A/VH. No evidence of delusions. Insight lacking and Judgment in keeping with insight.     There were no vitals taken for this visit.    Suicide Risk Assessment:   Protective- age, no prior attempts, no prior hospitalizations, no family h/o attempts, no ongoing substance abuse, no psychosis, denies SI/intent/plan, no h/o violence, seeking treatment, access to treatment, future oriented, good primary support, no access to firearms    Risk- race, gender, ongoing Axis I sxs    **Pt is at LOW imminent and long term risk of suicide given current risk factors.    Assessment:  32yo WM with PDD, Impulse Control d/o, anxiety d/o nos. Last saw RENNY Murillo 5/2016. Pt presented initially with his mother and caregiver Helen who has been with the pt x 8mos. Currently the pt is doing very well. Of note pt is only on lexapro for anxiety, is also on trileptal 300mg po BID for seizure d/o through neuro and has HALEY on cipap. Has great family support, receives 88hrs per week of direct care from outside service provider, has assistance with all ADLs and IADLs but seems content with arrangement. No h/o violent or aggressive behavior. Had manic sxs with psychosis when on keppra but never before or since. Has recently stopped zyprexa due to significant weight gain and there has been no increase in behavior or anxiety. Per parent and caregiver there is some concern that a prev caregiver who was recently fired was motivated for the pt to be more sedated-  they both denied the pt needed any change in medication. " "Ru is doing well, has some instances of intrusive behavior with young attractive women and he becomes difficult to redirect- added a trial of PRN buspar for days he has public outings- this has been effective and helpful. Today the incidents have become more difficult to redirect- will order a trial of xanax 0.25-0.5mg po daily PRN and also inc buspar PRN to 10mg daily as needed for public outings. Today on f/u pt's mother does not believe buspar has been effective- xanax effective but cannot be given in the moment as "it just happens so fast"- inability to redirect pt in public has become a safety concern for women and for the pt as authorities are not typically well trained in spectrum disorders (mom working to educate the police force). Warrants a transition to new ssri- will taper off lexapro and likely start luvox at the next appt. Last appt on f/u he is "no worse" without the meds- mom motivated to cont without as they cont to observe. Anxiety was mildly inc'd but hard to attribute to lack of med as his primary caregiver just left work abruptly with pregnancy complications- change of schedule and personnel hard for Ru. Last appt 2 new providers, doing well with both, mom has placed cameras and she has no concerns about the new hires. Routine back in place and ru had anxiety but was functioning well- mom would like to cont w/o meds. Will cont to observe. It was time to intervene with meds per mom, started trial of luvox which initially helped at 50mg bid, we then inc'd to 100mg bid with s/e of diarrhea and sweating, now back to 50mg po bid with resolution of most concerning s/e and still improvement in anxiety- will cont at this time but mom will cont to do r/b assessment. Pt's father with worsening health- leading to some anxiety, new caregiver now gone after some unprofessional behavior. Pt doing well on the dec'd luvox dose and favorite caregiver is now back and working 25hrs. Self injury " dec'd. Recent hospital stay for uti and sepsis- now back to baseline. Some recent transaminitis- trending down- will cont to follow. Initially did well on inc'd fluvox to 100mg po qam and 50mg po qhs- but last appt they reported inc'd anxiety and inc'd need for use of xanax and that the xanax 0.25mg has not been effective for relief of sxs recently- inc'd fluvox from 100bid (mom titrated the dose) to 150mg po BID. Will also inc xanax to 0.5mg po daily PRN anxiety.  Skin picking improved, escalating less frequently. Will cont daily xanax 0.5mg po qam and cont to observe. No acute safety concerns. rtc 3mos.     Pt now weaning off xanax. Has had some regression along with some medical concerns- uncertain of cause for either. Mom feels perhaps due to slow titration of the xanax use which became daily- may have led to disinhibition. Will d/c the now 0.25mg daily dose and we will observe for 1-2 wks. May then begin adjusting luvox? Although i'm uncertain this is playing a role. Mom interested in trial of cbd oil and while I did not recommend I appreciate her honest report of trial of use- we will monitor. Pt back to baseline today- uncertain of cause of recent decompensation but grateful for stabilization. Pt's father has passed- it has brought some relief to home environment which could be contributing to pt's improvement. Today pt's mom reports he's been a bit more anxious- correlates with poor sleep. Will move mag to  and reach out via portal. Will cont meds w/o change today- pt better today- just from scheduling settling down. Stable. Interacting well with his caregiver.     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 Cont luvox 150mg po BID   2. Cont Trileptal 300mg po BID per neuro  3. RTC 3mos    -Supportive therapy and psychoeducation  provided  -R/B/SE's of medications discussed with the pt who expresses understanding and chooses to take medications as prescribed.   -Pt instructed to call clinic, 911 or go to nearest emergency room if sxs worsen or pt is in   crisis. The pt expresses understanding.

## 2022-03-03 ENCOUNTER — PATIENT MESSAGE (OUTPATIENT)
Dept: PSYCHIATRY | Facility: CLINIC | Age: 37
End: 2022-03-03
Payer: MEDICARE

## 2022-03-14 ENCOUNTER — PATIENT MESSAGE (OUTPATIENT)
Dept: PSYCHIATRY | Facility: CLINIC | Age: 37
End: 2022-03-14
Payer: MEDICARE

## 2022-03-16 RX ORDER — LACTULOSE 10 G/15ML
SOLUTION ORAL; RECTAL
Qty: 473 ML | Refills: 3 | Status: SHIPPED | OUTPATIENT
Start: 2022-03-16 | End: 2022-05-24

## 2022-04-04 ENCOUNTER — PATIENT MESSAGE (OUTPATIENT)
Dept: FAMILY MEDICINE | Facility: CLINIC | Age: 37
End: 2022-04-04
Payer: MEDICARE

## 2022-04-05 NOTE — TELEPHONE ENCOUNTER
Can you fill out the physician form for pt to participate in horse therapy?   The form is in your basket.

## 2022-04-11 ENCOUNTER — OFFICE VISIT (OUTPATIENT)
Dept: FAMILY MEDICINE | Facility: CLINIC | Age: 37
End: 2022-04-11
Payer: MEDICARE

## 2022-04-11 VITALS
BODY MASS INDEX: 36.02 KG/M2 | HEIGHT: 70 IN | WEIGHT: 251.56 LBS | OXYGEN SATURATION: 95 % | DIASTOLIC BLOOD PRESSURE: 82 MMHG | HEART RATE: 75 BPM | SYSTOLIC BLOOD PRESSURE: 132 MMHG

## 2022-04-11 DIAGNOSIS — E66.01 MORBID (SEVERE) OBESITY DUE TO EXCESS CALORIES: ICD-10-CM

## 2022-04-11 DIAGNOSIS — N31.9 NEUROGENIC BLADDER: Chronic | ICD-10-CM

## 2022-04-11 DIAGNOSIS — F84.9 PERVASIVE DEVELOPMENTAL DISORDER, ACTIVE: Chronic | ICD-10-CM

## 2022-04-11 DIAGNOSIS — R19.4 CHANGE IN BOWEL HABITS: ICD-10-CM

## 2022-04-11 DIAGNOSIS — M1A.9XX0 CHRONIC GOUT WITHOUT TOPHUS, UNSPECIFIED CAUSE, UNSPECIFIED SITE: Chronic | ICD-10-CM

## 2022-04-11 PROCEDURE — 99499 RISK ADDL DX/OHS AUDIT: ICD-10-PCS | Mod: S$GLB,,, | Performed by: EMERGENCY MEDICINE

## 2022-04-11 PROCEDURE — 3079F DIAST BP 80-89 MM HG: CPT | Mod: CPTII,S$GLB,, | Performed by: EMERGENCY MEDICINE

## 2022-04-11 PROCEDURE — 99999 PR PBB SHADOW E&M-EST. PATIENT-LVL IV: ICD-10-PCS | Mod: PBBFAC,,, | Performed by: EMERGENCY MEDICINE

## 2022-04-11 PROCEDURE — 1159F MED LIST DOCD IN RCRD: CPT | Mod: CPTII,S$GLB,, | Performed by: EMERGENCY MEDICINE

## 2022-04-11 PROCEDURE — 99213 PR OFFICE/OUTPT VISIT, EST, LEVL III, 20-29 MIN: ICD-10-PCS | Mod: S$GLB,,, | Performed by: EMERGENCY MEDICINE

## 2022-04-11 PROCEDURE — 1159F PR MEDICATION LIST DOCUMENTED IN MEDICAL RECORD: ICD-10-PCS | Mod: CPTII,S$GLB,, | Performed by: EMERGENCY MEDICINE

## 2022-04-11 PROCEDURE — 99499 UNLISTED E&M SERVICE: CPT | Mod: S$GLB,,, | Performed by: EMERGENCY MEDICINE

## 2022-04-11 PROCEDURE — 3075F SYST BP GE 130 - 139MM HG: CPT | Mod: CPTII,S$GLB,, | Performed by: EMERGENCY MEDICINE

## 2022-04-11 PROCEDURE — 3079F PR MOST RECENT DIASTOLIC BLOOD PRESSURE 80-89 MM HG: ICD-10-PCS | Mod: CPTII,S$GLB,, | Performed by: EMERGENCY MEDICINE

## 2022-04-11 PROCEDURE — 99213 OFFICE O/P EST LOW 20 MIN: CPT | Mod: S$GLB,,, | Performed by: EMERGENCY MEDICINE

## 2022-04-11 PROCEDURE — 3008F BODY MASS INDEX DOCD: CPT | Mod: CPTII,S$GLB,, | Performed by: EMERGENCY MEDICINE

## 2022-04-11 PROCEDURE — 99999 PR PBB SHADOW E&M-EST. PATIENT-LVL IV: CPT | Mod: PBBFAC,,, | Performed by: EMERGENCY MEDICINE

## 2022-04-11 PROCEDURE — 3075F PR MOST RECENT SYSTOLIC BLOOD PRESS GE 130-139MM HG: ICD-10-PCS | Mod: CPTII,S$GLB,, | Performed by: EMERGENCY MEDICINE

## 2022-04-11 PROCEDURE — 3008F PR BODY MASS INDEX (BMI) DOCUMENTED: ICD-10-PCS | Mod: CPTII,S$GLB,, | Performed by: EMERGENCY MEDICINE

## 2022-04-11 RX ORDER — AMOXICILLIN 250 MG/1
250 CAPSULE ORAL NIGHTLY
COMMUNITY
Start: 2022-04-07 | End: 2022-06-26

## 2022-04-12 DIAGNOSIS — R25.8 NOCTURNAL LEG MOVEMENTS: ICD-10-CM

## 2022-04-12 RX ORDER — PRAMIPEXOLE DIHYDROCHLORIDE 0.12 MG/1
TABLET ORAL
Qty: 90 TABLET | Refills: 5 | Status: SHIPPED | OUTPATIENT
Start: 2022-04-12 | End: 2022-10-21

## 2022-04-12 NOTE — PROGRESS NOTES
Subjective:   THIS NOTE IS DONE WITH VOICE RECOGNITION        Patient ID: Hussein Doyle is a 36 y.o. male.    Chief Complaint: Follow-up (Fill out paperwork)         Assessment:       1. Pervasive developmental disorder, active    2. Neurogenic bladder    3. Change in bowel habits    4. Morbid (severe) obesity due to excess calories    5. Chronic gout without tophus, unspecified cause, unspecified site        Plan:       1. Pervasive developmental disorder, active  Stable  forms completed for horseback riding  No new issues identified    2. Neurogenic bladder  No recent infections  His mother would like to check for infection now  They are collecting the urine with self-contained systems.  This has been beneficial for reducing the frequency of urinary tract infections.    3. Change in bowel habits  Constipation currently very well controlled  Continue to focus on natural foods like apples      4. Morbid (severe) obesity due to excess calories  No recent change  Do not anticipate changes    5. Chronic gout without tophus, unspecified cause, unspecified site  Taking allopurinol without difficulty  Will check uric acid and CBC with next routine blood draw.        CLEMENT Dubois is doing well.  He is seen today with his assistant as well as his mother.    There have been no recent urinary tract infections.  They are using a new methodology to do the catheterizations.  This is the closest a minute seem to have helped.  His mother does ask about whether not he should be circumcised.  The foreskin is easily pulled back.  He is sensitive about having anybody but himself full foreskin back.  He is not sexually active.  He does see Urology for his neurogenic bladder.  Avoiding surgery would be ideal with possible.    His constipation is well controlled.  His mother is treating aggressively with apples every day, lactulose, and MiraLax.  This approach has worked, with no recognized side effects.    His weight has not  changed.    He does have gout history with allopurinol.  He is tolerating his medications without recognized side effect.  He has not had a recent attack.  Plan to continue current therapy.    Immunization History   Administered Date(s) Administered    COVID-19, MRNA, LN-S, PF (Pfizer) (Purple Cap) 01/29/2021, 02/25/2021, 02/25/2021    Influenza - Quadrivalent - PF *Preferred* (6 months and older) 10/17/2013, 10/17/2014, 10/24/2015, 10/22/2018, 10/04/2019, 09/29/2020, 09/29/2020, 11/26/2021    Tdap 01/22/2020     COVID booster recommended.    Current Outpatient Medications   Medication Sig Dispense Refill    allopurinoL (ZYLOPRIM) 100 MG tablet TAKE ONE TABLET BY MOUTH TWICE DAILY 180 tablet 2    amoxicillin (AMOXIL) 250 MG capsule Take 250 mg by mouth nightly.      ascorbic acid, vitamin C, 250 mg Chew Take by mouth.      calcium carbonate-vitamin D3 500 mg(1,250mg) -400 unit Tab Take 1 tablet by mouth 2 (two) times a day.       fluvoxaMINE (LUVOX) 100 MG tablet TAKE ONE AND A HALF TABLETS BY MOUTH TWICE DAILY 270 tablet 1    inulin (FIBER GUMMIES ORAL) Take 2 tablets by mouth once daily.       L. ACIDOPHILUS/BIFID. ANIMALIS (CHEWABLE PROBIOTIC ORAL) Take 1 tablet by mouth 2 (two) times daily.       lactulose (CHRONULAC) 10 gram/15 mL solution TAKE ONE TAKE ONE TABLESPOONFUL BY MOUTH THREE TIMES DAILY 473 mL 3    magnesium 30 mg Tab Take 1 tablet by mouth once daily.       MELATONIN ORAL Take 1 tablet by mouth every evening.      multivitamin capsule Take 1 capsule by mouth every morning.       mupirocin calcium 2% (BACTROBAN) 2 % cream Apply topically 3 (three) times daily. 15 g 1    NON FORMULARY MEDICATION CBD Oil- OTC      omega-3 fatty acids/fish oil (FISH OIL-OMEGA-3 FATTY ACIDS) 300-1,000 mg capsule Take 1 capsule by mouth once daily.       OXcarbazepine (TRILEPTAL) 300 MG Tab TAKE ONE TABLET (300MG) BY MOUTH TWICE DAILY 180 tablet 1    pantoprazole (PROTONIX) 40 MG tablet TAKE ONE TABLET  (40MG) BY MOUTH ONCE DAILY 90 tablet 3    polyethylene glycol (GLYCOLAX) 17 gram PwPk Take 17 g by mouth once daily.       pramipexole (MIRAPEX) 0.125 MG tablet TAKE ONE TABLET BY MOUTH THREE TIMES DAILY 90 tablet 5    tamsulosin (FLOMAX) 0.4 mg Cap Take 0.4 mg by mouth every evening. Every day      zinc gluconate 50 mg tablet Take 50 mg by mouth once daily.      nitrofurantoin (MACRODANTIN) 100 MG capsule Take 100 mg by mouth once daily.       No current facility-administered medications for this visit.         Review of Systems   Unable to perform ROS: Psychiatric disorder       Objective:      Physical Exam  Vitals reviewed.   Constitutional:       Appearance: Normal appearance. He is obese.   HENT:      Head: Normocephalic.      Jaw: There is normal jaw occlusion.      Right Ear: Tympanic membrane, ear canal and external ear normal.      Left Ear: Tympanic membrane, ear canal and external ear normal.      Mouth/Throat:      Dentition: No dental tenderness, dental caries or gum lesions.      Pharynx: Oropharynx is clear.   Cardiovascular:      Rate and Rhythm: Normal rate and regular rhythm.      Heart sounds: No murmur heard.  Pulmonary:      Effort: Pulmonary effort is normal.      Breath sounds: Normal breath sounds.   Abdominal:      General: Abdomen is protuberant.      Palpations: Abdomen is soft. There is no fluid wave.      Tenderness: There is no abdominal tenderness.      Hernia: There is no hernia in the umbilical area or ventral area.   Neurological:      Mental Status: He is alert.      Cranial Nerves: Cranial nerves are intact.      Motor: Motor function is intact. No tremor.      Coordination: Coordination is intact.      Deep Tendon Reflexes:      Reflex Scores:       Brachioradialis reflexes are 2+ on the right side and 2+ on the left side.       Patellar reflexes are 2+ on the right side and 2+ on the left side.     Comments: Oriented to person, not place or time.  This is not new.    Psychiatric:         Thought Content: Thought content is not delusional. Thought content does not include suicidal ideation.         Judgment: Judgment is impulsive and inappropriate (can be problematic).      Comments: Neurological status is stable.  He displays echolalia.  He does fidget.  He is cooperative.    His mother has been working with a community that interacts with him in regards to setting boundaries, particularly around interactions with women.

## 2022-04-26 ENCOUNTER — OFFICE VISIT (OUTPATIENT)
Dept: PSYCHIATRY | Facility: CLINIC | Age: 37
End: 2022-04-26
Payer: COMMERCIAL

## 2022-04-26 VITALS
HEART RATE: 65 BPM | SYSTOLIC BLOOD PRESSURE: 114 MMHG | HEIGHT: 70 IN | DIASTOLIC BLOOD PRESSURE: 74 MMHG | WEIGHT: 253.19 LBS | BODY MASS INDEX: 36.25 KG/M2

## 2022-04-26 DIAGNOSIS — F42.8 OTHER OBSESSIVE-COMPULSIVE DISORDERS: ICD-10-CM

## 2022-04-26 DIAGNOSIS — F84.0 AUTISTIC DISORDER, ACTIVE: Chronic | ICD-10-CM

## 2022-04-26 DIAGNOSIS — F63.9 IMPULSE CONTROL DISORDER: Chronic | ICD-10-CM

## 2022-04-26 DIAGNOSIS — R56.9 CONVULSIONS, UNSPECIFIED CONVULSION TYPE: ICD-10-CM

## 2022-04-26 DIAGNOSIS — R62.50 DEVELOPMENTAL DELAY, MODERATE: ICD-10-CM

## 2022-04-26 DIAGNOSIS — E66.9 OBESITY (BMI 30.0-34.9): ICD-10-CM

## 2022-04-26 DIAGNOSIS — F84.9 PERVASIVE DEVELOPMENTAL DISORDER, ACTIVE: Primary | Chronic | ICD-10-CM

## 2022-04-26 PROCEDURE — 99214 OFFICE O/P EST MOD 30 MIN: CPT | Mod: S$GLB,,, | Performed by: PSYCHIATRY & NEUROLOGY

## 2022-04-26 PROCEDURE — 3078F PR MOST RECENT DIASTOLIC BLOOD PRESSURE < 80 MM HG: ICD-10-PCS | Mod: CPTII,S$GLB,, | Performed by: PSYCHIATRY & NEUROLOGY

## 2022-04-26 PROCEDURE — 3008F BODY MASS INDEX DOCD: CPT | Mod: CPTII,S$GLB,, | Performed by: PSYCHIATRY & NEUROLOGY

## 2022-04-26 PROCEDURE — 90833 PSYTX W PT W E/M 30 MIN: CPT | Mod: S$GLB,,, | Performed by: PSYCHIATRY & NEUROLOGY

## 2022-04-26 PROCEDURE — 1159F MED LIST DOCD IN RCRD: CPT | Mod: CPTII,S$GLB,, | Performed by: PSYCHIATRY & NEUROLOGY

## 2022-04-26 PROCEDURE — 3074F SYST BP LT 130 MM HG: CPT | Mod: CPTII,S$GLB,, | Performed by: PSYCHIATRY & NEUROLOGY

## 2022-04-26 PROCEDURE — 99999 PR PBB SHADOW E&M-EST. PATIENT-LVL III: CPT | Mod: PBBFAC,,, | Performed by: PSYCHIATRY & NEUROLOGY

## 2022-04-26 PROCEDURE — 1159F PR MEDICATION LIST DOCUMENTED IN MEDICAL RECORD: ICD-10-PCS | Mod: CPTII,S$GLB,, | Performed by: PSYCHIATRY & NEUROLOGY

## 2022-04-26 PROCEDURE — 3074F PR MOST RECENT SYSTOLIC BLOOD PRESSURE < 130 MM HG: ICD-10-PCS | Mod: CPTII,S$GLB,, | Performed by: PSYCHIATRY & NEUROLOGY

## 2022-04-26 PROCEDURE — 99999 PR PBB SHADOW E&M-EST. PATIENT-LVL III: ICD-10-PCS | Mod: PBBFAC,,, | Performed by: PSYCHIATRY & NEUROLOGY

## 2022-04-26 PROCEDURE — 99499 UNLISTED E&M SERVICE: CPT | Mod: S$GLB,,, | Performed by: PSYCHIATRY & NEUROLOGY

## 2022-04-26 PROCEDURE — 99499 RISK ADDL DX/OHS AUDIT: ICD-10-PCS | Mod: S$GLB,,, | Performed by: PSYCHIATRY & NEUROLOGY

## 2022-04-26 PROCEDURE — 3078F DIAST BP <80 MM HG: CPT | Mod: CPTII,S$GLB,, | Performed by: PSYCHIATRY & NEUROLOGY

## 2022-04-26 PROCEDURE — 90833 PR PSYCHOTHERAPY W/PATIENT W/E&M, 30 MIN (ADD ON): ICD-10-PCS | Mod: S$GLB,,, | Performed by: PSYCHIATRY & NEUROLOGY

## 2022-04-26 PROCEDURE — 99214 PR OFFICE/OUTPT VISIT, EST, LEVL IV, 30-39 MIN: ICD-10-PCS | Mod: S$GLB,,, | Performed by: PSYCHIATRY & NEUROLOGY

## 2022-04-26 PROCEDURE — 3008F PR BODY MASS INDEX (BMI) DOCUMENTED: ICD-10-PCS | Mod: CPTII,S$GLB,, | Performed by: PSYCHIATRY & NEUROLOGY

## 2022-04-26 RX ORDER — OXCARBAZEPINE 300 MG/1
TABLET, FILM COATED ORAL
Qty: 180 TABLET | Refills: 1 | Status: SHIPPED | OUTPATIENT
Start: 2022-04-26 | End: 2022-09-19

## 2022-04-26 RX ORDER — FLUVOXAMINE MALEATE 100 MG/1
TABLET, COATED ORAL
Qty: 270 TABLET | Refills: 1 | Status: SHIPPED | OUTPATIENT
Start: 2022-04-26 | End: 2022-10-11 | Stop reason: SDUPTHER

## 2022-04-26 NOTE — PROGRESS NOTES
"ID: 29yo WM with PDD, Impulse Control d/o, anxiety d/o nos.     CC: anxiety    Interim Hx: presents on time with his mother and aid, tomasa.     Pt is doing well today. Stable. Only cont'd issue is cont'd uti's- working with urology on this.     Mom has some aortic stenosis and some blockages- a failed stress test. Is now prepping for stent placement and it has prompted her to get her will in order - which has not been revised since   last year- and it has been "an eye opener for the other grown kids. Talk about scared. Just awaking them to the realities of Ru, too. But we're ok."     During her upcoming procedures ru will go stay with Tomasa at her home with her  which mom is so grateful for. Her procedure is scheduled for Thursday.     On Psychiatric ROS:    See above for pertinents.    PSYCHOTHERAPY ADD-ON   30 (16-37*) minutes    Time: 20 minutes  Participants: Met with patient and mother     Therapeutic Intervention Type: insight oriented psychotherapy, behavior modifying psychotherapy, supportive psychotherapy  Why chosen therapy is appropriate versus another modality: relevant to diagnosis, patient responds to this modality, evidence based practice    Target symptoms: anxiety, caregiver support to mom   Primary focus: accessing appropriate resources  Psychotherapeutic techniques: support, validation, reframing    Outcome monitoring methods: self-report, observation, feedback from family    Patient's response to intervention:  The patient's response to intervention is accepting, motivated.    Progress toward goals:  The patient's progress toward goals is good.    PPHx: Endorses h/o self injury- will pick at scabs in sort of obsessive way- this has stabilized  Denies inpt psych hospitalization  Denies h/o suicide attempt     Current Psych Meds: **Trileptal 300mg po BID through neuro for sz d/o, luvox 150mg po BID  Past Psych Meds: s/e's to mult prev meds to include zyprexa (wgt " "gain), lexapro 20mg po qam (anxiety and to suppress sex drive), buspar 10mg po BID, xanax 0.5mg po daily PRN anxiety     PMHx: obesity, onel w/ cipap, seizure d/o, scoliosis surgery at 13yo led to hemiparalysis- now walking but has neurogenic bladder and frequent catheterization, partial nephrectomy R kidney    SubstHx:   T- none  E- none  D- none  Caffeine- very rare, if ever    FamPHx: mAunt- schizophrenic, mcousin- schizophrenia    Musculoskeletal:  Muscle strength/Tone: no dyskinesia/ no tremor  Gait/Station- non antalgic, no assistance needed    MSE: appears stated age, well groomed, obese, appropriate dress- shorts/tshirt, engages well with examiner at a childlike level.  Good e/c. Speech reg rate and vol, nonpressured. less spontaneous speech today. Mood is "i'm good". Affect euthymic. Oriented to month and season. Narrative memory intact. Intellectual function is below avg based on vocab and basic fund of knowledge- PDD long h/o sp ed. Thought is perseverative but he is tracking in conversation. No tangentiality or circumstantiality. No FOI/WES. Denies SI/HI. Denies A/VH. No evidence of delusions. Insight lacking and Judgment in keeping with insight.     Blood pressure 114/74, pulse 65, height 5' 10" (1.778 m), weight 114.8 kg (253 lb 3.2 oz).    Suicide Risk Assessment:   Protective- age, no prior attempts, no prior hospitalizations, no family h/o attempts, no ongoing substance abuse, no psychosis, denies SI/intent/plan, no h/o violence, seeking treatment, access to treatment, future oriented, good primary support, no access to firearms    Risk- race, gender, ongoing Axis I sxs    **Pt is at LOW imminent and long term risk of suicide given current risk factors.    Assessment:  30yo WM with PDD, Impulse Control d/o, anxiety d/o nos. Last saw RENNY Murillo 5/2016. Pt presented initially with his mother and caregiver Helen who has been with the pt x 8mos. Currently the pt is doing very well. Of note pt is only on " "lexapro for anxiety, is also on trileptal 300mg po BID for seizure d/o through neuro and has HALEY on cipap. Has great family support, receives 88hrs per week of direct care from outside service provider, has assistance with all ADLs and IADLs but seems content with arrangement. No h/o violent or aggressive behavior. Had manic sxs with psychosis when on keppra but never before or since. Has recently stopped zyprexa due to significant weight gain and there has been no increase in behavior or anxiety. Per parent and caregiver there is some concern that a prev caregiver who was recently fired was motivated for the pt to be more sedated-  they both denied the pt needed any change in medication. Hussein is doing well, has some instances of intrusive behavior with young attractive women and he becomes difficult to redirect- added a trial of PRN buspar for days he has public outings- this has been effective and helpful. Today the incidents have become more difficult to redirect- will order a trial of xanax 0.25-0.5mg po daily PRN and also inc buspar PRN to 10mg daily as needed for public outings. Today on f/u pt's mother does not believe buspar has been effective- xanax effective but cannot be given in the moment as "it just happens so fast"- inability to redirect pt in public has become a safety concern for women and for the pt as authorities are not typically well trained in spectrum disorders (mom working to educate the police force). Warrants a transition to new ssri- will taper off lexapro and likely start luvox at the next appt. Last appt on f/u he is "no worse" without the meds- mom motivated to cont without as they cont to observe. Anxiety was mildly inc'd but hard to attribute to lack of med as his primary caregiver just left work abruptly with pregnancy complications- change of schedule and personnel hard for Hussein. Last appt 2 new providers, doing well with both, mom has placed cameras and she has no concerns " about the new hires. Routine back in place and ru had anxiety but was functioning well- mom would like to cont w/o meds. Will cont to observe. It was time to intervene with meds per mom, started trial of luvox which initially helped at 50mg bid, we then inc'd to 100mg bid with s/e of diarrhea and sweating, now back to 50mg po bid with resolution of most concerning s/e and still improvement in anxiety- will cont at this time but mom will cont to do r/b assessment. Pt's father with worsening health- leading to some anxiety, new caregiver now gone after some unprofessional behavior. Pt doing well on the dec'd luvox dose and favorite caregiver is now back and working 25hrs. Self injury dec'd. Recent hospital stay for uti and sepsis- now back to baseline. Some recent transaminitis- trending down- will cont to follow. Initially did well on inc'd fluvox to 100mg po qam and 50mg po qhs- but last appt they reported inc'd anxiety and inc'd need for use of xanax and that the xanax 0.25mg has not been effective for relief of sxs recently- inc'd fluvox from 100bid (mom titrated the dose) to 150mg po BID. Will also inc xanax to 0.5mg po daily PRN anxiety.  Skin picking improved, escalating less frequently. Will cont daily xanax 0.5mg po qam and cont to observe. No acute safety concerns. rtc 3mos.     Pt now weaning off xanax. Has had some regression along with some medical concerns- uncertain of cause for either. Mom feels perhaps due to slow titration of the xanax use which became daily- may have led to disinhibition. Will d/c the now 0.25mg daily dose and we will observe for 1-2 wks. May then begin adjusting luvox? Although i'm uncertain this is playing a role. Mom interested in trial of cbd oil and while I did not recommend I appreciate her honest report of trial of use- we will monitor. Pt back to baseline today- uncertain of cause of recent decompensation but grateful for stabilization. Pt's father has passed- it has  brought some relief to home environment which could be contributing to pt's improvement. Today pt's mom reports he's been a bit more anxious- correlates with poor sleep. Will move mag to  and reach out via portal. Will cont meds w/o change today- pt better today- just from scheduling settling down. Stable. Interacting well with his caregiver.     given my relocation, pt will transition to a new provider when available. Pvu.    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 Cont luvox 150mg po BID   2. Cont Trileptal 300mg po BID per neuro  3. RTC 3mos    -Supportive therapy and psychoeducation provided  -R/B/SE's of medications discussed with the pt who expresses understanding and chooses to take medications as prescribed.   -Pt instructed to call clinic, 911 or go to nearest emergency room if sxs worsen or pt is in   crisis. The pt expresses understanding.

## 2022-05-02 DIAGNOSIS — R13.10 DYSPHAGIA, UNSPECIFIED TYPE: ICD-10-CM

## 2022-05-02 NOTE — TELEPHONE ENCOUNTER
Care Due:                  Date            Visit Type   Department     Provider  --------------------------------------------------------------------------------                                EP -                              PRIMARY      [facility]  Last Visit: 04-      CARE ([facility])   MEDICINE       CLARICE ARIAS  Next Visit: None Scheduled  None         None Found                                                            Last  Test          Frequency    Reason                     Performed    Due Date  --------------------------------------------------------------------------------    Uric Acid...  12 months..  allopurinoL..............  02- 01-    Powered by [employer]. Reference number: [id number].   5/02/2022 11:38:31 AM CDT

## 2022-05-03 NOTE — TELEPHONE ENCOUNTER
Refill Routing Note   Medication(s) are not appropriate for processing by [facility] for the following reason(s):      - Indication is outside of scope for [facility]    [facility] action(s):  Route Medication-related problems identified: Requires labs     Medication Therapy Plan: Labs required: Uric acid  Medication reconciliation completed: No     Appointments  past 12m or future 3m with PCP    Date Provider   Last Visit   4/11/2022 Reji Zendejas Jr., MD   Next Visit   Visit date not found Reji Zendejas Jr., MD   ED visits in past 90 days: 1        Note composed:6:44 AM 05/03/2022

## 2022-05-04 RX ORDER — PANTOPRAZOLE SODIUM 40 MG/1
TABLET, DELAYED RELEASE ORAL
Qty: 90 TABLET | Refills: 3 | Status: SHIPPED | OUTPATIENT
Start: 2022-05-04 | End: 2023-03-08

## 2022-05-11 ENCOUNTER — OFFICE VISIT (OUTPATIENT)
Dept: NEUROLOGY | Facility: CLINIC | Age: 37
End: 2022-05-11
Payer: MEDICARE

## 2022-05-11 VITALS
DIASTOLIC BLOOD PRESSURE: 72 MMHG | SYSTOLIC BLOOD PRESSURE: 113 MMHG | WEIGHT: 251.13 LBS | BODY MASS INDEX: 35.95 KG/M2 | RESPIRATION RATE: 18 BRPM | HEIGHT: 70 IN | HEART RATE: 73 BPM

## 2022-05-11 DIAGNOSIS — G40.909 SEIZURE DISORDER: Primary | ICD-10-CM

## 2022-05-11 DIAGNOSIS — M81.0 OSTEOPOROSIS, UNSPECIFIED OSTEOPOROSIS TYPE, UNSPECIFIED PATHOLOGICAL FRACTURE PRESENCE: ICD-10-CM

## 2022-05-11 PROCEDURE — 1160F PR REVIEW ALL MEDS BY PRESCRIBER/CLIN PHARMACIST DOCUMENTED: ICD-10-PCS | Mod: CPTII,S$GLB,, | Performed by: NURSE PRACTITIONER

## 2022-05-11 PROCEDURE — 99214 OFFICE O/P EST MOD 30 MIN: CPT | Mod: S$GLB,,, | Performed by: NURSE PRACTITIONER

## 2022-05-11 PROCEDURE — 1160F RVW MEDS BY RX/DR IN RCRD: CPT | Mod: CPTII,S$GLB,, | Performed by: NURSE PRACTITIONER

## 2022-05-11 PROCEDURE — 3008F PR BODY MASS INDEX (BMI) DOCUMENTED: ICD-10-PCS | Mod: CPTII,S$GLB,, | Performed by: NURSE PRACTITIONER

## 2022-05-11 PROCEDURE — 1159F MED LIST DOCD IN RCRD: CPT | Mod: CPTII,S$GLB,, | Performed by: NURSE PRACTITIONER

## 2022-05-11 PROCEDURE — 3074F PR MOST RECENT SYSTOLIC BLOOD PRESSURE < 130 MM HG: ICD-10-PCS | Mod: CPTII,S$GLB,, | Performed by: NURSE PRACTITIONER

## 2022-05-11 PROCEDURE — 3078F DIAST BP <80 MM HG: CPT | Mod: CPTII,S$GLB,, | Performed by: NURSE PRACTITIONER

## 2022-05-11 PROCEDURE — 3074F SYST BP LT 130 MM HG: CPT | Mod: CPTII,S$GLB,, | Performed by: NURSE PRACTITIONER

## 2022-05-11 PROCEDURE — 99214 PR OFFICE/OUTPT VISIT, EST, LEVL IV, 30-39 MIN: ICD-10-PCS | Mod: S$GLB,,, | Performed by: NURSE PRACTITIONER

## 2022-05-11 PROCEDURE — 3008F BODY MASS INDEX DOCD: CPT | Mod: CPTII,S$GLB,, | Performed by: NURSE PRACTITIONER

## 2022-05-11 PROCEDURE — 99999 PR PBB SHADOW E&M-EST. PATIENT-LVL V: CPT | Mod: PBBFAC,,, | Performed by: NURSE PRACTITIONER

## 2022-05-11 PROCEDURE — 99999 PR PBB SHADOW E&M-EST. PATIENT-LVL V: ICD-10-PCS | Mod: PBBFAC,,, | Performed by: NURSE PRACTITIONER

## 2022-05-11 PROCEDURE — 1159F PR MEDICATION LIST DOCUMENTED IN MEDICAL RECORD: ICD-10-PCS | Mod: CPTII,S$GLB,, | Performed by: NURSE PRACTITIONER

## 2022-05-11 PROCEDURE — 3078F PR MOST RECENT DIASTOLIC BLOOD PRESSURE < 80 MM HG: ICD-10-PCS | Mod: CPTII,S$GLB,, | Performed by: NURSE PRACTITIONER

## 2022-05-11 NOTE — ASSESSMENT & PLAN NOTE
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 Continue!   - last seizure was reported by mother about 8 yrs ago   Mother admits that pt was mistakenly taken Trileptal once a day in the past instead of BID. emphasized importance of AED compliance.   Following LFT trends   - pt actively following with hepatology  Frequent UTIs likely from intermittent caths   - expressed importance of AEDs as UTI can lower seizure threshold.  Obtain updated Dexa scan as last one reported osteoporosis.    - will message PCP in regards to these results

## 2022-05-11 NOTE — PROGRESS NOTES
"    NEUROLOGY  Outpatient Follow Up    [facility]  [address] [facility], [place] [address]  [phone] (office) / [phone] (fax)    Patient Name:  Hussein Doyle  :  1985  MR #:  [id number]  Acct #:  [id number]    Date of Neurology Visit: 2022  Name of Provider: XIN Peterson    Other Physicians:  Reji Zendejas Jr, MD (Primary Care Physician); No ref. provider found (Referring)      Chief Complaint: Seizures      History of Present Illness (HPI):  Per Dr. Salinas's note:  The patient is a 34 y.o. male referred for management of epilepsy.  He previously saw Dr. Sunshine.  This is my first time seeing him.  He provides little in the way of history due to his cognitive issues, so most of the history was obtained from his caregiver.  Additional history is as noted below.  Briefly, though, this gentleman suffers from a developmental delay and has a history of a prior GTC seizure.  He had onset of new episodes involving behavioral outbursts.  He was admitted to the EMU, and these were found not to be epileptic in nature.  Since the last time he was seen, he has had no further episodes worrisome for seizures.    Prior history:  "First seizure suspected in , onset was not witnessed but caused him to fall to the ground, period of unresponsiveness, had hit his head. Not clear if there was convulsive activity at that time. Mother also describes episodes where eyes roll back, he falls to the ground, has generalized convulsive activity and is disoriented / confused for a short time following. Frequency is approximately once every 2 months, last episode 2 months ago. More prominent since 2014.      Over the past few months and in particular 6 weeks, has had increase in behavioral outbursts. Mother is not sure if this is directly result of subclinical seizure or not but describes shouting out, inability to follow through with instructions or direction, eyes rolling " "(brief, eyes flit back and to right, forceful eye closure lasting 1-2 seconds) without change in muscle tone or consciousness. Concerned his behaviors have been regressing, he mentions name of caregiver from many years ago often. Repeats phrases (you need to have dark hair), appears agitated, unable to sit still. In the past month has had increase in dose of Keppra; had been on 250 BID until 12/4, dose was increased to 500 BID. At the time of visit with Dr. Parker on 12/16 was on 1500 mg HS. Family desperate to switch Keppra if this will improve behaviors. "        Interval Hx 7/13/2020:   Patient is new to me  Patient here today with mother (caretaker) who also supplies all history. Mother states patient has been dealing with multiple symptoms including diarrhea, excessive sweating, more outburst, constipation, generalized weakness and states "just not his usual self". Patient was recently hospitalized for UTI and constipation. Mother states patient gets UTIs often as he gets self-catheterizations from neurogenic bladder. Patient does have new active bladder infection currently and will be seeing his PCP tomorrow to make sure he is on the correct antibiotics. Patient had endoscopic procedure this morning.   Mother is frustrated as she states a lot of testing has been completed but she still has no answers.     On further note, patient has been seizure free and tolerating the Trileptal well (300mg BID). His latest LFTs are still elevated but have improved.   As previously recommended, patient has seen Hepatology in Feb 2020 and is scheduled for liver biopsy 7/20/2020.   Patient has also lost weight as previously recommended.       Last seizure: ~ 2014 as per report    AEDs:  Trileptal 300mg BID      Interval Hx 9/29/2020 (Dr. Salinas):  "The patient is a 34 y.o. male seen previously for a history of epilepsy who also has events that are nonepileptic in nature.  I last saw him in 1/20.  He did see Josselyn Red in " "7/20.  Since he was last here, he has had no seizures.  Question related to his Trileptal has previously been raised regarding a recent increase in his LFTs beyond his chronic transaminitis.  Of note, the patient has previously been on Keppra and had psychiatric side effects.  He has seen hepatology, and liver biopsy is pending.  His mother's primary concerns center around behavioral issues, sweating, right eye twitching, and restlessness of the legs."     Interval history 2/2021 (Dr. Salinas):  "The patient is a 35 y.o. male seen previously for a history of epilepsy who also has events that are nonepileptic in nature.  I last saw him in 9/20.  Since he was last here, he has had no seizures.  They report no side effects from his Trileptal.  I had previously discussed with hepatology whether or not they had concerns that this drug might be a contributory factor to his cirrhosis.  They indicated to me that they had no such worries and that there was no indication for changing the drug from their perspective."    Interval history 11/2021 (Dr. Salinas):  "The patient is a 36 y.o. male seen previously for a history of epilepsy who also has events that are nonepileptic in nature.  I last saw him in 2/21.  Since he was last here, he has had no seizures.  They report no clear side effects from his Trileptal, though he may be somewhat off balance at times.  In particular, his caregivers think it is unusual that he uses his feet to  objects rather than bending over to pick them up with his hands.  I had previously discussed with hepatology whether or not they had concerns that this drug might be a contributory factor to his cirrhosis.  They indicated to me that they had no such worries and that there was no indication for changing the drug from their perspective."      Interval Hx 5/11/2022:  Patient is here today for seizure follow up. He is accompanied by his caretaker and mother who supply information. There have been no " reported seizures. Mother states he has been suffering frequent UTIs due to intermittent catheterizations. Patient is maintained on Trileptal 300 mg BID and tolerating well. He is actively following Hepatology and will have labs drawn in the next couple of weeks.             Treatment to date:    Endoscopy 7/13/2020  Liver Biopsy scheduled for 7/20/2020            Review of Systems:    Completed by Mother at time of appt    General: Weight gain: No, Weight Loss: Yes, Fatigue: yes,   Respiratory:  Cough: No, Shortness of Breath: No,   Endocrine: Heat Intolerance: No, Cold Intolerance: No,  Eyes:  Blurred Vision: No, Double Vision: No,  Musculoskeletal: Muscle Aches/Pain: No, Joint Pain/Swelling: No, Muscle Cramps: No, Muscle Weakness: Yes,   Neurological: Difficulty Walking/Falls: No, Headache Migraine: No, Dizziness/Vertigo: No, Fainting: No, Difficulty with Speech: No, Weakness: Yes, Tingling/Numbness: No, Tremors: No, Seizures: Yes, Difficulty Swallowing: No,   Cardiovascular: Chest Pain: No, Shortness of Breath: No,   Gastrointestinal: Nausea/Vomiting: No, Constipation: Yes, Diarrhea: Yes,   Psych/Cog:  Depression: Yes, Anxiety: No, Problems Concentrating: Yes  :  Incontinence: No,  Urinary Infections: No  ENT:Hearing Loss: No, Earache: No, Ringing in Ears: Yes,   The remainder of the review of twelve body systems was reviewed and normal.    Past Medical, Surgical, Family & Social History:   Reviewed and updated.    Home Medications:     Current Outpatient Medications:     AFLURIA QD 2021-22,3YR UP,,PF, 60 mcg (15 mcg x 4)/0.5 mL Syrg, , Disp: , Rfl:     allopurinoL (ZYLOPRIM) 100 MG tablet, TAKE ONE TABLET BY MOUTH TWICE DAILY, Disp: 180 tablet, Rfl: 2    amoxicillin (AMOXIL) 250 MG capsule, Take 250 mg by mouth nightly., Disp: , Rfl:     ascorbic acid, vitamin C, 250 mg Chew, Take by mouth., Disp: , Rfl:     calcium carbonate-vitamin D3 500 mg(1,250mg) -400 unit Tab, Take 1 tablet by mouth 2 (two) times  "a day. , Disp: , Rfl:     fluvoxaMINE (LUVOX) 100 MG tablet, TAKE ONE AND A HALF TABLETS BY MOUTH TWICE DAILY, Disp: 270 tablet, Rfl: 1    inulin (FIBER GUMMIES ORAL), Take 2 tablets by mouth once daily. , Disp: , Rfl:     L. ACIDOPHILUS/BIFID. ANIMALIS (CHEWABLE PROBIOTIC ORAL), Take 1 tablet by mouth 2 (two) times daily. , Disp: , Rfl:     lactulose (CHRONULAC) 10 gram/15 mL solution, TAKE ONE TAKE ONE TABLESPOONFUL BY MOUTH THREE TIMES DAILY, Disp: 473 mL, Rfl: 3    levoFLOXacin (LEVAQUIN) 750 MG tablet, Take 1 tablet (750 mg total) by mouth once daily., Disp: 6 tablet, Rfl: 0    magnesium 30 mg Tab, Take 1 tablet by mouth once daily. , Disp: , Rfl:     MELATONIN ORAL, Take 1 tablet by mouth every evening., Disp: , Rfl:     multivitamin capsule, Take 1 capsule by mouth every morning. , Disp: , Rfl:     mupirocin calcium 2% (BACTROBAN) 2 % cream, Apply topically 3 (three) times daily., Disp: 15 g, Rfl: 1    NON FORMULARY MEDICATION, CBD Oil- OTC, Disp: , Rfl:     omega-3 fatty acids/fish oil (FISH OIL-OMEGA-3 FATTY ACIDS) 300-1,000 mg capsule, Take 1 capsule by mouth once daily. , Disp: , Rfl:     OXcarbazepine (TRILEPTAL) 300 MG Tab, TAKE ONE TABLET (300MG) BY MOUTH TWICE DAILY, Disp: 180 tablet, Rfl: 1    pantoprazole (PROTONIX) 40 MG tablet, TAKE ONE TABLET (40MG) BY MOUTH ONCE DAILY, Disp: 90 tablet, Rfl: 3    polyethylene glycol (GLYCOLAX) 17 gram PwPk, Take 17 g by mouth once daily. , Disp: , Rfl:     pramipexole (MIRAPEX) 0.125 MG tablet, TAKE ONE TABLET BY MOUTH THREE TIMES DAILY, Disp: 90 tablet, Rfl: 5    tamsulosin (FLOMAX) 0.4 mg Cap, Take 0.4 mg by mouth every evening. Every day, Disp: , Rfl:     zinc gluconate 50 mg tablet, Take 50 mg by mouth once daily., Disp: , Rfl:     Physical Examination:  /72 (BP Location: Left arm, Patient Position: Sitting, BP Method: Large (Automatic))   Pulse 73   Resp 18   Ht 5' 10" (1.778 m)   Wt 113.9 kg (251 lb 1.7 oz)   BMI 36.03 kg/m² "     GENERAL:  General appearance: Well, non-toxic appearing.  No apparent distress.  Neck: supple.  Inattentive      MENTAL STATUS:  Alertness, attention span & concentration: normal.  Language: mumbles words  Does not follow commands      GROSS MOTOR:  Gait & station: normal.  Tone: normal.  Moves all extremities equally                Diagnostic Data Reviewed:   Component      Latest Ref Rng & Units 7/9/2020 6/30/2020   WBC      3.90 - 12.70 K/uL  8.86   RBC      4.60 - 6.20 M/uL  4.13 (L)   Hemoglobin      14.0 - 18.0 g/dL  13.9 (L)   Hematocrit      40.0 - 54.0 %  39.4 (L)   MCV      82 - 98 fL  95   MCH      27.0 - 31.0 pg  33.7 (H)   MCHC      32.0 - 36.0 g/dL  35.3   RDW      11.5 - 14.5 %  12.2   Platelets      150 - 350 K/uL  197   MPV      9.2 - 12.9 fL  9.6   Immature Granulocytes      0.0 - 0.5 %  0.1   Gran # (ANC)      1.8 - 7.7 K/uL  4.8   Immature Grans (Abs)      0.00 - 0.04 K/uL  0.01   Lymph #      1.0 - 4.8 K/uL  3.3   Mono #      0.3 - 1.0 K/uL  0.6   Eos #      0.0 - 0.5 K/uL  0.1   Baso #      0.00 - 0.20 K/uL  0.05   nRBC      0 /100 WBC  0   Gran%      38.0 - 73.0 %  54.2   Lymph%      18.0 - 48.0 %  36.8   Mono%      4.0 - 15.0 %  6.9   Eosinophil%      0.0 - 8.0 %  1.4   Basophil%      0.0 - 1.9 %  0.6   Differential Method        Automated   Sodium      136 - 145 mmol/L  140   Potassium      3.5 - 5.1 mmol/L  4.1   Chloride      95 - 110 mmol/L  108   CO2      22 - 31 mmol/L  25   Glucose      70 - 110 mg/dL  84   BUN, Bld      9 - 21 mg/dL  15   Creatinine      0.50 - 1.40 mg/dL  0.64   Calcium      8.4 - 10.2 mg/dL  9.9   PROTEIN TOTAL      6.0 - 8.4 g/dL  8.6 (H)   Albumin      3.5 - 5.2 g/dL  4.6   BILIRUBIN TOTAL      0.2 - 1.3 mg/dL  0.6   Alkaline Phosphatase      38 - 145 U/L  65   AST      17 - 59 U/L  151 (H)   ALT      0 - 50 U/L  119 (H)   Anion Gap      8 - 16 mmol/L  7 (L)   eGFR if African American      >60 mL/min/1.73 m:2  >60   eGFR if non       >60  mL/min/1.73 m:2  >60   Specimen UA       Urine, Catheterized    Color, UA      Yellow, Straw, Dee Yellow    Appearance, UA      Clear Cloudy (A)    pH, UA      5.0 - 8.0 7.0    Specific Gravity, UA      1.005 - 1.030 1.010    Protein, UA      Negative Negative    Glucose, UA      Negative Negative    Ketones, UA      Negative Negative    Bilirubin (UA)      Negative Negative    Occult Blood UA      Negative Negative    NITRITE UA      Negative Positive (A)    UROBILINOGEN UA      <2.0 EU/dL 0.2    Leukocytes, UA      Negative 3+ (A)    Squam Epithel, UA      /hpf 0    WBC, UA      0 - 5 /hpf >100 (H)    Hyaline Casts, UA      0 - 1 /lpf 6 (A)    Bacteria, UA      None-Occ /hpf Few (A)      Component      Latest Ref Rng & Units 6/20/2020   OXCARBAZEPINE      3 - 35 mcg/mL 7     Abd u/s 5/2018 - liver enlarged, fatty liver, no liver masses, no biliary dilation.  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 Continue!   - last seizure was reported by mother about 8 yrs ago   Mother admits that pt was mistakenly taken Trileptal once a day in the past instead of BID. emphasized importance of AED compliance.   Following LFT trends   - pt actively following with hepatology  Frequent UTIs likely from intermittent caths   - expressed importance of AEDs as UTI can lower seizure threshold.  Obtain updated Dexa scan as last one reported osteoporosis.    - will message PCP in regards to these results                  Orthopedic    Osteoporosis    Current Assessment & Plan     Repeat Dexa                                   Important to note, also  has a past medical history of Autistic disorder, active (5/6/2013), Bowel obstruction, Early intervention  counseling, Gout, chronic (11/12/2015), Grand mal seizure, Hepatic steatosis, Impulse control disorder, unspecified (5/6/2013), Mastoiditis, Moderate mental retardation (5/6/2013), Neurogenic bladder, Neurogenic bladder, HALEY (obstructive sleep apnea) (11/12/2015), Otitis media, Paraplegia, Pervasive developmental disorder, active (12/27/2015), Renal cancer (2010), Scoliosis, Seizure disorder (11/12/2015), Stroke, and Tardive dyskinesia.          The patient will return to clinic in 3 months.    All questions were answered and patient is comfortable with the plan.         Thank you very much for the opportunity to assist in this patient's care.    If you have any questions or concerns, please do not hesitate to contact me at any time.    Sincerely,   XIN Peterson                I spent a total of 35 minutes on the day of the visit.This includes face to face time and non-face to face time preparing to see the patient (eg, review of tests), Obtaining and/or reviewing separately obtained history, Documenting clinical information in the electronic or other health record, Independently interpreting resultsand communicating results to the patient/family/caregiver, or Care coordination.

## 2022-05-12 PROBLEM — M81.0 OSTEOPOROSIS: Status: ACTIVE | Noted: 2022-05-12

## 2022-05-21 ENCOUNTER — PATIENT MESSAGE (OUTPATIENT)
Dept: FAMILY MEDICINE | Facility: CLINIC | Age: 37
End: 2022-05-21
Payer: MEDICARE

## 2022-05-24 RX ORDER — LACTULOSE 10 G/15ML
SOLUTION ORAL; RECTAL
Qty: 473 ML | Refills: 3 | Status: SHIPPED | OUTPATIENT
Start: 2022-05-24 | End: 2022-08-05

## 2022-05-24 NOTE — TELEPHONE ENCOUNTER
Refill Routing Note   Medication(s) are not appropriate for processing by [facility] for the following reason(s):      - Outside of protocol    [facility] action(s):  Route          Medication reconciliation completed: No     Appointments  past 12m or future 3m with PCP    Date Provider   Last Visit   4/11/2022 Reji Zendejas Jr., MD   Next Visit   Visit date not found Reji Zendejas Jr., MD   ED visits in past 90 days: 1        Note composed:12:09 PM 05/24/2022

## 2022-05-26 ENCOUNTER — HOSPITAL ENCOUNTER (OUTPATIENT)
Dept: RADIOLOGY | Facility: HOSPITAL | Age: 37
Discharge: HOME | End: 2022-05-26
Attending: NURSE PRACTITIONER
Payer: MEDICARE

## 2022-05-26 DIAGNOSIS — G40.909 SEIZURE DISORDER: ICD-10-CM

## 2022-05-26 DIAGNOSIS — M81.0 OSTEOPOROSIS, UNSPECIFIED OSTEOPOROSIS TYPE, UNSPECIFIED PATHOLOGICAL FRACTURE PRESENCE: ICD-10-CM

## 2022-05-26 PROCEDURE — 77081 DEXA BONE DENSITY APPENDICULAR SKELETON: ICD-10-PCS | Mod: 26,,, | Performed by: RADIOLOGY

## 2022-05-26 PROCEDURE — 77080 DEXA BONE DENSITY SPINE HIP: ICD-10-PCS | Mod: 26,XS,, | Performed by: RADIOLOGY

## 2022-05-26 PROCEDURE — 77081 DXA BONE DENSITY APPENDICULR: CPT | Mod: TC,PO,59

## 2022-05-26 PROCEDURE — 77080 DXA BONE DENSITY AXIAL: CPT | Mod: 26,XS,, | Performed by: RADIOLOGY

## 2022-05-26 PROCEDURE — 77081 DXA BONE DENSITY APPENDICULR: CPT | Mod: 26,,, | Performed by: RADIOLOGY

## 2022-05-26 PROCEDURE — 77080 DXA BONE DENSITY AXIAL: CPT | Mod: TC,PO

## 2022-05-31 ENCOUNTER — TELEPHONE (OUTPATIENT)
Dept: NEUROLOGY | Facility: CLINIC | Age: 37
End: 2022-05-31
Payer: MEDICARE

## 2022-05-31 NOTE — TELEPHONE ENCOUNTER
----- Message from ALLA Rodrigues sent at 5/31/2022 10:27 AM CDT -----  Dexa reports osteoporosis as did his previous Dexa from 2020. Please make sure his PCP and family are aware of this as his PCP will be the one managing it.

## 2022-05-31 NOTE — TELEPHONE ENCOUNTER
Spoke with patient's mother regarding results and recommendations per ALLA Peterson. Patient's mother v/u

## 2022-06-14 ENCOUNTER — HOSPITAL ENCOUNTER (OUTPATIENT)
Dept: RADIOLOGY | Facility: HOSPITAL | Age: 37
Discharge: HOME | End: 2022-06-14
Attending: NURSE PRACTITIONER
Payer: MEDICARE

## 2022-06-14 DIAGNOSIS — K74.60 CIRRHOSIS OF LIVER WITHOUT ASCITES, UNSPECIFIED HEPATIC CIRRHOSIS TYPE: ICD-10-CM

## 2022-06-14 DIAGNOSIS — K75.81 NASH (NONALCOHOLIC STEATOHEPATITIS): ICD-10-CM

## 2022-06-14 PROCEDURE — 76705 US ABDOMEN LIMITED: ICD-10-PCS | Mod: 26,,, | Performed by: RADIOLOGY

## 2022-06-14 PROCEDURE — 76705 ECHO EXAM OF ABDOMEN: CPT | Mod: 26,,, | Performed by: RADIOLOGY

## 2022-06-14 PROCEDURE — 76705 ECHO EXAM OF ABDOMEN: CPT | Mod: TC,PO

## 2022-06-16 ENCOUNTER — LAB VISIT (OUTPATIENT)
Dept: LAB | Facility: HOSPITAL | Age: 37
End: 2022-06-16
Attending: NURSE PRACTITIONER
Payer: MEDICARE

## 2022-06-16 DIAGNOSIS — K74.60 CIRRHOSIS OF LIVER WITHOUT ASCITES, UNSPECIFIED HEPATIC CIRRHOSIS TYPE: ICD-10-CM

## 2022-06-16 LAB
AFP SERPL-MCNC: <2 NG/ML (ref 0–8.4)
ALBUMIN SERPL BCP-MCNC: 4.2 G/DL (ref 3.5–5.2)
ALP SERPL-CCNC: 71 U/L (ref 55–135)
ALT SERPL W/O P-5'-P-CCNC: 94 U/L (ref 10–44)
ANION GAP SERPL CALC-SCNC: 8 MMOL/L (ref 8–16)
AST SERPL-CCNC: 89 U/L (ref 10–40)
BILIRUB SERPL-MCNC: 0.6 MG/DL (ref 0.1–1)
BUN SERPL-MCNC: 11 MG/DL (ref 6–20)
CALCIUM SERPL-MCNC: 9.6 MG/DL (ref 8.7–10.5)
CHLORIDE SERPL-SCNC: 106 MMOL/L (ref 95–110)
CO2 SERPL-SCNC: 26 MMOL/L (ref 23–29)
CREAT SERPL-MCNC: 0.8 MG/DL (ref 0.5–1.4)
ERYTHROCYTE [DISTWIDTH] IN BLOOD BY AUTOMATED COUNT: 12.5 % (ref 11.5–14.5)
EST. GFR  (AFRICAN AMERICAN): >60 ML/MIN/1.73 M^2
EST. GFR  (NON AFRICAN AMERICAN): >60 ML/MIN/1.73 M^2
GLUCOSE SERPL-MCNC: 92 MG/DL (ref 70–110)
HCT VFR BLD AUTO: 44.6 % (ref 40–54)
HGB BLD-MCNC: 15.8 G/DL (ref 14–18)
INR PPP: 1 (ref 0.8–1.2)
MCH RBC QN AUTO: 33.3 PG (ref 27–31)
MCHC RBC AUTO-ENTMCNC: 35.4 G/DL (ref 32–36)
MCV RBC AUTO: 94 FL (ref 82–98)
PLATELET # BLD AUTO: 186 K/UL (ref 150–450)
PMV BLD AUTO: 9.8 FL (ref 9.2–12.9)
POTASSIUM SERPL-SCNC: 4.3 MMOL/L (ref 3.5–5.1)
PROT SERPL-MCNC: 8.1 G/DL (ref 6–8.4)
PROTHROMBIN TIME: 11 SEC (ref 9–12.5)
RBC # BLD AUTO: 4.75 M/UL (ref 4.6–6.2)
SODIUM SERPL-SCNC: 140 MMOL/L (ref 136–145)
WBC # BLD AUTO: 7.62 K/UL (ref 3.9–12.7)

## 2022-06-16 PROCEDURE — 85610 PROTHROMBIN TIME: CPT | Performed by: NURSE PRACTITIONER

## 2022-06-16 PROCEDURE — 36415 COLL VENOUS BLD VENIPUNCTURE: CPT | Performed by: NURSE PRACTITIONER

## 2022-06-16 PROCEDURE — 80053 COMPREHEN METABOLIC PANEL: CPT | Performed by: NURSE PRACTITIONER

## 2022-06-16 PROCEDURE — 85027 COMPLETE CBC AUTOMATED: CPT | Performed by: NURSE PRACTITIONER

## 2022-06-16 PROCEDURE — 82105 ALPHA-FETOPROTEIN SERUM: CPT | Performed by: NURSE PRACTITIONER

## 2022-06-20 ENCOUNTER — OFFICE VISIT (OUTPATIENT)
Dept: HEPATOLOGY | Facility: CLINIC | Age: 37
End: 2022-06-20
Payer: MEDICARE

## 2022-06-20 DIAGNOSIS — R79.9 ABNORMAL FINDING OF BLOOD CHEMISTRY, UNSPECIFIED: ICD-10-CM

## 2022-06-20 DIAGNOSIS — K74.60 CIRRHOSIS OF LIVER WITHOUT ASCITES, UNSPECIFIED HEPATIC CIRRHOSIS TYPE: Primary | ICD-10-CM

## 2022-06-20 DIAGNOSIS — E66.01 MORBID (SEVERE) OBESITY DUE TO EXCESS CALORIES: ICD-10-CM

## 2022-06-20 DIAGNOSIS — K75.81 NASH (NONALCOHOLIC STEATOHEPATITIS): ICD-10-CM

## 2022-06-20 PROCEDURE — 99215 OFFICE O/P EST HI 40 MIN: CPT | Mod: 95,,, | Performed by: NURSE PRACTITIONER

## 2022-06-20 PROCEDURE — 99499 RISK ADDL DX/OHS AUDIT: ICD-10-PCS | Mod: 95,,, | Performed by: NURSE PRACTITIONER

## 2022-06-20 PROCEDURE — 99215 PR OFFICE/OUTPT VISIT, EST, LEVL V, 40-54 MIN: ICD-10-PCS | Mod: 95,,, | Performed by: NURSE PRACTITIONER

## 2022-06-20 PROCEDURE — 99499 UNLISTED E&M SERVICE: CPT | Mod: 95,,, | Performed by: NURSE PRACTITIONER

## 2022-06-20 NOTE — PROGRESS NOTES
The patient location is: [place], [place]  The chief complaint leading to consultation is: F/u for cirrhosis    Visit type: audiovisual    Face to Face time with patient: 35 min  45 minutes of total time spent on the encounter, which includes face to face time and non-face to face time preparing to see the patient (eg, review of tests), Obtaining and/or reviewing separately obtained history, Documenting clinical information in the electronic or other health record, Independently interpreting results (not separately reported) and communicating results to the patient/family/caregiver, or Care coordination (not separately reported).     Each patient to whom he or she provides medical services by telemedicine is:  (1) informed of the relationship between the physician and patient and the respective role of any other health care provider with respect to management of the patient; and (2) notified that he or she may decline to receive medical services by telemedicine and may withdraw from such care at any time.      [facility] - Established Patient    Last Clinic Visit: 12/13/21      HISTORY     This is a 36 y.o. 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 Mother present for visit.     Transaminases consistently elevated since 11/2015, AST>ALT.    Serological workup has been negative for other etiologies of liver disease.  Ferritin elevated, 800-1500 though iron sat normal. No copies of C282Y or H63D to suggest HH. IgG mildly elevated (1677) though other autoimmune markers negative.      Imaging has shown hepatomegaly, fatty liver, splenomegaly.    Diagnosed with cirrhosis: Liver biopsy 7/2020  - Cirrhotic liver  - Steatosis with steatohepatitis (please see comment and GARCIA grading form below)  - Macrovesicular steatosis 20-30% and microvesicular steatosis 20%  - Etiology: Nonalcoholic fatty liver disease (NAFLD)  - Trichrome: cirrhosis (Stage 4/4)  - Iron: No deposit (Grade  0/4)  GARCIA grading 4/8  FAT, 1 (6 to 33%)  Ballooned hepatocytes, 2 (many)  Lobular inflammation 1 (<2 foci)    Health Maintenance:  -- HCC screening: abd US 6/14/22 with no focal hepatic lesions; AFP <2   -- Variceal screening: EGD 7/2020 without varices or PHG  -- Hepatitis A & B vaccination: needs vaccines    Interval history:  He was previously able to lose ~50 lb and liver enzymes normalized. Per mom, he has gained weight back. BMI currently ~36. Enzymes are trending up again: AST 89, ALT 94.    Still has chronic constipation and UTIs.    Current symptoms of hepatic decompensation:              Ascites: no              LE edema: no                Hepatic encephalopathy: difficult to assess given autism and MR; his ammonia was mildly elevated in the past and mom thought he was having confusion. Lactulose started and he takes this 2-3 x day; this has also helped with constipation. She reports mental status at baseline lately.               GI bleeding: no              Jaundice: no    MELD-Na score: 6 at 6/16/2022 10:30 AM  MELD score: 6 at 6/16/2022 10:30 AM  Calculated from:  Serum Creatinine: 0.8 mg/dL (Using min of 1 mg/dL) at 6/16/2022 10:30 AM  Serum Sodium: 140 mmol/L (Using max of 137 mmol/L) at 6/16/2022 10:30 AM  Total Bilirubin: 0.6 mg/dL (Using min of 1 mg/dL) at 6/16/2022 10:30 AM  INR(ratio): 1.0 at 6/16/2022 10:30 AM  Age: 36 years        Past medical history, surgical history, problem list, family history, social history, allergies: Reviewed and updated in the appropriate section of the electronic medical record.  Pertinent findings:  Autism, tardive dyskinesia, seizures, renal CA, neurogenic bladder      Current Outpatient Medications   Medication Sig Dispense Refill    AFLURIA QD 2021-22,3YR UP,,PF, 60 mcg (15 mcg x 4)/0.5 mL Syrg       allopurinoL (ZYLOPRIM) 100 MG tablet TAKE ONE TABLET BY MOUTH TWICE DAILY 180 tablet 2    amoxicillin (AMOXIL) 250 MG capsule Take 250 mg by mouth nightly.       ascorbic acid, vitamin C, 250 mg Chew Take by mouth.      calcium carbonate-vitamin D3 500 mg(1,250mg) -400 unit Tab Take 1 tablet by mouth 2 (two) times a day.       fluvoxaMINE (LUVOX) 100 MG tablet TAKE ONE AND A HALF TABLETS BY MOUTH TWICE DAILY 270 tablet 1    inulin (FIBER GUMMIES ORAL) Take 2 tablets by mouth once daily.       L. ACIDOPHILUS/BIFID. ANIMALIS (CHEWABLE PROBIOTIC ORAL) Take 1 tablet by mouth 2 (two) times daily.       lactulose (CHRONULAC) 10 gram/15 mL solution TAKE ONE TAKE ONE TABLESPOONFUL BY MOUTH THREE TIMES DAILY 473 mL 3    levoFLOXacin (LEVAQUIN) 750 MG tablet Take 1 tablet (750 mg total) by mouth once daily. 6 tablet 0    magnesium 30 mg Tab Take 1 tablet by mouth once daily.       MELATONIN ORAL Take 1 tablet by mouth every evening.      multivitamin capsule Take 1 capsule by mouth every morning.       mupirocin calcium 2% (BACTROBAN) 2 % cream Apply topically 3 (three) times daily. 15 g 1    NON FORMULARY MEDICATION CBD Oil- OTC      omega-3 fatty acids/fish oil (FISH OIL-OMEGA-3 FATTY ACIDS) 300-1,000 mg capsule Take 1 capsule by mouth once daily.       OXcarbazepine (TRILEPTAL) 300 MG Tab TAKE ONE TABLET (300MG) BY MOUTH TWICE DAILY 180 tablet 1    pantoprazole (PROTONIX) 40 MG tablet TAKE ONE TABLET (40MG) BY MOUTH ONCE DAILY 90 tablet 3    polyethylene glycol (GLYCOLAX) 17 gram PwPk Take 17 g by mouth once daily.       pramipexole (MIRAPEX) 0.125 MG tablet TAKE ONE TABLET BY MOUTH THREE TIMES DAILY 90 tablet 5    tamsulosin (FLOMAX) 0.4 mg Cap Take 0.4 mg by mouth every evening. Every day      zinc gluconate 50 mg tablet Take 50 mg by mouth once daily.       No current facility-administered medications for this visit.     Medication list reviewed and updated.      Review of Systems   Constitutional: Negative for fatigue or unexpected weight change.   Respiratory: Negative for shortness of breath.    Cardiovascular: Negative for leg  swelling.  Gastrointestinal: Negative for abdominal distention, abdominal pain, diarrhea, nausea, and vomiting. Negative for blood in stool, melena, or hematemesis. +constipation  Musculoskeletal: Negative for myalgias.    Skin: Negative for itching.  Neurological: Negative for dizziness or tremors. Negative for confusion, slowed mentation, or memory loss.   Hematological: Does not bruise/bleed easily.   Psychiatric: Negative for mood changes or sleep disturbance.      Physical Exam   Constitutional: No distress.   Pulmonary/Chest: No respiratory distress.   Skin: No jaundice.   Psychiatric: At baseline per mother.        LABS & DIAGNOSTIC STUDIES     I have personally reviewed pertinent laboratory findings:    Lab Results   Component Value Date    ALT 94 (H) 06/16/2022    AST 89 (H) 06/16/2022    ALKPHOS 71 06/16/2022    BILITOT 0.6 06/16/2022    ALBUMIN 4.2 06/16/2022    INR 1.0 06/16/2022       Lab Results   Component Value Date    WBC 7.62 06/16/2022    WBC 7.30 06/16/2022    HGB 15.8 06/16/2022    HGB 15.6 06/16/2022    HCT 44.6 06/16/2022    HCT 45.8 06/16/2022    MCV 94 06/16/2022    MCV 95 06/16/2022     06/16/2022     06/16/2022       Lab Results   Component Value Date     06/16/2022    K 4.3 06/16/2022    BUN 11 06/16/2022    CREATININE 0.8 06/16/2022    ESTGFRAFRICA >60 06/16/2022    EGFRNONAA >60 06/16/2022       Lab Results   Component Value Date    SMOOTHMUSCAB Negative 1:40 04/30/2018    AMAIFA Negative 1:40 04/30/2018    IGGSERUM 1677 (H) 02/29/2020    ANASCREEN Negative <1:160 04/30/2018    FERRITIN 893 (H) 02/29/2020    FESATURATED 26 04/30/2018    CERULOPLSM 30.0 04/30/2018    HEPBSAG Negative 04/30/2018    HEPBIGM Negative 04/30/2018    HEPBCAB Negative 02/02/2021    HEPCAB Negative 04/30/2018    HEPAIGM Negative 04/30/2018       Lab Results   Component Value Date    AFP <2.0 06/16/2022       I have personally reviewed the following result reports:  Abdominal US - 6/14/22  EGD  - 7/13/20  Colonoscopy - 9/9/20  Liver biopsy - 7/20/20      ASSESSMENT & PLAN     36 y.o. male with:    1. Cirrhosis, due to TRAYLOR, well compensated  -- Labs reviewed. MELD previously <15 (currently 6), no indication for liver transplant at this time. Reassured that liver continues to work well.  -- Monitor MELD labs every 6 months  -- Continue HCC screening every 6 months with ultrasound and AFP, next due 12/2022  -- No varices on EGD 7/2020, repeat in 2 years (7/2022). Ordered to be done in [place].   -- Recommend vaccines for hepatitis A and B     2. Fatty liver / TRAYLOR, BMI ~36  -- Transaminases trending up with weight gain  -- Encouraged to continue weight loss efforts including dietary changes and physical activity  -- Maintain control of blood pressure, cholesterol, and blood sugar as these are risk factors for fatty liver    3. Possible hepatic encephalopathy  -- Ammonia normal-minimally elevated in the past, difficult to assess for HE given autism and developmental delay   -- Doing well with lactulose which has also helped his constipation. Mental status at baseline per mother        Orders Placed This Encounter   Procedures    US Abdomen Limited    CBC Without Differential    Comprehensive Metabolic Panel    Protime-INR    AFP Tumor Marker    Hemoglobin A1C    Lipid Panel       *See AVS for patient education and instructions.      F/u in 6 months with labs/US prior.      Thank you for allowing me to participate in the care of Hussein Armendariz, FNP-C  Hepatology

## 2022-06-20 NOTE — Clinical Note
Please schedule labs (CBC, CMP, INR, AFP, HgbA1c, lipid panel) and US in 6 months. Virtual visit anytime after to review results. Thanks!

## 2022-06-26 PROBLEM — E66.811 OBESITY (BMI 30.0-34.9): Status: RESOLVED | Noted: 2020-11-10 | Resolved: 2022-06-26

## 2022-06-26 PROBLEM — E66.9 OBESITY (BMI 30.0-34.9): Status: RESOLVED | Noted: 2020-11-10 | Resolved: 2022-06-26

## 2022-06-26 PROBLEM — E66.01 MORBID (SEVERE) OBESITY DUE TO EXCESS CALORIES: Status: RESOLVED | Noted: 2022-04-11 | Resolved: 2022-06-26

## 2022-06-26 NOTE — PATIENT INSTRUCTIONS
Continue lab and ultrasound monitoring every 6 months  Repeat endoscopy (EGD) this year. Ordered to be done in [place]. Can call to schedule: [phone]  Weight loss efforts for treatment of fatty liver  Will update blood sugar and cholesterol testing with next labs          This is a helpful web site about cirrhosis: [email or web address].ca/     Signs and symptoms of worsening liver disease include jaundice (yellow skin/eyes), fluid in the abdomen (ascites), and confusion/disorientation/slowed thought processes due to hepatic encephalopathy (toxins building up when the liver is not working properly). You should seek medical attention if any of these things occur. Also, possible bleeding from esophageal varices (blood vessels in the stomach and foodpipe that can burst and cause bleeding). If you have symptoms of vomiting blood, blood in your stool, maroon or black stools, or coffee ground vomit, you should go to the emergency room.     Cirrhosis Counseling  -- Strict abstinence from alcohol (includes beer, wine, and/or liquor)  -- Avoid non-steroidal anti-inflammatory drugs (NSAIDs) such as ibuprofen (Motrin, Advil), naproxen (Naprosyn, Aleve), meloxicam (Mobic)   -- Tylenol/acetaminophen is OKAY and should be used as needed for pain, no more than 2000 mg per day  -- Avoid raw shellfish due to the risk of Vibrio vulnificus infection  -- Low salt/sodium diet, less than 2000 mg per day   -- High protein diet to prevent muscle mass loss. Can drink protein shakes (Premier Protein is a great option because it is very high protein and low sugar)  -- Periodic upper endoscopy (EGD) to screen for varices (enlarged blood vessels) in the esophagus and stomach which can increase risk of bleeding  -- Increased risk of liver cancer associated with cirrhosis; therefore, continued screening with ultrasound (or CT / MRI) and AFP tumor marker every 6 months is recommended.

## 2022-06-28 ENCOUNTER — TELEPHONE (OUTPATIENT)
Dept: GASTROENTEROLOGY | Facility: CLINIC | Age: 37
End: 2022-06-28
Payer: MEDICARE

## 2022-07-01 ENCOUNTER — TELEPHONE (OUTPATIENT)
Dept: HEPATOLOGY | Facility: CLINIC | Age: 37
End: 2022-07-01
Payer: MEDICARE

## 2022-07-01 NOTE — TELEPHONE ENCOUNTER
[facility] 6 [facility]. No answer. lvm

## 2022-07-01 NOTE — TELEPHONE ENCOUNTER
----- Message from Amelia Armendariz NP sent at 6/26/2022  2:12 PM CDT -----  Please schedule labs (CBC, CMP, INR, AFP, HgbA1c, lipid panel) and US in 6 months. Virtual visit anytime after to review results. Thanks!

## 2022-07-06 ENCOUNTER — TELEPHONE (OUTPATIENT)
Dept: HEPATOLOGY | Facility: CLINIC | Age: 37
End: 2022-07-06
Payer: MEDICARE

## 2022-07-14 ENCOUNTER — PATIENT MESSAGE (OUTPATIENT)
Dept: FAMILY MEDICINE | Facility: CLINIC | Age: 37
End: 2022-07-14
Payer: MEDICARE

## 2022-07-14 ENCOUNTER — PATIENT MESSAGE (OUTPATIENT)
Dept: HEPATOLOGY | Facility: CLINIC | Age: 37
End: 2022-07-14
Payer: MEDICARE

## 2022-08-11 ENCOUNTER — TELEPHONE (OUTPATIENT)
Dept: GASTROENTEROLOGY | Facility: CLINIC | Age: 37
End: 2022-08-11
Payer: MEDICARE

## 2022-08-11 NOTE — TELEPHONE ENCOUNTER
Called patient, LVRENNY to remind him of EGD with Dr Villagomez on 8/17/22. Prep instructions are to have a light meal the evening before, nothing to eat or drink after midnight. Someone from surgery center will call a day or two before with an arrive time.

## 2022-08-12 RX ORDER — POTASSIUM CHLORIDE 750 MG/1
10 TABLET, EXTENDED RELEASE ORAL NIGHTLY
COMMUNITY

## 2022-08-15 ENCOUNTER — OFFICE VISIT (OUTPATIENT)
Dept: PSYCHIATRY | Facility: CLINIC | Age: 37
End: 2022-08-15
Payer: COMMERCIAL

## 2022-08-15 VITALS
HEART RATE: 75 BPM | SYSTOLIC BLOOD PRESSURE: 111 MMHG | DIASTOLIC BLOOD PRESSURE: 72 MMHG | BODY MASS INDEX: 35.62 KG/M2 | HEIGHT: 71 IN | WEIGHT: 254.44 LBS

## 2022-08-15 DIAGNOSIS — F84.9 PERVASIVE DEVELOPMENTAL DISORDER: ICD-10-CM

## 2022-08-15 DIAGNOSIS — F41.9 ANXIETY DISORDER, UNSPECIFIED TYPE: ICD-10-CM

## 2022-08-15 DIAGNOSIS — F63.9 IMPULSE CONTROL DISORDER: ICD-10-CM

## 2022-08-15 DIAGNOSIS — F42.9 OBSESSIVE-COMPULSIVE DISORDER, UNSPECIFIED TYPE: Primary | ICD-10-CM

## 2022-08-15 PROCEDURE — 3074F SYST BP LT 130 MM HG: CPT | Mod: CPTII,S$GLB,, | Performed by: PSYCHOLOGIST

## 2022-08-15 PROCEDURE — 99999 PR PBB SHADOW E&M-EST. PATIENT-LVL V: CPT | Mod: PBBFAC,,, | Performed by: PSYCHOLOGIST

## 2022-08-15 PROCEDURE — 90792 PR PSYCHIATRIC DIAGNOSTIC EVALUATION W/MEDICAL SERVICES: ICD-10-PCS | Mod: S$GLB,,, | Performed by: PSYCHOLOGIST

## 2022-08-15 PROCEDURE — 3078F PR MOST RECENT DIASTOLIC BLOOD PRESSURE < 80 MM HG: ICD-10-PCS | Mod: CPTII,S$GLB,, | Performed by: PSYCHOLOGIST

## 2022-08-15 PROCEDURE — 3078F DIAST BP <80 MM HG: CPT | Mod: CPTII,S$GLB,, | Performed by: PSYCHOLOGIST

## 2022-08-15 PROCEDURE — 1159F PR MEDICATION LIST DOCUMENTED IN MEDICAL RECORD: ICD-10-PCS | Mod: CPTII,S$GLB,, | Performed by: PSYCHOLOGIST

## 2022-08-15 PROCEDURE — 3008F BODY MASS INDEX DOCD: CPT | Mod: CPTII,S$GLB,, | Performed by: PSYCHOLOGIST

## 2022-08-15 PROCEDURE — 1159F MED LIST DOCD IN RCRD: CPT | Mod: CPTII,S$GLB,, | Performed by: PSYCHOLOGIST

## 2022-08-15 PROCEDURE — 3074F PR MOST RECENT SYSTOLIC BLOOD PRESSURE < 130 MM HG: ICD-10-PCS | Mod: CPTII,S$GLB,, | Performed by: PSYCHOLOGIST

## 2022-08-15 PROCEDURE — 99999 PR PBB SHADOW E&M-EST. PATIENT-LVL V: ICD-10-PCS | Mod: PBBFAC,,, | Performed by: PSYCHOLOGIST

## 2022-08-15 PROCEDURE — 90792 PSYCH DIAG EVAL W/MED SRVCS: CPT | Mod: S$GLB,,, | Performed by: PSYCHOLOGIST

## 2022-08-15 PROCEDURE — 3008F PR BODY MASS INDEX (BMI) DOCUMENTED: ICD-10-PCS | Mod: CPTII,S$GLB,, | Performed by: PSYCHOLOGIST

## 2022-08-15 RX ORDER — SULFAMETHOXAZOLE AND TRIMETHOPRIM 400; 80 MG/1; MG/1
1 TABLET ORAL DAILY
COMMUNITY
Start: 2022-08-04 | End: 2023-01-16 | Stop reason: ALTCHOICE
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
## 2022-08-16 ENCOUNTER — ANESTHESIA EVENT (OUTPATIENT)
Dept: ENDOSCOPY | Facility: HOSPITAL | Age: 37
End: 2022-08-16
Payer: MEDICARE

## 2022-08-17 ENCOUNTER — HOSPITAL ENCOUNTER (OUTPATIENT)
Facility: HOSPITAL | Age: 37
Discharge: HOME | End: 2022-08-17
Attending: INTERNAL MEDICINE | Admitting: INTERNAL MEDICINE
Payer: MEDICARE

## 2022-08-17 ENCOUNTER — ANESTHESIA (OUTPATIENT)
Dept: ENDOSCOPY | Facility: HOSPITAL | Age: 37
End: 2022-08-17
Payer: MEDICARE

## 2022-08-17 DIAGNOSIS — K74.60 CIRRHOSIS: ICD-10-CM

## 2022-08-17 PROCEDURE — D9220A PRA ANESTHESIA: Mod: ANES,,, | Performed by: ANESTHESIOLOGY

## 2022-08-17 PROCEDURE — 43235 EGD DIAGNOSTIC BRUSH WASH: CPT | Mod: PO | Performed by: INTERNAL MEDICINE

## 2022-08-17 PROCEDURE — D9220A PRA ANESTHESIA: ICD-10-PCS | Mod: ANES,,, | Performed by: ANESTHESIOLOGY

## 2022-08-17 PROCEDURE — 63600175 PHARM REV CODE 636 W HCPCS: Mod: PO | Performed by: INTERNAL MEDICINE

## 2022-08-17 PROCEDURE — 63600175 PHARM REV CODE 636 W HCPCS: Mod: PO | Performed by: NURSE ANESTHETIST, CERTIFIED REGISTERED

## 2022-08-17 PROCEDURE — 37000009 HC ANESTHESIA EA ADD 15 MINS: Mod: PO | Performed by: INTERNAL MEDICINE

## 2022-08-17 PROCEDURE — 43235 PR EGD, FLEX, DIAGNOSTIC: ICD-10-PCS | Mod: ,,, | Performed by: INTERNAL MEDICINE

## 2022-08-17 PROCEDURE — D9220A PRA ANESTHESIA: Mod: CRNA,,, | Performed by: NURSE ANESTHETIST, CERTIFIED REGISTERED

## 2022-08-17 PROCEDURE — 37000008 HC ANESTHESIA 1ST 15 MINUTES: Mod: PO | Performed by: INTERNAL MEDICINE

## 2022-08-17 PROCEDURE — 25000003 PHARM REV CODE 250: Mod: PO | Performed by: NURSE ANESTHETIST, CERTIFIED REGISTERED

## 2022-08-17 PROCEDURE — D9220A PRA ANESTHESIA: ICD-10-PCS | Mod: CRNA,,, | Performed by: NURSE ANESTHETIST, CERTIFIED REGISTERED

## 2022-08-17 PROCEDURE — 43235 EGD DIAGNOSTIC BRUSH WASH: CPT | Mod: ,,, | Performed by: INTERNAL MEDICINE

## 2022-08-17 RX ORDER — FENTANYL CITRATE 50 UG/ML
INJECTION, SOLUTION INTRAMUSCULAR; INTRAVENOUS
Status: DISCONTINUED | OUTPATIENT
Start: 2022-08-17 | End: 2022-08-17

## 2022-08-17 RX ORDER — LIDOCAINE HCL/PF 100 MG/5ML
SYRINGE (ML) INTRAVENOUS
Status: DISCONTINUED | OUTPATIENT
Start: 2022-08-17 | End: 2022-08-17

## 2022-08-17 RX ORDER — PROPOFOL 10 MG/ML
VIAL (ML) INTRAVENOUS
Status: DISCONTINUED | OUTPATIENT
Start: 2022-08-17 | End: 2022-08-17

## 2022-08-17 RX ORDER — SODIUM CHLORIDE 0.9 % (FLUSH) 0.9 %
10 SYRINGE (ML) INJECTION
Status: DISCONTINUED | OUTPATIENT
Start: 2022-08-17 | End: 2022-08-17 | Stop reason: HOSPADM

## 2022-08-17 RX ORDER — SODIUM CHLORIDE, SODIUM LACTATE, POTASSIUM CHLORIDE, CALCIUM CHLORIDE 600; 310; 30; 20 MG/100ML; MG/100ML; MG/100ML; MG/100ML
INJECTION, SOLUTION INTRAVENOUS CONTINUOUS
Status: DISCONTINUED | OUTPATIENT
Start: 2022-08-17 | End: 2022-08-17 | Stop reason: HOSPADM

## 2022-08-17 RX ADMIN — PROPOFOL 50 MG: 10 INJECTION, EMULSION INTRAVENOUS at 07:08

## 2022-08-17 RX ADMIN — PROPOFOL 20 MG: 10 INJECTION, EMULSION INTRAVENOUS at 07:08

## 2022-08-17 RX ADMIN — SODIUM CHLORIDE, SODIUM LACTATE, POTASSIUM CHLORIDE, AND CALCIUM CHLORIDE: .6; .31; .03; .02 INJECTION, SOLUTION INTRAVENOUS at 07:08

## 2022-08-17 RX ADMIN — FENTANYL CITRATE 100 MCG: 50 INJECTION, SOLUTION INTRAMUSCULAR; INTRAVENOUS at 07:08

## 2022-08-17 RX ADMIN — PROPOFOL 150 MG: 10 INJECTION, EMULSION INTRAVENOUS at 07:08

## 2022-08-17 RX ADMIN — LIDOCAINE HYDROCHLORIDE 100 MG: 20 INJECTION, SOLUTION INTRAVENOUS at 07:08

## 2022-08-17 NOTE — DISCHARGE SUMMARY
[facility] - Endoscopy  Discharge Note  Short Stay    Discharge Note  Short Stay      SUMMARY     Admit Date: 8/17/2022    Attending Physician: Refugio Villagomez MD     Discharge Physician: Refugio Villagomez MD    Discharge Date: 8/17/2022 8:00 AM    Final Diagnosis: Cirrhosis of liver without ascites, unspecified hepatic cirrhosis type [K74.60]    Disposition: HOME OR SELF CARE    Patient Instructions:   Current Discharge Medication List      CONTINUE these medications which have NOT CHANGED    Details   allopurinoL (ZYLOPRIM) 100 MG tablet TAKE ONE TABLET BY MOUTH TWICE DAILY  Qty: 180 tablet, Refills: 2    Comments: This prescription was filled on 11/26/2021. Any refills authorized will be placed on file.  Associated Diagnoses: Chronic gout without tophus, unspecified cause, unspecified site      ascorbic acid, vitamin C, 250 mg Chew Take by mouth.      calcium carbonate-vitamin D3 500 mg(1,250mg) -400 unit Tab Take 1 tablet by mouth 2 (two) times a day.       fluvoxaMINE (LUVOX) 100 MG tablet TAKE ONE AND A HALF TABLETS BY MOUTH TWICE DAILY  Qty: 270 tablet, Refills: 1    Associated Diagnoses: Impulse control disorder; Other obsessive-compulsive disorders      inulin (FIBER GUMMIES ORAL) Take 2 tablets by mouth once daily.       L. ACIDOPHILUS/BIFID. ANIMALIS (CHEWABLE PROBIOTIC ORAL) Take 1 tablet by mouth 2 (two) times daily.       lactulose (CHRONULAC) 10 gram/15 mL solution TAKE ONE TABLESPOONFUL BY MOUTH THREE TIMES DAILY  Qty: 473 mL, Refills: 3      levoFLOXacin (LEVAQUIN) 750 MG tablet Take 1 tablet (750 mg total) by mouth once daily.  Qty: 6 tablet, Refills: 0      magnesium 30 mg Tab Take 1 tablet by mouth every evening.      MELATONIN ORAL Take 1 tablet by mouth every evening.      multivitamin capsule Take 1 capsule by mouth every morning.       omega-3 fatty acids/fish oil (FISH OIL-OMEGA-3 FATTY ACIDS) 300-1,000 mg capsule Take 1 capsule by mouth once daily.       OXcarbazepine (TRILEPTAL) 300  MG Tab TAKE ONE TABLET (300MG) BY MOUTH TWICE DAILY  Qty: 180 tablet, Refills: 1    Associated Diagnoses: Convulsions, unspecified convulsion type      pantoprazole (PROTONIX) 40 MG tablet TAKE ONE TABLET (40MG) BY MOUTH ONCE DAILY  Qty: 90 tablet, Refills: 3    Comments: This prescription was filled on 5/2/2022. Any refills authorized will be placed on file.  Associated Diagnoses: Dysphagia, unspecified type      polyethylene glycol (GLYCOLAX) 17 gram PwPk Take 17 g by mouth once daily.       potassium chloride (KLOR-CON) 10 MEQ TbSR Take 10 mEq by mouth every evening.      pramipexole (MIRAPEX) 0.125 MG tablet TAKE ONE TABLET BY MOUTH THREE TIMES DAILY  Qty: 90 tablet, Refills: 5    Associated Diagnoses: Nocturnal leg movements      sulfamethoxazole-trimethoprim 400-80mg (BACTRIM,SEPTRA) 400-80 mg per tablet Take 1 tablet by mouth once daily.      tamsulosin (FLOMAX) 0.4 mg Cap Take 0.4 mg by mouth every evening. Every day      zinc gluconate 50 mg tablet Take 50 mg by mouth once daily.      mupirocin calcium 2% (BACTROBAN) 2 % cream Apply topically 3 (three) times daily.  Qty: 15 g, Refills: 1    Associated Diagnoses: Skin infection      NON FORMULARY MEDICATION CBD Oil- OTC      UNABLE TO FIND Take by mouth once daily. [employer] Vitamin             Discharge Procedure Orders (must include Diet, Follow-up, Activity)    Follow Up:  Follow up with PCP as previously scheduled  Resume routine diet.  Activity as tolerated.    No driving day of procedure.

## 2022-08-17 NOTE — PROVATION PATIENT INSTRUCTIONS
Discharge Summary/Instructions after an Endoscopic Procedure  Patient Name: Hussein Yu  Patient MRN: [id number]  Patient YOB: 1985 Wednesday, August 17, 2022  Refugio Villagomez MD  Dear patient,  As a result of recent federal legislation (The Federal Cures Act), you may   receive lab or pathology results from your procedure in your [facility]   account before your physician is able to contact you. Your physician or   their representative will relay the results to you with their   recommendations at their soonest availability.  Thank you,  RESTRICTIONS:  During your procedure today, you received medications for sedation.  These   medications may affect your judgment, balance and coordination.  Therefore,   for 24 hours, you have the following restrictions:   - DO NOT drive a car, operate machinery, make legal/financial decisions,   sign important papers or drink alcohol.    ACTIVITY:  Today: no heavy lifting, straining or running due to procedural   sedation/anesthesia.  The following day: return to full activity including work.  DIET:  Eat and drink normally unless instructed otherwise.     TREATMENT FOR COMMON SIDE EFFECTS:  - Mild abdominal pain, nausea, belching, bloating or excessive gas:  rest,   eat lightly and use a heating pad.  - Sore Throat: treat with throat lozenges and/or gargle with warm salt   water.  - Because air was used during the procedure, expelling large amounts of air   from your rectum or belching is normal.  - If a bowel prep was taken, you may not have a bowel movement for 1-3 days.    This is normal.  SYMPTOMS TO WATCH FOR AND REPORT TO YOUR PHYSICIAN:  1. Abdominal pain or bloating, other than gas cramps.  2. Chest pain.  3. Back pain.  4. Signs of infection such as: chills or fever occurring within 24 hours   after the procedure.  5. Rectal bleeding, which would show as bright red, maroon, or black stools.   (A tablespoon of blood from the rectum is not serious, especially  if   hemorrhoids are present.)  6. Vomiting.  7. Weakness or dizziness.  GO DIRECTLY TO THE NEAREST EMERGENCY ROOM IF YOU HAVE ANY OF THE FOLLOWING:      Difficulty breathing              Chills and/or fever over 101 F   Persistent vomiting and/or vomiting blood   Severe abdominal pain   Severe chest pain   Black, tarry stools   Bleeding- more than one tablespoon   Any other symptom or condition that you feel may need urgent attention  Your doctor recommends these additional instructions:  If any biopsies were taken, your doctors clinic will contact you in 1 to 2   weeks with any results.  Continue your present medications.   You are being discharged to home.  For questions, problems or results please call your physician - Refugio Villagomez MD at Work:  [phone].  EMERGENCY PHONE NUMBER: [phone], LAB RESULTS: [phone]  IF A COMPLICATION OR EMERGENCY SITUATION ARISES AND YOU ARE UNABLE TO REACH   YOUR PHYSICIAN - GO DIRECTLY TO THE EMERGENCY ROOM.  ___________________________________________  Nurse Signature  ___________________________________________  Patient/Designated Responsible Party Signature  Refugio Villagomez MD  8/17/2022 8:00:13 AM  This report has been verified and signed electronically.  Dear patient,  As a result of recent federal legislation (The Federal Cures Act), you may   receive lab or pathology results from your procedure in your [facility]   account before your physician is able to contact you. Your physician or   their representative will relay the results to you with their   recommendations at their soonest availability.  Thank you.  PROVATION

## 2022-08-17 NOTE — ANESTHESIA PREPROCEDURE EVALUATION
08/17/2022  Hussein Doyle is a 36 y.o., male.    Pre-op Assessment    I have reviewed the Patient Summary Reports.    I have reviewed the Nursing Notes. I have reviewed the NPO Status.   I have reviewed the Medications.     Review of Systems  Anesthesia Hx:  No problems with previous Anesthesia Denies Hx of Anesthetic complications  Denies Family Hx of Anesthesia complications.   Denies Personal Hx of Anesthesia complications.   Social:  Non-Smoker    Hematology/Oncology:         -- Cancer in past history: Other (see Oncology comments) surgery    Cardiovascular:   Denies Hypertension.  Denies MI.  Denies CAD.    Denies CABG/stent.   Denies Angina.    Pulmonary:   Denies COPD.  Denies Asthma.  Denies Recent URI. Sleep Apnea    Renal/:   Chronic Renal Disease    Hepatic/GI:   Bowel Prep. Denies GERD. Liver Disease,    Neurological:   Denies TIA. CVA Seizures, well controlled Moderate mental retardation Denies Cognitive Impairment    Endocrine:   Denies Diabetes. Denies Hypothyroidism.    Psych:   Psychiatric History Autism          Physical Exam  General:  Obesity, Well nourished, Cooperative and Alert      Airway/Jaw/Neck:  Airway Findings: Mouth Opening: Normal   Tongue: Normal   General Airway Assessment: Adult, Good Oropharynx Findings: Normal Mallampati: II  Improves to II with phonation.  TM Distance: 4-6 cm   Neck Findings: Normal     Dental:  Dental Findings: In tact     Chest/Lungs:  Chest/Lungs Findings: Clear to auscultation, Normal Respiratory Rate      Heart/Vascular:  Heart Findings: Rate: Normal  Rhythm: Regular Rhythm  Sounds: Normal  Heart murmur: negative    Abdomen:  Abdomen Findings: Normal    Musculoskeletal:  Musculoskeletal Findings: Normal   Skin:  Skin Findings: Normal    Mental Status:  Mental Status Findings:  Cooperative         Anesthesia Plan  Type of Anesthesia, risks &  benefits discussed:  Anesthesia Type:  general    Patient's Preference:   Plan Factors:          Intra-op Monitoring Plan: standard ASA monitors  Intra-op Monitoring Plan Comments:   Post Op Pain Control Plan:   Post Op Pain Control Plan Comments:     Induction:   IV  Beta Blocker:  Patient is not currently on a Beta-Blocker (No further documentation required).       Informed Consent: Informed consent signed with the Patient representative and all parties understand the risks and agree with anesthesia plan.  All questions answered.  Anesthesia consent signed with patient representative.  ASA Score: 3     Day of Surgery Review of History & Physical: I have interviewed and examined the patient. I have reviewed the patient's H&P dated:              Ready For Surgery From Anesthesia Perspective.           Physical Exam  General: Obesity, Well nourished, Cooperative and Alert    Airway:  Mallampati: II / II  Mouth Opening: Normal  TM Distance: 4-6 cm  Tongue: Normal    Dental:  In tact    Chest/Lungs:  Clear to auscultation, Normal Respiratory Rate    Heart:  Rate: Normal  Rhythm: Regular Rhythm  Sounds: Normal          Anesthesia Plan  Type of Anesthesia, risks & benefits discussed:    Anesthesia Type: general  Intra-op Monitoring Plan: standard ASA monitors  Induction:  IV  Informed Consent: Informed consent signed with the Patient representative and all parties understand the risks and agree with anesthesia plan.  All questions answered.   ASA Score: 3  Day of Surgery Review of History & Physical: I have interviewed and examined the patient. I have reviewed the patient's H&P dated:     Ready For Surgery From Anesthesia Perspective.       .

## 2022-08-17 NOTE — ANESTHESIA POSTPROCEDURE EVALUATION
Anesthesia Post Evaluation    Patient: Hussein Doyle    Procedure(s) Performed: Procedure(s) (LRB):  EGD (ESOPHAGOGASTRODUODENOSCOPY) (N/A)    Final Anesthesia Type: general      Patient location during evaluation: PACU  Patient participation: Yes- Able to Participate  Level of consciousness: awake and alert and oriented  Post-procedure vital signs: reviewed and stable  Pain management: adequate  Airway patency: patent    PONV status at discharge: No PONV  Anesthetic complications: no      Cardiovascular status: blood pressure returned to baseline  Respiratory status: unassisted, spontaneous ventilation and room air  Hydration status: euvolemic  Follow-up not needed.          Vitals Value Taken Time   /58 08/17/22 0816   Temp 36.6 °C (97.9 °F) 08/17/22 0800   Pulse 59 08/17/22 0816   Resp 10 08/17/22 0816   SpO2 97 % 08/17/22 0816         No case tracking events are documented in the log.      Pain/Jane Score: Jane Score: 3 (8/17/2022  8:00 AM)

## 2022-08-17 NOTE — H&P
History & Physical - Short Stay  Gastroenterology      SUBJECTIVE:     Procedure: EGD    Chief Complaint/Indication for Procedure: Cirrhosis    PTA Medications   Medication Sig    allopurinoL (ZYLOPRIM) 100 MG tablet TAKE ONE TABLET BY MOUTH TWICE DAILY    ascorbic acid, vitamin C, 250 mg Chew Take by mouth.    calcium carbonate-vitamin D3 500 mg(1,250mg) -400 unit Tab Take 1 tablet by mouth 2 (two) times a day.     fluvoxaMINE (LUVOX) 100 MG tablet TAKE ONE AND A HALF TABLETS BY MOUTH TWICE DAILY    inulin (FIBER GUMMIES ORAL) Take 2 tablets by mouth once daily.     L. ACIDOPHILUS/BIFID. ANIMALIS (CHEWABLE PROBIOTIC ORAL) Take 1 tablet by mouth 2 (two) times daily.     lactulose (CHRONULAC) 10 gram/15 mL solution TAKE ONE TABLESPOONFUL BY MOUTH THREE TIMES DAILY    levoFLOXacin (LEVAQUIN) 750 MG tablet Take 1 tablet (750 mg total) by mouth once daily.    magnesium 30 mg Tab Take 1 tablet by mouth every evening.    MELATONIN ORAL Take 1 tablet by mouth every evening.    multivitamin capsule Take 1 capsule by mouth every morning.     omega-3 fatty acids/fish oil (FISH OIL-OMEGA-3 FATTY ACIDS) 300-1,000 mg capsule Take 1 capsule by mouth once daily.     OXcarbazepine (TRILEPTAL) 300 MG Tab TAKE ONE TABLET (300MG) BY MOUTH TWICE DAILY    pantoprazole (PROTONIX) 40 MG tablet TAKE ONE TABLET (40MG) BY MOUTH ONCE DAILY    polyethylene glycol (GLYCOLAX) 17 gram PwPk Take 17 g by mouth once daily.     potassium chloride (KLOR-CON) 10 MEQ TbSR Take 10 mEq by mouth every evening.    pramipexole (MIRAPEX) 0.125 MG tablet TAKE ONE TABLET BY MOUTH THREE TIMES DAILY    sulfamethoxazole-trimethoprim 400-80mg (BACTRIM,SEPTRA) 400-80 mg per tablet Take 1 tablet by mouth once daily.    tamsulosin (FLOMAX) 0.4 mg Cap Take 0.4 mg by mouth every evening. Every day    zinc gluconate 50 mg tablet Take 50 mg by mouth once daily.    mupirocin calcium 2% (BACTROBAN) 2 % cream Apply topically 3 (three) times daily.     NON FORMULARY MEDICATION CBD Oil- OTC    UNABLE TO FIND Take by mouth once daily. Theralogix Vitamin       Review of patient's allergies indicates:   Allergen Reactions    Benadryl [diphenhydramine hcl] Other (See Comments)     Increases anxiety and more restless    Keppra [levetiracetam] Other (See Comments)     agitation    Ativan [lorazepam] Anxiety    Xanax [alprazolam] Anxiety     Worsens anxiety and agitation    Abilify  [aripiprazole]      Other reaction(s): arrhythmia    Clonazepam      Other reaction(s): unknown    Cough syrup w/decongestant      Other reaction(s): auditory hallucinations    Diazepam      Other reaction(s): tongue swelling  Other reaction(s): Other (See Comments)  Other reaction(s): tongue swelling    Haloperidol Other (See Comments)    Phenytoin sodium extended      Other reaction(s): unknown    Quetiapine      Other reaction(s): sweating  Other reaction(s): sedation  Other reaction(s): sweating  Other reaction(s): sedation    Risperidone      Other reaction(s): bladder incontinence    Thimerosal      Other reaction(s): seizure    Ziprasidone hcl Other (See Comments)     Other reaction(s): tonic clonic seizure  seizure        Past Medical History:   Diagnosis Date    Autistic disorder, active 5/6/2013    Bowel obstruction     pt mother denies    Early intervention counseling     Gout, chronic 11/12/2015    Grand mal seizure     Hepatic steatosis     Impulse control disorder, unspecified 5/6/2013    Mastoiditis     Moderate mental retardation 5/6/2013    Neurogenic bladder     Neurogenic bladder     HALEY (obstructive sleep apnea) 11/12/2015    BIPAP.  Dr. Garber     Otitis media     Paraplegia     Pervasive developmental disorder, active 12/27/2015    Renal cancer 2010    Right    Scoliosis     paraplegia    Seizure disorder 11/12/2015    Stroke     one week old    Tardive dyskinesia      Past Surgical History:   Procedure Laterality Date    ANKLE FRACTURE  SURGERY      BACK SURGERY  2000    COLONOSCOPY  10/28/2008    [facility]ana [facility]; anal fissure, minimal pinpoint rectal erythema; floppy-type colon with very little tone; biopsy: rectum mild chronic proctitis with few eosinophils sent for scanning    COLONOSCOPY N/A 6/15/2018    Procedure: COLONOSCOPY;  Surgeon: Refugio Villagomez MD;  Location: [facility];  Service: Endoscopy;  Laterality: N/A; Preparation of the colon was fair, unremarkable; REPEAT at 50 YEARS old FOR SCREENING    COLONOSCOPY N/A 9/9/2020    Procedure: COLONOSCOPY;  Surgeon: Refugio Villagomez MD;  Location: [facility];  Service: Endoscopy;  Laterality: N/A;    ESOPHAGOGASTRODUODENOSCOPY N/A 7/13/2020    Procedure: EGD (ESOPHAGOGASTRODUODENOSCOPY);  Surgeon: Refugio Villagomez MD;  Location: [facility];  Service: Endoscopy;  Laterality: N/A;    ESOPHAGOGASTRODUODENOSCOPY (EGD) WITH DILATION N/A 2020    INNER EAR SURGERY      mastoid    LIVER BIOPSY N/A 7/20/2020    Procedure: BIOPSY, LIVER;  Surgeon: Ananya Surgeon;  Location: [facility];  Service: Anesthesiology;  Laterality: N/A;  189 liver bx/Ultrasound anes 1.5 hours    MAGNETIC RESONANCE IMAGING N/A 10/9/2020    Procedure: MRI (Magnetic Resonance Imagine);  Surgeon: Manish Araiza MD;  Location[facility];  Service: Anesthesiology;  Laterality: N/A;    right partial nephrectomy Right 2010    Sees urology    TYMPANOSTOMY TUBE PLACEMENT       Family History   Problem Relation Age of Onset    Cancer Father         lymphoma    Cataracts Father     Colon polyps Father     Cataracts Mother     Cataracts Maternal Grandmother     Diabetes Maternal Grandmother     Macular degeneration Maternal Grandmother     Glaucoma Maternal Grandmother     Anesthesia problems Neg Hx     Clotting disorder Neg Hx     Colon cancer Neg Hx     Crohn's disease Neg Hx     Ulcerative colitis Neg Hx     Stomach cancer Neg Hx     Esophageal cancer Neg Hx     Cirrhosis Neg Hx      Social History      Tobacco Use    Smoking status: Never Smoker    Smokeless tobacco: Never Used   Substance Use Topics    Alcohol use: No    Drug use: No         OBJECTIVE:     Vital Signs (Most Recent)  Temp: 98.2 °F (36.8 °C) (08/17/22 0722)  Pulse: 62 (08/17/22 0722)  Resp: 16 (08/17/22 0722)  BP: 130/73 (08/17/22 0722)  SpO2: 98 % (08/17/22 0722)    Physical Exam:                                                       GENERAL:  Comfortable, in no acute distress.                                 HEENT EXAM:  Nonicteric.  No adenopathy.  Oropharynx is clear.               NECK:  Supple.                                                               LUNGS:  Clear.                                                               CARDIAC:  Regular rate and rhythm.  S1, S2.  No murmur.                      ABDOMEN:  Soft, positive bowel sounds, nontender.  No hepatosplenomegaly or masses.  No rebound or guarding.                                             EXTREMITIES:  No edema.     MENTAL STATUS:  Normal, alert and oriented.      ASSESSMENT/PLAN:     Assessment: Cirrhosis    Plan: EGD    Anesthesia Plan: General    ASA Grade: ASA 3 - Patient with moderate systemic disease with functional limitations    MALLAMPATI SCORE:  I (soft palate, uvula, fauces, and tonsillar pillars visible)

## 2022-08-17 NOTE — TRANSFER OF CARE
"Anesthesia Transfer of Care Note    Patient: Hussein Doyle    Procedure(s) Performed: Procedure(s) (LRB):  EGD (ESOPHAGOGASTRODUODENOSCOPY) (N/A)    Patient location: PACU    Anesthesia Type: general    Transport from OR: Transported from OR on room air with adequate spontaneous ventilation    Post pain: adequate analgesia    Post assessment: no apparent anesthetic complications and tolerated procedure well    Post vital signs: stable    Level of consciousness: sedated    Nausea/Vomiting: no nausea/vomiting    Complications: none    Transfer of care protocol was followed      Last vitals:   Visit Vitals  /73 (BP Location: Right arm, Patient Position: Sitting)   Pulse 62   Temp 36.8 °C (98.2 °F) (Skin)   Resp 16   Ht 5' 10.5" (1.791 m)   Wt 113.4 kg (250 lb)   SpO2 98%   BMI 35.36 kg/m²     "

## 2022-08-18 VITALS
HEART RATE: 59 BPM | SYSTOLIC BLOOD PRESSURE: 119 MMHG | BODY MASS INDEX: 35 KG/M2 | HEIGHT: 71 IN | RESPIRATION RATE: 15 BRPM | DIASTOLIC BLOOD PRESSURE: 66 MMHG | OXYGEN SATURATION: 97 % | TEMPERATURE: 98 F | WEIGHT: 250 LBS

## 2022-08-22 ENCOUNTER — OFFICE VISIT (OUTPATIENT)
Dept: FAMILY MEDICINE | Facility: CLINIC | Age: 37
End: 2022-08-22
Payer: MEDICARE

## 2022-08-22 VITALS
WEIGHT: 254.19 LBS | BODY MASS INDEX: 35.59 KG/M2 | SYSTOLIC BLOOD PRESSURE: 110 MMHG | OXYGEN SATURATION: 96 % | DIASTOLIC BLOOD PRESSURE: 70 MMHG | HEIGHT: 71 IN | HEART RATE: 76 BPM

## 2022-08-22 DIAGNOSIS — F84.0 AUTISTIC DISORDER, ACTIVE: Primary | Chronic | ICD-10-CM

## 2022-08-22 DIAGNOSIS — K75.81 NASH (NONALCOHOLIC STEATOHEPATITIS): ICD-10-CM

## 2022-08-22 DIAGNOSIS — G47.33 OSA (OBSTRUCTIVE SLEEP APNEA): Chronic | ICD-10-CM

## 2022-08-22 DIAGNOSIS — N31.9 NEUROGENIC BLADDER: Chronic | ICD-10-CM

## 2022-08-22 DIAGNOSIS — E66.01 MORBID (SEVERE) OBESITY DUE TO EXCESS CALORIES: ICD-10-CM

## 2022-08-22 DIAGNOSIS — M1A.9XX0 CHRONIC GOUT WITHOUT TOPHUS, UNSPECIFIED CAUSE, UNSPECIFIED SITE: Chronic | ICD-10-CM

## 2022-08-22 DIAGNOSIS — G40.909 SEIZURE DISORDER: Chronic | ICD-10-CM

## 2022-08-22 PROBLEM — R19.4 CHANGE IN BOWEL HABITS: Status: RESOLVED | Noted: 2020-09-09 | Resolved: 2022-08-22

## 2022-08-22 PROCEDURE — 3008F BODY MASS INDEX DOCD: CPT | Mod: CPTII,S$GLB,, | Performed by: EMERGENCY MEDICINE

## 2022-08-22 PROCEDURE — 3078F PR MOST RECENT DIASTOLIC BLOOD PRESSURE < 80 MM HG: ICD-10-PCS | Mod: CPTII,S$GLB,, | Performed by: EMERGENCY MEDICINE

## 2022-08-22 PROCEDURE — 1159F MED LIST DOCD IN RCRD: CPT | Mod: CPTII,S$GLB,, | Performed by: EMERGENCY MEDICINE

## 2022-08-22 PROCEDURE — 3074F PR MOST RECENT SYSTOLIC BLOOD PRESSURE < 130 MM HG: ICD-10-PCS | Mod: CPTII,S$GLB,, | Performed by: EMERGENCY MEDICINE

## 2022-08-22 PROCEDURE — 1159F PR MEDICATION LIST DOCUMENTED IN MEDICAL RECORD: ICD-10-PCS | Mod: CPTII,S$GLB,, | Performed by: EMERGENCY MEDICINE

## 2022-08-22 PROCEDURE — 3074F SYST BP LT 130 MM HG: CPT | Mod: CPTII,S$GLB,, | Performed by: EMERGENCY MEDICINE

## 2022-08-22 PROCEDURE — 3008F PR BODY MASS INDEX (BMI) DOCUMENTED: ICD-10-PCS | Mod: CPTII,S$GLB,, | Performed by: EMERGENCY MEDICINE

## 2022-08-22 PROCEDURE — 99999 PR PBB SHADOW E&M-EST. PATIENT-LVL IV: CPT | Mod: PBBFAC,,, | Performed by: EMERGENCY MEDICINE

## 2022-08-22 PROCEDURE — 99214 PR OFFICE/OUTPT VISIT, EST, LEVL IV, 30-39 MIN: ICD-10-PCS | Mod: S$GLB,,, | Performed by: EMERGENCY MEDICINE

## 2022-08-22 PROCEDURE — 99999 PR PBB SHADOW E&M-EST. PATIENT-LVL IV: ICD-10-PCS | Mod: PBBFAC,,, | Performed by: EMERGENCY MEDICINE

## 2022-08-22 PROCEDURE — 99214 OFFICE O/P EST MOD 30 MIN: CPT | Mod: S$GLB,,, | Performed by: EMERGENCY MEDICINE

## 2022-08-22 PROCEDURE — 3078F DIAST BP <80 MM HG: CPT | Mod: CPTII,S$GLB,, | Performed by: EMERGENCY MEDICINE

## 2022-08-22 NOTE — PROGRESS NOTES
Subjective:   THIS NOTE IS DONE WITH VOICE RECOGNITION        Patient ID: Hussein Doyle is a 36 y.o. male.    Chief Complaint: austism, Sleep Apnea, Gout, and administrative form completion         Assessment:       1. Autistic disorder, active    2. Seizure disorder    3. HALEY (obstructive sleep apnea)    4. Neurogenic bladder    5. TRAYLOR (nonalcoholic steatohepatitis)    6. Chronic gout without tophus, unspecified cause, unspecified site    7. Morbid (severe) obesity due to excess calories        Plan:       1. Autistic disorder, active  Currently doing well.    90L completed  Caregivers are stable  Long term planning is happening.  Eli, his mother, is making changes to insure long term support.  Recently changed mental health providers, good report established.    2. Seizure disorder  No recent seizures   Continue current medications    3. HALEY (obstructive sleep apnea)  Dependent on CPAP  Equipment current   Power source redundancy discussed    4. Neurogenic bladder  Followed by urology   Continues daily cathing   No recent infections  Consider high-dose cranberry concentrate, will discuss with next visit.    5. TRAYLOR (nonalcoholic steatohepatitis)  Liver functions remain stable  Followed by hepatology     6. Chronic gout without tophus, unspecified cause, unspecified site  No recent symptoms   Uric acid at target  Continue current medications    7. Morbid (severe) obesity due to excess calories  Stable   Behavioral changes in regards to caloric consumption likely.    Continue with interventions like his CPAP.    HPI     Esophageal gastroduodenoscopy was unremarkable within the past 2 weeks.  He continues to have occasional symptoms.  No bleeding.  No weight loss.    He has been seen by Psychiatry, Dr. Praveen Brush.  This visit but well.  Transferred care has been successful.    1. Obsessive Compusive Disorder   2. Anxiety Disorder  3. Impulse Control Disorder  4. Pervasive Developmental Disorder    He  was seen in hepatology in June.  His cirrhosis secondary to TRAYLOR is stable.  Liver functions have been mildly elevated, not changed recently.    No new seizure activity.    He is followed by Dr. Fredo Schaffer for neurogenic bladder.  He continues daily catheterization.  This is done by his mother or his aide.    Gout is currently well controlled.  No side effects from medications.    Lab Results   Component Value Date    URICACID 4.5 02/02/2021     His mother is working family members to have a plan for long term care.  His mother is prepared to make personal financial sacrifice if necessary.  She remains a strong advocate for him.    Immunization History   Administered Date(s) Administered    COVID-19, MRNA, LN-S, PF (Pfizer) (Purple Cap) 01/29/2021, 02/25/2021, 02/25/2021    Influenza - Quadrivalent - PF *Preferred* (6 months and older) 10/17/2013, 10/17/2014, 10/24/2015, 10/22/2018, 10/04/2019, 09/29/2020, 09/29/2020, 11/26/2021    Tdap 01/22/2020     Influenza vaccine recommended this fall.      Current Outpatient Medications   Medication Sig Dispense Refill    allopurinoL (ZYLOPRIM) 100 MG tablet TAKE ONE TABLET BY MOUTH TWICE DAILY 180 tablet 2    ascorbic acid, vitamin C, 250 mg Chew Take by mouth.      calcium carbonate-vitamin D3 500 mg(1,250mg) -400 unit Tab Take 1 tablet by mouth 2 (two) times a day.       fluvoxaMINE (LUVOX) 100 MG tablet TAKE ONE AND A HALF TABLETS BY MOUTH TWICE DAILY 270 tablet 1    inulin (FIBER GUMMIES ORAL) Take 2 tablets by mouth once daily.       L. ACIDOPHILUS/BIFID. ANIMALIS (CHEWABLE PROBIOTIC ORAL) Take 1 tablet by mouth 2 (two) times daily.       lactulose (CHRONULAC) 10 gram/15 mL solution TAKE ONE TABLESPOONFUL BY MOUTH THREE TIMES DAILY 473 mL 3    levoFLOXacin (LEVAQUIN) 750 MG tablet Take 1 tablet (750 mg total) by mouth once daily. 6 tablet 0    magnesium 30 mg Tab Take 1 tablet by mouth every evening.      MELATONIN ORAL Take 1 tablet by mouth every  evening.      multivitamin capsule Take 1 capsule by mouth every morning.       mupirocin calcium 2% (BACTROBAN) 2 % cream Apply topically 3 (three) times daily. 15 g 1    NON FORMULARY MEDICATION CBD Oil- OTC      omega-3 fatty acids/fish oil (FISH OIL-OMEGA-3 FATTY ACIDS) 300-1,000 mg capsule Take 1 capsule by mouth once daily.       OXcarbazepine (TRILEPTAL) 300 MG Tab TAKE ONE TABLET (300MG) BY MOUTH TWICE DAILY 180 tablet 1    pantoprazole (PROTONIX) 40 MG tablet TAKE ONE TABLET (40MG) BY MOUTH ONCE DAILY 90 tablet 3    polyethylene glycol (GLYCOLAX) 17 gram PwPk Take 17 g by mouth once daily.       potassium chloride (KLOR-CON) 10 MEQ TbSR Take 10 mEq by mouth every evening.      pramipexole (MIRAPEX) 0.125 MG tablet TAKE ONE TABLET BY MOUTH THREE TIMES DAILY 90 tablet 5    sulfamethoxazole-trimethoprim 400-80mg (BACTRIM,SEPTRA) 400-80 mg per tablet Take 1 tablet by mouth once daily.      tamsulosin (FLOMAX) 0.4 mg Cap Take 0.4 mg by mouth every evening. Every day      UNABLE TO FIND Take by mouth once daily. Theralogix Vitamin      zinc gluconate 50 mg tablet Take 50 mg by mouth once daily.       No current facility-administered medications for this visit.         Review of Systems   Unable to perform ROS: Psychiatric disorder       Objective:      Physical Exam  Vitals reviewed.   Constitutional:       General: He is not in acute distress.     Appearance: Normal appearance. He is well-developed. He is obese. He is not ill-appearing.   HENT:      Head: Normocephalic and atraumatic.      Right Ear: Tympanic membrane, ear canal and external ear normal.      Left Ear: Tympanic membrane, ear canal and external ear normal.      Nose: Nose normal.      Mouth/Throat:      Pharynx: Oropharynx is clear. No oropharyngeal exudate.   Eyes:      General: No scleral icterus.     Conjunctiva/sclera: Conjunctivae normal.      Pupils: Pupils are equal, round, and reactive to light.   Neck:      Thyroid: No  thyromegaly.   Cardiovascular:      Rate and Rhythm: Normal rate and regular rhythm.      Heart sounds: Normal heart sounds.   Pulmonary:      Effort: Pulmonary effort is normal.      Breath sounds: Normal breath sounds. No wheezing or rales.   Abdominal:      General: Bowel sounds are normal. There is no distension.      Palpations: Abdomen is soft.      Tenderness: There is no abdominal tenderness.   Musculoskeletal:         General: No tenderness. Normal range of motion.      Cervical back: Normal range of motion and neck supple.   Lymphadenopathy:      Cervical: No cervical adenopathy.   Skin:     General: Skin is warm and dry.      Capillary Refill: Capillary refill takes less than 2 seconds.      Findings: No rash.   Neurological:      Mental Status: He is alert.      Motor: No abnormal muscle tone.      Coordination: Coordination normal.      Deep Tendon Reflexes: Reflexes are normal and symmetric.      Comments: No tics or unusual movements noted.      His speech pattern is unusual, but has not changed.  He does understand speech and will answer if given an opportunity.    He does need help with decision making and planning.   Psychiatric:      Comments: Recent evaluation by Mental Health.  No significant changes.

## 2022-08-26 ENCOUNTER — PATIENT MESSAGE (OUTPATIENT)
Dept: FAMILY MEDICINE | Facility: CLINIC | Age: 37
End: 2022-08-26
Payer: MEDICARE

## 2022-08-29 ENCOUNTER — TELEPHONE (OUTPATIENT)
Dept: FAMILY MEDICINE | Facility: CLINIC | Age: 37
End: 2022-08-29
Payer: MEDICARE

## 2022-08-29 NOTE — TELEPHONE ENCOUNTER
----- Message from Carmencita Gunderson sent at 8/29/2022 12:00 PM CDT -----  Contact: pt's mother Eli at [phone]  Type: Needs Medical Advice  Who Called: pt  Best Call Back Number: [phone]  Additional Information: pt is calling the office to speak with Micaela villavicencio in regards to the paperwork that needs to be signed as she would like to bring it by today. Please call back to advise.

## 2022-08-30 ENCOUNTER — TELEPHONE (OUTPATIENT)
Dept: NEUROLOGY | Facility: CLINIC | Age: 37
End: 2022-08-30
Payer: MEDICARE

## 2022-10-03 ENCOUNTER — PATIENT MESSAGE (OUTPATIENT)
Dept: FAMILY MEDICINE | Facility: CLINIC | Age: 37
End: 2022-10-03
Payer: MEDICARE

## 2022-10-10 ENCOUNTER — PATIENT MESSAGE (OUTPATIENT)
Dept: PSYCHIATRY | Facility: CLINIC | Age: 37
End: 2022-10-10
Payer: MEDICARE

## 2022-10-11 ENCOUNTER — OFFICE VISIT (OUTPATIENT)
Dept: PSYCHIATRY | Facility: CLINIC | Age: 37
End: 2022-10-11
Payer: COMMERCIAL

## 2022-10-11 ENCOUNTER — PATIENT MESSAGE (OUTPATIENT)
Dept: PSYCHIATRY | Facility: CLINIC | Age: 37
End: 2022-10-11
Payer: MEDICARE

## 2022-10-11 VITALS
HEART RATE: 67 BPM | BODY MASS INDEX: 36.1 KG/M2 | WEIGHT: 238.19 LBS | HEIGHT: 68 IN | DIASTOLIC BLOOD PRESSURE: 85 MMHG | SYSTOLIC BLOOD PRESSURE: 134 MMHG

## 2022-10-11 DIAGNOSIS — F41.9 ANXIETY DISORDER, UNSPECIFIED TYPE: ICD-10-CM

## 2022-10-11 DIAGNOSIS — F42.9 OBSESSIVE-COMPULSIVE DISORDER, UNSPECIFIED TYPE: Primary | ICD-10-CM

## 2022-10-11 DIAGNOSIS — F42.8 OTHER OBSESSIVE-COMPULSIVE DISORDERS: ICD-10-CM

## 2022-10-11 DIAGNOSIS — F63.9 IMPULSE CONTROL DISORDER: ICD-10-CM

## 2022-10-11 DIAGNOSIS — F84.9 PERVASIVE DEVELOPMENTAL DISORDER: ICD-10-CM

## 2022-10-11 PROCEDURE — 3008F BODY MASS INDEX DOCD: CPT | Mod: CPTII,S$GLB,, | Performed by: PSYCHOLOGIST

## 2022-10-11 PROCEDURE — 1159F PR MEDICATION LIST DOCUMENTED IN MEDICAL RECORD: ICD-10-PCS | Mod: CPTII,S$GLB,, | Performed by: PSYCHOLOGIST

## 2022-10-11 PROCEDURE — 3008F PR BODY MASS INDEX (BMI) DOCUMENTED: ICD-10-PCS | Mod: CPTII,S$GLB,, | Performed by: PSYCHOLOGIST

## 2022-10-11 PROCEDURE — 90833 PSYTX W PT W E/M 30 MIN: CPT | Mod: S$GLB,,, | Performed by: PSYCHOLOGIST

## 2022-10-11 PROCEDURE — 99214 PR OFFICE/OUTPT VISIT, EST, LEVL IV, 30-39 MIN: ICD-10-PCS | Mod: S$GLB,,, | Performed by: PSYCHOLOGIST

## 2022-10-11 PROCEDURE — 3079F DIAST BP 80-89 MM HG: CPT | Mod: CPTII,S$GLB,, | Performed by: PSYCHOLOGIST

## 2022-10-11 PROCEDURE — 90833 PR PSYCHOTHERAPY W/PATIENT W/E&M, 30 MIN (ADD ON): ICD-10-PCS | Mod: S$GLB,,, | Performed by: PSYCHOLOGIST

## 2022-10-11 PROCEDURE — 3079F PR MOST RECENT DIASTOLIC BLOOD PRESSURE 80-89 MM HG: ICD-10-PCS | Mod: CPTII,S$GLB,, | Performed by: PSYCHOLOGIST

## 2022-10-11 PROCEDURE — 99214 OFFICE O/P EST MOD 30 MIN: CPT | Mod: S$GLB,,, | Performed by: PSYCHOLOGIST

## 2022-10-11 PROCEDURE — 99999 PR PBB SHADOW E&M-EST. PATIENT-LVL V: CPT | Mod: PBBFAC,,, | Performed by: PSYCHOLOGIST

## 2022-10-11 PROCEDURE — 99999 PR PBB SHADOW E&M-EST. PATIENT-LVL V: ICD-10-PCS | Mod: PBBFAC,,, | Performed by: PSYCHOLOGIST

## 2022-10-11 PROCEDURE — 3075F SYST BP GE 130 - 139MM HG: CPT | Mod: CPTII,S$GLB,, | Performed by: PSYCHOLOGIST

## 2022-10-11 PROCEDURE — 3075F PR MOST RECENT SYSTOLIC BLOOD PRESS GE 130-139MM HG: ICD-10-PCS | Mod: CPTII,S$GLB,, | Performed by: PSYCHOLOGIST

## 2022-10-11 PROCEDURE — 1159F MED LIST DOCD IN RCRD: CPT | Mod: CPTII,S$GLB,, | Performed by: PSYCHOLOGIST

## 2022-10-11 RX ORDER — FLUVOXAMINE MALEATE 100 MG/1
TABLET, COATED ORAL
Qty: 270 TABLET | Refills: 1 | Status: SHIPPED | OUTPATIENT
Start: 2022-10-11 | End: 2022-12-08

## 2022-10-11 NOTE — PROGRESS NOTES
Outpatient Psychiatry Follow-Up Visit    Clinical Status of Patient: Outpatient (Ambulatory)  10/11/2022     Chief Complaint: OCD, anxiety, impulse control, PDD      Interval History and Content of Current Session:  Interim Events/Subjective Report/Content of Current Session:  follow-up appointment.    Pt is a 37 y/o male with past psychiatric hx of OCD, anxiety, impulse control, PDD who presents for follow-up treatment. Pt presents with his mother and caretaker. Pt's mother reported that he continues to vocalize negative statements they are interpreting as past trauma. Pt's mother also noted that he has chronic concerns with UTIs and is engaged with urology. We discussed infectious disease and plan moving forward for the family. Pt's mother stated that she is also interested in psychosocial interventions for pt's past trauma.     Past Psychiatric hx: s/e's to mult prev meds to include zyprexa (wgt gain), lexapro 20mg po qam (anxiety and to suppress sex drive), buspar 10mg po BID, xanax 0.5mg po daily PRN anxiety    Past Medical hx:   Past Medical History:   Diagnosis Date    Autistic disorder, active 5/6/2013    Bowel obstruction     pt mother denies    Early intervention counseling     Gout, chronic 11/12/2015    Grand mal seizure     Hepatic steatosis     Impulse control disorder, unspecified 5/6/2013    Mastoiditis     Moderate mental retardation 5/6/2013    Neurogenic bladder     Neurogenic bladder     HALEY (obstructive sleep apnea) 11/12/2015    BIPAP.  Dr. Garber     Otitis media     Paraplegia     Pervasive developmental disorder, active 12/27/2015    Renal cancer 2010    Right    Scoliosis     paraplegia    Seizure disorder 11/12/2015    Stroke     one week old    Tardive dyskinesia         Interim hx:  Medication changes last visit: None  Anxiety: stable  Depression: stable     Denies suicidal/homicidal ideations.  Denies hopelessness/worthlessness.    Denies auditory/visual hallucinations      Alcohol: Pt  denied  Drug: Pt denied  Caffeine: Not assessed  Tobacco: Pt denied      Review of Systems   PSYCHIATRIC: Pertinent items are noted in the narrative.        CONSTITUTIONAL: weight stable    Past Medical, Family and Social History: The patient's past medical, family and social history have been reviewed and updated as appropriate within the electronic medical record. See encounter notes.     Current Psychiatric Medication:  fluvoxamine 150 mg     Compliance: yes      Side effects: Pt denied     Risk Parameters:  Patient reports no suicidal ideation  Patient reports no homicidal ideation  Patient reports no self-injurious behavior  Patient reports no violent behavior     Exam (detailed: at least 9 elements; comprehensive: all 15 elements)   Constitutional  Vitals:  Most recent vital signs, dated less than 90 days prior to this appointment, were reviewed. BP: ()/()   Arterial Line BP: ()/()       General:  unremarkable, age appropriate, casual attire, good eye contact, good rapport       Musculoskeletal  Muscle Strength/Tone:  no flaccidity, no tremor    Gait & Station:  normal      Psychiatric                       Speech:  normal tone, normal rate, rhythm, and volume   Mood & Affect:   Depressed, anxious         Thought Process:   Goal directed; Linear    Associations:   intact   Thought Content:   No SI/HI, delusions, or paranoia, no AV/VH   Insight & Judgement:   Good, adequate to circumstances   Orientation:   grossly intact; alert and oriented x 4    Memory:  intact for content of interview    Language:  grossly intact, can repeat    Attention Span  : Grossly intact for content of interview   Fund of Knowledge:   intact and appropriate to age and level of education        Assessment and Diagnosis   Status/Progress: Based on the examination today, the patient's problem(s) is/are adequately controlled.  New problems have not been presented today. Co-morbidities are not complicating management of the primary  condition. There are no active rule-out diagnoses for this patient at this time.      Impression: Pt continues to vocalize negative statements that are interpreted by mother as past trauma. Pt's mother given contact information for Lilibeth Walker with [facility] and encouraged to schedule a therapy appointment for the pt. Will continue with medication plan as is and monitor symptoms moving forward.     Diagnosis:   1. Obsessive Compusive Disorder   2. Anxiety Disorder  3. Impulse Control Disorder  4. Pervasive Developmental Disorder  Intervention/Counseling/Treatment Plan   Medication Management:      1. fluvoxamine 150 mg BID     2. Call to report any worsening of symptoms or problems with the medication. Pt instructed to go to ER with thoughts of harming self, others     3. Patient given contact # for psychotherapists at [facility] and also instructed to check with insurance for list of providers.     Psychotherapy: 20 minutes  Target symptoms: anxiety  Why chosen therapy is appropriate versus another modality: CBT used; relevant to diagnosis, patient responds to this modality  Outcome monitoring methods: self-report, observation  Therapeutic intervention type: Cognitive Behavioral Therapy, Behavioral strategies  Topics discussed/themes: building skills sets for symptom management, symptom recognition, nutrition, exercise  The patient's response to the intervention is accepting  Patient's response to treatment is: good.   The patient's progress toward treatment goals: stable     Return to clinic: 3 months    -Cognitive-Behavioral/Supportive therapy and psychoeducation provided  -R/B/SE's of medications discussed with the pt who expresses understanding and chooses to take medications as prescribed.   -Pt instructed to call clinic, 911 or go to nearest emergency room if sxs worsen or pt is in   crisis. The pt expresses understanding.    Praveen Brush, PhD, MP     Antidepressant/Antianxiety Medication  Initiation:  Patient informed of risks, benefits, and potential side effects of medication and accepts informed consent.  Common side effects include nausea, fatigue, headache, insomnia., Specifically discussed the possibility of new or worsening suicidal thoughts/depression.  Patient instructed to stop the medication immediately and seek urgent treatment if this occurs. Patient instructed not to abruptly discontinue medication without physician guidance except in cases of sudden onset or worsening of SI.

## 2022-10-16 ENCOUNTER — PATIENT MESSAGE (OUTPATIENT)
Dept: FAMILY MEDICINE | Facility: CLINIC | Age: 37
End: 2022-10-16
Payer: MEDICARE

## 2022-10-16 DIAGNOSIS — N39.0 RECURRENT UTI: ICD-10-CM

## 2022-10-16 DIAGNOSIS — N31.9 NEUROGENIC BLADDER: Primary | Chronic | ICD-10-CM

## 2022-10-20 ENCOUNTER — TELEPHONE (OUTPATIENT)
Dept: FAMILY MEDICINE | Facility: CLINIC | Age: 37
End: 2022-10-20
Payer: MEDICARE

## 2022-10-20 NOTE — TELEPHONE ENCOUNTER
----- Message from Gage Mario sent at 10/20/2022 11:26 AM CDT -----  Regarding: pt mother called  Name of Who is Calling: SHERRY RODRIGUEZ [[id number]] Eli (mother)      What is the request in detail: pt mother called requesting a call back he was recently seen in the ER for a UTI.please advise      Can the clinic reply by [facility]: No      What Number to Call Back if not in [facility]:[phone] (home)

## 2022-10-21 ENCOUNTER — TELEPHONE (OUTPATIENT)
Dept: FAMILY MEDICINE | Facility: CLINIC | Age: 37
End: 2022-10-21

## 2022-10-21 ENCOUNTER — TELEPHONE (OUTPATIENT)
Dept: FAMILY MEDICINE | Facility: CLINIC | Age: 37
End: 2022-10-21
Payer: MEDICARE

## 2022-10-21 DIAGNOSIS — N39.0 RECURRENT UTI: Primary | ICD-10-CM

## 2022-10-21 NOTE — TELEPHONE ENCOUNTER
Pt's mother will bring in urine specimen after AB treatment complete.  Mary Beth Serrano, APRN, CNP, FNP-BC  [facility]

## 2022-10-21 NOTE — TELEPHONE ENCOUNTER
Spoke to pt mom. She would like to wait until pt has completed the antibiotics to give a urine specimen to be cultured. Spoke to Mrs.Beverly Serrano,NP she will put the urine order in that way it will be ready when pt gives a sample.

## 2022-10-21 NOTE — TELEPHONE ENCOUNTER
----- Message from Gage Mario sent at 10/21/2022  3:00 PM CDT -----  Regarding: pt mother called  Name of Who is Calling: SHERRY RODRIGUEZ [[id number]] Eli (Mother)      What is the request in detail: pt mother called requesting a call back. She wants to know if a urine culture will be ordered.Please advise      Can the clinic reply by [facility]: NO      What Number to Call Back if not in [facility]:[phone] (home)

## 2022-10-28 ENCOUNTER — OFFICE VISIT (OUTPATIENT)
Dept: NEUROLOGY | Facility: CLINIC | Age: 37
End: 2022-10-28
Payer: MEDICARE

## 2022-10-28 VITALS
RESPIRATION RATE: 18 BRPM | SYSTOLIC BLOOD PRESSURE: 142 MMHG | WEIGHT: 254.88 LBS | BODY MASS INDEX: 36.57 KG/M2 | HEART RATE: 65 BPM | DIASTOLIC BLOOD PRESSURE: 85 MMHG

## 2022-10-28 DIAGNOSIS — G40.909 SEIZURE DISORDER: Primary | ICD-10-CM

## 2022-10-28 PROCEDURE — 99213 PR OFFICE/OUTPT VISIT, EST, LEVL III, 20-29 MIN: ICD-10-PCS | Mod: S$GLB,,, | Performed by: PSYCHIATRY & NEUROLOGY

## 2022-10-28 PROCEDURE — 99499 UNLISTED E&M SERVICE: CPT | Mod: S$GLB,,, | Performed by: PSYCHIATRY & NEUROLOGY

## 2022-10-28 PROCEDURE — 99499 RISK ADDL DX/OHS AUDIT: ICD-10-PCS | Mod: S$GLB,,, | Performed by: PSYCHIATRY & NEUROLOGY

## 2022-10-28 PROCEDURE — 3077F PR MOST RECENT SYSTOLIC BLOOD PRESSURE >= 140 MM HG: ICD-10-PCS | Mod: CPTII,S$GLB,, | Performed by: PSYCHIATRY & NEUROLOGY

## 2022-10-28 PROCEDURE — 99999 PR PBB SHADOW E&M-EST. PATIENT-LVL IV: CPT | Mod: PBBFAC,,, | Performed by: PSYCHIATRY & NEUROLOGY

## 2022-10-28 PROCEDURE — 1159F MED LIST DOCD IN RCRD: CPT | Mod: CPTII,S$GLB,, | Performed by: PSYCHIATRY & NEUROLOGY

## 2022-10-28 PROCEDURE — 99999 PR PBB SHADOW E&M-EST. PATIENT-LVL IV: ICD-10-PCS | Mod: PBBFAC,,, | Performed by: PSYCHIATRY & NEUROLOGY

## 2022-10-28 PROCEDURE — 99213 OFFICE O/P EST LOW 20 MIN: CPT | Mod: S$GLB,,, | Performed by: PSYCHIATRY & NEUROLOGY

## 2022-10-28 PROCEDURE — 3079F DIAST BP 80-89 MM HG: CPT | Mod: CPTII,S$GLB,, | Performed by: PSYCHIATRY & NEUROLOGY

## 2022-10-28 PROCEDURE — 3077F SYST BP >= 140 MM HG: CPT | Mod: CPTII,S$GLB,, | Performed by: PSYCHIATRY & NEUROLOGY

## 2022-10-28 PROCEDURE — 3079F PR MOST RECENT DIASTOLIC BLOOD PRESSURE 80-89 MM HG: ICD-10-PCS | Mod: CPTII,S$GLB,, | Performed by: PSYCHIATRY & NEUROLOGY

## 2022-10-28 PROCEDURE — 1159F PR MEDICATION LIST DOCUMENTED IN MEDICAL RECORD: ICD-10-PCS | Mod: CPTII,S$GLB,, | Performed by: PSYCHIATRY & NEUROLOGY

## 2022-10-28 RX ORDER — SODIUM FLUORIDE 1.1 G/100G
CREAM ORAL
COMMUNITY
Start: 2022-10-24

## 2022-10-28 NOTE — PROGRESS NOTES
"Date of service:  10/28/2022    Chief complaint:  Seizures    Interval history:  The patient is a 36 y.o. male seen previously for a history of epilepsy who also has events that are nonepileptic in nature.  I last saw him about a year ago.  Since he was last here, he has had no seizures.  They report no clear side effects from his Trileptal, though he may be somewhat off balance at times.  In particular, his caregivers think it is unusual that he uses his feet to  objects rather than bending over to pick them up with his hands.  I had previously discussed with hepatology whether or not they had concerns that this drug might be a contributory factor to his cirrhosis.  They indicated to me that they had no such worries and that there was no indication for changing the drug from their perspective.    History of present illness:  The patient is a 36 y.o. male referred for management of epilepsy.  He previously saw Dr. Sunshine.  This is my first time seeing him.  He provides little in the way of history due to his cognitive issues, so most of the history was obtained from his caregiver.  Additional history is as noted below.  Briefly, though, this gentleman suffers from a developmental delay and has a history of a prior GTC seizure.  He had onset of new episodes involving behavioral outbursts.  He was admitted to the EMU, and these were found not to be epileptic in nature.  Since the last time he was seen, he has had no further episodes worrisome for seizures.    Prior history:  "First seizure suspected in 2006, onset was not witnessed but caused him to fall to the ground, period of unresponsiveness, had hit his head. Not clear if there was convulsive activity at that time. Mother also describes episodes where eyes roll back, he falls to the ground, has generalized convulsive activity and is disoriented / confused for a short time following. Frequency is approximately once every 2 months, last episode 2 months " "ago. More prominent since June 2014.      Over the past few months and in particular 6 weeks, has had increase in behavioral outbursts. Mother is not sure if this is directly result of subclinical seizure or not but describes shouting out, inability to follow through with instructions or direction, eyes rolling (brief, eyes flit back and to right, forceful eye closure lasting 1-2 seconds) without change in muscle tone or consciousness. Concerned his behaviors have been regressing, he mentions name of caregiver from many years ago often. Repeats phrases (you need to have dark hair), appears agitated, unable to sit still. In the past month has had increase in dose of Keppra; had been on 250 BID until 12/4, dose was increased to 500 BID. At the time of visit with Dr. Parker on 12/16 was on 1500 mg HS. Family desperate to switch Keppra if this will improve behaviors. "      Past Medical History:   Diagnosis Date    Autistic disorder, active 5/6/2013    Bowel obstruction     pt mother denies    Early intervention counseling     Gout, chronic 11/12/2015    Grand mal seizure     Hepatic steatosis     Impulse control disorder, unspecified 5/6/2013    Mastoiditis     Moderate mental retardation 5/6/2013    Neurogenic bladder     Neurogenic bladder     HALEY (obstructive sleep apnea) 11/12/2015    BIPAP.  Dr. Garber     Otitis media     Paraplegia     Pervasive developmental disorder, active 12/27/2015    Renal cancer 2010    Right    Scoliosis     paraplegia    Seizure disorder 11/12/2015    Stroke     one week old    Tardive dyskinesia        Past Surgical History:   Procedure Laterality Date    ANKLE FRACTURE SURGERY      BACK SURGERY  2000    COLONOSCOPY  10/28/2008    Dr. Arana at [facility]; anal fissure, minimal pinpoint rectal erythema; floppy-type colon with very little tone; biopsy: rectum mild chronic proctitis with few eosinophils sent for scanning    COLONOSCOPY N/A 6/15/2018    Procedure: COLONOSCOPY;  Surgeon: Refugio " ASHLEY Villagomez MD;  Location: [facility];  Service: Endoscopy;  Laterality: N/A; Preparation of the colon was fair, unremarkable; REPEAT at 50 YEARS old FOR SCREENING    COLONOSCOPY N/A 9/9/2020    Procedure: COLONOSCOPY;  Surgeon: Refugio Villagomez MD;  Location: [facility];  Service: Endoscopy;  Laterality: N/A;    ESOPHAGOGASTRODUODENOSCOPY N/A 7/13/2020    Procedure: EGD (ESOPHAGOGASTRODUODENOSCOPY);  Surgeon: Refugio Villagomez MD;  Location: [facility];  Service: Endoscopy;  Laterality: N/A;    ESOPHAGOGASTRODUODENOSCOPY N/A 8/17/2022    Procedure: EGD (ESOPHAGOGASTRODUODENOSCOPY);  Surgeon: Refugio Villagomez MD;  Location: [facility];  Service: Endoscopy;  Laterality: N/A;  cirrhosis, varices screening    ESOPHAGOGASTRODUODENOSCOPY (EGD) WITH DILATION N/A 2020    INNER EAR SURGERY      mastoid    LIVER BIOPSY N/A 7/20/2020    Procedure: BIOPSY, LIVER;  Surgeon: Ananya Surgeon;  Location: [facility];  Service: Anesthesiology;  Laterality: N/A;  189 liver bx/Ultrasound anes 1.5 hours    MAGNETIC RESONANCE IMAGING N/A 10/9/2020    Procedure: MRI (Magnetic Resonance Imagine);  Surgeon: Manish Araiza MD;  Location: [facility];  Service: Anesthesiology;  Laterality: N/A;    right partial nephrectomy Right 2010    Sees urology    TYMPANOSTOMY TUBE PLACEMENT         Family History   Problem Relation Age of Onset    Cancer Father         lymphoma    Cataracts Father     Colon polyps Father     Cataracts Mother     Cataracts Maternal Grandmother     Diabetes Maternal Grandmother     Macular degeneration Maternal Grandmother     Glaucoma Maternal Grandmother     Anesthesia problems Neg Hx     Clotting disorder Neg Hx     Colon cancer Neg Hx     Crohn's disease Neg Hx     Ulcerative colitis Neg Hx     Stomach cancer Neg Hx     Esophageal cancer Neg Hx     Cirrhosis Neg Hx        Social History     Socioeconomic History    Marital status: Single   Tobacco Use    Smoking status: Never    Smokeless tobacco: Never    Substance and Sexual Activity    Alcohol use: No    Drug use: No    Sexual activity: Never   Social History Narrative    Lives with his parents.  More dependent with ADLs.  Very active in the community.        New Opportunities jhoan.     Social Determinants of Health     Financial Resource Strain: Low Risk     Difficulty of Paying Living Expenses: Not hard at all   Food Insecurity: No Food Insecurity    Worried About Running Out of Food in the Last Year: Never true    Ran Out of Food in the Last Year: Never true   Transportation Needs: No Transportation Needs    Lack of Transportation (Medical): No    Lack of Transportation (Non-Medical): No   Physical Activity: Insufficiently Active    Days of Exercise per Week: 3 days    Minutes of Exercise per Session: 40 min   Stress: Stress Concern Present    Feeling of Stress : To some extent   Social Connections: Unknown    Frequency of Communication with Friends and Family: Never    Frequency of Social Gatherings with Friends and Family: Three times a week    Active Member of Clubs or Organizations: Yes    Attends Club or Organization Meetings: More than 4 times per year    Marital Status: Never    Housing Stability: Low Risk     Unable to Pay for Housing in the Last Year: No    Number of Places Lived in the Last Year: 1    Unstable Housing in the Last Year: No        Review of Systems:  A 14 system review is limited due to his imperfect reliability as a historian.  His family reports no significant changes since his prior visit.    Physical exam:  BP (!) 142/85 (BP Location: Left arm, Patient Position: Sitting, BP Method: Large (Automatic))   Pulse 65   Resp 18   Wt 115.6 kg (254 lb 13.6 oz)   BMI 36.57 kg/m²    Higher Cortical Function:   Patient is a well developed, obese man who is unable to sit still and behaves inappropriately.  He was not hostile, but imperfectly uncooperative.     Cranial Nerves II - XII:   EOMs were intact with normal smooth and no  "nystagmus.   PERRLA. D/C Funduscopic exam - disc were flat with normal A/V ratio and no exudates or hemorrhages. Visual fields were full to confrontation.   Motor - facial movement was symmetrical and normal.   Facial sensory - Light touch and pin prick sensations were normal.   Hearing was normal to finger rub.  Palate moved well and was symmetrical with normal palatal and oral sensation.   Tongue movement was full & the patient could say "la la la" and "Ka Ka Ka" without  difficulty. Patient repeated Hoahaoism and Baptism without difficulty. Normal power and bulk was found in the massiter and rotator muscles of the neck.  Motor: Power, bulk and tone were normal in all extremities.  Sensory: Light touch, pin prick, vibration and position senses were normal in all extremities.   Coordination:   Rapid alternating movements and rapid finger tapping - normal.   Finger to nose - nl.   Arm roll - symmetrical.   Gait: Station, gait and tandem walking were done without difficulty and Romberg was negative.      Tremor: resting, postural, intentional - none    Data base:  Prior records were reviewed and are as summarized above.    Labs (4/21):  [facility]: 8    MRI brain (12/15):  "Motion limited MRI of the brain.  There is trace asymmetry of the mesial temporal lobes left slightly smaller than the right allowing for motion limitation.  This may be artifactual or developmental variant with underlying left mesial temporal sclerosis not excluded.  Clinical correlation correlation with EEG analysis is advised.  No evidence for focal parietal signal abnormality, acute infarction or enhancing lesion."    vEEG (1/16):  "FINAL SUMMARY:  ELECTROENCEPHALOGRAM:  Interictal: This is a normal EEG during wakefulness, drowsiness, and sleep.  Ictal: During this recording, periods of eye flutter during wakefulness and myoclonic jerks during sleep were recorded without associated epileptiform activity on EEG.  CLINICAL SEIZURE:  Classification: " "Nonepileptic events. Based on clinical history and evaluation by Psychiatry, these events were likely motor tics during wakefulness and myoclonic jerks during sleep. There is no evidence of an epileptic process on this recording. No seizures were recorded"    DEXA (1/20):  "Osteoporosis by measurement of the 1/3 of the left forearm.  Other measurements are normal bone mineral density."    Assessment and plan:  The patient is a 36 y.o. 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  As far as medications go, we will continue his Trileptal.  We will check labs for medication monitoring purposes in about 2 months.  I would like to follow this a bit more closely in him owing to his cirrhosis.  Medication side effects were discussed with the patient's caregiver.  We will check a DEXA for medication monitoring purposes.  He will follow with his PCP for any issues with bone mineral density.  State law as it pertains to driving for individuals with seizures was not discussed as his cognitive issues preclude him from driving regardless of degree of seizure control.  The patient's family has been counseled on seizure safety.  "

## 2022-11-08 ENCOUNTER — TELEPHONE (OUTPATIENT)
Dept: FAMILY MEDICINE | Facility: CLINIC | Age: 37
End: 2022-11-08
Payer: MEDICARE

## 2022-11-08 ENCOUNTER — PATIENT MESSAGE (OUTPATIENT)
Dept: FAMILY MEDICINE | Facility: CLINIC | Age: 37
End: 2022-11-08
Payer: MEDICARE

## 2022-11-08 NOTE — TELEPHONE ENCOUNTER
----- Message from Deepthi Hinojosa sent at 11/8/2022  9:31 AM CST -----  Contact: Eli - Pts Mother  Type:  Needs Medical Advice    Who Called: Eli  Symptoms (please be specific): chronic uti infections, discolored urine  How long has patient had these symptoms:  several months  Pharmacy name and phone #:    [facility] [address] [address]  [address] [address]  [facility] [address]  Phone: [phone] Fax: [phone]      Would the patient rather a call back or a response via [facility]? Call  Best Call Back Number: [phone]    Additional Information: Pt's mother called wanting a urine specimen order placed. Son has chronic uti's and she just collected a urine specimen on him and wants to bring it in for a culture. Pt's mother requests order or call back.

## 2022-11-10 ENCOUNTER — PATIENT MESSAGE (OUTPATIENT)
Dept: FAMILY MEDICINE | Facility: CLINIC | Age: 37
End: 2022-11-10
Payer: MEDICARE

## 2022-11-14 ENCOUNTER — OFFICE VISIT (OUTPATIENT)
Dept: FAMILY MEDICINE | Facility: CLINIC | Age: 37
End: 2022-11-14
Payer: MEDICARE

## 2022-11-14 VITALS
HEART RATE: 72 BPM | HEIGHT: 70 IN | DIASTOLIC BLOOD PRESSURE: 70 MMHG | OXYGEN SATURATION: 98 % | BODY MASS INDEX: 37.15 KG/M2 | SYSTOLIC BLOOD PRESSURE: 120 MMHG | WEIGHT: 259.5 LBS

## 2022-11-14 DIAGNOSIS — F84.0 AUTISTIC DISORDER, ACTIVE: Chronic | ICD-10-CM

## 2022-11-14 DIAGNOSIS — N31.9 NEUROGENIC BLADDER: Primary | Chronic | ICD-10-CM

## 2022-11-14 DIAGNOSIS — K59.09 CHRONIC CONSTIPATION: Chronic | ICD-10-CM

## 2022-11-14 DIAGNOSIS — G47.33 OSA (OBSTRUCTIVE SLEEP APNEA): Chronic | ICD-10-CM

## 2022-11-14 DIAGNOSIS — G40.909 SEIZURE DISORDER: Chronic | ICD-10-CM

## 2022-11-14 PROCEDURE — 99999 PR PBB SHADOW E&M-EST. PATIENT-LVL V: CPT | Mod: PBBFAC,,, | Performed by: EMERGENCY MEDICINE

## 2022-11-14 PROCEDURE — 3078F PR MOST RECENT DIASTOLIC BLOOD PRESSURE < 80 MM HG: ICD-10-PCS | Mod: CPTII,S$GLB,, | Performed by: EMERGENCY MEDICINE

## 2022-11-14 PROCEDURE — 1159F PR MEDICATION LIST DOCUMENTED IN MEDICAL RECORD: ICD-10-PCS | Mod: CPTII,S$GLB,, | Performed by: EMERGENCY MEDICINE

## 2022-11-14 PROCEDURE — 3008F PR BODY MASS INDEX (BMI) DOCUMENTED: ICD-10-PCS | Mod: CPTII,S$GLB,, | Performed by: EMERGENCY MEDICINE

## 2022-11-14 PROCEDURE — 1159F MED LIST DOCD IN RCRD: CPT | Mod: CPTII,S$GLB,, | Performed by: EMERGENCY MEDICINE

## 2022-11-14 PROCEDURE — 3008F BODY MASS INDEX DOCD: CPT | Mod: CPTII,S$GLB,, | Performed by: EMERGENCY MEDICINE

## 2022-11-14 PROCEDURE — 99999 PR PBB SHADOW E&M-EST. PATIENT-LVL V: ICD-10-PCS | Mod: PBBFAC,,, | Performed by: EMERGENCY MEDICINE

## 2022-11-14 PROCEDURE — 3078F DIAST BP <80 MM HG: CPT | Mod: CPTII,S$GLB,, | Performed by: EMERGENCY MEDICINE

## 2022-11-14 PROCEDURE — 99213 PR OFFICE/OUTPT VISIT, EST, LEVL III, 20-29 MIN: ICD-10-PCS | Mod: S$GLB,,, | Performed by: EMERGENCY MEDICINE

## 2022-11-14 PROCEDURE — 99213 OFFICE O/P EST LOW 20 MIN: CPT | Mod: S$GLB,,, | Performed by: EMERGENCY MEDICINE

## 2022-11-14 PROCEDURE — 3074F PR MOST RECENT SYSTOLIC BLOOD PRESSURE < 130 MM HG: ICD-10-PCS | Mod: CPTII,S$GLB,, | Performed by: EMERGENCY MEDICINE

## 2022-11-14 PROCEDURE — 3074F SYST BP LT 130 MM HG: CPT | Mod: CPTII,S$GLB,, | Performed by: EMERGENCY MEDICINE

## 2022-11-14 RX ORDER — AMOXICILLIN 875 MG/1
TABLET, FILM COATED ORAL
COMMUNITY
Start: 2022-11-10 | End: 2023-01-10

## 2022-11-14 NOTE — Clinical Note
Rehan,  This gentleman is generally very well controlled.  He has multiple resources available to him as he has a mother who is a tremendous advocate.  I am hoping that you will take him on as patient.  I see him 3 or 4 times a year and rarely have to do anything.  The most pressing issue is recurrent urinary tract infections, and he does see Urology for this.  He is not had recent seizures.  Let me know if your willing to do this, and I told mother I would try to make a smooth transition care.  Thank you.  ABDIAS

## 2022-11-14 NOTE — Clinical Note
Amado,   This austic man is followed by [facility].  He has had repeated infections.  He does have a neurogenic bladder.  His mother is wanting a 2nd opinion about additional workup that may be needed.  She is not unhappy with her current urologic air, just wants somebody to take a look.    This gentleman is not a good fit for female providers.  Thanks  Reji Zendejas

## 2022-11-14 NOTE — PROGRESS NOTES
Subjective:   THIS NOTE IS DONE WITH VOICE RECOGNITION        Patient ID: Hussein Doyle is a 37 y.o. male.    Chief Complaint: Follow-up         Assessment:       1. Neurogenic bladder    2. Chronic constipation    3. HALEY (obstructive sleep apnea)    4. Autistic disorder, active    5. Seizure disorder        Plan:       1. Neurogenic bladder  His mother's requesting 2nd opinion   No change in therapies     - Ambulatory referral/consult to Urology; Future    2. Chronic constipation  Anticipate more issues with constipation   Aggressive use of polyethylene glycol    3. HALEY (obstructive sleep apnea)  Using with good control continue     4. Autistic disorder, active  He has t transitioned psychiatric care, without problems  Will transition primary care hopefully with same outcome      HPI    He is brought in today by his mother with continued concerns about repeated urinary tract infections.  He has been receiving his urological care from Dr. Soni.  He has seen him for number of years.  He has a well-established neurogenic bladder and is cathing, with assistance from his healthcare aide or mother 4 times a day.  He is had a series of infections.  I have gone over within multiple times process for catheterization, and I do believe they are doing it correctly.  His mother would like to get a 2nd opinion and whether not additional urological intervention is indicated.    1200 cc on caths most times.    He continues horse therapy and music.  The disruption from COVID is gradually resolving, but remains somewhat problematic.    He did see Dr. Lopez, psychiatry.  This encounter went well.    He has longstanding issues with constipation.  This is been aggressively managed by his mother.  He is beginning to have increasing symptoms, and I have asked her to increase the polyethylene glycol for a few days.      We did discuss continuity of care issues.  I will be retiring a couple of months.  I believe Hussein would  do best with a male provider.  He does have Psychiatry in [facility] consultations in place.  Obviously he is also seen urology.    Current Outpatient Medications   Medication Sig Dispense Refill    allopurinoL (ZYLOPRIM) 100 MG tablet TAKE ONE TABLET BY MOUTH TWICE DAILY 180 tablet 3    amoxicillin (AMOXIL) 875 MG tablet SMARTSI Tablet(s) By Mouth Every 12 Hours      ascorbic acid, vitamin C, 250 mg Chew Take by mouth.      calcium carbonate-vitamin D3 500 mg(1,250mg) -400 unit Tab Take 1 tablet by mouth 2 (two) times a day.       CONSTULOSE 10 gram/15 mL solution TAKE one TEASPOONFUL (5ml) BY MOUTH THREE TIMES DAILY 473 mL 3    DENTA 5000 PLUS 1.1 % Crea SMARTSIG:By Mouth Morning-Night      fluvoxaMINE (LUVOX) 100 MG tablet TAKE ONE AND A HALF TABLETS BY MOUTH TWICE DAILY 270 tablet 1    inulin (FIBER GUMMIES ORAL) Take 2 tablets by mouth once daily.       L. ACIDOPHILUS/BIFID. ANIMALIS (CHEWABLE PROBIOTIC ORAL) Take 1 tablet by mouth 2 (two) times daily.       magnesium 30 mg Tab Take 1 tablet by mouth every evening.      MELATONIN ORAL Take 1 tablet by mouth every evening.      multivitamin capsule Take 1 capsule by mouth every morning.       mupirocin calcium 2% (BACTROBAN) 2 % cream Apply topically 3 (three) times daily. 15 g 1    NON FORMULARY MEDICATION CBD Oil- OTC      omega-3 fatty acids/fish oil (FISH OIL-OMEGA-3 FATTY ACIDS) 300-1,000 mg capsule Take 1 capsule by mouth once daily.       OXcarbazepine (TRILEPTAL) 300 MG Tab TAKE ONE TABLET (300MG) BY MOUTH TWICE DAILY 180 tablet 1    pantoprazole (PROTONIX) 40 MG tablet TAKE ONE TABLET (40MG) BY MOUTH ONCE DAILY 90 tablet 3    polyethylene glycol (GLYCOLAX) 17 gram PwPk Take 17 g by mouth once daily.       potassium chloride (KLOR-CON) 10 MEQ TbSR Take 10 mEq by mouth every evening.      pramipexole (MIRAPEX) 0.125 MG tablet TAKE ONE TABLET BY MOUTH THREE TIMES DAILY 90 tablet 5    sulfamethoxazole-trimethoprim 400-80mg (BACTRIM,SEPTRA) 400-80 mg per  tablet Take 1 tablet by mouth once daily.      tamsulosin (FLOMAX) 0.4 mg Cap Take 0.4 mg by mouth every evening. Every day      UNABLE TO FIND Take by mouth once daily. Theralogix Vitamin      zinc gluconate 50 mg tablet Take 50 mg by mouth once daily.       No current facility-administered medications for this visit.         Review of Systems   Unable to perform ROS: Patient nonverbal (Mother provided information.)   Constitutional:  Negative for chills and fever.   HENT: Negative.     Cardiovascular:  Negative for leg swelling.   Gastrointestinal:  Positive for constipation.   Genitourinary:  Positive for difficulty urinating. Negative for hematuria.   Neurological:  Negative for seizures.   Psychiatric/Behavioral:          Autism behaviors have been stable.     Objective:      Physical Exam  Vitals reviewed.   Constitutional:       Appearance: Normal appearance. He is obese.   HENT:      Head: Normocephalic and atraumatic.      Nose: Nose normal.      Mouth/Throat:      Mouth: Mucous membranes are moist.      Pharynx: Oropharynx is clear.   Eyes:      Extraocular Movements: Extraocular movements intact.      Conjunctiva/sclera: Conjunctivae normal.   Cardiovascular:      Rate and Rhythm: Normal rate and regular rhythm.      Pulses: Normal pulses.      Heart sounds: Normal heart sounds.   Pulmonary:      Effort: Pulmonary effort is normal.   Abdominal:      General: There is no distension.      Tenderness: There is no abdominal tenderness.   Musculoskeletal:         General: Normal range of motion.      Cervical back: Normal range of motion and neck supple.   Skin:     General: Skin is warm and dry.   Neurological:      General: No focal deficit present.      Mental Status: He is alert and oriented to person, place, and time.      Comments: Stable, no evidence of seizure or other focal issues.

## 2022-11-20 ENCOUNTER — PATIENT MESSAGE (OUTPATIENT)
Dept: FAMILY MEDICINE | Facility: CLINIC | Age: 37
End: 2022-11-20
Payer: MEDICARE

## 2022-11-27 ENCOUNTER — PATIENT MESSAGE (OUTPATIENT)
Dept: FAMILY MEDICINE | Facility: CLINIC | Age: 37
End: 2022-11-27
Payer: MEDICARE

## 2022-12-20 ENCOUNTER — HOSPITAL ENCOUNTER (OUTPATIENT)
Dept: RADIOLOGY | Facility: HOSPITAL | Age: 37
Discharge: HOME | End: 2022-12-20
Attending: NURSE PRACTITIONER
Payer: MEDICARE

## 2022-12-20 DIAGNOSIS — K74.60 CIRRHOSIS OF LIVER WITHOUT ASCITES, UNSPECIFIED HEPATIC CIRRHOSIS TYPE: ICD-10-CM

## 2022-12-20 PROCEDURE — 76705 ECHO EXAM OF ABDOMEN: CPT | Mod: 26,,, | Performed by: RADIOLOGY

## 2022-12-20 PROCEDURE — 76705 US ABDOMEN LIMITED: ICD-10-PCS | Mod: 26,,, | Performed by: RADIOLOGY

## 2022-12-20 PROCEDURE — 76705 ECHO EXAM OF ABDOMEN: CPT | Mod: TC,PO

## 2022-12-27 ENCOUNTER — OFFICE VISIT (OUTPATIENT)
Dept: HEPATOLOGY | Facility: CLINIC | Age: 37
End: 2022-12-27
Payer: MEDICARE

## 2022-12-27 DIAGNOSIS — K74.60 CIRRHOSIS OF LIVER WITHOUT ASCITES, UNSPECIFIED HEPATIC CIRRHOSIS TYPE: Primary | ICD-10-CM

## 2022-12-27 DIAGNOSIS — K75.81 NASH (NONALCOHOLIC STEATOHEPATITIS): ICD-10-CM

## 2022-12-27 DIAGNOSIS — E66.01 MORBID (SEVERE) OBESITY DUE TO EXCESS CALORIES: ICD-10-CM

## 2022-12-27 DIAGNOSIS — E78.5 HYPERLIPIDEMIA, UNSPECIFIED HYPERLIPIDEMIA TYPE: ICD-10-CM

## 2022-12-27 PROCEDURE — 3044F HG A1C LEVEL LT 7.0%: CPT | Mod: CPTII,95,, | Performed by: NURSE PRACTITIONER

## 2022-12-27 PROCEDURE — 99214 OFFICE O/P EST MOD 30 MIN: CPT | Mod: 95,,, | Performed by: NURSE PRACTITIONER

## 2022-12-27 PROCEDURE — 99214 PR OFFICE/OUTPT VISIT, EST, LEVL IV, 30-39 MIN: ICD-10-PCS | Mod: 95,,, | Performed by: NURSE PRACTITIONER

## 2022-12-27 PROCEDURE — 3044F PR MOST RECENT HEMOGLOBIN A1C LEVEL <7.0%: ICD-10-PCS | Mod: CPTII,95,, | Performed by: NURSE PRACTITIONER

## 2022-12-27 NOTE — PATIENT INSTRUCTIONS
Continue lab and ultrasound monitoring every 6 months- next due in June          CIRRHOSIS EDUCATION:  This is a helpful web site about cirrhosis: https://cirrhosiscare.ca/     Because you have cirrhosis, it is extremely important to obtain blood tests and an ultrasound every 6 months to screen for liver cancer (you are at risk for developing liver cancer due to increased scar tissue in the liver).     Signs and symptoms of worsening liver disease include jaundice (yellow skin/eyes), fluid in the abdomen (ascites), and confusion/disorientation/slowed thought processes due to hepatic encephalopathy (toxins building up when the liver is not working properly). You should seek medical attention if any of these things occur. Also, possible bleeding from esophageal varices (blood vessels in the stomach and foodpipe that can burst and cause bleeding). If you have symptoms of vomiting blood, blood in your stool, maroon or black stools, or coffee ground vomit, you should go to the emergency room immediately.     Cirrhosis can increase the risk of impaired liver function or liver failure; however, we will watch your liver function score (MELD score) closely with each clinic visit. A normal MELD score is 6, highest is 40. The MELD score helps to determine when we may need to consider evaluation for liver transplant.     Counseling  -- Strict abstinence from alcohol (includes beer, wine, and/or liquor)  -- Avoid non-steroidal anti-inflammatory drugs (NSAIDs) such as ibuprofen (Motrin, Advil), naproxen (Naprosyn, Aleve), meloxicam (Mobic)   -- Tylenol/acetaminophen is OKAY and should be used as needed for pain, no more than 2000 mg per day  -- Avoid raw shellfish due to the risk of Vibrio vulnificus infection  -- Low salt/sodium diet, less than 2000 mg per day   -- High protein diet to prevent muscle mass loss. Can drink protein shakes (Premier Protein is a great option because it is very high protein and low sugar). A bedtime  snack with protein can also be helpful. Example: peanut butter sandwich/crackers  -- Periodic upper endoscopy (EGD) to screen for varices (enlarged blood vessels) in the esophagus and stomach which can increase risk of bleeding  -- Increased risk of liver cancer associated with cirrhosis; therefore, continued screening with ultrasound (or CT / MRI) and AFP tumor marker every 6 months is recommended.

## 2022-12-27 NOTE — Clinical Note
Please schedule labs, US, and f/u visit in 6 months. Usually prefers virtual but can be seen in [facility] too. Thanks!

## 2022-12-27 NOTE — PROGRESS NOTES
The patient location is: [place], [place]  The chief complaint leading to consultation is: F/u for cirrhosis    Visit type: audiovisual    Face to Face time with patient: 20 min  30 minutes of total time spent on the encounter, which includes face to face time and non-face to face time preparing to see the patient (eg, review of tests), Obtaining and/or reviewing separately obtained history, Documenting clinical information in the electronic or other health record, Independently interpreting results (not separately reported) and communicating results to the patient/family/caregiver, or Care coordination (not separately reported).     Each patient to whom he or she provides medical services by telemedicine is:  (1) informed of the relationship between the physician and patient and the respective role of any other health care provider with respect to management of the patient; and (2) notified that he or she may decline to receive medical services by telemedicine and may withdraw from such care at any time.      [facility] - Established Patient    Last Clinic Visit: 6/20/22      HISTORY     This is a 37 y.o. 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 Mother present for visit who is able to assist with history.    Transaminases consistently elevated since 11/2015, AST>ALT.    Serological workup has been negative for other etiologies of liver disease.  Ferritin elevated, 800-1500 though iron sat normal. No copies of C282Y or H63D to suggest HH. IgG mildly elevated (1677) though other autoimmune markers negative.      Imaging has shown hepatomegaly, fatty liver, splenomegaly.    Diagnosed with cirrhosis: Liver biopsy 7/2020  - Cirrhotic liver  - Steatosis with steatohepatitis (please see comment and GARCIA grading form below)  - Macrovesicular steatosis 20-30% and microvesicular steatosis 20%  - Etiology: Nonalcoholic fatty liver disease (NAFLD)  - Trichrome: cirrhosis (Stage  4/4)  - Iron: No deposit (Grade 0/4)  GARCIA grading 4/8  FAT, 1 (6 to 33%)  Ballooned hepatocytes, 2 (many)  Lobular inflammation 1 (<2 foci)    Health Maintenance:  -- HCC screening: abd US 22 without focal hepatic lesion; AFP <2  -- Variceal screening: EGD 22 without varices  -- Hepatitis A & B vaccination: needs vaccines    Interval history:  He was previously able to lose ~50 lb and liver enzymes normalized. Limiting portions and watching diet has been difficult to maintain. Weight has since trended back up and enzymes are increasing:  / ALT 94.     Risk factors for fatty liver-  BMI ~37  HgbA1c improved, 5.2  TG more elevated, >200. On fish oil     Main concern is chronic UTIs. Mom is arranging a 2nd/3rd opinion with urology.     Also has chronic constipation though has had 2 BMs/day for the last few days.     Current symptoms of hepatic decompensation:              Ascites: no              LE edema: no                Hepatic encephalopathy: difficult to assess given autism and MR; his ammonia was mildly elevated in the past and mom thought he was having confusion. Lactulose started and he takes this 3 x day; this has also helped with constipation. She reports mental status at baseline lately.               GI bleeding: no              Jaundice: no          Past medical history, surgical history, problem list, family history, social history, allergies: Reviewed and updated in the appropriate section of the electronic medical record.  Pertinent findings:  Autism, tardive dyskinesia, seizures, renal CA, neurogenic bladder      Current Outpatient Medications   Medication Sig Dispense Refill    allopurinoL (ZYLOPRIM) 100 MG tablet TAKE ONE TABLET BY MOUTH TWICE DAILY 180 tablet 3    amoxicillin (AMOXIL) 875 MG tablet SMARTSI Tablet(s) By Mouth Every 12 Hours      ascorbic acid, vitamin C, 250 mg Chew Take by mouth.      calcium carbonate-vitamin D3 500 mg(1,250mg) -400 unit Tab Take 1 tablet by  mouth 2 (two) times a day.       DENTA 5000 PLUS 1.1 % Crea SMARTSIG:By Mouth Morning-Night      fluvoxaMINE (LUVOX) 100 MG tablet TAKE ONE and half TABLET BY MOUTH TWICE DAILY 270 tablet 1    inulin (FIBER GUMMIES ORAL) Take 2 tablets by mouth once daily.       L. ACIDOPHILUS/BIFID. ANIMALIS (CHEWABLE PROBIOTIC ORAL) Take 1 tablet by mouth 2 (two) times daily.       lactulose (CHRONULAC) 10 gram/15 mL solution TAKE one TEASPOONFUL (5ml) BY MOUTH THREE TIMES DAILY 473 mL 3    magnesium 30 mg Tab Take 1 tablet by mouth every evening.      MELATONIN ORAL Take 1 tablet by mouth every evening.      multivitamin capsule Take 1 capsule by mouth every morning.       mupirocin calcium 2% (BACTROBAN) 2 % cream Apply topically 3 (three) times daily. 15 g 1    NON FORMULARY MEDICATION CBD Oil- OTC      omega-3 fatty acids/fish oil (FISH OIL-OMEGA-3 FATTY ACIDS) 300-1,000 mg capsule Take 1 capsule by mouth once daily.       OXcarbazepine (TRILEPTAL) 300 MG Tab TAKE ONE TABLET (300MG) BY MOUTH TWICE DAILY 180 tablet 1    pantoprazole (PROTONIX) 40 MG tablet TAKE ONE TABLET (40MG) BY MOUTH ONCE DAILY 90 tablet 3    polyethylene glycol (GLYCOLAX) 17 gram PwPk Take 17 g by mouth once daily.       potassium chloride (KLOR-CON) 10 MEQ TbSR Take 10 mEq by mouth every evening.      pramipexole (MIRAPEX) 0.125 MG tablet TAKE ONE TABLET BY MOUTH THREE TIMES DAILY 90 tablet 5    sulfamethoxazole-trimethoprim 400-80mg (BACTRIM,SEPTRA) 400-80 mg per tablet Take 1 tablet by mouth once daily.      tamsulosin (FLOMAX) 0.4 mg Cap Take 0.4 mg by mouth every evening. Every day      UNABLE TO FIND Take by mouth once daily. Theralogix Vitamin      zinc gluconate 50 mg tablet Take 50 mg by mouth once daily.       No current facility-administered medications for this visit.     Medication list reviewed and updated.      Review of Systems   As per HPI       Physical Exam   Constitutional: No distress.   Pulmonary/Chest: No respiratory distress.    Skin: No jaundice.   Psychiatric: At baseline per mother.        LABS & DIAGNOSTIC STUDIES     I have personally reviewed pertinent laboratory findings:    Lab Results   Component Value Date    ALT 94 (H) 12/20/2022     (H) 12/20/2022    ALKPHOS 73 12/20/2022    BILITOT 0.7 12/20/2022    ALBUMIN 4.2 12/20/2022    INR 1.0 12/20/2022       Lab Results   Component Value Date    WBC 7.64 12/20/2022    HGB 15.6 12/20/2022    HCT 46.8 12/20/2022    MCV 96 12/20/2022     12/20/2022       Lab Results   Component Value Date     12/20/2022    K 4.3 12/20/2022    BUN 11 12/20/2022    CREATININE 0.8 12/20/2022    ESTGFRAFRICA >60 06/16/2022    EGFRNONAA >60 06/16/2022       Lab Results   Component Value Date    SMOOTHMUSCAB Negative 1:40 04/30/2018    AMAIFA Negative 1:40 04/30/2018    IGGSERUM 1677 (H) 02/29/2020    ANASCREEN Negative <1:160 04/30/2018    FERRITIN 893 (H) 02/29/2020    FESATURATED 26 04/30/2018    CERULOPLSM 30.0 04/30/2018    HEPBSAG Negative 04/30/2018    HEPBIGM Negative 04/30/2018    HEPBCAB Negative 02/02/2021    HEPCAB Negative 04/30/2018    HEPAIGM Negative 04/30/2018       Lab Results   Component Value Date    AFP <2.0 12/20/2022       I have personally reviewed the following result reports:  Abdominal US - 12/20/22  EGD - 7/13/20  Colonoscopy - 9/9/20  Liver biopsy - 7/20/20      ASSESSMENT & PLAN     37 y.o. male with:    1. Cirrhosis, due to TRAYLOR, well compensated  -- Child-Lazo score: A  -- MELD remains <15, no indication for liver transplant evaluation at this time. Reassured that liver function remains stable.   -- Monitor MELD labs every 6 months  -- Continue HCC screening every 6 months with ultrasound and AFP, next due 6/2023  -- EGD 8/2022 without varices. Normal platelet count and no s/s portal HTN. Can repeat in 3 years.  -- Recommend vaccines for hepatitis A and B    MELD-Na score: 6 at 12/20/2022 10:12 AM  MELD score: 6 at 12/20/2022 10:12 AM  Calculated from:  Serum  Creatinine: 0.8 mg/dL (Using min of 1 mg/dL) at 12/20/2022 10:12 AM  Serum Sodium: 139 mmol/L (Using max of 137 mmol/L) at 12/20/2022 10:12 AM  Total Bilirubin: 0.7 mg/dL (Using min of 1 mg/dL) at 12/20/2022 10:12 AM  INR(ratio): 1.0 at 12/20/2022 10:12 AM  Age: 37 years      2. Fatty liver / TRAYLOR, BMI ~37  -- Transaminases trending up with weight gain and higher triglycerides   -- Encouraged to continue weight loss efforts including dietary changes and physical activity. May benefit from medical weight loss; recommend discussing with PCP  -- Maintain control of blood pressure, cholesterol, and blood sugar as these are risk factors for fatty liver    3. Possible hepatic encephalopathy  -- Ammonia normal-minimally elevated in the past, difficult to assess for HE given autism and developmental delay   -- Doing well with lactulose which has also helped his constipation. Takes this TID. Mental status at baseline per mother      Orders Placed This Encounter   Procedures    US Abdomen Limited    CBC Without Differential    Comprehensive Metabolic Panel    Protime-INR    AFP Tumor Marker       *See AVS for patient education and instructions.      F/u in 6 months with labs/US prior- Virtual or [facility]       Thank you for allowing me to participate in the care of Hussein Armendariz, ROMÁNP-C  Hepatology

## 2022-12-28 ENCOUNTER — TELEPHONE (OUTPATIENT)
Dept: HEPATOLOGY | Facility: CLINIC | Age: 37
End: 2022-12-28
Payer: MEDICARE

## 2022-12-28 NOTE — TELEPHONE ENCOUNTER
Called and spoke with pt., mother they agreed with the scheduled appointment.  Mailed letter appointment as well.

## 2022-12-28 NOTE — TELEPHONE ENCOUNTER
----- Message from Amelia Armendariz NP sent at 12/27/2022 11:15 AM CST -----  Please schedule labs, US, and f/u visit in 6 months. Usually prefers virtual but can be seen in [place] too. Thanks!

## 2023-01-10 ENCOUNTER — OFFICE VISIT (OUTPATIENT)
Dept: FAMILY MEDICINE | Facility: CLINIC | Age: 38
End: 2023-01-10
Payer: COMMERCIAL

## 2023-01-10 ENCOUNTER — OFFICE VISIT (OUTPATIENT)
Dept: PSYCHIATRY | Facility: CLINIC | Age: 38
End: 2023-01-10
Payer: COMMERCIAL

## 2023-01-10 VITALS
WEIGHT: 255.75 LBS | DIASTOLIC BLOOD PRESSURE: 88 MMHG | HEIGHT: 70 IN | HEART RATE: 78 BPM | SYSTOLIC BLOOD PRESSURE: 130 MMHG | OXYGEN SATURATION: 97 % | BODY MASS INDEX: 36.61 KG/M2

## 2023-01-10 DIAGNOSIS — M1A.9XX0 CHRONIC GOUT WITHOUT TOPHUS, UNSPECIFIED CAUSE, UNSPECIFIED SITE: Chronic | ICD-10-CM

## 2023-01-10 DIAGNOSIS — F84.0 AUTISTIC DISORDER, ACTIVE: Primary | Chronic | ICD-10-CM

## 2023-01-10 DIAGNOSIS — G47.33 OSA (OBSTRUCTIVE SLEEP APNEA): Chronic | ICD-10-CM

## 2023-01-10 DIAGNOSIS — F41.9 ANXIETY DISORDER, UNSPECIFIED TYPE: ICD-10-CM

## 2023-01-10 DIAGNOSIS — F63.9 IMPULSE CONTROL DISORDER: ICD-10-CM

## 2023-01-10 DIAGNOSIS — F84.9 PERVASIVE DEVELOPMENTAL DISORDER: ICD-10-CM

## 2023-01-10 DIAGNOSIS — K59.09 CHRONIC CONSTIPATION: Chronic | ICD-10-CM

## 2023-01-10 DIAGNOSIS — N31.9 NEUROGENIC BLADDER: Chronic | ICD-10-CM

## 2023-01-10 DIAGNOSIS — F42.9 OBSESSIVE-COMPULSIVE DISORDER, UNSPECIFIED TYPE: Primary | ICD-10-CM

## 2023-01-10 PROCEDURE — 99214 PR OFFICE/OUTPT VISIT, EST, LEVL IV, 30-39 MIN: ICD-10-PCS | Mod: 95,,, | Performed by: PSYCHOLOGIST

## 2023-01-10 PROCEDURE — 3079F DIAST BP 80-89 MM HG: CPT | Mod: CPTII,S$GLB,, | Performed by: EMERGENCY MEDICINE

## 2023-01-10 PROCEDURE — 3079F PR MOST RECENT DIASTOLIC BLOOD PRESSURE 80-89 MM HG: ICD-10-PCS | Mod: CPTII,S$GLB,, | Performed by: EMERGENCY MEDICINE

## 2023-01-10 PROCEDURE — 3075F PR MOST RECENT SYSTOLIC BLOOD PRESS GE 130-139MM HG: ICD-10-PCS | Mod: CPTII,S$GLB,, | Performed by: EMERGENCY MEDICINE

## 2023-01-10 PROCEDURE — 90833 PSYTX W PT W E/M 30 MIN: CPT | Mod: 95,,, | Performed by: PSYCHOLOGIST

## 2023-01-10 PROCEDURE — 99213 OFFICE O/P EST LOW 20 MIN: CPT | Mod: S$GLB,,, | Performed by: EMERGENCY MEDICINE

## 2023-01-10 PROCEDURE — 99999 PR PBB SHADOW E&M-EST. PATIENT-LVL IV: ICD-10-PCS | Mod: PBBFAC,,, | Performed by: EMERGENCY MEDICINE

## 2023-01-10 PROCEDURE — 99999 PR PBB SHADOW E&M-EST. PATIENT-LVL IV: CPT | Mod: PBBFAC,,, | Performed by: EMERGENCY MEDICINE

## 2023-01-10 PROCEDURE — 99213 PR OFFICE/OUTPT VISIT, EST, LEVL III, 20-29 MIN: ICD-10-PCS | Mod: S$GLB,,, | Performed by: EMERGENCY MEDICINE

## 2023-01-10 PROCEDURE — 3008F PR BODY MASS INDEX (BMI) DOCUMENTED: ICD-10-PCS | Mod: CPTII,S$GLB,, | Performed by: EMERGENCY MEDICINE

## 2023-01-10 PROCEDURE — 90833 PR PSYCHOTHERAPY W/PATIENT W/E&M, 30 MIN (ADD ON): ICD-10-PCS | Mod: 95,,, | Performed by: PSYCHOLOGIST

## 2023-01-10 PROCEDURE — 3075F SYST BP GE 130 - 139MM HG: CPT | Mod: CPTII,S$GLB,, | Performed by: EMERGENCY MEDICINE

## 2023-01-10 PROCEDURE — 99214 OFFICE O/P EST MOD 30 MIN: CPT | Mod: 95,,, | Performed by: PSYCHOLOGIST

## 2023-01-10 PROCEDURE — 1159F PR MEDICATION LIST DOCUMENTED IN MEDICAL RECORD: ICD-10-PCS | Mod: CPTII,95,, | Performed by: PSYCHOLOGIST

## 2023-01-10 PROCEDURE — 3008F BODY MASS INDEX DOCD: CPT | Mod: CPTII,S$GLB,, | Performed by: EMERGENCY MEDICINE

## 2023-01-10 PROCEDURE — 1159F MED LIST DOCD IN RCRD: CPT | Mod: CPTII,95,, | Performed by: PSYCHOLOGIST

## 2023-01-10 RX ORDER — NITROFURANTOIN (MACROCRYSTALS) 100 MG/1
100 CAPSULE ORAL EVERY 12 HOURS
COMMUNITY
Start: 2023-01-06 | End: 2023-06-27

## 2023-01-10 NOTE — Clinical Note
I did change his primary care physician to Dr. Mazariegos.  Please schedule him appointment for March or April.

## 2023-01-10 NOTE — Clinical Note
Rehan,   This fellow is a bit of a challenge.  It is mostly managing his mother.  He does see Psychiatry, Urology, Neurology, and  Dermatology.  He is needle phobic.  Most of his care is listening to his mother.  She is his advocate and primary care giver.
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
## 2023-01-12 ENCOUNTER — PATIENT MESSAGE (OUTPATIENT)
Dept: UROLOGY | Facility: CLINIC | Age: 38
End: 2023-01-12
Payer: COMMERCIAL

## 2023-01-28 PROBLEM — K75.81 LIVER CIRRHOSIS SECONDARY TO NASH: Status: ACTIVE | Noted: 2020-07-28

## 2023-01-28 PROBLEM — R79.89 ELEVATED LFTS: Status: ACTIVE | Noted: 2023-01-28

## 2023-01-28 PROBLEM — Z85.528 HISTORY OF RENAL CELL CARCINOMA: Status: ACTIVE | Noted: 2023-01-28

## 2023-01-28 PROBLEM — F99 CO-OCCURRENCE OF MULTIPLE PSYCHIATRIC DISORDERS: Status: ACTIVE | Noted: 2023-01-28

## 2023-01-30 ENCOUNTER — TELEPHONE (OUTPATIENT)
Dept: FAMILY MEDICINE | Facility: CLINIC | Age: 38
End: 2023-01-30
Payer: MEDICARE

## 2023-01-30 NOTE — TELEPHONE ENCOUNTER
----- Message from Marion Garcia sent at 1/30/2023  1:19 PM CST -----  Regarding: sooner appointment  Contact: Summer with [facility]  Type:  Sooner Appointment Request    Caller is requesting a sooner appointment.  Caller declined first available appointment listed below.  Caller will not accept being placed on the waitlist and is requesting a message be sent to doctor.    Name of Caller:  Sydni wit [facility]   When is the first available appointment?    Symptoms:    Best Call Back Number:  [phone] (home)     Additional Information:  Patient is in need of a hosiptal Follow up visit, but nothing was available until after 4/6/23. Please contact patient to advise. Thanks!

## 2023-02-02 ENCOUNTER — OFFICE VISIT (OUTPATIENT)
Dept: FAMILY MEDICINE | Facility: CLINIC | Age: 38
End: 2023-02-02
Payer: MEDICARE

## 2023-02-02 VITALS
WEIGHT: 253.06 LBS | BODY MASS INDEX: 36.23 KG/M2 | DIASTOLIC BLOOD PRESSURE: 78 MMHG | SYSTOLIC BLOOD PRESSURE: 124 MMHG | HEIGHT: 70 IN | TEMPERATURE: 98 F | HEART RATE: 64 BPM | OXYGEN SATURATION: 96 %

## 2023-02-02 DIAGNOSIS — L08.9 SKIN INFECTION: ICD-10-CM

## 2023-02-02 DIAGNOSIS — Z09 HOSPITAL DISCHARGE FOLLOW-UP: Primary | ICD-10-CM

## 2023-02-02 DIAGNOSIS — A41.51 SEPSIS DUE TO ESCHERICHIA COLI WITHOUT ACUTE ORGAN DYSFUNCTION: ICD-10-CM

## 2023-02-02 PROBLEM — E66.812 CLASS 2 OBESITY DUE TO EXCESS CALORIES WITHOUT SERIOUS COMORBIDITY WITH BODY MASS INDEX (BMI) OF 36.0 TO 36.9 IN ADULT: Status: ACTIVE | Noted: 2022-04-11

## 2023-02-02 PROBLEM — R79.89 ELEVATED LFTS: Status: RESOLVED | Noted: 2023-01-28 | Resolved: 2023-02-02

## 2023-02-02 PROBLEM — A41.9 SEPSIS: Status: RESOLVED | Noted: 2018-04-26 | Resolved: 2023-02-02

## 2023-02-02 PROBLEM — E66.09 CLASS 2 OBESITY DUE TO EXCESS CALORIES WITHOUT SERIOUS COMORBIDITY WITH BODY MASS INDEX (BMI) OF 36.0 TO 36.9 IN ADULT: Status: ACTIVE | Noted: 2022-04-11

## 2023-02-02 PROCEDURE — 3074F PR MOST RECENT SYSTOLIC BLOOD PRESSURE < 130 MM HG: ICD-10-PCS | Mod: CPTII,S$GLB,, | Performed by: STUDENT IN AN ORGANIZED HEALTH CARE EDUCATION/TRAINING PROGRAM

## 2023-02-02 PROCEDURE — 3078F PR MOST RECENT DIASTOLIC BLOOD PRESSURE < 80 MM HG: ICD-10-PCS | Mod: CPTII,S$GLB,, | Performed by: STUDENT IN AN ORGANIZED HEALTH CARE EDUCATION/TRAINING PROGRAM

## 2023-02-02 PROCEDURE — 1159F MED LIST DOCD IN RCRD: CPT | Mod: CPTII,S$GLB,, | Performed by: STUDENT IN AN ORGANIZED HEALTH CARE EDUCATION/TRAINING PROGRAM

## 2023-02-02 PROCEDURE — 3008F BODY MASS INDEX DOCD: CPT | Mod: CPTII,S$GLB,, | Performed by: STUDENT IN AN ORGANIZED HEALTH CARE EDUCATION/TRAINING PROGRAM

## 2023-02-02 PROCEDURE — 3008F PR BODY MASS INDEX (BMI) DOCUMENTED: ICD-10-PCS | Mod: CPTII,S$GLB,, | Performed by: STUDENT IN AN ORGANIZED HEALTH CARE EDUCATION/TRAINING PROGRAM

## 2023-02-02 PROCEDURE — 1159F PR MEDICATION LIST DOCUMENTED IN MEDICAL RECORD: ICD-10-PCS | Mod: CPTII,S$GLB,, | Performed by: STUDENT IN AN ORGANIZED HEALTH CARE EDUCATION/TRAINING PROGRAM

## 2023-02-02 PROCEDURE — 99999 PR PBB SHADOW E&M-EST. PATIENT-LVL IV: CPT | Mod: PBBFAC,,, | Performed by: STUDENT IN AN ORGANIZED HEALTH CARE EDUCATION/TRAINING PROGRAM

## 2023-02-02 PROCEDURE — 3074F SYST BP LT 130 MM HG: CPT | Mod: CPTII,S$GLB,, | Performed by: STUDENT IN AN ORGANIZED HEALTH CARE EDUCATION/TRAINING PROGRAM

## 2023-02-02 PROCEDURE — 99999 PR PBB SHADOW E&M-EST. PATIENT-LVL IV: ICD-10-PCS | Mod: PBBFAC,,, | Performed by: STUDENT IN AN ORGANIZED HEALTH CARE EDUCATION/TRAINING PROGRAM

## 2023-02-02 PROCEDURE — 3078F DIAST BP <80 MM HG: CPT | Mod: CPTII,S$GLB,, | Performed by: STUDENT IN AN ORGANIZED HEALTH CARE EDUCATION/TRAINING PROGRAM

## 2023-02-02 PROCEDURE — 99214 OFFICE O/P EST MOD 30 MIN: CPT | Mod: S$GLB,,, | Performed by: STUDENT IN AN ORGANIZED HEALTH CARE EDUCATION/TRAINING PROGRAM

## 2023-02-02 PROCEDURE — 99214 PR OFFICE/OUTPT VISIT, EST, LEVL IV, 30-39 MIN: ICD-10-PCS | Mod: S$GLB,,, | Performed by: STUDENT IN AN ORGANIZED HEALTH CARE EDUCATION/TRAINING PROGRAM

## 2023-02-02 RX ORDER — MUPIROCIN CALCIUM 20 MG/G
CREAM TOPICAL 3 TIMES DAILY
Qty: 15 G | Refills: 3 | Status: SHIPPED | OUTPATIENT
Start: 2023-02-02

## 2023-02-02 NOTE — ASSESSMENT & PLAN NOTE
Resolved. Continue intermittent catheterization. Continue to monitor for symptoms. Patient has appointment with our urology department in March - patient's mother wants to keep to get additional input on patient's condition.

## 2023-02-02 NOTE — PROGRESS NOTES
Name: Hussein Doyle  MRN: [id number]  : 1985  PCP: Rehan Mazariegos MD    HPI  Here for hospital follow up. Admitted to [facility] 2023 under the care of Elena Paz DO. Initial concern was sepsis due to UTI. Patient was recently treated with Augmentin for E coli UTI - patient is also on nitrofurantoin prophylaxis due to neurogenic bladder. However, the patient experienced fevers and chills prompting ED visit. Started on empiric Rocephin. No discharge summary written at this time but last progress note from  suggests that patient would be discharged on  if Ucx was negative or grew the same strain of E coli that was previously treated. My review of urine culture shows the same strain of E coli that was previously treated (resistant to cipro, levo, and Bactrim).     Patient is present today with his mother who provides the history. Patient was discharged  - reportedly, ID was consulted and recommended an unknown antibiotic (possibly fosfomycin) which he received prior to discharge. Got a second opinion from a new urologist shortly after discharge - starting mirabegron and performing intermittent cath more frequently. Currenly suffering no infectious symptoms.     Review of Systems   Constitutional:  Negative for fever.   Respiratory:  Negative for shortness of breath.    Cardiovascular:  Negative for chest pain.     Patient Active Problem List   Diagnosis    Autistic disorder, active    Impulse control disorder    Developmental delay, moderate    Neurogenic bladder    HALEY on CPAP    Gout, chronic    Seizure disorder    Pervasive developmental disorder, active    Chronic constipation    OCD (obsessive compulsive disorder)    Hallux, valgus, congenital    Recurrent UTI    Sepsis    TRAYLOR (nonalcoholic steatohepatitis)    Dysphagia    Liver cirrhosis secondary to TRAYLOR    Class 2 severe obesity due to excess calories with serious comorbidity and body mass index (BMI) of 36.0 to 36.9 in adult     Osteoporosis    Co-occurrence of multiple psychiatric disorders    History of renal cell carcinoma       Vitals:    02/02/23 1059   BP: 124/78   Pulse: 64   Temp: 97.6 °F (36.4 °C)       Physical Exam  Constitutional:       General: He is not in acute distress.     Appearance: Normal appearance. He is well-developed.   HENT:      Head: Normocephalic and atraumatic.      Right Ear: External ear normal.      Left Ear: External ear normal.   Eyes:      Conjunctiva/sclera: Conjunctivae normal.   Cardiovascular:      Rate and Rhythm: Normal rate and regular rhythm.      Heart sounds: No murmur heard.    No friction rub. No gallop.   Pulmonary:      Effort: Pulmonary effort is normal. No respiratory distress.      Breath sounds: No wheezing, rhonchi or rales.   Abdominal:      General: Abdomen is flat. There is no distension.   Musculoskeletal:         General: No swelling or deformity.      Right lower leg: No edema.      Left lower leg: No edema.   Skin:     General: Skin is warm and dry.      Coloration: Skin is not jaundiced.   Neurological:      Mental Status: He is alert and oriented to person, place, and time. Mental status is at baseline.   Psychiatric:         Attention and Perception: Attention and perception normal.         Mood and Affect: Mood normal.         Speech: Speech normal.         Behavior: Behavior normal. Behavior is cooperative.         Cognition and Memory: Cognition is impaired.       1. Hospital discharge follow-up    2. Sepsis due to Escherichia coli without acute organ dysfunction  Assessment & Plan:  Resolved. Continue intermittent catheterization. Continue to monitor for symptoms. Patient has appointment with our urology department in March - patient's mother wants to keep to get additional input on patient's condition.      3. Skin infection  -     mupirocin calcium 2% (BACTROBAN) 2 % cream; Apply topically 3 (three) times daily.  Dispense: 15 g; Refill: 3        Follow up in 4  months    Rehan Mazariegos MD  02/02/2023

## 2023-03-22 DIAGNOSIS — F42.9 OBSESSIVE-COMPULSIVE DISORDER, UNSPECIFIED TYPE: ICD-10-CM

## 2023-03-22 DIAGNOSIS — F63.9 IMPULSE CONTROL DISORDER: ICD-10-CM

## 2023-03-22 DIAGNOSIS — F42.8 OTHER OBSESSIVE-COMPULSIVE DISORDERS: ICD-10-CM

## 2023-03-22 RX ORDER — FLUVOXAMINE MALEATE 100 MG/1
TABLET, COATED ORAL
Qty: 270 TABLET | Refills: 1 | Status: SHIPPED | OUTPATIENT
Start: 2023-03-22 | End: 2023-04-25

## 2023-03-27 DIAGNOSIS — F63.9 IMPULSE CONTROL DISORDER: ICD-10-CM

## 2023-03-27 DIAGNOSIS — F42.8 OTHER OBSESSIVE-COMPULSIVE DISORDERS: ICD-10-CM

## 2023-03-27 DIAGNOSIS — F42.9 OBSESSIVE-COMPULSIVE DISORDER, UNSPECIFIED TYPE: ICD-10-CM

## 2023-03-27 RX ORDER — FLUVOXAMINE MALEATE 100 MG/1
TABLET, COATED ORAL
Qty: 270 TABLET | Refills: 1 | OUTPATIENT
Start: 2023-03-27

## 2023-03-31 ENCOUNTER — OFFICE VISIT (OUTPATIENT)
Dept: FAMILY MEDICINE | Facility: CLINIC | Age: 38
End: 2023-03-31
Payer: MEDICARE

## 2023-03-31 ENCOUNTER — PATIENT MESSAGE (OUTPATIENT)
Dept: PSYCHIATRY | Facility: CLINIC | Age: 38
End: 2023-03-31
Payer: MEDICARE

## 2023-03-31 VITALS
OXYGEN SATURATION: 97 % | WEIGHT: 255.94 LBS | BODY MASS INDEX: 36.73 KG/M2 | HEART RATE: 82 BPM | SYSTOLIC BLOOD PRESSURE: 122 MMHG | DIASTOLIC BLOOD PRESSURE: 76 MMHG | TEMPERATURE: 98 F

## 2023-03-31 DIAGNOSIS — J06.9 URI, ACUTE: ICD-10-CM

## 2023-03-31 DIAGNOSIS — N39.0 RECURRENT UTI: Primary | ICD-10-CM

## 2023-03-31 DIAGNOSIS — R56.9 CONVULSIONS, UNSPECIFIED CONVULSION TYPE: ICD-10-CM

## 2023-03-31 PROCEDURE — 3074F SYST BP LT 130 MM HG: CPT | Mod: CPTII,S$GLB,, | Performed by: NURSE PRACTITIONER

## 2023-03-31 PROCEDURE — 99214 PR OFFICE/OUTPT VISIT, EST, LEVL IV, 30-39 MIN: ICD-10-PCS | Mod: 25,S$GLB,, | Performed by: NURSE PRACTITIONER

## 2023-03-31 PROCEDURE — 99999 PR PBB SHADOW E&M-EST. PATIENT-LVL V: CPT | Mod: PBBFAC,,, | Performed by: NURSE PRACTITIONER

## 2023-03-31 PROCEDURE — 96372 THER/PROPH/DIAG INJ SC/IM: CPT | Mod: S$GLB,,, | Performed by: NURSE PRACTITIONER

## 2023-03-31 PROCEDURE — 3078F DIAST BP <80 MM HG: CPT | Mod: CPTII,S$GLB,, | Performed by: NURSE PRACTITIONER

## 2023-03-31 PROCEDURE — 1159F MED LIST DOCD IN RCRD: CPT | Mod: CPTII,S$GLB,, | Performed by: NURSE PRACTITIONER

## 2023-03-31 PROCEDURE — 1159F PR MEDICATION LIST DOCUMENTED IN MEDICAL RECORD: ICD-10-PCS | Mod: CPTII,S$GLB,, | Performed by: NURSE PRACTITIONER

## 2023-03-31 PROCEDURE — 99999 PR PBB SHADOW E&M-EST. PATIENT-LVL V: ICD-10-PCS | Mod: PBBFAC,,, | Performed by: NURSE PRACTITIONER

## 2023-03-31 PROCEDURE — 96372 PR INJECTION,THERAP/PROPH/DIAG2ST, IM OR SUBCUT: ICD-10-PCS | Mod: S$GLB,,, | Performed by: NURSE PRACTITIONER

## 2023-03-31 PROCEDURE — 3074F PR MOST RECENT SYSTOLIC BLOOD PRESSURE < 130 MM HG: ICD-10-PCS | Mod: CPTII,S$GLB,, | Performed by: NURSE PRACTITIONER

## 2023-03-31 PROCEDURE — 3008F PR BODY MASS INDEX (BMI) DOCUMENTED: ICD-10-PCS | Mod: CPTII,S$GLB,, | Performed by: NURSE PRACTITIONER

## 2023-03-31 PROCEDURE — 3078F PR MOST RECENT DIASTOLIC BLOOD PRESSURE < 80 MM HG: ICD-10-PCS | Mod: CPTII,S$GLB,, | Performed by: NURSE PRACTITIONER

## 2023-03-31 PROCEDURE — 3008F BODY MASS INDEX DOCD: CPT | Mod: CPTII,S$GLB,, | Performed by: NURSE PRACTITIONER

## 2023-03-31 PROCEDURE — 99214 OFFICE O/P EST MOD 30 MIN: CPT | Mod: 25,S$GLB,, | Performed by: NURSE PRACTITIONER

## 2023-03-31 RX ORDER — AMOXICILLIN AND CLAVULANATE POTASSIUM 875; 125 MG/1; MG/1
1 TABLET, FILM COATED ORAL 2 TIMES DAILY
COMMUNITY
Start: 2023-03-29 | End: 2023-03-31

## 2023-03-31 RX ORDER — CEFDINIR 300 MG/1
300 CAPSULE ORAL 2 TIMES DAILY
Qty: 20 CAPSULE | Refills: 0 | Status: SHIPPED | OUTPATIENT
Start: 2023-03-31 | End: 2023-04-10

## 2023-03-31 RX ORDER — OXCARBAZEPINE 300 MG/1
300 TABLET, FILM COATED ORAL 2 TIMES DAILY
Qty: 180 TABLET | Refills: 1 | Status: SHIPPED | OUTPATIENT
Start: 2023-03-31 | End: 2023-08-22 | Stop reason: SDUPTHER

## 2023-03-31 RX ORDER — CEFTRIAXONE 1 G/1
1 INJECTION, POWDER, FOR SOLUTION INTRAMUSCULAR; INTRAVENOUS
Status: COMPLETED | OUTPATIENT
Start: 2023-03-31 | End: 2023-03-31

## 2023-03-31 RX ADMIN — CEFTRIAXONE 1 G: 1 INJECTION, POWDER, FOR SOLUTION INTRAMUSCULAR; INTRAVENOUS at 12:03

## 2023-03-31 NOTE — PROGRESS NOTES
Hussein Doyle is a 37 y.o. y.o. male patient of Dr. Rehan Mazariegos MD who presents to the clinic today for for an in-clinic visit.      HPI    Patient, who is known to me, reports in follow up for a problem new to me: office visit in NOvember.  Also concerned about the following:     COld sxs  Ecoli in urine--on Augmentin for 2 days.  Increasing blood in intermittent catheterizations.      The patient had a visit for these symptoms 4 days ago and status is worse     Misc    Liver Dysfunction: Patient complains of an abnormal  function  that is stable.    Urinary Tract Infection: Patient complains of  new onset of UTI x 4 days  Patient does have a history of recurrent UTI.  Patient does have a history of pyelonephritis. And sepsis.  He needs to be straight-cath'd 4-6x daily      Neuro symptoms: stable .    OTC/Prescription Medications tried are  none Mucinex honey       PMH of Autism, developmental delay, TRAYLOR, chronic constipation, neurogenic bladder, HALEY on CPAP, Multiple pscyhiatric concerns.    Smoking History:  Patient has never been a smoker.    ROS      GI/: GI/ Symptoms: No problems    MS:   has no  new bone, joint or muscle problems.    Skin:  has no rashes, itching.     All other ROS neg.    Past Medical History:   Diagnosis Date    Anxiety     Autistic disorder, active 05/06/2013    Bowel obstruction     pt mother denies    Early intervention counseling     GERD (gastroesophageal reflux disease)     Gout, chronic 11/12/2015    Grand mal seizure     Hepatic encephalopathy     Hepatic steatosis     Impulse control disorder     Impulse control disorder, unspecified 05/06/2013    Liver cirrhosis secondary to TRAYLOR     Mastoiditis     Moderate mental retardation 05/06/2013    Neurogenic bladder     intermittent catherization (5 times a day)    Obesity     OCD (obsessive compulsive disorder)     HALEY (obstructive sleep apnea) 11/12/2015    on cpap at bedtime    Otitis media     Paraplegia      Pervasive developmental disorder, active 12/27/2015    Recurrent UTI     Renal cancer 2010    Right RCC    Scoliosis     paraplegia    Seizure disorder 11/12/2015    Sepsis due to Escherichia coli without acute organ dysfunction 2/2/2023    Stroke     one week old    Tardive dyskinesia        Current Outpatient Medications:     allopurinoL (ZYLOPRIM) 100 MG tablet, TAKE ONE TABLET BY MOUTH TWICE DAILY, Disp: 180 tablet, Rfl: 2    ascorbic acid, vitamin C, 250 mg Chew, Take by mouth., Disp: , Rfl:     calcium carbonate-vitamin D3 500 mg(1,250mg) -400 unit Tab, Take 1 tablet by mouth 2 (two) times a day. , Disp: , Rfl:     DENTA 5000 PLUS 1.1 % Crea, SMARTSIG:By Mouth Morning-Night, Disp: , Rfl:     fluvoxaMINE (LUVOX) 100 MG tablet, TAKE ONE and half TABLET BY MOUTH TWICE DAILY, Disp: 270 tablet, Rfl: 1    inulin (FIBER GUMMIES ORAL), Take 2 tablets by mouth once daily. , Disp: , Rfl:     L. ACIDOPHILUS/BIFID. ANIMALIS (CHEWABLE PROBIOTIC ORAL), Take 1 tablet by mouth 2 (two) times daily. , Disp: , Rfl:     lactulose (CHRONULAC) 10 gram/15 mL solution, TAKE one TEASPOONFUL (5ml) BY MOUTH THREE TIMES DAILY, Disp: 473 mL, Rfl: 3    magnesium 30 mg Tab, Take 1 tablet by mouth every evening., Disp: , Rfl:     MELATONIN ORAL, Take 1 tablet by mouth every evening., Disp: , Rfl:     mirabegron (MYRBETRIQ ORAL), Take by mouth., Disp: , Rfl:     multivitamin capsule, Take 1 capsule by mouth every morning. , Disp: , Rfl:     mupirocin calcium 2% (BACTROBAN) 2 % cream, Apply topically 3 (three) times daily., Disp: 15 g, Rfl: 3    nitrofurantoin (MACRODANTIN) 100 MG capsule, Take 100 mg by mouth every 12 (twelve) hours., Disp: , Rfl:     NON FORMULARY MEDICATION, CBD Oil- OTC, Disp: , Rfl:     omega-3 fatty acids/fish oil (FISH OIL-OMEGA-3 FATTY ACIDS) 300-1,000 mg capsule, Take 1 capsule by mouth once daily. , Disp: , Rfl:     OXcarbazepine (TRILEPTAL) 300 MG Tab, TAKE ONE TABLET (300MG) BY MOUTH TWICE DAILY, Disp: 180 tablet,  Rfl: 1    pantoprazole (PROTONIX) 40 MG tablet, TAKE ONE TABLET BY MOUTH ONCE DAILY, Disp: 90 tablet, Rfl: 2    polyethylene glycol (GLYCOLAX) 17 gram PwPk, Take 17 g by mouth once daily. , Disp: , Rfl:     potassium chloride (KLOR-CON) 10 MEQ TbSR, Take 10 mEq by mouth every evening., Disp: , Rfl:     pramipexole (MIRAPEX) 0.125 MG tablet, TAKE ONE TABLET BY MOUTH THREE TIMES DAILY, Disp: 90 tablet, Rfl: 5    tamsulosin (FLOMAX) 0.4 mg Cap, Take 0.4 mg by mouth every evening. Every day, Disp: , Rfl:     zinc gluconate 50 mg tablet, Take 50 mg by mouth once daily., Disp: , Rfl:       PHYSICAL EXAM    Alert, coop 37 y.o. male patient in no apparent distress distress, is not ill-appearing.    Vitals:    03/31/23 1115   BP: 122/76   Pulse: 82   Temp: 98.2 °F (36.8 °C)   SpO2: 97%   Weight: 116.1 kg (255 lb 15.3 oz)       VS reviewed.  VS stable..  CC, nursing note, medications & PMH all reviewed today.    HEENT:  Normocephalic, without obvious abnormality, atraumatic                 EENT:  Ext nose/ears normal.               TMs with dull cones of light bilat, + mild erythema, no effusions bilat.               Pharynx not injected.  Tonsils 2+ enlarged, no exudate.               Bilat small NT cervical lymphadenopathy noted.              No sinus tenderness to palp.               Eye lids symmetrical, no discharge present.     Resp:  Breathing unlabored.  Lungs CTA bilat.  Moves air to bases bilat.  Resp excursion symmetrical.           Heart:  Heart regular rate. and Regular rate and rhythm.                    MS:  Ambulates 2. Mild impairment: Walks 20', uses assistive devices, slower speed, mild gait deviations. , with no ambulation aid           Extremities appear to move smoothly and without difficulty.      NEURO:  Alert and oriented x 1.  Occ responds appropriately during interaction.                  abnormal findings: talks to himself, not always present in the conversation, difficulty concentrating.,      Skin: no rash.           Color good.    Psych:  Responds appropriately throughout the visit.               Mood:  pleasant and appropriate               Appearance: well-groomed, appropriate .               Affect:   unrelated to conversation.      Recurrent UTI  -     cefTRIAXone injection 1 g  -     cefdinir (OMNICEF) 300 MG capsule; Take 1 capsule (300 mg total) by mouth 2 (two) times daily. for 10 days  Dispense: 20 capsule; Refill: 0    URI, acute          Pt today presents with 4 month f/u request for multiple health concerns--see PMH and above.      This is a new problem to me.  no work up is planned.        Additional medication(s) are prescribed today.    Prescribing Considerations:  Renal function: >60 and Allergies to medications     Referred to No referrals made at this visit.          Education:  Pt given education re: condition and advised to perform any comfort measures/ treatment recommended on patient instruction sheet, which were reviewed at the time of the visit.    Follow Up:  If not better in 3 days days, the patient is advised to call here, PCP office or go for an in-person/follow up evaluation.  If worse or concerns, the patient is advised to call for advise to this office or the PCP office or call [facility] or go to the nearest URGENT CARE or ER.    Explained exam findings, diagnosis and treatment plan to patient, family member, and care giver.  Questions answered and patient states understanding.

## 2023-03-31 NOTE — TELEPHONE ENCOUNTER
I can fill this request but it should be filled neurology moving forward. Next refill request please forward to Dr. [place].

## 2023-03-31 NOTE — PATIENT INSTRUCTIONS
Increase water.    STOP amox clav  START the cefdinir tonight.    Go to the ER over 100.5 or if much worse.      Mucpirocin to the sore area.    Robitussin Honey is good for cold symptoms.      If you have concerns or questions, please do not hesitate to call.  If you have any questions, please do not hesitate to call.  You can reach us at [phone] Monday through Friday  Or call  Dr. Mazariegos.    Thank you for using the [facility]!    JACKSON Campos, CNP, FNP-BC  [facility]    To rate your experience with ALLA Campos, click on the link below:      [email or web address]

## 2023-04-09 ENCOUNTER — PATIENT MESSAGE (OUTPATIENT)
Dept: FAMILY MEDICINE | Facility: CLINIC | Age: 38
End: 2023-04-09
Payer: MEDICARE

## 2023-04-09 ENCOUNTER — PATIENT MESSAGE (OUTPATIENT)
Dept: PSYCHIATRY | Facility: CLINIC | Age: 38
End: 2023-04-09
Payer: MEDICARE

## 2023-04-11 ENCOUNTER — PATIENT MESSAGE (OUTPATIENT)
Dept: FAMILY MEDICINE | Facility: CLINIC | Age: 38
End: 2023-04-11
Payer: MEDICARE

## 2023-04-25 ENCOUNTER — OFFICE VISIT (OUTPATIENT)
Dept: PSYCHIATRY | Facility: CLINIC | Age: 38
End: 2023-04-25
Payer: COMMERCIAL

## 2023-04-25 DIAGNOSIS — F84.9 PERVASIVE DEVELOPMENTAL DISORDER: ICD-10-CM

## 2023-04-25 DIAGNOSIS — F41.9 ANXIETY DISORDER, UNSPECIFIED TYPE: ICD-10-CM

## 2023-04-25 DIAGNOSIS — F63.9 IMPULSE CONTROL DISORDER: ICD-10-CM

## 2023-04-25 DIAGNOSIS — F42.9 OBSESSIVE-COMPULSIVE DISORDER, UNSPECIFIED TYPE: Primary | ICD-10-CM

## 2023-04-25 DIAGNOSIS — F42.8 OTHER OBSESSIVE-COMPULSIVE DISORDERS: ICD-10-CM

## 2023-04-25 PROCEDURE — 90833 PR PSYCHOTHERAPY W/PATIENT W/E&M, 30 MIN (ADD ON): ICD-10-PCS | Mod: 95,,, | Performed by: PSYCHOLOGIST

## 2023-04-25 PROCEDURE — 99214 PR OFFICE/OUTPT VISIT, EST, LEVL IV, 30-39 MIN: ICD-10-PCS | Mod: 95,,, | Performed by: PSYCHOLOGIST

## 2023-04-25 PROCEDURE — 90833 PSYTX W PT W E/M 30 MIN: CPT | Mod: 95,,, | Performed by: PSYCHOLOGIST

## 2023-04-25 PROCEDURE — 99214 OFFICE O/P EST MOD 30 MIN: CPT | Mod: 95,,, | Performed by: PSYCHOLOGIST

## 2023-04-25 PROCEDURE — 1159F PR MEDICATION LIST DOCUMENTED IN MEDICAL RECORD: ICD-10-PCS | Mod: CPTII,95,, | Performed by: PSYCHOLOGIST

## 2023-04-25 PROCEDURE — 1159F MED LIST DOCD IN RCRD: CPT | Mod: CPTII,95,, | Performed by: PSYCHOLOGIST

## 2023-04-25 RX ORDER — FLUVOXAMINE MALEATE 100 MG/1
100 TABLET, COATED ORAL 2 TIMES DAILY
Qty: 30 TABLET | Refills: 1 | Status: SHIPPED | OUTPATIENT
Start: 2023-04-25 | End: 2023-04-26

## 2023-04-25 NOTE — PROGRESS NOTES
The patient location is:  [place], [place]  The patient location [place] is: [place]  The patient phone number is: [phone]  Visit type: Virtual visit with synchronous audio and video  Each patient to whom he or she provides medical services by telemedicine is:  (1) informed of the relationship between the physician and patient and the respective role of any other health care provider with respect to management of the patient; and (2) notified that he or she may decline to receive medical services by telemedicine and may withdraw from such care at any time.     Outpatient Psychiatry Follow-Up Visit    Clinical Status of Patient: Outpatient (Ambulatory)  04/25/2023     Chief Complaint: OCD, anxiety, impulse control, PDD      Interval History and Content of Current Session:  Interim Events/Subjective Report/Content of Current Session:  follow-up appointment.    Pt is a 37 y/o male with past psychiatric hx of OCD, anxiety, impulse control, PDD who presents for follow-up treatment. Pt presents with his mother and caretaker. Pt's mother reports that the pt is doing a little better with less anxiety and negative self-talk. Pt has returned to previous caretaker, who is working on increasing consistency in schedule. Pt has stopped horse therapy due to an injury for the horse. Pt's mother is looking for alternatives. Pt continues in music therapy and continues to go to the [facility] 3-4x per week for exercise, mostly treadmill.     Past Psychiatric hx: s/e's to mult prev meds to include zyprexa (wgt gain), lexapro 20mg po qam (anxiety and to suppress sex drive), buspar 10mg po BID, xanax 0.5mg po daily PRN anxiety    Past Medical hx:   Past Medical History:   Diagnosis Date    Anxiety     Autistic disorder, active 05/06/2013    Bowel obstruction     pt mother denies    Early intervention counseling     GERD (gastroesophageal reflux disease)     Gout, chronic 11/12/2015    Grand mal seizure     Hepatic encephalopathy      Hepatic steatosis     Impulse control disorder     Impulse control disorder, unspecified 05/06/2013    Liver cirrhosis secondary to TRAYLOR     Mastoiditis     Moderate mental retardation 05/06/2013    Neurogenic bladder     intermittent catherization (5 times a day)    Obesity     OCD (obsessive compulsive disorder)     HALEY (obstructive sleep apnea) 11/12/2015    on cpap at bedtime    Otitis media     Paraplegia     Pervasive developmental disorder, active 12/27/2015    Recurrent UTI     Renal cancer 2010    Right RCC    Scoliosis     paraplegia    Seizure disorder 11/12/2015    Sepsis due to Escherichia coli without acute organ dysfunction 2/2/2023    Stroke     one week old    Tardive dyskinesia         Interim hx:  Medication changes last visit: None  Anxiety: stable  Depression: stable     Denies suicidal/homicidal ideations.  Denies hopelessness/worthlessness.    Denies auditory/visual hallucinations      Alcohol: Pt denied  Drug: Pt denied  Caffeine: Not assessed  Tobacco: Pt denied      Review of Systems   PSYCHIATRIC: Pertinent items are noted in the narrative.        CONSTITUTIONAL: weight stable    Past Medical, Family and Social History: The patient's past medical, family and social history have been reviewed and updated as appropriate within the electronic medical record. See encounter notes.     Current Psychiatric Medication:  fluvoxamine 150 mg BID, oxcarbazepine 300 mg BID (per neuro)     Compliance: yes      Side effects: Pt denied     Risk Parameters:  Patient reports no suicidal ideation  Patient reports no homicidal ideation  Patient reports no self-injurious behavior  Patient reports no violent behavior     Exam (detailed: at least 9 elements; comprehensive: all 15 elements)   Constitutional  Vitals:  Most recent vital signs, dated less than 90 days prior to this appointment, were reviewed.        General:  unremarkable, age appropriate, casual attire, good eye contact, good rapport        Musculoskeletal  Muscle Strength/Tone:  no flaccidity, no tremor    Gait & Station:  normal      Psychiatric                       Speech:  normal tone, normal rate, rhythm, and volume   Mood & Affect:    anxious         Thought Process:   Goal directed; Linear    Associations:   intact   Thought Content:   No SI/HI, delusions, or paranoia, no AV/VH   Insight & Judgement:   Good, adequate to circumstances   Orientation:   grossly intact; alert and oriented x 4    Memory:  intact for content of interview    Language:  grossly intact, can repeat    Attention Span  : Grossly intact for content of interview   Fund of Knowledge:   intact and appropriate to age and level of education        Assessment and Diagnosis   Status/Progress: Based on the examination today, the patient's problem(s) is/are adequately controlled.  New problems have not been presented today. Co-morbidities are not complicating management of the primary condition. There are no active rule-out diagnoses for this patient at this time.      Impression: Pt continues to vocalize negative statements that are interpreted by mother as past trauma, albeit some decrease in frequency. Pt's mother requests a decrease in dosage of fluvoxamine and will decrease to 100 mg BID. Pt's mother encouraged to quantify anxiety correlated behaviors to help assess impact of reducing prescription dosage.     Diagnosis:   1. Obsessive Compulsive Disorder   2. Anxiety Disorder  3. Impulse Control Disorder  4. Pervasive Developmental Disorder  Intervention/Counseling/Treatment Plan   Medication Management:      1. Reduce fluvoxamine to 100 mg BID    2. Oxcarbazepine 300 mg BID (managed by neuro)     2. Call to report any worsening of symptoms or problems with the medication. Pt instructed to go to ER with thoughts of harming self, others     3. Patient given contact # for psychotherapists at [facility] and also instructed to check with insurance for list of providers.      Psychotherapy: 20 minutes  Target symptoms: anxiety  Why chosen therapy is appropriate versus another modality: CBT used; relevant to diagnosis, patient responds to this modality  Outcome monitoring methods: self-report, observation  Therapeutic intervention type: Cognitive Behavioral Therapy, Behavioral strategies  Topics discussed/themes: building skills sets for symptom management, symptom recognition, nutrition, exercise  The patient's response to the intervention is accepting  Patient's response to treatment is: good.   The patient's progress toward treatment goals: stable     Return to clinic: 3 months    -Cognitive-Behavioral/Supportive therapy and psychoeducation provided  -R/B/SE's of medications discussed with the pt who expresses understanding and chooses to take medications as prescribed.   -Pt instructed to call clinic, 911 or go to nearest emergency room if sxs worsen or pt is in   crisis. The pt expresses understanding.    Praveen Brush, PhD, MP     Antidepressant/Antianxiety Medication Initiation:  Patient informed of risks, benefits, and potential side effects of medication and accepts informed consent.  Common side effects include nausea, fatigue, headache, insomnia., Specifically discussed the possibility of new or worsening suicidal thoughts/depression.  Patient instructed to stop the medication immediately and seek urgent treatment if this occurs. Patient instructed not to abruptly discontinue medication without physician guidance except in cases of sudden onset or worsening of SI.

## 2023-04-26 ENCOUNTER — TELEPHONE (OUTPATIENT)
Dept: PSYCHIATRY | Facility: CLINIC | Age: 38
End: 2023-04-26
Payer: MEDICARE

## 2023-04-26 DIAGNOSIS — F42.8 OTHER OBSESSIVE-COMPULSIVE DISORDERS: ICD-10-CM

## 2023-04-26 DIAGNOSIS — F42.9 OBSESSIVE-COMPULSIVE DISORDER, UNSPECIFIED TYPE: ICD-10-CM

## 2023-04-26 DIAGNOSIS — F63.9 IMPULSE CONTROL DISORDER: ICD-10-CM

## 2023-04-26 RX ORDER — FLUVOXAMINE MALEATE 100 MG/1
100 TABLET, COATED ORAL 2 TIMES DAILY
Qty: 60 TABLET | Refills: 1 | Status: SHIPPED | OUTPATIENT
Start: 2023-04-26 | End: 2023-07-25 | Stop reason: SDUPTHER

## 2023-04-26 NOTE — TELEPHONE ENCOUNTER
Pharmacy is was calling to clarify pt RX for fluvoxamine to 100 mg BID, because it was written different from past script. I informed pharmacy per Dr Brush notes from virtual visit from yesterday 4/25/23: Reduce fluvoxamine to 100 mg BID and pharmacy also increased quantity to 60 instead of 30.  Pharmacy verbalized understanding. Please advise

## 2023-05-01 PROBLEM — N39.0 RECURRENT UTI: Status: RESOLVED | Noted: 2018-04-26 | Resolved: 2023-05-01

## 2023-06-01 RX ORDER — LACTULOSE 10 G/15ML
10 SOLUTION ORAL; RECTAL 3 TIMES DAILY
Qty: 473 ML | Refills: 3 | Status: SHIPPED | OUTPATIENT
Start: 2023-06-01 | End: 2023-08-24

## 2023-06-05 DIAGNOSIS — R25.8 NOCTURNAL LEG MOVEMENTS: ICD-10-CM

## 2023-06-05 RX ORDER — PRAMIPEXOLE DIHYDROCHLORIDE 0.12 MG/1
TABLET ORAL
Qty: 90 TABLET | Refills: 0 | Status: SHIPPED | OUTPATIENT
Start: 2023-06-05 | End: 2023-06-07

## 2023-06-07 DIAGNOSIS — G47.33 OSA ON CPAP: Primary | ICD-10-CM

## 2023-06-07 DIAGNOSIS — R25.8 NOCTURNAL LEG MOVEMENTS: ICD-10-CM

## 2023-06-07 RX ORDER — PRAMIPEXOLE DIHYDROCHLORIDE 0.12 MG/1
TABLET ORAL
Qty: 90 TABLET | Refills: 0 | Status: SHIPPED | OUTPATIENT
Start: 2023-06-07 | End: 2023-07-05

## 2023-06-07 NOTE — TELEPHONE ENCOUNTER
----- Message from Tisha Velarde sent at 6/7/2023  9:36 AM CDT -----  Contact: clinic at [phone]  Type: Needs Medical Advice  Who Called:  Cassandra  Symptoms (please be spec  Best Call Back Number: [phone]  Additional Information: PT is requesting C-PAP supplies.  Clinic need  signed orders and clinicals.  Faxed to [phone]. Pleae call back to advise.

## 2023-06-08 ENCOUNTER — PATIENT MESSAGE (OUTPATIENT)
Dept: HEPATOLOGY | Facility: CLINIC | Age: 38
End: 2023-06-08
Payer: MEDICARE

## 2023-06-09 ENCOUNTER — TELEPHONE (OUTPATIENT)
Dept: FAMILY MEDICINE | Facility: CLINIC | Age: 38
End: 2023-06-09
Payer: MEDICARE

## 2023-06-09 NOTE — TELEPHONE ENCOUNTER
----- Message from Denise Chávez sent at 6/9/2023 11:29 AM CDT -----  Contact: Cassandra [phone]        Type: Needs Medical Advice  Who Called:  Shaneka bell/ [facility]     Best Call Back Number: [phone]  fax [phone]  Additional Information: [facility] requesting signed order for cp machine and supplies as well as last clinical notes. Pls call back and advise.

## 2023-06-09 NOTE — TELEPHONE ENCOUNTER
----- Message from Marion Garcia sent at 6/9/2023 10:40 AM CDT -----  Regarding: advice  Contact: Radha  Type: Needs Medical Advice  Who Called:  Radha with [facility]   Symptoms (please be specific):    How long has patient had these symptoms:    Pharmacy name and phone #:   Best Call Back Number: [phone]  Additional Information: Radha stated that insurance would need a new order for sleep study that doctor want. In order to get process started they would need this information sent to fax: [phone]. Please contact if any further questions. Thanks!

## 2023-06-09 NOTE — TELEPHONE ENCOUNTER
----- Message from Nery Morton MA sent at 6/9/2023 11:45 AM CDT -----  Contact: mayelin ramirez  Type: Needs Medical Advice  Who Called:  [facility] Cassandra     Best Call Back Number: [phone]    Additional Information:   Caller says pt changed his mind and no long need the supplies and would like a sleep study  instead please advise

## 2023-06-09 NOTE — TELEPHONE ENCOUNTER
Spoke with Cassandra from [facility]. Faxed order for new sleep study and she confirmed they have received it.

## 2023-06-14 ENCOUNTER — HOSPITAL ENCOUNTER (OUTPATIENT)
Dept: RADIOLOGY | Facility: HOSPITAL | Age: 38
Discharge: HOME | End: 2023-06-14
Attending: NURSE PRACTITIONER
Payer: MEDICARE

## 2023-06-14 DIAGNOSIS — K74.60 CIRRHOSIS OF LIVER WITHOUT ASCITES, UNSPECIFIED HEPATIC CIRRHOSIS TYPE: ICD-10-CM

## 2023-06-14 PROCEDURE — 76705 US ABDOMEN LIMITED: ICD-10-PCS | Mod: 26,,, | Performed by: RADIOLOGY

## 2023-06-14 PROCEDURE — 76705 ECHO EXAM OF ABDOMEN: CPT | Mod: TC,PO

## 2023-06-14 PROCEDURE — 76705 ECHO EXAM OF ABDOMEN: CPT | Mod: 26,,, | Performed by: RADIOLOGY

## 2023-06-23 ENCOUNTER — OFFICE VISIT (OUTPATIENT)
Dept: FAMILY MEDICINE | Facility: CLINIC | Age: 38
End: 2023-06-23
Payer: MEDICARE

## 2023-06-23 ENCOUNTER — OFFICE VISIT (OUTPATIENT)
Dept: HEPATOLOGY | Facility: CLINIC | Age: 38
End: 2023-06-23
Payer: MEDICARE

## 2023-06-23 ENCOUNTER — TELEPHONE (OUTPATIENT)
Dept: NEUROLOGY | Facility: CLINIC | Age: 38
End: 2023-06-23
Payer: MEDICARE

## 2023-06-23 VITALS
SYSTOLIC BLOOD PRESSURE: 126 MMHG | BODY MASS INDEX: 37.05 KG/M2 | HEIGHT: 70 IN | HEART RATE: 71 BPM | DIASTOLIC BLOOD PRESSURE: 74 MMHG | OXYGEN SATURATION: 98 % | WEIGHT: 258.81 LBS

## 2023-06-23 DIAGNOSIS — K75.81 NASH (NONALCOHOLIC STEATOHEPATITIS): ICD-10-CM

## 2023-06-23 DIAGNOSIS — K74.60 LIVER CIRRHOSIS SECONDARY TO NASH: ICD-10-CM

## 2023-06-23 DIAGNOSIS — K74.60 CIRRHOSIS OF LIVER WITHOUT ASCITES, UNSPECIFIED HEPATIC CIRRHOSIS TYPE: Primary | ICD-10-CM

## 2023-06-23 DIAGNOSIS — Z78.9 INTOLERANCE OF CONTINUOUS POSITIVE AIRWAY PRESSURE (CPAP) VENTILATION: ICD-10-CM

## 2023-06-23 DIAGNOSIS — E66.01 CLASS 2 SEVERE OBESITY DUE TO EXCESS CALORIES WITH SERIOUS COMORBIDITY AND BODY MASS INDEX (BMI) OF 37.0 TO 37.9 IN ADULT: ICD-10-CM

## 2023-06-23 DIAGNOSIS — E78.5 HYPERLIPIDEMIA, UNSPECIFIED HYPERLIPIDEMIA TYPE: ICD-10-CM

## 2023-06-23 DIAGNOSIS — R73.9 HYPERGLYCEMIA: ICD-10-CM

## 2023-06-23 DIAGNOSIS — Z72.820 POOR SLEEP: Primary | ICD-10-CM

## 2023-06-23 DIAGNOSIS — G40.909 SEIZURE DISORDER: Chronic | ICD-10-CM

## 2023-06-23 DIAGNOSIS — G47.33 OSA ON CPAP: ICD-10-CM

## 2023-06-23 DIAGNOSIS — K75.81 LIVER CIRRHOSIS SECONDARY TO NASH: ICD-10-CM

## 2023-06-23 PROCEDURE — 99214 OFFICE O/P EST MOD 30 MIN: CPT | Mod: S$GLB,,, | Performed by: STUDENT IN AN ORGANIZED HEALTH CARE EDUCATION/TRAINING PROGRAM

## 2023-06-23 PROCEDURE — 99214 PR OFFICE/OUTPT VISIT, EST, LEVL IV, 30-39 MIN: ICD-10-PCS | Mod: 95,,, | Performed by: NURSE PRACTITIONER

## 2023-06-23 PROCEDURE — 3008F BODY MASS INDEX DOCD: CPT | Mod: CPTII,S$GLB,, | Performed by: STUDENT IN AN ORGANIZED HEALTH CARE EDUCATION/TRAINING PROGRAM

## 2023-06-23 PROCEDURE — 1159F MED LIST DOCD IN RCRD: CPT | Mod: CPTII,S$GLB,, | Performed by: STUDENT IN AN ORGANIZED HEALTH CARE EDUCATION/TRAINING PROGRAM

## 2023-06-23 PROCEDURE — 99999 PR PBB SHADOW E&M-EST. PATIENT-LVL V: CPT | Mod: PBBFAC,,, | Performed by: STUDENT IN AN ORGANIZED HEALTH CARE EDUCATION/TRAINING PROGRAM

## 2023-06-23 PROCEDURE — 3078F DIAST BP <80 MM HG: CPT | Mod: CPTII,S$GLB,, | Performed by: STUDENT IN AN ORGANIZED HEALTH CARE EDUCATION/TRAINING PROGRAM

## 2023-06-23 PROCEDURE — 3008F PR BODY MASS INDEX (BMI) DOCUMENTED: ICD-10-PCS | Mod: CPTII,S$GLB,, | Performed by: STUDENT IN AN ORGANIZED HEALTH CARE EDUCATION/TRAINING PROGRAM

## 2023-06-23 PROCEDURE — 99999 PR PBB SHADOW E&M-EST. PATIENT-LVL V: ICD-10-PCS | Mod: PBBFAC,,, | Performed by: STUDENT IN AN ORGANIZED HEALTH CARE EDUCATION/TRAINING PROGRAM

## 2023-06-23 PROCEDURE — 3074F PR MOST RECENT SYSTOLIC BLOOD PRESSURE < 130 MM HG: ICD-10-PCS | Mod: CPTII,S$GLB,, | Performed by: STUDENT IN AN ORGANIZED HEALTH CARE EDUCATION/TRAINING PROGRAM

## 2023-06-23 PROCEDURE — 3078F PR MOST RECENT DIASTOLIC BLOOD PRESSURE < 80 MM HG: ICD-10-PCS | Mod: CPTII,S$GLB,, | Performed by: STUDENT IN AN ORGANIZED HEALTH CARE EDUCATION/TRAINING PROGRAM

## 2023-06-23 PROCEDURE — 99214 OFFICE O/P EST MOD 30 MIN: CPT | Mod: 95,,, | Performed by: NURSE PRACTITIONER

## 2023-06-23 PROCEDURE — 99499 UNLISTED E&M SERVICE: CPT | Mod: 95,,, | Performed by: NURSE PRACTITIONER

## 2023-06-23 PROCEDURE — 3074F SYST BP LT 130 MM HG: CPT | Mod: CPTII,S$GLB,, | Performed by: STUDENT IN AN ORGANIZED HEALTH CARE EDUCATION/TRAINING PROGRAM

## 2023-06-23 PROCEDURE — 1159F PR MEDICATION LIST DOCUMENTED IN MEDICAL RECORD: ICD-10-PCS | Mod: CPTII,S$GLB,, | Performed by: STUDENT IN AN ORGANIZED HEALTH CARE EDUCATION/TRAINING PROGRAM

## 2023-06-23 PROCEDURE — 99214 PR OFFICE/OUTPT VISIT, EST, LEVL IV, 30-39 MIN: ICD-10-PCS | Mod: S$GLB,,, | Performed by: STUDENT IN AN ORGANIZED HEALTH CARE EDUCATION/TRAINING PROGRAM

## 2023-06-23 RX ORDER — AMOXICILLIN 875 MG/1
875 TABLET, FILM COATED ORAL
COMMUNITY
End: 2023-08-31

## 2023-06-23 NOTE — PROGRESS NOTES
The patient location is: [place], [place]  The chief complaint leading to consultation is: F/u for cirrhosis    Visit type: audiovisual    Face to Face time with patient: 20 min  30 minutes of total time spent on the encounter, which includes face to face time and non-face to face time preparing to see the patient (eg, review of tests), Obtaining and/or reviewing separately obtained history, Documenting clinical information in the electronic or other health record, Independently interpreting results (not separately reported) and communicating results to the patient/family/caregiver, or Care coordination (not separately reported).     Each patient to whom he or she provides medical services by telemedicine is:  (1) informed of the relationship between the physician and patient and the respective role of any other health care provider with respect to management of the patient; and (2) notified that he or she may decline to receive medical services by telemedicine and may withdraw from such care at any time.      [facility] - Established Patient    Last Clinic Visit: 12/27/22      HISTORY     This is a 37 y.o. 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 Mother present for visit who is able to assist with history and updates.    Transaminases consistently elevated since 11/2015, AST>ALT.    Serological workup has been negative for other etiologies of liver disease. Ferritin elevated, 800-1500 though iron sat normal. No copies of C282Y or H63D to suggest HH. IgG mildly elevated (1677) though other autoimmune markers negative.      Imaging has shown hepatomegaly, fatty liver, splenomegaly.    Risk factors for fatty liver include-  BMI >35  HLD, pre-diabetes    Diagnosed with cirrhosis: Liver biopsy 7/2020  - Cirrhotic liver  - Steatosis with steatohepatitis   - Macrovesicular steatosis 20-30% and microvesicular steatosis 20%  - Trichrome: cirrhosis (Stage 4/4)  - Iron: No deposit  (Grade 0/4)    Interval history:  He was previously able to lose ~50 lb and liver enzymes normalized. Limiting portions and watching diet has been difficult to maintain. Weight has since trended back up and enzymes are increasing:  / .    TG trending up last year.  HgbA1c improved to 5.2.     Current symptoms of hepatic decompensation:              Ascites: no              LE edema: no                Hepatic encephalopathy: difficult to assess given autism and MR; his ammonia was mildly elevated in the past and mom thought he was having confusion. Lactulose started and he takes this 3 x day; this has also helped with constipation. Mental status at baseline lately.              GI bleeding: no              Jaundice: no    Health Maintenance:  -- HCC screening: abd US 6/14/23 without focal hepatic lesion; AFP 2.2  Imaging also with 14 mm echogenicity in the GB, favored to be sludge/stones though enlarging polyp/mass not excluded. No mass on CT 1/2023.   -- Variceal screening: EGD 8/17/22 without varices  -- Hepatitis A & B vaccination: needs vaccines        Past medical history, surgical history, problem list, family history, social history, allergies: Reviewed and updated in the appropriate section of the electronic medical record.      Current Outpatient Medications   Medication Sig Dispense Refill    allopurinoL (ZYLOPRIM) 100 MG tablet TAKE ONE TABLET BY MOUTH TWICE DAILY 180 tablet 2    amoxicillin (AMOXIL) 875 MG tablet Take 875 mg by mouth every 12 (twelve) hours.      ascorbic acid, vitamin C, 250 mg Chew Take by mouth.      calcium carbonate-vitamin D3 500 mg(1,250mg) -400 unit Tab Take 1 tablet by mouth 2 (two) times a day.       DENTA 5000 PLUS 1.1 % Crea SMARTSIG:By Mouth Morning-Night      fluvoxaMINE (LUVOX) 100 MG tablet Take 1 tablet (100 mg total) by mouth 2 (two) times daily. 60 tablet 1    inulin (FIBER GUMMIES ORAL) Take 2 tablets by mouth once daily.       L. ACIDOPHILUS/BIFID.  ANIMALIS (CHEWABLE PROBIOTIC ORAL) Take 1 tablet by mouth 2 (two) times daily.       lactulose (CHRONULAC) 10 gram/15 mL solution Take 15 mLs (10 g total) by mouth 3 (three) times daily. 473 mL 3    magnesium 30 mg Tab Take 1 tablet by mouth every evening.      MELATONIN ORAL Take 1 tablet by mouth every evening.      mirabegron (MYRBETRIQ ORAL) Take by mouth.      multivitamin capsule Take 1 capsule by mouth every morning.       mupirocin calcium 2% (BACTROBAN) 2 % cream Apply topically 3 (three) times daily. 15 g 3    nitrofurantoin (MACRODANTIN) 100 MG capsule Take 100 mg by mouth every 12 (twelve) hours.      NON FORMULARY MEDICATION CBD Oil- OTC      omega-3 fatty acids/fish oil (FISH OIL-OMEGA-3 FATTY ACIDS) 300-1,000 mg capsule Take 1 capsule by mouth once daily.       OXcarbazepine (TRILEPTAL) 300 MG Tab Take 1 tablet (300 mg total) by mouth 2 (two) times daily. 180 tablet 1    pantoprazole (PROTONIX) 40 MG tablet TAKE ONE TABLET BY MOUTH ONCE DAILY 90 tablet 2    polyethylene glycol (GLYCOLAX) 17 gram PwPk Take 17 g by mouth once daily.       potassium chloride (KLOR-CON) 10 MEQ TbSR Take 10 mEq by mouth every evening.      pramipexole (MIRAPEX) 0.125 MG tablet TAKE ONE TABLET BY MOUTH THREE TIMES DAILY 90 tablet 0    tamsulosin (FLOMAX) 0.4 mg Cap Take 0.4 mg by mouth every evening. Every day      zinc gluconate 50 mg tablet Take 50 mg by mouth once daily.       No current facility-administered medications for this visit.     Medication list reviewed and updated.      Review of Systems - as per HPI (per mother)  Constitutional: Negative for fatigue. +weight gain  Respiratory: Negative for shortness of breath.    Cardiovascular: Negative for leg swelling.  Gastrointestinal: Negative for abdominal distention or abdominal pain. Negative for melena or hematemesis.  Musculoskeletal: Negative for myalgias.    Skin: Negative for jaundice or itching.  Neurological: Negative for tremors.   Hematological: Does not  bruise/bleed easily.   Psychiatric: Negative for sleep disturbance.      Physical Exam   Constitutional: No distress.   Pulmonary/Chest: No respiratory distress.   Skin: No jaundice.   Psychiatric: At baseline per mother.        LABS & DIAGNOSTIC STUDIES     I have personally reviewed pertinent laboratory findings:    Lab Results   Component Value Date     (H) 06/14/2023     (H) 06/14/2023    ALKPHOS 75 06/14/2023    BILITOT 0.6 06/14/2023    ALBUMIN 4.0 06/14/2023    INR 1.0 06/14/2023       Lab Results   Component Value Date    WBC 7.65 06/14/2023    HGB 16.5 06/14/2023    HCT 47.5 06/14/2023    MCV 95 06/14/2023     06/14/2023       Lab Results   Component Value Date     06/14/2023    K 3.9 06/14/2023    BUN 9 06/14/2023    CREATININE 0.8 06/14/2023    ESTGFRAFRICA >60 06/16/2022    EGFRNONAA >60 06/16/2022       Lab Results   Component Value Date    SMOOTHMUSCAB Negative 1:40 04/30/2018    AMAIFA Negative 1:40 04/30/2018    IGGSERUM 1677 (H) 02/29/2020    ANASCREEN Negative <1:160 04/30/2018    FERRITIN 893 (H) 02/29/2020    FESATURATED 26 04/30/2018    CERULOPLSM 30.0 04/30/2018    HEPBSAG Negative 04/30/2018    HEPBIGM Negative 04/30/2018    HEPBCAB Negative 02/02/2021    HEPCAB Negative 04/30/2018    HEPAIGM Negative 04/30/2018       Lab Results   Component Value Date    AFP 2.2 06/14/2023       I have personally reviewed the following result reports:  Abdominal US - 6/14/23  EGD - 8/17/22  Colonoscopy - 9/9/20  Liver biopsy - 7/20/20      ASSESSMENT & PLAN     37 y.o. male with:    1. Cirrhosis, due to TRAYLOR, well compensated  -- Child-Lazo score: A  -- MELD remains <15, no indication for liver transplant evaluation at this time. Reassured that liver function remains stable.   -- Monitor MELD labs every 6 months  -- Continue HCC screening every 6 months with ultrasound and AFP, next due 12/2023  -- EGD 8/2022 without varices. Normal platelet count and no s/s portal HTN. Can repeat  in 3 years.  -- Recommend vaccines for hepatitis A and B    MELD-Na: 6 at 6/14/2023  8:25 AM  MELD: 6 at 6/14/2023  8:25 AM  Calculated from:  Serum Creatinine: 0.8 mg/dL (Using min of 1 mg/dL) at 6/14/2023  8:25 AM  Serum Sodium: 139 mmol/L (Using max of 137 mmol/L) at 6/14/2023  8:25 AM  Total Bilirubin: 0.6 mg/dL (Using min of 1 mg/dL) at 6/14/2023  8:25 AM  INR(ratio): 1.0 at 6/14/2023  8:25 AM      2. Fatty liver / TRAYLOR, BMI ~37, HLD  -- Transaminases trending up with weight gain and higher triglycerides   -- Encouraged to continue weight loss efforts including dietary changes and physical activity. May benefit from GLP-1s as portion control has been difficult, recommend discussing options with PCP.  -- Maintain control of cholesterol and blood sugar as these are risk factors for fatty liver    3. Possible hepatic encephalopathy  -- Ammonia normal-minimally elevated in the past, difficult to assess for HE given autism and developmental delay   -- Doing well with lactulose which has also helped his constipation. Takes this TID. Mental status at baseline per mother    4. Abnormal gallbladder findings  -- US favors sludge/stones though cannot rule out polyp or mass. CT 1/2023 without evidence of GB mass. Mom does not think that he would be able to complete an MRI. Will update PCP on results to determine further workup. He will get an US every 6 months though can obtain short-term f/u if unable to get MRI.      Orders Placed This Encounter   Procedures    US Abdomen Limited    CBC Without Differential    Comprehensive Metabolic Panel    AFP Tumor Marker    Protime-INR       *See AVS for patient education and instructions.      Labs in Oct to coordinate with PCP.  F/u in 6 months with US prior- [facility].      Thank you for allowing me to participate in the care of Hussein Armendariz, ROMÁNP-C  Hepatology

## 2023-06-23 NOTE — ASSESSMENT & PLAN NOTE
Patient currently seems to have issues with restlessness during sleep and has issues tolerating mask - often found off his face. Patient has also been gaining weight. He does have a portion of the afternoon set aside for naps. Given uncertainty of sleep quality, I would like to re-assess patient's sleep apnea and titrate CPAP with mask fitting.

## 2023-06-23 NOTE — ASSESSMENT & PLAN NOTE
Prior neurologist has left and patient needs a new one. Mother will look into Katy Cheema. If a referral is needed, I will provide one. In the meantime, I can provide refills as needed.

## 2023-06-23 NOTE — PROGRESS NOTES
Name: Hussein Doyle  MRN: [id number]  : 1985  PCP: Rehan Mazariegos MD    HPI  Patient presents today for follow up. Primary concerns include sleep apnea and seizure management. Further details listed in A&P.    Review of Systems   Neurological:  Negative for seizures.   Psychiatric/Behavioral:  Positive for sleep disturbance. Negative for agitation.      Patient Active Problem List   Diagnosis    Autistic disorder, active    Impulse control disorder    Developmental delay, moderate    Neurogenic bladder    HALEY on CPAP    Gout, chronic    Seizure disorder    Pervasive developmental disorder, active    Chronic constipation    OCD (obsessive compulsive disorder)    Hallux, valgus, congenital    TRAYLOR (nonalcoholic steatohepatitis)    Dysphagia    Liver cirrhosis secondary to TRAYLOR    Class 2 severe obesity due to excess calories with serious comorbidity and body mass index (BMI) of 37.0 to 37.9 in adult    Osteoporosis    Co-occurrence of multiple psychiatric disorders    History of renal cell carcinoma       Vitals:    23 1123   BP: 126/74   Pulse: 71       Physical Exam  Constitutional:       General: He is not in acute distress.     Appearance: Normal appearance. He is well-developed.   HENT:      Head: Normocephalic and atraumatic.      Right Ear: External ear normal.      Left Ear: External ear normal.   Eyes:      Conjunctiva/sclera: Conjunctivae normal.   Cardiovascular:      Rate and Rhythm: Normal rate and regular rhythm.      Heart sounds: No murmur heard.    No friction rub. No gallop.   Pulmonary:      Effort: Pulmonary effort is normal. No respiratory distress.      Breath sounds: No wheezing, rhonchi or rales.   Abdominal:      General: Abdomen is flat. There is no distension.   Musculoskeletal:         General: No swelling or deformity.      Right lower leg: No edema.      Left lower leg: No edema.   Skin:     General: Skin is warm and dry.      Coloration: Skin is not jaundiced.    Neurological:      Mental Status: He is alert and oriented to person, place, and time. Mental status is at baseline.   Psychiatric:         Attention and Perception: Perception normal. He is inattentive.         Mood and Affect: Mood normal.         Speech: Speech normal.         Behavior: Behavior normal. Behavior is cooperative.         Cognition and Memory: Cognition is impaired.       1. Poor sleep  -     Polysomnogram (CPAP will be added if patient meets diagnostic criteria.); Future    2. Intolerance of continuous positive airway pressure (CPAP) ventilation  -     Polysomnogram (CPAP will be added if patient meets diagnostic criteria.); Future    3. Hyperglycemia  -     Hemoglobin A1C; Future    4. HALEY on CPAP  Overview:  11/07: AHI 24, desats to 73%. ASV titrated 2008    Assessment & Plan:  Patient currently seems to have issues with restlessness during sleep and has issues tolerating mask - often found off his face. Patient has also been gaining weight. He does have a portion of the afternoon set aside for naps. Given uncertainty of sleep quality, I would like to re-assess patient's sleep apnea and titrate CPAP with mask fitting.    Orders:  -     Polysomnogram (CPAP will be added if patient meets diagnostic criteria.); Future    5. Class 2 severe obesity due to excess calories with serious comorbidity and body mass index (BMI) of 37.0 to 37.9 in adult  Assessment & Plan:  Worsening. Dietary changes are difficult due to patient's impulse control. Will screen for diabetes. Continue to monitor.      6. Seizure disorder  Assessment & Plan:  Prior neurologist has left and patient needs a new one. Mother will look into Katy Cheema. If a referral is needed, I will provide one. In the meantime, I can provide refills as needed.          Follow up in 4 months    Rehan Mazariegos MD  06/23/2023

## 2023-06-23 NOTE — TELEPHONE ENCOUNTER
----- Message from Myla Duncan sent at 6/23/2023 11:53 AM CDT -----  Good morning pt Hussein see was told to schedule a appointment can someone please give them a call.

## 2023-06-23 NOTE — ASSESSMENT & PLAN NOTE
Worsening. Dietary changes are difficult due to patient's impulse control. Will screen for diabetes. Continue to monitor.

## 2023-06-23 NOTE — TELEPHONE ENCOUNTER
Spoke with patient's mother. Patient a previous Dr. Salinas's patient. Patient's mother was told to schedule an appointment with Dr. Cheema to establish care for seizures. Informed patient's mother that nothing pulls up when I try to schedule. Patient's mother requests for an appointment this year. Advised that I will speak with Dr. Cheema' Nurse to see if there is anything available, but that I am not certain if they do. Patient's mother v/u

## 2023-06-27 ENCOUNTER — TELEPHONE (OUTPATIENT)
Dept: HEPATOLOGY | Facility: CLINIC | Age: 38
End: 2023-06-27
Payer: MEDICARE

## 2023-06-27 NOTE — TELEPHONE ENCOUNTER
----- Message from Amelia Armendariz NP sent at 6/27/2023  9:59 AM CDT -----  Please schedule US and f/u visit in 6 months ([facility]). Thanks!

## 2023-06-27 NOTE — PATIENT INSTRUCTIONS
Labs in October to coordinate with PCP  Ultrasound and visit in 6 months- [facility]  Continue weight loss efforts; may benefit from medications to help with this. Liver enzyme should improve with weight loss  Will send ultrasound results to PCP regarding gallbladder findings        CIRRHOSIS EDUCATION:  This is a helpful web site about cirrhosis: https://cirrhosiscare.ca/     Because you have cirrhosis, it is extremely important to obtain blood tests and an ultrasound every 6 months to screen for liver cancer (you are at risk for developing liver cancer due to increased scar tissue in the liver).     Signs and symptoms of worsening liver disease include jaundice (yellow skin/eyes), fluid in the abdomen (ascites), and confusion/disorientation/slowed thought processes due to hepatic encephalopathy (toxins building up when the liver is not working properly). You should seek medical attention if any of these things occur. Also, possible bleeding from esophageal varices (blood vessels in the stomach and foodpipe that can burst and cause bleeding). If you have symptoms of vomiting blood, blood in your stool, maroon or black stools, or coffee ground vomit, you should go to the emergency room immediately.     Cirrhosis can increase the risk of impaired liver function or liver failure; however, we will watch your liver function score (MELD score) closely with each clinic visit. A normal MELD score is 6, highest is 40. The MELD score helps to determine when we may need to consider evaluation for liver transplant.     Counseling  -- Strict abstinence from alcohol (includes beer, wine, and/or liquor)  -- Avoid non-steroidal anti-inflammatory drugs (NSAIDs) such as ibuprofen (Motrin, Advil), naproxen (Naprosyn, Aleve), meloxicam (Mobic)   -- Tylenol/acetaminophen is OKAY and should be used as needed for pain, no more than 2000 mg per day  -- Avoid raw shellfish due to the risk of Vibrio vulnificus infection  -- Low salt/sodium  diet, less than 2000 mg per day   -- High protein diet to prevent muscle mass loss. Can drink protein shakes ([employer]ier Protein is a great option because it is very high protein and low sugar). A bedtime snack with protein can also be helpful. Example: peanut butter sandwich/crackers  -- Periodic upper endoscopy (EGD) to screen for varices (enlarged blood vessels) in the esophagus and stomach which can increase risk of bleeding  -- Increased risk of liver cancer associated with cirrhosis; therefore, continued screening with ultrasound (or CT / MRI) and AFP tumor marker every 6 months is recommended.

## 2023-07-05 DIAGNOSIS — R25.8 NOCTURNAL LEG MOVEMENTS: ICD-10-CM

## 2023-07-05 RX ORDER — PRAMIPEXOLE DIHYDROCHLORIDE 0.12 MG/1
TABLET ORAL
Qty: 90 TABLET | Refills: 0 | Status: SHIPPED | OUTPATIENT
Start: 2023-07-05 | End: 2023-07-24 | Stop reason: SDUPTHER

## 2023-07-07 ENCOUNTER — PATIENT MESSAGE (OUTPATIENT)
Dept: HEPATOLOGY | Facility: CLINIC | Age: 38
End: 2023-07-07
Payer: MEDICARE

## 2023-07-07 ENCOUNTER — PATIENT MESSAGE (OUTPATIENT)
Dept: FAMILY MEDICINE | Facility: CLINIC | Age: 38
End: 2023-07-07
Payer: MEDICARE

## 2023-07-17 ENCOUNTER — PATIENT MESSAGE (OUTPATIENT)
Dept: FAMILY MEDICINE | Facility: CLINIC | Age: 38
End: 2023-07-17
Payer: MEDICARE

## 2023-07-17 ENCOUNTER — OFFICE VISIT (OUTPATIENT)
Dept: UROLOGY | Facility: CLINIC | Age: 38
End: 2023-07-17
Payer: MEDICARE

## 2023-07-17 VITALS — WEIGHT: 258.19 LBS | HEIGHT: 70 IN | BODY MASS INDEX: 36.96 KG/M2

## 2023-07-17 DIAGNOSIS — N39.0 RECURRENT URINARY TRACT INFECTION: Primary | ICD-10-CM

## 2023-07-17 DIAGNOSIS — N31.9 NEUROGENIC BLADDER: Chronic | ICD-10-CM

## 2023-07-17 DIAGNOSIS — R33.9 URINARY RETENTION: ICD-10-CM

## 2023-07-17 LAB
BILIRUBIN, UA POC OHS: NEGATIVE
BLOOD, UA POC OHS: NEGATIVE
CLARITY, UA POC OHS: CLEAR
COLOR, UA POC OHS: YELLOW
GLUCOSE, UA POC OHS: NEGATIVE
KETONES, UA POC OHS: NEGATIVE
LEUKOCYTES, UA POC OHS: ABNORMAL
NITRITE, UA POC OHS: NEGATIVE
PH, UA POC OHS: 7
PROTEIN, UA POC OHS: NEGATIVE
SPECIFIC GRAVITY, UA POC OHS: 1.02
UROBILINOGEN, UA POC OHS: 1

## 2023-07-17 PROCEDURE — 99204 PR OFFICE/OUTPT VISIT, NEW, LEVL IV, 45-59 MIN: ICD-10-PCS | Mod: S$GLB,,, | Performed by: UROLOGY

## 2023-07-17 PROCEDURE — 81003 URINALYSIS AUTO W/O SCOPE: CPT | Mod: QW,S$GLB,, | Performed by: UROLOGY

## 2023-07-17 PROCEDURE — 81003 POCT URINALYSIS(INSTRUMENT): ICD-10-PCS | Mod: QW,S$GLB,, | Performed by: UROLOGY

## 2023-07-17 PROCEDURE — 99204 OFFICE O/P NEW MOD 45 MIN: CPT | Mod: S$GLB,,, | Performed by: UROLOGY

## 2023-07-17 PROCEDURE — 1159F MED LIST DOCD IN RCRD: CPT | Mod: CPTII,S$GLB,, | Performed by: UROLOGY

## 2023-07-17 PROCEDURE — 3008F PR BODY MASS INDEX (BMI) DOCUMENTED: ICD-10-PCS | Mod: CPTII,S$GLB,, | Performed by: UROLOGY

## 2023-07-17 PROCEDURE — 1159F PR MEDICATION LIST DOCUMENTED IN MEDICAL RECORD: ICD-10-PCS | Mod: CPTII,S$GLB,, | Performed by: UROLOGY

## 2023-07-17 PROCEDURE — 3008F BODY MASS INDEX DOCD: CPT | Mod: CPTII,S$GLB,, | Performed by: UROLOGY

## 2023-07-17 PROCEDURE — 99999 PR PBB SHADOW E&M-EST. PATIENT-LVL III: CPT | Mod: PBBFAC,,, | Performed by: UROLOGY

## 2023-07-17 PROCEDURE — 99999 PR PBB SHADOW E&M-EST. PATIENT-LVL III: ICD-10-PCS | Mod: PBBFAC,,, | Performed by: UROLOGY

## 2023-07-17 NOTE — PROGRESS NOTES
Subjective:       Patient ID: Hussein Doyle is a 37 y.o. male.    Chief Complaint: Urinary Tract Infection (Bladder. Pt mother stated that the pt change a cath every 6 times a day. )    HPI    37-year-old who was seen today with his mother and caregiver.  He has a history of neurogenic bladder.  The details are not clear but he had surgery for scoliosis as a child and since then he has had urinary retention.  His mother and caregiver are now catheterizing 6 times per day.  The patient is unable to care for himself.  He apparently has large volume urine output.  Mother says that hydration must be maintained already has problems with constipation.  She says after 3 hours of not catheterizing his bladder is full and drains a full bag measuring 1500 cc.  The primary problem is frequent urinary tract infections.  He has required hospitalization due to sepsis previous occasions.  Symptoms include more urinary leakage as well as fever and rigors.  Today he is asymptomatic and his urinalysis is mostly clear except trace leukocyte.  He has also had a history of partial nephrectomy in 2010.  The details are unclear but reportedly for cancer.  He would have been 25 years old at that time.  He had a contrast enhanced CT scan about 6 months ago and I reviewed those images.  The kidneys appeared normal.  There is no evidence of partial nephrectomy and there was no stones or obstruction.    Past Medical History:   Diagnosis Date    Anxiety     Autistic disorder, active 05/06/2013    Bowel obstruction     pt mother denies    Early intervention counseling     GERD (gastroesophageal reflux disease)     Gout, chronic 11/12/2015    Grand mal seizure     Hepatic encephalopathy     Hepatic steatosis     Impulse control disorder     Impulse control disorder, unspecified 05/06/2013    Liver cirrhosis secondary to TRAYLOR     Mastoiditis     Moderate mental retardation 05/06/2013    Neurogenic bladder     intermittent catherization (5  times a day)    Obesity     OCD (obsessive compulsive disorder)     HALEY (obstructive sleep apnea) 11/12/2015    on cpap at bedtime    Otitis media     Paraplegia     Pervasive developmental disorder, active 12/27/2015    Recurrent UTI     Renal cancer 2010    Right RCC    Scoliosis     paraplegia    Seizure disorder 11/12/2015    Sepsis due to Escherichia coli without acute organ dysfunction 2/2/2023    Stroke     one week old    Tardive dyskinesia      Past Surgical History:   Procedure Laterality Date    ANKLE FRACTURE SURGERY      BACK SURGERY  2000    COLONOSCOPY  10/28/2008    Dr. Arana at [facility]; anal fissure, minimal pinpoint rectal erythema; floppy-type colon with very little tone; biopsy: rectum mild chronic proctitis with few eosinophils sent for scanning    COLONOSCOPY N/A 6/15/2018    Procedure: COLONOSCOPY;  Surgeon: Refugio Villagomez MD;  Location: [facility];  Service: Endoscopy;  Laterality: N/A; Preparation of the colon was fair, unremarkable; REPEAT at 50 YEARS old FOR SCREENING    COLONOSCOPY N/A 9/9/2020    Procedure: COLONOSCOPY;  Surgeon: Refugio Villagomez MD;  Location: [facility];  Service: Endoscopy;  Laterality: N/A;    ESOPHAGOGASTRODUODENOSCOPY N/A 7/13/2020    Procedure: EGD (ESOPHAGOGASTRODUODENOSCOPY);  Surgeon: Refugio Villagomez MD;  Location: [facility];  Service: Endoscopy;  Laterality: N/A;    ESOPHAGOGASTRODUODENOSCOPY N/A 8/17/2022    Procedure: EGD (ESOPHAGOGASTRODUODENOSCOPY);  Surgeon: Refugio Villagomez MD;  Location: [facility];  Service: Endoscopy;  Laterality: N/A;  cirrhosis, varices screening    ESOPHAGOGASTRODUODENOSCOPY (EGD) WITH DILATION N/A 2020    INNER EAR SURGERY      mastoid    LIVER BIOPSY N/A 7/20/2020    Procedure: BIOPSY, LIVER;  Surgeon: Ananya Surgeon;  Location: [facility];  Service: Anesthesiology;  Laterality: N/A;  189 liver bx/Ultrasound anes 1.5 hours    MAGNETIC RESONANCE IMAGING N/A 10/9/2020    Procedure: MRI (Magnetic Resonance Imagine);  Surgeon:  Manish Araiza MD;  Location: [facility];  Service: Anesthesiology;  Laterality: N/A;    right partial nephrectomy Right 2010    Sees urology    TYMPANOSTOMY TUBE PLACEMENT         Current Outpatient Medications:     allopurinoL (ZYLOPRIM) 100 MG tablet, TAKE ONE TABLET BY MOUTH TWICE DAILY, Disp: 180 tablet, Rfl: 2    ascorbic acid, vitamin C, 250 mg Chew, Take by mouth., Disp: , Rfl:     calcium carbonate-vitamin D3 500 mg(1,250mg) -400 unit Tab, Take 1 tablet by mouth 2 (two) times a day. , Disp: , Rfl:     DENTA 5000 PLUS 1.1 % Crea, SMARTSIG:By Mouth Morning-Night, Disp: , Rfl:     fluvoxaMINE (LUVOX) 100 MG tablet, Take 1 tablet (100 mg total) by mouth 2 (two) times daily., Disp: 60 tablet, Rfl: 1    inulin (FIBER GUMMIES ORAL), Take 2 tablets by mouth once daily. , Disp: , Rfl:     L. ACIDOPHILUS/BIFID. ANIMALIS (CHEWABLE PROBIOTIC ORAL), Take 1 tablet by mouth 2 (two) times daily. , Disp: , Rfl:     lactulose (CHRONULAC) 10 gram/15 mL solution, Take 15 mLs (10 g total) by mouth 3 (three) times daily., Disp: 473 mL, Rfl: 3    magnesium 30 mg Tab, Take 1 tablet by mouth every evening., Disp: , Rfl:     MELATONIN ORAL, Take 1 tablet by mouth every evening., Disp: , Rfl:     mirabegron (MYRBETRIQ ORAL), Take by mouth., Disp: , Rfl:     multivitamin capsule, Take 1 capsule by mouth every morning. , Disp: , Rfl:     mupirocin calcium 2% (BACTROBAN) 2 % cream, Apply topically 3 (three) times daily., Disp: 15 g, Rfl: 3    NON FORMULARY MEDICATION, CBD Oil- OTC, Disp: , Rfl:     omega-3 fatty acids/fish oil (FISH OIL-OMEGA-3 FATTY ACIDS) 300-1,000 mg capsule, Take 1 capsule by mouth once daily. , Disp: , Rfl:     OXcarbazepine (TRILEPTAL) 300 MG Tab, Take 1 tablet (300 mg total) by mouth 2 (two) times daily., Disp: 180 tablet, Rfl: 1    pantoprazole (PROTONIX) 40 MG tablet, TAKE ONE TABLET BY MOUTH ONCE DAILY, Disp: 90 tablet, Rfl: 2    polyethylene glycol (GLYCOLAX) 17 gram PwPk, Take 17 g by mouth once daily.  , Disp: , Rfl:     potassium chloride (KLOR-CON) 10 MEQ TbSR, Take 10 mEq by mouth every evening., Disp: , Rfl:     pramipexole (MIRAPEX) 0.125 MG tablet, TAKE ONE TABLET BY MOUTH THREE TIMES DAILY, Disp: 90 tablet, Rfl: 0    zinc gluconate 50 mg tablet, Take 50 mg by mouth once daily., Disp: , Rfl:     amoxicillin (AMOXIL) 875 MG tablet, Take 875 mg by mouth every 12 (twelve) hours., Disp: , Rfl:       Review of Systems   Unable to perform ROS: Patient nonverbal     Objective:      Physical Exam  Vitals reviewed.   Constitutional:       Appearance: He is well-developed. He is obese.   Pulmonary:      Effort: Pulmonary effort is normal.   Skin:     Findings: No rash.   Neurological:      Mental Status: He is alert. Mental status is at baseline.       Assessment:       1. Recurrent urinary tract infection    2. Neurogenic bladder    3. Urinary retention        Plan:       Recurrent urinary tract infection  -     POCT Urinalysis(Instrument)    Neurogenic bladder    Urinary retention      We discussed risk factors for urinary tract infections.  He has catheterize 6 times a day which likely leads to introduction of bacteria.  We discussed maintaining cranberry extract D mannose probiotics and good sterile technique.  He is likely chronically colonized at this point.  I would caution against treating asymptomatic urinary tract infections.  We discussed long-term management with a a formal urinary diversion rather than relying on catheterization by family members but there is concerned that he may not tolerate having an external appliance.    I spent 45 minutes with the patient including chart review.

## 2023-07-21 ENCOUNTER — TELEPHONE (OUTPATIENT)
Dept: PSYCHIATRY | Facility: CLINIC | Age: 38
End: 2023-07-21
Payer: MEDICARE

## 2023-07-21 NOTE — TELEPHONE ENCOUNTER
PEREZ in attempt to confirm pt virtual appointment for 7/25/23 at 11 am with Dr Brush. LM for pt to call clinic back whenever he gets a chance.

## 2023-07-22 DIAGNOSIS — R25.8 NOCTURNAL LEG MOVEMENTS: ICD-10-CM

## 2023-07-24 ENCOUNTER — PATIENT MESSAGE (OUTPATIENT)
Dept: PSYCHIATRY | Facility: CLINIC | Age: 38
End: 2023-07-24
Payer: MEDICARE

## 2023-07-24 RX ORDER — PRAMIPEXOLE DIHYDROCHLORIDE 0.12 MG/1
TABLET ORAL
Qty: 90 TABLET | Refills: 0 | Status: SHIPPED | OUTPATIENT
Start: 2023-07-24 | End: 2023-08-17

## 2023-07-25 ENCOUNTER — OFFICE VISIT (OUTPATIENT)
Dept: PSYCHIATRY | Facility: CLINIC | Age: 38
End: 2023-07-25
Payer: COMMERCIAL

## 2023-07-25 DIAGNOSIS — F41.9 ANXIETY DISORDER, UNSPECIFIED TYPE: ICD-10-CM

## 2023-07-25 DIAGNOSIS — F42.9 OBSESSIVE-COMPULSIVE DISORDER, UNSPECIFIED TYPE: Primary | ICD-10-CM

## 2023-07-25 DIAGNOSIS — F42.8 OTHER OBSESSIVE-COMPULSIVE DISORDERS: ICD-10-CM

## 2023-07-25 DIAGNOSIS — F63.9 IMPULSE CONTROL DISORDER: ICD-10-CM

## 2023-07-25 DIAGNOSIS — F84.9 PERVASIVE DEVELOPMENTAL DISORDER, ACTIVE: ICD-10-CM

## 2023-07-25 PROCEDURE — 1159F MED LIST DOCD IN RCRD: CPT | Mod: CPTII,95,, | Performed by: PSYCHOLOGIST

## 2023-07-25 PROCEDURE — 99214 OFFICE O/P EST MOD 30 MIN: CPT | Mod: 95,,, | Performed by: PSYCHOLOGIST

## 2023-07-25 PROCEDURE — 1159F PR MEDICATION LIST DOCUMENTED IN MEDICAL RECORD: ICD-10-PCS | Mod: CPTII,95,, | Performed by: PSYCHOLOGIST

## 2023-07-25 PROCEDURE — 99214 PR OFFICE/OUTPT VISIT, EST, LEVL IV, 30-39 MIN: ICD-10-PCS | Mod: 95,,, | Performed by: PSYCHOLOGIST

## 2023-07-25 RX ORDER — FLUVOXAMINE MALEATE 100 MG/1
100 TABLET, COATED ORAL 2 TIMES DAILY
Qty: 60 TABLET | Refills: 3 | Status: SHIPPED | OUTPATIENT
Start: 2023-07-25 | End: 2023-10-30 | Stop reason: SDUPTHER

## 2023-07-25 NOTE — PROGRESS NOTES
The patient location is:  [place], [place]  The patient location [place] is: [place]  The patient phone number is: [phone]  Visit type: Virtual visit with audio  Each patient to whom he or she provides medical services by telemedicine is:  (1) informed of the relationship between the physician and patient and the respective role of any other health care provider with respect to management of the patient; and (2) notified that he or she may decline to receive medical services by telemedicine and may withdraw from such care at any time.    The reason for the audio only service rather than synchronous audio and video virtual visit was related to technical difficulties or patient preference/necessity.     Outpatient Psychiatry Follow-Up Visit    Clinical Status of Patient: Outpatient (Ambulatory)  07/25/2023     Chief Complaint: OCD, anxiety, impulse control, PDD      Interval History and Content of Current Session:  Interim Events/Subjective Report/Content of Current Session:  follow-up appointment.    Pt is a 37 y/o male with past psychiatric hx of OCD, anxiety, impulse control, PDD who presents for follow-up treatment. Pt's mother reports that he is doing well. Stated that he was able to reduce dose of fluvoxamine without difficulty. No increase in anxiety. Pt continues in music therapy and continues to go to the [employer] 3-4x per week for exercise. Had a sleep study performed a few weeks ago but no results as of yet. Has an apt with a new neurologist next month.    Past Psychiatric hx: s/e's to mult prev meds to include zyprexa (wgt gain), lexapro 20mg po qam (anxiety and to suppress sex drive), buspar 10mg po BID, xanax 0.5mg po daily PRN anxiety    Past Medical hx:   Past Medical History:   Diagnosis Date    Anxiety     Autistic disorder, active 05/06/2013    Bowel obstruction     pt mother denies    Early intervention counseling     GERD (gastroesophageal reflux disease)     Gout, chronic 11/12/2015    Grand  mal seizure     Hepatic encephalopathy     Hepatic steatosis     Impulse control disorder     Impulse control disorder, unspecified 05/06/2013    Liver cirrhosis secondary to TRAYLOR     Mastoiditis     Moderate mental retardation 05/06/2013    Neurogenic bladder     intermittent catherization (5 times a day)    Obesity     OCD (obsessive compulsive disorder)     HALEY (obstructive sleep apnea) 11/12/2015    on cpap at bedtime    Otitis media     Paraplegia     Pervasive developmental disorder, active 12/27/2015    Recurrent UTI     Renal cancer 2010    Right RCC    Scoliosis     paraplegia    Seizure disorder 11/12/2015    Sepsis due to Escherichia coli without acute organ dysfunction 2/2/2023    Stroke     one week old    Tardive dyskinesia         Interim hx:  Medication changes last visit: Reduce fluvoxamine to 100 mg BID  Anxiety: stable  Depression: stable     Denies suicidal/homicidal ideations.  Denies hopelessness/worthlessness.    Denies auditory/visual hallucinations      Alcohol: Pt denied  Drug: Pt denied  Caffeine: Not assessed  Tobacco: Pt denied      Review of Systems   PSYCHIATRIC: Pertinent items are noted in the narrative.        CONSTITUTIONAL: weight stable    Past Medical, Family and Social History: The patient's past medical, family and social history have been reviewed and updated as appropriate within the electronic medical record. See encounter notes.     Current Psychiatric Medication:  fluvoxamine 100 mg BID, oxcarbazepine 300 mg BID (per neuro)     Compliance: yes      Side effects: Pt denied     Risk Parameters:  Patient reports no suicidal ideation  Patient reports no homicidal ideation  Patient reports no self-injurious behavior  Patient reports no violent behavior     Exam (detailed: at least 9 elements; comprehensive: all 15 elements)   Constitutional  Vitals:  Most recent vital signs, dated less than 90 days prior to this appointment, were reviewed. BP: ()/()   Arterial Line BP: ()/()        General:  unremarkable, age appropriate, casual attire, good eye contact, good rapport       Musculoskeletal  Muscle Strength/Tone:  no flaccidity, no tremor    Gait & Station:  normal      Psychiatric                       Speech:  normal tone, normal rate, rhythm, and volume   Mood & Affect:    Mildly anxious         Thought Process:   Goal directed; Linear    Associations:   intact   Thought Content:   No SI/HI, delusions, or paranoia, no AV/VH   Insight & Judgement:   Good, adequate to circumstances   Orientation:   grossly intact; alert and oriented x 4    Memory:  intact for content of interview    Language:  grossly intact, can repeat    Attention Span  : Grossly intact for content of interview   Fund of Knowledge:   intact and appropriate to age and level of education        Assessment and Diagnosis   Status/Progress: Based on the examination today, the patient's problem(s) is/are adequately controlled.  New problems have not been presented today. Co-morbidities are not complicating management of the primary condition. There are no active rule-out diagnoses for this patient at this time.      Impression: Pt's frequency of vocalizing negative statements appear to have reduced some. No increase in anxiety or mood after reduction of fluvoxamine dose. Will continue with medication plan as is for now and monitor moving forward.     Diagnosis:   1. Obsessive Compulsive Disorder   2. Anxiety Disorder  3. Impulse Control Disorder  4. Pervasive Developmental Disorder  Intervention/Counseling/Treatment Plan   Medication Management:      1. fluvoxamine to 100 mg BID    2. Oxcarbazepine 300 mg BID (managed by neuro)     2. Call to report any worsening of symptoms or problems with the medication. Pt instructed to go to ER with thoughts of harming self, others     3. Patient given contact # for psychotherapists at [facility] and also instructed to check with insurance for list of providers.     Psychotherapy:  20 minutes  Target symptoms: anxiety  Why chosen therapy is appropriate versus another modality: CBT used; relevant to diagnosis, patient responds to this modality  Outcome monitoring methods: self-report, observation  Therapeutic intervention type: Cognitive Behavioral Therapy, Behavioral strategies  Topics discussed/themes: building skills sets for symptom management, symptom recognition, nutrition, exercise  The patient's response to the intervention is accepting  Patient's response to treatment is: good.   The patient's progress toward treatment goals: stable     Return to clinic: 4 months    -Cognitive-Behavioral/Supportive therapy and psychoeducation provided  -R/B/SE's of medications discussed with the pt who expresses understanding and chooses to take medications as prescribed.   -Pt instructed to call clinic, 911 or go to nearest emergency room if sxs worsen or pt is in   crisis. The pt expresses understanding.    Praveen Brush, PhD, MP     Antidepressant/Antianxiety Medication Initiation:  Patient informed of risks, benefits, and potential side effects of medication and accepts informed consent.  Common side effects include nausea, fatigue, headache, insomnia., Specifically discussed the possibility of new or worsening suicidal thoughts/depression.  Patient instructed to stop the medication immediately and seek urgent treatment if this occurs. Patient instructed not to abruptly discontinue medication without physician guidance except in cases of sudden onset or worsening of SI.

## 2023-07-27 ENCOUNTER — PATIENT MESSAGE (OUTPATIENT)
Dept: FAMILY MEDICINE | Facility: CLINIC | Age: 38
End: 2023-07-27
Payer: MEDICARE

## 2023-08-15 ENCOUNTER — TELEPHONE (OUTPATIENT)
Dept: NEUROLOGY | Facility: CLINIC | Age: 38
End: 2023-08-15
Payer: MEDICARE

## 2023-08-17 DIAGNOSIS — R25.8 NOCTURNAL LEG MOVEMENTS: ICD-10-CM

## 2023-08-17 RX ORDER — PRAMIPEXOLE DIHYDROCHLORIDE 0.12 MG/1
TABLET ORAL
Qty: 90 TABLET | Refills: 5 | Status: SHIPPED | OUTPATIENT
Start: 2023-08-17 | End: 2023-08-31

## 2023-08-22 ENCOUNTER — TELEPHONE (OUTPATIENT)
Dept: NEUROLOGY | Facility: CLINIC | Age: 38
End: 2023-08-22

## 2023-08-22 ENCOUNTER — OFFICE VISIT (OUTPATIENT)
Dept: NEUROLOGY | Facility: CLINIC | Age: 38
End: 2023-08-22
Payer: MEDICARE

## 2023-08-22 VITALS
DIASTOLIC BLOOD PRESSURE: 80 MMHG | HEIGHT: 70 IN | RESPIRATION RATE: 18 BRPM | SYSTOLIC BLOOD PRESSURE: 140 MMHG | HEART RATE: 81 BPM | BODY MASS INDEX: 37.36 KG/M2 | WEIGHT: 260.94 LBS

## 2023-08-22 DIAGNOSIS — K74.60 HEPATIC CIRRHOSIS, UNSPECIFIED HEPATIC CIRRHOSIS TYPE, UNSPECIFIED WHETHER ASCITES PRESENT: ICD-10-CM

## 2023-08-22 DIAGNOSIS — G40.909 SEIZURE DISORDER: Primary | ICD-10-CM

## 2023-08-22 DIAGNOSIS — R56.9 CONVULSIONS, UNSPECIFIED CONVULSION TYPE: ICD-10-CM

## 2023-08-22 DIAGNOSIS — Z79.899 HIGH RISK MEDICATION USE: ICD-10-CM

## 2023-08-22 PROCEDURE — 3008F BODY MASS INDEX DOCD: CPT | Mod: CPTII,S$GLB,, | Performed by: PSYCHIATRY & NEUROLOGY

## 2023-08-22 PROCEDURE — 3079F PR MOST RECENT DIASTOLIC BLOOD PRESSURE 80-89 MM HG: ICD-10-PCS | Mod: CPTII,S$GLB,, | Performed by: PSYCHIATRY & NEUROLOGY

## 2023-08-22 PROCEDURE — 1160F RVW MEDS BY RX/DR IN RCRD: CPT | Mod: CPTII,S$GLB,, | Performed by: PSYCHIATRY & NEUROLOGY

## 2023-08-22 PROCEDURE — 3008F PR BODY MASS INDEX (BMI) DOCUMENTED: ICD-10-PCS | Mod: CPTII,S$GLB,, | Performed by: PSYCHIATRY & NEUROLOGY

## 2023-08-22 PROCEDURE — 99215 OFFICE O/P EST HI 40 MIN: CPT | Mod: S$GLB,,, | Performed by: PSYCHIATRY & NEUROLOGY

## 2023-08-22 PROCEDURE — 3079F DIAST BP 80-89 MM HG: CPT | Mod: CPTII,S$GLB,, | Performed by: PSYCHIATRY & NEUROLOGY

## 2023-08-22 PROCEDURE — 99215 PR OFFICE/OUTPT VISIT, EST, LEVL V, 40-54 MIN: ICD-10-PCS | Mod: S$GLB,,, | Performed by: PSYCHIATRY & NEUROLOGY

## 2023-08-22 PROCEDURE — 1160F PR REVIEW ALL MEDS BY PRESCRIBER/CLIN PHARMACIST DOCUMENTED: ICD-10-PCS | Mod: CPTII,S$GLB,, | Performed by: PSYCHIATRY & NEUROLOGY

## 2023-08-22 PROCEDURE — 99999 PR PBB SHADOW E&M-EST. PATIENT-LVL III: CPT | Mod: PBBFAC,,, | Performed by: PSYCHIATRY & NEUROLOGY

## 2023-08-22 PROCEDURE — 1159F PR MEDICATION LIST DOCUMENTED IN MEDICAL RECORD: ICD-10-PCS | Mod: CPTII,S$GLB,, | Performed by: PSYCHIATRY & NEUROLOGY

## 2023-08-22 PROCEDURE — 99999 PR PBB SHADOW E&M-EST. PATIENT-LVL III: ICD-10-PCS | Mod: PBBFAC,,, | Performed by: PSYCHIATRY & NEUROLOGY

## 2023-08-22 PROCEDURE — 1159F MED LIST DOCD IN RCRD: CPT | Mod: CPTII,S$GLB,, | Performed by: PSYCHIATRY & NEUROLOGY

## 2023-08-22 PROCEDURE — 3077F PR MOST RECENT SYSTOLIC BLOOD PRESSURE >= 140 MM HG: ICD-10-PCS | Mod: CPTII,S$GLB,, | Performed by: PSYCHIATRY & NEUROLOGY

## 2023-08-22 PROCEDURE — 3077F SYST BP >= 140 MM HG: CPT | Mod: CPTII,S$GLB,, | Performed by: PSYCHIATRY & NEUROLOGY

## 2023-08-22 RX ORDER — OXCARBAZEPINE 300 MG/1
300 TABLET, FILM COATED ORAL 2 TIMES DAILY
Qty: 180 TABLET | Refills: 3 | Status: SHIPPED | OUTPATIENT
Start: 2023-08-22

## 2023-08-22 NOTE — PROGRESS NOTES
"    Date: 8/22/2023    Patient ID: Hussein Doyle is a 37 y.o. male.    Chief Complaint: Seizures      History of Present Illness:  Mr. Doyle is a 37 y.o. male who presents to [facility] care for epilepsy. He also has nonepileptic events. The patient was accompanied by his mother who also contributed to the following history.     Interval history:  Previously followed with Dr. Salinas, last seen in Oct 2022. No seizures for years.     He takes oxcarbazepine 300 mg BID. Last level in Dec 2022 was low at 7. Liver function is high but this has been discussed in the past and not felt to be secondary to trileptal.     DXA showed osteoporosis.     His mood is stable and behavior is better.     Keppra caused behavioral changes. He has had a partial nephrectomy.      History of present illness:  The patient is a 36 y.o. male referred for management of epilepsy.  He previously saw Dr. Sunshine.  This is my first time seeing him.  He provides little in the way of history due to his cognitive issues, so most of the history was obtained from his caregiver.  Additional history is as noted below.  Briefly, though, this gentleman suffers from a developmental delay and has a history of a prior GTC seizure.  He had onset of new episodes involving behavioral outbursts.  He was admitted to the EMU, and these were found not to be epileptic in nature.  Since the last time he was seen, he has had no further episodes worrisome for seizures.    He was started on Geodon during this time before the seizures.      Prior history:  "First seizure suspected in 2006, onset was not witnessed but caused him to fall to the ground, period of unresponsiveness, had hit his head. Not clear if there was convulsive activity at that time. Mother also describes episodes where eyes roll back, he falls to the ground, has generalized convulsive activity and is disoriented / confused for a short time following. Frequency is approximately once every 2 " "months, last episode 2 months ago. More prominent since June 2014.      Over the past few months and in particular 6 weeks, has had increase in behavioral outbursts. Mother is not sure if this is directly result of subclinical seizure or not but describes shouting out, inability to follow through with instructions or direction, eyes rolling (brief, eyes flit back and to right, forceful eye closure lasting 1-2 seconds) without change in muscle tone or consciousness. Concerned his behaviors have been regressing, he mentions name of caregiver from many years ago often. Repeats phrases (you need to have dark hair), appears agitated, unable to sit still. In the past month has had increase in dose of Keppra; had been on 250 BID until 12/4, dose was increased to 500 BID. At the time of visit with Dr. Parker on 12/16 was on 1500 mg HS. Family desperate to switch Keppra if this will improve behaviors. "    Per Dr. Salinas's note: "I had previously discussed with hepatology whether or not they had concerns that this drug might be a contributory factor to his cirrhosis.  They indicated to me that they had no such worries and that there was no indication for changing the drug from their perspective."    MRI brain (12/15):  "Motion limited MRI of the brain.  There is trace asymmetry of the mesial temporal lobes left slightly smaller than the right allowing for motion limitation.  This may be artifactual or developmental variant with underlying left mesial temporal sclerosis not excluded.  Clinical correlation correlation with EEG analysis is advised.  No evidence for focal parietal signal abnormality, acute infarction or enhancing lesion."     vEEG (1/16):  "FINAL SUMMARY:  ELECTROENCEPHALOGRAM:  Interictal: This is a normal EEG during wakefulness, drowsiness, and sleep.  Ictal: During this recording, periods of eye flutter during wakefulness and myoclonic jerks during sleep were recorded without associated epileptiform activity " "on EEG.  CLINICAL SEIZURE:  Classification: Nonepileptic events. Based on clinical history and evaluation by Psychiatry, these events were likely motor tics during wakefulness and myoclonic jerks during sleep. There is no evidence of an epileptic process on this recording. No seizures were recorded"     VITOR (5/22):  "Osteopenia"       Allergies:  Review of patient's allergies indicates:   Allergen Reactions    Benadryl [diphenhydramine hcl] Other (See Comments)     Increases anxiety and more restless    Keppra [levetiracetam] Other (See Comments)     agitation    Ativan [lorazepam] Anxiety    Xanax [alprazolam] Anxiety     Worsens anxiety and agitation    Abilify  [aripiprazole]      Other reaction(s): arrhythmia    Clonazepam      Other reaction(s): unknown    Cough syrup w/decongestant      Other reaction(s): auditory hallucinations    Diazepam      Other reaction(s): tongue swelling  Other reaction(s): Other (See Comments)  Other reaction(s): tongue swelling    Haloperidol Other (See Comments)    Phenytoin sodium extended      Other reaction(s): unknown    Quetiapine      Other reaction(s): sweating  Other reaction(s): sedation  Other reaction(s): sweating  Other reaction(s): sedation    Risperidone      Other reaction(s): bladder incontinence    Thimerosal      Other reaction(s): seizure    Ziprasidone hcl Other (See Comments)     Other reaction(s): tonic clonic seizure  seizure       Current Medications:  Current Outpatient Medications   Medication Sig Dispense Refill    allopurinoL (ZYLOPRIM) 100 MG tablet TAKE ONE TABLET BY MOUTH TWICE DAILY 180 tablet 2    amoxicillin (AMOXIL) 875 MG tablet Take 875 mg by mouth every 12 (twelve) hours.      ascorbic acid, vitamin C, 250 mg Chew Take by mouth.      calcium carbonate-vitamin D3 500 mg(1,250mg) -400 unit Tab Take 1 tablet by mouth 2 (two) times a day.       DENTA 5000 PLUS 1.1 % Crea SMARTSIG:By Mouth Morning-Night      fluvoxaMINE (LUVOX) 100 MG tablet Take 1 " tablet (100 mg total) by mouth 2 (two) times daily. 60 tablet 3    inulin (FIBER GUMMIES ORAL) Take 2 tablets by mouth once daily.       L. ACIDOPHILUS/BIFID. ANIMALIS (CHEWABLE PROBIOTIC ORAL) Take 1 tablet by mouth 2 (two) times daily.       lactulose (CHRONULAC) 10 gram/15 mL solution Take 15 mLs (10 g total) by mouth 3 (three) times daily. 473 mL 3    magnesium 30 mg Tab Take 1 tablet by mouth every evening.      MELATONIN ORAL Take 1 tablet by mouth every evening.      mirabegron (MYRBETRIQ ORAL) Take by mouth.      multivitamin capsule Take 1 capsule by mouth every morning.       mupirocin calcium 2% (BACTROBAN) 2 % cream Apply topically 3 (three) times daily. 15 g 3    NON FORMULARY MEDICATION CBD Oil- OTC      omega-3 fatty acids/fish oil (FISH OIL-OMEGA-3 FATTY ACIDS) 300-1,000 mg capsule Take 1 capsule by mouth once daily.       pantoprazole (PROTONIX) 40 MG tablet TAKE ONE TABLET BY MOUTH ONCE DAILY 90 tablet 2    polyethylene glycol (GLYCOLAX) 17 gram PwPk Take 17 g by mouth once daily.       potassium chloride (KLOR-CON) 10 MEQ TbSR Take 10 mEq by mouth every evening.      pramipexole (MIRAPEX) 0.125 MG tablet TAKE ONE TABLET BY MOUTH THREE TIMES DAILY 90 tablet 5    zinc gluconate 50 mg tablet Take 50 mg by mouth once daily.      OXcarbazepine (TRILEPTAL) 300 MG Tab Take 1 tablet (300 mg total) by mouth 2 (two) times daily. 180 tablet 3     No current facility-administered medications for this visit.       Past Medical History:  Past Medical History:   Diagnosis Date    Anxiety     Autistic disorder, active 05/06/2013    Bowel obstruction     pt mother denies    Early intervention counseling     GERD (gastroesophageal reflux disease)     Gout, chronic 11/12/2015    Grand mal seizure     Hepatic encephalopathy     Hepatic steatosis     Impulse control disorder     Impulse control disorder, unspecified 05/06/2013    Liver cirrhosis secondary to TRAYLOR     Mastoiditis     Moderate mental retardation  05/06/2013    Neurogenic bladder     intermittent catherization (5 times a day)    Obesity     OCD (obsessive compulsive disorder)     HALEY (obstructive sleep apnea) 11/12/2015    on cpap at bedtime    Otitis media     Paraplegia     Pervasive developmental disorder, active 12/27/2015    Recurrent UTI     Renal cancer 2010    Right RCC    Scoliosis     paraplegia    Seizure disorder 11/12/2015    Sepsis due to Escherichia coli without acute organ dysfunction 2/2/2023    Stroke     one week old    Tardive dyskinesia        Past Surgical History:  Past Surgical History:   Procedure Laterality Date    ANKLE FRACTURE SURGERY      BACK SURGERY  2000    COLONOSCOPY  10/28/2008    Dr. Arana at [facility]; anal fissure, minimal pinpoint rectal erythema; floppy-type colon with very little tone; biopsy: rectum mild chronic proctitis with few eosinophils sent for scanning    COLONOSCOPY N/A 6/15/2018    Procedure: COLONOSCOPY;  Surgeon: Refugio Villagomez MD;  Location: [facility];  Service: Endoscopy;  Laterality: N/A; Preparation of the colon was fair, unremarkable; REPEAT at 50 YEARS old FOR SCREENING    COLONOSCOPY N/A 9/9/2020    Procedure: COLONOSCOPY;  Surgeon: Refugio Villagomez MD;  Location: [facility];  Service: Endoscopy;  Laterality: N/A;    ESOPHAGOGASTRODUODENOSCOPY N/A 7/13/2020    Procedure: EGD (ESOPHAGOGASTRODUODENOSCOPY);  Surgeon: Refugio Villagomez MD;  Location: [facility];  Service: Endoscopy;  Laterality: N/A;    ESOPHAGOGASTRODUODENOSCOPY N/A 8/17/2022    Procedure: EGD (ESOPHAGOGASTRODUODENOSCOPY);  Surgeon: Refugio Villagomez MD;  Location: [facility];  Service: Endoscopy;  Laterality: N/A;  cirrhosis, varices screening    ESOPHAGOGASTRODUODENOSCOPY (EGD) WITH DILATION N/A 2020    INNER EAR SURGERY      mastoid    LIVER BIOPSY N/A 7/20/2020    Procedure: BIOPSY, LIVER;  Surgeon: Ananya Surgeon;  Location: [facility];  Service: Anesthesiology;  Laterality: N/A;  189 liver bx/Ultrasound anes 1.5 hours     "MAGNETIC RESONANCE IMAGING N/A 10/9/2020    Procedure: MRI (Magnetic Resonance Imagine);  Surgeon: Manish Araiza MD;  Location: [facility] beds [facility];  Service: Anesthesiology;  Laterality: N/A;    right partial nephrectomy Right 2010    Sees urology    TYMPANOSTOMY TUBE PLACEMENT         Family History:  family history includes Cancer in his father; Cataracts in his father, maternal grandmother, and mother; Colon polyps in his father; Diabetes in his maternal grandmother; Glaucoma in his maternal grandmother; Macular degeneration in his maternal grandmother.    Social History:   reports that he has never smoked. He has never used smokeless tobacco. He reports that he does not drink alcohol and does not use drugs.    Physical Exam:  Vitals:    08/22/23 0958   BP: (!) 140/80   Pulse: 81   Resp: 18   Weight: 118.3 kg (260 lb 14.6 oz)   Height: 5' 10" (1.778 m)   PainSc: 0-No pain     Body mass index is 37.44 kg/m².    Neurological Exam:  Mental status: Awake, alert.  Speech/Language: repetitive speech.   Cranial nerves: Pupils equal round and reactive to light,   Motor: moves all extremities well  Sensation: Intact to light touch and vibration bilaterally.  Coordination: Finger-nose-finger testing and rapid alternating movements normal bilaterally.   Gait: right leg externally rotated    Data:  I have personally reviewed the referring provider's notes, labs, & imaging made available to me today.     Labs:  CBC:   Lab Results   Component Value Date    WBC 7.65 06/14/2023    HGB 16.5 06/14/2023    HCT 47.5 06/14/2023     06/14/2023    MCV 95 06/14/2023    RDW 12.7 06/14/2023     BMP:   Lab Results   Component Value Date     06/14/2023    K 3.9 06/14/2023     06/14/2023    CO2 27 06/14/2023    BUN 9 06/14/2023    CREATININE 0.8 06/14/2023     (H) 06/14/2023    CALCIUM 9.6 06/14/2023    MG 2.1 01/29/2023     LFTS;   Lab Results   Component Value Date    PROT 8.3 06/14/2023    ALBUMIN 4.0 06/14/2023    " BILITOT 0.6 06/14/2023     (H) 06/14/2023    ALKPHOS 75 06/14/2023     (H) 06/14/2023    GGT 75 (H) 04/30/2018     COAGS:   Lab Results   Component Value Date    INR 1.0 06/14/2023     FLP:   Lab Results   Component Value Date    CHOL 173 12/20/2022    HDL 34 (L) 12/20/2022    LDLCALC 95.4 12/20/2022    TRIG 218 (H) 12/20/2022    CHOLHDL 19.7 (L) 12/20/2022       Imaging:  I have personally reviewed the imaging, mild asymmetry on his MRI with left hippocampus being smaller    Assessment and Plan:  Mr. Doyle is a 37 y.o. male here for seizures. He is seizure-free and tolerating the trileptal well. However, his liver function continues to increase. Will discuss with hepatology about the need to change.     If changing trileptal, would worry about mood changes so will check with psychiatry as well.     Mother also noted he has urology issues/neurogenic bladder and asked that I discuss with urology too.     Will check trough level and make further plan from there after discussing with his care team.     He has osteopenia on DXA. Advised she discuss with PCP about treatment and monitoring.     Seizure disorder  -     Comprehensive metabolic panel; Future; Expected date: 08/22/2023  -     Oxcarbazepine level; Future; Expected date: 08/22/2023  -     CBC Auto Differential; Future; Expected date: 08/22/2023    Hepatic cirrhosis, unspecified hepatic cirrhosis type, unspecified whether ascites present  -     Comprehensive metabolic panel; Future; Expected date: 08/22/2023  -     Oxcarbazepine level; Future; Expected date: 08/22/2023  -     CBC Auto Differential; Future; Expected date: 08/22/2023    High risk medication use  -     Comprehensive metabolic panel; Future; Expected date: 08/22/2023  -     Oxcarbazepine level; Future; Expected date: 08/22/2023  -     CBC Auto Differential; Future; Expected date: 08/22/2023    Convulsions, unspecified convulsion type  -     OXcarbazepine (TRILEPTAL) 300 MG Tab; Take 1  tablet (300 mg total) by mouth 2 (two) times daily.  Dispense: 180 tablet; Refill: 3         I spent a total of 45 minutes on the day of the visit.This includes face to face time and non-face to face time preparing to see the patient (eg, review of tests), Obtaining and/or reviewing separately obtained history, Documenting clinical information in the electronic or other health record, Independently interpreting results and communicating results to the patient/family/caregiver, or Care coordination.

## 2023-08-24 ENCOUNTER — LAB VISIT (OUTPATIENT)
Dept: LAB | Facility: HOSPITAL | Age: 38
End: 2023-08-24
Attending: PSYCHIATRY & NEUROLOGY
Payer: MEDICAID

## 2023-08-24 DIAGNOSIS — Z79.899 HIGH RISK MEDICATION USE: ICD-10-CM

## 2023-08-24 DIAGNOSIS — G40.909 SEIZURE DISORDER: ICD-10-CM

## 2023-08-24 DIAGNOSIS — K74.60 HEPATIC CIRRHOSIS, UNSPECIFIED HEPATIC CIRRHOSIS TYPE, UNSPECIFIED WHETHER ASCITES PRESENT: ICD-10-CM

## 2023-08-24 LAB
ALBUMIN SERPL BCP-MCNC: 4.1 G/DL (ref 3.5–5.2)
ALP SERPL-CCNC: 73 U/L (ref 55–135)
ALT SERPL W/O P-5'-P-CCNC: 78 U/L (ref 10–44)
ANION GAP SERPL CALC-SCNC: 9 MMOL/L (ref 8–16)
AST SERPL-CCNC: 76 U/L (ref 10–40)
BASOPHILS # BLD AUTO: 0.05 K/UL (ref 0–0.2)
BASOPHILS NFR BLD: 0.6 % (ref 0–1.9)
BILIRUB SERPL-MCNC: 0.6 MG/DL (ref 0.1–1)
BUN SERPL-MCNC: 9 MG/DL (ref 6–20)
CALCIUM SERPL-MCNC: 9.6 MG/DL (ref 8.7–10.5)
CHLORIDE SERPL-SCNC: 103 MMOL/L (ref 95–110)
CO2 SERPL-SCNC: 28 MMOL/L (ref 23–29)
CREAT SERPL-MCNC: 0.7 MG/DL (ref 0.5–1.4)
DIFFERENTIAL METHOD: ABNORMAL
EOSINOPHIL # BLD AUTO: 0.1 K/UL (ref 0–0.5)
EOSINOPHIL NFR BLD: 1.5 % (ref 0–8)
ERYTHROCYTE [DISTWIDTH] IN BLOOD BY AUTOMATED COUNT: 12.8 % (ref 11.5–14.5)
EST. GFR  (NO RACE VARIABLE): >60 ML/MIN/1.73 M^2
GLUCOSE SERPL-MCNC: 106 MG/DL (ref 70–110)
HCT VFR BLD AUTO: 46.2 % (ref 40–54)
HGB BLD-MCNC: 15.7 G/DL (ref 14–18)
IMM GRANULOCYTES # BLD AUTO: 0.03 K/UL (ref 0–0.04)
IMM GRANULOCYTES NFR BLD AUTO: 0.4 % (ref 0–0.5)
LYMPHOCYTES # BLD AUTO: 2.3 K/UL (ref 1–4.8)
LYMPHOCYTES NFR BLD: 27.8 % (ref 18–48)
MCH RBC QN AUTO: 32.8 PG (ref 27–31)
MCHC RBC AUTO-ENTMCNC: 34 G/DL (ref 32–36)
MCV RBC AUTO: 97 FL (ref 82–98)
MONOCYTES # BLD AUTO: 0.7 K/UL (ref 0.3–1)
MONOCYTES NFR BLD: 9 % (ref 4–15)
NEUTROPHILS # BLD AUTO: 4.9 K/UL (ref 1.8–7.7)
NEUTROPHILS NFR BLD: 60.7 % (ref 38–73)
NRBC BLD-RTO: 0 /100 WBC
PLATELET # BLD AUTO: 173 K/UL (ref 150–450)
PMV BLD AUTO: 10.3 FL (ref 9.2–12.9)
POTASSIUM SERPL-SCNC: 4.1 MMOL/L (ref 3.5–5.1)
PROT SERPL-MCNC: 8.1 G/DL (ref 6–8.4)
RBC # BLD AUTO: 4.78 M/UL (ref 4.6–6.2)
SODIUM SERPL-SCNC: 140 MMOL/L (ref 136–145)
WBC # BLD AUTO: 8.12 K/UL (ref 3.9–12.7)

## 2023-08-24 PROCEDURE — 80183 DRUG SCRN QUANT OXCARBAZEPIN: CPT | Performed by: PSYCHIATRY & NEUROLOGY

## 2023-08-24 PROCEDURE — 36415 COLL VENOUS BLD VENIPUNCTURE: CPT | Mod: PO | Performed by: PSYCHIATRY & NEUROLOGY

## 2023-08-24 PROCEDURE — 80053 COMPREHEN METABOLIC PANEL: CPT | Performed by: PSYCHIATRY & NEUROLOGY

## 2023-08-24 PROCEDURE — 85025 COMPLETE CBC W/AUTO DIFF WBC: CPT | Performed by: PSYCHIATRY & NEUROLOGY

## 2023-08-24 RX ORDER — LACTULOSE 10 G/15ML
SOLUTION ORAL; RECTAL
Qty: 473 ML | Refills: 3 | Status: SHIPPED | OUTPATIENT
Start: 2023-08-24 | End: 2023-11-14

## 2023-08-26 LAB — OXCARBAZEPINE METABOLITE: 7 MCG/ML (ref 10–35)

## 2023-08-31 ENCOUNTER — OFFICE VISIT (OUTPATIENT)
Dept: FAMILY MEDICINE | Facility: CLINIC | Age: 38
End: 2023-08-31
Payer: MEDICARE

## 2023-08-31 ENCOUNTER — HOSPITAL ENCOUNTER (OUTPATIENT)
Dept: RADIOLOGY | Facility: HOSPITAL | Age: 38
Discharge: HOME | End: 2023-08-31
Attending: STUDENT IN AN ORGANIZED HEALTH CARE EDUCATION/TRAINING PROGRAM
Payer: MEDICARE

## 2023-08-31 VITALS
HEART RATE: 73 BPM | DIASTOLIC BLOOD PRESSURE: 72 MMHG | BODY MASS INDEX: 37.59 KG/M2 | SYSTOLIC BLOOD PRESSURE: 126 MMHG | OXYGEN SATURATION: 97 % | HEIGHT: 70 IN | WEIGHT: 262.56 LBS

## 2023-08-31 DIAGNOSIS — R25.8 NOCTURNAL LEG MOVEMENTS: Primary | ICD-10-CM

## 2023-08-31 DIAGNOSIS — R26.9 ABNORMAL GAIT: ICD-10-CM

## 2023-08-31 PROCEDURE — 99999 PR PBB SHADOW E&M-EST. PATIENT-LVL IV: CPT | Mod: PBBFAC,,, | Performed by: STUDENT IN AN ORGANIZED HEALTH CARE EDUCATION/TRAINING PROGRAM

## 2023-08-31 PROCEDURE — 73521 X-RAY EXAM HIPS BI 2 VIEWS: CPT | Mod: TC,FY,PO

## 2023-08-31 PROCEDURE — 3074F PR MOST RECENT SYSTOLIC BLOOD PRESSURE < 130 MM HG: ICD-10-PCS | Mod: CPTII,S$GLB,, | Performed by: STUDENT IN AN ORGANIZED HEALTH CARE EDUCATION/TRAINING PROGRAM

## 2023-08-31 PROCEDURE — 3074F SYST BP LT 130 MM HG: CPT | Mod: CPTII,S$GLB,, | Performed by: STUDENT IN AN ORGANIZED HEALTH CARE EDUCATION/TRAINING PROGRAM

## 2023-08-31 PROCEDURE — 3008F BODY MASS INDEX DOCD: CPT | Mod: CPTII,S$GLB,, | Performed by: STUDENT IN AN ORGANIZED HEALTH CARE EDUCATION/TRAINING PROGRAM

## 2023-08-31 PROCEDURE — 3008F PR BODY MASS INDEX (BMI) DOCUMENTED: ICD-10-PCS | Mod: CPTII,S$GLB,, | Performed by: STUDENT IN AN ORGANIZED HEALTH CARE EDUCATION/TRAINING PROGRAM

## 2023-08-31 PROCEDURE — 99999 PR PBB SHADOW E&M-EST. PATIENT-LVL IV: ICD-10-PCS | Mod: PBBFAC,,, | Performed by: STUDENT IN AN ORGANIZED HEALTH CARE EDUCATION/TRAINING PROGRAM

## 2023-08-31 PROCEDURE — 3078F PR MOST RECENT DIASTOLIC BLOOD PRESSURE < 80 MM HG: ICD-10-PCS | Mod: CPTII,S$GLB,, | Performed by: STUDENT IN AN ORGANIZED HEALTH CARE EDUCATION/TRAINING PROGRAM

## 2023-08-31 PROCEDURE — 73521 XR HIPS BILATERAL 2 VIEW INCL AP PELVIS: ICD-10-PCS | Mod: 26,,, | Performed by: RADIOLOGY

## 2023-08-31 PROCEDURE — 1159F PR MEDICATION LIST DOCUMENTED IN MEDICAL RECORD: ICD-10-PCS | Mod: CPTII,S$GLB,, | Performed by: STUDENT IN AN ORGANIZED HEALTH CARE EDUCATION/TRAINING PROGRAM

## 2023-08-31 PROCEDURE — 3078F DIAST BP <80 MM HG: CPT | Mod: CPTII,S$GLB,, | Performed by: STUDENT IN AN ORGANIZED HEALTH CARE EDUCATION/TRAINING PROGRAM

## 2023-08-31 PROCEDURE — 99213 OFFICE O/P EST LOW 20 MIN: CPT | Mod: S$GLB,,, | Performed by: STUDENT IN AN ORGANIZED HEALTH CARE EDUCATION/TRAINING PROGRAM

## 2023-08-31 PROCEDURE — 99213 PR OFFICE/OUTPT VISIT, EST, LEVL III, 20-29 MIN: ICD-10-PCS | Mod: S$GLB,,, | Performed by: STUDENT IN AN ORGANIZED HEALTH CARE EDUCATION/TRAINING PROGRAM

## 2023-08-31 PROCEDURE — 1159F MED LIST DOCD IN RCRD: CPT | Mod: CPTII,S$GLB,, | Performed by: STUDENT IN AN ORGANIZED HEALTH CARE EDUCATION/TRAINING PROGRAM

## 2023-08-31 PROCEDURE — 73521 X-RAY EXAM HIPS BI 2 VIEWS: CPT | Mod: 26,,, | Performed by: RADIOLOGY

## 2023-08-31 RX ORDER — CEFDINIR 300 MG/1
300 CAPSULE ORAL 2 TIMES DAILY
COMMUNITY
Start: 2023-08-24 | End: 2023-09-28

## 2023-08-31 RX ORDER — PRAMIPEXOLE DIHYDROCHLORIDE 0.25 MG/1
0.25 TABLET ORAL 3 TIMES DAILY
Qty: 90 TABLET | Refills: 11 | Status: SHIPPED | OUTPATIENT
Start: 2023-08-31

## 2023-08-31 NOTE — PROGRESS NOTES
Name: Hussein Doyle  MRN: [id number]  : 1985  PCP: Rehan Mazariegos MD    HPI  Patient presents to fill 90L form.  Mother and caretaker are present. They note that patient continues to have leg spasms despite pramipexole. Additionally, he has been turning his right foot out more when walking. CT abd/pelvis from 2023 didn't comment on hip joints but they look normal on my review.    Review of Systems   All other systems reviewed and are negative.      Patient Active Problem List   Diagnosis    Autistic disorder, active    Impulse control disorder    Developmental delay, moderate    Neurogenic bladder    HALEY on CPAP    Gout, chronic    Seizure disorder    Pervasive developmental disorder, active    Chronic constipation    OCD (obsessive compulsive disorder)    Hallux, valgus, congenital    TRAYLOR (nonalcoholic steatohepatitis)    Dysphagia    Liver cirrhosis secondary to TRAYLOR    Class 2 severe obesity due to excess calories with serious comorbidity and body mass index (BMI) of 37.0 to 37.9 in adult    Osteoporosis    Co-occurrence of multiple psychiatric disorders    History of renal cell carcinoma    Nocturnal leg movements       Vitals:    23 1140   BP: 126/72   Pulse: 73       Physical Exam  Constitutional:       General: He is not in acute distress.     Appearance: Normal appearance. He is well-developed.   HENT:      Head: Normocephalic and atraumatic.      Right Ear: External ear normal.      Left Ear: External ear normal.   Eyes:      Conjunctiva/sclera: Conjunctivae normal.   Cardiovascular:      Rate and Rhythm: Normal rate and regular rhythm.      Heart sounds: No murmur heard.     No friction rub. No gallop.   Pulmonary:      Effort: Pulmonary effort is normal. No respiratory distress.      Breath sounds: No wheezing, rhonchi or rales.   Abdominal:      General: Abdomen is flat. There is no distension.   Musculoskeletal:         General: No swelling or deformity.      Right lower leg:  No edema.      Left lower leg: No edema.   Skin:     General: Skin is warm and dry.      Coloration: Skin is not jaundiced.   Neurological:      Mental Status: He is alert and oriented to person, place, and time. Mental status is at baseline.   Psychiatric:         Attention and Perception: Perception normal. He is inattentive.         Mood and Affect: Mood normal.         Speech: Speech normal.         Behavior: Behavior normal. Behavior is cooperative.         Cognition and Memory: Cognition is impaired.         1. Nocturnal leg movements  Assessment & Plan:  Poorly controlled. Increase pramipexole to 0.25mg TID.    Orders:  -     pramipexole (MIRAPEX) 0.25 MG tablet; Take 1 tablet (0.25 mg total) by mouth 3 (three) times daily.  Dispense: 90 tablet; Refill: 11    2. Abnormal gait  -     X-Ray Hips Bilateral 2 View Incl AP Pelvis; Future; Expected date: 08/31/2023        Follow up as previously scheduled.     Rehan Mazariegos MD  08/31/2023

## 2023-09-05 ENCOUNTER — TELEPHONE (OUTPATIENT)
Dept: PODIATRY | Facility: CLINIC | Age: 38
End: 2023-09-05
Payer: MEDICARE

## 2023-09-05 NOTE — TELEPHONE ENCOUNTER
----- Message from Olga Boyd sent at 9/5/2023  9:23 AM CDT -----  Contact: chu (mom)  Type:  Same Day Appointment Request    Caller is requesting a same day appointment.  Caller declined first available appointment listed below.    Name of Caller:ignacio  When is the first available appointment?october  Symptoms:several toenails on the right foot are black, causing pain  Best Call Back Number:[phone] mother    Additional Information: Please call to advise  Thanks    Alternat phone as mother has her own dr phelps today  Care giver # [phone]        [facility] [id number] [place] [address], [address] [facility]  [address] [facility], [address] [address]  Phone: [phone] Fax: [phone]

## 2023-09-28 ENCOUNTER — OFFICE VISIT (OUTPATIENT)
Dept: PODIATRY | Facility: CLINIC | Age: 38
End: 2023-09-28
Payer: MEDICARE

## 2023-09-28 VITALS — WEIGHT: 264.13 LBS | BODY MASS INDEX: 37.81 KG/M2 | HEIGHT: 70 IN

## 2023-09-28 DIAGNOSIS — L60.2 ONYCHOGRYPHOSIS: Primary | ICD-10-CM

## 2023-09-28 PROCEDURE — 99999 PR PBB SHADOW E&M-EST. PATIENT-LVL IV: CPT | Mod: PBBFAC,,, | Performed by: PODIATRIST

## 2023-09-28 PROCEDURE — 1159F MED LIST DOCD IN RCRD: CPT | Mod: CPTII,S$GLB,, | Performed by: PODIATRIST

## 2023-09-28 PROCEDURE — 99999 PR PBB SHADOW E&M-EST. PATIENT-LVL IV: ICD-10-PCS | Mod: PBBFAC,,, | Performed by: PODIATRIST

## 2023-09-28 PROCEDURE — 99203 OFFICE O/P NEW LOW 30 MIN: CPT | Mod: S$GLB,,, | Performed by: PODIATRIST

## 2023-09-28 PROCEDURE — 3008F BODY MASS INDEX DOCD: CPT | Mod: CPTII,S$GLB,, | Performed by: PODIATRIST

## 2023-09-28 PROCEDURE — 1159F PR MEDICATION LIST DOCUMENTED IN MEDICAL RECORD: ICD-10-PCS | Mod: CPTII,S$GLB,, | Performed by: PODIATRIST

## 2023-09-28 PROCEDURE — 3008F PR BODY MASS INDEX (BMI) DOCUMENTED: ICD-10-PCS | Mod: CPTII,S$GLB,, | Performed by: PODIATRIST

## 2023-09-28 PROCEDURE — 99203 PR OFFICE/OUTPT VISIT, NEW, LEVL III, 30-44 MIN: ICD-10-PCS | Mod: S$GLB,,, | Performed by: PODIATRIST

## 2023-09-28 PROCEDURE — 1160F RVW MEDS BY RX/DR IN RCRD: CPT | Mod: CPTII,S$GLB,, | Performed by: PODIATRIST

## 2023-09-28 PROCEDURE — 1160F PR REVIEW ALL MEDS BY PRESCRIBER/CLIN PHARMACIST DOCUMENTED: ICD-10-PCS | Mod: CPTII,S$GLB,, | Performed by: PODIATRIST

## 2023-09-28 RX ORDER — SULFAMETHOXAZOLE AND TRIMETHOPRIM 400; 80 MG/1; MG/1
1 TABLET ORAL
COMMUNITY
Start: 2023-08-22 | End: 2024-03-20 | Stop reason: CLARIF

## 2023-09-28 NOTE — PROGRESS NOTES
"  [address] [facility] [address] [place] [address]  Phone: [phone]   [phone]    Patient's PCP:Rehan Mazariegos MD  Referring Provider: Aaareferral Self    Subjective:      Chief Complaint:: Nail Problem and Hammer Toe    Nail Problem  Pertinent negatives include no abdominal pain, arthralgias, chest pain, chills, coughing, fatigue, fever, headaches, joint swelling, myalgias, nausea, neck pain, numbness, rash or weakness.     Hussein Doyle is a 37 y.o. male who presents today with a complaint of discoloration of nails with the right 3rd toenail appearing to be darkened.  The current episode started about a month.  The symptoms include discolored. Probable cause of complaint nail fungus. The problem has stayed the same. Treatment to date have included soaking with epsom salt and peroxide which provided some relief.     Additionally, patient presents with hammertoe deformity of digits.    Vitals:    09/28/23 0937   Weight: 119.8 kg (264 lb 1.8 oz)   Height: 5' 10" (1.778 m)   PainSc: 0-No pain      Shoe Size: 10.5 wide    Past Surgical History:   Procedure Laterality Date    ANKLE FRACTURE SURGERY      BACK SURGERY  2000    COLONOSCOPY  10/28/2008    Dr. Arana at [facility]; anal fissure, minimal pinpoint rectal erythema; floppy-type colon with very little tone; biopsy: rectum mild chronic proctitis with few eosinophils sent for scanning    COLONOSCOPY N/A 6/15/2018    Procedure: COLONOSCOPY;  Surgeon: Refugio Villagomez MD;  Location: [facility];  Service: Endoscopy;  Laterality: N/A; Preparation of the colon was fair, unremarkable; REPEAT at 50 YEARS old FOR SCREENING    COLONOSCOPY N/A 9/9/2020    Procedure: COLONOSCOPY;  Surgeon: Refugio Villagomez MD;  Location: [facility];  Service: Endoscopy;  Laterality: N/A;    ESOPHAGOGASTRODUODENOSCOPY N/A 7/13/2020    Procedure: EGD (ESOPHAGOGASTRODUODENOSCOPY);  Surgeon: Refugio Villagomez MD;  Location: [facility];  Service: Endoscopy;  Laterality: " N/A;    ESOPHAGOGASTRODUODENOSCOPY N/A 8/17/2022    Procedure: EGD (ESOPHAGOGASTRODUODENOSCOPY);  Surgeon: Refugio Villagomez MD;  Location: [facility];  Service: Endoscopy;  Laterality: N/A;  cirrhosis, varices screening    ESOPHAGOGASTRODUODENOSCOPY (EGD) WITH DILATION N/A 2020    INNER EAR SURGERY      mastoid    LIVER BIOPSY N/A 7/20/2020    Procedure: BIOPSY, LIVER;  Surgeon: Ananya Surgeon;  Location: [facility];  Service: Anesthesiology;  Laterality: N/A;  189 liver bx/Ultrasound anes 1.5 hours    MAGNETIC RESONANCE IMAGING N/A 10/9/2020    Procedure: MRI (Magnetic Resonance Imagine);  Surgeon: Manish Araiza MD;  Location: [facility];  Service: Anesthesiology;  Laterality: N/A;    right partial nephrectomy Right 2010    Sees urology    TYMPANOSTOMY TUBE PLACEMENT       Past Medical History:   Diagnosis Date    Anxiety     Autistic disorder, active 05/06/2013    Bowel obstruction     pt mother denies    Early intervention counseling     GERD (gastroesophageal reflux disease)     Gout, chronic 11/12/2015    Grand mal seizure     Hepatic encephalopathy     Hepatic steatosis     Impulse control disorder     Impulse control disorder, unspecified 05/06/2013    Liver cirrhosis secondary to TRAYLOR     Mastoiditis     Moderate mental retardation 05/06/2013    Neurogenic bladder     intermittent catherization (5 times a day)    Obesity     OCD (obsessive compulsive disorder)     HALEY (obstructive sleep apnea) 11/12/2015    on cpap at bedtime    Otitis media     Paraplegia     Pervasive developmental disorder, active 12/27/2015    Recurrent UTI     Renal cancer 2010    Right RCC    Scoliosis     paraplegia    Seizure disorder 11/12/2015    Sepsis due to Escherichia coli without acute organ dysfunction 2/2/2023    Stroke     one week old    Tardive dyskinesia      Family History   Problem Relation Age of Onset    Cancer Father         lymphoma    Cataracts Father     Colon polyps Father     Cataracts Mother     Cataracts  Maternal Grandmother     Diabetes Maternal Grandmother     Macular degeneration Maternal Grandmother     Glaucoma Maternal Grandmother     Anesthesia problems Neg Hx     Clotting disorder Neg Hx     Colon cancer Neg Hx     Crohn's disease Neg Hx     Ulcerative colitis Neg Hx     Stomach cancer Neg Hx     Esophageal cancer Neg Hx     Cirrhosis Neg Hx         Social History:   Marital Status: Single  Alcohol History:  reports no history of alcohol use.  Tobacco History:  reports that he has never smoked. He has never used smokeless tobacco.  Drug History:  reports no history of drug use.    Review of patient's allergies indicates:   Allergen Reactions    Benadryl [diphenhydramine hcl] Other (See Comments)     Increases anxiety and more restless    Keppra [levetiracetam] Other (See Comments)     agitation    Ativan [lorazepam] Anxiety    Xanax [alprazolam] Anxiety     Worsens anxiety and agitation    Abilify  [aripiprazole]      Other reaction(s): arrhythmia    Clonazepam      Other reaction(s): unknown    Cough syrup w/decongestant      Other reaction(s): auditory hallucinations    Diazepam      Other reaction(s): tongue swelling  Other reaction(s): Other (See Comments)  Other reaction(s): tongue swelling    Haloperidol Other (See Comments)    Phenytoin sodium extended      Other reaction(s): unknown    Quetiapine      Other reaction(s): sweating  Other reaction(s): sedation  Other reaction(s): sweating  Other reaction(s): sedation    Risperidone      Other reaction(s): bladder incontinence    Thimerosal      Other reaction(s): seizure    Ziprasidone hcl Other (See Comments)     Other reaction(s): tonic clonic seizure  seizure       Current Outpatient Medications   Medication Sig Dispense Refill    allopurinoL (ZYLOPRIM) 100 MG tablet TAKE ONE TABLET BY MOUTH TWICE DAILY 180 tablet 2    ascorbic acid, vitamin C, 250 mg Chew Take by mouth.      calcium carbonate-vitamin D3 500 mg(1,250mg) -400 unit Tab Take 1 tablet by  mouth 2 (two) times a day.       DENTA 5000 PLUS 1.1 % Crea SMARTSIG:By Mouth Morning-Night      fluvoxaMINE (LUVOX) 100 MG tablet Take 1 tablet (100 mg total) by mouth 2 (two) times daily. 60 tablet 3    inulin (FIBER GUMMIES ORAL) Take 2 tablets by mouth once daily.       L. ACIDOPHILUS/BIFID. ANIMALIS (CHEWABLE PROBIOTIC ORAL) Take 1 tablet by mouth 2 (two) times daily.       lactulose (CHRONULAC) 10 gram/15 mL solution TAKE 15 mls BY MOUTH THREE TIMES DAILY 473 mL 3    magnesium 30 mg Tab Take 1 tablet by mouth every evening.      MELATONIN ORAL Take 1 tablet by mouth every evening.      mirabegron (MYRBETRIQ ORAL) Take by mouth.      multivitamin capsule Take 1 capsule by mouth every morning.       mupirocin calcium 2% (BACTROBAN) 2 % cream Apply topically 3 (three) times daily. 15 g 3    NON FORMULARY MEDICATION CBD Oil- OTC      omega-3 fatty acids/fish oil (FISH OIL-OMEGA-3 FATTY ACIDS) 300-1,000 mg capsule Take 1 capsule by mouth once daily.       OXcarbazepine (TRILEPTAL) 300 MG Tab Take 1 tablet (300 mg total) by mouth 2 (two) times daily. 180 tablet 3    pantoprazole (PROTONIX) 40 MG tablet TAKE ONE TABLET BY MOUTH ONCE DAILY 90 tablet 2    polyethylene glycol (GLYCOLAX) 17 gram PwPk Take 17 g by mouth once daily.       potassium chloride (KLOR-CON) 10 MEQ TbSR Take 10 mEq by mouth every evening.      pramipexole (MIRAPEX) 0.25 MG tablet Take 1 tablet (0.25 mg total) by mouth 3 (three) times daily. 90 tablet 11    sulfamethoxazole-trimethoprim 400-80mg (BACTRIM,SEPTRA) 400-80 mg per tablet Take 1 tablet by mouth.      zinc gluconate 50 mg tablet Take 50 mg by mouth once daily.       No current facility-administered medications for this visit.       Review of Systems   Constitutional:  Negative for chills, fatigue, fever and unexpected weight change.   HENT:  Negative for hearing loss and trouble swallowing.    Eyes:  Negative for photophobia and visual disturbance.   Respiratory:  Negative for cough,  shortness of breath and wheezing.    Cardiovascular:  Negative for chest pain, palpitations and leg swelling.   Gastrointestinal:  Negative for abdominal pain and nausea.   Genitourinary:  Negative for dysuria and frequency.   Musculoskeletal:  Negative for arthralgias, back pain, gait problem, joint swelling, myalgias and neck pain.   Skin:  Negative for rash and wound.   Neurological:  Negative for tremors, seizures, weakness, numbness and headaches.   Hematological:  Does not bruise/bleed easily.         Objective:        Physical Exam:   Foot Exam  Physical Exam  Ortho/SPM Exam   Constitutional: Patient is oriented to person, place, and time. Patient appears well-developed and well-nourished. No acute distress.      Psychiatric: Patient has a normal mood and affect. Patient's speech is normal and behavior is normal. Judgment is normal. Cognition and memory are normal.     Toenails 1st, 2nd, 3rd, 4th, 5th  bilateral are, dystrophic, discolored. Tender to distal nail plate pressure, without periungual skin abnormality of each.    Skin is normal age and health appropriate color, turgor, texture, and temperature bilateral lower extremities without ulceration, hyperpigmentation, discoloration, masses nodules or cords palpated.  No ecchymosis, erythema, edema, or cardinal signs of infection bilateral lower extremities.    Reducible extensor and flexor contractures at the MTPJ and PIPJ of toes 2-5, bilat.      Protective sensation intact. Pain sensation intact bilateral feet and legs.    DP and PT pulses 2/4 bilateral, capillary refill 3 seconds all toes/distal feet, all toes/both feet warm to touch.   Negative lower extremity edema bilateral.    Imaging:            Assessment:       1. Onychogryphosis      Plan:   Onychogryphosis      No follow-ups on file.    Procedures          Counseling:     I provided patient education verbally regarding:   Patient diagnosis, treatment options, as well as alternatives, risks, and  benefits.     This note was created using Dragon voice recognition software that occasionally misinterpreted phrases or words.       Discussed with patient and patient's family bedside wearing comfortable supportive shoes for his hammertoe deformities.  Discussed with them padding of areas that are painful.    Fungal infection of toenails explained. Treatment options including no treatment, periodic debridement, topical medications, oral medications, and removal of the nail were discussed, as well as success rates and risks of recurrence. We agreed on topical medication      Patient should call the office immediately if any signs of infection, such as fever, chills, sweats, increased redness or pain.    Patient was instructed to call the clinic or go to the emergency department if their symptoms do not improve, worsens, or if new symptoms develop.  Patient was advised that if any increased swelling, pain, or numbness arise to go immediately to the ED. Patient knows to call any time if an emergency arises. Shared decision making occurred and patient verbalized understanding in agreement with this plan.

## 2023-10-13 ENCOUNTER — PATIENT MESSAGE (OUTPATIENT)
Dept: FAMILY MEDICINE | Facility: CLINIC | Age: 38
End: 2023-10-13
Payer: MEDICARE

## 2023-10-23 ENCOUNTER — PATIENT MESSAGE (OUTPATIENT)
Dept: PSYCHIATRY | Facility: CLINIC | Age: 38
End: 2023-10-23
Payer: MEDICARE

## 2023-10-23 ENCOUNTER — OFFICE VISIT (OUTPATIENT)
Dept: FAMILY MEDICINE | Facility: CLINIC | Age: 38
End: 2023-10-23
Payer: MEDICARE

## 2023-10-23 ENCOUNTER — LAB VISIT (OUTPATIENT)
Dept: LAB | Facility: HOSPITAL | Age: 38
End: 2023-10-23
Attending: STUDENT IN AN ORGANIZED HEALTH CARE EDUCATION/TRAINING PROGRAM
Payer: MEDICARE

## 2023-10-23 VITALS
DIASTOLIC BLOOD PRESSURE: 86 MMHG | WEIGHT: 263.13 LBS | HEART RATE: 73 BPM | HEIGHT: 70 IN | OXYGEN SATURATION: 96 % | BODY MASS INDEX: 37.67 KG/M2 | SYSTOLIC BLOOD PRESSURE: 152 MMHG

## 2023-10-23 DIAGNOSIS — R21 RASH IN ADULT: ICD-10-CM

## 2023-10-23 DIAGNOSIS — L91.8 SKIN TAG: ICD-10-CM

## 2023-10-23 DIAGNOSIS — F63.9 IMPULSE CONTROL DISORDER: ICD-10-CM

## 2023-10-23 DIAGNOSIS — F42.8 OTHER OBSESSIVE-COMPULSIVE DISORDERS: ICD-10-CM

## 2023-10-23 DIAGNOSIS — R73.9 HYPERGLYCEMIA: ICD-10-CM

## 2023-10-23 DIAGNOSIS — B35.1 ONYCHOMYCOSIS: ICD-10-CM

## 2023-10-23 DIAGNOSIS — M20.42 HAMMER TOE OF LEFT FOOT: ICD-10-CM

## 2023-10-23 DIAGNOSIS — E66.01 CLASS 2 SEVERE OBESITY DUE TO EXCESS CALORIES WITH SERIOUS COMORBIDITY AND BODY MASS INDEX (BMI) OF 37.0 TO 37.9 IN ADULT: Primary | ICD-10-CM

## 2023-10-23 DIAGNOSIS — F42.9 OBSESSIVE-COMPULSIVE DISORDER, UNSPECIFIED TYPE: ICD-10-CM

## 2023-10-23 LAB
ESTIMATED AVG GLUCOSE: 108 MG/DL (ref 68–131)
HBA1C MFR BLD: 5.4 % (ref 4–5.6)

## 2023-10-23 PROCEDURE — 99214 OFFICE O/P EST MOD 30 MIN: CPT | Mod: S$GLB,,, | Performed by: STUDENT IN AN ORGANIZED HEALTH CARE EDUCATION/TRAINING PROGRAM

## 2023-10-23 PROCEDURE — 99214 PR OFFICE/OUTPT VISIT, EST, LEVL IV, 30-39 MIN: ICD-10-PCS | Mod: S$GLB,,, | Performed by: STUDENT IN AN ORGANIZED HEALTH CARE EDUCATION/TRAINING PROGRAM

## 2023-10-23 PROCEDURE — 83036 HEMOGLOBIN GLYCOSYLATED A1C: CPT | Performed by: STUDENT IN AN ORGANIZED HEALTH CARE EDUCATION/TRAINING PROGRAM

## 2023-10-23 PROCEDURE — 99999 PR PBB SHADOW E&M-EST. PATIENT-LVL IV: CPT | Mod: PBBFAC,,, | Performed by: STUDENT IN AN ORGANIZED HEALTH CARE EDUCATION/TRAINING PROGRAM

## 2023-10-23 PROCEDURE — 3079F PR MOST RECENT DIASTOLIC BLOOD PRESSURE 80-89 MM HG: ICD-10-PCS | Mod: CPTII,S$GLB,, | Performed by: STUDENT IN AN ORGANIZED HEALTH CARE EDUCATION/TRAINING PROGRAM

## 2023-10-23 PROCEDURE — 3079F DIAST BP 80-89 MM HG: CPT | Mod: CPTII,S$GLB,, | Performed by: STUDENT IN AN ORGANIZED HEALTH CARE EDUCATION/TRAINING PROGRAM

## 2023-10-23 PROCEDURE — 3008F PR BODY MASS INDEX (BMI) DOCUMENTED: ICD-10-PCS | Mod: CPTII,S$GLB,, | Performed by: STUDENT IN AN ORGANIZED HEALTH CARE EDUCATION/TRAINING PROGRAM

## 2023-10-23 PROCEDURE — 3077F SYST BP >= 140 MM HG: CPT | Mod: CPTII,S$GLB,, | Performed by: STUDENT IN AN ORGANIZED HEALTH CARE EDUCATION/TRAINING PROGRAM

## 2023-10-23 PROCEDURE — 36415 COLL VENOUS BLD VENIPUNCTURE: CPT | Mod: PO | Performed by: STUDENT IN AN ORGANIZED HEALTH CARE EDUCATION/TRAINING PROGRAM

## 2023-10-23 PROCEDURE — 1159F PR MEDICATION LIST DOCUMENTED IN MEDICAL RECORD: ICD-10-PCS | Mod: CPTII,S$GLB,, | Performed by: STUDENT IN AN ORGANIZED HEALTH CARE EDUCATION/TRAINING PROGRAM

## 2023-10-23 PROCEDURE — 1159F MED LIST DOCD IN RCRD: CPT | Mod: CPTII,S$GLB,, | Performed by: STUDENT IN AN ORGANIZED HEALTH CARE EDUCATION/TRAINING PROGRAM

## 2023-10-23 PROCEDURE — 3008F BODY MASS INDEX DOCD: CPT | Mod: CPTII,S$GLB,, | Performed by: STUDENT IN AN ORGANIZED HEALTH CARE EDUCATION/TRAINING PROGRAM

## 2023-10-23 PROCEDURE — 99999 PR PBB SHADOW E&M-EST. PATIENT-LVL IV: ICD-10-PCS | Mod: PBBFAC,,, | Performed by: STUDENT IN AN ORGANIZED HEALTH CARE EDUCATION/TRAINING PROGRAM

## 2023-10-23 PROCEDURE — 3077F PR MOST RECENT SYSTOLIC BLOOD PRESSURE >= 140 MM HG: ICD-10-PCS | Mod: CPTII,S$GLB,, | Performed by: STUDENT IN AN ORGANIZED HEALTH CARE EDUCATION/TRAINING PROGRAM

## 2023-10-23 RX ORDER — TERBINAFINE HYDROCHLORIDE 250 MG/1
250 TABLET ORAL DAILY
Qty: 90 TABLET | Refills: 0 | Status: SHIPPED | OUTPATIENT
Start: 2023-10-23 | End: 2023-10-23

## 2023-10-23 NOTE — LETTER
October 23, 2023    Hussein Doyle  [address]  [facility] [address]             [facility] [address] [place] [address]  Phone: [phone]  Fax: [phone] To whom it may concern:    I am the primary care physician for Hussein Doyle. I understand he is applying for equine therapy to help with balance, fitness, cognition, and other benefits. I have physically cleared him to participate in equine therapy.    If you have any questions or concerns, please don't hesitate to call.    Sincerely,        Rehan Mazariegos MD

## 2023-10-23 NOTE — ASSESSMENT & PLAN NOTE
Nonspecific. Not concerning for any specific disease. Possibly related to cirrhosis. Continue to monitor.

## 2023-10-23 NOTE — PROGRESS NOTES
Name: Hussein Doyle  MRN: [id number]  : 1985  PCP: Rehan Mazariegos MD    HPI  Patient presents with multiple needs. Mother and caretaker present.    Patient has nail fungus that so far has not responded to topical antifungals and epsom salt baths. They have only been using the topical antifungal for two months.    The patient has hammertoe on his left foot that prevents him from putting his show on all the way. They needed to purchase for him special shoes with a wider base. They were hoping for a prescription given the expense.    He has benefited from equine therapy in the past and hopes to do so again but he needs medical clearance.    He has a skin tag on his back that has not resolved with topical salicylate. Is note bothersome.    Wakes up with a red rash on his chest and abdomen in the mornings. Does not complain of pain or itching (he is not prone to complaining). Mom and caretaker have been applying Goldbond powder.    Mother plans to get generator and was hoping to get it subsidized due to the medical necessity of the patient's BiPAP.    Review of Systems   Cardiovascular:  Negative for palpitations and leg swelling.   Neurological:  Negative for dizziness.       Patient Active Problem List   Diagnosis    Autistic disorder, active    Impulse control disorder    Developmental delay, moderate    Neurogenic bladder    HALEY on CPAP    Gout, chronic    Seizure disorder    Pervasive developmental disorder, active    Chronic constipation    OCD (obsessive compulsive disorder)    Hallux, valgus, congenital    TRAYLOR (nonalcoholic steatohepatitis)    Dysphagia    Liver cirrhosis secondary to TRAYLOR    Class 2 severe obesity due to excess calories with serious comorbidity and body mass index (BMI) of 37.0 to 37.9 in adult    Osteoporosis    Co-occurrence of multiple psychiatric disorders    History of renal cell carcinoma    Nocturnal leg movements    Onychomycosis    Rash in adult    Skin tag    Hammer  toe of left foot       Vitals:    10/23/23 1105   BP: (!) 152/86   Pulse: 73       Physical Exam  Constitutional:       General: He is not in acute distress.     Appearance: Normal appearance. He is well-developed.   HENT:      Head: Normocephalic and atraumatic.      Right Ear: External ear normal.      Left Ear: External ear normal.   Eyes:      Conjunctiva/sclera: Conjunctivae normal.   Pulmonary:      Effort: Pulmonary effort is normal. No respiratory distress.   Abdominal:      General: Abdomen is flat. There is no distension.   Musculoskeletal:         General: No swelling.      Right lower leg: No edema.      Left lower leg: No edema.      Left foot: Deformity (hammertoe of 2nd toe) present.   Feet:      Right foot:      Toenail Condition: Fungal disease present.  Skin:     General: Skin is warm and dry.      Coloration: Skin is not jaundiced.      Comments: Mild erythema across chest and upper abdomen with no skin eruption, exfoliation. Small skin tag on right back.   Neurological:      Mental Status: He is alert and oriented to person, place, and time. Mental status is at baseline.   Psychiatric:         Attention and Perception: Attention and perception normal.         Mood and Affect: Mood normal.         Speech: Speech normal.         Behavior: Behavior is cooperative.         Cognition and Memory: Cognition normal.         1. Class 2 severe obesity due to excess calories with serious comorbidity and body mass index (BMI) of 37.0 to 37.9 in adult  Assessment & Plan:  Stable. Caretaker has reduced patient's volume of intake but he seems stagnant. Has tried low carb for patient. I recommend higher protein intake and lower fat intake - may further decrease calorie count.      2. Onychomycosis  Assessment & Plan:  Topical medication as listed. Oral terbinafine contraindicated due to TRAYLOR cirrhosis.    Orders:  -     Discontinue: terbinafine HCL (LAMISIL) 250 mg tablet; Take 1 tablet (250 mg total) by mouth  once daily.  Dispense: 90 tablet; Refill: 0  -     efinaconazole (JUBLIA) 10 % Escobar; Apply 1 application  topically once daily.  Dispense: 8 mL; Refill: 1    3. Rash in adult  Assessment & Plan:  Nonspecific. Not concerning for any specific disease. Possibly related to cirrhosis. Continue to monitor.      4. Skin tag  Assessment & Plan:  No need for further intervention unless it becomes symptomatic.      5. Hyperglycemia  -     Hemoglobin A1C; Future    6. Hammer toe of left foot  -     HME - OTHER        Follow up in 4 months. I spent 36 minutes pre-charting, interviewing patient, performing exam, formulating and discussing plan, placing orders, and documenting.      Rehan Mazariegos MD  10/23/2023

## 2023-10-23 NOTE — LETTER
October 23, 2023    Hussein Doyle  [address]  [facility] [address]             [facility] [address] [place] [address]  Phone: [phone]  Fax: [phone] To whom it may concern:    I am the primary care physician for Hussein Doyle. He has obstructive sleep apnea and requires nightly BiPAP for treatment of sleep apnea. This is medically necessary.       If you have any questions or concerns, please don't hesitate to call.    Sincerely,        Rehan Mazariegos MD

## 2023-10-23 NOTE — ASSESSMENT & PLAN NOTE
Stable. Caretaker has reduced patient's volume of intake but he seems stagnant. Has tried low carb for patient. I recommend higher protein intake and lower fat intake - may further decrease calorie count.

## 2023-10-30 RX ORDER — FLUVOXAMINE MALEATE 100 MG/1
150 TABLET, COATED ORAL 2 TIMES DAILY
Qty: 90 TABLET | Refills: 3 | Status: SHIPPED | OUTPATIENT
Start: 2023-10-30 | End: 2024-01-24

## 2023-10-31 ENCOUNTER — PATIENT MESSAGE (OUTPATIENT)
Dept: FAMILY MEDICINE | Facility: CLINIC | Age: 38
End: 2023-10-31
Payer: MEDICARE

## 2023-10-31 ENCOUNTER — TELEPHONE (OUTPATIENT)
Dept: FAMILY MEDICINE | Facility: CLINIC | Age: 38
End: 2023-10-31
Payer: MEDICARE

## 2023-10-31 DIAGNOSIS — M79.604 RIGHT LEG PAIN: Primary | ICD-10-CM

## 2023-10-31 NOTE — TELEPHONE ENCOUNTER
Spoke with Ms. Benitez, notified Dr. Mazariegos of Hussein falling. Sent to him in another encounter.

## 2023-10-31 NOTE — TELEPHONE ENCOUNTER
----- Message from Nehemiah Yanes sent at 10/31/2023 11:46 AM CDT -----  Contact: pts' mother Eli at [phone]  Type: Needs Medical Advice  Who Called:  pt's mother Eli  Best Call Back Number: [phone]  Additional Information: pt's mother Eli is calling the office to speak with Nurse Hinojosa.

## 2023-11-01 ENCOUNTER — TELEPHONE (OUTPATIENT)
Dept: FAMILY MEDICINE | Facility: CLINIC | Age: 38
End: 2023-11-01
Payer: MEDICARE

## 2023-11-01 ENCOUNTER — PATIENT MESSAGE (OUTPATIENT)
Dept: ORTHOPEDICS | Facility: CLINIC | Age: 38
End: 2023-11-01

## 2023-11-01 ENCOUNTER — OFFICE VISIT (OUTPATIENT)
Dept: ORTHOPEDICS | Facility: CLINIC | Age: 38
End: 2023-11-01
Payer: MEDICARE

## 2023-11-01 VITALS — BODY MASS INDEX: 37.65 KG/M2 | WEIGHT: 263 LBS | HEIGHT: 70 IN

## 2023-11-01 DIAGNOSIS — R26.9 ABNORMALITY OF GAIT: Primary | ICD-10-CM

## 2023-11-01 PROCEDURE — 1159F PR MEDICATION LIST DOCUMENTED IN MEDICAL RECORD: ICD-10-PCS | Mod: CPTII,S$GLB,, | Performed by: ORTHOPAEDIC SURGERY

## 2023-11-01 PROCEDURE — 3008F BODY MASS INDEX DOCD: CPT | Mod: CPTII,S$GLB,, | Performed by: ORTHOPAEDIC SURGERY

## 2023-11-01 PROCEDURE — 3008F PR BODY MASS INDEX (BMI) DOCUMENTED: ICD-10-PCS | Mod: CPTII,S$GLB,, | Performed by: ORTHOPAEDIC SURGERY

## 2023-11-01 PROCEDURE — 1160F RVW MEDS BY RX/DR IN RCRD: CPT | Mod: CPTII,S$GLB,, | Performed by: ORTHOPAEDIC SURGERY

## 2023-11-01 PROCEDURE — 1160F PR REVIEW ALL MEDS BY PRESCRIBER/CLIN PHARMACIST DOCUMENTED: ICD-10-PCS | Mod: CPTII,S$GLB,, | Performed by: ORTHOPAEDIC SURGERY

## 2023-11-01 PROCEDURE — 99204 OFFICE O/P NEW MOD 45 MIN: CPT | Mod: S$GLB,,, | Performed by: ORTHOPAEDIC SURGERY

## 2023-11-01 PROCEDURE — 3044F HG A1C LEVEL LT 7.0%: CPT | Mod: CPTII,S$GLB,, | Performed by: ORTHOPAEDIC SURGERY

## 2023-11-01 PROCEDURE — 99204 PR OFFICE/OUTPT VISIT, NEW, LEVL IV, 45-59 MIN: ICD-10-PCS | Mod: S$GLB,,, | Performed by: ORTHOPAEDIC SURGERY

## 2023-11-01 PROCEDURE — 3044F PR MOST RECENT HEMOGLOBIN A1C LEVEL <7.0%: ICD-10-PCS | Mod: CPTII,S$GLB,, | Performed by: ORTHOPAEDIC SURGERY

## 2023-11-01 PROCEDURE — 99999 PR PBB SHADOW E&M-EST. PATIENT-LVL III: ICD-10-PCS | Mod: PBBFAC,,, | Performed by: ORTHOPAEDIC SURGERY

## 2023-11-01 PROCEDURE — 99999 PR PBB SHADOW E&M-EST. PATIENT-LVL III: CPT | Mod: PBBFAC,,, | Performed by: ORTHOPAEDIC SURGERY

## 2023-11-01 PROCEDURE — 1159F MED LIST DOCD IN RCRD: CPT | Mod: CPTII,S$GLB,, | Performed by: ORTHOPAEDIC SURGERY

## 2023-11-01 NOTE — PROGRESS NOTES
Subjective:      Patient ID: Hussein Doyle is a 37 y.o. male.    Chief Complaint: Pain of the Right Lower Leg    HPI  37-year-old autistic male noted by his caretakers to have a bit more of an abnormal gait over the last several days.  They bring him in as a precaution.  He was seen in the emergency department had multiple x-rays obtained and dispositioned as an outpatient.  There has been giving him Motrin without improvement.  Patient denies any complaints of pain but he is caretaker said that he has a high pain threshold  ROS      Objective:    Ortho Exam     Constitutional:   Patient is alert  and oriented in no acute distress    HEENT:  normocephalic atraumatic; PERRL EOMI  Neck:  Supple without adenopathy  Cardiovascular:  Normal rate and rhythm  Pulmonary:  Normal respiratory effort normal chest wall expansion  Abdominal:  Nonprotuberant nondistended  Musculoskeletal:  Patient comes in a wheelchair today he is able to comfortably bear weight  He does have a modest limp.  He does have tardive dyskinesia  He is able to actively range his hip knee and ankle without any apparent discomfort.  Neurological:  No focal defect; cranial nerves 2-12 grossly intact  Psychiatric/behavioral:  Mood and behavior normal      X-Ray Foot Complete Right  Narrative: EXAMINATION:  XR FOOT COMPLETE 3 VIEW RIGHT    CLINICAL HISTORY:  Right foot pain.    TECHNIQUE:  Three views of the right foot.    COMPARISON:  08/20/2018    FINDINGS:  No acute displaced fracture, subluxation, or dislocation is identified.  There is prominent calcaneal spurring at the Achilles insertion and at the plantar fascial insertion.  Mild scattered degenerative changes are present.  No radiopaque foreign bodies identified.  No significant soft tissue swelling is identified.  Impression: 1. No acute displaced fracture or dislocation.    Electronically signed by: Heladio Taylor MD  Date:    10/30/2023  Time:    20:11  X-Ray Tibia Fibula 2 View  Right  Narrative: EXAMINATION:  XR TIBIA FIBULA 2 VIEW RIGHT    CLINICAL HISTORY:  Right leg pain.    TECHNIQUE:  2 views of the right tibia and fibula.    COMPARISON:  None    FINDINGS:  No acute displaced fracture, subluxation, or dislocation is identified.  There is calcaneal spurring.  No osseous lesion is noted.  No radiopaque foreign bodies identified. No significant soft tissue swelling is identified.  Impression: 1. No acute displaced fracture or dislocation.    Electronically signed by: Heladio Taylor MD  Date:    10/30/2023  Time:    20:10  X-Ray Femur Ap/Lat Right  Narrative: EXAMINATION:  XR FEMUR 2 VIEW RIGHT    CLINICAL HISTORY:  Right leg pain.    TECHNIQUE:  AP and lateral views of the right femur were performed.    COMPARISON:  None available    FINDINGS:  There is no acute displaced fracture, subluxation, or dislocation identified.  There is no osseous lesion.  There is no radiopaque foreign body.  Impression: 1. No acute displaced fracture or dislocation.    Electronically signed by: Heladio Taylor MD  Date:    10/30/2023  Time:    20:09       My Radiographs Findings:  I have personally reviewed radiographs and concur with above findings      Assessment:       Encounter Diagnosis   Name Primary?    Abnormality of gait Yes         Plan:       I have discussed medical condition treatment options with him in his caretakers at length.  I have told him that I see no indication for any additional diagnostics at this point.  I have suggested close observation assistance with his gait we will try to arrange some home physical therapy for him from for general gait training I have discussed over-the-counter NSAIDs if approved by his PCP.  Follow up in 3-4 weeks if symptoms fail to improve sooner if any questions or problems.  If his ability to ambulate rapidly deteriorates and I have suggested follow up with his urgent care primary care or emergency department.        Past Medical History:   Diagnosis Date     Anxiety     Autistic disorder, active 05/06/2013    Bowel obstruction     pt mother denies    Early intervention counseling     GERD (gastroesophageal reflux disease)     Gout, chronic 11/12/2015    Grand mal seizure     Hepatic encephalopathy     Hepatic steatosis     Impulse control disorder     Impulse control disorder, unspecified 05/06/2013    Liver cirrhosis secondary to TRAYLOR     Mastoiditis     Moderate mental retardation 05/06/2013    Neurogenic bladder     intermittent catherization (5 times a day)    Obesity     OCD (obsessive compulsive disorder)     HALEY (obstructive sleep apnea) 11/12/2015    on cpap at bedtime    Otitis media     Paraplegia     Pervasive developmental disorder, active 12/27/2015    Recurrent UTI     Renal cancer 2010    Right RCC    Scoliosis     paraplegia    Seizure disorder 11/12/2015    Sepsis due to Escherichia coli without acute organ dysfunction 2/2/2023    Stroke     one week old    Tardive dyskinesia      Past Surgical History:   Procedure Laterality Date    ANKLE FRACTURE SURGERY      BACK SURGERY  2000    COLONOSCOPY  10/28/2008    Dr. Arana at [facility]; anal fissure, minimal pinpoint rectal erythema; floppy-type colon with very little tone; biopsy: rectum mild chronic proctitis with few eosinophils sent for scanning    COLONOSCOPY N/A 6/15/2018    Procedure: COLONOSCOPY;  Surgeon: Refugio Villagomez MD;  Location: [facility];  Service: Endoscopy;  Laterality: N/A; Preparation of the colon was fair, unremarkable; REPEAT at 50 YEARS old FOR SCREENING    COLONOSCOPY N/A 9/9/2020    Procedure: COLONOSCOPY;  Surgeon: Refugio Villagomez MD;  Location: [facility];  Service: Endoscopy;  Laterality: N/A;    ESOPHAGOGASTRODUODENOSCOPY N/A 7/13/2020    Procedure: EGD (ESOPHAGOGASTRODUODENOSCOPY);  Surgeon: Refugio Villagomez MD;  Location: [facility];  Service: Endoscopy;  Laterality: N/A;    ESOPHAGOGASTRODUODENOSCOPY N/A 8/17/2022    Procedure: EGD (ESOPHAGOGASTRODUODENOSCOPY);  Surgeon:  Refugio Villagomez MD;  Location: [facility] ENDO;  Service: Endoscopy;  Laterality: N/A;  cirrhosis, varices screening    ESOPHAGOGASTRODUODENOSCOPY (EGD) WITH DILATION N/A 2020    INNER EAR SURGERY      mastoid    LIVER BIOPSY N/A 7/20/2020    Procedure: BIOPSY, LIVER;  Surgeon: Ananya Surgeon;  Location: [facility];  Service: Anesthesiology;  Laterality: N/A;  189 liver bx/Ultrasound anes 1.5 hours    MAGNETIC RESONANCE IMAGING N/A 10/9/2020    Procedure: MRI (Magnetic Resonance Imagine);  Surgeon: Manish Araiza MD;  Location: [facility];  Service: Anesthesiology;  Laterality: N/A;    right partial nephrectomy Right 2010    Sees urology    TYMPANOSTOMY TUBE PLACEMENT           Current Outpatient Medications:     allopurinoL (ZYLOPRIM) 100 MG tablet, TAKE ONE TABLET BY MOUTH TWICE DAILY, Disp: 180 tablet, Rfl: 2    ascorbic acid, vitamin C, 250 mg Chew, Take by mouth., Disp: , Rfl:     calcium carbonate-vitamin D3 500 mg(1,250mg) -400 unit Tab, Take 1 tablet by mouth 2 (two) times a day. , Disp: , Rfl:     DENTA 5000 PLUS 1.1 % Crea, SMARTSIG:By Mouth Morning-Night, Disp: , Rfl:     efinaconazole (JUBLIA) 10 % Escobar, Apply 1 application  topically once daily., Disp: 8 mL, Rfl: 1    fluvoxaMINE (LUVOX) 100 MG tablet, Take 1.5 tablets (150 mg total) by mouth 2 (two) times daily., Disp: 90 tablet, Rfl: 3    ibuprofen (ADVIL,MOTRIN) 800 MG tablet, Take 1 tablet (800 mg total) by mouth every 8 (eight) hours as needed for Pain., Disp: 20 tablet, Rfl: 0    inulin (FIBER GUMMIES ORAL), Take 2 tablets by mouth once daily. , Disp: , Rfl:     L. ACIDOPHILUS/BIFID. ANIMALIS (CHEWABLE PROBIOTIC ORAL), Take 1 tablet by mouth 2 (two) times daily. , Disp: , Rfl:     lactulose (CHRONULAC) 10 gram/15 mL solution, TAKE 15 mls BY MOUTH THREE TIMES DAILY, Disp: 473 mL, Rfl: 3    magnesium 30 mg Tab, Take 1 tablet by mouth every evening., Disp: , Rfl:     MELATONIN ORAL, Take 1 tablet by mouth every evening., Disp: , Rfl:     mirabegron  (MYRBETRIQ ORAL), Take by mouth., Disp: , Rfl:     multivitamin capsule, Take 1 capsule by mouth every morning. , Disp: , Rfl:     mupirocin calcium 2% (BACTROBAN) 2 % cream, Apply topically 3 (three) times daily., Disp: 15 g, Rfl: 3    NON FORMULARY MEDICATION, CBD Oil- OTC, Disp: , Rfl:     omega-3 fatty acids/fish oil (FISH OIL-OMEGA-3 FATTY ACIDS) 300-1,000 mg capsule, Take 1 capsule by mouth once daily. , Disp: , Rfl:     OXcarbazepine (TRILEPTAL) 300 MG Tab, Take 1 tablet (300 mg total) by mouth 2 (two) times daily., Disp: 180 tablet, Rfl: 3    pantoprazole (PROTONIX) 40 MG tablet, TAKE ONE TABLET BY MOUTH ONCE DAILY, Disp: 90 tablet, Rfl: 2    polyethylene glycol (GLYCOLAX) 17 gram PwPk, Take 17 g by mouth once daily. , Disp: , Rfl:     potassium chloride (KLOR-CON) 10 MEQ TbSR, Take 10 mEq by mouth every evening., Disp: , Rfl:     pramipexole (MIRAPEX) 0.25 MG tablet, Take 1 tablet (0.25 mg total) by mouth 3 (three) times daily., Disp: 90 tablet, Rfl: 11    sulfamethoxazole-trimethoprim 400-80mg (BACTRIM,SEPTRA) 400-80 mg per tablet, Take 1 tablet by mouth., Disp: , Rfl:     zinc gluconate 50 mg tablet, Take 50 mg by mouth once daily., Disp: , Rfl:     Review of patient's allergies indicates:   Allergen Reactions    Benadryl [diphenhydramine hcl] Other (See Comments)     Increases anxiety and more restless    Keppra [levetiracetam] Other (See Comments)     agitation    Ativan [lorazepam] Anxiety    Xanax [alprazolam] Anxiety     Worsens anxiety and agitation    Abilify  [aripiprazole]      Other reaction(s): arrhythmia    Clonazepam      Other reaction(s): unknown    Cough syrup w/decongestant      Other reaction(s): auditory hallucinations    Diazepam      Other reaction(s): tongue swelling  Other reaction(s): Other (See Comments)  Other reaction(s): tongue swelling    Haloperidol Other (See Comments)    Phenytoin sodium extended      Other reaction(s): unknown    Quetiapine      Other reaction(s):  sweating  Other reaction(s): sedation  Other reaction(s): sweating  Other reaction(s): sedation    Risperidone      Other reaction(s): bladder incontinence    Thimerosal      Other reaction(s): seizure    Ziprasidone hcl Other (See Comments)     Other reaction(s): tonic clonic seizure  seizure       Family History   Problem Relation Age of Onset    Cancer Father         lymphoma    Cataracts Father     Colon polyps Father     Cataracts Mother     Cataracts Maternal Grandmother     Diabetes Maternal Grandmother     Macular degeneration Maternal Grandmother     Glaucoma Maternal Grandmother     Anesthesia problems Neg Hx     Clotting disorder Neg Hx     Colon cancer Neg Hx     Crohn's disease Neg Hx     Ulcerative colitis Neg Hx     Stomach cancer Neg Hx     Esophageal cancer Neg Hx     Cirrhosis Neg Hx      Social History     Occupational History    Not on file   Tobacco Use    Smoking status: Never    Smokeless tobacco: Never   Substance and Sexual Activity    Alcohol use: Never    Drug use: Never    Sexual activity: Never

## 2023-11-01 NOTE — TELEPHONE ENCOUNTER
----- Message from Olga Boyd sent at 10/31/2023  4:42 PM CDT -----  Contact: chu (mom)  Type:  Needs Medical Advice    Who Called: chu    Would the patient rather a call back or a response via [facility]? Call back    Best Call Back Number: [phone]    Additional Information: sent video via portal and hopes you can view it.     Please call to advise  Thanks

## 2023-11-10 ENCOUNTER — TELEPHONE (OUTPATIENT)
Dept: ORTHOPEDICS | Facility: CLINIC | Age: 38
End: 2023-11-10
Payer: MEDICARE

## 2023-11-10 NOTE — TELEPHONE ENCOUNTER
----- Message from Jesus Pollack MA sent at 11/10/2023  9:33 AM CST -----  Contact: Victorina/[employer] [employer]  Received request for [facility] & they have to have medical diagnoses and how it relates to patient needing PT.    Call back number is [phone]

## 2023-11-13 ENCOUNTER — TELEPHONE (OUTPATIENT)
Dept: ORTHOPEDICS | Facility: CLINIC | Age: 38
End: 2023-11-13
Payer: MEDICARE

## 2023-11-13 NOTE — TELEPHONE ENCOUNTER
[facility] ready to okay orders, but needs the dx codes changed to autism, seizure disorder etc, instead of the abnormality of gait..inhaled corticosteroids it okay to redo the reorder with the requested dx?

## 2023-11-13 NOTE — TELEPHONE ENCOUNTER
----- Message from Praveen Nieto sent at 11/13/2023  9:25 AM CST -----  Regarding: returning Katalina's call, call Victorina   Contact: Victorina Carson   returning Katalina's call, call Victorina

## 2023-11-14 RX ORDER — LACTULOSE 10 G/15ML
SOLUTION ORAL; RECTAL
Qty: 473 ML | Refills: 3 | Status: SHIPPED | OUTPATIENT
Start: 2023-11-14 | End: 2024-01-10

## 2023-11-14 NOTE — TELEPHONE ENCOUNTER
Care Due:                  Date            Visit Type   Department     Provider  --------------------------------------------------------------------------------                                EP [facility] FAMILY  Last Visit: 10-      CARE ([facility])   MEDICINE       Rehan Mazariegos                              EP -                              PRIMARY      [facility] FAMILY  Next Visit: 02-      CARE ([facility])   MEDICINE       Rehan Mazariegos                                                            Last  Test          Frequency    Reason                     Performed    Due Date  --------------------------------------------------------------------------------    Uric Acid...  12 months..  allopurinoL..............  Not Found    Overdue    [employer] Embedded Care Due Messages. Reference number: [id number].   11/14/2023 8:33:48 AM CST

## 2023-11-19 ENCOUNTER — PATIENT MESSAGE (OUTPATIENT)
Dept: FAMILY MEDICINE | Facility: CLINIC | Age: 38
End: 2023-11-19
Payer: MEDICARE

## 2023-11-20 ENCOUNTER — TELEPHONE (OUTPATIENT)
Dept: FAMILY MEDICINE | Facility: CLINIC | Age: 38
End: 2023-11-20
Payer: MEDICARE

## 2023-11-20 NOTE — TELEPHONE ENCOUNTER
----- Message from Qiana Bass sent at 11/20/2023  8:25 AM CST -----  Contact: Eli  Type:  Same Day Appointment Request    Caller is requesting a same day appointment.  Caller declined first available appointment listed below.      Name of Caller:  Maria Elena Benitez  When is the first available appointment?  11/27  Symptoms:  Warm knot on L side near rib cage that is getting bigger and deeper, and has low grade fever, even with taking Cipro for a UTI  Best Call Back Number:  [phone]  Additional Information: Please call Mom back to advise. Thank You.

## 2023-11-26 NOTE — PROGRESS NOTES
"Outpatient Psychiatry Follow-Up Visit    Clinical Status of Patient: Outpatient (Ambulatory)  11/27/2023     Chief Complaint: OCD, anxiety, impulse control, PDD      Interval History and Content of Current Session:  Interim Events/Subjective Report/Content of Current Session:  follow-up appointment with mother and caregiver.    Pt is a 35 y/o male with past psychiatric hx of OCD, anxiety, impulse control, PDD who presents for follow-up treatment. Pt's mother stated that the increase in fluvoxamine has helped his anxiety. Mother noted that October is the anniversary of his father's death, with the holidays being particularly difficult for him. Pt's mother stated that he was having some difficulty walking. Nearly collapsed around Halloween. Went to ED with negative results. Seen by ortho who recommended PT. PT has recently completed an assessment, suggesting that it may be due to back concerns. Will start working with him later this week. Pt's caretaker noted that the has occasional "leg jolts." Thinks that it may be due to leg falling asleep. Plans to complete PT and possibly consider second opinion with ortho.     Past Psychiatric hx: s/e's to mult prev meds to include zyprexa (wgt gain), lexapro 20mg po qam (anxiety and to suppress sex drive), buspar 10mg po BID, xanax 0.5mg po daily PRN anxiety    Past Medical hx:   Past Medical History:   Diagnosis Date    Anxiety     Autistic disorder, active 05/06/2013    Bowel obstruction     pt mother denies    Early intervention counseling     GERD (gastroesophageal reflux disease)     Gout, chronic 11/12/2015    Grand mal seizure     Hepatic encephalopathy     Hepatic steatosis     Impulse control disorder     Impulse control disorder, unspecified 05/06/2013    Liver cirrhosis secondary to TRAYLOR     Mastoiditis     Moderate mental retardation 05/06/2013    Neurogenic bladder     intermittent catherization (5 times a day)    Obesity     OCD (obsessive compulsive disorder)     " HALEY (obstructive sleep apnea) 11/12/2015    on cpap at bedtime    Otitis media     Paraplegia     Pervasive developmental disorder, active 12/27/2015    Recurrent UTI     Renal cancer 2010    Right RCC    Scoliosis     paraplegia    Seizure disorder 11/12/2015    Sepsis due to Escherichia coli without acute organ dysfunction 2/2/2023    Stroke     one week old    Tardive dyskinesia         Interim hx:  Medication changes last visit: Increase fluvoxamine back to 150 mg BID  Anxiety: stable  Depression: stable     Denies suicidal/homicidal ideations.  Denies hopelessness/worthlessness.    Denies auditory/visual hallucinations      Alcohol: Pt denied  Drug: Pt denied  Caffeine: Not assessed  Tobacco: Pt denied      Review of Systems   PSYCHIATRIC: Pertinent items are noted in the narrative.        CONSTITUTIONAL: weight stable    Past Medical, Family and Social History: The patient's past medical, family and social history have been reviewed and updated as appropriate within the electronic medical record. See encounter notes.     Current Psychiatric Medication:  fluvoxamine 150 mg BID, oxcarbazepine 300 mg BID (per neuro)     Compliance: yes      Side effects: Pt denied     Risk Parameters:  Patient reports no suicidal ideation  Patient reports no homicidal ideation  Patient reports no self-injurious behavior  Patient reports no violent behavior     Exam (detailed: at least 9 elements; comprehensive: all 15 elements)   Constitutional  Vitals:  Most recent vital signs, dated less than 90 days prior to this appointment, were reviewed. Pulse:  [73]   BP: (125)/(78)       General:  unremarkable, age appropriate, casual attire, good eye contact, good rapport       Musculoskeletal  Muscle Strength/Tone:  no flaccidity, no tremor    Gait & Station:  normal      Psychiatric                       Speech:  normal tone, normal rate, rhythm, and volume   Mood & Affect:    Mildly anxious         Thought Process:   Goal directed;  Linear    Associations:   intact   Thought Content:   No SI/HI, delusions, or paranoia, no AV/VH   Insight & Judgement:   Good, adequate to circumstances   Orientation:   grossly intact; alert and oriented x 4    Memory:  intact for content of interview    Language:  grossly intact, can repeat    Attention Span  : Grossly intact for content of interview   Fund of Knowledge:   intact and appropriate to age and level of education        Assessment and Diagnosis   Status/Progress: Based on the examination today, the patient's problem(s) is/are adequately controlled.  New problems have not been presented today. Co-morbidities are not complicating management of the primary condition. There are no active rule-out diagnoses for this patient at this time.      Impression: Pt's frequency of vocalizing negative statements appear to have reduced some. Pt's mother appears worried about gait concerns and expressed thoughts and emotions about such. CBT and solution-focused techniques used to help challenge thoughts and provide some direction moving forward. Will continue with medication plan as is for now and monitor moving forward.     Diagnosis:   1. Obsessive Compulsive Disorder   2. Anxiety Disorder  3. Impulse Control Disorder  4. Pervasive Developmental Disorder  Intervention/Counseling/Treatment Plan   Medication Management:      1. fluvoxamine 150 mg BID    2. Oxcarbazepine 300 mg BID (managed by neuro)     2. Call to report any worsening of symptoms or problems with the medication. Pt instructed to go to ER with thoughts of harming self, others     3. Patient given contact # for psychotherapists at [facility] and also instructed to check with insurance for list of providers.     Psychotherapy: 20 minutes  Target symptoms: anxiety  Why chosen therapy is appropriate versus another modality: CBT used; relevant to diagnosis, patient responds to this modality  Outcome monitoring methods: self-report,  observation  Therapeutic intervention type: Cognitive Behavioral Therapy, Solution-focused  Topics discussed/themes: building skills sets for symptom management, symptom recognition, nutrition, exercise  The patient's response to the intervention is accepting  Patient's response to treatment is: good.   The patient's progress toward treatment goals: stable     Return to clinic: 4 months    -Cognitive-Behavioral/Supportive therapy and psychoeducation provided  -R/B/SE's of medications discussed with the pt who expresses understanding and chooses to take medications as prescribed.   -Pt instructed to call clinic, 911 or go to nearest emergency room if sxs worsen or pt is in   crisis. The pt expresses understanding.    Praveen Brush, PhD, MP     Antidepressant/Antianxiety Medication Initiation:  Patient informed of risks, benefits, and potential side effects of medication and accepts informed consent.  Common side effects include nausea, fatigue, headache, insomnia., Specifically discussed the possibility of new or worsening suicidal thoughts/depression.  Patient instructed to stop the medication immediately and seek urgent treatment if this occurs. Patient instructed not to abruptly discontinue medication without physician guidance except in cases of sudden onset or worsening of SI.

## 2023-11-27 ENCOUNTER — OFFICE VISIT (OUTPATIENT)
Dept: PSYCHIATRY | Facility: CLINIC | Age: 38
End: 2023-11-27
Payer: MEDICAID

## 2023-11-27 VITALS
WEIGHT: 264.56 LBS | HEIGHT: 68 IN | SYSTOLIC BLOOD PRESSURE: 125 MMHG | HEART RATE: 73 BPM | DIASTOLIC BLOOD PRESSURE: 78 MMHG | BODY MASS INDEX: 40.09 KG/M2

## 2023-11-27 DIAGNOSIS — F41.9 ANXIETY DISORDER, UNSPECIFIED TYPE: ICD-10-CM

## 2023-11-27 DIAGNOSIS — F63.9 IMPULSE CONTROL DISORDER: ICD-10-CM

## 2023-11-27 DIAGNOSIS — F84.9 PERVASIVE DEVELOPMENTAL DISORDER, ACTIVE: ICD-10-CM

## 2023-11-27 DIAGNOSIS — F42.9 OBSESSIVE-COMPULSIVE DISORDER, UNSPECIFIED TYPE: Primary | ICD-10-CM

## 2023-11-27 PROCEDURE — 3074F SYST BP LT 130 MM HG: CPT | Mod: CPTII,S$GLB,, | Performed by: PSYCHOLOGIST

## 2023-11-27 PROCEDURE — 1159F MED LIST DOCD IN RCRD: CPT | Mod: CPTII,S$GLB,, | Performed by: PSYCHOLOGIST

## 2023-11-27 PROCEDURE — 99214 OFFICE O/P EST MOD 30 MIN: CPT | Mod: S$GLB,,, | Performed by: PSYCHOLOGIST

## 2023-11-27 PROCEDURE — 99999 PR PBB SHADOW E&M-EST. PATIENT-LVL V: ICD-10-PCS | Mod: PBBFAC,,, | Performed by: PSYCHOLOGIST

## 2023-11-27 PROCEDURE — 99214 PR OFFICE/OUTPT VISIT, EST, LEVL IV, 30-39 MIN: ICD-10-PCS | Mod: S$GLB,,, | Performed by: PSYCHOLOGIST

## 2023-11-27 PROCEDURE — 3008F BODY MASS INDEX DOCD: CPT | Mod: CPTII,S$GLB,, | Performed by: PSYCHOLOGIST

## 2023-11-27 PROCEDURE — 90833 PSYTX W PT W E/M 30 MIN: CPT | Mod: S$GLB,,, | Performed by: PSYCHOLOGIST

## 2023-11-27 PROCEDURE — 90833 PR PSYCHOTHERAPY W/PATIENT W/E&M, 30 MIN (ADD ON): ICD-10-PCS | Mod: S$GLB,,, | Performed by: PSYCHOLOGIST

## 2023-11-27 PROCEDURE — 3078F PR MOST RECENT DIASTOLIC BLOOD PRESSURE < 80 MM HG: ICD-10-PCS | Mod: CPTII,S$GLB,, | Performed by: PSYCHOLOGIST

## 2023-11-27 PROCEDURE — 1159F PR MEDICATION LIST DOCUMENTED IN MEDICAL RECORD: ICD-10-PCS | Mod: CPTII,S$GLB,, | Performed by: PSYCHOLOGIST

## 2023-11-27 PROCEDURE — 3078F DIAST BP <80 MM HG: CPT | Mod: CPTII,S$GLB,, | Performed by: PSYCHOLOGIST

## 2023-11-27 PROCEDURE — 3044F HG A1C LEVEL LT 7.0%: CPT | Mod: CPTII,S$GLB,, | Performed by: PSYCHOLOGIST

## 2023-11-27 PROCEDURE — 3044F PR MOST RECENT HEMOGLOBIN A1C LEVEL <7.0%: ICD-10-PCS | Mod: CPTII,S$GLB,, | Performed by: PSYCHOLOGIST

## 2023-11-27 PROCEDURE — 3008F PR BODY MASS INDEX (BMI) DOCUMENTED: ICD-10-PCS | Mod: CPTII,S$GLB,, | Performed by: PSYCHOLOGIST

## 2023-11-27 PROCEDURE — 99999 PR PBB SHADOW E&M-EST. PATIENT-LVL V: CPT | Mod: PBBFAC,,, | Performed by: PSYCHOLOGIST

## 2023-11-27 PROCEDURE — 3074F PR MOST RECENT SYSTOLIC BLOOD PRESSURE < 130 MM HG: ICD-10-PCS | Mod: CPTII,S$GLB,, | Performed by: PSYCHOLOGIST

## 2023-11-27 RX ORDER — TERBINAFINE HYDROCHLORIDE 250 MG/1
250 TABLET ORAL DAILY
COMMUNITY
End: 2024-03-20 | Stop reason: CLARIF

## 2023-12-03 ENCOUNTER — PATIENT MESSAGE (OUTPATIENT)
Dept: NEUROLOGY | Facility: CLINIC | Age: 38
End: 2023-12-03
Payer: MEDICARE

## 2023-12-12 ENCOUNTER — OFFICE VISIT (OUTPATIENT)
Dept: NEUROLOGY | Facility: CLINIC | Age: 38
End: 2023-12-12
Payer: MEDICARE

## 2023-12-12 ENCOUNTER — LAB VISIT (OUTPATIENT)
Dept: LAB | Facility: HOSPITAL | Age: 38
End: 2023-12-12
Attending: PSYCHIATRY & NEUROLOGY
Payer: MEDICAID

## 2023-12-12 VITALS
HEIGHT: 68 IN | WEIGHT: 263.25 LBS | BODY MASS INDEX: 39.9 KG/M2 | DIASTOLIC BLOOD PRESSURE: 72 MMHG | SYSTOLIC BLOOD PRESSURE: 121 MMHG | HEART RATE: 70 BPM

## 2023-12-12 DIAGNOSIS — R29.2 HYPERREFLEXIA: ICD-10-CM

## 2023-12-12 DIAGNOSIS — R42 DIZZINESS AND GIDDINESS: Primary | ICD-10-CM

## 2023-12-12 DIAGNOSIS — R29.898 RIGHT LEG WEAKNESS: ICD-10-CM

## 2023-12-12 DIAGNOSIS — R26.89 OTHER ABNORMALITIES OF GAIT AND MOBILITY: ICD-10-CM

## 2023-12-12 DIAGNOSIS — R42 DIZZINESS AND GIDDINESS: ICD-10-CM

## 2023-12-12 LAB
ALBUMIN SERPL BCP-MCNC: 4.2 G/DL (ref 3.5–5.2)
ALP SERPL-CCNC: 76 U/L (ref 55–135)
ALT SERPL W/O P-5'-P-CCNC: 142 U/L (ref 10–44)
AMMONIA PLAS-SCNC: 44 UMOL/L (ref 10–50)
ANION GAP SERPL CALC-SCNC: 12 MMOL/L (ref 8–16)
AST SERPL-CCNC: 203 U/L (ref 10–40)
BILIRUB SERPL-MCNC: 0.6 MG/DL (ref 0.1–1)
BUN SERPL-MCNC: 10 MG/DL (ref 6–20)
CALCIUM SERPL-MCNC: 9.9 MG/DL (ref 8.7–10.5)
CHLORIDE SERPL-SCNC: 103 MMOL/L (ref 95–110)
CO2 SERPL-SCNC: 23 MMOL/L (ref 23–29)
CREAT SERPL-MCNC: 0.7 MG/DL (ref 0.5–1.4)
EST. GFR  (NO RACE VARIABLE): >60 ML/MIN/1.73 M^2
FOLATE SERPL-MCNC: 14.9 NG/ML (ref 4–24)
GLUCOSE SERPL-MCNC: 107 MG/DL (ref 70–110)
POTASSIUM SERPL-SCNC: 4.1 MMOL/L (ref 3.5–5.1)
PROT SERPL-MCNC: 8.8 G/DL (ref 6–8.4)
SODIUM SERPL-SCNC: 138 MMOL/L (ref 136–145)
TSH SERPL DL<=0.005 MIU/L-ACNC: 1.51 UIU/ML (ref 0.4–4)
VIT B12 SERPL-MCNC: 1115 PG/ML (ref 210–950)

## 2023-12-12 PROCEDURE — 99999 PR PBB SHADOW E&M-EST. PATIENT-LVL V: ICD-10-PCS | Mod: PBBFAC,,, | Performed by: PSYCHIATRY & NEUROLOGY

## 2023-12-12 PROCEDURE — 36415 COLL VENOUS BLD VENIPUNCTURE: CPT | Mod: PO | Performed by: PSYCHIATRY & NEUROLOGY

## 2023-12-12 PROCEDURE — 84425 ASSAY OF VITAMIN B-1: CPT | Performed by: PSYCHIATRY & NEUROLOGY

## 2023-12-12 PROCEDURE — 3078F PR MOST RECENT DIASTOLIC BLOOD PRESSURE < 80 MM HG: ICD-10-PCS | Mod: CPTII,S$GLB,, | Performed by: PSYCHIATRY & NEUROLOGY

## 2023-12-12 PROCEDURE — 84446 ASSAY OF VITAMIN E: CPT | Performed by: PSYCHIATRY & NEUROLOGY

## 2023-12-12 PROCEDURE — 3008F BODY MASS INDEX DOCD: CPT | Mod: CPTII,S$GLB,, | Performed by: PSYCHIATRY & NEUROLOGY

## 2023-12-12 PROCEDURE — 3074F SYST BP LT 130 MM HG: CPT | Mod: CPTII,S$GLB,, | Performed by: PSYCHIATRY & NEUROLOGY

## 2023-12-12 PROCEDURE — 3044F HG A1C LEVEL LT 7.0%: CPT | Mod: CPTII,S$GLB,, | Performed by: PSYCHIATRY & NEUROLOGY

## 2023-12-12 PROCEDURE — 84443 ASSAY THYROID STIM HORMONE: CPT | Performed by: PSYCHIATRY & NEUROLOGY

## 2023-12-12 PROCEDURE — 85025 COMPLETE CBC W/AUTO DIFF WBC: CPT | Performed by: PSYCHIATRY & NEUROLOGY

## 2023-12-12 PROCEDURE — 80183 DRUG SCRN QUANT OXCARBAZEPIN: CPT | Performed by: PSYCHIATRY & NEUROLOGY

## 2023-12-12 PROCEDURE — 82607 VITAMIN B-12: CPT | Performed by: PSYCHIATRY & NEUROLOGY

## 2023-12-12 PROCEDURE — 99215 OFFICE O/P EST HI 40 MIN: CPT | Mod: S$GLB,,, | Performed by: PSYCHIATRY & NEUROLOGY

## 2023-12-12 PROCEDURE — 1160F PR REVIEW ALL MEDS BY PRESCRIBER/CLIN PHARMACIST DOCUMENTED: ICD-10-PCS | Mod: CPTII,S$GLB,, | Performed by: PSYCHIATRY & NEUROLOGY

## 2023-12-12 PROCEDURE — 80053 COMPREHEN METABOLIC PANEL: CPT | Performed by: PSYCHIATRY & NEUROLOGY

## 2023-12-12 PROCEDURE — 1159F PR MEDICATION LIST DOCUMENTED IN MEDICAL RECORD: ICD-10-PCS | Mod: CPTII,S$GLB,, | Performed by: PSYCHIATRY & NEUROLOGY

## 2023-12-12 PROCEDURE — 1159F MED LIST DOCD IN RCRD: CPT | Mod: CPTII,S$GLB,, | Performed by: PSYCHIATRY & NEUROLOGY

## 2023-12-12 PROCEDURE — 99999 PR PBB SHADOW E&M-EST. PATIENT-LVL V: CPT | Mod: PBBFAC,,, | Performed by: PSYCHIATRY & NEUROLOGY

## 2023-12-12 PROCEDURE — 3078F DIAST BP <80 MM HG: CPT | Mod: CPTII,S$GLB,, | Performed by: PSYCHIATRY & NEUROLOGY

## 2023-12-12 PROCEDURE — 82746 ASSAY OF FOLIC ACID SERUM: CPT | Performed by: PSYCHIATRY & NEUROLOGY

## 2023-12-12 PROCEDURE — 99215 PR OFFICE/OUTPT VISIT, EST, LEVL V, 40-54 MIN: ICD-10-PCS | Mod: S$GLB,,, | Performed by: PSYCHIATRY & NEUROLOGY

## 2023-12-12 PROCEDURE — 3008F PR BODY MASS INDEX (BMI) DOCUMENTED: ICD-10-PCS | Mod: CPTII,S$GLB,, | Performed by: PSYCHIATRY & NEUROLOGY

## 2023-12-12 PROCEDURE — 82140 ASSAY OF AMMONIA: CPT | Performed by: PSYCHIATRY & NEUROLOGY

## 2023-12-12 PROCEDURE — 3074F PR MOST RECENT SYSTOLIC BLOOD PRESSURE < 130 MM HG: ICD-10-PCS | Mod: CPTII,S$GLB,, | Performed by: PSYCHIATRY & NEUROLOGY

## 2023-12-12 PROCEDURE — 1160F RVW MEDS BY RX/DR IN RCRD: CPT | Mod: CPTII,S$GLB,, | Performed by: PSYCHIATRY & NEUROLOGY

## 2023-12-12 PROCEDURE — 3044F PR MOST RECENT HEMOGLOBIN A1C LEVEL <7.0%: ICD-10-PCS | Mod: CPTII,S$GLB,, | Performed by: PSYCHIATRY & NEUROLOGY

## 2023-12-12 RX ORDER — DOXYCYCLINE HYCLATE 100 MG/1
100 TABLET, DELAYED RELEASE ORAL EVERY 12 HOURS
COMMUNITY
End: 2024-03-20 | Stop reason: CLARIF

## 2023-12-12 NOTE — PROGRESS NOTES
Date: 12/12/2023    Patient ID: Hussein Doyle is a 38 y.o. male.    Chief Complaint: unsteady      History of Present Illness:  Mr. Doyle is a 38 y.o. male who presents for followup of epilepsy, nonepileptic events, and a new problem of difficulty walking and leg spasms. The patient was accompanied by his mother who also contributed to the following history.     Interval history: He has been having more trouble walking. He has been having leg spasms and pain. He has been having imbalance and needing a walker. He is having to hold onto something. This has been stable. It is not progressively worsening but it is not really better. It is more noticeable when he is stepping on or off a curb or if he is getting up from a seated position. They haven't noticed eye jerking and no nausea or vomiting.     He has a UTI right now.     He has gained weight lately.     He did PT in the home recently and this was not helpful.     Prior history:  Previously followed with Dr. Salinas, established with me in Aug 2023. No seizures for years.      He takes oxcarbazepine 300 mg BID. Last level in Aug 2023 was low at 7. Liver function is high but this has been discussed in the past and not felt to be secondary to trileptal.      DXA showed osteoporosis.      His mood is stable and behavior is better.      Keppra caused behavioral changes. He has had a partial nephrectomy.      History of present illness:  The patient is a 36 y.o. male referred for management of epilepsy.  He previously saw Dr. Sunshine.  This is my first time seeing him.  He provides little in the way of history due to his cognitive issues, so most of the history was obtained from his caregiver.  Additional history is as noted below.  Briefly, though, this gentleman suffers from a developmental delay and has a history of a prior GTC seizure.  He had onset of new episodes involving behavioral outbursts.  He was admitted to the EMU, and these were found not to be  "epileptic in nature.  Since the last time he was seen, he has had no further episodes worrisome for seizures.     He was started on Geodon during this time before the seizures.      Prior history:  "First seizure suspected in 2006, onset was not witnessed but caused him to fall to the ground, period of unresponsiveness, had hit his head. Not clear if there was convulsive activity at that time. Mother also describes episodes where eyes roll back, he falls to the ground, has generalized convulsive activity and is disoriented / confused for a short time following. Frequency is approximately once every 2 months, last episode 2 months ago. More prominent since June 2014.      Over the past few months and in particular 6 weeks, has had increase in behavioral outbursts. Mother is not sure if this is directly result of subclinical seizure or not but describes shouting out, inability to follow through with instructions or direction, eyes rolling (brief, eyes flit back and to right, forceful eye closure lasting 1-2 seconds) without change in muscle tone or consciousness. Concerned his behaviors have been regressing, he mentions name of caregiver from many years ago often. Repeats phrases (you need to have dark hair), appears agitated, unable to sit still. In the past month has had increase in dose of Keppra; had been on 250 BID until 12/4, dose was increased to 500 BID. At the time of visit with Dr. Parker on 12/16 was on 1500 mg HS. Family desperate to switch Keppra if this will improve behaviors. "     Per Dr. Salinas's note: "I had previously discussed with hepatology whether or not they had concerns that this drug might be a contributory factor to his cirrhosis.  They indicated to me that they had no such worries and that there was no indication for changing the drug from their perspective."     MRI brain (12/15):  "Motion limited MRI of the brain.  There is trace asymmetry of the mesial temporal lobes left slightly " "smaller than the right allowing for motion limitation.  This may be artifactual or developmental variant with underlying left mesial temporal sclerosis not excluded.  Clinical correlation correlation with EEG analysis is advised.  No evidence for focal parietal signal abnormality, acute infarction or enhancing lesion."     vEEG (1/16):  "FINAL SUMMARY:  ELECTROENCEPHALOGRAM:  Interictal: This is a normal EEG during wakefulness, drowsiness, and sleep.  Ictal: During this recording, periods of eye flutter during wakefulness and myoclonic jerks during sleep were recorded without associated epileptiform activity on EEG.  CLINICAL SEIZURE:  Classification: Nonepileptic events. Based on clinical history and evaluation by Psychiatry, these events were likely motor tics during wakefulness and myoclonic jerks during sleep. There is no evidence of an epileptic process on this recording. No seizures were recorded"     DEXA (5/22):  "Osteopenia"     Prior AED:  Keppra - behavioral issues    Allergies:  Review of patient's allergies indicates:   Allergen Reactions    Benadryl [diphenhydramine hcl] Other (See Comments)     Increases anxiety and more restless    Keppra [levetiracetam] Other (See Comments)     agitation    Ativan [lorazepam] Anxiety    Xanax [alprazolam] Anxiety     Worsens anxiety and agitation    Abilify  [aripiprazole]      Other reaction(s): arrhythmia    Clonazepam      Other reaction(s): unknown    Cough syrup w/decongestant      Other reaction(s): auditory hallucinations    Diazepam      Other reaction(s): tongue swelling  Other reaction(s): Other (See Comments)  Other reaction(s): tongue swelling    Haloperidol Other (See Comments)    Phenytoin sodium extended      Other reaction(s): unknown    Quetiapine      Other reaction(s): sweating  Other reaction(s): sedation  Other reaction(s): sweating  Other reaction(s): sedation    Risperidone      Other reaction(s): bladder incontinence    Thimerosal      Other " reaction(s): seizure    Ziprasidone hcl Other (See Comments)     Other reaction(s): tonic clonic seizure  seizure       Current Medications:  Current Outpatient Medications   Medication Sig Dispense Refill    allopurinoL (ZYLOPRIM) 100 MG tablet TAKE ONE TABLET BY MOUTH TWICE DAILY 180 tablet 2    ascorbic acid, vitamin C, 250 mg Chew Take by mouth.      calcium carbonate-vitamin D3 500 mg(1,250mg) -400 unit Tab Take 1 tablet by mouth 2 (two) times a day.       DENTA 5000 PLUS 1.1 % Crea SMARTSIG:By Mouth Morning-Night      doxycycline (DORYX) 100 MG EC tablet Take 100 mg by mouth every 12 (twelve) hours. 10 days      fluvoxaMINE (LUVOX) 100 MG tablet Take 1.5 tablets (150 mg total) by mouth 2 (two) times daily. 90 tablet 3    ibuprofen (ADVIL,MOTRIN) 800 MG tablet Take 1 tablet (800 mg total) by mouth every 8 (eight) hours as needed for Pain. 20 tablet 0    inulin (FIBER GUMMIES ORAL) Take 2 tablets by mouth once daily.       L. ACIDOPHILUS/BIFID. ANIMALIS (CHEWABLE PROBIOTIC ORAL) Take 1 tablet by mouth 2 (two) times daily.       lactulose (CHRONULAC) 10 gram/15 mL solution TAKE 15 mls BY MOUTH THREE TIMES DAILY 473 mL 3    magnesium 30 mg Tab Take 1 tablet by mouth every evening.      MELATONIN ORAL Take 1 tablet by mouth every evening.      mirabegron (MYRBETRIQ ORAL) Take by mouth.      multivitamin capsule Take 1 capsule by mouth every morning.       mupirocin calcium 2% (BACTROBAN) 2 % cream Apply topically 3 (three) times daily. 15 g 3    NON FORMULARY MEDICATION CBD Oil- OTC as needed      omega-3 fatty acids/fish oil (FISH OIL-OMEGA-3 FATTY ACIDS) 300-1,000 mg capsule Take 1 capsule by mouth once daily.       OXcarbazepine (TRILEPTAL) 300 MG Tab Take 1 tablet (300 mg total) by mouth 2 (two) times daily. 180 tablet 3    pantoprazole (PROTONIX) 40 MG tablet TAKE ONE TABLET BY MOUTH ONCE DAILY 90 tablet 2    polyethylene glycol (GLYCOLAX) 17 gram PwPk Take 17 g by mouth once daily.       potassium chloride  (KLOR-CON) 10 MEQ TbSR Take 10 mEq by mouth every evening.      pramipexole (MIRAPEX) 0.25 MG tablet Take 1 tablet (0.25 mg total) by mouth 3 (three) times daily. 90 tablet 11    sulfamethoxazole-trimethoprim 400-80mg (BACTRIM,SEPTRA) 400-80 mg per tablet Take 1 tablet by mouth.      terbinafine HCL (LAMISIL) 250 mg tablet Take 250 mg by mouth once daily.      zinc gluconate 50 mg tablet Take 50 mg by mouth once daily.       No current facility-administered medications for this visit.       Past Medical History:  Past Medical History:   Diagnosis Date    Anxiety     Autistic disorder, active 05/06/2013    Bowel obstruction     pt mother denies    Early intervention counseling     GERD (gastroesophageal reflux disease)     Gout, chronic 11/12/2015    Grand mal seizure     Hepatic encephalopathy     Hepatic steatosis     Impulse control disorder     Impulse control disorder, unspecified 05/06/2013    Liver cirrhosis secondary to TRAYLOR     Mastoiditis     Moderate mental retardation 05/06/2013    Neurogenic bladder     intermittent catherization (5 times a day)    Obesity     OCD (obsessive compulsive disorder)     HALEY (obstructive sleep apnea) 11/12/2015    on cpap at bedtime    Otitis media     Paraplegia     Pervasive developmental disorder, active 12/27/2015    Recurrent UTI     Renal cancer 2010    Right RCC    Scoliosis     paraplegia    Seizure disorder 11/12/2015    Sepsis due to Escherichia coli without acute organ dysfunction 2/2/2023    Stroke     one week old    Tardive dyskinesia        Past Surgical History:  Past Surgical History:   Procedure Laterality Date    ANKLE FRACTURE SURGERY      BACK SURGERY  2000    COLONOSCOPY  10/28/2008    Dr. Arana at [facility]; anal fissure, minimal pinpoint rectal erythema; floppy-type colon with very little tone; biopsy: rectum mild chronic proctitis with few eosinophils sent for scanning    COLONOSCOPY N/A 6/15/2018    Procedure: COLONOSCOPY;  Surgeon: Refugio Villagomez,  "MD;  Location: [facility];  Service: Endoscopy;  Laterality: N/A; Preparation of the colon was fair, unremarkable; REPEAT at 50 YEARS old FOR SCREENING    COLONOSCOPY N/A 9/9/2020    Procedure: COLONOSCOPY;  Surgeon: Refugio Villagomez MD;  Location: [facility];  Service: Endoscopy;  Laterality: N/A;    ESOPHAGOGASTRODUODENOSCOPY N/A 7/13/2020    Procedure: EGD (ESOPHAGOGASTRODUODENOSCOPY);  Surgeon: Refugio Villagomez MD;  Location: [facility];  Service: Endoscopy;  Laterality: N/A;    ESOPHAGOGASTRODUODENOSCOPY N/A 8/17/2022    Procedure: EGD (ESOPHAGOGASTRODUODENOSCOPY);  Surgeon: Refugio Villagomez MD;  Location: [facility];  Service: Endoscopy;  Laterality: N/A;  cirrhosis, varices screening    ESOPHAGOGASTRODUODENOSCOPY (EGD) WITH DILATION N/A 2020    INNER EAR SURGERY      mastoid    LIVER BIOPSY N/A 7/20/2020    Procedure: BIOPSY, LIVER;  Surgeon: Ananya Surgeon;  Location: [facility];  Service: Anesthesiology;  Laterality: N/A;  189 liver bx/Ultrasound anes 1.5 hours    MAGNETIC RESONANCE IMAGING N/A 10/9/2020    Procedure: MRI (Magnetic Resonance Imagine);  Surgeon: Manish Araiza MD;  Location: [facility];  Service: Anesthesiology;  Laterality: N/A;    right partial nephrectomy Right 2010    Sees urology    TYMPANOSTOMY TUBE PLACEMENT         Family History:  family history includes Cancer in his father; Cataracts in his father, maternal grandmother, and mother; Colon polyps in his father; Diabetes in his maternal grandmother; Glaucoma in his maternal grandmother; Macular degeneration in his maternal grandmother.    Social History:   reports that he has never smoked. He has never used smokeless tobacco. He reports that he does not drink alcohol and does not use drugs.    Physical Exam:  Vitals:    12/12/23 1412   BP: 121/72   Pulse: 70   Weight: 119.4 kg (263 lb 3.7 oz)   Height: 5' 8" (1.727 m)   PainSc: 0-No pain     Body mass index is 40.02 kg/m².    Neurological Exam:  Mental status: Awake and " alert  Speech language: repetitive speech  Cranial nerves: Face symmetric, EOMI, PERRL  Motor: Moves all extremities well, no focal weakness noted but limited exam  Reflexes: 3+ in BLE  Sensation: feels vibration in UE bilaterally, unclear if feels it in LE  Coordination: No ataxia. No tremor.    Gait: drags right leg, unsteady on turns    Data:  I have personally reviewed other provider's notes, labs, & imaging made available to me today.     Labs:  CBC:   Lab Results   Component Value Date    WBC 8.12 08/24/2023    HGB 15.7 08/24/2023    HCT 46.2 08/24/2023     08/24/2023    MCV 97 08/24/2023    RDW 12.8 08/24/2023     BMP:   Lab Results   Component Value Date     08/24/2023    K 4.1 08/24/2023     08/24/2023    CO2 28 08/24/2023    BUN 9 08/24/2023    CREATININE 0.7 08/24/2023     08/24/2023    CALCIUM 9.6 08/24/2023    MG 2.1 01/29/2023     LFTS;   Lab Results   Component Value Date    PROT 8.1 08/24/2023    ALBUMIN 4.1 08/24/2023    BILITOT 0.6 08/24/2023    AST 76 (H) 08/24/2023    ALKPHOS 73 08/24/2023    ALT 78 (H) 08/24/2023    GGT 75 (H) 04/30/2018     COAGS:   Lab Results   Component Value Date    INR 1.0 06/14/2023     FLP:   Lab Results   Component Value Date    CHOL 173 12/20/2022    HDL 34 (L) 12/20/2022    LDLCALC 95.4 12/20/2022    TRIG 218 (H) 12/20/2022    CHOLHDL 19.7 (L) 12/20/2022       Imaging:  I have personally reviewed the imaging, MRI brain from 2020 normal    Assessment and Plan:  Mr. Doyle is a 38 y.o. male here for followup of unsteadiness. Exam is difficult to interpret and findings are nonspecific. He is hyperreflexic in the legs and dragging right leg. Sensory exam is unclear and he does not complain of pain (but never does). Will obtain labs and CT of neuroaxis. May need to obtain MRIs but would need sedation so will start with CT.      Dizziness and giddiness  -     Comprehensive metabolic panel; Future; Expected date: 12/12/2023  -     CBC Auto  Differential; Future; Expected date: 12/12/2023  -     Ammonia; Future; Expected date: 12/12/2023  -     Oxcarbazepine level; Future; Expected date: 12/12/2023  -     Vitamin B12; Future; Expected date: 12/12/2023  -     FOLATE; Future; Expected date: 12/12/2023  -     VITAMIN B1; Future; Expected date: 12/12/2023  -     VITAMIN E; Future; Expected date: 12/12/2023  -     CT Head Without Contrast; Future; Expected date: 12/12/2023  -     TSH; Future; Expected date: 12/12/2023  -     CT Cervical Spine Without Contrast; Future; Expected date: 12/12/2023  -     CT Thoracic Spine Without Contrast; Future; Expected date: 12/12/2023  -     CT Lumbar Spine Without Contrast; Future; Expected date: 12/12/2023    Right leg weakness  -     Comprehensive metabolic panel; Future; Expected date: 12/12/2023  -     CBC Auto Differential; Future; Expected date: 12/12/2023  -     Ammonia; Future; Expected date: 12/12/2023  -     Oxcarbazepine level; Future; Expected date: 12/12/2023  -     Vitamin B12; Future; Expected date: 12/12/2023  -     FOLATE; Future; Expected date: 12/12/2023  -     VITAMIN B1; Future; Expected date: 12/12/2023  -     VITAMIN E; Future; Expected date: 12/12/2023  -     TSH; Future; Expected date: 12/12/2023  -     CT Cervical Spine Without Contrast; Future; Expected date: 12/12/2023  -     CT Thoracic Spine Without Contrast; Future; Expected date: 12/12/2023  -     CT Lumbar Spine Without Contrast; Future; Expected date: 12/12/2023    Hyperreflexia  -     Comprehensive metabolic panel; Future; Expected date: 12/12/2023  -     CBC Auto Differential; Future; Expected date: 12/12/2023  -     Ammonia; Future; Expected date: 12/12/2023  -     Oxcarbazepine level; Future; Expected date: 12/12/2023  -     Vitamin B12; Future; Expected date: 12/12/2023  -     FOLATE; Future; Expected date: 12/12/2023  -     VITAMIN B1; Future; Expected date: 12/12/2023  -     VITAMIN E; Future; Expected date: 12/12/2023  -     TSH;  Future; Expected date: 12/12/2023  -     CT Cervical Spine Without Contrast; Future; Expected date: 12/12/2023  -     CT Thoracic Spine Without Contrast; Future; Expected date: 12/12/2023  -     CT Lumbar Spine Without Contrast; Future; Expected date: 12/12/2023    Other abnormalities of gait and mobility  -     Vitamin B12; Future; Expected date: 12/12/2023  -     FOLATE; Future; Expected date: 12/12/2023       I spent a total of 50 minutes on the day of the visit.This includes face to face time and non-face to face time preparing to see the patient (eg, review of tests), Obtaining and/or reviewing separately obtained history, Documenting clinical information in the electronic or other health record, Independently interpreting results and communicating results to the patient/family/caregiver, or Care coordination.

## 2023-12-13 LAB
BASOPHILS # BLD AUTO: 0.06 K/UL (ref 0–0.2)
BASOPHILS NFR BLD: 0.8 % (ref 0–1.9)
DIFFERENTIAL METHOD: ABNORMAL
EOSINOPHIL # BLD AUTO: 0.2 K/UL (ref 0–0.5)
EOSINOPHIL NFR BLD: 2 % (ref 0–8)
ERYTHROCYTE [DISTWIDTH] IN BLOOD BY AUTOMATED COUNT: 12.5 % (ref 11.5–14.5)
HCT VFR BLD AUTO: 47 % (ref 40–54)
HGB BLD-MCNC: 16.4 G/DL (ref 14–18)
IMM GRANULOCYTES # BLD AUTO: 0.03 K/UL (ref 0–0.04)
IMM GRANULOCYTES NFR BLD AUTO: 0.4 % (ref 0–0.5)
LYMPHOCYTES # BLD AUTO: 2.5 K/UL (ref 1–4.8)
LYMPHOCYTES NFR BLD: 33.2 % (ref 18–48)
MCH RBC QN AUTO: 33.9 PG (ref 27–31)
MCHC RBC AUTO-ENTMCNC: 34.9 G/DL (ref 32–36)
MCV RBC AUTO: 97 FL (ref 82–98)
MONOCYTES # BLD AUTO: 0.6 K/UL (ref 0.3–1)
MONOCYTES NFR BLD: 8.5 % (ref 4–15)
NEUTROPHILS # BLD AUTO: 4.2 K/UL (ref 1.8–7.7)
NEUTROPHILS NFR BLD: 55.1 % (ref 38–73)
NRBC BLD-RTO: 0 /100 WBC
PLATELET # BLD AUTO: 170 K/UL (ref 150–450)
PMV BLD AUTO: 10.4 FL (ref 9.2–12.9)
RBC # BLD AUTO: 4.84 M/UL (ref 4.6–6.2)
WBC # BLD AUTO: 7.55 K/UL (ref 3.9–12.7)

## 2023-12-14 LAB — OXCARBAZEPINE METABOLITE: 9 MCG/ML (ref 10–35)

## 2023-12-18 ENCOUNTER — HOSPITAL ENCOUNTER (OUTPATIENT)
Dept: RADIOLOGY | Facility: HOSPITAL | Age: 38
Discharge: HOME | End: 2023-12-18
Attending: NURSE PRACTITIONER
Payer: MEDICARE

## 2023-12-18 DIAGNOSIS — K74.60 CIRRHOSIS OF LIVER WITHOUT ASCITES, UNSPECIFIED HEPATIC CIRRHOSIS TYPE: ICD-10-CM

## 2023-12-18 LAB — VIT B1 BLD-MCNC: 75 UG/L (ref 38–122)

## 2023-12-18 PROCEDURE — 76705 ECHO EXAM OF ABDOMEN: CPT | Mod: TC,PO

## 2023-12-18 PROCEDURE — 76705 ECHO EXAM OF ABDOMEN: CPT | Mod: 26,,, | Performed by: RADIOLOGY

## 2023-12-18 PROCEDURE — 76705 US ABDOMEN LIMITED: ICD-10-PCS | Mod: 26,,, | Performed by: RADIOLOGY

## 2023-12-19 LAB — A-TOCOPHEROL VIT E SERPL-MCNC: 1749 UG/DL (ref 500–1800)

## 2023-12-26 ENCOUNTER — PATIENT MESSAGE (OUTPATIENT)
Dept: HEPATOLOGY | Facility: CLINIC | Age: 38
End: 2023-12-26
Payer: MEDICARE

## 2023-12-27 ENCOUNTER — PATIENT MESSAGE (OUTPATIENT)
Dept: NEUROLOGY | Facility: CLINIC | Age: 38
End: 2023-12-27
Payer: MEDICARE

## 2023-12-27 ENCOUNTER — OFFICE VISIT (OUTPATIENT)
Dept: HEPATOLOGY | Facility: CLINIC | Age: 38
End: 2023-12-27
Payer: MEDICARE

## 2023-12-27 DIAGNOSIS — K75.81 NASH (NONALCOHOLIC STEATOHEPATITIS): ICD-10-CM

## 2023-12-27 DIAGNOSIS — K74.60 CIRRHOSIS OF LIVER WITHOUT ASCITES, UNSPECIFIED HEPATIC CIRRHOSIS TYPE: Primary | ICD-10-CM

## 2023-12-27 DIAGNOSIS — E78.5 HYPERLIPIDEMIA, UNSPECIFIED HYPERLIPIDEMIA TYPE: ICD-10-CM

## 2023-12-27 DIAGNOSIS — R74.8 ELEVATED LIVER ENZYMES: ICD-10-CM

## 2023-12-27 DIAGNOSIS — E66.01 CLASS 3 SEVERE OBESITY DUE TO EXCESS CALORIES WITH SERIOUS COMORBIDITY AND BODY MASS INDEX (BMI) OF 40.0 TO 44.9 IN ADULT: ICD-10-CM

## 2023-12-27 PROCEDURE — 99215 OFFICE O/P EST HI 40 MIN: CPT | Mod: 95,,, | Performed by: NURSE PRACTITIONER

## 2023-12-27 NOTE — Clinical Note
Hi, his mother and I were discussing weight loss options at his last visit. Do you think he would be a candidate for a GLP? She seemed interested and I don't know if he will be able to lose the weight otherwise. I know he has chronic constipation which would be a concern. Just wanted to get your thoughts. Thanks, Amelia

## 2023-12-27 NOTE — PROGRESS NOTES
[facility] - Established Patient    Last Clinic Visit: 6/23/23      HISTORY     This is a 38 y.o. 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 Mother present for visit who is able to assist with history and updates.    Transaminases consistently elevated since 11/2015, AST>ALT.    Serologic workup has been negative for other etiologies of liver disease. Ferritin elevated, 800-1500 though iron sat normal. No copies of C282Y or H63D to suggest HH. IgG mildly elevated (1677) though other autoimmune markers negative.      Imaging has shown hepatomegaly, fatty liver, splenomegaly.    Risk factors for fatty liver include-  BMI >35  HLD, pre-diabetes    Diagnosed with cirrhosis: Liver biopsy 7/2020  - Cirrhotic liver  - Steatosis with steatohepatitis   - Macrovesicular steatosis 20-30% and microvesicular steatosis 20%  - Trichrome: cirrhosis (Stage 4/4)  - Iron: No deposit (Grade 0/4)    He was previously able to lose ~50 lb and liver enzymes normalized.    Interval history:  Liver enzymes trending up right now:  / .  Took doxycyline last month for an abscess. Lamisil was also started in Nov; he has about 2 more weeks of this.    In regard to fatty liver-  Weight is up right now. Has previously discussed GLP-1 with PCP though not currently on this. HgbA1c 5.4     Current symptoms of hepatic decompensation:              Ascites: no              LE edema: no                Hepatic encephalopathy: difficult to assess given autism and MR; his ammonia was mildly elevated in the past though currently WNL. Take lactulose, which has also helped with constipation.   Mental status at baseline lately.              GI bleeding: no              Jaundice: no    Health Maintenance:  -- HCC screening: abd US 12/2023 without focal hepatic lesion; AFP 2.2 (6/2023)  -- Variceal screening: EGD 8/17/22 without varices  -- Hepatitis A & B vaccination: needs vaccines        Past medical  history, surgical history, problem list, family history, social history, allergies: Reviewed and updated in the appropriate section of the electronic medical record.      Current Outpatient Medications   Medication Sig Dispense Refill    allopurinoL (ZYLOPRIM) 100 MG tablet TAKE ONE TABLET BY MOUTH TWICE DAILY 180 tablet 2    ascorbic acid, vitamin C, 250 mg Chew Take by mouth.      calcium carbonate-vitamin D3 500 mg(1,250mg) -400 unit Tab Take 1 tablet by mouth 2 (two) times a day.       DENTA 5000 PLUS 1.1 % Crea SMARTSIG:By Mouth Morning-Night      doxycycline (DORYX) 100 MG EC tablet Take 100 mg by mouth every 12 (twelve) hours. 10 days      fluvoxaMINE (LUVOX) 100 MG tablet Take 1.5 tablets (150 mg total) by mouth 2 (two) times daily. 90 tablet 3    ibuprofen (ADVIL,MOTRIN) 800 MG tablet Take 1 tablet (800 mg total) by mouth every 8 (eight) hours as needed for Pain. 20 tablet 0    inulin (FIBER GUMMIES ORAL) Take 2 tablets by mouth once daily.       L. ACIDOPHILUS/BIFID. ANIMALIS (CHEWABLE PROBIOTIC ORAL) Take 1 tablet by mouth 2 (two) times daily.       lactulose (CHRONULAC) 10 gram/15 mL solution TAKE 15 mls BY MOUTH THREE TIMES DAILY 473 mL 3    LORazepam (ATIVAN) 1 MG tablet Take 1 tablet by mouth 30 minutes before CT scan. May repeat after 30 minutes. 2 tablet 0    magnesium 30 mg Tab Take 1 tablet by mouth every evening.      MELATONIN ORAL Take 1 tablet by mouth every evening.      mirabegron (MYRBETRIQ ORAL) Take by mouth.      multivitamin capsule Take 1 capsule by mouth every morning.       mupirocin calcium 2% (BACTROBAN) 2 % cream Apply topically 3 (three) times daily. 15 g 3    NON FORMULARY MEDICATION CBD Oil- OTC as needed      omega-3 fatty acids/fish oil (FISH OIL-OMEGA-3 FATTY ACIDS) 300-1,000 mg capsule Take 1 capsule by mouth once daily.       OXcarbazepine (TRILEPTAL) 300 MG Tab Take 1 tablet (300 mg total) by mouth 2 (two) times daily. 180 tablet 3    pantoprazole (PROTONIX) 40 MG tablet  TAKE ONE TABLET BY MOUTH ONCE DAILY 90 tablet 2    polyethylene glycol (GLYCOLAX) 17 gram PwPk Take 17 g by mouth once daily.       potassium chloride (KLOR-CON) 10 MEQ TbSR Take 10 mEq by mouth every evening.      pramipexole (MIRAPEX) 0.25 MG tablet Take 1 tablet (0.25 mg total) by mouth 3 (three) times daily. 90 tablet 11    sulfamethoxazole-trimethoprim 400-80mg (BACTRIM,SEPTRA) 400-80 mg per tablet Take 1 tablet by mouth.      terbinafine HCL (LAMISIL) 250 mg tablet Take 250 mg by mouth once daily.      zinc gluconate 50 mg tablet Take 50 mg by mouth once daily.       No current facility-administered medications for this visit.     Medication list reviewed and updated.      Review of Systems - as per HPI (per mother)  Constitutional: +weight gain  Respiratory: Negative for shortness of breath.    Cardiovascular: Negative for leg swelling.  Gastrointestinal: Negative for abdominal distention or abdominal pain. Negative for melena or hematemesis.  Musculoskeletal: Negative for myalgias.    Skin: Negative for jaundice or itching.  Neurological: Negative for tremors.   Hematological: Does not bruise/bleed easily.       Physical Exam - limited by virtual visit  Constitutional: No distress. Alert and oriented.  Pulmonary/Chest: No respiratory distress.   Skin: No jaundice.   Psychiatric: At baseline.       LABS & DIAGNOSTIC STUDIES     I have personally reviewed pertinent laboratory findings:    Lab Results   Component Value Date     (H) 12/12/2023     (H) 12/12/2023    ALKPHOS 76 12/12/2023    BILITOT 0.6 12/12/2023    ALBUMIN 4.2 12/12/2023    INR 1.0 06/14/2023       Lab Results   Component Value Date    WBC 7.55 12/12/2023    HGB 16.4 12/12/2023    HCT 47.0 12/12/2023    MCV 97 12/12/2023     12/12/2023       Lab Results   Component Value Date     12/12/2023    K 4.1 12/12/2023    BUN 10 12/12/2023    CREATININE 0.7 12/12/2023    ESTGFRAFRICA >60 06/16/2022    EGFRNONAA >60 06/16/2022        Lab Results   Component Value Date    SMOOTHMUSCAB Negative 1:40 04/30/2018    AMAIFA Negative 1:40 04/30/2018    IGGSERUM 1677 (H) 02/29/2020    ANASCREEN Negative <1:160 04/30/2018    FERRITIN 893 (H) 02/29/2020    FESATURATED 26 04/30/2018    CERULOPLSM 30.0 04/30/2018    HEPBSAG Negative 04/30/2018    HEPBIGM Negative 04/30/2018    HEPBCAB Negative 02/02/2021    HEPCAB Negative 04/30/2018    HEPAIGM Negative 04/30/2018       Lab Results   Component Value Date    AFP 2.2 06/14/2023       I have personally reviewed the following result reports:  Abdominal US - 12/18/23  EGD - 8/17/22  Colonoscopy - 9/9/20  Liver biopsy - 7/20/20      ASSESSMENT & PLAN     38 y.o. male with:    1. Cirrhosis of liver without ascites, unspecified hepatic cirrhosis type    2. Elevated liver enzymes    3. TRAYLOR (nonalcoholic steatohepatitis)    4. Class 3 severe obesity due to excess calories with serious comorbidity and body mass index (BMI) of 40.0 to 44.9 in adult    5. Hyperlipidemia, unspecified hyperlipidemia type        MELD 3.0: 6 at 6/14/2023  8:25 AM  MELD-Na: 6 at 6/14/2023  8:25 AM  Calculated from:  Serum Creatinine: 0.8 mg/dL (Using min of 1 mg/dL) at 6/14/2023  8:25 AM  Serum Sodium: 139 mmol/L (Using max of 137 mmol/L) at 6/14/2023  8:25 AM  Total Bilirubin: 0.6 mg/dL (Using min of 1 mg/dL) at 6/14/2023  8:25 AM  Serum Albumin: 4.0 g/dL (Using max of 3.5 g/dL) at 6/14/2023  8:25 AM  INR(ratio): 1.0 at 6/14/2023  8:25 AM  Age at listing (hypothetical): 37 years  Sex: Male at 6/14/2023  8:25 AM      Liver biopsy in 2020 confirmed TRAYLOR cirrhosis. Cirrhosis has been well compensated. Ammonia has been mildly elevated in the past though difficult to assess for hepatic encephalopathy given MR and autism. Using lactulose which has helped with constipation.     Liver enzymes are currently trending up with weight gain though it is possible that recent antibiotic use and lamisil may also be  contributing.    Recommendations:  MELD <15, no indication for liver transplant evaluation at this time.   Repeat labs at the end of January to see if liver tests improve once he has stopped the lamisil.   Continue HCC screening every 6 months with ultrasound and AFP, next due 6/2024  EGD 8/2022 without varices. Normal platelet count and no s/s portal HTN. Can repeat in 3 years (2025).  Recommend vaccines for hepatitis A and B  Mental status at baseline, continue lactulose  Discussed need for aggressive weight loss efforts. Would benefit from GLP-1 as portion control has been difficult. Unclear if this may worsen his chronic constipation. Recommend discussing with PCP.   Maintain good control of metabolic risk factors, specifically HLD      Orders Placed This Encounter   Procedures    US Abdomen Limited    Hepatic Function Panel    AFP Tumor Marker    CBC Without Differential    Comprehensive Metabolic Panel    AFP Tumor Marker    Protime-INR         F/u in 6 months with labs/US.      Thank you for allowing me to participate in the care of Hussein Fatima Yanira Armendariz, FNP-C  Hepatology          The patient location is: [place], [place]   The chief complaint leading to consultation is: F/u for cirrhosis    Visit type: audiovisual    Face to Face time with patient: 35 min  45 minutes of total time spent on the encounter, which includes face to face time and non-face to face time preparing to see the patient (eg, review of tests), Obtaining and/or reviewing separately obtained history, Documenting clinical information in the electronic or other health record, Independently interpreting results (not separately reported) and communicating results to the patient/family/caregiver, or Care coordination (not separately reported).     Each patient to whom he or she provides medical services by telemedicine is:  (1) informed of the relationship between the physician and patient and the respective role of any  other health care provider with respect to management of the patient; and (2) notified that he or she may decline to receive medical services by telemedicine and may withdraw from such care at any time.

## 2023-12-28 ENCOUNTER — HOSPITAL ENCOUNTER (OUTPATIENT)
Dept: RADIOLOGY | Facility: HOSPITAL | Age: 38
Discharge: HOME | End: 2023-12-28
Attending: PSYCHIATRY & NEUROLOGY
Payer: MEDICARE

## 2023-12-28 ENCOUNTER — TELEPHONE (OUTPATIENT)
Dept: NEUROLOGY | Facility: CLINIC | Age: 38
End: 2023-12-28
Payer: MEDICARE

## 2023-12-28 DIAGNOSIS — R42 DIZZINESS AND GIDDINESS: ICD-10-CM

## 2023-12-28 DIAGNOSIS — R29.2 HYPERREFLEXIA: ICD-10-CM

## 2023-12-28 DIAGNOSIS — R29.898 RIGHT LEG WEAKNESS: ICD-10-CM

## 2023-12-28 PROCEDURE — 72128 CT CHEST SPINE W/O DYE: CPT | Mod: TC,PO

## 2023-12-28 PROCEDURE — 72128 CT THORACIC SPINE WITHOUT CONTRAST: ICD-10-PCS | Mod: 26,,, | Performed by: RADIOLOGY

## 2023-12-28 PROCEDURE — 70450 CT HEAD/BRAIN W/O DYE: CPT | Mod: TC,PO

## 2023-12-28 PROCEDURE — 72128 CT CHEST SPINE W/O DYE: CPT | Mod: 26,,, | Performed by: RADIOLOGY

## 2023-12-28 PROCEDURE — 72131 CT LUMBAR SPINE W/O DYE: CPT | Mod: TC,PO

## 2023-12-28 PROCEDURE — 72125 CT NECK SPINE W/O DYE: CPT | Mod: TC,PO

## 2023-12-28 PROCEDURE — 70450 CT HEAD/BRAIN W/O DYE: CPT | Mod: 26,,, | Performed by: RADIOLOGY

## 2023-12-28 PROCEDURE — 72131 CT LUMBAR SPINE WITHOUT CONTRAST: ICD-10-PCS | Mod: 26,,, | Performed by: RADIOLOGY

## 2023-12-28 PROCEDURE — 72125 CT CERVICAL SPINE WITHOUT CONTRAST: ICD-10-PCS | Mod: 26,,, | Performed by: RADIOLOGY

## 2023-12-28 PROCEDURE — 72131 CT LUMBAR SPINE W/O DYE: CPT | Mod: 26,,, | Performed by: RADIOLOGY

## 2023-12-28 PROCEDURE — 70450 CT HEAD WITHOUT CONTRAST: ICD-10-PCS | Mod: 26,,, | Performed by: RADIOLOGY

## 2023-12-28 PROCEDURE — 72125 CT NECK SPINE W/O DYE: CPT | Mod: 26,,, | Performed by: RADIOLOGY

## 2023-12-28 NOTE — TELEPHONE ENCOUNTER
Patients mom states he does not have a true allergy to ativan, it worsened his anxiety but he was taking it daily. Patients mom thinks he would be fine to take ativan once just for the CT scans. If possible would like ativan sent to [facility] for his scans today. Advised will try to get in touch with Dr. Cheema to see if this can be done.

## 2023-12-28 NOTE — TELEPHONE ENCOUNTER
Called in Ativan to [facility]: Ativan 1 mg tablet PRN  Instructions: take 1 tablet 30 minutes before CT scans. Ok to repeat dose after 30 minutes if needed    Dispense: 2 tablets. Refill: none   Patients mother notified in portal

## 2023-12-28 NOTE — TELEPHONE ENCOUNTER
----- Message from Bj Cheema sent at 12/28/2023 11:07 AM CST -----  Type: Needs Medical Advice  Who Called:  pts mayi Benitez  Best Call Back Number: [phone]  Additional Information: caller states she does not think th pt will keep still for all of the CT's scheduled today and wants to if something can be sent in to complete the CT, pl call bk to advise thanks

## 2023-12-29 ENCOUNTER — DOCUMENTATION ONLY (OUTPATIENT)
Dept: ORTHOPEDICS | Facility: CLINIC | Age: 38
End: 2023-12-29
Payer: MEDICARE

## 2023-12-29 NOTE — PROGRESS NOTES
[facility]'s plan of care was reviewed and signed by the provider. The document was successfully faxed to [phone].    Batched to medical records.

## 2024-01-03 DIAGNOSIS — R29.898 WEAKNESS OF LOWER EXTREMITY, UNSPECIFIED LATERALITY: ICD-10-CM

## 2024-01-03 DIAGNOSIS — R27.0 ATAXIA: ICD-10-CM

## 2024-01-03 DIAGNOSIS — M54.9 DORSALGIA, UNSPECIFIED: Primary | ICD-10-CM

## 2024-01-04 ENCOUNTER — TELEPHONE (OUTPATIENT)
Dept: ORTHOPEDICS | Facility: CLINIC | Age: 39
End: 2024-01-04
Payer: MEDICARE

## 2024-01-04 NOTE — TELEPHONE ENCOUNTER
Received fax from [facility]  P: [phone]  F: [phone]    Physician orders---- signed by Dr. Lucero returned fax  Progress notes---reviewed by Dr. Lucero.     Sent to batch scan.

## 2024-01-09 ENCOUNTER — TELEPHONE (OUTPATIENT)
Dept: NEUROLOGY | Facility: CLINIC | Age: 39
End: 2024-01-09
Payer: MEDICARE

## 2024-01-09 DIAGNOSIS — M1A.9XX0 CHRONIC GOUT WITHOUT TOPHUS, UNSPECIFIED CAUSE, UNSPECIFIED SITE: ICD-10-CM

## 2024-01-09 RX ORDER — ALLOPURINOL 100 MG/1
TABLET ORAL
Qty: 180 TABLET | Refills: 2 | Status: SHIPPED | OUTPATIENT
Start: 2024-01-09

## 2024-01-09 NOTE — TELEPHONE ENCOUNTER
Spoke with patients mother and informed of CT results. She would like to move forward with MRI's with sedation. Called radiology at main [place] to schedule. No answer. Left message with patient information.

## 2024-01-09 NOTE — TELEPHONE ENCOUNTER
No care due was identified.  [facility] Embedded Care Due Messages. Reference number: [id number].   1/09/2024 12:45:53 PM CST

## 2024-01-10 RX ORDER — LACTULOSE 10 G/15ML
SOLUTION ORAL; RECTAL
Qty: 473 ML | Refills: 3 | Status: SHIPPED | OUTPATIENT
Start: 2024-01-10

## 2024-01-10 NOTE — TELEPHONE ENCOUNTER
No care due was identified.  [facility] Embedded Care Due Messages. Reference number: [id number].   1/10/2024 3:31:58 PM CST

## 2024-01-12 ENCOUNTER — PATIENT MESSAGE (OUTPATIENT)
Dept: NEUROLOGY | Facility: CLINIC | Age: 39
End: 2024-01-12
Payer: MEDICARE

## 2024-01-12 PROBLEM — E66.813 CLASS 3 SEVERE OBESITY DUE TO EXCESS CALORIES WITH SERIOUS COMORBIDITY AND BODY MASS INDEX (BMI) OF 40.0 TO 44.9 IN ADULT: Status: ACTIVE | Noted: 2022-04-11

## 2024-01-12 NOTE — TELEPHONE ENCOUNTER
Placed call to radiology at main [place] to schedule MRI's with sedation. No answer. Left message for patients mother to be contacted to schedule.

## 2024-01-23 DIAGNOSIS — F63.9 IMPULSE CONTROL DISORDER: ICD-10-CM

## 2024-01-23 DIAGNOSIS — F42.8 OTHER OBSESSIVE-COMPULSIVE DISORDERS: ICD-10-CM

## 2024-01-23 DIAGNOSIS — F42.9 OBSESSIVE-COMPULSIVE DISORDER, UNSPECIFIED TYPE: ICD-10-CM

## 2024-01-24 RX ORDER — FLUVOXAMINE MALEATE 100 MG/1
TABLET, COATED ORAL
Qty: 90 TABLET | Refills: 3 | Status: SHIPPED | OUTPATIENT
Start: 2024-01-24

## 2024-01-30 ENCOUNTER — LAB VISIT (OUTPATIENT)
Dept: LAB | Facility: HOSPITAL | Age: 39
End: 2024-01-30
Attending: NURSE PRACTITIONER
Payer: MEDICARE

## 2024-01-30 DIAGNOSIS — K74.60 CIRRHOSIS OF LIVER WITHOUT ASCITES, UNSPECIFIED HEPATIC CIRRHOSIS TYPE: ICD-10-CM

## 2024-01-30 DIAGNOSIS — R74.8 ELEVATED LIVER ENZYMES: ICD-10-CM

## 2024-01-30 LAB
ALBUMIN SERPL BCP-MCNC: 4.1 G/DL (ref 3.5–5.2)
ALP SERPL-CCNC: 77 U/L (ref 55–135)
ALT SERPL W/O P-5'-P-CCNC: 126 U/L (ref 10–44)
AST SERPL-CCNC: 152 U/L (ref 10–40)
BILIRUB DIRECT SERPL-MCNC: 0.2 MG/DL (ref 0.1–0.3)
BILIRUB SERPL-MCNC: 0.6 MG/DL (ref 0.1–1)
PROT SERPL-MCNC: 8.3 G/DL (ref 6–8.4)

## 2024-01-30 PROCEDURE — 82105 ALPHA-FETOPROTEIN SERUM: CPT | Performed by: NURSE PRACTITIONER

## 2024-01-30 PROCEDURE — 36415 COLL VENOUS BLD VENIPUNCTURE: CPT | Mod: PO | Performed by: NURSE PRACTITIONER

## 2024-01-30 PROCEDURE — 80076 HEPATIC FUNCTION PANEL: CPT | Performed by: NURSE PRACTITIONER

## 2024-01-31 LAB — AFP SERPL-MCNC: 2.7 NG/ML (ref 0–8.4)

## 2024-02-05 ENCOUNTER — TELEPHONE (OUTPATIENT)
Dept: NEUROLOGY | Facility: CLINIC | Age: 39
End: 2024-02-05
Payer: MEDICARE
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
## 2024-02-07 DIAGNOSIS — R29.898 WEAKNESS OF LOWER EXTREMITY, UNSPECIFIED LATERALITY: ICD-10-CM

## 2024-02-07 DIAGNOSIS — R27.0 ATAXIA: Primary | ICD-10-CM

## 2024-02-07 DIAGNOSIS — M54.9 DORSALGIA, UNSPECIFIED: ICD-10-CM

## 2024-02-07 NOTE — TELEPHONE ENCOUNTER
Spoke to the pt's mom, informed of appts for MRI on 4/2/24 starting at 1200.  Informed anesthesia will call her with instructions.  She v/u.

## 2024-02-20 DIAGNOSIS — R13.10 DYSPHAGIA, UNSPECIFIED TYPE: ICD-10-CM

## 2024-02-20 NOTE — TELEPHONE ENCOUNTER
Care Due:                  Date            Visit Type   Department     Provider  --------------------------------------------------------------------------------                                EP [facility]  Last Visit: 10-      CARE ([facility])   MEDICINE       Rehan Mazariegos                              EP -                              PRIMARY      [facility]  Next Visit: 02-      CARE ([facility])   MEDICINE       Rehan Mazariegos                                                            Last  Test          Frequency    Reason                     Performed    Due Date  --------------------------------------------------------------------------------    Uric Acid...  12 months..  allopurinoL..............  Not Found    Overdue    [employer] Embedded Care Due Messages. Reference number: [id number].   2/20/2024 4:09:35 PM CST

## 2024-02-25 RX ORDER — PANTOPRAZOLE SODIUM 40 MG/1
TABLET, DELAYED RELEASE ORAL
Qty: 90 TABLET | Refills: 2 | Status: SHIPPED | OUTPATIENT
Start: 2024-02-25 | End: 2024-06-13

## 2024-02-26 ENCOUNTER — OFFICE VISIT (OUTPATIENT)
Dept: FAMILY MEDICINE | Facility: CLINIC | Age: 39
End: 2024-02-26
Payer: MEDICARE

## 2024-02-26 VITALS
OXYGEN SATURATION: 97 % | BODY MASS INDEX: 40.12 KG/M2 | DIASTOLIC BLOOD PRESSURE: 94 MMHG | WEIGHT: 264.75 LBS | SYSTOLIC BLOOD PRESSURE: 134 MMHG | HEART RATE: 71 BPM | HEIGHT: 68 IN

## 2024-02-26 DIAGNOSIS — K75.81 LIVER CIRRHOSIS SECONDARY TO NASH: ICD-10-CM

## 2024-02-26 DIAGNOSIS — K74.60 LIVER CIRRHOSIS SECONDARY TO NASH: ICD-10-CM

## 2024-02-26 DIAGNOSIS — E66.01 CLASS 3 SEVERE OBESITY DUE TO EXCESS CALORIES WITH SERIOUS COMORBIDITY AND BODY MASS INDEX (BMI) OF 40.0 TO 44.9 IN ADULT: Primary | ICD-10-CM

## 2024-02-26 PROCEDURE — 99213 OFFICE O/P EST LOW 20 MIN: CPT | Mod: S$GLB,,, | Performed by: STUDENT IN AN ORGANIZED HEALTH CARE EDUCATION/TRAINING PROGRAM

## 2024-02-26 PROCEDURE — 99999 PR PBB SHADOW E&M-EST. PATIENT-LVL IV: CPT | Mod: PBBFAC,,, | Performed by: STUDENT IN AN ORGANIZED HEALTH CARE EDUCATION/TRAINING PROGRAM

## 2024-02-26 RX ORDER — SEMAGLUTIDE 0.5 MG/.5ML
0.5 INJECTION, SOLUTION SUBCUTANEOUS
Qty: 4 EACH | Refills: 11 | Status: SHIPPED | OUTPATIENT
Start: 2024-02-26

## 2024-02-26 NOTE — PROGRESS NOTES
Name: Hussein Doyle  MRN: [id number]  : 1985  PCP: Rehan Mazariegos MD    Subjective   Patient is here for follow up.     Review of Systems   Unable to perform ROS: Psychiatric disorder   Cardiovascular:  Negative for palpitations and leg swelling.   Neurological:  Negative for dizziness.       Patient Active Problem List   Diagnosis    Autistic disorder, active    Impulse control disorder    Developmental delay, moderate    Neurogenic bladder    HALEY on CPAP    Gout, chronic    Seizure disorder    Pervasive developmental disorder, active    Chronic constipation    OCD (obsessive compulsive disorder)    Hallux, valgus, congenital    TRAYLOR (nonalcoholic steatohepatitis)    Dysphagia    Liver cirrhosis secondary to TRAYLOR    Class 3 severe obesity due to excess calories with serious comorbidity and body mass index (BMI) of 40.0 to 44.9 in adult    Osteoporosis    Co-occurrence of multiple psychiatric disorders    History of renal cell carcinoma    Nocturnal leg movements    Onychomycosis    Rash in adult    Skin tag    Hammer toe of left foot       Health Maintenance Due   Topic Date Due    Pneumococcal Vaccines (Age 0-64) (1 of 2 - PCV) Never done    HIV Screening  Never done       Objective   Vitals:    24 0942   BP: (!) 134/94   Pulse: 71       Physical Exam  Constitutional:       General: He is not in acute distress.     Appearance: Normal appearance. He is well-developed.   HENT:      Head: Normocephalic and atraumatic.      Right Ear: External ear normal.      Left Ear: External ear normal.   Eyes:      Conjunctiva/sclera: Conjunctivae normal.   Pulmonary:      Effort: Pulmonary effort is normal. No respiratory distress.   Abdominal:      General: Abdomen is flat. There is no distension.   Musculoskeletal:         General: No swelling or deformity.      Right lower leg: No edema.      Left lower leg: No edema.   Skin:     General: Skin is warm and dry.      Coloration: Skin is not jaundiced.    Neurological:      Mental Status: He is alert and oriented to person, place, and time. Mental status is at baseline.   Psychiatric:         Attention and Perception: Attention and perception normal.         Mood and Affect: Mood normal.         Speech: Speech normal.         Behavior: Behavior normal. Behavior is cooperative.         Thought Content: Thought content normal.         Cognition and Memory: Cognition normal.         Judgment: Judgment normal.         Assessment & Plan   1. Class 3 severe obesity due to excess calories with serious comorbidity and body mass index (BMI) of 40.0 to 44.9 in adult  Assessment & Plan:  Poorly controlled. Family has been working on several restrictions (mostly reducing the amount he eats at one time but not the frequency at which he eats as he likes eating on a schedule). Weight loss has been difficult though. Hepatology asked me about starting GLP-1 agonist to prevent further progression of cirrhosis.    Orders:  -     semaglutide, weight loss, (WEGOVY) 0.5 mg/0.5 mL PnIj; Inject 0.5 mg into the skin every 7 days.  Dispense: 4 each; Refill: 11    2. Liver cirrhosis secondary to TRAYLOR  Assessment & Plan:  See plan for obesity.          Follow up in 4 months.     Rehan Mazariegos MD  02/26/2024

## 2024-02-26 NOTE — ASSESSMENT & PLAN NOTE
Poorly controlled. Family has been working on several restrictions (mostly reducing the amount he eats at one time but not the frequency at which he eats as he likes eating on a schedule). Weight loss has been difficult though. Hepatology asked me about starting GLP-1 agonist to prevent further progression of cirrhosis.

## 2024-02-27 ENCOUNTER — DOCUMENTATION ONLY (OUTPATIENT)
Dept: FAMILY MEDICINE | Facility: CLINIC | Age: 39
End: 2024-02-27

## 2024-02-27 NOTE — PROGRESS NOTES
denied  ru Nunes (Key: [id number])  Wegovy 0.5MG/0.5ML auto-injectors     Form  [employer] Medicare Part D Electronic Prior Authorization Form (2017 NCPDP)

## 2024-03-01 ENCOUNTER — PATIENT MESSAGE (OUTPATIENT)
Dept: PSYCHIATRY | Facility: CLINIC | Age: 39
End: 2024-03-01
Payer: MEDICARE

## 2024-03-04 ENCOUNTER — PATIENT MESSAGE (OUTPATIENT)
Dept: FAMILY MEDICINE | Facility: CLINIC | Age: 39
End: 2024-03-04
Payer: MEDICARE

## 2024-03-05 ENCOUNTER — PATIENT MESSAGE (OUTPATIENT)
Dept: PSYCHIATRY | Facility: CLINIC | Age: 39
End: 2024-03-05
Payer: MEDICARE

## 2024-03-05 ENCOUNTER — OFFICE VISIT (OUTPATIENT)
Dept: PSYCHIATRY | Facility: CLINIC | Age: 39
End: 2024-03-05
Payer: COMMERCIAL

## 2024-03-05 DIAGNOSIS — F42.9 OBSESSIVE-COMPULSIVE DISORDER, UNSPECIFIED TYPE: Primary | ICD-10-CM

## 2024-03-05 DIAGNOSIS — F63.9 IMPULSE CONTROL DISORDER: ICD-10-CM

## 2024-03-05 DIAGNOSIS — F84.9 PERVASIVE DEVELOPMENTAL DISORDER, ACTIVE: ICD-10-CM

## 2024-03-05 DIAGNOSIS — F41.9 ANXIETY DISORDER, UNSPECIFIED TYPE: ICD-10-CM

## 2024-03-05 PROCEDURE — 1159F MED LIST DOCD IN RCRD: CPT | Mod: CPTII,95,, | Performed by: PSYCHOLOGIST

## 2024-03-05 PROCEDURE — 90833 PSYTX W PT W E/M 30 MIN: CPT | Mod: 95,,, | Performed by: PSYCHOLOGIST

## 2024-03-05 PROCEDURE — 99214 OFFICE O/P EST MOD 30 MIN: CPT | Mod: 95,,, | Performed by: PSYCHOLOGIST

## 2024-03-05 RX ORDER — HYDROXYZINE PAMOATE 25 MG/1
25 CAPSULE ORAL
Qty: 60 CAPSULE | Refills: 2 | Status: SHIPPED | OUTPATIENT
Start: 2024-03-05

## 2024-03-05 NOTE — PROGRESS NOTES
"The patient location is:  [place], [place]  The patient location [place] is: [place]  The patient phone number is: [phone]  Visit type: Virtual visit with synchronous audio and video  Each patient to whom he or she provides medical services by telemedicine is:  (1) informed of the relationship between the physician and patient and the respective role of any other health care provider with respect to management of the patient; and (2) notified that he or she may decline to receive medical services by telemedicine and may withdraw from such care at any time.     Outpatient Psychiatry Follow-Up Visit    Clinical Status of Patient: Outpatient (Ambulatory)  03/05/2024     Chief Complaint: OCD, anxiety, impulse control, PDD      Interval History and Content of Current Session:  Interim Events/Subjective Report/Content of Current Session:  follow-up appointment with mother and caregiver.    Pt is a 37 y/o male with past psychiatric hx of OCD, anxiety, impulse control, PDD who presents for follow-up treatment. Pt's mother reported that she had some recent falls from "an allergy to a medication." Stated that she was in the hospital for 4 days, has a collar immobilization device and using a walker. Had an MRI and will need surgery on C spine. Mother stated that these changes are resulting in anxiety for pt. Stated that he has increased negative self-talk and picking scabs. Pt has increased interaction with home health care, now at 24/7. Mother stated that her and care team are trying to keep environment consistent to reduce reactivity. Took him to be evaluated by urology due to passing blood. Will start botox treatments.     Past Psychiatric hx: s/e's to mult prev meds to include zyprexa (wgt gain), lexapro 20mg po qam (anxiety and to suppress sex drive), buspar 10mg po BID, xanax 0.5mg po daily PRN anxiety    Past Medical hx:   Past Medical History:   Diagnosis Date    Anxiety     Bowel obstruction     pt mother " denies    GERD (gastroesophageal reflux disease)     Grand mal seizure     Hepatic encephalopathy     Mastoiditis     Otitis media     Paraplegia     Recurrent UTI     Renal cancer 2010    Right RCC    Scoliosis     paraplegia    Sepsis due to Escherichia coli without acute organ dysfunction 02/02/2023    Stroke     one week old    Tardive dyskinesia         Interim hx:  Medication changes last visit: decrease fluvoxamine back to 150 mg BID  Anxiety: stable  Depression: stable     Denies suicidal/homicidal ideations.  Denies hopelessness/worthlessness.    Denies auditory/visual hallucinations      Alcohol: Pt denied  Drug: Pt denied  Caffeine: Not assessed  Tobacco: Pt denied      Review of Systems   PSYCHIATRIC: Pertinent items are noted in the narrative.        CONSTITUTIONAL: weight stable    Past Medical, Family and Social History: The patient's past medical, family and social history have been reviewed and updated as appropriate within the electronic medical record. See encounter notes.     Current Psychiatric Medication:  fluvoxamine 150 mg BID, oxcarbazepine 300 mg BID (per neuro)     Compliance: yes      Side effects: Pt denied     Risk Parameters:  Patient reports no suicidal ideation  Patient reports no homicidal ideation  Patient reports no self-injurious behavior  Patient reports no violent behavior     Exam (detailed: at least 9 elements; comprehensive: all 15 elements)   Constitutional  Vitals:  Most recent vital signs, dated less than 90 days prior to this appointment, were reviewed. BP: ()/()   Arterial Line BP: ()/()       General:  unremarkable, age appropriate, casual attire, good eye contact, good rapport       Musculoskeletal  Muscle Strength/Tone:  no flaccidity, no tremor    Gait & Station:  normal      Psychiatric                       Speech:  normal tone, normal rate, rhythm, and volume   Mood & Affect:    Mildly anxious         Thought Process:   Goal directed; Linear    Associations:    intact   Thought Content:   No SI/HI, delusions, or paranoia, no AV/VH   Insight & Judgement:   Good, adequate to circumstances   Orientation:   grossly intact; alert and oriented x 4    Memory:  intact for content of interview    Language:  grossly intact, can repeat    Attention Span  : Grossly intact for content of interview   Fund of Knowledge:   intact and appropriate to age and level of education        Assessment and Diagnosis   Status/Progress: Based on the examination today, the patient's problem(s) is/are adequately controlled.  New problems have not been presented today. Co-morbidities are not complicating management of the primary condition. There are no active rule-out diagnoses for this patient at this time.      Impression: Pt's frequency of vocalizing negative statements appear to have increased with recent stressor of mother's injury. Will start a trial of hydroxyzine PRN for anxiety during this transition with mother's health treatments. Discussed coping strategies. Will continue with other medications as is for now and monitor moving forward.     Diagnosis:   1. Obsessive Compulsive Disorder   2. Anxiety Disorder  3. Impulse Control Disorder  4. Pervasive Developmental Disorder  Intervention/Counseling/Treatment Plan   Medication Management:      1. fluvoxamine 150 mg BID    2. Start trial of hydroxyzine 25 mg PRN    3. Oxcarbazepine 300 mg BID (managed by neuro)     4. Call to report any worsening of symptoms or problems with the medication. Pt instructed to go to ER with thoughts of harming self, others     5. Patient given contact # for psychotherapists at [facility] and also instructed to check with insurance for list of providers.     Psychotherapy: 20 minutes  Target symptoms: anxiety  Why chosen therapy is appropriate versus another modality: CBT used; relevant to diagnosis, patient responds to this modality  Outcome monitoring methods: self-report, observation  Therapeutic  intervention type: Cognitive Behavioral Therapy, Solution-focused  Topics discussed/themes: building skills sets for symptom management, symptom recognition, nutrition, exercise  The patient's response to the intervention is accepting  Patient's response to treatment is: good.   The patient's progress toward treatment goals: stable     Return to clinic: 2 months    -Cognitive-Behavioral/Supportive therapy and psychoeducation provided  -R/B/SE's of medications discussed with the pt who expresses understanding and chooses to take medications as prescribed.   -Pt instructed to call clinic, 911 or go to nearest emergency room if sxs worsen or pt is in   crisis. The pt expresses understanding.    Praveen Brush, PhD, MP     Antidepressant/Antianxiety Medication Initiation:  Patient informed of risks, benefits, and potential side effects of medication and accepts informed consent.  Common side effects include nausea, fatigue, headache, insomnia., Specifically discussed the possibility of new or worsening suicidal thoughts/depression.  Patient instructed to stop the medication immediately and seek urgent treatment if this occurs. Patient instructed not to abruptly discontinue medication without physician guidance except in cases of sudden onset or worsening of SI.

## 2024-03-06 ENCOUNTER — PATIENT MESSAGE (OUTPATIENT)
Dept: PSYCHIATRY | Facility: CLINIC | Age: 39
End: 2024-03-06
Payer: MEDICARE

## 2024-03-12 RX ORDER — LACTULOSE 10 G/15ML
SOLUTION ORAL; RECTAL
Qty: 473 ML | Refills: 3 | Status: SHIPPED | OUTPATIENT
Start: 2024-03-12

## 2024-03-12 NOTE — TELEPHONE ENCOUNTER
Care Due:                  Date            Visit Type   Department     Provider  --------------------------------------------------------------------------------                                EP -                              [facility]  Last Visit: 02-      CARE ([facility])   MEDICINE       Rehan Mazariegos                              EP -                              PRIMARY      [facility]  Next Visit: 06-      CARE ([facility])   ALIE Mazariegos                                                            Last  Test          Frequency    Reason                     Performed    Due Date  --------------------------------------------------------------------------------    HBA1C.......  6 months...  semaglutide,.............  10-   04-    [facility] Embedded Care Due Messages. Reference number: [id number].   3/12/2024 10:01:45 AM CDT

## 2024-03-25 PROBLEM — N39.41 URGENCY INCONTINENCE: Status: ACTIVE | Noted: 2024-03-25

## 2024-03-26 ENCOUNTER — PATIENT MESSAGE (OUTPATIENT)
Dept: HEPATOLOGY | Facility: CLINIC | Age: 39
End: 2024-03-26
Payer: MEDICARE

## 2024-03-28 ENCOUNTER — TELEPHONE (OUTPATIENT)
Dept: HEPATOLOGY | Facility: CLINIC | Age: 39
End: 2024-03-28
Payer: MEDICARE

## 2024-03-28 NOTE — TELEPHONE ENCOUNTER
Per patient's mother..  I hope to schedule him in about 3 or 4 weeks which my recovery will be far enough...for me to be able to be with him...      Pt has an appt kurtis fr 6/27 at [facility].  Nayeli

## 2024-04-04 ENCOUNTER — PATIENT MESSAGE (OUTPATIENT)
Dept: FAMILY MEDICINE | Facility: CLINIC | Age: 39
End: 2024-04-04
Payer: MEDICARE

## 2024-04-04 NOTE — PROGRESS NOTES
Chart reviewed.   Immunizations reconciled.    updated.     Mom calling- mom is concerned that she has a UTI, struggling to get a clean catch.  Her acid reflux has also gotten really bad since Monday, she is going horse.  Discussed with ELAINA STOUT, mom advised to go to ED for concerns of UTI and not able to collect urine.  Mom has put in a call to GI to address acid reflux.  Mom agreed with plan.     [phone]

## 2024-04-17 ENCOUNTER — PATIENT MESSAGE (OUTPATIENT)
Dept: FAMILY MEDICINE | Facility: CLINIC | Age: 39
End: 2024-04-17
Payer: MEDICARE

## 2024-04-22 ENCOUNTER — TELEPHONE (OUTPATIENT)
Dept: FAMILY MEDICINE | Facility: CLINIC | Age: 39
End: 2024-04-22
Payer: MEDICARE

## 2024-04-22 NOTE — TELEPHONE ENCOUNTER
Called pts mother, she stated that the caregiver went home on Friday being sick. Went home today again sick, stated she has tested positive for previous exposure.     Pt is already taking antibiotic for UTI already but now has bad cough    Got scheduled for virtual tomorrow.

## 2024-04-22 NOTE — TELEPHONE ENCOUNTER
----- Message from Jacob Bryan sent at 4/22/2024  1:29 PM CDT -----  Type:  Same Day Appointment Request    Caller is requesting a same day appointment.  Caller declined first available appointment listed below.      Name of Caller:  pt motherEli  When is the first available appointment?  none  Symptoms:  COVID  Best Call Back Number:  [phone]   Additional Information:   thank you

## 2024-04-23 ENCOUNTER — OFFICE VISIT (OUTPATIENT)
Dept: FAMILY MEDICINE | Facility: CLINIC | Age: 39
End: 2024-04-23
Payer: MEDICARE

## 2024-04-23 DIAGNOSIS — J06.9 VIRAL URI WITH COUGH: Primary | ICD-10-CM

## 2024-04-23 PROCEDURE — 99213 OFFICE O/P EST LOW 20 MIN: CPT | Mod: 95,,, | Performed by: STUDENT IN AN ORGANIZED HEALTH CARE EDUCATION/TRAINING PROGRAM

## 2024-04-23 RX ORDER — PREDNISONE 20 MG/1
20 TABLET ORAL DAILY
Qty: 5 TABLET | Refills: 0 | Status: SHIPPED | OUTPATIENT
Start: 2024-04-23 | End: 2024-04-28

## 2024-04-23 NOTE — PROGRESS NOTES
The patient location is: [place]  The chief complaint leading to consultation is: Cough    Visit type: audiovisual    Notes:    HPI  Patient presents with cough presents since 4/19. Of note, he has been taking a course of Augmentin since 4/19 for a UTI. No improvement in symptoms with the antibiotics.     Answers submitted by the patient for this visit:  Cough Questionnaire (Submitted on 4/23/2024)  Chief Complaint: Cough  Chronicity: new  Onset: in the past 7 days  Progression since onset: rapidly worsening  Frequency: every few minutes  Cough characteristics: non-productive  ear congestion: No  heartburn: No  hemoptysis: No  nasal congestion: Yes  sweats: Yes  weight loss: No  Aggravated by: lying down, stress  Risk factors for lung disease: smoking/tobacco exposure  asthma: No  bronchiectasis: No  bronchitis: No  COPD: No  emphysema: No  pneumonia: No  Treatments tried: OTC cough suppressant  Improvement on treatment: no relief      Review of Systems   Constitutional:  Positive for diaphoresis. Negative for chills and fever.   HENT:  Positive for postnasal drip and sore throat. Negative for ear pain and rhinorrhea.    Respiratory:  Positive for cough. Negative for shortness of breath and wheezing.    Cardiovascular:  Negative for chest pain.   Musculoskeletal:  Negative for myalgias.   Skin:  Negative for rash.   Allergic/Immunologic: Negative for environmental allergies.   Neurological:  Negative for headaches.       Objective:  Physical Exam  Vitals reviewed: Caretaker present - patient was off screen.         Plan:  1. Viral URI with cough  -     predniSONE (DELTASONE) 20 MG tablet; Take 1 tablet (20 mg total) by mouth once daily. for 5 days  Dispense: 5 tablet; Refill: 0    Unlikely to be bacterial given no improvement with Augmentin.     Follow up as needed.     Face to Face time with patient: 10 minutes  12 minutes of total time spent on the encounter, which includes face to face time and non-face to face  time preparing to see the patient (eg, review of tests), Obtaining and/or reviewing separately obtained history, Documenting clinical information in the electronic or other health record, Independently interpreting results (not separately reported) and communicating results to the patient/family/caregiver, or Care coordination (not separately reported).     Each patient to whom he or she provides medical services by telemedicine is:  (1) informed of the relationship between the physician and patient and the respective role of any other health care provider with respect to management of the patient; and (2) notified that he or she may decline to receive medical services by telemedicine and may withdraw from such care at any time.

## 2024-04-24 ENCOUNTER — PATIENT MESSAGE (OUTPATIENT)
Dept: FAMILY MEDICINE | Facility: CLINIC | Age: 39
End: 2024-04-24
Payer: MEDICARE

## 2024-05-06 ENCOUNTER — PATIENT MESSAGE (OUTPATIENT)
Dept: PSYCHIATRY | Facility: CLINIC | Age: 39
End: 2024-05-06
Payer: MEDICARE

## 2024-05-13 ENCOUNTER — PATIENT MESSAGE (OUTPATIENT)
Dept: PSYCHIATRY | Facility: CLINIC | Age: 39
End: 2024-05-13
Payer: MEDICARE

## 2024-05-22 ENCOUNTER — PATIENT MESSAGE (OUTPATIENT)
Dept: FAMILY MEDICINE | Facility: CLINIC | Age: 39
End: 2024-05-22
Payer: MEDICARE

## 2024-05-22 ENCOUNTER — PATIENT MESSAGE (OUTPATIENT)
Dept: HEPATOLOGY | Facility: CLINIC | Age: 39
End: 2024-05-22
Payer: MEDICARE

## 2024-05-22 NOTE — TELEPHONE ENCOUNTER
The patient's mother (Eli) was contacted to schedule 3 MRIs.  An appointment has been scheduled on Tuesday, June 18, 2024 at 11:15AM.  Mother confirmed.

## 2024-05-28 ENCOUNTER — OFFICE VISIT (OUTPATIENT)
Dept: PSYCHIATRY | Facility: CLINIC | Age: 39
End: 2024-05-28
Payer: MEDICAID

## 2024-05-28 DIAGNOSIS — F41.9 ANXIETY DISORDER, UNSPECIFIED TYPE: ICD-10-CM

## 2024-05-28 DIAGNOSIS — F63.9 IMPULSE CONTROL DISORDER: ICD-10-CM

## 2024-05-28 DIAGNOSIS — F42.9 OBSESSIVE-COMPULSIVE DISORDER, UNSPECIFIED TYPE: Primary | ICD-10-CM

## 2024-05-28 DIAGNOSIS — F84.9 PERVASIVE DEVELOPMENTAL DISORDER, ACTIVE: ICD-10-CM

## 2024-05-28 PROCEDURE — 90833 PSYTX W PT W E/M 30 MIN: CPT | Mod: 95,,, | Performed by: PSYCHOLOGIST

## 2024-05-28 PROCEDURE — 1159F MED LIST DOCD IN RCRD: CPT | Mod: CPTII,95,, | Performed by: PSYCHOLOGIST

## 2024-05-28 PROCEDURE — 99214 OFFICE O/P EST MOD 30 MIN: CPT | Mod: 95,,, | Performed by: PSYCHOLOGIST

## 2024-05-28 NOTE — PROGRESS NOTES
"The patient location is:  [place], [place]  The patient location [place] is: [place]  The patient phone number is: [phone]  Visit type: Virtual visit with synchronous audio and video  Each patient to whom he or she provides medical services by telemedicine is:  (1) informed of the relationship between the physician and patient and the respective role of any other health care provider with respect to management of the patient; and (2) notified that he or she may decline to receive medical services by telemedicine and may withdraw from such care at any time.     Outpatient Psychiatry Follow-Up Visit    Clinical Status of Patient: Outpatient (Ambulatory)  05/28/2024     Chief Complaint: OCD, anxiety, impulse control, PDD      Interval History and Content of Current Session:  Interim Events/Subjective Report/Content of Current Session:  follow-up appointment with mother and caregiver.    Pt is a 37 y/o male with past psychiatric hx of OCD, anxiety, impulse control, PDD who presents for follow-up treatment. Pt reported that he had a good time on vacation in [place]. Pt's mother reported some improvement in anxiety. Continues to have vocalizations of trauma from childhood. Has started to masturbate, which the mother and caregivers sequester to "time alone" in the shower. Mother noted that this restarted during her health concerns.     Past Psychiatric hx: s/e's to mult prev meds to include zyprexa (wgt gain), lexapro 20mg po qam (anxiety and to suppress sex drive), buspar 10mg po BID, xanax 0.5mg po daily PRN anxiety    Past Medical hx:   Past Medical History:   Diagnosis Date    Anxiety     Autism     Bowel obstruction     pt mother denies    GERD (gastroesophageal reflux disease)     Grand mal seizure     Hepatic encephalopathy     Mastoiditis     Otitis media     Paraplegia     Recurrent UTI     Renal cancer 2010    Right RCC    Scoliosis     paraplegia    Sepsis due to Escherichia coli without acute organ " dysfunction 02/02/2023    Sleep apnea     uses CPAP    Stroke     one week old    Tardive dyskinesia         Interim hx:  Medication changes last visit: Start trial of hydroxyzine 25 mg PRN  Anxiety: stable  Depression: stable     Denies suicidal/homicidal ideations.  Denies hopelessness/worthlessness.    Denies auditory/visual hallucinations      Alcohol: Pt denied  Drug: Pt denied  Caffeine: Not assessed  Tobacco: Pt denied      Review of Systems   PSYCHIATRIC: Pertinent items are noted in the narrative.        CONSTITUTIONAL: weight stable    Past Medical, Family and Social History: The patient's past medical, family and social history have been reviewed and updated as appropriate within the electronic medical record. See encounter notes.     Current Psychiatric Medication:  fluvoxamine 150 mg BID, oxcarbazepine 300 mg BID (per neuro), hydroxyzine 25 mg PRN     Compliance: yes      Side effects: Pt denied     Risk Parameters:  Patient reports no suicidal ideation  Patient reports no homicidal ideation  Patient reports no self-injurious behavior  Patient reports no violent behavior     Exam (detailed: at least 9 elements; comprehensive: all 15 elements)   Constitutional  Vitals:  Most recent vital signs, dated less than 90 days prior to this appointment, were reviewed. BP: ()/()   Arterial Line BP: ()/()       General:  unremarkable, age appropriate, casual attire, good eye contact, good rapport       Musculoskeletal  Muscle Strength/Tone:  no flaccidity, no tremor    Gait & Station:  normal      Psychiatric                       Speech:  normal tone, normal rate, rhythm, and volume   Mood & Affect:    Mildly anxious         Thought Process:   Goal directed; Linear    Associations:   intact   Thought Content:   No SI/HI, delusions, or paranoia, no AV/VH   Insight & Judgement:   Good, adequate to circumstances   Orientation:   grossly intact; alert and oriented x 4    Memory:  intact for content of interview     Language:  grossly intact, can repeat    Attention Span  : Grossly intact for content of interview   Fund of Knowledge:   intact and appropriate to age and level of education        Assessment and Diagnosis   Status/Progress: Based on the examination today, the patient's problem(s) is/are adequately controlled.  New problems have not been presented today. Co-morbidities are not complicating management of the primary condition. There are no active rule-out diagnoses for this patient at this time.      Impression: Pt's frequency of vocalizing negative statements appear to have increased with recent stressor of mother's injury. Additionally, increased frequency of masturbation may be a coping strategy for anxiety related mother's injury. Discussed behavioral strategies and medication options to manage. Will continue with other medications as is for now and monitor moving forward.     Diagnosis:   1. Obsessive Compulsive Disorder   2. Anxiety Disorder  3. Impulse Control Disorder  4. Pervasive Developmental Disorder  Intervention/Counseling/Treatment Plan   Medication Management:      1. fluvoxamine 150 mg BID    2. hydroxyzine 25 mg PRN    3. Oxcarbazepine 300 mg BID (managed by neuro)     4. Call to report any worsening of symptoms or problems with the medication. Pt instructed to go to ER with thoughts of harming self, others     5. Patient given contact # for psychotherapists at [facility] and also instructed to check with insurance for list of providers.     Psychotherapy: 20 minutes  Target symptoms: anxiety  Why chosen therapy is appropriate versus another modality: CBT used; relevant to diagnosis, patient responds to this modality  Outcome monitoring methods: self-report, observation  Therapeutic intervention type: Cognitive Behavioral Therapy, Solution-focused  Topics discussed/themes: building skills sets for symptom management, symptom recognition, nutrition, exercise  The patient's response to the  intervention is accepting  Patient's response to treatment is: good.   The patient's progress toward treatment goals: stable     Return to clinic: 2 months    -Cognitive-Behavioral/Supportive therapy and psychoeducation provided  -R/B/SE's of medications discussed with the pt who expresses understanding and chooses to take medications as prescribed.   -Pt instructed to call clinic, 911 or go to nearest emergency room if sxs worsen or pt is in   crisis. The pt expresses understanding.    Praveen Brush, PhD, MP     Antidepressant/Antianxiety Medication Initiation:  Patient informed of risks, benefits, and potential side effects of medication and accepts informed consent.  Common side effects include nausea, fatigue, headache, insomnia., Specifically discussed the possibility of new or worsening suicidal thoughts/depression.  Patient instructed to stop the medication immediately and seek urgent treatment if this occurs. Patient instructed not to abruptly discontinue medication without physician guidance except in cases of sudden onset or worsening of SI.

## 2024-06-13 DIAGNOSIS — R13.10 DYSPHAGIA, UNSPECIFIED TYPE: ICD-10-CM

## 2024-06-13 RX ORDER — PANTOPRAZOLE SODIUM 40 MG/1
TABLET, DELAYED RELEASE ORAL
Qty: 90 TABLET | Refills: 3 | Status: SHIPPED | OUTPATIENT
Start: 2024-06-13

## 2024-06-13 NOTE — TELEPHONE ENCOUNTER
Provider Staff:  Action required for this patient    Requires labs      Please see care gap opportunities below in Care Due Message.    Thanks!  [facility]     Appointments      Date Provider   Last Visit   4/23/2024 Rehan Mazariegos MD   Next Visit   6/25/2024 Rehan Mazariegos MD     Refill Decision Note   Hussein Doyle  is requesting a refill authorization.    Brief Assessment and Rationale for Refill:   Approve       Medication Therapy Plan:         Comments:     Note composed:10:48 AM 06/13/2024

## 2024-06-13 NOTE — TELEPHONE ENCOUNTER
Care Due:                  Date            Visit Type   Department     Provider  --------------------------------------------------------------------------------                                ESTABLISHED                              PATIENT -    [facility]  Last Visit: 04-      VIRTUAL      MEDICINE       Rehan Mazariegos                               -                              PRIMARY      [facility]  Next Visit: 06-      CARE ([facility])   MEDICINE       Rehan Mazariegos                                                            Last  Test          Frequency    Reason                     Performed    Due Date  --------------------------------------------------------------------------------    HBA1C.......  6 months...  semaglutide,.............  10-   04-    Uric Acid...  12 months..  allopurinoL..............  Not Found    Overdue    [employer] Embedded Care Due Messages. Reference number: [id number].   6/13/2024 9:30:00 AM CDT

## 2024-06-17 ENCOUNTER — TELEPHONE (OUTPATIENT)
Dept: NEUROLOGY | Facility: CLINIC | Age: 39
End: 2024-06-17
Payer: MEDICARE

## 2024-06-17 DIAGNOSIS — R56.9 CONVULSIONS, UNSPECIFIED CONVULSION TYPE: ICD-10-CM

## 2024-06-17 DIAGNOSIS — G40.909 SEIZURE DISORDER: Primary | ICD-10-CM

## 2024-06-20 ENCOUNTER — PATIENT MESSAGE (OUTPATIENT)
Dept: HEPATOLOGY | Facility: CLINIC | Age: 39
End: 2024-06-20
Payer: MEDICARE

## 2024-06-20 DIAGNOSIS — F42.8 OTHER OBSESSIVE-COMPULSIVE DISORDERS: ICD-10-CM

## 2024-06-20 DIAGNOSIS — F63.9 IMPULSE CONTROL DISORDER: ICD-10-CM

## 2024-06-20 DIAGNOSIS — F42.9 OBSESSIVE-COMPULSIVE DISORDER, UNSPECIFIED TYPE: ICD-10-CM

## 2024-06-20 RX ORDER — FLUVOXAMINE MALEATE 100 MG/1
TABLET, COATED ORAL
Qty: 180 TABLET | Refills: 1 | Status: SHIPPED | OUTPATIENT
Start: 2024-06-20

## 2024-06-22 ENCOUNTER — PATIENT MESSAGE (OUTPATIENT)
Dept: PSYCHIATRY | Facility: CLINIC | Age: 39
End: 2024-06-22
Payer: MEDICARE

## 2024-06-22 DIAGNOSIS — F63.9 IMPULSE CONTROL DISORDER: ICD-10-CM

## 2024-06-22 DIAGNOSIS — F42.8 OTHER OBSESSIVE-COMPULSIVE DISORDERS: ICD-10-CM

## 2024-06-22 DIAGNOSIS — F42.9 OBSESSIVE-COMPULSIVE DISORDER, UNSPECIFIED TYPE: ICD-10-CM

## 2024-06-24 ENCOUNTER — PATIENT MESSAGE (OUTPATIENT)
Dept: FAMILY MEDICINE | Facility: CLINIC | Age: 39
End: 2024-06-24
Payer: MEDICARE

## 2024-06-25 ENCOUNTER — HOSPITAL ENCOUNTER (OUTPATIENT)
Dept: RADIOLOGY | Facility: HOSPITAL | Age: 39
Discharge: HOME | End: 2024-06-25
Attending: NURSE PRACTITIONER
Payer: MEDICARE

## 2024-06-25 ENCOUNTER — OFFICE VISIT (OUTPATIENT)
Dept: FAMILY MEDICINE | Facility: CLINIC | Age: 39
End: 2024-06-25
Payer: MEDICARE

## 2024-06-25 ENCOUNTER — PATIENT MESSAGE (OUTPATIENT)
Dept: FAMILY MEDICINE | Facility: CLINIC | Age: 39
End: 2024-06-25

## 2024-06-25 VITALS
HEART RATE: 74 BPM | HEIGHT: 71 IN | SYSTOLIC BLOOD PRESSURE: 142 MMHG | BODY MASS INDEX: 37.38 KG/M2 | OXYGEN SATURATION: 94 % | DIASTOLIC BLOOD PRESSURE: 90 MMHG | WEIGHT: 267 LBS

## 2024-06-25 DIAGNOSIS — L08.9 SKIN INFECTION: ICD-10-CM

## 2024-06-25 DIAGNOSIS — E66.01 CLASS 2 SEVERE OBESITY DUE TO EXCESS CALORIES WITH SERIOUS COMORBIDITY AND BODY MASS INDEX (BMI) OF 37.0 TO 37.9 IN ADULT: ICD-10-CM

## 2024-06-25 DIAGNOSIS — E66.01 CLASS 3 SEVERE OBESITY DUE TO EXCESS CALORIES WITH SERIOUS COMORBIDITY AND BODY MASS INDEX (BMI) OF 40.0 TO 44.9 IN ADULT: Primary | ICD-10-CM

## 2024-06-25 DIAGNOSIS — K74.60 CIRRHOSIS OF LIVER WITHOUT ASCITES, UNSPECIFIED HEPATIC CIRRHOSIS TYPE: ICD-10-CM

## 2024-06-25 DIAGNOSIS — N31.9 HIGH COMPLIANCE BLADDER: ICD-10-CM

## 2024-06-25 DIAGNOSIS — I10 PRIMARY HYPERTENSION: ICD-10-CM

## 2024-06-25 DIAGNOSIS — Z46.2 ENCOUNTER FOR FITTING OF CUSTOM EAR PLUGS: ICD-10-CM

## 2024-06-25 DIAGNOSIS — H72.93 UNSPECIFIED PERFORATION OF TYMPANIC MEMBRANE, BILATERAL: ICD-10-CM

## 2024-06-25 DIAGNOSIS — G40.909 SEIZURE DISORDER: Chronic | ICD-10-CM

## 2024-06-25 PROCEDURE — 1159F MED LIST DOCD IN RCRD: CPT | Mod: CPTII,S$GLB,, | Performed by: STUDENT IN AN ORGANIZED HEALTH CARE EDUCATION/TRAINING PROGRAM

## 2024-06-25 PROCEDURE — 76775 US EXAM ABDO BACK WALL LIM: CPT | Mod: 26,,, | Performed by: RADIOLOGY

## 2024-06-25 PROCEDURE — 99999 PR PBB SHADOW E&M-EST. PATIENT-LVL V: CPT | Mod: PBBFAC,,, | Performed by: STUDENT IN AN ORGANIZED HEALTH CARE EDUCATION/TRAINING PROGRAM

## 2024-06-25 PROCEDURE — 76705 ECHO EXAM OF ABDOMEN: CPT | Mod: 26,,, | Performed by: RADIOLOGY

## 2024-06-25 PROCEDURE — 76705 ECHO EXAM OF ABDOMEN: CPT | Mod: TC,PO

## 2024-06-25 PROCEDURE — 76775 US EXAM ABDO BACK WALL LIM: CPT | Mod: TC,PO

## 2024-06-25 PROCEDURE — 3080F DIAST BP >= 90 MM HG: CPT | Mod: CPTII,S$GLB,, | Performed by: STUDENT IN AN ORGANIZED HEALTH CARE EDUCATION/TRAINING PROGRAM

## 2024-06-25 PROCEDURE — 99214 OFFICE O/P EST MOD 30 MIN: CPT | Mod: S$GLB,,, | Performed by: STUDENT IN AN ORGANIZED HEALTH CARE EDUCATION/TRAINING PROGRAM

## 2024-06-25 PROCEDURE — 3077F SYST BP >= 140 MM HG: CPT | Mod: CPTII,S$GLB,, | Performed by: STUDENT IN AN ORGANIZED HEALTH CARE EDUCATION/TRAINING PROGRAM

## 2024-06-25 PROCEDURE — 3008F BODY MASS INDEX DOCD: CPT | Mod: CPTII,S$GLB,, | Performed by: STUDENT IN AN ORGANIZED HEALTH CARE EDUCATION/TRAINING PROGRAM

## 2024-06-25 RX ORDER — NYSTATIN 100000 [USP'U]/G
POWDER TOPICAL 2 TIMES DAILY
Qty: 60 G | Refills: 11 | Status: SHIPPED | OUTPATIENT
Start: 2024-06-25

## 2024-06-25 RX ORDER — MUPIROCIN CALCIUM 20 MG/G
CREAM TOPICAL 3 TIMES DAILY
Qty: 15 G | Refills: 3 | Status: SHIPPED | OUTPATIENT
Start: 2024-06-25

## 2024-06-25 RX ORDER — FLUVOXAMINE MALEATE 100 MG/1
200 TABLET, COATED ORAL DAILY
Qty: 180 TABLET | Refills: 1 | Status: SHIPPED | OUTPATIENT
Start: 2024-06-25

## 2024-06-25 RX ORDER — AMLODIPINE BESYLATE 2.5 MG/1
2.5 TABLET ORAL DAILY
Qty: 90 TABLET | Refills: 3 | Status: SHIPPED | OUTPATIENT
Start: 2024-06-25 | End: 2025-06-25

## 2024-06-25 RX ORDER — SCOLOPAMINE TRANSDERMAL SYSTEM 1 MG/1
1 PATCH, EXTENDED RELEASE TRANSDERMAL
Qty: 6 PATCH | Refills: 0 | Status: SHIPPED | OUTPATIENT
Start: 2024-06-25

## 2024-06-25 NOTE — ASSESSMENT & PLAN NOTE
Last few blood pressure readings have been elevated - may be related to weight gain. Will start low dose amlodipine. I will check in a few weeks on their home blood pressure readings.

## 2024-06-25 NOTE — ASSESSMENT & PLAN NOTE
No seizures while on Trileptal. Continue medication. We did discuss the addition of or switch to topiramate for possible weight loss in addition to seizure control.

## 2024-06-25 NOTE — ASSESSMENT & PLAN NOTE
Patient's weight has slowly but minimally increased. GLP-1 agonists are not covered. Mother and caretaker have been trying to cook healthier and limit his portion sizes but he has not lost any weight. Phentermine would not be a long term solution. Bariatric surgery is contraindicated due to patient's cognitive function. Plenity would be contraindicated as patient reportedly doesn't stop eating if he's full. At this point, a specialist is needed.

## 2024-06-27 ENCOUNTER — OFFICE VISIT (OUTPATIENT)
Dept: HEPATOLOGY | Facility: CLINIC | Age: 39
End: 2024-06-27
Payer: MEDICARE

## 2024-06-27 DIAGNOSIS — K75.81 LIVER CIRRHOSIS SECONDARY TO NASH: Primary | ICD-10-CM

## 2024-06-27 DIAGNOSIS — R74.8 ELEVATED LIVER ENZYMES: ICD-10-CM

## 2024-06-27 DIAGNOSIS — K75.81 METABOLIC DYSFUNCTION-ASSOCIATED STEATOHEPATITIS (MASH): ICD-10-CM

## 2024-06-27 DIAGNOSIS — E66.01 CLASS 2 SEVERE OBESITY DUE TO EXCESS CALORIES WITH SERIOUS COMORBIDITY AND BODY MASS INDEX (BMI) OF 37.0 TO 37.9 IN ADULT: ICD-10-CM

## 2024-06-27 DIAGNOSIS — K74.60 LIVER CIRRHOSIS SECONDARY TO NASH: Primary | ICD-10-CM

## 2024-06-27 DIAGNOSIS — K82.4 GALLBLADDER POLYP: ICD-10-CM

## 2024-06-27 PROCEDURE — 99214 OFFICE O/P EST MOD 30 MIN: CPT | Mod: 95,,, | Performed by: NURSE PRACTITIONER

## 2024-06-27 NOTE — PATIENT INSTRUCTIONS
Referral to general surgery for gallbladder polyp  Liver labs + ultrasound every 6 months  Look into options for GLPs (Wegovy, Zepbound, Ozempic, Mounjaro)         CIRRHOSIS EDUCATION:  This is a helpful web site about cirrhosis: https://cirrhosiscare.ca/     Because you have cirrhosis, it is extremely important to obtain blood tests and an ultrasound every 6 months to screen for liver cancer (you are at risk for developing liver cancer due to increased scar tissue in the liver).     Signs and symptoms of worsening liver disease include jaundice (yellow skin/eyes), fluid in the abdomen (ascites), and confusion/disorientation/slowed thought processes due to hepatic encephalopathy (toxins building up when the liver is not working properly). You should seek medical attention if any of these things occur. Also, possible bleeding from esophageal varices (blood vessels in the stomach and foodpipe that can burst and cause bleeding). If you have symptoms of vomiting blood, blood in your stool, maroon or black stools, or coffee ground vomit, you should go to the emergency room immediately.     Cirrhosis can increase the risk of impaired liver function or liver failure; however, we will watch your liver function score (MELD score) closely with each clinic visit. A normal MELD score is 6, highest is 40. The MELD score helps to determine when we may need to consider evaluation for liver transplant.     Counseling  -- Strict abstinence from alcohol (includes beer, wine, and/or liquor)  -- Avoid non-steroidal anti-inflammatory drugs (NSAIDs) such as ibuprofen (Motrin, Advil), naproxen (Naprosyn, Aleve), meloxicam (Mobic)   -- Tylenol/acetaminophen is OKAY and should be used as needed for pain, no more than 2000 mg per day  -- Avoid raw shellfish due to the risk of Vibrio vulnificus infection  -- Low salt/sodium diet, less than 2000 mg per day   -- High protein diet to prevent muscle mass loss. Can drink protein shakes (Premier  Protein is a great option because it is very high protein and low sugar). A bedtime snack with protein can also be helpful. Example: peanut butter sandwich/crackers  -- Periodic upper endoscopy (EGD) to screen for varices (enlarged blood vessels) in the esophagus and stomach which can increase risk of bleeding  -- Increased risk of liver cancer associated with cirrhosis; therefore, continued screening with ultrasound (or CT / MRI) and AFP tumor marker every 6 months is recommended.

## 2024-06-27 NOTE — PROGRESS NOTES
[facility] - Established Patient    Last Clinic Visit: 12/27/23      HISTORY     This is a 38 y.o. 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 Mother present for visit who is able to assist with history and updates.    Transaminases consistently elevated since 11/2015, AST>ALT.    Serologic workup has been negative for other etiologies of liver disease. Ferritin elevated, 800-1500 though iron sat normal. No copies of C282Y or H63D to suggest HH. IgG mildly elevated (1677) though other autoimmune markers negative.      Imaging has shown hepatomegaly, fatty liver, splenomegaly.    Risk factors for fatty liver include-  BMI >35  HLD, pre-diabetes    Diagnosed with cirrhosis: Liver biopsy 7/2020  - Cirrhotic liver  - Steatosis with steatohepatitis   - Macrovesicular steatosis 20-30% and microvesicular steatosis 20%  - Trichrome: cirrhosis (Stage 4/4)  - Iron: No deposit (Grade 0/4)    He was previously able to lose ~50 lb and liver enzymes normalized.    Interval history:  Liver enzymes continue to trend up as he has gained weight:  / . Liver function remains normal.    Meds- no longer on lamisil. Remains on daily Bactrim for UTI prophylaxis.      In regard to fatty liver-  Looking into Wegovy coverage with insurance.   No recent lipid panel.  HgbA1c 5.4 last year (though does have h/o pre-diabetes). Hyperglycemia noted on labs.      Current symptoms of hepatic decompensation:              Ascites: no              LE edema: no                Hepatic encephalopathy: difficult to assess given autism and MR; his ammonia was mildly elevated in the past. Takes lactulose, which helps with his constipation.   Mental status at baseline lately.              GI bleeding: no              Jaundice: no    Health Maintenance:  -- HCC screening: [facility] US 6/2024 without focal hepatic lesion; AFP 2.9. Imaging has also consistently shown a gallbladder polyp, currently ~1.1 cm.    --  Variceal screening: EGD 8/17/22 without varices  -- Hepatitis A & B vaccination: needs vaccines        Past medical history, surgical history, problem list, family history, social history, allergies: Reviewed and updated in the appropriate section of the electronic medical record.      Current Outpatient Medications   Medication Sig Dispense Refill    allopurinoL (ZYLOPRIM) 100 MG tablet TAKE ONE TABLET BY MOUTH TWICE DAILY 180 tablet 2    amLODIPine (NORVASC) 2.5 MG tablet Take 1 tablet (2.5 mg total) by mouth once daily. 90 tablet 3    ascorbic acid, vitamin C, 250 mg Chew Take by mouth once daily.      calcium carbonate-vitamin D3 500 mg(1,250mg) -400 unit Tab Take 1 tablet by mouth 2 (two) times a day.       DENTA 5000 PLUS 1.1 % Crea SMARTSIG:By Mouth Morning-Night      fluvoxaMINE (LUVOX) 100 MG tablet Take 2 tablets (200 mg total) by mouth Daily. 180 tablet 1    hydrOXYzine pamoate (VISTARIL) 25 MG Cap Take 1 capsule (25 mg total) by mouth as needed (anxiety). 60 capsule 2    inulin (FIBER GUMMIES ORAL) Take 2 tablets by mouth once daily.       L. ACIDOPHILUS/BIFID. ANIMALIS (CHEWABLE PROBIOTIC ORAL) Take 1 tablet by mouth 2 (two) times daily.       lactulose (CHRONULAC) 10 gram/15 mL solution TAKE 15 mls BY MOUTH THREE TIMES DAILY 473 mL 3    magnesium 30 mg Tab Take 1 tablet by mouth every evening.      MELATONIN ORAL Take 1 tablet by mouth every evening.      mirabegron (MYRBETRIQ ORAL) Take by mouth once daily.      multivitamin capsule Take 1 capsule by mouth every morning.       mupirocin calcium 2% (BACTROBAN) 2 % cream Apply topically 3 (three) times daily. 15 g 3    nystatin (MYCOSTATIN) powder Apply topically 2 (two) times daily. 60 g 11    omega-3 fatty acids/fish oil (FISH OIL-OMEGA-3 FATTY ACIDS) 300-1,000 mg capsule Take 1 capsule by mouth once daily.       OXcarbazepine (TRILEPTAL) 300 MG Tab Take 1 tablet (300 mg total) by mouth 2 (two) times daily. 180 tablet 3    pantoprazole (PROTONIX) 40  MG tablet TAKE ONE TABLET BY MOUTH ONCE DAILY 90 tablet 3    polyethylene glycol (GLYCOLAX) 17 gram PwPk Take 17 g by mouth once daily.       potassium chloride (KLOR-CON) 10 MEQ TbSR Take 10 mEq by mouth every evening.      pramipexole (MIRAPEX) 0.25 MG tablet Take 1 tablet (0.25 mg total) by mouth 3 (three) times daily. 90 tablet 11    scopolamine (TRANSDERM-SCOP) 1.3-1.5 mg (1 mg over 3 days) Place 1 patch onto the skin every 72 hours. 6 patch 0    zinc gluconate 50 mg tablet Take 50 mg by mouth once daily.       No current facility-administered medications for this visit.     Facility-Administered Medications Ordered in Other Visits   Medication Dose Route Frequency Provider Last Rate Last Admin    dextrose 50% injection 12.5 g  12.5 g Intravenous PRN Volpi-Abadie, Jacqueline, MD        dextrose 50% injection 25 g  25 g Intravenous PRN Volpi-Abadie, Jacqueline, MD        insulin regular injection 0-8 Units  0-8 Units Intravenous Continuous PRN Volpi-Abadie, Jacqueline, MD        lactated ringers infusion   Intravenous Continuous Volpi-Abadie, Jacqueline,  mL/hr at 03/25/24 0728 New Bag at 03/25/24 0728     Medication list reviewed and updated.      Review of Systems - as per HPI  Constitutional: Negative for unexpected weight change.   Respiratory: Negative for shortness of breath.    Cardiovascular: Negative for leg swelling.  Gastrointestinal: Negative for abdominal distention or abdominal pain. Negative for melena or hematemesis.  Musculoskeletal: Negative for myalgias.    Skin: Negative for jaundice or itching.  Neurological: Negative for confusion or slowed mentation. Negative for tremors.   Hematological: Does not bruise/bleed easily.       Physical Exam - limited by virtual visit  Constitutional: No distress. Alert and oriented.  Pulmonary/Chest: No respiratory distress.   Skin: No jaundice.   Psychiatric: Normal mood and affect. Speech, behavior, and thought content at baseline.         LABS &  DIAGNOSTIC STUDIES     I have personally reviewed pertinent laboratory findings:    Lab Results   Component Value Date     (H) 06/25/2024     (H) 06/25/2024    ALKPHOS 73 06/25/2024    BILITOT 0.8 06/25/2024    ALBUMIN 3.8 06/25/2024    INR 1.1 06/25/2024       Lab Results   Component Value Date    WBC 5.36 06/25/2024    HGB 14.5 06/25/2024    HCT 41.3 06/25/2024    MCV 97 06/25/2024     (L) 06/25/2024       Lab Results   Component Value Date     06/25/2024    K 4.2 06/25/2024    BUN 9 06/25/2024    CREATININE 0.7 06/25/2024    ESTGFRAFRICA >60 06/16/2022    EGFRNONAA >60 06/16/2022       Lab Results   Component Value Date    SMOOTHMUSCAB Negative 1:40 04/30/2018    AMAIFA Negative 1:40 04/30/2018    IGGSERUM 1677 (H) 02/29/2020    ANASCREEN Negative <1:160 04/30/2018    FERRITIN 893 (H) 02/29/2020    FESATURATED 26 04/30/2018    CERULOPLSM 30.0 04/30/2018    HEPBSAG Negative 04/30/2018    HEPBIGM Negative 04/30/2018    HEPBCAB Negative 02/02/2021    HEPCAB Negative 04/30/2018    HEPAIGM Negative 04/30/2018       Lab Results   Component Value Date    AFP 2.9 06/25/2024       I have personally reviewed the following result reports:  Abdominal US - 6/25/24  EGD - 8/17/22  Colonoscopy - 9/9/20  Liver biopsy - 7/20/20      ASSESSMENT & PLAN     38 y.o. male with:    1. Liver cirrhosis secondary to TRAYLOR    2. Metabolic dysfunction-associated steatohepatitis (MASH)    3. Class 2 severe obesity due to excess calories with serious comorbidity and body mass index (BMI) of 37.0 to 37.9 in adult    4. Elevated liver enzymes    5. Gallbladder polyp          MELD 3.0: 7 at 6/25/2024  9:56 AM  MELD-Na: 7 at 6/25/2024  9:56 AM  Calculated from:  Serum Creatinine: 0.7 mg/dL (Using min of 1 mg/dL) at 6/25/2024  9:56 AM  Serum Sodium: 139 mmol/L (Using max of 137 mmol/L) at 6/25/2024  9:56 AM  Total Bilirubin: 0.8 mg/dL (Using min of 1 mg/dL) at 6/25/2024  9:56 AM  Serum Albumin: 3.8 g/dL (Using max of 3.5  g/dL) at 6/25/2024  9:56 AM  INR(ratio): 1.1 at 6/25/2024  9:56 AM  Age at listing (hypothetical): 38 years  Sex: Male at 6/25/2024  9:56 AM      Liver biopsy in 2020 confirmed MASH cirrhosis. Cirrhosis has been well compensated. Ammonia has been mildly elevated in the past though difficult to assess for hepatic encephalopathy given MR and autism. Using lactulose which has helped with constipation.     Liver enzymes have trended back up with weight gain.    Recommendations:  MELD <15, no indication for liver transplant evaluation at this time. Monitor LFTs every 6 months. Anticipate improvement in liver enzymes if he is able to lose weight.   Continue HCC screening every 6 months with ultrasound and AFP, next due 12/2024  EGD 8/2022 without varices, recommend 3 year repeat (2025)  Recommend vaccines for hepatitis A and B  Mental status at baseline, continue lactulose  Again discussed need for aggressive weight loss efforts. Would benefit from GLP-1 as portion control has been difficult. Mother is currently looking into insurance coverage for Wegovy.   Maintain good control of blood pressure, cholesterol, and blood sugar  Imaging has consistently shown a gallbladder polyp, now slightly >1 cm. Will send to surgery for an opinion though discussed that surgery would be high risk given cirrhosis        Orders Placed This Encounter   Procedures    US Abdomen Limited    CBC Without Differential    Comprehensive Metabolic Panel    AFP Tumor Marker    Protime-INR    Ambulatory referral/consult to General Surgery       F/u in 6 months with labs/US.      Thank you for allowing me to participate in the care of Hussein Akuariley Armendariz, ROMÁNP-C  Hepatology          The patient location is: [place], [place]   The chief complaint leading to consultation is: F/u for cirrhosis    Visit type: audiovisual    Face to Face time with patient: 21 min  35 minutes of total time spent on the encounter, which includes face to  face time and non-face to face time preparing to see the patient (eg, review of tests), Obtaining and/or reviewing separately obtained history, Documenting clinical information in the electronic or other health record, Independently interpreting results (not separately reported) and communicating results to the patient/family/caregiver, or Care coordination (not separately reported).     Each patient to whom he or she provides medical services by telemedicine is:  (1) informed of the relationship between the physician and patient and the respective role of any other health care provider with respect to management of the patient; and (2) notified that he or she may decline to receive medical services by telemedicine and may withdraw from such care at any time.

## 2024-06-28 ENCOUNTER — CLINICAL SUPPORT (OUTPATIENT)
Dept: AUDIOLOGY | Facility: CLINIC | Age: 39
End: 2024-06-28
Payer: MEDICARE

## 2024-06-28 ENCOUNTER — PATIENT MESSAGE (OUTPATIENT)
Dept: ADMINISTRATIVE | Facility: HOSPITAL | Age: 39
End: 2024-06-28
Payer: MEDICARE

## 2024-06-28 DIAGNOSIS — Z46.2 ENCOUNTER FOR OTHER EARMOLD IMPRESSION: Primary | ICD-10-CM

## 2024-06-28 DIAGNOSIS — Z46.2 ENCOUNTER FOR FITTING OF CUSTOM EAR PLUGS: ICD-10-CM

## 2024-06-28 DIAGNOSIS — H72.93 UNSPECIFIED PERFORATION OF TYMPANIC MEMBRANE, BILATERAL: ICD-10-CM

## 2024-06-28 PROCEDURE — 99999 PR PBB SHADOW E&M-EST. PATIENT-LVL I: CPT | Mod: PBBFAC,,, | Performed by: AUDIOLOGIST

## 2024-06-28 NOTE — PROGRESS NOTES
The patient, accompanied by his mother, was seen in the audiology clinic. A right and left ear impression was prepared so that Aqu[employer]ot swim plugs could be ordered. The patient's mother should be contacted upon receipt of the swim plugs for  at the . The patient 's mother was informed of the limited remake time period. It was recommended that she contact us as soon as possible if the swim plug fit is not satisfactory. The patient's mother was quoted $75 plus tax per swim plug, and she was informed that the swim plugs are not refundable.

## 2024-07-01 ENCOUNTER — DOCUMENTATION ONLY (OUTPATIENT)
Dept: AUDIOLOGY | Facility: CLINIC | Age: 39
End: 2024-07-01
Payer: MEDICARE

## 2024-07-01 RX ORDER — AMLODIPINE BESYLATE 5 MG/1
5 TABLET ORAL DAILY
Qty: 30 TABLET | Refills: 11 | Status: SHIPPED | OUTPATIENT
Start: 2024-07-01 | End: 2025-07-01

## 2024-07-01 NOTE — PROGRESS NOTES
Beena prepared impressions for the patient, Viri and I got a PO and I sent the impressions off to [facility] so they can make custom earplugs for the patient.

## 2024-07-09 ENCOUNTER — PATIENT MESSAGE (OUTPATIENT)
Dept: FAMILY MEDICINE | Facility: CLINIC | Age: 39
End: 2024-07-09
Payer: MEDICARE

## 2024-07-09 DIAGNOSIS — I10 PRIMARY HYPERTENSION: ICD-10-CM

## 2024-07-15 DIAGNOSIS — R25.8 NOCTURNAL LEG MOVEMENTS: ICD-10-CM

## 2024-07-15 RX ORDER — PRAMIPEXOLE DIHYDROCHLORIDE 0.25 MG/1
0.25 TABLET ORAL 3 TIMES DAILY
Qty: 90 TABLET | Refills: 11 | Status: SHIPPED | OUTPATIENT
Start: 2024-07-15

## 2024-07-15 NOTE — TELEPHONE ENCOUNTER
No care due was identified.  [facility] Embedded Care Due Messages. Reference number: [id number].   7/15/2024 9:43:52 AM CDT

## 2024-07-17 ENCOUNTER — DOCUMENTATION ONLY (OUTPATIENT)
Dept: AUDIOLOGY | Facility: CLINIC | Age: 39
End: 2024-07-17
Payer: MEDICARE

## 2024-07-17 NOTE — PROGRESS NOTES
I received in the patient's custom swimplug order from [employer]. I created an invoice for the patient's cost and contacted his mother to let her know she can pick them up from the second floor check in desk. I contacted [employer] to get a copy of the invoice (our cost) because only a packing slipping was included with the order.

## 2024-07-18 DIAGNOSIS — M1A.9XX0 CHRONIC GOUT WITHOUT TOPHUS, UNSPECIFIED CAUSE, UNSPECIFIED SITE: ICD-10-CM

## 2024-07-18 RX ORDER — ALLOPURINOL 100 MG/1
TABLET ORAL
Qty: 180 TABLET | Refills: 2 | Status: SHIPPED | OUTPATIENT
Start: 2024-07-18

## 2024-07-18 NOTE — TELEPHONE ENCOUNTER
Refill Routing Note   Medication(s) are not appropriate for processing by [facility] for the following reason(s):        Required labs outdated  Required labs abnormal    [facility] action(s):  Defer             Appointments  past 12m or future 3m with PCP    Date Provider   Last Visit   6/25/2024 Rehan Mazariegos MD   Next Visit   10/28/2024 Rehan Mazariegos MD   ED visits in past 90 days: 0        Note composed:9:57 AM 07/18/2024

## 2024-07-18 NOTE — TELEPHONE ENCOUNTER
No care due was identified.  [facility] Embedded Care Due Messages. Reference number: [id number].   7/18/2024 8:36:44 AM CDT

## 2024-07-19 ENCOUNTER — DOCUMENTATION ONLY (OUTPATIENT)
Dept: AUDIOLOGY | Facility: CLINIC | Age: 39
End: 2024-07-19
Payer: MEDICARE

## 2024-07-19 VITALS — DIASTOLIC BLOOD PRESSURE: 83 MMHG | SYSTOLIC BLOOD PRESSURE: 139 MMHG

## 2024-07-19 RX ORDER — AMLODIPINE BESYLATE 10 MG/1
10 TABLET ORAL DAILY
Qty: 30 TABLET | Refills: 11 | Status: SHIPPED | OUTPATIENT
Start: 2024-07-19 | End: 2025-07-19

## 2024-07-19 NOTE — PROGRESS NOTES
The patient's caregiver picked up his custom swimplugs from our clinic, so his Mother called to make the payment. I dropped the charges for the plugs and contacted Katie with Financial Assistance to see if she can contact the patient's mother to take the payment.

## 2024-07-22 ENCOUNTER — OFFICE VISIT (OUTPATIENT)
Dept: UROLOGY | Facility: CLINIC | Age: 39
End: 2024-07-22
Payer: MEDICARE

## 2024-07-22 VITALS — HEIGHT: 71 IN | BODY MASS INDEX: 37.06 KG/M2 | WEIGHT: 264.75 LBS

## 2024-07-22 DIAGNOSIS — Z85.528 HISTORY OF KIDNEY CANCER: ICD-10-CM

## 2024-07-22 DIAGNOSIS — N39.41 URGENCY INCONTINENCE: ICD-10-CM

## 2024-07-22 DIAGNOSIS — N39.0 RECURRENT URINARY TRACT INFECTION: Primary | ICD-10-CM

## 2024-07-22 DIAGNOSIS — N31.9 NEUROGENIC BLADDER: Chronic | ICD-10-CM

## 2024-07-22 LAB
BILIRUBIN, UA POC OHS: NEGATIVE
BLOOD, UA POC OHS: ABNORMAL
CLARITY, UA POC OHS: ABNORMAL
COLOR, UA POC OHS: YELLOW
GLUCOSE, UA POC OHS: NEGATIVE
KETONES, UA POC OHS: NEGATIVE
LEUKOCYTES, UA POC OHS: ABNORMAL
NITRITE, UA POC OHS: POSITIVE
PH, UA POC OHS: 7
PROTEIN, UA POC OHS: ABNORMAL
SPECIFIC GRAVITY, UA POC OHS: 1.02
UROBILINOGEN, UA POC OHS: 1

## 2024-07-22 PROCEDURE — 3008F BODY MASS INDEX DOCD: CPT | Mod: CPTII,S$GLB,, | Performed by: UROLOGY

## 2024-07-22 PROCEDURE — 1159F MED LIST DOCD IN RCRD: CPT | Mod: CPTII,S$GLB,, | Performed by: UROLOGY

## 2024-07-22 PROCEDURE — 99999 PR PBB SHADOW E&M-EST. PATIENT-LVL IV: CPT | Mod: PBBFAC,,, | Performed by: UROLOGY

## 2024-07-22 PROCEDURE — 81003 URINALYSIS AUTO W/O SCOPE: CPT | Mod: QW,S$GLB,, | Performed by: UROLOGY

## 2024-07-22 PROCEDURE — 99214 OFFICE O/P EST MOD 30 MIN: CPT | Mod: S$GLB,,, | Performed by: UROLOGY

## 2024-07-22 RX ORDER — SULFAMETHOXAZOLE AND TRIMETHOPRIM 400; 80 MG/1; MG/1
1 TABLET ORAL
COMMUNITY
Start: 2024-05-02

## 2024-07-22 RX ORDER — MIRABEGRON 50 MG/1
1 TABLET, FILM COATED, EXTENDED RELEASE ORAL
COMMUNITY
Start: 2024-07-15

## 2024-07-22 NOTE — PROGRESS NOTES
Subjective:       Patient ID: Hussein Doyle is a 38 y.o. male.    Chief Complaint: Annual Exam    HPI    38-year-old with a history of neurogenic bladder.  He is catheterized by his mother and caregiver 5-6 times per day.   The gets frequent urinary tract infections.  He has required hospitalization due to sepsis previous occasions.  Symptoms include more urinary leakage as well as fever and rigors.  Today he is asymptomatic.  He has also had a history of partial nephrectomy in 2010.  The details are unclear but reportedly for cancer.  He would have been 25 years old at that time.  He had a recent renal US and I reviewed those images.  The kidneys appeared normal.  He recently underwent Botox injections by Dr. Velasquez to hopefully decrease the number of times he is catheterized.    Review of Systems   Unable to perform ROS: Patient nonverbal       Objective:      Physical Exam  Vitals reviewed.   Constitutional:       Appearance: He is well-developed.   Pulmonary:      Effort: Pulmonary effort is normal.   Neurological:      Mental Status: Mental status is at baseline.         Assessment:       1. Recurrent urinary tract infection    2. Neurogenic bladder    3. Urgency incontinence    4. History of kidney cancer        Plan:       Recurrent urinary tract infection  -     POCT Urinalysis(Instrument)    Neurogenic bladder    Urgency incontinence    History of kidney cancer      Chronically colonized due to frequent catheterizations.  He is asymptomatic today.  I recommend he keep follow-up with Dr. Velasquez as planned.  No need to continue screening for kidney cancer.  He is 14 years out from his surgery in his kidneys appeared normal.

## 2024-07-25 NOTE — TELEPHONE ENCOUNTER
No care due was identified.  [facility] Embedded Care Due Messages. Reference number: [id number].   7/25/2024 6:05:25 PM CDT

## 2024-07-26 RX ORDER — SCOLOPAMINE TRANSDERMAL SYSTEM 1 MG/1
1 PATCH, EXTENDED RELEASE TRANSDERMAL
Qty: 6 PATCH | Refills: 0 | Status: SHIPPED | OUTPATIENT
Start: 2024-07-26

## 2024-07-29 ENCOUNTER — PATIENT MESSAGE (OUTPATIENT)
Dept: HEPATOLOGY | Facility: CLINIC | Age: 39
End: 2024-07-29
Payer: MEDICARE

## 2024-07-29 ENCOUNTER — OFFICE VISIT (OUTPATIENT)
Dept: SURGERY | Facility: CLINIC | Age: 39
End: 2024-07-29
Payer: MEDICARE

## 2024-07-29 VITALS
WEIGHT: 263.69 LBS | BODY MASS INDEX: 36.92 KG/M2 | SYSTOLIC BLOOD PRESSURE: 117 MMHG | TEMPERATURE: 97 F | HEART RATE: 70 BPM | HEIGHT: 71 IN | DIASTOLIC BLOOD PRESSURE: 62 MMHG

## 2024-07-29 DIAGNOSIS — K82.4 GALLBLADDER POLYP: ICD-10-CM

## 2024-07-29 PROCEDURE — 3078F DIAST BP <80 MM HG: CPT | Mod: CPTII,S$GLB,, | Performed by: STUDENT IN AN ORGANIZED HEALTH CARE EDUCATION/TRAINING PROGRAM

## 2024-07-29 PROCEDURE — 1159F MED LIST DOCD IN RCRD: CPT | Mod: CPTII,S$GLB,, | Performed by: STUDENT IN AN ORGANIZED HEALTH CARE EDUCATION/TRAINING PROGRAM

## 2024-07-29 PROCEDURE — 99999 PR PBB SHADOW E&M-EST. PATIENT-LVL V: CPT | Mod: PBBFAC,,, | Performed by: STUDENT IN AN ORGANIZED HEALTH CARE EDUCATION/TRAINING PROGRAM

## 2024-07-29 PROCEDURE — 99203 OFFICE O/P NEW LOW 30 MIN: CPT | Mod: S$GLB,,, | Performed by: STUDENT IN AN ORGANIZED HEALTH CARE EDUCATION/TRAINING PROGRAM

## 2024-07-29 PROCEDURE — 3074F SYST BP LT 130 MM HG: CPT | Mod: CPTII,S$GLB,, | Performed by: STUDENT IN AN ORGANIZED HEALTH CARE EDUCATION/TRAINING PROGRAM

## 2024-07-29 PROCEDURE — 3008F BODY MASS INDEX DOCD: CPT | Mod: CPTII,S$GLB,, | Performed by: STUDENT IN AN ORGANIZED HEALTH CARE EDUCATION/TRAINING PROGRAM

## 2024-07-29 NOTE — PROGRESS NOTES
History & Physical    Subjective     History of Present Illness:  Patient is a 38 y.o. male presents as a referral from hepatology.  Patient has a history of cirrhosis.  Questionable portal hypertension based off of imaging.  No ascites.  He was referred to me for a 1.1 cm gallbladder polyp which is stable ultrasound imaging.    Chief Complaint   Patient presents with    Consult     Gallbladder polyp       Review of patient's allergies indicates:   Allergen Reactions    Benadryl [diphenhydramine hcl] Other (See Comments)     Increases anxiety and more restless    Keppra [levetiracetam] Other (See Comments)     agitation    Ativan [lorazepam] Anxiety    Xanax [alprazolam] Anxiety     Worsens anxiety and agitation    Abilify  [aripiprazole]      Other reaction(s): arrhythmia    Clonazepam      Other reaction(s): unknown    Cough syrup w/decongestant      Other reaction(s): auditory hallucinations    Diazepam      Other reaction(s): tongue swelling  Other reaction(s): Other (See Comments)  Other reaction(s): tongue swelling    Haloperidol Other (See Comments)    Phenytoin sodium extended      Other reaction(s): unknown    Quetiapine      Other reaction(s): sweating  Other reaction(s): sedation  Other reaction(s): sweating  Other reaction(s): sedation    Risperidone      Other reaction(s): bladder incontinence    Thimerosal      Other reaction(s): seizure    Ziprasidone hcl Other (See Comments)     Other reaction(s): tonic clonic seizure  seizure       Current Outpatient Medications   Medication Sig Dispense Refill    allopurinoL (ZYLOPRIM) 100 MG tablet TAKE ONE TABLET BY MOUTH TWICE DAILY 180 tablet 2    amLODIPine (NORVASC) 10 MG tablet Take 1 tablet (10 mg total) by mouth once daily. 30 tablet 11    ascorbic acid, vitamin C, 250 mg Chew Take by mouth once daily.      calcium carbonate-vitamin D3 500 mg(1,250mg) -400 unit Tab Take 1 tablet by mouth 2 (two) times a day.       DENTA 5000 PLUS 1.1 % Crea        fluvoxaMINE (LUVOX) 100 MG tablet Take 2 tablets (200 mg total) by mouth 2 (two) times daily. 180 tablet 1    inulin (FIBER GUMMIES ORAL) Take 2 tablets by mouth once daily.       L. ACIDOPHILUS/BIFID. ANIMALIS (CHEWABLE PROBIOTIC ORAL) Take 1 tablet by mouth 2 (two) times daily.       lactulose (CHRONULAC) 10 gram/15 mL solution TAKE 15 mls BY MOUTH THREE TIMES DAILY 473 mL 3    magnesium 30 mg Tab Take 1 tablet by mouth every evening.      MELATONIN ORAL Take 1 tablet by mouth every evening.      multivitamin capsule Take 1 capsule by mouth every morning.       MYRBETRIQ 50 mg Tb24 Take 1 tablet by mouth.      nystatin (MYCOSTATIN) powder Apply topically 2 (two) times daily. 60 g 11    omega-3 fatty acids/fish oil (FISH OIL-OMEGA-3 FATTY ACIDS) 300-1,000 mg capsule Take 1 capsule by mouth once daily.       OXcarbazepine (TRILEPTAL) 300 MG Tab Take 1 tablet (300 mg total) by mouth 2 (two) times daily. 180 tablet 3    pantoprazole (PROTONIX) 40 MG tablet TAKE ONE TABLET BY MOUTH ONCE DAILY 90 tablet 3    polyethylene glycol (GLYCOLAX) 17 gram PwPk Take 17 g by mouth once daily.       potassium chloride (KLOR-CON) 10 MEQ TbSR Take 10 mEq by mouth every evening.      pramipexole (MIRAPEX) 0.25 MG tablet Take 1 tablet (0.25 mg total) by mouth 3 (three) times daily. 90 tablet 11    sulfamethoxazole-trimethoprim 400-80mg (BACTRIM,SEPTRA) 400-80 mg per tablet Take 1 tablet by mouth.      zinc gluconate 50 mg tablet Take 50 mg by mouth once daily.      hydrOXYzine pamoate (VISTARIL) 25 MG Cap Take 1 capsule (25 mg total) by mouth as needed (anxiety). (Patient not taking: Reported on 7/29/2024) 60 capsule 2    mupirocin calcium 2% (BACTROBAN) 2 % cream Apply topically 3 (three) times daily. (Patient not taking: Reported on 7/29/2024) 15 g 3    scopolamine (TRANSDERM-SCOP) 1.3-1.5 mg (1 mg over 3 days) PLACE 1 PATCH ONTO THE SKIN EVERY 72 HOURS (Patient not taking: Reported on 7/29/2024) 6 patch 0     No current  facility-administered medications for this visit.     Facility-Administered Medications Ordered in Other Visits   Medication Dose Route Frequency Provider Last Rate Last Admin    dextrose 50% injection 12.5 g  12.5 g Intravenous PRN Volpi-Abadie, Jacqueline, MD        dextrose 50% injection 25 g  25 g Intravenous PRN Volpi-Abadie, Jacqueline, MD        insulin regular injection 0-8 Units  0-8 Units Intravenous Continuous PRN Volpi-Abadie, Jacqueline, MD        lactated ringers infusion   Intravenous Continuous Volpi-Abadie, Jacqueline,  mL/hr at 03/25/24 0728 New Bag at 03/25/24 0728       Past Medical History:   Diagnosis Date    Anxiety     Autism     Bowel obstruction     pt mother denies    GERD (gastroesophageal reflux disease)     Grand mal seizure     Hepatic encephalopathy     Mastoiditis     Otitis media     Paraplegia     Recurrent UTI     Renal cancer 2010    Right RCC    Scoliosis     paraplegia    Sepsis due to Escherichia coli without acute organ dysfunction 02/02/2023    Sleep apnea     uses CPAP    Stroke     one week old    Tardive dyskinesia      Past Surgical History:   Procedure Laterality Date    ADENOIDECTOMY      ANKLE FRACTURE SURGERY      BACK SURGERY  2000    COLONOSCOPY  10/28/2008    Dr. Arana at [facility]; anal fissure, minimal pinpoint rectal erythema; floppy-type colon with very little tone; biopsy: rectum mild chronic proctitis with few eosinophils sent for scanning    COLONOSCOPY N/A 06/15/2018    Procedure: COLONOSCOPY;  Surgeon: Refugio Villagomez MD;  Location: [facility];  Service: Endoscopy;  Laterality: N/A; Preparation of the colon was fair, unremarkable; REPEAT at 50 YEARS old FOR SCREENING    COLONOSCOPY N/A 09/09/2020    Procedure: COLONOSCOPY;  Surgeon: Refugio Villagomez MD;  Location: [facility];  Service: Endoscopy;  Laterality: N/A;    CYSTOSCOPY,WITH BOTULINUM TOXIN INJECTION N/A 3/25/2024    Procedure: CYSTOSCOPY,WITH BOTULINUM TOXIN INJECTION- 200 units;  Surgeon:  Amado Velasquez MD;  Location: [facility] OR;  Service: Urology;  Laterality: N/A;    ESOPHAGOGASTRODUODENOSCOPY N/A 07/13/2020    Procedure: EGD (ESOPHAGOGASTRODUODENOSCOPY);  Surgeon: Refugio Villagomez MD;  Location: [facility] ENDO;  Service: Endoscopy;  Laterality: N/A;    ESOPHAGOGASTRODUODENOSCOPY N/A 08/17/2022    Procedure: EGD (ESOPHAGOGASTRODUODENOSCOPY);  Surgeon: Refugio Villagomez MD;  Location: [facility] ENDO;  Service: Endoscopy;  Laterality: N/A;  cirrhosis, varices screening    ESOPHAGOGASTRODUODENOSCOPY (EGD) WITH DILATION N/A 2020    INNER EAR SURGERY      mastoid    LIVER BIOPSY N/A 07/20/2020    Procedure: BIOPSY, LIVER;  Surgeon: Ananya Surgeon;  Location: [facility];  Service: Anesthesiology;  Laterality: N/A;  189 liver bx/Ultrasound anes 1.5 hours    MAGNETIC RESONANCE IMAGING N/A 10/09/2020    Procedure: MRI (Magnetic Resonance Imagine);  Surgeon: Manish Araiza MD;  Location: [facility];  Service: Anesthesiology;  Laterality: N/A;    right partial nephrectomy Right 2010    Sees urology    TYMPANOSTOMY TUBE PLACEMENT       Family History   Problem Relation Name Age of Onset    Cancer Father          lymphoma    Cataracts Father      Colon polyps Father      Cataracts Mother      Cataracts Maternal Grandmother      Diabetes Maternal Grandmother      Macular degeneration Maternal Grandmother      Glaucoma Maternal Grandmother      Anesthesia problems Neg Hx      Clotting disorder Neg Hx      Colon cancer Neg Hx      Crohn's disease Neg Hx      Ulcerative colitis Neg Hx      Stomach cancer Neg Hx      Esophageal cancer Neg Hx      Cirrhosis Neg Hx       Social History     Tobacco Use    Smoking status: Never    Smokeless tobacco: Never   Substance Use Topics    Alcohol use: Never    Drug use: Never        Review of Systems:  Review of Systems   Constitutional: Negative.  Negative for fatigue and fever.   HENT: Negative.     Eyes: Negative.    Respiratory: Negative.  Negative for shortness of breath.   "  Cardiovascular: Negative.  Negative for chest pain.   Gastrointestinal: Negative.    Endocrine: Negative.    Genitourinary: Negative.    Musculoskeletal: Negative.    Skin: Negative.    Allergic/Immunologic: Negative.    Neurological: Negative.    Hematological: Negative.    Psychiatric/Behavioral: Negative.            Objective     Vital Signs (Most Recent)  Temp: 97 °F (36.1 °C) (07/29/24 1007)  Pulse: 70 (07/29/24 1007)  BP: 117/62 (07/29/24 1007)  5' 10.5" (1.791 m)  119.6 kg (263 lb 10.7 oz)     Physical Exam:  Physical Exam  Constitutional:       General: He is not in acute distress.     Appearance: Normal appearance. He is not ill-appearing, toxic-appearing or diaphoretic.   HENT:      Head: Normocephalic.      Nose: Nose normal.   Eyes:      Conjunctiva/sclera: Conjunctivae normal.   Cardiovascular:      Rate and Rhythm: Normal rate and regular rhythm.   Pulmonary:      Effort: Pulmonary effort is normal.   Abdominal:      Palpations: Abdomen is soft.   Musculoskeletal:         General: Normal range of motion.      Cervical back: Normal range of motion.   Skin:     General: Skin is warm.   Neurological:      General: No focal deficit present.      Mental Status: He is alert.   Psychiatric:         Mood and Affect: Mood normal.         Laboratory  Mild elevation in LFTs    Diagnostic Results:  Ultrasound was reviewed.  1.1 cm likely gallbladder polyp       Assessment and Plan   38-year-old male with a stable gallbladder polyp.  Patient is accompanied by family.  Relatively nonverbal.    PLAN:  Risks versus benefits of cholecystectomy versus observation were discussed.  We did discuss that the natural progression of gallbladder polyps is not completely known.  This does appear stable.  Patient has a history of cirrhosis which makes him at higher risk of operative complications including bleeding giving splenomegaly and possible portal hypertension.  Family ultimately elected to observe for now which is " reasonable.  Patient will follow up with me as needed.

## 2024-07-30 ENCOUNTER — OFFICE VISIT (OUTPATIENT)
Dept: PSYCHIATRY | Facility: CLINIC | Age: 39
End: 2024-07-30
Payer: MEDICAID

## 2024-07-30 DIAGNOSIS — F84.9 PERVASIVE DEVELOPMENTAL DISORDER: ICD-10-CM

## 2024-07-30 DIAGNOSIS — F41.9 ANXIETY DISORDER, UNSPECIFIED TYPE: ICD-10-CM

## 2024-07-30 DIAGNOSIS — F63.9 IMPULSE CONTROL DISORDER: ICD-10-CM

## 2024-07-30 DIAGNOSIS — F42.9 OBSESSIVE-COMPULSIVE DISORDER, UNSPECIFIED TYPE: Primary | ICD-10-CM

## 2024-07-30 DIAGNOSIS — F42.8 OTHER OBSESSIVE-COMPULSIVE DISORDERS: ICD-10-CM

## 2024-07-30 PROCEDURE — 1159F MED LIST DOCD IN RCRD: CPT | Mod: CPTII,95,, | Performed by: PSYCHOLOGIST

## 2024-07-30 PROCEDURE — 90833 PSYTX W PT W E/M 30 MIN: CPT | Mod: 95,,, | Performed by: PSYCHOLOGIST

## 2024-07-30 PROCEDURE — 99214 OFFICE O/P EST MOD 30 MIN: CPT | Mod: 95,,, | Performed by: PSYCHOLOGIST

## 2024-07-30 NOTE — PROGRESS NOTES
The patient location is:  [place], [place]  The patient location [place] is: [place]  The patient phone number is: [phone]  Visit type: Virtual visit with synchronous audio and video  Each patient to whom he or she provides medical services by telemedicine is:  (1) informed of the relationship between the physician and patient and the respective role of any other health care provider with respect to management of the patient; and (2) notified that he or she may decline to receive medical services by telemedicine and may withdraw from such care at any time.     Outpatient Psychiatry Follow-Up Visit    Clinical Status of Patient: Outpatient (Ambulatory)  07/29/2024     Chief Complaint: OCD, anxiety, impulse control, PDD      Interval History and Content of Current Session:  Interim Events/Subjective Report/Content of Current Session:  follow-up appointment with mother and caregiver.    Pt is a 37 y/o male with past psychiatric hx of OCD, anxiety, impulse control, PDD who presents for follow-up treatment. Pt's mother reported that she is concerned about his weight. Stated that they are considering bariatric surgery or GLP-1s. Will be discussing with specialist and has an appointment with bariatrics. Pt's mother stated that they have been making lifestyle changes and he has lost a few pounds recently. She reported that they tried to take away cookies and he started self-injury (scratch legs to bleeding) behaviors. Mother discussed a pattern of anxiety associated with dates that start in Sept and continue through Easter.     Past Psychiatric hx: s/e's to mult prev meds to include zyprexa (wgt gain), lexapro 20mg po qam (anxiety and to suppress sex drive), buspar 10mg po BID, xanax 0.5mg po daily PRN anxiety    Past Medical hx:   Past Medical History:   Diagnosis Date    Anxiety     Autism     Bowel obstruction     pt mother denies    GERD (gastroesophageal reflux disease)     Grand mal seizure     Hepatic  encephalopathy     Mastoiditis     Otitis media     Paraplegia     Recurrent UTI     Renal cancer 2010    Right RCC    Scoliosis     paraplegia    Sepsis due to Escherichia coli without acute organ dysfunction 02/02/2023    Sleep apnea     uses CPAP    Stroke     one week old    Tardive dyskinesia         Interim hx:  Medication changes last visit: Start trial of hydroxyzine 25 mg PRN  Anxiety: stable  Depression: stable     Denies suicidal/homicidal ideations.  Denies hopelessness/worthlessness.    Denies auditory/visual hallucinations      Alcohol: Pt denied  Drug: Pt denied  Caffeine: Not assessed  Tobacco: Pt denied      Review of Systems   PSYCHIATRIC: Pertinent items are noted in the narrative.        CONSTITUTIONAL: weight stable    Past Medical, Family and Social History: The patient's past medical, family and social history have been reviewed and updated as appropriate within the electronic medical record. See encounter notes.     Current Psychiatric Medication:  fluvoxamine 150 mg BID, oxcarbazepine 300 mg BID (per neuro), hydroxyzine 25 mg PRN     Compliance: yes      Side effects: Pt denied     Risk Parameters:  Patient reports no suicidal ideation  Patient reports no homicidal ideation  Patient reports no self-injurious behavior  Patient reports no violent behavior     Exam (detailed: at least 9 elements; comprehensive: all 15 elements)   Constitutional  Vitals:  Most recent vital signs, dated less than 90 days prior to this appointment, were reviewed. Temp:  [97 °F (36.1 °C)]   Pulse:  [70]   BP: (117)/(62)       General:  unremarkable, age appropriate, casual attire, good eye contact, good rapport       Musculoskeletal  Muscle Strength/Tone:  no flaccidity, no tremor    Gait & Station:  normal      Psychiatric                       Speech:  normal tone, normal rate, rhythm, and volume   Mood & Affect:    Mildly anxious         Thought Process:   Goal directed; Linear    Associations:   intact    Thought Content:   No SI/HI, delusions, or paranoia, no AV/VH   Insight & Judgement:   Good, adequate to circumstances   Orientation:   grossly intact; alert and oriented x 4    Memory:  intact for content of interview    Language:  grossly intact, can repeat    Attention Span  : Grossly intact for content of interview   Fund of Knowledge:   intact and appropriate to age and level of education        Assessment and Diagnosis   Status/Progress: Based on the examination today, the patient's problem(s) is/are adequately controlled.  New problems have not been presented today. Co-morbidities are not complicating management of the primary condition. There are no active rule-out diagnoses for this patient at this time.      Impression: Pt appears to be relatively stable at the moment save for the removal of cookies for weight loss. We explored alternative preparations to limit sugar intake and other lifestyle changes he could benefit from. Will continue with medication plan as is for now and monitor moving forward.     Diagnosis:   1. Obsessive Compulsive Disorder   2. Anxiety Disorder  3. Impulse Control Disorder  4. Pervasive Developmental Disorder  Intervention/Counseling/Treatment Plan   Medication Management:      1. fluvoxamine 150 mg BID    2. hydroxyzine 25 mg PRN    3. Oxcarbazepine 300 mg BID (managed by neuro)     4. Call to report any worsening of symptoms or problems with the medication. Pt instructed to go to ER with thoughts of harming self, others     5. Patient given contact # for psychotherapists at [facility] and also instructed to check with insurance for list of providers.     Psychotherapy: 20 minutes  Target symptoms: anxiety  Why chosen therapy is appropriate versus another modality: CBT used; relevant to diagnosis, patient responds to this modality  Outcome monitoring methods: self-report, observation  Therapeutic intervention type: Cognitive Behavioral Therapy, Solution-focused  Topics  discussed/themes: building skills sets for symptom management, symptom recognition, nutrition, exercise  The patient's response to the intervention is accepting  Patient's response to treatment is: good.   The patient's progress toward treatment goals: stable     Return to clinic: 2 months    -Cognitive-Behavioral/Supportive therapy and psychoeducation provided  -R/B/SE's of medications discussed with the pt who expresses understanding and chooses to take medications as prescribed.   -Pt instructed to call clinic, 911 or go to nearest emergency room if sxs worsen or pt is in   crisis. The pt expresses understanding.    Praveen Brush, PhD, MP     Antidepressant/Antianxiety Medication Initiation:  Patient informed of risks, benefits, and potential side effects of medication and accepts informed consent.  Common side effects include nausea, fatigue, headache, insomnia., Specifically discussed the possibility of new or worsening suicidal thoughts/depression.  Patient instructed to stop the medication immediately and seek urgent treatment if this occurs. Patient instructed not to abruptly discontinue medication without physician guidance except in cases of sudden onset or worsening of SI.             English

## 2024-08-02 ENCOUNTER — PATIENT MESSAGE (OUTPATIENT)
Dept: FAMILY MEDICINE | Facility: CLINIC | Age: 39
End: 2024-08-02
Payer: MEDICARE

## 2024-08-02 RX ORDER — LACTULOSE 10 G/15ML
10 SOLUTION ORAL; RECTAL 3 TIMES DAILY
Qty: 1892 ML | Refills: 11 | Status: SHIPPED | OUTPATIENT
Start: 2024-08-02 | End: 2024-08-02 | Stop reason: SDUPTHER

## 2024-08-02 RX ORDER — LACTULOSE 10 G/15ML
10 SOLUTION ORAL; RECTAL 3 TIMES DAILY
Qty: 1892 ML | Refills: 11 | Status: SHIPPED | OUTPATIENT
Start: 2024-08-02

## 2024-08-02 NOTE — TELEPHONE ENCOUNTER
No care due was identified.  [facility] Embedded Care Due Messages. Reference number: [id number].   8/02/2024 11:06:09 AM CDT

## 2024-08-02 NOTE — TELEPHONE ENCOUNTER
Refill Routing Note   Medication(s) are not appropriate for processing by [facility] for the following reason(s):        Outside of protocol    [facility] action(s):  Route               Appointments  past 12m or future 3m with PCP    Date Provider   Last Visit   6/25/2024 Rehan Mazariegos MD   Next Visit   10/28/2024 Rehan Mazariegos MD   ED visits in past 90 days: 0        Note composed:1:52 PM 08/02/2024

## 2024-08-02 NOTE — TELEPHONE ENCOUNTER
----- Message from José Manuel Evans sent at 8/2/2024  1:35 PM CDT -----  Contact: Eli  Type:  RX Refill Request    Who Called:  Eli/Mother  Refill or New Rx:  Refill  RX Name and Strength:  lactulose (CHRONULAC) 10 gram/15 mL solution  How is the patient currently taking it? (ex. 1XDay):  As prescribed  Is this a 30 day or 90 day RX:  As prescribed  Preferred Pharmacy with phone number:    [facility] [address], [place] [address]  [address] [facility] [address], [address] [address]  Phone: [phone] Fax: [phone]    Local or Mail Order:  Local  Ordering Provider:  Yair Rojas Call Back Number:  [phone]  Additional Information:  Patients is needing a refill before the weekend

## 2024-08-02 NOTE — TELEPHONE ENCOUNTER
No care due was identified.  [facility] Embedded Care Due Messages. Reference number: [id number].   8/02/2024 1:57:07 PM CDT

## 2024-08-05 ENCOUNTER — TELEPHONE (OUTPATIENT)
Dept: BARIATRICS | Facility: CLINIC | Age: 39
End: 2024-08-05
Payer: MEDICARE

## 2024-08-06 ENCOUNTER — TELEPHONE (OUTPATIENT)
Dept: BARIATRICS | Facility: CLINIC | Age: 39
End: 2024-08-06
Payer: MEDICARE

## 2024-08-19 ENCOUNTER — OFFICE VISIT (OUTPATIENT)
Dept: BARIATRICS | Facility: CLINIC | Age: 39
End: 2024-08-19
Payer: MEDICARE

## 2024-08-19 VITALS
SYSTOLIC BLOOD PRESSURE: 145 MMHG | RESPIRATION RATE: 16 BRPM | TEMPERATURE: 97 F | DIASTOLIC BLOOD PRESSURE: 65 MMHG | WEIGHT: 264.13 LBS | HEIGHT: 71 IN | HEART RATE: 71 BPM | BODY MASS INDEX: 36.98 KG/M2

## 2024-08-19 DIAGNOSIS — K74.60 LIVER CIRRHOSIS SECONDARY TO NASH: ICD-10-CM

## 2024-08-19 DIAGNOSIS — I12.9 HYPERTENSIVE CHRONIC KIDNEY DISEASE WITH STAGE 1 THROUGH STAGE 4 CHRONIC KIDNEY DISEASE, OR UNSPECIFIED CHRONIC KIDNEY DISEASE: ICD-10-CM

## 2024-08-19 DIAGNOSIS — E66.01 MORBID OBESITY: Primary | ICD-10-CM

## 2024-08-19 DIAGNOSIS — I10 PRIMARY HYPERTENSION: ICD-10-CM

## 2024-08-19 DIAGNOSIS — K59.09 CHRONIC CONSTIPATION: Chronic | ICD-10-CM

## 2024-08-19 DIAGNOSIS — G47.33 OSA ON CPAP: ICD-10-CM

## 2024-08-19 DIAGNOSIS — K75.81 LIVER CIRRHOSIS SECONDARY TO NASH: ICD-10-CM

## 2024-08-19 DIAGNOSIS — K75.81 METABOLIC DYSFUNCTION-ASSOCIATED STEATOHEPATITIS (MASH): ICD-10-CM

## 2024-08-19 DIAGNOSIS — E66.01 CLASS 2 SEVERE OBESITY DUE TO EXCESS CALORIES WITH SERIOUS COMORBIDITY AND BODY MASS INDEX (BMI) OF 37.0 TO 37.9 IN ADULT: ICD-10-CM

## 2024-08-19 PROCEDURE — 99999 PR PBB SHADOW E&M-EST. PATIENT-LVL III: CPT | Mod: PBBFAC,,, | Performed by: NURSE PRACTITIONER

## 2024-08-19 PROCEDURE — 3077F SYST BP >= 140 MM HG: CPT | Mod: CPTII,S$GLB,, | Performed by: NURSE PRACTITIONER

## 2024-08-19 PROCEDURE — 3078F DIAST BP <80 MM HG: CPT | Mod: CPTII,S$GLB,, | Performed by: NURSE PRACTITIONER

## 2024-08-19 PROCEDURE — 99205 OFFICE O/P NEW HI 60 MIN: CPT | Mod: S$GLB,,, | Performed by: NURSE PRACTITIONER

## 2024-08-19 PROCEDURE — 1160F RVW MEDS BY RX/DR IN RCRD: CPT | Mod: CPTII,S$GLB,, | Performed by: NURSE PRACTITIONER

## 2024-08-19 PROCEDURE — 99417 PROLNG OP E/M EACH 15 MIN: CPT | Mod: S$GLB,,, | Performed by: NURSE PRACTITIONER

## 2024-08-19 PROCEDURE — 1159F MED LIST DOCD IN RCRD: CPT | Mod: CPTII,S$GLB,, | Performed by: NURSE PRACTITIONER

## 2024-08-19 PROCEDURE — 3008F BODY MASS INDEX DOCD: CPT | Mod: CPTII,S$GLB,, | Performed by: NURSE PRACTITIONER

## 2024-08-19 RX ORDER — TOPIRAMATE 25 MG/1
25 TABLET ORAL 2 TIMES DAILY
Qty: 60 TABLET | Refills: 11 | Status: SHIPPED | OUTPATIENT
Start: 2024-08-19 | End: 2024-08-19 | Stop reason: CLARIF

## 2024-08-19 NOTE — PROGRESS NOTES
Initial Consult    Chief Complaint   Patient presents with    Consult    Obesity    Nutrition Counseling       History of Present Illness:  Patient is a 38 y.o. male who is referred for evaluation of surgical treatment of morbid obesity. His Body mass index is 37.36 kg/m². He has known comorbidities of diabetes mellitus, GERD, gout, hypertension, obstructive sleep apnea, osteoarthritis, and urinary incontinence. He has not attended the bariatric seminar and is most interested in  Medical Weight Loss, based on cognitive function patient is not a surgical candidate . History from mother, Eli and caregiver, Ashleigh.      Past attempts at weight loss include:   Unsupervised: gym membership   Supervised:  none  Diet pills: none  Exercise attempts: stationary cycle, treadmill, cardio circuit, treva group classes    Weight history:   At current weight:  3 years  Obese for entire life.  More than 35 pounds overweight for 20+ years.  More than 100 pounds overweight for 20+ years.  Started dieting at 35 years old.  Maximum weight reached: now at 264  Most weight lost was 35 lb through paraplegia after scoliosis surgery for few months.  He describes His eating habits as volume, snacking, grazing, sweets.    HALEY screening: HX and wears CPAP    Reflux screening: currently on Rx regimen      Past Medical History:   Diagnosis Date    Anxiety     Autism     Bowel obstruction     pt mother denies    GERD (gastroesophageal reflux disease)     Grand mal seizure     Hepatic encephalopathy     Mastoiditis     Otitis media     Paraplegia     Recurrent UTI     Renal cancer 2010    Right RCC    Scoliosis     paraplegia    Sepsis due to Escherichia coli without acute organ dysfunction 02/02/2023    Sleep apnea     uses CPAP    Stroke     one week old    Tardive dyskinesia      Past Surgical History:   Procedure Laterality Date    ADENOIDECTOMY      ANKLE FRACTURE SURGERY      BACK SURGERY  2000    COLONOSCOPY  10/28/2008    Dr. Arana at  [facility]; anal fissure, minimal pinpoint rectal erythema; floppy-type colon with very little tone; biopsy: rectum mild chronic proctitis with few eosinophils sent for scanning    COLONOSCOPY N/A 06/15/2018    Procedure: COLONOSCOPY;  Surgeon: Refugio Villagomez MD;  Location: [facility];  Service: Endoscopy;  Laterality: N/A; Preparation of the colon was fair, unremarkable; REPEAT at 50 YEARS old FOR SCREENING    COLONOSCOPY N/A 09/09/2020    Procedure: COLONOSCOPY;  Surgeon: Refugio Villagomez MD;  Location: [facility];  Service: Endoscopy;  Laterality: N/A;    CYSTOSCOPY,WITH BOTULINUM TOXIN INJECTION N/A 3/25/2024    Procedure: CYSTOSCOPY,WITH BOTULINUM TOXIN INJECTION- 200 units;  Surgeon: Amado Velasquez MD;  Location: [facility] OR;  Service: Urology;  Laterality: N/A;    ESOPHAGOGASTRODUODENOSCOPY N/A 07/13/2020    Procedure: EGD (ESOPHAGOGASTRODUODENOSCOPY);  Surgeon: Refugio Villagomez MD;  Location: [facility];  Service: Endoscopy;  Laterality: N/A;    ESOPHAGOGASTRODUODENOSCOPY N/A 08/17/2022    Procedure: EGD (ESOPHAGOGASTRODUODENOSCOPY);  Surgeon: Refugio Villagomez MD;  Location: [facility];  Service: Endoscopy;  Laterality: N/A;  cirrhosis, varices screening    ESOPHAGOGASTRODUODENOSCOPY (EGD) WITH DILATION N/A 2020    INNER EAR SURGERY      mastoid    LIVER BIOPSY N/A 07/20/2020    Procedure: BIOPSY, LIVER;  Surgeon: Ananya Surgeon;  Location: [facility];  Service: Anesthesiology;  Laterality: N/A;  189 liver bx/Ultrasound anes 1.5 hours    MAGNETIC RESONANCE IMAGING N/A 10/09/2020    Procedure: MRI (Magnetic Resonance Imagine);  Surgeon: Manish Araiza MD;  Location: [facility];  Service: Anesthesiology;  Laterality: N/A;    right partial nephrectomy Right 2010    Sees urology    TYMPANOSTOMY TUBE PLACEMENT       Family History   Problem Relation Name Age of Onset    Cancer Father          lymphoma    Cataracts Father      Colon polyps Father      Cataracts Mother      Cataracts Maternal Grandmother       Diabetes Maternal Grandmother      Macular degeneration Maternal Grandmother      Glaucoma Maternal Grandmother      Anesthesia problems Neg Hx      Clotting disorder Neg Hx      Colon cancer Neg Hx      Crohn's disease Neg Hx      Ulcerative colitis Neg Hx      Stomach cancer Neg Hx      Esophageal cancer Neg Hx      Cirrhosis Neg Hx       Social History     Tobacco Use    Smoking status: Never    Smokeless tobacco: Never   Substance Use Topics    Alcohol use: Never    Drug use: Never        Review of patient's allergies indicates:   Allergen Reactions    Benadryl [diphenhydramine hcl] Other (See Comments)     Increases anxiety and more restless    Keppra [levetiracetam] Other (See Comments)     agitation    Ativan [lorazepam] Anxiety    Xanax [alprazolam] Anxiety     Worsens anxiety and agitation    Abilify  [aripiprazole]      Other reaction(s): arrhythmia    Clonazepam      Other reaction(s): unknown    Cough syrup w/decongestant      Other reaction(s): auditory hallucinations    Diazepam      Other reaction(s): tongue swelling  Other reaction(s): Other (See Comments)  Other reaction(s): tongue swelling    Haloperidol Other (See Comments)    Phenytoin sodium extended      Other reaction(s): unknown    Quetiapine      Other reaction(s): sweating  Other reaction(s): sedation  Other reaction(s): sweating  Other reaction(s): sedation    Risperidone      Other reaction(s): bladder incontinence    Thimerosal      Other reaction(s): seizure    Ziprasidone hcl Other (See Comments)     Other reaction(s): tonic clonic seizure  seizure       Current Outpatient Medications   Medication Sig Dispense Refill    allopurinoL (ZYLOPRIM) 100 MG tablet TAKE ONE TABLET BY MOUTH TWICE DAILY 180 tablet 2    amLODIPine (NORVASC) 10 MG tablet Take 1 tablet (10 mg total) by mouth once daily. 30 tablet 11    ascorbic acid, vitamin C, 250 mg Chew Take by mouth once daily.      calcium carbonate-vitamin D3 500 mg(1,250mg) -400 unit Tab Take  1 tablet by mouth 2 (two) times a day.       DENTA 5000 PLUS 1.1 % Crea       fluvoxaMINE (LUVOX) 100 MG tablet Take 2 tablets (200 mg total) by mouth 2 (two) times daily. 180 tablet 1    inulin (FIBER GUMMIES ORAL) Take 2 tablets by mouth once daily.       L. ACIDOPHILUS/BIFID. ANIMALIS (CHEWABLE PROBIOTIC ORAL) Take 1 tablet by mouth 2 (two) times daily.       lactulose (CHRONULAC) 10 gram/15 mL solution Take 15 mLs (10 g total) by mouth 3 (three) times daily. 1892 mL 11    magnesium 30 mg Tab Take 1 tablet by mouth every evening.      MELATONIN ORAL Take 1 tablet by mouth every evening.      multivitamin capsule Take 1 capsule by mouth every morning.       mupirocin calcium 2% (BACTROBAN) 2 % cream Apply topically 3 (three) times daily. 15 g 3    MYRBETRIQ 50 mg Tb24 Take 1 tablet by mouth.      nystatin (MYCOSTATIN) powder Apply topically 2 (two) times daily. 60 g 11    omega-3 fatty acids/fish oil (FISH OIL-OMEGA-3 FATTY ACIDS) 300-1,000 mg capsule Take 1 capsule by mouth once daily.       OXcarbazepine (TRILEPTAL) 300 MG Tab Take 1 tablet (300 mg total) by mouth 2 (two) times daily. 180 tablet 3    pantoprazole (PROTONIX) 40 MG tablet TAKE ONE TABLET BY MOUTH ONCE DAILY 90 tablet 3    polyethylene glycol (GLYCOLAX) 17 gram PwPk Take 17 g by mouth once daily.       potassium chloride (KLOR-CON) 10 MEQ TbSR Take 10 mEq by mouth every evening.      pramipexole (MIRAPEX) 0.25 MG tablet Take 1 tablet (0.25 mg total) by mouth 3 (three) times daily. 90 tablet 11    sulfamethoxazole-trimethoprim 400-80mg (BACTRIM,SEPTRA) 400-80 mg per tablet Take 1 tablet by mouth.      zinc gluconate 50 mg tablet Take 50 mg by mouth once daily.      hydrOXYzine pamoate (VISTARIL) 25 MG Cap Take 1 capsule (25 mg total) by mouth as needed (anxiety). (Patient not taking: Reported on 7/29/2024) 60 capsule 2    scopolamine (TRANSDERM-SCOP) 1.3-1.5 mg (1 mg over 3 days) PLACE 1 PATCH ONTO THE SKIN EVERY 72 HOURS (Patient not taking:  Reported on 7/29/2024) 6 patch 0     No current facility-administered medications for this visit.     Facility-Administered Medications Ordered in Other Visits   Medication Dose Route Frequency Provider Last Rate Last Admin    dextrose 50% injection 12.5 g  12.5 g Intravenous PRN Volpi-Abadie, Jacqueline, MD        dextrose 50% injection 25 g  25 g Intravenous PRN Volpi-Abadie, Jacqueline, MD        insulin regular injection 0-8 Units  0-8 Units Intravenous Continuous PRN Volpi-Abadie, Jacqueline, MD        lactated ringers infusion   Intravenous Continuous Volpi-Abadie, Jacqueline,  mL/hr at 03/25/24 0728 New Bag at 03/25/24 0728         Chart review:  Primary Care Physician: Daryl      Lab review:  Most Recent Data:  CBC:   Lab Results   Component Value Date    WBC 5.36 06/25/2024    HGB 14.5 06/25/2024    HCT 41.3 06/25/2024     (L) 06/25/2024    MCV 97 06/25/2024    RDW 12.5 06/25/2024       BMP:   Lab Results   Component Value Date     06/25/2024    K 4.2 06/25/2024     06/25/2024    CO2 28 06/25/2024    BUN 9 06/25/2024    CREATININE 0.7 06/25/2024     (H) 06/25/2024    CALCIUM 9.8 06/25/2024    MG 2.1 01/29/2023     LFTs:   Lab Results   Component Value Date    PROT 8.0 06/25/2024    ALBUMIN 3.8 06/25/2024    BILITOT 0.8 06/25/2024     (H) 06/25/2024    ALKPHOS 73 06/25/2024     (H) 06/25/2024    GGT 75 (H) 04/30/2018     Coags:   Lab Results   Component Value Date    INR 1.1 06/25/2024     FLP:   Lab Results   Component Value Date    CHOL 173 12/20/2022    HDL 34 (L) 12/20/2022    LDLCALC 95.4 12/20/2022    TRIG 218 (H) 12/20/2022    CHOLHDL 19.7 (L) 12/20/2022     DM:   Lab Results   Component Value Date    HGBA1C 5.4 10/23/2023    HGBA1C 5.2 12/20/2022    HGBA1C 5.6 11/07/2018    LDLCALC 95.4 12/20/2022    CREATININE 0.7 06/25/2024     Thyroid:   Lab Results   Component Value Date    TSH 1.508 12/12/2023    FREET4 0.77 03/03/2014     Anemia:   Lab Results  "  Component Value Date    IRON 86 04/30/2018    TIBC 326 04/30/2018    FERRITIN 893 (H) 02/29/2020    GHNTVNMK40 1115 (H) 12/12/2023    FOLATE 14.9 12/12/2023     Cardiac:   Lab Results   Component Value Date    TROPONINI <0.012 04/26/2018    BNP <10 03/03/2014     Urinalysis:   Lab Results   Component Value Date    LABURIN ESCHERICHIA COLI  > 100,000 cfu/ml   (A) 03/29/2023    COLORU Yellow 07/22/2024    CLARITYU Cloudy (A) 07/22/2024    SPECGRAV 1.020 07/22/2024    NITRITE Positive (A) 07/22/2024    KETONESU Negative 07/22/2024    UROBILINOGEN 1.0 07/22/2024    WBCUA 92 (H) 03/29/2023    WBCUA 92 (H) 03/29/2023         Radiology review:      Other Results:  EKG (my interpretation): normal EKG, normal sinus rhythm, unchanged from previous tracings.        Review of Systems:  Review of Systems   Reason unable to perform ROS: Per Mother.   Constitutional:  Positive for fatigue.   Respiratory:  Positive for apnea.    Gastrointestinal:  Positive for constipation.        Reflux, belching, hiccups   Endocrine: Positive for heat intolerance.   Genitourinary:  Positive for difficulty urinating.        Neuorgenic bladder with mother catheterizing multiple times daily   Musculoskeletal:  Positive for back pain.   Skin:  Positive for wound.   Psychiatric/Behavioral:  Positive for behavioral problems, confusion, decreased concentration and self-injury. The patient is nervous/anxious.          Diet Education Discussed: Recommend high protein, low carb meals- mainly meats and vegetables.    Breakfast:   daily--prunes 2-3, applesauce for BM--- eating Yazidi oatmeal, thin bagel, (Monday), 2 scrambled eggs, biscuit with SF jelly & yogurt spread (Tuesday), pancake with bananas (made with 1 egg, banana, cinnamon) with SF syrup (Wednesday), cream of wheat packages & 2 blueberry muffins (Thursday), 2 eggs in toast "toad in a hole" (Friday), fresh grits & cinnamon roll (Saturday), [employer] Waffles & Turkey sausage (Sunday)  Lunch:  " "sandwich- ham/turkey, light harris with sandwich wheat thins- SF Koolaid 1 cup   Fruit- apple, grape, peach,   Dinner:  wheat rice/beans, grilled meats/vegetables, salad every night with low fat dressing and croutons   Fruit- banana  2 cookies- vanilla wafer after lunch and dinner everyday   When decreased to 1 cookie- self abused with change  Use airfryer 2-3x/wk  Water: > 64 oz  Juice with every meal- cutting juice with water right now  Caregiver (Ashleigh) and mother (Eli) cook  Making good changes  Atkins/Premier strawberry protein shake  Constipation significant issue- 5gm lactulose TID, and miralax in AM    MVI:   Mens OTC daily    Exercise: Recommend cardiovascular exercise, get HR over 100 for 20 minutes 3 times per week  [employer] 3-4x/wk- swim (mostly walk)  Walk on [employer]- weights near [facility] 2x/wk  Treadmill/ Elliptical trade 10-15 each, then swap 20-30 min total, speed 2,         Physical:     Vital Signs (Most Recent)  Temp: 97.4 °F (36.3 °C) (08/19/24 1340)  Pulse: 71 (08/19/24 1340)  Resp: 16 (08/19/24 1340)  BP: (!) 145/65 (08/19/24 1340)  5' 10.5" (1.791 m)  119.8 kg (264 lb 1.8 oz)     Body comp:  Fat Percent:  33.5 %  Fat Mass:  87.4 lb  FFM:  173.2 lb  TBW: 125 lb  TBW %:  48 %  BMR: 2388 kcal    Wt Readings from Last 5 Encounters:   08/19/24 119.8 kg (264 lb 1.8 oz)   07/29/24 119.6 kg (263 lb 10.7 oz)   07/22/24 120.1 kg (264 lb 12.4 oz)   06/25/24 121.1 kg (266 lb 15.6 oz)   03/25/24 120.2 kg (264 lb 15.9 oz)         Physical Exam:  Physical Exam  Nursing note reviewed. Vitals reviewed: would not allow full assessment.  Constitutional:       General: He is not in acute distress.     Appearance: He is obese. He is not ill-appearing, toxic-appearing or diaphoretic.   HENT:      Head: Normocephalic.      Right Ear: External ear normal.      Left Ear: External ear normal.      Nose: Nose normal.      Mouth/Throat:      Mouth: Mucous membranes are moist.      Pharynx: Oropharynx is clear. "   Eyes:      Conjunctiva/sclera: Conjunctivae normal.   Pulmonary:      Effort: Pulmonary effort is normal.   Skin:     Coloration: Skin is not jaundiced or pale.   Neurological:      Mental Status: He is alert. Mental status is at baseline.      Comments: Per mother         ASSESSMENT/PLAN:        1. Morbid obesity  Ambulatory referral/consult to Nutrition Services    DISCONTINUED: topiramate (TOPAMAX) 25 MG tablet      2. Class 2 severe obesity due to excess calories with serious comorbidity and body mass index (BMI) of 37.0 to 37.9 in adult        3. Hypertensive chronic kidney disease with stage 1 through stage 4 chronic kidney disease, or unspecified chronic kidney disease  Ambulatory referral/consult to Nutrition Services      4. Liver cirrhosis secondary to TRAYLOR        5. Primary hypertension        6. HALEY on CPAP        7. Metabolic dysfunction-associated steatohepatitis (MASH)        8. Chronic constipation            Plan:  Hussein Doyle has morbid obesity as their Body mass index is 37.36 kg/m². He would benefit from weight loss surgery; however, based on cognitive function he is not a suitable candidate for surgery. The preferred weight loss method agreed upon by his mother and I is Medical Weight Loss. He cannot comprehend that surgery is a tool; therefore, medications and lifestyle change will be necessary to keep weight off.     Patient has multiple medical problems that have been worsening over time.  We are planning to treat these medical problems which include obesity, prediabetes, htn, hld, haley, oa, TRAYLOR, with diet, weight loss, exercise, and medications.     Plan for this patient:  Falls reviewed with mother, Eli  Continue Prescribed home medications  Diet adherence  Discussed with mother slow changes to ensure no self harm  Reviewed breakfast diet- discussed with mother, caregiver, and pt to:   decrease bananas and soon eliminate them  Transition to Keto bread  Kodiac  oatmeal/pancakes  Decrease to 1/2 biscuit  Decrease to 1 muffin  Tanita scale results reviewed with patient  Fat   Muscle (FFM)  Water   Exercise/Activity adherence- continue as much as possible  Continue multivitamins-   Medical Weight Loss options discussed  Discussed PO vs Injectables  PO Medication  Topiramate- contraindicated based on hepatic impairment, sz risks, change in mentation with suicide/self harm tendency   Phentermine- contraindicated based on increased agitation, constipation  Diethylpropion- contraindicated as decreases sz threshold  Contrave- contraindicated with sz disorders and hepatic impairment, with increased risk for suicide/self harm   Injectable Medications  ASCVD Risk Calculator-  5.4% Current 10-Year ASCVD Risk, with lifetime risk at 69%, optimal risk 0.6%  Increase to fasting glucose over past 8-months, from 106 in 8/2023 & 107 in 12/2023, now increased to 119 in 6/2024- calculating his A1C in prediabetes at 5.8  Weight loss medications selection: Tirzepatide (Zepbound)  Aware that will need prior authorization for medication, which can take some times  Aware medication not formulary at primary pharmacy and might have to change pharmacy if they do not carry it   Denies history or family history of Medullary Thyroid Cancer or Multiple endocrine neoplasia syndrome  Discussion of manifestations of elevated calcitonin levels  Will start dosage at  2.5 mg subcutaneous for 4 weeks (weekly injections) then increased to 5 mg. Can continue to increase every 4 weeks with max dose of 15 mg  Discussion of small, frequent meals to prevent low blood sugars  Nutrition referral

## 2024-08-20 ENCOUNTER — PATIENT MESSAGE (OUTPATIENT)
Dept: BARIATRICS | Facility: CLINIC | Age: 39
End: 2024-08-20
Payer: MEDICARE

## 2024-08-20 DIAGNOSIS — F42.8 OTHER OBSESSIVE-COMPULSIVE DISORDERS: ICD-10-CM

## 2024-08-20 DIAGNOSIS — E66.01 MORBID OBESITY: Primary | ICD-10-CM

## 2024-08-20 DIAGNOSIS — K74.60 LIVER CIRRHOSIS SECONDARY TO NASH: ICD-10-CM

## 2024-08-20 DIAGNOSIS — K75.81 LIVER CIRRHOSIS SECONDARY TO NASH: ICD-10-CM

## 2024-08-20 DIAGNOSIS — F42.9 OBSESSIVE-COMPULSIVE DISORDER, UNSPECIFIED TYPE: ICD-10-CM

## 2024-08-20 DIAGNOSIS — I10 PRIMARY HYPERTENSION: ICD-10-CM

## 2024-08-20 DIAGNOSIS — F63.9 IMPULSE CONTROL DISORDER: ICD-10-CM

## 2024-08-20 RX ORDER — FLUVOXAMINE MALEATE 100 MG/1
200 TABLET, COATED ORAL 2 TIMES DAILY
Qty: 180 TABLET | Refills: 1 | Status: SHIPPED | OUTPATIENT
Start: 2024-08-20

## 2024-08-21 RX ORDER — TIRZEPATIDE 2.5 MG/.5ML
2.5 INJECTION, SOLUTION SUBCUTANEOUS
Qty: 2 ML | Refills: 0 | Status: SHIPPED | OUTPATIENT
Start: 2024-08-21 | End: 2024-09-20

## 2024-08-27 ENCOUNTER — PATIENT MESSAGE (OUTPATIENT)
Dept: FAMILY MEDICINE | Facility: CLINIC | Age: 39
End: 2024-08-27
Payer: MEDICARE

## 2024-08-28 ENCOUNTER — TELEPHONE (OUTPATIENT)
Dept: BARIATRICS | Facility: CLINIC | Age: 39
End: 2024-08-28
Payer: MEDICARE

## 2024-08-28 NOTE — TELEPHONE ENCOUNTER
Returned call to pt's mom in regard to request for appt change. She sts they no longer need to change the appt.

## 2024-09-04 ENCOUNTER — CLINICAL SUPPORT (OUTPATIENT)
Dept: BARIATRICS | Facility: CLINIC | Age: 39
End: 2024-09-04
Payer: MEDICARE

## 2024-09-04 DIAGNOSIS — E66.9 OBESITY (BMI 30-39.9): ICD-10-CM

## 2024-09-04 DIAGNOSIS — Z71.3 ENCOUNTER FOR DIETARY COUNSELING AND SURVEILLANCE: Primary | ICD-10-CM

## 2024-09-04 PROCEDURE — 99999 PR PBB SHADOW E&M-EST. PATIENT-LVL IV: CPT | Mod: PBBFAC,,,

## 2024-09-05 VITALS — WEIGHT: 264.44 LBS | BODY MASS INDEX: 37.02 KG/M2 | HEIGHT: 71 IN

## 2024-09-05 NOTE — PATIENT INSTRUCTIONS
Goals:   Addition of osmar seeds or ground flax seeds to oatmeal.   Fairlife milk in oatmilk to boost protein.   Swap protein powder to options discussed in session.   Begin swapping starchy carbohydrates for whole grain tortilla wraps, chickpea pasta 1c cooked, or vegetable alternative. Mix pasta 1/2 and 1/2 with vegetable substitute for introduction.

## 2024-09-05 NOTE — PROGRESS NOTES
NUTRITIONAL CONSULT    Referring Physician: Dr. Calderón  Referring Provider: Teodora Ibanez NP  Reason for MNT Referral: Initial assessment for medical weight loss work-up    PAST MEDICAL HISTORY:   38 y.o. male presents with a BMI of Body mass index is 37.41 kg/m²..    Past attempts at weight loss include:   Unsupervised: gym membership   Supervised:  none  Diet pills: none  Exercise attempts: stationary cycle, treadmill, cardio circuit, treva group classes     Weight history:   At current weight:  3 years  Obese for entire life.  More than 35 pounds overweight for 20+ years.  More than 100 pounds overweight for 20+ years.  Started dieting at 35 years old.  Maximum weight reached: now at 264  Most weight lost was 35 lb through paraplegia after scoliosis surgery for few months.  He describes His eating habits as volume, snacking, grazing, sweets.     HALEY screening: HX and wears CPAP     Reflux screening: currently on Rx regimen    Past Medical History:   Diagnosis Date    Anxiety     Autism     Bowel obstruction     pt mother denies    GERD (gastroesophageal reflux disease)     Grand mal seizure     Hepatic encephalopathy     Mastoiditis     Otitis media     Paraplegia     Recurrent UTI     Renal cancer 2010    Right RCC    Scoliosis     paraplegia    Sepsis due to Escherichia coli without acute organ dysfunction 02/02/2023    Sleep apnea     uses CPAP    Stroke     one week old    Tardive dyskinesia        CLINICAL DATA:  38 y.o.-year-old White male.  Height: 5'10.5  Weight: 264 lbs  IBW: 186 lbs  BMI: 37.41    Goal for Bariatric Surgery: to improve health, to improve quality of life, and to lose weight    NUTRITION & HEALTH HISTORY:  Greatest challenge: starchy CHO, portion control, and high-fat diet    Current diet recall:   Breakfast:  daily--prunes 2-3, applesauce for BM--- eating Gnosticist oatmeal, keto thins, (Monday), 2 scrambled eggs, biscuit with SF jelly & yogurt spread (Tuesday), pancake with 1/2 bananas  "(made with 1 egg, banana, cinnamon) with SF syrup (Wednesday), cream of wheat packages & 2 blueberry muffins (Thursday), 2 eggs in toast "toad in a hole" (Friday), fresh grits & cinnamon roll (Saturday), [place] [employer]aff[employer] & Turkey sausage ()  Lunch:  sandwich- ham/turkey, light harris with sandwich keto bread- SF Koolaid 1 cup  Snack: Fruit- apple, grape, peach,   Dinner:  wheat rice/beans, grilled meats/vegetables, salad every night with low fat dressing and croutons  Snack: 1/2 banana  2 cookies- vanilla wafer after lunch and dinner everyday; When decreased to 1 cookie- self abused with change    Current Diet:  Meal pattern: Regular  Protein supplements: Collagen Vital Peptides  Snackin / day  Vegetables: Likes a variety. Eats almost daily.  Fruits: Likes a variety. Eats daily.  Beverages:  gallon water, 1 glass of 1/2 SF juice and 1/2 water  Dining out: Weekly. Mostly fast food and restaurants.  Cooking at home: Weekly. Mostly  airfrying and baking  meat, fish, starchy CHO, and vegetables.    Exercise:  Past exercise: None    Current exercise: Adequate,   [employer] 3-4x/wk- swim (mostly walk)  Walk on [employer]- weights near [facility] 2x/wk  Treadmill/ Elliptical trade 10-15 each, then swap 20-30 min total, speed 2    Restrictions to exercise: None    Vitamins / Minerals / Herbs: Men's OTC daily, fish oil, CBD oil- anxiety, zinc, cranberry capsules for UTI    Food Allergies: NKFA; no intolerances stated.     Social:  Disabled. Autistic with OCD.   Lives with mother. Caregiver daily. Both attended appointment.   Grocery shopping and food prep Caregiver (sAhleigh) and mother (Eli).  Patient believes the household will be supportive after surgery.  Alcohol: None.  Smoking: None.    ASSESSMENT:  Patient reports attempts at weight loss, only to regain lost weight.  Patient demonstrated knowledge of healthy eating behaviors and exercise patterns; admits to not eating healthy and not exercising at this " point.  Patient states willingness to change lifestyle and make behavior modifications.  Expect fair  compliance with medical weight loss at this time.  Reporting severe constipation with 5gm lactulose TID, and miralax in AM. Discussed importance of fiber, fluids, and movement to stimulate bowel movements.   Reviewed dietary approach to GERD management: avoid large meals, avoid eating within 2-3 hours of bedtime, avoid late night eating and lying down soon after eating, elevate head of bed, avoid known foods which trigger reflux symptoms, minimize/avoid high-fat foods, chocolate, caffeine, citrus, alcohol, and tomato products.     Insurance requires medically supervised diet prior to consideration for bariatric surgery.    BARIATRIC DIET DISCUSSION:  Discussed diet related to patients food record. Emphasized plate method.   Reviewed diet before medical weight loss.  Reinforced that medication is not a magic bullet and importance of dieting and exericse.  Stressed importance of exercise and its role in achieving weight loss goals.  Reviewed nutrition facts labels for reading carbohydrate content of food items.   Stressed importance of utilizing a complete protein powder. Swapping from vital peptides.   Answered all questions.    RECOMMENDATIONS:  Patient is not a good candidate for bariatric surgery.     Needs additional visit(s) with RD.    PLAN:  Resume work-up for surgery.  Continue to review Nutrition Guidebook at home and call with any questions.  Work on Nutrition Checklist.  Work on expanding variety of vegetables.  Work on gradually cutting back on starchy CHO in the diet  Addition of osmar seeds or ground flax seeds to oatmeal.   Fairlife milk in oatmilk to boost protein.   Swap protein powder to options discussed in session.   Begin swapping starchy carbohydrates for whole grain tortilla wraps, chickpea pasta 1c cooked, or vegetable alternative. Mix pasta 1/2 and 1/2 with vegetable substitute for  introduction.   Begin trying various protein supplements to determine preference.  Start including protein supplements in the diet plan daily.  Return to clinic.    SESSION TIME:  60 minutes

## 2024-09-13 ENCOUNTER — PATIENT MESSAGE (OUTPATIENT)
Dept: BARIATRICS | Facility: CLINIC | Age: 39
End: 2024-09-13
Payer: MEDICARE

## 2024-09-13 DIAGNOSIS — R56.9 CONVULSIONS, UNSPECIFIED CONVULSION TYPE: ICD-10-CM

## 2024-09-13 RX ORDER — OXCARBAZEPINE 300 MG/1
300 TABLET, FILM COATED ORAL 2 TIMES DAILY
Qty: 180 TABLET | Refills: 0 | Status: SHIPPED | OUTPATIENT
Start: 2024-09-13

## 2024-09-23 ENCOUNTER — CLINICAL SUPPORT (OUTPATIENT)
Dept: BARIATRICS | Facility: CLINIC | Age: 39
End: 2024-09-23
Payer: MEDICARE

## 2024-09-23 VITALS — WEIGHT: 256.06 LBS | HEIGHT: 71 IN | BODY MASS INDEX: 35.85 KG/M2

## 2024-09-23 DIAGNOSIS — I10 PRIMARY HYPERTENSION: ICD-10-CM

## 2024-09-23 DIAGNOSIS — Z71.3 ENCOUNTER FOR DIETARY COUNSELING AND SURVEILLANCE: Primary | ICD-10-CM

## 2024-09-23 DIAGNOSIS — E66.9 OBESITY (BMI 30-39.9): ICD-10-CM

## 2024-09-23 PROCEDURE — 99999 PR PBB SHADOW E&M-EST. PATIENT-LVL III: CPT | Mod: PBBFAC,,,

## 2024-09-23 NOTE — PROGRESS NOTES
"NUTRITION NOTE    Referring Physician: Dr. Taylor  Reason for MNT Referral: Medically Supervised Diet pending medical weight loss    PAST MEDICAL HISTORY:    Patient presents for 2nd visit with weight loss over the past month; total weight loss by making numerous dietary and lifestyle changes.    Past Medical History:   Diagnosis Date    Anxiety     Autism     Bowel obstruction     pt mother denies    GERD (gastroesophageal reflux disease)     Grand mal seizure     Hepatic encephalopathy     Mastoiditis     Otitis media     Paraplegia     Recurrent UTI     Renal cancer 2010    Right RCC    Scoliosis     paraplegia    Sepsis due to Escherichia coli without acute organ dysfunction 02/02/2023    Sleep apnea     uses CPAP    Stroke     one week old    Tardive dyskinesia        CLINICAL DATA:  38 y.o. male.    There were no vitals filed for this visit.    Current Weight: 256 lbs  Weight Change Since Initial Visit: -8 lbs  Ideal Body Weight: 186 lbs  BMI: 36.22    Body composition  Fat%  44.1%  Fat mass 96.lbs   .4lbs  TBW  88.6;bs  TBW% 40.5%  BMR 1716kcal    CURRENT DIET:  Regular diet.  Diet Recall: Food records are present.    Diet Includes:   Breakfast:  daily--prunes 2-3, applesauce for BM--- eating Amish oatmeal, keto thins, (Monday), 2 scrambled eggs, biscuit with SF jelly & yogurt spread (Tuesday), pancake with 1/2 bananas (made with 1 egg, banana, cinnamon) with SF syrup (Wednesday), cream of wheat packages & 2 blueberry muffins (Thursday), 2 eggs in toast "toad in a hole" (Friday), fresh grits & cinnamon roll (Saturday), [place] Waffles & Turkey sausage (Sunday)  Lunch:  sandwich- ham/turkey, light harris with sandwich keto bread- SF Koolaid 1 cup  Snack: Fruit- apple, grape, peach,   Dinner:  wheat rice/beans, grilled meats/vegetables, salad every night with low fat dressing and croutons  Snack: 1/2 banana  2 cookies- vanilla wafer after lunch and dinner everyday; When decreased to 1 cookie- self abused " with change    Meal Pattern: Improved.  Protein Supplements: 0 per day.  Snacking: Adequate. Snacks include healthy choices.    Vegetables: Likes a variety. Eats almost daily.  Fruits: Likes a variety. Eats daily.  Beverages:  gallon water, 1 glass of 1/2 SF juice and 1/2 water  Dining out: Weekly. Mostly fast food and restaurants.  Cooking at home: Weekly. Mostly  airfrying and baking  meat, fish, starchy CHO, and vegetables.     Exercise:  Past exercise: None     Current exercise: Adequate,   [employer] 3-4x/wk- swim (mostly walk)  Walk on [employer]- weights near [facility] 2x/wk  Treadmill/ Elliptical trade 10-15 each, then swap 20-30 min total, speed 2     Restrictions to exercise: None     Vitamins / Minerals / Herbs: Men's OTC daily, fish oil, CBD oil- anxiety, zinc, cranberry capsules for UTI     Food Allergies: NKFA; no intolerances stated.      Social:  Disabled. Autistic with OCD.   Lives with mother. Caregiver daily. Both attended appointment.   Grocery shopping and food prep Caregiver (Ashleigh) and mother (Eli).  Patient believes the household will be supportive after surgery.  Alcohol: None.  Smoking: None.    ASSESSMENT:  Patient demonstrates some willingness to change lifestyle habits as evidenced by weight loss, good exercise, daily food logs, dietary changes, increased fruits, increased vegetables, more food preparation at cindy, healthier cooking at home, and healthier snacking at home.    Doing fairly well with working on greatest challenges (starchy CHO, portion control, and high-fat diet).    Excess calorie intake.  Adequate protein intake.    Introduced vegetable based pasta, greek yogurt ranch, steel cut oats with fairlife milk, swapped to spray narendra olive oil, and began decreasing portion sizes. Created document with meals breaking down carbohydrates and protein intake. Discussed aiming for 20-30g protein per meal with 10-15g protein per snack. Incorporation of vegetables for added fiber boost  to assist with constipation. Reviewed portion sizes for fruits. Advised to swap juice for protein water - Isopure infusions. He is still requesting corn at meals. Discussed decreasing portion and providing multiple vegetables at dinner meal. Continue utilizing low sodium canned products. Recommend rinsing before cooking.     PLAN:    Diet: Adjust diet plan.    Exercise: Maintain.    Behavior Modification: Continue to document food & activity logs daily. Add vegetables at lunch time to sandwich. Addition of flax seeds to muffins and oatmeal. Swap water to almond milk in smoothies. Begin portion control with fruits. Start isopure infusions to begin decreasing juice intake. Swap bread for whole grain tortillas to create pin wheels.     Weight loss prior to surgery (5-10% TBW): -8 lbs    Return to clinic in one month.    SESSION TIME:  60 minutes

## 2024-09-24 ENCOUNTER — PATIENT MESSAGE (OUTPATIENT)
Dept: BARIATRICS | Facility: CLINIC | Age: 39
End: 2024-09-24
Payer: MEDICARE

## 2024-09-27 ENCOUNTER — PATIENT MESSAGE (OUTPATIENT)
Dept: PSYCHIATRY | Facility: CLINIC | Age: 39
End: 2024-09-27
Payer: MEDICARE
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
## 2024-10-01 ENCOUNTER — OFFICE VISIT (OUTPATIENT)
Dept: PSYCHIATRY | Facility: CLINIC | Age: 39
End: 2024-10-01
Payer: MEDICAID

## 2024-10-01 DIAGNOSIS — F42.9 OBSESSIVE-COMPULSIVE DISORDER, UNSPECIFIED TYPE: Primary | ICD-10-CM

## 2024-10-01 DIAGNOSIS — F84.9 PERVASIVE DEVELOPMENTAL DISORDER, ACTIVE: ICD-10-CM

## 2024-10-01 DIAGNOSIS — F63.9 IMPULSE CONTROL DISORDER: ICD-10-CM

## 2024-10-01 DIAGNOSIS — F41.9 ANXIETY DISORDER, UNSPECIFIED TYPE: ICD-10-CM

## 2024-10-01 PROCEDURE — 99214 OFFICE O/P EST MOD 30 MIN: CPT | Mod: 95,,, | Performed by: PSYCHOLOGIST

## 2024-10-01 PROCEDURE — 1159F MED LIST DOCD IN RCRD: CPT | Mod: CPTII,95,, | Performed by: PSYCHOLOGIST

## 2024-10-01 PROCEDURE — G2211 COMPLEX E/M VISIT ADD ON: HCPCS | Mod: 95,,, | Performed by: PSYCHOLOGIST

## 2024-10-01 PROCEDURE — 90833 PSYTX W PT W E/M 30 MIN: CPT | Mod: 95,,, | Performed by: PSYCHOLOGIST

## 2024-10-10 ENCOUNTER — TELEPHONE (OUTPATIENT)
Dept: BARIATRICS | Facility: CLINIC | Age: 39
End: 2024-10-10
Payer: MEDICARE

## 2024-10-10 DIAGNOSIS — E66.01 MORBID OBESITY: Primary | ICD-10-CM

## 2024-10-10 DIAGNOSIS — K75.81 METABOLIC DYSFUNCTION-ASSOCIATED STEATOHEPATITIS (MASH): ICD-10-CM

## 2024-10-10 RX ORDER — SEMAGLUTIDE 0.25 MG/.5ML
0.25 INJECTION, SOLUTION SUBCUTANEOUS
Qty: 2 ML | Refills: 0 | Status: SHIPPED | OUTPATIENT
Start: 2024-10-10

## 2024-10-10 NOTE — TELEPHONE ENCOUNTER
Attempting to complete Prior Auth for Zepbound. Pt's mom unsure which medicaid the pt has and it is not written on his Medicaid card so I called the number on the back of the card. I spoke to Mandi. She reports that the pt has [employer] RxBIN [id number] RxPC[employer] [place]  [place] [id number]. Initiated Prior auth for ZepBound on CoverMyMeds website where it states this pt's payer does not cover Zepbound, but offered a list of weight loss meds that are covered. Provided the list to RAVINDRA Ibanez NP. for further advisement.

## 2024-10-11 ENCOUNTER — PATIENT MESSAGE (OUTPATIENT)
Dept: BARIATRICS | Facility: CLINIC | Age: 39
End: 2024-10-11
Payer: MEDICARE

## 2024-10-15 ENCOUNTER — PATIENT MESSAGE (OUTPATIENT)
Dept: PSYCHIATRY | Facility: CLINIC | Age: 39
End: 2024-10-15
Payer: MEDICARE

## 2024-10-16 ENCOUNTER — DOCUMENTATION ONLY (OUTPATIENT)
Dept: BARIATRICS | Facility: CLINIC | Age: 39
End: 2024-10-16
Payer: MEDICARE

## 2024-10-17 DIAGNOSIS — M1A.9XX0 CHRONIC GOUT WITHOUT TOPHUS, UNSPECIFIED CAUSE, UNSPECIFIED SITE: ICD-10-CM

## 2024-10-17 NOTE — TELEPHONE ENCOUNTER
Refill Routing Note   Medication(s) are not appropriate for processing by [facility] for the following reason(s):        Required labs outdated  Required labs abnormal    [facility] action(s):  Defer               Appointments  past 12m or future 3m with PCP    Date Provider   Last Visit   6/25/2024 Rehan Mazariegos MD   Next Visit   10/28/2024 Rehan Mazariegos MD   ED visits in past 90 days: 0        Note composed:5:38 PM 10/17/2024

## 2024-10-17 NOTE — TELEPHONE ENCOUNTER
No care due was identified.  [facility] Embedded Care Due Messages. Reference number: [id number].   10/17/2024 10:01:45 AM CDT

## 2024-10-18 RX ORDER — ALLOPURINOL 100 MG/1
TABLET ORAL
Qty: 180 TABLET | Refills: 0 | Status: SHIPPED | OUTPATIENT
Start: 2024-10-18

## 2024-10-28 ENCOUNTER — OFFICE VISIT (OUTPATIENT)
Dept: FAMILY MEDICINE | Facility: CLINIC | Age: 39
End: 2024-10-28
Payer: MEDICARE

## 2024-10-28 VITALS
BODY MASS INDEX: 35.52 KG/M2 | HEIGHT: 71 IN | WEIGHT: 253.75 LBS | SYSTOLIC BLOOD PRESSURE: 122 MMHG | DIASTOLIC BLOOD PRESSURE: 72 MMHG | OXYGEN SATURATION: 97 % | HEART RATE: 76 BPM

## 2024-10-28 DIAGNOSIS — R73.9 HYPERGLYCEMIA: ICD-10-CM

## 2024-10-28 DIAGNOSIS — L08.9 SKIN INFECTION: ICD-10-CM

## 2024-10-28 DIAGNOSIS — M1A.9XX0 CHRONIC GOUT WITHOUT TOPHUS, UNSPECIFIED CAUSE, UNSPECIFIED SITE: ICD-10-CM

## 2024-10-28 DIAGNOSIS — E66.812 CLASS 2 SEVERE OBESITY DUE TO EXCESS CALORIES WITH SERIOUS COMORBIDITY AND BODY MASS INDEX (BMI) OF 35.0 TO 35.9 IN ADULT: ICD-10-CM

## 2024-10-28 DIAGNOSIS — K75.81 METABOLIC DYSFUNCTION-ASSOCIATED STEATOHEPATITIS (MASH): Primary | ICD-10-CM

## 2024-10-28 DIAGNOSIS — E66.01 CLASS 2 SEVERE OBESITY DUE TO EXCESS CALORIES WITH SERIOUS COMORBIDITY AND BODY MASS INDEX (BMI) OF 35.0 TO 35.9 IN ADULT: ICD-10-CM

## 2024-10-28 DIAGNOSIS — D69.6 THROMBOCYTOPENIA, UNSPECIFIED: ICD-10-CM

## 2024-10-28 PROCEDURE — 99999 PR PBB SHADOW E&M-EST. PATIENT-LVL IV: CPT | Mod: PBBFAC,,, | Performed by: STUDENT IN AN ORGANIZED HEALTH CARE EDUCATION/TRAINING PROGRAM

## 2024-10-28 PROCEDURE — 3008F BODY MASS INDEX DOCD: CPT | Mod: CPTII,S$GLB,, | Performed by: STUDENT IN AN ORGANIZED HEALTH CARE EDUCATION/TRAINING PROGRAM

## 2024-10-28 PROCEDURE — 3074F SYST BP LT 130 MM HG: CPT | Mod: CPTII,S$GLB,, | Performed by: STUDENT IN AN ORGANIZED HEALTH CARE EDUCATION/TRAINING PROGRAM

## 2024-10-28 PROCEDURE — 3078F DIAST BP <80 MM HG: CPT | Mod: CPTII,S$GLB,, | Performed by: STUDENT IN AN ORGANIZED HEALTH CARE EDUCATION/TRAINING PROGRAM

## 2024-10-28 PROCEDURE — 1159F MED LIST DOCD IN RCRD: CPT | Mod: CPTII,S$GLB,, | Performed by: STUDENT IN AN ORGANIZED HEALTH CARE EDUCATION/TRAINING PROGRAM

## 2024-10-28 PROCEDURE — 99214 OFFICE O/P EST MOD 30 MIN: CPT | Mod: S$GLB,,, | Performed by: STUDENT IN AN ORGANIZED HEALTH CARE EDUCATION/TRAINING PROGRAM

## 2024-10-28 RX ORDER — MUPIROCIN CALCIUM 20 MG/G
CREAM TOPICAL 3 TIMES DAILY
Qty: 15 G | Refills: 3 | Status: SHIPPED | OUTPATIENT
Start: 2024-10-28

## 2024-10-28 RX ORDER — ALLOPURINOL 100 MG/1
100 TABLET ORAL 2 TIMES DAILY
Qty: 180 TABLET | Refills: 3 | Status: SHIPPED | OUTPATIENT
Start: 2024-10-28

## 2024-11-07 ENCOUNTER — PATIENT MESSAGE (OUTPATIENT)
Dept: BARIATRICS | Facility: CLINIC | Age: 39
End: 2024-11-07
Payer: MEDICARE

## 2024-11-12 ENCOUNTER — OFFICE VISIT (OUTPATIENT)
Dept: BARIATRICS | Facility: CLINIC | Age: 39
End: 2024-11-12
Payer: MEDICARE

## 2024-11-12 VITALS
BODY MASS INDEX: 35.19 KG/M2 | SYSTOLIC BLOOD PRESSURE: 110 MMHG | HEART RATE: 70 BPM | RESPIRATION RATE: 16 BRPM | TEMPERATURE: 98 F | WEIGHT: 251.38 LBS | DIASTOLIC BLOOD PRESSURE: 65 MMHG | HEIGHT: 71 IN

## 2024-11-12 DIAGNOSIS — E66.9 OBESITY (BMI 30-39.9): Primary | ICD-10-CM

## 2024-11-12 PROCEDURE — 1159F MED LIST DOCD IN RCRD: CPT | Mod: CPTII,S$GLB,, | Performed by: NURSE PRACTITIONER

## 2024-11-12 PROCEDURE — 1160F RVW MEDS BY RX/DR IN RCRD: CPT | Mod: CPTII,S$GLB,, | Performed by: NURSE PRACTITIONER

## 2024-11-12 PROCEDURE — 99213 OFFICE O/P EST LOW 20 MIN: CPT | Mod: S$GLB,,, | Performed by: NURSE PRACTITIONER

## 2024-11-12 PROCEDURE — 3008F BODY MASS INDEX DOCD: CPT | Mod: CPTII,S$GLB,, | Performed by: NURSE PRACTITIONER

## 2024-11-12 PROCEDURE — 3074F SYST BP LT 130 MM HG: CPT | Mod: CPTII,S$GLB,, | Performed by: NURSE PRACTITIONER

## 2024-11-12 PROCEDURE — 99999 PR PBB SHADOW E&M-EST. PATIENT-LVL V: CPT | Mod: PBBFAC,,, | Performed by: NURSE PRACTITIONER

## 2024-11-12 PROCEDURE — 3078F DIAST BP <80 MM HG: CPT | Mod: CPTII,S$GLB,, | Performed by: NURSE PRACTITIONER

## 2024-11-12 NOTE — PROGRESS NOTES
Medically Supervised Weight Loss Documentation    Date of Visit: 11/12/2024    Patient: Hussein Doyle    Beginning Weight: 264    Last Weight: 264    Current Weight: 251  Current BMI: Body mass index is 35.56 kg/m².  Weight Change: -13              Vitals:   Vitals:    11/12/24 1310   BP: 110/65   Pulse: 70   Resp: 16   Temp: 98.2 °F (36.8 °C)       Comorbidities:   Past Medical History:   Diagnosis Date    Anxiety     Autism     Bowel obstruction     pt mother denies    GERD (gastroesophageal reflux disease)     Grand mal seizure     Hepatic encephalopathy     Mastoiditis     Otitis media     Paraplegia     Recurrent UTI     Renal cancer 2010    Right RCC    Scoliosis     paraplegia temporarily after spine surgery-he can walk now    Sepsis due to Escherichia coli without acute organ dysfunction 02/02/2023    Sleep apnea     uses CPAP    Stroke     one week old    Tardive dyskinesia        Medications:  Current Outpatient Medications on File Prior to Visit   Medication Sig Dispense Refill    allopurinoL (ZYLOPRIM) 100 MG tablet Take 1 tablet (100 mg total) by mouth 2 (two) times daily. 180 tablet 3    amLODIPine (NORVASC) 10 MG tablet Take 1 tablet (10 mg total) by mouth once daily. 30 tablet 11    ascorbic acid, vitamin C, 250 mg Chew Take by mouth once daily.      calcium carbonate-vitamin D3 500 mg(1,250mg) -400 unit Tab Take 1 tablet by mouth 2 (two) times a day.       DENTA 5000 PLUS 1.1 % Crea       fluvoxaMINE (LUVOX) 100 MG tablet TAKE TWO TABLETS BY MOUTH TWICE DAILY 180 tablet 1    hydrOXYzine pamoate (VISTARIL) 25 MG Cap Take 1 capsule (25 mg total) by mouth as needed (anxiety). 60 capsule 2    inulin (FIBER GUMMIES ORAL) Take 2 tablets by mouth once daily.       L. ACIDOPHILUS/BIFID. ANIMALIS (CHEWABLE PROBIOTIC ORAL) Take 1 tablet by mouth 2 (two) times daily.       lactulose (CHRONULAC) 10 gram/15 mL solution Take 15 mLs (10 g total) by mouth 3 (three) times daily. 1892 mL 11    magnesium 30  mg Tab Take 1 tablet by mouth every evening.      MELATONIN ORAL Take 1 tablet by mouth every evening.      multivitamin capsule Take 1 capsule by mouth every morning.       mupirocin calcium 2% (BACTROBAN) 2 % cream Apply topically 3 (three) times daily. 15 g 3    MYRBETRIQ 50 mg Tb24 Take 1 tablet by mouth.      nystatin (MYCOSTATIN) powder Apply topically 2 (two) times daily. 60 g 11    omega-3 fatty acids/fish oil (FISH OIL-OMEGA-3 FATTY ACIDS) 300-1,000 mg capsule Take 1 capsule by mouth once daily.       OXcarbazepine (TRILEPTAL) 300 MG Tab Take 1 tablet (300 mg total) by mouth 2 (two) times daily. 180 tablet 0    pantoprazole (PROTONIX) 40 MG tablet TAKE ONE TABLET BY MOUTH ONCE DAILY 90 tablet 3    polyethylene glycol (GLYCOLAX) 17 gram PwPk Take 17 g by mouth once daily.       pramipexole (MIRAPEX) 0.25 MG tablet Take 1 tablet (0.25 mg total) by mouth 3 (three) times daily. 90 tablet 11    semaglutide, weight loss, (WEGOVY) 0.25 mg/0.5 mL PnIj Inject 0.25 mg into the skin every 7 days. 2 mL 0    sulfamethoxazole-trimethoprim 400-80mg (BACTRIM,SEPTRA) 400-80 mg per tablet Take 1 tablet by mouth.      zinc gluconate 50 mg tablet Take 50 mg by mouth once daily.       Current Facility-Administered Medications on File Prior to Visit   Medication Dose Route Frequency Provider Last Rate Last Admin    dextrose 50% injection 12.5 g  12.5 g Intravenous PRN Volpi-Abadie, Jacqueline, MD        dextrose 50% injection 25 g  25 g Intravenous PRN Volpi-Abadie, Jacqueline, MD        insulin regular injection 0-8 Units  0-8 Units Intravenous Continuous PRN Volpi-Abadie, Jacqueline, MD        lactated ringers infusion   Intravenous Continuous Volpi-Abadie, Jacqueline,  mL/hr at 03/25/24 0728 New Bag at 03/25/24 0728         Tanita Scale Body Composition:  Fat Percent:  33.6 %  Fat Mass:  84.4 lb  FFM:  167 lb  TBW: 120.4 lb  TBW %:  47.9 %  BMR: 2294 kcal    CURRENT DIET:  Regular diet.  Diet Recall: Food records are  "present.    Diet Includes:   Breakfast:  daily--prunes 2-3, applesauce for BM--- eating Latter day oatmeal, keto thins, (Monday), 2 scrambled eggs, biscuit with SF jelly & yogurt spread (Tuesday), pancake with 1/2 bananas (made with 1 egg, banana, cinnamon) with SF syrup (Wednesday), cream of wheat packages & 2 blueberry muffins (Thursday), 2 eggs in toast "toad in a hole" (Friday), fresh grits & cinnamon roll (Saturday), [place] Waffles & Turkey sausage (Sunday)  Lunch:  sandwich- ham/turkey, light harris with sandwich keto bread- SF Koolaid 1 cup  Snack: - ranch with greek yogurt and vegetable  Dinner:  wheat rice/beans, grilled meats/vegetables, salad every night with low fat dressing and croutons  Snack: 1/2 banana  No longer on cookies at lunch  Moved to 4 oz meat, 2-3 oz vegetables, 1-2 oz carbs    Meal Pattern: Improved.  Protein Supplements: 0 per day.  Snacking: Adequate. Snacks include healthy choices.    Vegetables: Likes a variety. Eats almost daily.  Fruits: Likes a variety. Eats daily.  Beverages:  gallon water, 1 glass of 1/2 SF juice and 1/2 water  Dining out: Weekly. Mostly fast food and restaurants.  Cooking at home: Weekly. Mostly  airfrying and baking  meat, fish, starchy CHO, and vegetables.     Exercise:  Current exercise: Adequate,   [employer] 3-4x/wk- treadmilll or bike  Walk on [employer]- weights near [facility] 2x/wk  Treadmill/ Elliptical trade 10-15 each, then swap 20-30 min total, speed 2     Restrictions to exercise: None     Vitamins / Minerals / Herbs: Men's OTC daily, fish oil, CBD oil- anxiety, zinc, cranberry capsules for UTI     Food Allergies: NKFA; no intolerances stated.      Social:  Disabled. Autistic with OCD.   Lives with mother. Caregiver daily. Both attended appointment.   Grocery shopping and food prep Caregiver (Ashleigh) and mother (Eli).  Patient believes the household will be supportive after surgery.  Alcohol: None.  Smoking: None.      Behavior or Diet Goals for this " patient:  Reviewed [place] with Patient  Continue prescribed home medications  Tanita Body Composition Scale  Decreased 3 lb fat  Decreased 6 lb muscle  Decreased 5 lb water  Discussed dietary changes  Do not skip meals- replace with protein shake  Increase Protein to 60 gm/day at minimum  Can remove small potatoe, have small wheat roll  Continue decreasing meals with the meat largest portion, then vegetables and if any carbs then be the smallest portion    Medical Weight Loss options discussed-  Discussed PO vs Injectables  PO Medication  Topiramate- contraindicated based on hepatic impairment, sz risks, change in mentation with suicide/self harm tendency   Phentermine- contraindicated based on increased agitation, constipation  Diethylpropion- contraindicated as decreases sz threshold  Contrave- contraindicated with sz disorders and hepatic impairment, with increased risk for suicide/self harm   Injectable Medications- Rx on 8/10/24  ASCVD Risk Calculator-  5.4% Current 10-Year ASCVD Risk, with lifetime risk at 69%, optimal risk 0.6%  Increase to fasting glucose over past 8-months, from 106 in 8/2023 & 107 in 12/2023, now increased to 119 in 6/2024- calculating his A1C in prediabetes at 5.8  Weight loss medications selection: Tirzepatide (Zepbound)  Aware that will need prior authorization for medication, which can take some times  Aware medication not formulary at primary pharmacy and might have to change pharmacy if they do not carry it   Denies history or family history of Medullary Thyroid Cancer or Multiple endocrine neoplasia syndrome  Discussion of manifestations of elevated calcitonin levels  Will start dosage at  2.5 mg subcutaneous for 4 weeks (weekly injections) then increased to 5 mg. Can continue to increase every 4 weeks with max dose of 15 mg    Changes for Today, 11/12/24  Unable to Zepbound for Obesity covered  Sent in Wegovy with approved coverage on 10/10/24,   completed 4 injections without  difficulty and family reporting tolerating well  Increase dose today for 0.5 mg, will contact me in 3 weeks for refill or dose increase    : total time spent for visit: 26 minutes

## 2024-11-13 ENCOUNTER — PATIENT MESSAGE (OUTPATIENT)
Dept: BARIATRICS | Facility: CLINIC | Age: 39
End: 2024-11-13
Payer: MEDICARE

## 2024-11-13 RX ORDER — SEMAGLUTIDE 0.5 MG/.5ML
0.5 INJECTION, SOLUTION SUBCUTANEOUS
Qty: 2 ML | Refills: 0 | Status: SHIPPED | OUTPATIENT
Start: 2024-11-13

## 2024-11-19 ENCOUNTER — PATIENT MESSAGE (OUTPATIENT)
Dept: PSYCHIATRY | Facility: CLINIC | Age: 39
End: 2024-11-19
Payer: MEDICARE

## 2024-11-26 ENCOUNTER — PATIENT MESSAGE (OUTPATIENT)
Dept: FAMILY MEDICINE | Facility: CLINIC | Age: 39
End: 2024-11-26
Payer: MEDICARE

## 2024-11-26 ENCOUNTER — OFFICE VISIT (OUTPATIENT)
Dept: PSYCHIATRY | Facility: CLINIC | Age: 39
End: 2024-11-26
Payer: MEDICAID

## 2024-11-26 ENCOUNTER — TELEPHONE (OUTPATIENT)
Dept: PSYCHIATRY | Facility: CLINIC | Age: 39
End: 2024-11-26
Payer: MEDICARE

## 2024-11-26 DIAGNOSIS — F63.9 IMPULSE CONTROL DISORDER: ICD-10-CM

## 2024-11-26 DIAGNOSIS — E66.812 CLASS 2 SEVERE OBESITY DUE TO EXCESS CALORIES WITH SERIOUS COMORBIDITY AND BODY MASS INDEX (BMI) OF 35.0 TO 35.9 IN ADULT: Primary | ICD-10-CM

## 2024-11-26 DIAGNOSIS — F41.9 ANXIETY DISORDER, UNSPECIFIED TYPE: ICD-10-CM

## 2024-11-26 DIAGNOSIS — F42.9 OBSESSIVE-COMPULSIVE DISORDER, UNSPECIFIED TYPE: Primary | ICD-10-CM

## 2024-11-26 DIAGNOSIS — E66.01 CLASS 2 SEVERE OBESITY DUE TO EXCESS CALORIES WITH SERIOUS COMORBIDITY AND BODY MASS INDEX (BMI) OF 35.0 TO 35.9 IN ADULT: Primary | ICD-10-CM

## 2024-11-26 DIAGNOSIS — K75.81 METABOLIC DYSFUNCTION-ASSOCIATED STEATOHEPATITIS (MASH): ICD-10-CM

## 2024-11-26 DIAGNOSIS — F84.9 PERVASIVE DEVELOPMENTAL DISORDER, ACTIVE: ICD-10-CM

## 2024-11-26 DIAGNOSIS — I10 PRIMARY HYPERTENSION: ICD-10-CM

## 2024-11-26 NOTE — TELEPHONE ENCOUNTER
MRN:  [id number] - can we place this pt on my schedule on 12/12 at 9:30 am? They are aware and agree to time/date.

## 2024-11-26 NOTE — PROGRESS NOTES
The patient location is:  [place], [place]  The patient location [place] is: [place]  The patient phone number is: [phone]  Visit type: Virtual visit with synchronous audio and video  Each patient to whom he or she provides medical services by telemedicine is:  (1) informed of the relationship between the physician and patient and the respective role of any other health care provider with respect to management of the patient; and (2) notified that he or she may decline to receive medical services by telemedicine and may withdraw from such care at any time.     Outpatient Psychiatry Follow-Up Visit    Clinical Status of Patient: Outpatient (Ambulatory)  11/26/2024     Chief Complaint: OCD, anxiety, impulse control, PDD      Interval History and Content of Current Session:  Interim Events/Subjective Report/Content of Current Session:  follow-up appointment with mother and caregiver.    Pt is a 35 y/o male with past psychiatric hx of OCD, anxiety, impulse control, PDD who presents for follow-up treatment. Pt's mother reported that the pt continues to present with elevated sex drive. Has been exploring various objects to press his genitalia against. Pt's mother described CPAP machine, dryer vent, and freezer. Pt's mother reported that she has tried to talk with him about this without causing a negative reaction. She noted that he has does this to varying degrees much of his life. Chart review indicates that previous psychiatrist used Lexapro with the partial intent to reduce sex drive.     Past Psychiatric hx: s/e's to mult prev meds to include zyprexa (wgt gain), lexapro 20mg po qam (anxiety and to suppress sex drive), buspar 10mg po BID, xanax 0.5mg po daily PRN anxiety    Past Medical hx:   Past Medical History:   Diagnosis Date    Anxiety     Autism     Bowel obstruction     pt mother denies    GERD (gastroesophageal reflux disease)     Grand mal seizure     Hepatic encephalopathy     Mastoiditis     Otitis  media     Paraplegia     Recurrent UTI     Renal cancer 2010    Right RCC    Scoliosis     paraplegia temporarily after spine surgery-he can walk now    Sepsis due to Escherichia coli without acute organ dysfunction 02/02/2023    Sleep apnea     uses CPAP    Stroke     one week old    Tardive dyskinesia         Interim hx:  Medication changes last visit: Start trial of hydroxyzine 25 mg PRN  Anxiety: stable  Depression: stable     Denies suicidal/homicidal ideations.  Denies hopelessness/worthlessness.    Denies auditory/visual hallucinations      Alcohol: Pt denied  Drug: Pt denied  Caffeine: Not assessed  Tobacco: Pt denied      Review of Systems   PSYCHIATRIC: Pertinent items are noted in the narrative.        CONSTITUTIONAL: weight stable    Past Medical, Family and Social History: The patient's past medical, family and social history have been reviewed and updated as appropriate within the electronic medical record. See encounter notes.     Current Psychiatric Medication:  fluvoxamine 150 mg BID, oxcarbazepine 300 mg BID (per neuro), hydroxyzine 25 mg PRN     Compliance: yes      Side effects: Pt denied     Risk Parameters:  Patient reports no suicidal ideation  Patient reports no homicidal ideation  Patient reports no self-injurious behavior  Patient reports no violent behavior     Exam (detailed: at least 9 elements; comprehensive: all 15 elements)   Constitutional  Vitals:  Most recent vital signs, dated less than 90 days prior to this appointment, were reviewed. BP: ()/()   Arterial Line BP: ()/()       General:  unremarkable, age appropriate, casual attire, good eye contact, good rapport       Musculoskeletal  Muscle Strength/Tone:  no flaccidity, no tremor    Gait & Station:  normal      Psychiatric                       Speech:  normal tone, normal rate, rhythm, and volume   Mood & Affect:    Mildly anxious         Thought Process:   Goal directed; Linear    Associations:   intact   Thought Content:   No  SI/HI, delusions, or paranoia, no AV/VH   Insight & Judgement:   Good, adequate to circumstances   Orientation:   grossly intact; alert and oriented x 4    Memory:  intact for content of interview    Language:  grossly intact, can repeat    Attention Span  : Grossly intact for content of interview   Fund of Knowledge:   intact and appropriate to age and level of education        Assessment and Diagnosis   Status/Progress: Based on the examination today, the patient's problem(s) is/are adequately controlled.  New problems have not been presented today. Co-morbidities are not complicating management of the primary condition. There are no active rule-out diagnoses for this patient at this time.      Impression: Pt appears to be relatively stable at the moment. Sex drive behaviors appear to be causing concern for pt's family as they fear he may hurt himself. Discussed this and effectiveness of past treatments. We discussed transition to alternative SSRI and will start cross-taper process during the next visit. Will continue with medication plan as is for now and monitor moving forward.     Diagnosis:   1. Obsessive Compulsive Disorder   2. Anxiety Disorder  3. Impulse Control Disorder  4. Pervasive Developmental Disorder  Intervention/Counseling/Treatment Plan   Medication Management:      1. fluvoxamine 150 mg BID    2. hydroxyzine 25 mg PRN    3. Oxcarbazepine 300 mg BID (managed by neuro)     4. Call to report any worsening of symptoms or problems with the medication. Pt instructed to go to ER with thoughts of harming self, others     5. Patient given contact # for psychotherapists at [facility] and also instructed to check with insurance for list of providers.     Psychotherapy: 45 minutes  Target symptoms: sex drive behaviors  Why chosen therapy is appropriate versus another modality: CBT used; relevant to diagnosis, patient responds to this modality  Outcome monitoring methods: self-report,  observation  Therapeutic intervention type: CBT and psychoeducation techniques.  Topics discussed/themes: building skills sets for symptom management, symptom recognition, nutrition, exercise  The patient's response to the intervention is accepting  Patient's response to treatment is: good.   The patient's progress toward treatment goals: stable     Return to clinic: 1 month    -Cognitive-Behavioral/Supportive therapy and psychoeducation provided  -R/B/SE's of medications discussed with the pt who expresses understanding and chooses to take medications as prescribed.   -Pt instructed to call clinic, 911 or go to nearest emergency room if sxs worsen or pt is in   crisis. The pt expresses understanding.    Praveen Brush, PhD, MP    Visit today included increased complexity associated with the care of the episodic problem developmental delays, anxiety addressed and managing the longitudinal care of the patient due to the serious and/or complex managed problem(s) developmental delays, anxiety.      Antidepressant/Antianxiety Medication Initiation:  Patient informed of risks, benefits, and potential side effects of medication and accepts informed consent.  Common side effects include nausea, fatigue, headache, insomnia., Specifically discussed the possibility of new or worsening suicidal thoughts/depression.  Patient instructed to stop the medication immediately and seek urgent treatment if this occurs. Patient instructed not to abruptly discontinue medication without physician guidance except in cases of sudden onset or worsening of SI.

## 2024-12-05 DIAGNOSIS — R56.9 CONVULSIONS, UNSPECIFIED CONVULSION TYPE: ICD-10-CM

## 2024-12-05 RX ORDER — OXCARBAZEPINE 300 MG/1
300 TABLET, FILM COATED ORAL 2 TIMES DAILY
Qty: 180 TABLET | Refills: 0 | Status: SHIPPED | OUTPATIENT
Start: 2024-12-05

## 2024-12-06 DIAGNOSIS — L08.9 SKIN INFECTION: ICD-10-CM

## 2024-12-06 RX ORDER — MUPIROCIN 20 MG/G
OINTMENT TOPICAL 3 TIMES DAILY
Qty: 22 G | Refills: 3 | Status: SHIPPED | OUTPATIENT
Start: 2024-12-06

## 2024-12-11 ENCOUNTER — PATIENT MESSAGE (OUTPATIENT)
Dept: BARIATRICS | Facility: CLINIC | Age: 39
End: 2024-12-11
Payer: MEDICARE

## 2024-12-11 DIAGNOSIS — E66.9 OBESITY (BMI 30-39.9): ICD-10-CM

## 2024-12-12 ENCOUNTER — PATIENT MESSAGE (OUTPATIENT)
Dept: FAMILY MEDICINE | Facility: CLINIC | Age: 39
End: 2024-12-12
Payer: MEDICARE

## 2024-12-12 ENCOUNTER — OFFICE VISIT (OUTPATIENT)
Dept: PSYCHIATRY | Facility: CLINIC | Age: 39
End: 2024-12-12
Payer: MEDICAID

## 2024-12-12 DIAGNOSIS — F41.9 ANXIETY DISORDER, UNSPECIFIED TYPE: ICD-10-CM

## 2024-12-12 DIAGNOSIS — F63.9 IMPULSE CONTROL DISORDER: ICD-10-CM

## 2024-12-12 DIAGNOSIS — F84.9 PERVASIVE DEVELOPMENTAL DISORDER, ACTIVE: ICD-10-CM

## 2024-12-12 DIAGNOSIS — F42.9 OBSESSIVE-COMPULSIVE DISORDER, UNSPECIFIED TYPE: Primary | ICD-10-CM

## 2024-12-12 RX ORDER — SEMAGLUTIDE 0.5 MG/.5ML
0.5 INJECTION, SOLUTION SUBCUTANEOUS
Qty: 2 ML | Refills: 1 | Status: SHIPPED | OUTPATIENT
Start: 2024-12-12

## 2024-12-15 ENCOUNTER — PATIENT MESSAGE (OUTPATIENT)
Dept: FAMILY MEDICINE | Facility: CLINIC | Age: 39
End: 2024-12-15
Payer: MEDICARE

## 2024-12-16 ENCOUNTER — OFFICE VISIT (OUTPATIENT)
Dept: FAMILY MEDICINE | Facility: CLINIC | Age: 39
End: 2024-12-16
Payer: MEDICARE

## 2024-12-16 DIAGNOSIS — F66 SEXUAL DISINHIBITION: Primary | ICD-10-CM

## 2024-12-16 PROCEDURE — 99214 OFFICE O/P EST MOD 30 MIN: CPT | Mod: 95,,, | Performed by: STUDENT IN AN ORGANIZED HEALTH CARE EDUCATION/TRAINING PROGRAM

## 2024-12-16 NOTE — PROGRESS NOTES
The patient location is: [place]  The chief complaint leading to consultation is: sexual disinhibition    Visit type: audiovisual    Notes:    HPI  Patient presents with sexual disinhibition. Caretaker is present to provide history. Of late, the patient has been exhibiting disturbing sexual behaviors. Seen placing his penis on various surfaces and in various openings, including parts of his CPAP machine and dryer vents. There was also an episode recently when he kissed a young girl, despite general rules about keeping an arm's distance from people.     Follows with psychiatrist Dr. Brush. Currently switching from fluvoxamine to sertraline. He also thinks the patient may need hormonal testing.     Review of Systems   Constitutional:  Negative for activity change and unexpected weight change.   HENT:  Negative for hearing loss, rhinorrhea and trouble swallowing.    Eyes:  Negative for discharge and visual disturbance.   Respiratory:  Negative for chest tightness and wheezing.    Cardiovascular:  Negative for chest pain and palpitations.   Gastrointestinal:  Negative for blood in stool, constipation, diarrhea and vomiting.   Endocrine: Negative for polydipsia and polyuria.   Genitourinary:  Positive for difficulty urinating. Negative for hematuria and urgency.   Musculoskeletal:  Negative for arthralgias, joint swelling and neck pain.   Neurological:  Negative for weakness and headaches.   Psychiatric/Behavioral:  Negative for confusion and dysphoric mood.        Objective:  Physical Exam  Constitutional:       General: He is not in acute distress.     Appearance: Normal appearance. He is well-developed.   HENT:      Head: Normocephalic and atraumatic.   Pulmonary:      Effort: Pulmonary effort is normal. No respiratory distress.   Neurological:      Mental Status: He is alert and oriented to person, place, and time. Mental status is at baseline.   Psychiatric:         Attention and Perception: Attention and  perception normal.         Mood and Affect: Mood normal.         Speech: Speech normal.         Behavior: Behavior normal. Behavior is cooperative.         Thought Content: Thought content normal.         Cognition and Memory: Cognition normal.         Judgment: Judgment normal.         Plan:  1. Sexual disinhibition  -     TESTOSTERONE PANEL; Future; Expected date: 12/16/2024    I had previously ordered cortisol and TSH - advised to collect those along with testosterone. Continue switch to sertraline.     Follow up as previously scheduled.     Face to Face time with patient: 9 minutes  11 minutes of total time spent on the encounter, which includes face to face time and non-face to face time preparing to see the patient (eg, review of tests), Obtaining and/or reviewing separately obtained history, Documenting clinical information in the electronic or other health record, Independently interpreting results (not separately reported) and communicating results to the patient/family/caregiver, or Care coordination (not separately reported).     Each patient to whom he or she provides medical services by telemedicine is:  (1) informed of the relationship between the physician and patient and the respective role of any other health care provider with respect to management of the patient; and (2) notified that he or she may decline to receive medical services by telemedicine and may withdraw from such care at any time.

## 2024-12-17 NOTE — PROGRESS NOTES
"The patient location is:  [place], [place]  The patient location [place] is: [place]  The patient phone number is: [phone]  Visit type: Virtual visit with synchronous audio and video  Each patient to whom he or she provides medical services by telemedicine is:  (1) informed of the relationship between the physician and patient and the respective role of any other health care provider with respect to management of the patient; and (2) notified that he or she may decline to receive medical services by telemedicine and may withdraw from such care at any time.     Outpatient Psychiatry Follow-Up Visit    Clinical Status of Patient: Outpatient (Ambulatory)  12/11/2024     Chief Complaint: OCD, anxiety, impulse control, PDD      Interval History and Content of Current Session:  Interim Events/Subjective Report/Content of Current Session:  follow-up appointment with mother.    Pt is a 37 y/o male with past psychiatric hx of OCD, anxiety, impulse control, PDD who presents for follow-up treatment. Pt's mother reported that his frustration level has been elevated. Pt's mother reported difficulty identifying a trigger for the frustration. Stated that his "sex drive behaviors" have decreased. Pt's mother has been trying to remove objects he would use for such. Picking skin on side and legs resulting in sores. Using Bactroban to prevent infection. Scar tissue formed. Continues to lose weight down to 247.     Past Psychiatric hx: s/e's to mult prev meds to include zyprexa (wgt gain), lexapro 20mg po qam (anxiety and to suppress sex drive), buspar 10mg po BID, xanax 0.5mg po daily PRN anxiety    Past Medical hx:   Past Medical History:   Diagnosis Date    Anxiety     Autism     Bowel obstruction     pt mother denies    GERD (gastroesophageal reflux disease)     Grand mal seizure     Hepatic encephalopathy     Mastoiditis     Otitis media     Paraplegia     Recurrent UTI     Renal cancer 2010    Right RCC    Scoliosis     " paraplegia temporarily after spine surgery-he can walk now    Sepsis due to Escherichia coli without acute organ dysfunction 02/02/2023    Sleep apnea     uses CPAP    Stroke     one week old    Tardive dyskinesia         Interim hx:  Medication changes last visit: Start trial of hydroxyzine 25 mg PRN  Anxiety: stable  Depression: stable     Denies suicidal/homicidal ideations.  Denies hopelessness/worthlessness.    Denies auditory/visual hallucinations      Alcohol: Pt denied  Drug: Pt denied  Caffeine: Not assessed  Tobacco: Pt denied      Review of Systems   PSYCHIATRIC: Pertinent items are noted in the narrative.        CONSTITUTIONAL: weight stable    Past Medical, Family and Social History: The patient's past medical, family and social history have been reviewed and updated as appropriate within the electronic medical record. See encounter notes.     Current Psychiatric Medication:  fluvoxamine 150 mg BID, oxcarbazepine 300 mg BID (per neuro), hydroxyzine 25 mg PRN     Compliance: yes      Side effects: Pt denied     Risk Parameters:  Patient reports no suicidal ideation  Patient reports no homicidal ideation  Patient reports no self-injurious behavior  Patient reports no violent behavior     Exam (detailed: at least 9 elements; comprehensive: all 15 elements)   Constitutional  Vitals:  Most recent vital signs, dated less than 90 days prior to this appointment, were reviewed. BP: ()/()   Arterial Line BP: ()/()       General:  unremarkable, age appropriate, casual attire, good eye contact, good rapport       Musculoskeletal  Muscle Strength/Tone:  no flaccidity, no tremor    Gait & Station:  normal      Psychiatric                       Speech:  normal tone, normal rate, rhythm, and volume   Mood & Affect:    Mildly anxious         Thought Process:   Goal directed; Linear    Associations:   intact   Thought Content:   No SI/HI, delusions, or paranoia, no AV/VH   Insight & Judgement:   Good, adequate to  circumstances   Orientation:   grossly intact; alert and oriented x 4    Memory:  intact for content of interview    Language:  grossly intact, can repeat    Attention Span  : Grossly intact for content of interview   Fund of Knowledge:   intact and appropriate to age and level of education        Assessment and Diagnosis   Status/Progress: Based on the examination today, the patient's problem(s) is/are adequately controlled.  New problems have not been presented today. Co-morbidities are not complicating management of the primary condition. There are no active rule-out diagnoses for this patient at this time.      Impression: Pt appears to be relatively stable at the moment. Sex drive behaviors appear to be causing concern for pt's family as they fear he may hurt himself. Discussed this and effectiveness of past treatments. We discussed transition to alternative SSRI and will start cross-taper process during the next visit. Will continue with medication plan as is for now and monitor moving forward.     Diagnosis:   1. Obsessive Compulsive Disorder   2. Anxiety Disorder  3. Impulse Control Disorder  4. Pervasive Developmental Disorder  Intervention/Counseling/Treatment Plan   Medication Management:      1. Decrease fluvoxamine to 100 mg BID for 2 weeks, reduce to 1 tab qd and start sertraline 50 mg    2. hydroxyzine 25 mg PRN    3. Oxcarbazepine 300 mg BID (managed by neuro)     4. Call to report any worsening of symptoms or problems with the medication. Pt instructed to go to ER with thoughts of harming self, others     5. Patient given contact # for psychotherapists at [facility] and also instructed to check with insurance for list of providers.     Psychotherapy: 20 minutes  Target symptoms: sex drive behaviors  Why chosen therapy is appropriate versus another modality: CBT used; relevant to diagnosis, patient responds to this modality  Outcome monitoring methods: self-report, observation  Therapeutic  intervention type: CBT and psychoeducation techniques.  Topics discussed/themes: building skills sets for symptom management, symptom recognition, nutrition, exercise  The patient's response to the intervention is accepting  Patient's response to treatment is: good.   The patient's progress toward treatment goals: stable     Return to clinic: 1 month    -Cognitive-Behavioral/Supportive therapy and psychoeducation provided  -R/B/SE's of medications discussed with the pt who expresses understanding and chooses to take medications as prescribed.   -Pt instructed to call clinic, 911 or go to nearest emergency room if sxs worsen or pt is in   crisis. The pt expresses understanding.    Praveen Brush, PhD, MP    Visit today included increased complexity associated with the care of the episodic problem developmental delays, anxiety addressed and managing the longitudinal care of the patient due to the serious and/or complex managed problem(s) developmental delays, anxiety.      Antidepressant/Antianxiety Medication Initiation:  Patient informed of risks, benefits, and potential side effects of medication and accepts informed consent.  Common side effects include nausea, fatigue, headache, insomnia., Specifically discussed the possibility of new or worsening suicidal thoughts/depression.  Patient instructed to stop the medication immediately and seek urgent treatment if this occurs. Patient instructed not to abruptly discontinue medication without physician guidance except in cases of sudden onset or worsening of SI.

## 2024-12-18 NOTE — PROGRESS NOTES
Name: Hussein Doyle  MRN: [id number]  : 1985  PCP: Rehan Mazariegos MD    Subjective   Patient presents for regular follow up. Primary concern is weight.     Patient will be going on a cruise with his mother this coming February. Asked about fitted ear plugs given history of ear infections. Also asked about scopolamine patches.     Review of Systems   Cardiovascular:  Negative for palpitations and leg swelling.   Neurological:  Negative for dizziness.       Patient Active Problem List   Diagnosis    Autistic disorder, active    Impulse control disorder    Developmental delay, moderate    Neurogenic bladder    HALEY on CPAP    Gout, chronic    Seizure disorder    Pervasive developmental disorder, active    Chronic constipation    OCD (obsessive compulsive disorder)    Hallux, valgus, congenital    TRAYLOR (nonalcoholic steatohepatitis)    Dysphagia    Liver cirrhosis secondary to TRAYLOR    Class 2 severe obesity due to excess calories with serious comorbidity and body mass index (BMI) of 37.0 to 37.9 in adult    Osteoporosis    Co-occurrence of multiple psychiatric disorders    History of renal cell carcinoma    Nocturnal leg movements    Onychomycosis    Rash in adult    Skin tag    Hammer toe of left foot    Urgency incontinence    Primary hypertension       Health Maintenance Due   Topic Date Due    Pneumococcal Vaccines (Age 0-64) (1 of 2 - PCV) Never done    HIV Screening  Never done       Objective   Vitals:    24 0830   BP: (!) 142/90   Pulse: 74       Physical Exam  Constitutional:       General: He is not in acute distress.     Appearance: Normal appearance. He is well-developed.   HENT:      Head: Normocephalic and atraumatic.      Right Ear: External ear normal.      Left Ear: External ear normal.   Eyes:      Conjunctiva/sclera: Conjunctivae normal.   Cardiovascular:      Rate and Rhythm: Normal rate and regular rhythm.      Heart sounds: No murmur heard.     No friction rub. No gallop.    Pulmonary:      Effort: Pulmonary effort is normal. No respiratory distress.      Breath sounds: No wheezing, rhonchi or rales.   Abdominal:      General: Abdomen is flat. There is no distension.   Musculoskeletal:         General: No swelling or deformity.      Right lower leg: No edema.      Left lower leg: No edema.   Skin:     General: Skin is warm and dry.      Coloration: Skin is not jaundiced.   Neurological:      Mental Status: He is alert and oriented to person, place, and time. Mental status is at baseline.   Psychiatric:         Attention and Perception: Attention normal.         Mood and Affect: Mood normal.         Speech: Speech normal.         Behavior: Behavior normal. Behavior is cooperative.         Cognition and Memory: Cognition is impaired.         Judgment: Judgment normal.         Assessment & Plan   1. Class 3 severe obesity due to excess calories with serious comorbidity and body mass index (BMI) of 40.0 to 44.9 in adult  Assessment & Plan:  Patient's weight has slowly but minimally increased. GLP-1 agonists are not covered. Mother and caretaker have been trying to cook healthier and limit his portion sizes but he has not lost any weight. Phentermine would not be a long term solution. Bariatric surgery is contraindicated due to patient's cognitive function. Plenity would be contraindicated as patient reportedly doesn't stop eating if he's full. At this point, a specialist is needed.    Orders:  -     Ambulatory referral/consult to Bariatric/Obesity Medicine; Future; Expected date: 07/02/2024    2. Skin infection  -     mupirocin calcium 2% (BACTROBAN) 2 % cream; Apply topically 3 (three) times daily.  Dispense: 15 g; Refill: 3    3. Seizure disorder  Assessment & Plan:  No seizures while on Trileptal. Continue medication. We did discuss the addition of or switch to topiramate for possible weight loss in addition to seizure control.       4. Class 2 severe obesity due to excess calories  with serious comorbidity and body mass index (BMI) of 37.0 to 37.9 in adult  Assessment & Plan:  Patient's weight has slowly but minimally increased. GLP-1 agonists are not covered. Mother and caretaker have been trying to cook healthier and limit his portion sizes but he has not lost any weight. Phentermine would not be a long term solution. Bariatric surgery is contraindicated due to patient's cognitive function. Plenity would be contraindicated as patient reportedly doesn't stop eating if he's full. At this point, a specialist is needed.      5. Primary hypertension  Assessment & Plan:  Last few blood pressure readings have been elevated - may be related to weight gain. Will start low dose amlodipine. I will check in a few weeks on their home blood pressure readings.     Orders:  -     amLODIPine (NORVASC) 2.5 MG tablet; Take 1 tablet (2.5 mg total) by mouth once daily.  Dispense: 90 tablet; Refill: 3    6. Encounter for fitting of custom ear plugs  -     Ambulatory referral/consult to Audiology; Future; Expected date: 07/02/2024    7. Unspecified perforation of tympanic membrane, bilateral  -     Ambulatory referral/consult to Audiology; Future; Expected date: 07/02/2024    Other orders  -     nystatin (MYCOSTATIN) powder; Apply topically 2 (two) times daily.  Dispense: 60 g; Refill: 11  -     scopolamine (TRANSDERM-SCOP) 1.3-1.5 mg (1 mg over 3 days); Place 1 patch onto the skin every 72 hours.  Dispense: 6 patch; Refill: 0        Follow up in 4 months.     Rehan Mazariegos MD  06/25/2024       Counseling: I discussed the itch-scratch cycle. I explained that scratching will lead to more itching and this cycle becomes self perpetuating. I discussed methods to prevent scratching in an effort to stop the patient's itching. Detail Level: Simple

## 2024-12-19 ENCOUNTER — HOSPITAL ENCOUNTER (OUTPATIENT)
Dept: RADIOLOGY | Facility: HOSPITAL | Age: 39
Discharge: HOME | End: 2024-12-19
Attending: NURSE PRACTITIONER
Payer: MEDICARE

## 2024-12-19 DIAGNOSIS — K75.81 LIVER CIRRHOSIS SECONDARY TO NASH: ICD-10-CM

## 2024-12-19 DIAGNOSIS — K74.60 LIVER CIRRHOSIS SECONDARY TO NASH: ICD-10-CM

## 2024-12-19 PROCEDURE — 76705 ECHO EXAM OF ABDOMEN: CPT | Mod: 26,,, | Performed by: STUDENT IN AN ORGANIZED HEALTH CARE EDUCATION/TRAINING PROGRAM

## 2024-12-19 PROCEDURE — 76705 ECHO EXAM OF ABDOMEN: CPT | Mod: TC,PO

## 2024-12-27 ENCOUNTER — TELEPHONE (OUTPATIENT)
Dept: PSYCHIATRY | Facility: CLINIC | Age: 39
End: 2024-12-27
Payer: MEDICARE

## 2024-12-27 ENCOUNTER — PATIENT MESSAGE (OUTPATIENT)
Dept: BARIATRICS | Facility: CLINIC | Age: 39
End: 2024-12-27
Payer: MEDICARE

## 2024-12-27 DIAGNOSIS — E66.9 OBESITY (BMI 30-39.9): Primary | ICD-10-CM

## 2024-12-27 DIAGNOSIS — K75.81 METABOLIC DYSFUNCTION-ASSOCIATED STEATOHEPATITIS (MASH): ICD-10-CM

## 2024-12-27 RX ORDER — SEMAGLUTIDE 0.5 MG/.5ML
0.5 INJECTION, SOLUTION SUBCUTANEOUS
Qty: 2 ML | Refills: 1 | Status: CANCELLED | OUTPATIENT
Start: 2024-12-27

## 2024-12-27 RX ORDER — SERTRALINE HYDROCHLORIDE 50 MG/1
50 TABLET, FILM COATED ORAL DAILY
Qty: 30 TABLET | Refills: 1 | Status: SHIPPED | OUTPATIENT
Start: 2024-12-27 | End: 2025-02-25

## 2024-12-27 NOTE — TELEPHONE ENCOUNTER
Patient Moms called for verification on there fluvoxamine. In your last visit it shows that you wanted him to take Sertraline. The patient mom said that the pharmacy never received the prescription for Sertraline. I called pharmacy to verify and they never received. Can you please resend a request for that medicine to the pharmacy please.

## 2025-01-02 RX ORDER — SEMAGLUTIDE 1 MG/.5ML
1 INJECTION, SOLUTION SUBCUTANEOUS
Qty: 2 ML | Refills: 1 | Status: SHIPPED | OUTPATIENT
Start: 2025-01-02

## 2025-01-09 ENCOUNTER — PROCEDURE VISIT (OUTPATIENT)
Facility: CLINIC | Age: 40
End: 2025-01-09
Payer: MEDICARE

## 2025-01-09 ENCOUNTER — OFFICE VISIT (OUTPATIENT)
Facility: CLINIC | Age: 40
End: 2025-01-09
Payer: MEDICARE

## 2025-01-09 VITALS — BODY MASS INDEX: 36.29 KG/M2 | WEIGHT: 253.5 LBS | HEIGHT: 70 IN

## 2025-01-09 DIAGNOSIS — E66.01 CLASS 2 SEVERE OBESITY WITH SERIOUS COMORBIDITY AND BODY MASS INDEX (BMI) OF 35.0 TO 35.9 IN ADULT, UNSPECIFIED OBESITY TYPE: ICD-10-CM

## 2025-01-09 DIAGNOSIS — R74.8 ELEVATED LIVER ENZYMES: ICD-10-CM

## 2025-01-09 DIAGNOSIS — K75.81 METABOLIC DYSFUNCTION-ASSOCIATED STEATOHEPATITIS (MASH): ICD-10-CM

## 2025-01-09 DIAGNOSIS — D69.6 THROMBOCYTOPENIA, UNSPECIFIED: ICD-10-CM

## 2025-01-09 DIAGNOSIS — E66.812 CLASS 2 SEVERE OBESITY WITH SERIOUS COMORBIDITY AND BODY MASS INDEX (BMI) OF 35.0 TO 35.9 IN ADULT, UNSPECIFIED OBESITY TYPE: ICD-10-CM

## 2025-01-09 DIAGNOSIS — K75.81 LIVER CIRRHOSIS SECONDARY TO NASH: ICD-10-CM

## 2025-01-09 DIAGNOSIS — K74.60 LIVER CIRRHOSIS SECONDARY TO NASH: ICD-10-CM

## 2025-01-09 DIAGNOSIS — K74.60 CIRRHOSIS OF LIVER WITHOUT ASCITES, UNSPECIFIED HEPATIC CIRRHOSIS TYPE: Primary | ICD-10-CM

## 2025-01-09 PROCEDURE — 99999 PR PBB SHADOW E&M-EST. PATIENT-LVL III: CPT | Mod: PBBFAC,,, | Performed by: NURSE PRACTITIONER

## 2025-01-09 NOTE — PATIENT INSTRUCTIONS
Labs and ultrasound every 6 months  Keep up the great work with weight loss efforts  Will arrange another endoscopy this summer           CIRRHOSIS EDUCATION:  This is a helpful web site about cirrhosis: https://cirrhosiscare.ca/     Because you have cirrhosis, it is extremely important to obtain blood tests and an ultrasound every 6 months to screen for liver cancer (you are at risk for developing liver cancer due to increased scar tissue in the liver).     Signs and symptoms of worsening liver disease include jaundice (yellow skin/eyes), fluid in the abdomen (ascites), and confusion/disorientation/slowed thought processes due to hepatic encephalopathy (toxins building up when the liver is not working properly). You should seek medical attention if any of these things occur. Also, possible bleeding from esophageal varices (blood vessels in the stomach and foodpipe that can burst and cause bleeding). If you have symptoms of vomiting blood, blood in your stool, maroon or black stools, or coffee ground vomit, you should go to the emergency room immediately.     Cirrhosis can increase the risk of impaired liver function or liver failure; however, we will watch your liver function score (MELD score) closely with each clinic visit. A normal MELD score is 6, highest is 40. The MELD score helps to determine when we may need to consider evaluation for liver transplant.     Counseling  -- Strict abstinence from alcohol (includes beer, wine, and/or liquor)  -- Avoid non-steroidal anti-inflammatory drugs (NSAIDs) such as ibuprofen (Motrin, Advil), naproxen (Naprosyn, Aleve), meloxicam (Mobic)   -- Tylenol/acetaminophen is OKAY and should be used as needed for pain, no more than 2000 mg per day  -- Avoid raw shellfish due to the risk of Vibrio vulnificus infection  -- Low salt/sodium diet, less than 2000 mg per day   -- High protein diet to prevent muscle mass loss. Can drink protein shakes (Premier Protein is a great option  because it is very high protein and low sugar). A bedtime snack with protein can also be helpful. Example: peanut butter sandwich/crackers  -- Periodic upper endoscopy (EGD) to screen for varices (enlarged blood vessels) in the esophagus and stomach which can increase risk of bleeding  -- Increased risk of liver cancer associated with cirrhosis; therefore, continued screening with ultrasound (or CT / MRI) and AFP tumor marker every 6 months is recommended.

## 2025-01-09 NOTE — PROCEDURES
FibroScan Transplant Hepatology    Date/Time: 1/9/2025 11:30 AM    Performed by: Amelia Armendariz NP  Authorized by: Amelia Armendariz NP    Diagnosis:  NAFLD    Probe:  XL    Universal Protocol: Patient's identity, procedure and site were verified, confirmatory pause was performed.  Discussed procedure including risks and potential complications.  Questions answered.  Patient verbalizes understanding and wishes to proceed with VCTE.     Procedure: After providing explanations of the procedure, patient was placed in the supine position with right arm in maximum abduction to allow optimal exposure of right lateral abdomen.  Patient was briefly assessed, Testing was performed in the mid-axillary location, 50Hz Shear Wave pulses were applied and the resulting Shear Wave and Propagation Speed detected with a 3.5 MHz ultrasonic signal, using the FibroScan probe, Skin to liver capsule distance and liver parenchyma were accessed during the entire examination with the FibroScan probe, Patient was instructed to breathe normally and to abstain from sudden movements during the procedure, allowing for random measurements of liver stiffness. At least 10 Shear Waves were produced, Individual measurements of each Shear Wave were calculated.  Patient tolerated the procedure well with no complications.  Meets discharge criteria as was dismissed.  Rates pain 0 out of 10.  Patient will follow up with ordering provider to review results.    Findings  Median liver stiffness score:  20.4  CAP Reading: dB/m:  286    IQR/med %:  15  Interpretation  Fibrosis interpretation is based on medial liver stiffness - Kilopascal (kPa).    Fibrosis Stage:  F4  Steatosis interpretation is based on controlled attenuation parameter - (dB/m).    Steatosis Grade:  <S1

## 2025-01-09 NOTE — PROGRESS NOTES
[facility] - Established Patient    Last Clinic Visit: 6/27/24      HISTORY     This is a 39 y.o. 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     Transaminases consistently elevated since 11/2015, AST>ALT.    Serologic workup has been negative for other etiologies of liver disease. Ferritin elevated, 800-1500 though iron sat normal. No copies of C282Y or H63D to suggest HH. IgG mildly elevated (1677) though other autoimmune markers negative.      Imaging has shown hepatomegaly, fatty liver, splenomegaly.    Risk factors for fatty liver include-  BMI 40  HLD, pre-diabetes    Diagnosed with cirrhosis: Liver biopsy 7/2020  - Cirrhotic liver  - Steatosis with steatohepatitis   - Macrovesicular steatosis 20-30% and microvesicular steatosis 20%  - Trichrome: cirrhosis (Stage 4/4)  - Iron: No deposit (Grade 0/4)    He was previously able to lose ~50 lb and liver enzymes normalized.    Interval history:  Mother present for visit.   No new concerns from liver standpoint.     In regard to MASH-  Started Wegovy last year and has lost >15 lb. Saw bariatric medicine. Family has made significant dietary changes.   HgbA1c down to 4.9.  Liver enzymes have improved as a result:   -- 71   -- 82.     Current symptoms of hepatic decompensation:              Ascites: no              LE edema: no                Hepatic encephalopathy: difficult to assess given autism and  Takes lactulose, which helps with his constipation too. Mental status at baseline.              GI bleeding: no              Jaundice: no    Health Maintenance:  -- HCC screening: abd US 12/19/24 without focal hepatic lesion; AFP 2.1.   -- Variceal screening: EGD 8/17/22 without varices  -- Hepatitis A & B vaccination: no        Past medical history, surgical history, problem list, family history, social history, allergies: Reviewed and updated in the appropriate section of the electronic medical  record.      Current Outpatient Medications   Medication Sig Dispense Refill    allopurinoL (ZYLOPRIM) 100 MG tablet Take 1 tablet (100 mg total) by mouth 2 (two) times daily. 180 tablet 3    amLODIPine (NORVASC) 10 MG tablet Take 1 tablet (10 mg total) by mouth once daily. 30 tablet 11    ascorbic acid, vitamin C, 250 mg Chew Take by mouth once daily.      calcium carbonate-vitamin D3 500 mg(1,250mg) -400 unit Tab Take 1 tablet by mouth 2 (two) times a day.       DENTA 5000 PLUS 1.1 % Crea       hydrOXYzine pamoate (VISTARIL) 25 MG Cap Take 1 capsule (25 mg total) by mouth as needed (anxiety). 60 capsule 2    inulin (FIBER GUMMIES ORAL) Take 2 tablets by mouth once daily.       L. ACIDOPHILUS/BIFID. ANIMALIS (CHEWABLE PROBIOTIC ORAL) Take 1 tablet by mouth 2 (two) times daily.       lactulose (CHRONULAC) 10 gram/15 mL solution Take 15 mLs (10 g total) by mouth 3 (three) times daily. 1892 mL 11    magnesium 30 mg Tab Take 1 tablet by mouth every evening.      MELATONIN ORAL Take 1 tablet by mouth every evening.      multivitamin capsule Take 1 capsule by mouth every morning.       mupirocin (BACTROBAN) 2 % ointment Apply topically 3 (three) times daily. 22 g 3    MYRBETRIQ 50 mg Tb24 Take 1 tablet by mouth.      nystatin (MYCOSTATIN) powder Apply topically 2 (two) times daily. 60 g 11    omega-3 fatty acids/fish oil (FISH OIL-OMEGA-3 FATTY ACIDS) 300-1,000 mg capsule Take 1 capsule by mouth once daily.       OXcarbazepine (TRILEPTAL) 300 MG Tab TAKE ONE TABLET BY MOUTH TWICE DAILY 180 tablet 0    pantoprazole (PROTONIX) 40 MG tablet TAKE ONE TABLET BY MOUTH ONCE DAILY 90 tablet 3    polyethylene glycol (GLYCOLAX) 17 gram PwPk Take 17 g by mouth once daily.       pramipexole (MIRAPEX) 0.25 MG tablet Take 1 tablet (0.25 mg total) by mouth 3 (three) times daily. 90 tablet 11    semaglutide, weight loss, (WEGOVY) 1 mg/0.5 mL PnIj Inject 1 mg into the skin every 7 days. 2 mL 1    sertraline (ZOLOFT) 50 MG tablet Take 1  tablet (50 mg total) by mouth once daily. 30 tablet 1    sulfamethoxazole-trimethoprim 400-80mg (BACTRIM,SEPTRA) 400-80 mg per tablet Take 1 tablet by mouth.      zinc gluconate 50 mg tablet Take 50 mg by mouth once daily.       No current facility-administered medications for this visit.     Facility-Administered Medications Ordered in Other Visits   Medication Dose Route Frequency Provider Last Rate Last Admin    dextrose 50% injection 12.5 g  12.5 g Intravenous PRN Volpi-Abadie, Jacqueline, MD        dextrose 50% injection 25 g  25 g Intravenous PRN Volpi-Abadie, Jacqueline, MD        insulin regular injection 0-8 Units  0-8 Units Intravenous Continuous PRN Volpi-Abadie, Jacqueline, MD        lactated ringers infusion   Intravenous Continuous Volpi-Abadie, Jacqueline,  mL/hr at 03/25/24 0728 New Bag at 03/25/24 0728     Medication list reviewed and updated.      Review of Systems - as per HPI  Constitutional: Negative for unexpected weight change.   Respiratory: Negative for shortness of breath.    Cardiovascular: Negative for leg swelling.  Gastrointestinal: Negative for abdominal distention or abdominal pain. Negative for melena or hematemesis.  Musculoskeletal: Negative for myalgias.    Skin: Negative for jaundice or itching.  Neurological: Negative for confusion or slowed mentation. Negative for tremors.   Hematological: Does not bruise/bleed easily.       Physical Exam   Constitutional: No distress. Alert and oriented.  Eyes: No scleral icterus.   Pulmonary/Chest: Respiratory effort normal. No respiratory distress.   Abdominal: No distension, no ascites appreciated.   Extremities: No edema.   Neurological: No tremor.   Skin: No jaundice.   Psychiatric: Normal mood and affect. Speech, behavior, and thought content normal.         LABS & DIAGNOSTIC STUDIES     I have personally reviewed pertinent laboratory findings:    Lab Results   Component Value Date    ALT 82 (H) 12/17/2024    AST 71 (H) 12/17/2024     ALKPHOS 83 12/17/2024    BILITOT 0.6 12/17/2024    ALBUMIN 4.3 12/17/2024    INR 1.1 12/17/2024       Lab Results   Component Value Date    WBC 7.65 12/17/2024    HGB 15.2 12/17/2024    HCT 43.0 12/17/2024    MCV 94 12/17/2024     (L) 12/17/2024       Lab Results   Component Value Date     12/17/2024    K 4.3 12/17/2024    BUN 15 12/17/2024    CREATININE 0.72 12/17/2024    ESTGFRAFRICA >60 06/16/2022    EGFRNONAA >60 06/16/2022       Lab Results   Component Value Date    SMOOTHMUSCAB Negative 1:40 04/30/2018    AMAIFA Negative 1:40 04/30/2018    IGGSERUM 1677 (H) 02/29/2020    ANASCREEN Negative <1:160 04/30/2018    FERRITIN 893 (H) 02/29/2020    FESATURATED 26 04/30/2018    CERULOPLSM 30.0 04/30/2018    HEPBSAG Negative 04/30/2018    HEPBIGM Negative 04/30/2018    HEPBCAB Negative 02/02/2021    HEPCAB Negative 04/30/2018    HEPAIGM Negative 04/30/2018       Lab Results   Component Value Date    AFP 2.1 12/17/2024       I have personally reviewed the following result reports:  Abdominal US - 12/19/24  EGD - 8/17/22  Colonoscopy - 9/9/20  Liver biopsy - 7/20/20      ASSESSMENT & PLAN     39 y.o. male with:    1. Cirrhosis of liver without ascites, unspecified hepatic cirrhosis type    2. Thrombocytopenia, unspecified    3. Metabolic dysfunction-associated steatohepatitis (MASH)    4. Class 2 severe obesity with serious comorbidity and body mass index (BMI) of 35.0 to 35.9 in adult, unspecified obesity type    5. Elevated liver enzymes        MELD 3.0: 7 at 12/17/2024  9:24 AM  MELD-Na: 7 at 12/17/2024  9:24 AM  Calculated from:  Serum Creatinine: 0.72 mg/dL (Using min of 1 mg/dL) at 12/17/2024  9:24 AM  Serum Sodium: 140 mmol/L (Using max of 137 mmol/L) at 12/17/2024  9:24 AM  Total Bilirubin: 0.6 mg/dL (Using min of 1 mg/dL) at 12/17/2024  9:24 AM  Serum Albumin: 4.3 g/dL (Using max of 3.5 g/dL) at 12/17/2024  9:24 AM  INR(ratio): 1.1 at 12/17/2024  9:24 AM  Age at listing (hypothetical): 39  years  Sex: Male at 12/17/2024  9:24 AM      Liver biopsy in 2020 confirmed MASH cirrhosis. Cirrhosis has been well compensated. Ammonia has been mildly elevated in the past though difficult to assess for hepatic encephalopathy given MR and autism. Using lactulose which has helped with constipation.     Liver enzymes currently improving with weight loss.     Recommendations:  MELD <15, no indication for liver transplant evaluation at this time. Monitor LFTs every 6 months.   Continue HCC screening every 6 months with ultrasound and AFP, next due 6/2025  EGD 8/2022 without varices. Platelets are minimally low though will obtain Fibroscan today to assess kPa and determine if repeat EGD is warranted.   Recommend vaccines for hepatitis A and B  Mental status at baseline, continue lactulose  Commended on weight loss success and dietary changes, encouraged to continue. Wegovy appears to be working well for him.    Maintain good control of blood pressure, cholesterol, and blood sugar      Orders Placed This Encounter   Procedures    FibroScan Transplant Hepatology(Vibration Controlled Transient Elastography)    US Abdomen Limited    CBC Without Differential    Comprehensive Metabolic Panel    AFP Tumor Marker    Protime-INR       ADDENDUM:  Fibroscan kPa 20.4, F4  Results discussed in clinic. Given Fibroscan kPa >20 and low platelet count, will plan for repeat EGD for varices screening this year. Mother prefers to arrange at next clinic visit.       F/u in 6 months with labs/US.      Thank you for allowing me to participate in the care of Hussein Armendariz, FNP-C  Hepatology          Duration of encounter: 30 min  This includes face to face time and non-face to face time preparing to see the patient (eg, review of tests), obtaining and/or reviewing separately obtained history, documenting clinical information in the electronic or other health record, independently interpreting results and  communicating results to the patient/family/caregiver, or Care coordination.

## 2025-01-10 ENCOUNTER — OFFICE VISIT (OUTPATIENT)
Dept: BARIATRICS | Facility: CLINIC | Age: 40
End: 2025-01-10
Payer: MEDICARE

## 2025-01-10 VITALS
BODY MASS INDEX: 35.02 KG/M2 | WEIGHT: 250.19 LBS | HEIGHT: 71 IN | HEART RATE: 79 BPM | RESPIRATION RATE: 16 BRPM | SYSTOLIC BLOOD PRESSURE: 135 MMHG | DIASTOLIC BLOOD PRESSURE: 80 MMHG | TEMPERATURE: 97 F

## 2025-01-10 DIAGNOSIS — K75.81 LIVER CIRRHOSIS SECONDARY TO NASH: ICD-10-CM

## 2025-01-10 DIAGNOSIS — I12.9 HYPERTENSIVE CHRONIC KIDNEY DISEASE WITH STAGE 1 THROUGH STAGE 4 CHRONIC KIDNEY DISEASE, OR UNSPECIFIED CHRONIC KIDNEY DISEASE: ICD-10-CM

## 2025-01-10 DIAGNOSIS — E66.9 OBESITY (BMI 30-39.9): Primary | ICD-10-CM

## 2025-01-10 DIAGNOSIS — K75.81 METABOLIC DYSFUNCTION-ASSOCIATED STEATOHEPATITIS (MASH): ICD-10-CM

## 2025-01-10 DIAGNOSIS — K74.60 LIVER CIRRHOSIS SECONDARY TO NASH: ICD-10-CM

## 2025-01-10 DIAGNOSIS — G47.33 OSA ON CPAP: ICD-10-CM

## 2025-01-10 PROCEDURE — 99999 PR PBB SHADOW E&M-EST. PATIENT-LVL III: CPT | Mod: PBBFAC,,, | Performed by: NURSE PRACTITIONER

## 2025-01-10 NOTE — PROGRESS NOTES
Medically Supervised Weight Loss Documentation    Date of Visit: 01/10/2025    Patient: Hussein Doyle    Beginning Weight: 264    Last Weight: 251    Current Weight: 250     Current BMI: Body mass index is 35.39 kg/m².  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 180 tablet 3    amLODIPine (NORVASC) 10 MG tablet Take 1 tablet (10 mg total) by mouth once daily. 30 tablet 11    ascorbic acid, vitamin C, 250 mg Chew Take by mouth once daily.      calcium carbonate-vitamin D3 500 mg(1,250mg) -400 unit Tab Take 1 tablet by mouth 2 (two) times a day.       DENTA 5000 PLUS 1.1 % Crea       hydrOXYzine pamoate (VISTARIL) 25 MG Cap Take 1 capsule (25 mg total) by mouth as needed (anxiety). 60 capsule 2    inulin (FIBER GUMMIES ORAL) Take 2 tablets by mouth once daily.       L. ACIDOPHILUS/BIFID. ANIMALIS (CHEWABLE PROBIOTIC ORAL) Take 1 tablet by mouth 2 (two) times daily.       lactulose (CHRONULAC) 10 gram/15 mL solution Take 15 mLs (10 g total) by mouth 3 (three) times daily. 1892 mL 11    magnesium 30 mg Tab Take 1 tablet by mouth every evening.      MELATONIN ORAL Take 1 tablet by mouth  every evening.      multivitamin capsule Take 1 capsule by mouth every morning.       mupirocin (BACTROBAN) 2 % ointment Apply topically 3 (three) times daily. 22 g 3    MYRBETRIQ 50 mg Tb24 Take 1 tablet by mouth.      nystatin (MYCOSTATIN) powder Apply topically 2 (two) times daily. 60 g 11    omega-3 fatty acids/fish oil (FISH OIL-OMEGA-3 FATTY ACIDS) 300-1,000 mg capsule Take 1 capsule by mouth once daily.       OXcarbazepine (TRILEPTAL) 300 MG Tab TAKE ONE TABLET BY MOUTH TWICE DAILY 180 tablet 0    pantoprazole (PROTONIX) 40 MG tablet TAKE ONE TABLET BY MOUTH ONCE DAILY 90 tablet 3    polyethylene glycol (GLYCOLAX) 17 gram PwPk Take 17 g by mouth once daily.       pramipexole (MIRAPEX) 0.25 MG tablet Take 1 tablet (0.25 mg total) by mouth 3 (three) times daily. 90 tablet 11    semaglutide, weight loss, (WEGOVY) 1 mg/0.5 mL PnIj Inject 1 mg into the skin every 7 days. 2 mL 1    sertraline (ZOLOFT) 50 MG tablet Take 1 tablet (50 mg total) by mouth once daily. 30 tablet 1    sulfamethoxazole-trimethoprim 400-80mg (BACTRIM,SEPTRA) 400-80 mg per tablet Take 1 tablet by mouth.      zinc gluconate 50 mg tablet Take 50 mg by mouth once daily.       Current Facility-Administered Medications on File Prior to Visit   Medication Dose Route Frequency Provider Last Rate Last Admin    dextrose 50% injection 12.5 g  12.5 g Intravenous PRN Volpi-Abadie, Jacqueline, MD        dextrose 50% injection 25 g  25 g Intravenous PRN Volpi-Abadie, Jacqueline, MD        insulin regular injection 0-8 Units  0-8 Units Intravenous Continuous PRN Volpi-Abadie, Jacqueline, MD        lactated ringers infusion   Intravenous Continuous Volpi-Abadie, Jacqueline,  mL/hr at 03/25/24 0728 New Bag at 03/25/24 0728         Tanita Scale Body Composition:  Fat Percent:  32.1 %  Fat Mass:  80.4 lb  FFM:  169.8 lb  TBW: 121.4 lb  TBW %:  48.5 %  BMR: 2327 kcal    CURRENT DIET:  Regular diet.  Diet Recall: Food records are present.    Diet Includes:  "  Breakfast:  daily--prunes 2-3, applesauce for BM--- eating Jain oatmeal 1/2 from before, protein mini muffins x1, turkey diana (Monday), 2 scrambled eggs, biscuit with SF jelly & yogurt spread (Tuesday), pancake with 1/2 bananas (made with 1 egg, PB, cinnamon- instead of banana) with SF syrup (Wednesday), cream of wheat packages & 2 blueberry muffins (Thursday), 2 eggs in toast "toad in a hole" (Friday), fresh grits- cut in half & cinnamon roll, only 1 now (Saturday), Jordan Waffles & Turkey sausage (Sunday)  Lunch:  sandwich- ham/turkey, light harris with sandwich keto bread- SF Koolaid 1 cup--- plans to change to vegetable soup  Snack: - ranch with greek yogurt and vegetable (cream cheese, dressing), trail mix or protein shake at 3 pm  Dinner: , grilled meats/vegetables, salad every night with low fat dressing and croutons  No longer on cookies at lunch  Moved to 4 oz meat, 2-3 oz vegetables, 1-2 oz carbs  Decreased carbs to nearly none except dinner  Isopure Clear- Mixed Berry to breakfast meal    Meal Pattern: Improved.  Protein Supplements: 2 per day.  Snacking: Adequate. Snacks include healthy choices.    Vegetables: Likes a variety. Eats almost daily.  Fruits: Likes a variety. Eats daily.  Beverages:  gallon water, 1 glass of 1/2 SF juice and protein clear drink now  Dining out: limited now. Was Weekly- Mostly fast food and restaurants.  Cooking at home: Weekly. Mostly  airfrying minimal with mostly grilling and baking  meat, fish, starchy CHO, and vegetables.     Exercise:  Current exercise: Adequate,   [employer] 3-4x/wk- treadmilll 5-6 minutes or pedal bike 15 minutes  Walk on [place]- weights near [facility] 2x/wk  Treadmill/ Elliptical trade 10-15 each, then swap 20-30 min total, speed 2     Restrictions to exercise: None     Vitamins / Minerals / Herbs: Men's OTC daily, fish oil, CBD oil- anxiety, zinc, cranberry capsules for UTI  Miralax daily  Lactulose TID for constipation     Food Allergies: NKFA; " no intolerances stated.      Social:  Disabled. Autistic with OCD.   Lives with mother. Caregiver daily. Both attended appointment.   Grocery shopping and food prep Caregiver (Ashleigh) and mother (Eli).  Patient believes the household will be supportive after surgery.  Alcohol: None.  Smoking: None.      Behavior or Diet Goals for this patient:  Reviewed [place] with family/Caregiver  Continue prescribed home medications  Tanita Body Composition Scale  Decreased 4 lb fat  Increased 2 lb muscle  Increased 1 lb water  Discussed dietary changes  Do not skip meals- replace with protein shake  Increase Protein to 60 gm/day at minimum  Can remove small potatoe, have small wheat roll  Continue decreasing meals with the meat largest portion, then vegetables and if any carbs then be the smallest portion    Medical Weight Loss options discussed-  Discussed PO vs Injectables  PO Medication  Topiramate- contraindicated based on hepatic impairment, sz risks, change in mentation with suicide/self harm tendency   Phentermine- contraindicated based on increased agitation, constipation  Diethylpropion- contraindicated as decreases sz threshold  Contrave- contraindicated with sz disorders and hepatic impairment, with increased risk for suicide/self harm   Injectable Medications- Rx on 8/10/24  ASCVD Risk Calculator-  5.4% Current 10-Year ASCVD Risk, with lifetime risk at 69%, optimal risk 0.6%  Increase to fasting glucose over past 8-months, from 106 in 8/2023 & 107 in 12/2023, now increased to 119 in 6/2024- calculating his A1C in prediabetes at 5.8  Weight loss medications selection: Tirzepatide (Zepbound)  Aware that will need prior authorization for medication, which can take some times  Aware medication not formulary at primary pharmacy and might have to change pharmacy if they do not carry it   Denies history or family history of Medullary Thyroid Cancer or Multiple endocrine neoplasia syndrome  Discussion of manifestations  of elevated calcitonin levels  Will start dosage at  2.5 mg subcutaneous for 4 weeks (weekly injections) then increased to 5 mg. Can continue to increase every 4 weeks with max dose of 15 mg    11/12/24  Unable to Zepbound for Obesity covered  Sent in Wegovy with approved coverage on 10/10/24,   completed 4 injections without difficulty and family reporting tolerating well  Increase dose today for 0.5 mg, will contact me in 3 weeks for refill or dose increase    Refill Request on 1/2/25- increase in dosage to 1 mg    Changes for today, 1/10/25  Discussion on increase in dose and how he is tolerating- today will be first dose  GERD reported by mother- continue with protonix daily, can add tums PRN- aware can worse with dose increase  F/U in 2 months, agreeable for virtual        : total time spent for visit: 37 minutes

## 2025-01-13 NOTE — PROGRESS NOTES
"The patient location is:  [place], [place]  The patient location [place] is: [place]  The patient phone number is: [phone]  Visit type: Virtual visit with synchronous audio and video  Each patient to whom he or she provides medical services by telemedicine is:  (1) informed of the relationship between the physician and patient and the respective role of any other health care provider with respect to management of the patient; and (2) notified that he or she may decline to receive medical services by telemedicine and may withdraw from such care at any time.     Outpatient Psychiatry Follow-Up Visit    Clinical Status of Patient: Outpatient (Ambulatory)  01/14/2025     Chief Complaint: OCD, anxiety, impulse control, PDD      Interval History and Content of Current Session:  Interim Events/Subjective Report/Content of Current Session:  follow-up appointment with mother.    Pt is a 37 y/o male with past psychiatric hx of OCD, anxiety, impulse control, PDD who presents for follow-up treatment. Pt's mother reported that his anxiety has decreased. Stated that he more calm in several areas of functioning. Pt's mother reported that he continues to take fluvoxamine at night ad sertraline in the morning. Pt continues to pick at his side and belly. Pt's mother reported that "sex drive behaviors" have decreased. Pt has started Wegov[employer].    Past Psychiatric hx: s/e's to mult prev meds to include zyprexa (wgt gain), lexapro 20mg po qam (anxiety and to suppress sex drive), buspar 10mg po BID, xanax 0.5mg po daily PRN anxiety    Past Medical hx:   Past Medical History:   Diagnosis Date    Anxiety     Autism     Bowel obstruction     pt mother denies    GERD (gastroesophageal reflux disease)     Grand mal seizure     Hepatic encephalopathy     Mastoiditis     Otitis media     Paraplegia     Recurrent UTI     Renal cancer 2010    Right RCC    Scoliosis     paraplegia temporarily after spine surgery-he can walk now    Sepsis due " to Escherichia coli without acute organ dysfunction 02/02/2023    Sleep apnea     uses CPAP    Stroke     one week old    Tardive dyskinesia         Interim hx:  Medication changes last visit: Decrease fluvoxamine to 100 mg BID for 2 weeks, reduce to 1 tab qd and start sertraline 50 mg  Anxiety: stable  Depression: stable     Denies suicidal/homicidal ideations.  Denies hopelessness/worthlessness.    Denies auditory/visual hallucinations      Alcohol: Pt denied  Drug: Pt denied  Caffeine: Not assessed  Tobacco: Pt denied      Review of Systems   PSYCHIATRIC: Pertinent items are noted in the narrative.        CONSTITUTIONAL: weight stable    Past Medical, Family and Social History: The patient's past medical, family and social history have been reviewed and updated as appropriate within the electronic medical record. See encounter notes.     Current Psychiatric Medication:  sertraline 50 mg, oxcarbazepine 300 mg BID (per neuro), hydroxyzine 25 mg PRN     Compliance: yes      Side effects: Pt denied     Risk Parameters:  Patient reports no suicidal ideation  Patient reports no homicidal ideation  Patient reports no self-injurious behavior  Patient reports no violent behavior     Exam (detailed: at least 9 elements; comprehensive: all 15 elements)   Constitutional  Vitals:  Most recent vital signs, dated less than 90 days prior to this appointment, were reviewed. BP: ()/()   Arterial Line BP: ()/()       General:  unremarkable, age appropriate, casual attire, good eye contact, good rapport       Musculoskeletal  Muscle Strength/Tone:  no flaccidity, no tremor    Gait & Station:  normal      Psychiatric                       Speech:  normal tone, normal rate, rhythm, and volume   Mood & Affect:    Mildly anxious         Thought Process:   Goal directed; Linear    Associations:   intact   Thought Content:   No SI/HI, delusions, or paranoia, no AV/VH   Insight & Judgement:   Good, adequate to circumstances   Orientation:    grossly intact; alert and oriented x 4    Memory:  intact for content of interview    Language:  grossly intact, can repeat    Attention Span  : Grossly intact for content of interview   Fund of Knowledge:   intact and appropriate to age and level of education        Assessment and Diagnosis   Status/Progress: Based on the examination today, the patient's problem(s) is/are adequately controlled.  New problems have not been presented today. Co-morbidities are not complicating management of the primary condition. There are no active rule-out diagnoses for this patient at this time.      Impression: Pt appears to be relatively stable at the moment. Sex drive behaviors appear to be decreasing. Will continue to increase dose of sertraline and monitor.    Diagnosis:   1. Obsessive Compulsive Disorder   2. Anxiety Disorder  3. Impulse Control Disorder  4. Pervasive Developmental Disorder  Intervention/Counseling/Treatment Plan   Medication Management:      1. Increase sertraline to 100 mg    2. hydroxyzine 25 mg PRN    3. Oxcarbazepine 300 mg BID (managed by neuro)     4. Call to report any worsening of symptoms or problems with the medication. Pt instructed to go to ER with thoughts of harming self, others     5. Patient given contact # for psychotherapists at [facility] and also instructed to check with insurance for list of providers.     Psychotherapy: 20 minutes  Target symptoms: sex drive behaviors, anxiety  Why chosen therapy is appropriate versus another modality: CBT used; relevant to diagnosis, patient responds to this modality  Outcome monitoring methods: self-report, observation  Therapeutic intervention type: CBT and psychoeducation techniques.  Topics discussed/themes: building skills sets for symptom management, symptom recognition, nutrition, exercise  The patient's response to the intervention is accepting  Patient's response to treatment is: good.   The patient's progress toward treatment goals:  stable     Return to clinic: 2 weeks    -Cognitive-Behavioral/Supportive therapy and psychoeducation provided  -R/B/SE's of medications discussed with the pt who expresses understanding and chooses to take medications as prescribed.   -Pt instructed to call clinic, 911 or go to nearest emergency room if sxs worsen or pt is in   crisis. The pt expresses understanding.    Praveen Brush, PhD, MP    Visit today included increased complexity associated with the care of the episodic problem developmental delays, anxiety addressed and managing the longitudinal care of the patient due to the serious and/or complex managed problem(s) developmental delays, anxiety.      Antidepressant/Antianxiety Medication Initiation:  Patient informed of risks, benefits, and potential side effects of medication and accepts informed consent.  Common side effects include nausea, fatigue, headache, insomnia., Specifically discussed the possibility of new or worsening suicidal thoughts/depression.  Patient instructed to stop the medication immediately and seek urgent treatment if this occurs. Patient instructed not to abruptly discontinue medication without physician guidance except in cases of sudden onset or worsening of SI.

## 2025-01-14 ENCOUNTER — OFFICE VISIT (OUTPATIENT)
Dept: PSYCHIATRY | Facility: CLINIC | Age: 40
End: 2025-01-14
Payer: MEDICAID

## 2025-01-14 DIAGNOSIS — F41.9 ANXIETY DISORDER, UNSPECIFIED TYPE: ICD-10-CM

## 2025-01-14 DIAGNOSIS — F84.9 PERVASIVE DEVELOPMENTAL DISORDER, ACTIVE: ICD-10-CM

## 2025-01-14 DIAGNOSIS — F63.9 IMPULSE CONTROL DISORDER: ICD-10-CM

## 2025-01-14 DIAGNOSIS — F42.9 OBSESSIVE-COMPULSIVE DISORDER, UNSPECIFIED TYPE: Primary | ICD-10-CM

## 2025-01-14 RX ORDER — SERTRALINE HYDROCHLORIDE 100 MG/1
100 TABLET, FILM COATED ORAL DAILY
Qty: 30 TABLET | Refills: 1 | Status: SHIPPED | OUTPATIENT
Start: 2025-01-14 | End: 2025-03-15

## 2025-01-15 ENCOUNTER — PATIENT MESSAGE (OUTPATIENT)
Dept: FAMILY MEDICINE | Facility: CLINIC | Age: 40
End: 2025-01-15
Payer: MEDICARE

## 2025-01-15 DIAGNOSIS — L29.9 PRURITUS: Primary | ICD-10-CM

## 2025-01-17 ENCOUNTER — OFFICE VISIT (OUTPATIENT)
Dept: FAMILY MEDICINE | Facility: CLINIC | Age: 40
End: 2025-01-17
Payer: MEDICARE

## 2025-01-17 DIAGNOSIS — L98.499 ULCER OF ABDOMEN WALL, UNSPECIFIED ULCER STAGE: Primary | ICD-10-CM

## 2025-01-17 DIAGNOSIS — L97.119 ULCER OF RIGHT THIGH, UNSPECIFIED ULCER STAGE: ICD-10-CM

## 2025-01-17 PROCEDURE — 98005 SYNCH AUDIO-VIDEO EST LOW 20: CPT | Mod: 95,,, | Performed by: STUDENT IN AN ORGANIZED HEALTH CARE EDUCATION/TRAINING PROGRAM

## 2025-01-17 RX ORDER — CATHETER 16 FR
1 EACH MISCELLANEOUS 3 TIMES DAILY PRN
Qty: 100 EACH | Refills: 11 | Status: SHIPPED | OUTPATIENT
Start: 2025-01-17

## 2025-01-17 RX ORDER — SYRINGE,SAFETY WITH NEEDLE,1ML 25GX1"
1 SYRINGE (EA) MISCELLANEOUS WEEKLY
Qty: 10 EACH | Refills: 11 | Status: SHIPPED | OUTPATIENT
Start: 2025-01-17

## 2025-01-17 NOTE — PROGRESS NOTES
The patient location is: [place]  The chief complaint leading to consultation is: wounds    Visit type: audiovisual    Notes:    HPI  Patient presents about wounds. Mother present to provide history. Since November, the patient has had skin ulcerations that will get inflamed and irritated when he picked at them. Mother is applying mupirocin and Bactine. She is also applying bandages but he finds them bothersome and takes them off. Every time one or two ulcers would heal, some other ulcer would show up elsewhere. Patient has not specifically complained of pain or itching.     Review of Systems   Constitutional:  Negative for activity change and unexpected weight change.   HENT:  Negative for hearing loss, rhinorrhea and trouble swallowing.    Eyes:  Negative for discharge and visual disturbance.   Respiratory:  Negative for chest tightness and wheezing.    Cardiovascular:  Negative for chest pain and palpitations.   Gastrointestinal:  Negative for blood in stool, constipation, diarrhea and vomiting.   Endocrine: Negative for polydipsia and polyuria.   Genitourinary:  Negative for difficulty urinating, hematuria and urgency.   Musculoskeletal:  Negative for arthralgias, joint swelling and neck pain.   Neurological:  Negative for weakness and headaches.   Psychiatric/Behavioral:  Negative for confusion and dysphoric mood.        Objective:  Physical Exam  Skin:     Findings: Wound (several ulcers present overlying right superior iliac crest, one ulcer overlying left lower quadrant, vertical wound overlying lower spine, ulcer on medial right thigh) present.         Plan:  1. Ulcer of abdomen wall, unspecified ulcer stage  -     Ambulatory referral/consult to Wound Clinic; Future; Expected date: 01/24/2025    2. Ulcer of right thigh, unspecified ulcer stage  -     Ambulatory referral/consult to Wound Clinic; Future; Expected date: 01/24/2025    Other orders  -     catheter (POOLE CATHETER) [facility]; 1 each by  "Misc.(Non-Drug; Combo Route) route 3 (three) times daily as needed (urinary retention).  Dispense: 100 each; Refill: 11  -     insulin syringe-needle U-100 (BD INSULIN SYRINGE) 1 mL 29 gauge x 1/2" Syrg; 1 each by Misc.(Non-Drug; Combo Route) route once a week.  Dispense: 10 each; Refill: 11    I'm not sure what is causing the initial ulcerations, but I think that the patient scratching and picking at the wounds is prolonging them. Based on interview, no current concern for cellulitis. I recommend topical diphenhydramine (Benadryl) cream with either topical lidocaine or topical capsaicin - goal is to numb area and reduce itching. Referral as listed.     Follow up as previously scheduled.     Face to Face time with patient: 15 minutes  27 minutes of total time spent on the encounter, which includes face to face time and non-face to face time preparing to see the patient (eg, review of tests), Obtaining and/or reviewing separately obtained history, Documenting clinical information in the electronic or other health record, Independently interpreting results (not separately reported) and communicating results to the patient/family/caregiver, or Care coordination (not separately reported).     Each patient to whom he or she provides medical services by telemedicine is:  (1) informed of the relationship between the physician and patient and the respective role of any other health care provider with respect to management of the patient; and (2) notified that he or she may decline to receive medical services by telemedicine and may withdraw from such care at any time.      "

## 2025-01-27 ENCOUNTER — PATIENT MESSAGE (OUTPATIENT)
Dept: NEUROLOGY | Facility: CLINIC | Age: 40
End: 2025-01-27
Payer: MEDICARE

## 2025-01-27 NOTE — PROGRESS NOTES
The patient location is:  [place], [place]  The patient location [place] is: [place]  The patient phone number is: [phone]  Visit type: Virtual visit with synchronous audio and video  Each patient to whom he or she provides medical services by telemedicine is:  (1) informed of the relationship between the physician and patient and the respective role of any other health care provider with respect to management of the patient; and (2) notified that he or she may decline to receive medical services by telemedicine and may withdraw from such care at any time.     Outpatient Psychiatry Follow-Up Visit    Clinical Status of Patient: Outpatient (Ambulatory)  01/28/2025     Chief Complaint: OCD, anxiety, impulse control, PDD      Interval History and Content of Current Session:  Interim Events/Subjective Report/Content of Current Session:  follow-up appointment with mother.    Pt is a 35 y/o male with past psychiatric hx of OCD, anxiety, impulse control, PDD who presents for follow-up treatment. Pt's mother reported that the pt is doing well. Appears to be more calm. Has met with wound care who recommended NAC for picking/scratching. PCP prescribed topical formulation. Pt's mother is preparing the pt for upcoming cruise. Continues to be concerned about his libido. Pt's mother reported that he has not been doing the hypersexual behaviors that he has in the past.     Past Psychiatric hx: s/e's to mult prev meds to include zyprexa (wgt gain), lexapro 20mg po qam (anxiety and to suppress sex drive), buspar 10mg po BID, xanax 0.5mg po daily PRN anxiety    Past Medical hx:   Past Medical History:   Diagnosis Date    Anxiety     Autism     Bowel obstruction     pt mother denies    GERD (gastroesophageal reflux disease)     Grand mal seizure     Hepatic encephalopathy     Mastoiditis     TRAYLOR (nonalcoholic steatohepatitis)     Otitis media     Paraplegia     Premature birth     6 weeks premature    Recurrent UTI     Renal  cancer 2010    Right RCC    Retinopathy     Scoliosis     paraplegia temporarily after spine surgery-he can walk now    Scoliosis     has a frank in his back    Sepsis due to Escherichia coli without acute organ dysfunction 02/02/2023    Sleep apnea     uses CPAP    Stroke     one week old    Tardive dyskinesia         Interim hx:  Medication changes last visit: Increase sertraline to 100 mg  Anxiety: stable  Depression: stable     Denies suicidal/homicidal ideations.  Denies hopelessness/worthlessness.    Denies auditory/visual hallucinations      Alcohol: Pt denied  Drug: Pt denied  Caffeine: Not assessed  Tobacco: Pt denied      Review of Systems   PSYCHIATRIC: Pertinent items are noted in the narrative.        CONSTITUTIONAL: weight stable    Past Medical, Family and Social History: The patient's past medical, family and social history have been reviewed and updated as appropriate within the electronic medical record. See encounter notes.     Current Psychiatric Medication:  sertraline 100 mg, oxcarbazepine 300 mg BID (per neuro), hydroxyzine 25 mg PRN     Compliance: yes      Side effects: Pt denied     Risk Parameters:  Patient reports no suicidal ideation  Patient reports no homicidal ideation  Patient reports no self-injurious behavior  Patient reports no violent behavior     Exam (detailed: at least 9 elements; comprehensive: all 15 elements)   Constitutional  Vitals:  Most recent vital signs, dated less than 90 days prior to this appointment, were reviewed. BP: ()/()   Arterial Line BP: ()/()       General:  unremarkable, age appropriate, casual attire, good eye contact, good rapport       Musculoskeletal  Muscle Strength/Tone:  no flaccidity, no tremor    Gait & Station:  normal      Psychiatric                       Speech:  normal tone, normal rate, rhythm, and volume   Mood & Affect:    Mildly anxious         Thought Process:   Goal directed; Linear    Associations:   intact   Thought Content:   No  SI/HI, delusions, or paranoia, no AV/VH   Insight & Judgement:   Good, adequate to circumstances   Orientation:   grossly intact; alert and oriented x 4    Memory:  intact for content of interview    Language:  grossly intact, can repeat    Attention Span  : Grossly intact for content of interview   Fund of Knowledge:   intact and appropriate to age and level of education        Assessment and Diagnosis   Status/Progress: Based on the examination today, the patient's problem(s) is/are adequately controlled.  New problems have not been presented today. Co-morbidities are not complicating management of the primary condition. There are no active rule-out diagnoses for this patient at this time.      Impression: Pt appears to be relatively stable at the moment. Sex drive behaviors appear to be decreasing. Will continue to increase dose of sertraline and monitor.    Diagnosis:   1. Obsessive Compulsive Disorder   2. Anxiety Disorder  3. Impulse Control Disorder  4. Pervasive Developmental Disorder  Intervention/Counseling/Treatment Plan   Medication Management:      1. sertraline 100 mg    2. hydroxyzine 25 mg PRN    3. Oxcarbazepine 300 mg BID (managed by neuro)     4. Call to report any worsening of symptoms or problems with the medication. Pt instructed to go to ER with thoughts of harming self, others     5. Patient given contact # for psychotherapists at [facility] [place] and also instructed to check with insurance for list of providers.     Psychotherapy: 20 minutes  Target symptoms: sex drive behaviors, anxiety  Why chosen therapy is appropriate versus another modality: CBT used; relevant to diagnosis, patient responds to this modality  Outcome monitoring methods: self-report, observation  Therapeutic intervention type: psychoeducation techniques.  Topics discussed/themes: building skills sets for symptom management, symptom recognition, nutrition, exercise  The patient's response to the intervention is  accepting  Patient's response to treatment is: good.   The patient's progress toward treatment goals: stable     Return to clinic: 4 weeks    -Cognitive-Behavioral/Supportive therapy and psychoeducation provided  -R/B/SE's of medications discussed with the pt who expresses understanding and chooses to take medications as prescribed.   -Pt instructed to call clinic, 911 or go to nearest emergency room if sxs worsen or pt is in   crisis. The pt expresses understanding.    Praveen Brush, PhD, MP    Visit today included increased complexity associated with the care of the episodic problem developmental delays, anxiety addressed and managing the longitudinal care of the patient due to the serious and/or complex managed problem(s) developmental delays, anxiety.      Antidepressant/Antianxiety Medication Initiation:  Patient informed of risks, benefits, and potential side effects of medication and accepts informed consent.  Common side effects include nausea, fatigue, headache, insomnia., Specifically discussed the possibility of new or worsening suicidal thoughts/depression.  Patient instructed to stop the medication immediately and seek urgent treatment if this occurs. Patient instructed not to abruptly discontinue medication without physician guidance except in cases of sudden onset or worsening of SI.

## 2025-01-28 ENCOUNTER — OFFICE VISIT (OUTPATIENT)
Dept: PSYCHIATRY | Facility: CLINIC | Age: 40
End: 2025-01-28
Payer: MEDICAID

## 2025-01-28 ENCOUNTER — TELEPHONE (OUTPATIENT)
Dept: FAMILY MEDICINE | Facility: CLINIC | Age: 40
End: 2025-01-28
Payer: MEDICARE

## 2025-01-28 ENCOUNTER — DOCUMENTATION ONLY (OUTPATIENT)
Dept: FAMILY MEDICINE | Facility: CLINIC | Age: 40
End: 2025-01-28
Payer: MEDICARE

## 2025-01-28 DIAGNOSIS — F41.9 ANXIETY DISORDER, UNSPECIFIED TYPE: ICD-10-CM

## 2025-01-28 DIAGNOSIS — L29.9 PRURITUS: ICD-10-CM

## 2025-01-28 DIAGNOSIS — F84.9 PERVASIVE DEVELOPMENTAL DISORDER, ACTIVE: ICD-10-CM

## 2025-01-28 DIAGNOSIS — F42.9 OBSESSIVE-COMPULSIVE DISORDER, UNSPECIFIED TYPE: Primary | ICD-10-CM

## 2025-01-28 DIAGNOSIS — F63.9 IMPULSE CONTROL DISORDER: ICD-10-CM

## 2025-01-28 RX ORDER — ACETYLCYSTEINE
1 POWDER (GRAM) MISCELLANEOUS 2 TIMES DAILY PRN
Qty: 25 G | Refills: 11 | Status: SHIPPED | OUTPATIENT
Start: 2025-01-28 | End: 2025-01-28 | Stop reason: SDUPTHER

## 2025-01-28 RX ORDER — ACETYLCYSTEINE
1 POWDER (GRAM) MISCELLANEOUS 2 TIMES DAILY PRN
Qty: 25 G | Refills: 11 | Status: SHIPPED | OUTPATIENT
Start: 2025-01-28

## 2025-01-28 NOTE — TELEPHONE ENCOUNTER
Spoke with Partha from pharmacy, Partha would like to know how acetylcysteine will be applied ie, topical and strength. Please send new rx to pharm.

## 2025-01-28 NOTE — PROGRESS NOTES
Denied    SHERRY FERRIS (Key: [id number])  PA Case ID #: [id number]  Rx #: [id number]  Need Help? Call us at [phone]  Status  Sent to Plan today  Drug  Acetylcysteine powder    Form  [employer] Medicare Part D Electronic Prior Authorization Form (2017 NCPDP)  Original Claim Info  70,A5 Part D ExclusionPlan Exclusion

## 2025-01-28 NOTE — TELEPHONE ENCOUNTER
----- Message from Annette sent at 1/28/2025  2:02 PM CST -----  Regarding: Refill  Type:  RX Refill Request    Who Called: Pt Mom    Refill or New Rx:New    RX Name and Strength:acetylcysteine, bulk, Powd       Preferred Pharmacy with phone number:[facility] Address: [address], [place] [address] Phone: [phone]    Local or Mail Order:Local    Ordering Provider:.    Would the patient rather a call back or a response via [facility]? Call    Best Call Back Number:[phone]    Additional Information: Pt Mom is requesting for script to be sent to RX above but they do need more information. Thanks

## 2025-01-28 NOTE — TELEPHONE ENCOUNTER
----- Message from Mary sent at 1/28/2025  4:16 PM CST -----  Contact: PHarmacy  Type:  Pharmacy Calling to Clarify an RX    Name of Caller:Pharmacy     Pharmacy Name:    [facility] - [place], [place] - [address]  [address] [address]  Phone: [phone] Fax: [phone]      Prescription Name:acetylcysteine, bulk, Powd    What do they need to clarify?:No clear directions were included     Best Call Back Number:see above     Additional Information: Please advise

## 2025-01-28 NOTE — TELEPHONE ENCOUNTER
----- Message from Mary sent at 1/28/2025  4:16 PM CST -----  Contact: PHarmacy  Type:  Pharmacy Calling to Clarify an RX    Name of Caller:Pharmacy     Pharmacy Name:    [facility] - [place], [place] - [address]  [address] [address]  Phone: [phone] Fax: [phone]      Prescription Name:acetylcysteine, bulk, Powd    What do they need to clarify?:No clear directions were included     Best Call Back Number:see above     Additional Information: Please advise

## 2025-01-29 DIAGNOSIS — R13.10 DYSPHAGIA, UNSPECIFIED TYPE: ICD-10-CM

## 2025-01-29 RX ORDER — ACETYLCYSTEINE 600 MG
600 CAPSULE ORAL DAILY
Qty: 30 CAPSULE | Refills: 11 | Status: SHIPPED | OUTPATIENT
Start: 2025-01-29

## 2025-01-29 RX ORDER — PANTOPRAZOLE SODIUM 40 MG/1
TABLET, DELAYED RELEASE ORAL
Qty: 90 TABLET | Refills: 3 | Status: SHIPPED | OUTPATIENT
Start: 2025-01-29

## 2025-01-29 NOTE — TELEPHONE ENCOUNTER
Provider Staff:  Action required for this patient    Requires labs      Please see care gap opportunities below in Care Due Message.    Thanks!  [facility]     Appointments      Date Provider   Last Visit   1/17/2025 Rehan Mazariegos MD   Next Visit   2/17/2025 Rehan Mazariegos MD     Refill Decision Note   Hussein Doyle  is requesting a refill authorization.  Brief Assessment and Rationale for Refill:  Approve     Medication Therapy Plan:  FOVS      Comments:     Note composed:10:12 AM 01/29/2025

## 2025-01-29 NOTE — TELEPHONE ENCOUNTER
Care Due:                  Date            Visit Type   Department     Provider  --------------------------------------------------------------------------------                                ESTABLISHED                              PATIENT -    [facility]  Last Visit: 01-      VIRTUAL      MEDICINE       Rehan Mazariegos                               -                              PRIMARY      [facility]  Next Visit: 02-      CARE ([facility])   MEDICINE       Rehan Mazariegos                                                            Last  Test          Frequency    Reason                     Performed    Due Date  --------------------------------------------------------------------------------    Uric Acid...  12 months..  allopurinoL..............  Not Found    Overdue    [employer] Embedded Care Due Messages. Reference number: [id number].   1/29/2025 9:17:49 AM CST

## 2025-01-31 ENCOUNTER — PATIENT MESSAGE (OUTPATIENT)
Dept: BARIATRICS | Facility: CLINIC | Age: 40
End: 2025-01-31
Payer: MEDICARE

## 2025-01-31 DIAGNOSIS — E66.9 OBESITY (BMI 30-39.9): ICD-10-CM

## 2025-01-31 DIAGNOSIS — K75.81 METABOLIC DYSFUNCTION-ASSOCIATED STEATOHEPATITIS (MASH): ICD-10-CM

## 2025-01-31 RX ORDER — SEMAGLUTIDE 1 MG/.5ML
1 INJECTION, SOLUTION SUBCUTANEOUS
Qty: 2 ML | Refills: 1 | Status: CANCELLED | OUTPATIENT
Start: 2025-01-31

## 2025-02-03 DIAGNOSIS — E66.9 OBESITY (BMI 30-39.9): ICD-10-CM

## 2025-02-03 DIAGNOSIS — K75.81 METABOLIC DYSFUNCTION-ASSOCIATED STEATOHEPATITIS (MASH): ICD-10-CM

## 2025-02-04 ENCOUNTER — TELEPHONE (OUTPATIENT)
Dept: FAMILY MEDICINE | Facility: CLINIC | Age: 40
End: 2025-02-04
Payer: MEDICARE

## 2025-02-04 DIAGNOSIS — L29.9 PRURITUS: Primary | ICD-10-CM

## 2025-02-04 RX ORDER — ACETYLCARNITINE 500 MG
1 CAPSULE ORAL 2 TIMES DAILY PRN
Qty: 60 EACH | Refills: 5 | Status: SHIPPED | OUTPATIENT
Start: 2025-02-04

## 2025-02-04 NOTE — TELEPHONE ENCOUNTER
----- Message from Annette sent at 2/4/2025 10:20 AM CST -----  Regarding: Meds  Type:  Pharmacy Calling to Clarify an RX    Name of Caller:Tianna    Pharmacy Name:    [facility] - [place], [place] - [address]  [address] [address]  Phone: [phone] Fax: [phone]      Prescription Name:acetylcysteine, bulk, Powd     What do they need to clarify?:Need clarification on more information on drug ( most of the information was missing)     Best Call Back Number:[phone]    Additional Information: Thanks

## 2025-02-04 NOTE — TELEPHONE ENCOUNTER
Spoke to Tianna at the pharmacy.   For acetylcysteine, bulk, Powd they are missing: the strength, form, directions, amount to dispense and how should it be taken

## 2025-02-06 RX ORDER — SEMAGLUTIDE 1 MG/.5ML
1 INJECTION, SOLUTION SUBCUTANEOUS
Qty: 2 ML | Refills: 1 | Status: SHIPPED | OUTPATIENT
Start: 2025-02-06

## 2025-02-11 RX ORDER — SERTRALINE HYDROCHLORIDE 100 MG/1
100 TABLET, FILM COATED ORAL
Qty: 30 TABLET | Refills: 1 | Status: SHIPPED | OUTPATIENT
Start: 2025-02-11

## 2025-02-28 ENCOUNTER — OFFICE VISIT (OUTPATIENT)
Dept: NEUROLOGY | Facility: CLINIC | Age: 40
End: 2025-02-28
Payer: MEDICARE

## 2025-02-28 DIAGNOSIS — M85.80 OSTEOPENIA, UNSPECIFIED LOCATION: Primary | ICD-10-CM

## 2025-02-28 DIAGNOSIS — R56.9 CONVULSIONS, UNSPECIFIED CONVULSION TYPE: ICD-10-CM

## 2025-02-28 DIAGNOSIS — Z79.818 LONG TERM (CURRENT) USE OF OTHER AGENTS AFFECTING ESTROGEN RECEPTORS AND ESTROGEN LEVELS: ICD-10-CM

## 2025-02-28 DIAGNOSIS — G40.909 SEIZURE DISORDER: ICD-10-CM

## 2025-02-28 RX ORDER — OXCARBAZEPINE 300 MG/1
300 TABLET, FILM COATED ORAL 2 TIMES DAILY
Qty: 180 TABLET | Refills: 3 | Status: SHIPPED | OUTPATIENT
Start: 2025-02-28

## 2025-02-28 NOTE — PROGRESS NOTES
The patient location is: home in [place]  The chief complaint leading to consultation is: seizures    Visit type: audiovisual    Face to Face time with patient: 27 minutes  45 minutes of total time spent on the encounter, which includes face to face time and non-face to face time preparing to see the patient (eg, review of tests), Obtaining and/or reviewing separately obtained history, Documenting clinical information in the electronic or other health record, Independently interpreting results (not separately reported) and communicating results to the patient/family/caregiver, or Care coordination (not separately reported).     Each patient to whom he or she provides medical services by telemedicine is:  (1) informed of the relationship between the physician and patient and the respective role of any other health care provider with respect to management of the patient; and (2) notified that he or she may decline to receive medical services by telemedicine and may withdraw from such care at any time.    Notes:      Date: 2/28/2025    Patient ID: Hussein Doyle is a 39 y.o. male.    Chief Complaint: seizures      History of Present Illness:  Mr. Doyle is a 39 y.o. male who presents for followup of epilepsy and nonepileptic events. Previously followed with Dr. Salinas and Dr. Sunshine. The patient was accompanied by his mother who also contributed to the following history.      Interval history: Last seen in Dec 2023. No seizures (last was years ago). He continues on oxcarbazepine 300 mg BID. Last level was low at 9, this is typical for his levels for years.     AST and ALT have been elevated for years now. This has been disucssed in the past and not felt to be secondary to trileptal.     They went on a cruise and he did really well. Psych has taken him off fluvoxamine and started him on zoloft. This is helping him. He has been calm. He was self abusing and picking. It was recommended to try NAC. He has been  "taking 600 mg daily. He has been picking some still but it does seem better.     His weight has been stable.        History of present illness:  He previously saw Dr. Sunshine. He provides little in the way of history due to his cognitive issues, so most of the history was obtained from his caregiver.  Additional history is as noted below.  Briefly, though, this gentleman suffers from a developmental delay and has a history of a prior GTC seizure.  He had onset of new episodes involving behavioral outbursts.  He was admitted to the EMU, and these were found not to be epileptic in nature.  Since the last time he was seen, he has had no further episodes worrisome for seizures.     He was started on Geodon during this time before the seizures.      Prior history:  "First seizure suspected in 2006, onset was not witnessed but caused him to fall to the ground, period of unresponsiveness, had hit his head. Not clear if there was convulsive activity at that time. Mother also describes episodes where eyes roll back, he falls to the ground, has generalized convulsive activity and is disoriented / confused for a short time following. Frequency is approximately once every 2 months, last episode 2 months ago. More prominent since June 2014.      Over the past few months and in particular 6 weeks, has had increase in behavioral outbursts. Mother is not sure if this is directly result of subclinical seizure or not but describes shouting out, inability to follow through with instructions or direction, eyes rolling (brief, eyes flit back and to right, forceful eye closure lasting 1-2 seconds) without change in muscle tone or consciousness. Concerned his behaviors have been regressing, he mentions name of caregiver from many years ago often. Repeats phrases (you need to have dark hair), appears agitated, unable to sit still. In the past month has had increase in dose of Keppra; had been on 250 BID until 12/4, dose was increased to " "500 BID. At the time of visit with Dr. Parker on 12/16 was on 1500 mg HS. Family desperate to switch Keppra if this will improve behaviors. "     Per Dr. Salinas's note: "I had previously discussed with hepatology whether or not they had concerns that this drug might be a contributory factor to his cirrhosis.  They indicated to me that they had no such worries and that there was no indication for changing the drug from their perspective."     MRI brain (12/15):  "Motion limited MRI of the brain.  There is trace asymmetry of the mesial temporal lobes left slightly smaller than the right allowing for motion limitation.  This may be artifactual or developmental variant with underlying left mesial temporal sclerosis not excluded.  Clinical correlation correlation with EEG analysis is advised.  No evidence for focal parietal signal abnormality, acute infarction or enhancing lesion."     vEEG (1/16):  "FINAL SUMMARY:  ELECTROENCEPHALOGRAM:  Interictal: This is a normal EEG during wakefulness, drowsiness, and sleep.  Ictal: During this recording, periods of eye flutter during wakefulness and myoclonic jerks during sleep were recorded without associated epileptiform activity on EEG.  CLINICAL SEIZURE:  Classification: Nonepileptic events. Based on clinical history and evaluation by Psychiatry, these events were likely motor tics during wakefulness and myoclonic jerks during sleep. There is no evidence of an epileptic process on this recording. No seizures were recorded"     DEXA (5/22):  "Osteopenia"     Prior AED:  Keppra - behavioral issues       Allergies:  Review of patient's allergies indicates:   Allergen Reactions    Benadryl [diphenhydramine hcl] Other (See Comments)     Increases anxiety and more restless    Keppra [levetiracetam] Other (See Comments)     agitation    Ativan [lorazepam] Anxiety    Xanax [alprazolam] Anxiety     Worsens anxiety and agitation    Abilify  [aripiprazole]      Other reaction(s): " arrhythmia    Clonazepam      Other reaction(s): unknown    Cough syrup w/decongestant      Other reaction(s): auditory hallucinations    Diazepam      Other reaction(s): tongue swelling  Other reaction(s): Other (See Comments)  Other reaction(s): tongue swelling    Haloperidol Other (See Comments)    Phenytoin sodium extended      Other reaction(s): unknown    Quetiapine      Other reaction(s): sweating  Other reaction(s): sedation  Other reaction(s): sweating  Other reaction(s): sedation    Risperidone      Other reaction(s): bladder incontinence    Thimerosal      Other reaction(s): seizure    Ziprasidone hcl Other (See Comments)     Other reaction(s): tonic clonic seizure  seizure       Current Medications:  Current Medications[1]    Past Medical History:  Past Medical History:   Diagnosis Date    Anxiety     Autism     Bowel obstruction     pt mother denies    GERD (gastroesophageal reflux disease)     Grand mal seizure     Hepatic encephalopathy     Mastoiditis     TRAYLOR (nonalcoholic steatohepatitis)     Otitis media     Paraplegia     Premature birth     6 weeks premature    Recurrent UTI     Renal cancer 2010    Right RCC    Retinopathy     Scoliosis     paraplegia temporarily after spine surgery-he can walk now    Scoliosis     has a frank in his back    Sepsis due to Escherichia coli without acute organ dysfunction 02/02/2023    Sleep apnea     uses CPAP    Stroke     one week old    Tardive dyskinesia        Past Surgical History:  Past Surgical History:   Procedure Laterality Date    ADENOIDECTOMY      ANKLE FRACTURE SURGERY      with hardware    BACK SURGERY  2000    COLONOSCOPY  10/28/2008    Dr. Arana at [facility]; anal fissure, minimal pinpoint rectal erythema; floppy-type colon with very little tone; biopsy: rectum mild chronic proctitis with few eosinophils sent for scanning    COLONOSCOPY N/A 06/15/2018    Procedure: COLONOSCOPY;  Surgeon: Refugio Villagomez MD;  Location: [facility];  Service:  Endoscopy;  Laterality: N/A; Preparation of the colon was fair, unremarkable; REPEAT at 50 YEARS old FOR SCREENING    COLONOSCOPY N/A 09/09/2020    Procedure: COLONOSCOPY;  Surgeon: Refugio Villagomez MD;  Location: [facility];  Service: Endoscopy;  Laterality: N/A;    CYSTOSCOPY,WITH BOTULINUM TOXIN INJECTION N/A 03/25/2024    Procedure: CYSTOSCOPY,WITH BOTULINUM TOXIN INJECTION- 200 units;  Surgeon: Amado Velasquez MD;  Location: [facility] OR;  Service: Urology;  Laterality: N/A;    CYSTOSCOPY,WITH BOTULINUM TOXIN INJECTION N/A 10/17/2024    Procedure: CYSTOSCOPY,WITH BOTULINUM TOXIN INJECTION, 200 units;  Surgeon: Amado Velasquez MD;  Location: [facility] OR;  Service: Urology;  Laterality: N/A;    ESOPHAGOGASTRODUODENOSCOPY N/A 07/13/2020    Procedure: EGD (ESOPHAGOGASTRODUODENOSCOPY);  Surgeon: Refugio Villagomez MD;  Location: [facility];  Service: Endoscopy;  Laterality: N/A;    ESOPHAGOGASTRODUODENOSCOPY N/A 08/17/2022    Procedure: EGD (ESOPHAGOGASTRODUODENOSCOPY);  Surgeon: Refugio Villagomez MD;  Location: [facility];  Service: Endoscopy;  Laterality: N/A;  cirrhosis, varices screening    ESOPHAGOGASTRODUODENOSCOPY (EGD) WITH DILATION N/A 2020    INNER EAR SURGERY      mastoid    LIVER BIOPSY N/A 07/20/2020    Procedure: BIOPSY, LIVER;  Surgeon: Ananya Surgeon;  Location: [facility];  Service: Anesthesiology;  Laterality: N/A;  189 liver bx/Ultrasound anes 1.5 hours    MAGNETIC RESONANCE IMAGING N/A 10/09/2020    Procedure: MRI (Magnetic Resonance Imagine);  Surgeon: Manish Araiza MD;  Location: [facility];  Service: Anesthesiology;  Laterality: N/A;    right partial nephrectomy Right 2010    Sees urology    TYMPANOSTOMY TUBE PLACEMENT         Family History:  family history includes Cancer in his father; Cataracts in his father, maternal grandmother, and mother; Colon polyps in his father; Diabetes in his maternal grandmother; Glaucoma in his maternal grandmother; Macular degeneration in his maternal  grandmother.    Social History:   reports that he has never smoked. He has never used smokeless tobacco. He reports that he does not drink alcohol and does not use drugs.    Physical Exam:  There were no vitals filed for this visit.  There is no height or weight on file to calculate BMI.    Neurological Exam:  Mental status: Awake and alert  Speech language: No dysarthria or aphasia on conversation  Cranial nerves: Face symmetric  Motor: Moves all extremities well  Coordination: No ataxia. No tremor.      Data:  I have personally reviewed other provider's notes, labs, & imaging made available to me today.     Labs:  CBC:   Lab Results   Component Value Date    WBC 7.65 12/17/2024    HGB 15.2 12/17/2024    HCT 43.0 12/17/2024     (L) 12/17/2024    MCV 94 12/17/2024    RDW 12.4 12/17/2024     BMP:   Lab Results   Component Value Date     12/17/2024    K 4.3 12/17/2024     12/17/2024    CO2 29 12/17/2024    BUN 15 12/17/2024    CREATININE 0.72 12/17/2024    GLU 96 12/17/2024    CALCIUM 9.6 12/17/2024    MG 2.1 01/29/2023     LFTS;   Lab Results   Component Value Date    PROT 8.1 12/17/2024    ALBUMIN 4.3 12/17/2024    BILITOT 0.6 12/17/2024    AST 71 (H) 12/17/2024    ALKPHOS 83 12/17/2024    ALT 82 (H) 12/17/2024    GGT 75 (H) 04/30/2018     COAGS:   Lab Results   Component Value Date    INR 1.1 12/17/2024     FLP:   Lab Results   Component Value Date    CHOL 173 12/20/2022    HDL 34 (L) 12/20/2022    LDLCALC 95.4 12/20/2022    TRIG 218 (H) 12/20/2022    CHOLHDL 19.7 (L) 12/20/2022       Assessment and Plan:  Mr. Doyle is a 39 y.o. male here for followup of epilepsy.     Doing well with trileptal 300 mg BID.  Will check level and continue this dose. Will also check DXA for monitoring purposes as previous have shown osteopenia.     Advised for picking they could try to increase NAC to 1200 mg daily.     Osteopenia, unspecified location  -     DXA Bone Density Axial Skeleton 1 or more sites; Future;  Expected date: 02/28/2025  -     DXA Bone Density Appendicular Skeleton; Future; Expected date: 02/28/2025    Seizure disorder  -     OXcarbazepine (TRILEPTAL) 300 MG Tab; Take 1 tablet (300 mg total) by mouth 2 (two) times daily.  Dispense: 180 tablet; Refill: 3  -     Oxcarbazepine level; Future; Expected date: 02/28/2025    Convulsions, unspecified convulsion type  -     OXcarbazepine (TRILEPTAL) 300 MG Tab; Take 1 tablet (300 mg total) by mouth 2 (two) times daily.  Dispense: 180 tablet; Refill: 3    Long term (current) use of other agents affecting estrogen receptors and estrogen levels  -     DXA Bone Density Axial Skeleton 1 or more sites; Future; Expected date: 02/28/2025  -     DXA Bone Density Appendicular Skeleton; Future; Expected date: 02/28/2025         Visit today is associated with current or anticipated ongoing medical care related to this patient's single serious condition/complex condition (epilepsy). Plan to followup in 12 months for ongoing management.        [1]   Current Outpatient Medications   Medication Sig Dispense Refill    acetylcarnitine 500 mg Cap Take 1 capsule by mouth 2 (two) times daily as needed (itching). 60 each 5    acetylcysteine 600 mg Cap Take 1 capsule (600 mg total) by mouth once daily. (Patient not taking: Reported on 1/31/2025) 30 capsule 11    allopurinoL (ZYLOPRIM) 100 MG tablet Take 1 tablet (100 mg total) by mouth 2 (two) times daily. 180 tablet 3    amLODIPine (NORVASC) 10 MG tablet Take 1 tablet (10 mg total) by mouth once daily. 30 tablet 11    ascorbic acid, vitamin C, 250 mg Chew Take by mouth once daily.      calcium carbonate-vitamin D3 500 mg(1,250mg) -400 unit Tab Take 1 tablet by mouth 2 (two) times a day.       catheter (POOLE CATHETER) 16 Fr Misc 1 each by Misc.(Non-Drug; Combo Route) route 3 (three) times daily as needed (urinary retention). 100 each 11    DENTA 5000 PLUS 1.1 % Crea       hydrOXYzine pamoate (VISTARIL) 25 MG Cap Take 1 capsule (25 mg  "total) by mouth as needed (anxiety). 60 capsule 2    insulin syringe-needle U-100 (BD INSULIN SYRINGE) 1 mL 29 gauge x 1/2" Syrg 1 each by Misc.(Non-Drug; Combo Route) route once a week. 10 each 11    inulin (FIBER GUMMIES ORAL) Take 2 tablets by mouth once daily.       L. ACIDOPHILUS/BIFID. ANIMALIS (CHEWABLE PROBIOTIC ORAL) Take 1 tablet by mouth 2 (two) times daily.       lactulose (CHRONULAC) 10 gram/15 mL solution Take 15 mLs (10 g total) by mouth 3 (three) times daily. 1892 mL 11    magnesium 30 mg Tab Take 1 tablet by mouth every evening.      MELATONIN ORAL Take 1 tablet by mouth every evening.      multivitamin capsule Take 1 capsule by mouth every morning.       mupirocin (BACTROBAN) 2 % ointment Apply topically 2 (two) times daily. Apply topically to open wounds once to twice daily and PRN, as instructed. 30 g 2    MYRBETRIQ 50 mg Tb24 Take 1 tablet by mouth.      nystatin (MYCOSTATIN) powder Apply topically 2 (two) times daily. 60 g 11    omega-3 fatty acids/fish oil (FISH OIL-OMEGA-3 FATTY ACIDS) 300-1,000 mg capsule Take 1 capsule by mouth once daily.       OXcarbazepine (TRILEPTAL) 300 MG Tab Take 1 tablet (300 mg total) by mouth 2 (two) times daily. 180 tablet 3    pantoprazole (PROTONIX) 40 MG tablet TAKE ONE TABLET BY MOUTH ONCE DAILY 90 tablet 3    polyethylene glycol (GLYCOLAX) 17 gram PwPk Take 17 g by mouth once daily.       pramipexole (MIRAPEX) 0.25 MG tablet Take 1 tablet (0.25 mg total) by mouth 3 (three) times daily. 90 tablet 11    semaglutide, weight loss, (WEGOVY) 1 mg/0.5 mL PnIj Inject 1 mg into the skin every 7 days. 2 mL 1    sertraline (ZOLOFT) 100 MG tablet TAKE ONE TABLET BY MOUTH ONCE DAILY 30 tablet 1    sulfamethoxazole-trimethoprim 400-80mg (BACTRIM,SEPTRA) 400-80 mg per tablet Take 1 tablet by mouth.      zinc gluconate 50 mg tablet Take 50 mg by mouth once daily.       No current facility-administered medications for this visit.     Facility-Administered Medications Ordered " in Other Visits   Medication Dose Route Frequency Provider Last Rate Last Admin    dextrose 50% injection 12.5 g  12.5 g Intravenous PRN Volpi-Abadie, Jacqueline, MD        dextrose 50% injection 25 g  25 g Intravenous PRN Volpi-Abadie, Jacqueline, MD        insulin regular injection 0-8 Units  0-8 Units Intravenous Continuous PRN Volpi-Abadie, Jacqueline, MD        lactated ringers infusion   Intravenous Continuous Volpi-Abadie, Jacqueline,  mL/hr at 03/25/24 0728 New Bag at 03/25/24 0773

## 2025-03-06 ENCOUNTER — OFFICE VISIT (OUTPATIENT)
Dept: FAMILY MEDICINE | Facility: CLINIC | Age: 40
End: 2025-03-06
Payer: MEDICARE

## 2025-03-06 VITALS
SYSTOLIC BLOOD PRESSURE: 124 MMHG | WEIGHT: 246.69 LBS | OXYGEN SATURATION: 97 % | DIASTOLIC BLOOD PRESSURE: 72 MMHG | HEART RATE: 79 BPM | BODY MASS INDEX: 34.9 KG/M2

## 2025-03-06 DIAGNOSIS — G40.909 SEIZURE DISORDER: Chronic | ICD-10-CM

## 2025-03-06 DIAGNOSIS — K75.81 LIVER CIRRHOSIS SECONDARY TO NASH: Primary | ICD-10-CM

## 2025-03-06 DIAGNOSIS — M85.80 OSTEOPENIA, UNSPECIFIED LOCATION: ICD-10-CM

## 2025-03-06 DIAGNOSIS — R25.8 NOCTURNAL LEG MOVEMENTS: ICD-10-CM

## 2025-03-06 DIAGNOSIS — E66.01 CLASS 2 SEVERE OBESITY WITH SERIOUS COMORBIDITY AND BODY MASS INDEX (BMI) OF 35.0 TO 35.9 IN ADULT, UNSPECIFIED OBESITY TYPE: ICD-10-CM

## 2025-03-06 DIAGNOSIS — I10 PRIMARY HYPERTENSION: ICD-10-CM

## 2025-03-06 DIAGNOSIS — F63.9 IMPULSE CONTROL DISORDER: Chronic | ICD-10-CM

## 2025-03-06 DIAGNOSIS — K74.60 LIVER CIRRHOSIS SECONDARY TO NASH: Primary | ICD-10-CM

## 2025-03-06 DIAGNOSIS — E66.812 CLASS 2 SEVERE OBESITY WITH SERIOUS COMORBIDITY AND BODY MASS INDEX (BMI) OF 35.0 TO 35.9 IN ADULT, UNSPECIFIED OBESITY TYPE: ICD-10-CM

## 2025-03-06 PROBLEM — B35.1 ONYCHOMYCOSIS: Status: RESOLVED | Noted: 2023-10-23 | Resolved: 2025-03-06

## 2025-03-06 PROBLEM — M81.0 OSTEOPOROSIS: Status: RESOLVED | Noted: 2022-05-12 | Resolved: 2025-03-06

## 2025-03-06 PROCEDURE — 99999 PR PBB SHADOW E&M-EST. PATIENT-LVL III: CPT | Mod: PBBFAC,,, | Performed by: STUDENT IN AN ORGANIZED HEALTH CARE EDUCATION/TRAINING PROGRAM

## 2025-03-06 NOTE — ASSESSMENT & PLAN NOTE
Recently visited neurology. Continue oxcarbazepine. DEXA scheduled due to prior osteopenia and risk on bone density with medication.

## 2025-03-06 NOTE — ASSESSMENT & PLAN NOTE
Weight is improving with semaglutide. Liver enzymes from December have improved. Continue semaglutide. Follow up with hepatology.

## 2025-03-06 NOTE — PROGRESS NOTES
Name: Hussein Doyle  MRN: [id number]  : 1985    Subjective   HPI  Patient presents for regular follow up.     Review of Systems   Unable to perform ROS: Psychiatric disorder   Cardiovascular:  Negative for palpitations and leg swelling.   Neurological:  Negative for dizziness.        Problem List[1]    Medications Ordered Prior to Encounter[2]    Past Medical History:   Diagnosis Date    Anxiety     Autism     Bowel obstruction     pt mother denies    GERD (gastroesophageal reflux disease)     Grand mal seizure     Hepatic encephalopathy     Mastoiditis     TRAYLOR (nonalcoholic steatohepatitis)     Otitis media     Paraplegia     Premature birth     6 weeks premature    Recurrent UTI     Renal cancer     Right RCC    Retinopathy     Scoliosis     paraplegia temporarily after spine surgery-he can walk now    Scoliosis     has a frank in his back    Sepsis due to Escherichia coli without acute organ dysfunction 2023    Sleep apnea     uses CPAP    Stroke     one week old    Tardive dyskinesia        Past Surgical History:   Procedure Laterality Date    ADENOIDECTOMY      ANKLE FRACTURE SURGERY      with hardware    BACK SURGERY      COLONOSCOPY  10/28/2008    Dr. Arana at [facility]; anal fissure, minimal pinpoint rectal erythema; floppy-type colon with very little tone; biopsy: rectum mild chronic proctitis with few eosinophils sent for scanning    COLONOSCOPY N/A 06/15/2018    Procedure: COLONOSCOPY;  Surgeon: Refugio Villagomez MD;  Location: [facility];  Service: Endoscopy;  Laterality: N/A; Preparation of the colon was fair, unremarkable; REPEAT at 50 YEARS old FOR SCREENING    COLONOSCOPY N/A 2020    Procedure: COLONOSCOPY;  Surgeon: Refugio Villagomez MD;  Location: [facility];  Service: Endoscopy;  Laterality: N/A;    CYSTOSCOPY,WITH BOTULINUM TOXIN INJECTION N/A 2024    Procedure: CYSTOSCOPY,WITH BOTULINUM TOXIN INJECTION- 200 units;  Surgeon: Amado Velasquez MD;  Location: [facility]  OR;  Service: Urology;  Laterality: N/A;    CYSTOSCOPY,WITH BOTULINUM TOXIN INJECTION N/A 10/17/2024    Procedure: CYSTOSCOPY,WITH BOTULINUM TOXIN INJECTION, 200 units;  Surgeon: Amado Velasquez MD;  Location: [facility] OR;  Service: Urology;  Laterality: N/A;    ESOPHAGOGASTRODUODENOSCOPY N/A 07/13/2020    Procedure: EGD (ESOPHAGOGASTRODUODENOSCOPY);  Surgeon: Refugio Villagomez MD;  Location: [facility];  Service: Endoscopy;  Laterality: N/A;    ESOPHAGOGASTRODUODENOSCOPY N/A 08/17/2022    Procedure: EGD (ESOPHAGOGASTRODUODENOSCOPY);  Surgeon: Refugio Villagomez MD;  Location: [facility];  Service: Endoscopy;  Laterality: N/A;  cirrhosis, varices screening    ESOPHAGOGASTRODUODENOSCOPY (EGD) WITH DILATION N/A 2020    INNER EAR SURGERY      mastoid    LIVER BIOPSY N/A 07/20/2020    Procedure: BIOPSY, LIVER;  Surgeon: Ananya Surgeon;  Location: [facility];  Service: Anesthesiology;  Laterality: N/A;  189 liver bx/Ultrasound anes 1.5 hours    MAGNETIC RESONANCE IMAGING N/A 10/09/2020    Procedure: MRI (Magnetic Resonance Imagine);  Surgeon: Manish Araiza MD;  Location: [facility];  Service: Anesthesiology;  Laterality: N/A;    right partial nephrectomy Right 2010    Sees urology    TYMPANOSTOMY TUBE PLACEMENT          Family History   Problem Relation Name Age of Onset    Cancer Father          lymphoma    Cataracts Father      Colon polyps Father      Cataracts Mother      Cataracts Maternal Grandmother      Diabetes Maternal Grandmother      Macular degeneration Maternal Grandmother      Glaucoma Maternal Grandmother      Anesthesia problems Neg Hx      Clotting disorder Neg Hx      Colon cancer Neg Hx      Crohn's disease Neg Hx      Ulcerative colitis Neg Hx      Stomach cancer Neg Hx      Esophageal cancer Neg Hx      Cirrhosis Neg Hx         Social History[3]    Review of patient's allergies indicates:   Allergen Reactions    Benadryl [diphenhydramine hcl] Other (See Comments)     Increases anxiety and more  restless    Keppra [levetiracetam] Other (See Comments)     agitation    Ativan [lorazepam] Anxiety    Xanax [alprazolam] Anxiety     Worsens anxiety and agitation    Abilify  [aripiprazole]      Other reaction(s): arrhythmia    Clonazepam      Other reaction(s): unknown    Cough syrup w/decongestant      Other reaction(s): auditory hallucinations    Diazepam      Other reaction(s): tongue swelling  Other reaction(s): Other (See Comments)  Other reaction(s): tongue swelling    Haloperidol Other (See Comments)    Phenytoin sodium extended      Other reaction(s): unknown    Quetiapine      Other reaction(s): sweating  Other reaction(s): sedation  Other reaction(s): sweating  Other reaction(s): sedation    Risperidone      Other reaction(s): bladder incontinence    Thimerosal      Other reaction(s): seizure    Ziprasidone hcl Other (See Comments)     Other reaction(s): tonic clonic seizure  seizure        Health Maintenance Due   Topic Date Due    HIV Screening  Never done    Pneumococcal Vaccines (Age 0-49) (1 of 2 - PCV) Never done    COVID-19 Vaccine (6 - 2024-25 season) 09/01/2024       Objective   Vitals:    03/06/25 1406   BP: 124/72   Pulse: 79        Physical Exam  Constitutional:       General: He is not in acute distress.     Appearance: Normal appearance. He is well-developed.   HENT:      Head: Normocephalic and atraumatic.      Right Ear: External ear normal.      Left Ear: External ear normal.   Eyes:      Conjunctiva/sclera: Conjunctivae normal.   Pulmonary:      Effort: Pulmonary effort is normal. No respiratory distress.   Abdominal:      General: Abdomen is flat. There is no distension.   Musculoskeletal:         General: No swelling or deformity.      Right lower leg: No edema.      Left lower leg: No edema.   Skin:     General: Skin is warm and dry.      Coloration: Skin is not jaundiced.   Neurological:      Mental Status: He is alert and oriented to person, place, and time. Mental status is at  baseline.   Psychiatric:         Attention and Perception: Perception normal. He is inattentive.         Mood and Affect: Mood normal.         Behavior: Behavior is cooperative.         Cognition and Memory: Cognition is impaired.          Assessment & Plan  Liver cirrhosis secondary to TRAYLOR  Class 2 severe obesity with serious comorbidity and body mass index (BMI) of 35.0 to 35.9 in adult, unspecified obesity type  Weight is improving with semaglutide. Liver enzymes from December have improved. Continue semaglutide. Follow up with hepatology.    Orders:    pneumoc 20-saadia conj-dip cr(PF) (PREVNAR-20 (PF)) injection Syrg 0.5 mL    Nocturnal leg movements  Controlled. Continue pramipexole.          Seizure disorder  Osteopenia, unspecified location  Recently visited neurology. Continue oxcarbazepine. DEXA scheduled due to prior osteopenia and risk on bone density with medication.         Impulse control disorder  Improved with higher doses of sertraline. Continue this and oxcarbazepine. Follow up with psychiatry.          Primary hypertension  Controlled. Continue amlodipine.         Visit today included increased complexity associated with the care of the episodic problem none addressed and managing the longitudinal care of the patient due to the serious and/or complex managed problem(s) osteopenia, seizure disorder, impulse control disorder, obesity, cirrhosis.      Follow up in 6 months.     Rehan Mazariegos MD  03/06/2025          [1]   Patient Active Problem List  Diagnosis    Autistic disorder, active    Impulse control disorder    Developmental delay, moderate    Neurogenic bladder    HALEY on CPAP    Gout, chronic    Seizure disorder    Pervasive developmental disorder, active    Chronic constipation    OCD (obsessive compulsive disorder)    Hallux, valgus, congenital    Metabolic dysfunction-associated steatohepatitis (MASH)    Dysphagia    Liver cirrhosis secondary to TRAYLOR    Class 2 severe obesity with  "serious comorbidity and body mass index (BMI) of 35.0 to 35.9 in adult, unspecified obesity type    Osteoporosis    Co-occurrence of multiple psychiatric disorders    History of renal cell carcinoma    Nocturnal leg movements    Onychomycosis    Rash in adult    Skin tag    Hammer toe of left foot    Urgency incontinence    Primary hypertension    Thrombocytopenia, unspecified   [2]   Current Outpatient Medications on File Prior to Visit   Medication Sig Dispense Refill    acetylcarnitine 500 mg Cap Take 1 capsule by mouth 2 (two) times daily as needed (itching). 60 each 5    allopurinoL (ZYLOPRIM) 100 MG tablet Take 1 tablet (100 mg total) by mouth 2 (two) times daily. 180 tablet 3    amLODIPine (NORVASC) 10 MG tablet Take 1 tablet (10 mg total) by mouth once daily. 30 tablet 11    ascorbic acid, vitamin C, 250 mg Chew Take by mouth once daily.      calcium carbonate-vitamin D3 500 mg(1,250mg) -400 unit Tab Take 1 tablet by mouth 2 (two) times a day.       catheter (POOLE CATHETER) 16 Fr Misc 1 each by Misc.(Non-Drug; Combo Route) route 3 (three) times daily as needed (urinary retention). 100 each 11    DENTA 5000 PLUS 1.1 % Crea       hydrOXYzine pamoate (VISTARIL) 25 MG Cap Take 1 capsule (25 mg total) by mouth as needed (anxiety). 60 capsule 2    insulin syringe-needle U-100 (BD INSULIN SYRINGE) 1 mL 29 gauge x 1/2" Syrg 1 each by Misc.(Non-Drug; Combo Route) route once a week. 10 each 11    inulin (FIBER GUMMIES ORAL) Take 2 tablets by mouth once daily.       L. ACIDOPHILUS/BIFID. ANIMALIS (CHEWABLE PROBIOTIC ORAL) Take 1 tablet by mouth 2 (two) times daily.       lactulose (CHRONULAC) 10 gram/15 mL solution Take 15 mLs (10 g total) by mouth 3 (three) times daily. 1892 mL 11    magnesium 30 mg Tab Take 1 tablet by mouth every evening.      MELATONIN ORAL Take 1 tablet by mouth every evening.      multivitamin capsule Take 1 capsule by mouth every morning.       mupirocin (BACTROBAN) 2 % ointment Apply topically " 2 (two) times daily. Apply topically to open wounds once to twice daily and PRN, as instructed. 30 g 2    MYRBETRIQ 50 mg Tb24 Take 1 tablet by mouth.      nystatin (MYCOSTATIN) powder Apply topically 2 (two) times daily. 60 g 11    omega-3 fatty acids/fish oil (FISH OIL-OMEGA-3 FATTY ACIDS) 300-1,000 mg capsule Take 1 capsule by mouth once daily.       OXcarbazepine (TRILEPTAL) 300 MG Tab Take 1 tablet (300 mg total) by mouth 2 (two) times daily. 180 tablet 3    pantoprazole (PROTONIX) 40 MG tablet TAKE ONE TABLET BY MOUTH ONCE DAILY 90 tablet 3    polyethylene glycol (GLYCOLAX) 17 gram PwPk Take 17 g by mouth once daily.       pramipexole (MIRAPEX) 0.25 MG tablet Take 1 tablet (0.25 mg total) by mouth 3 (three) times daily. 90 tablet 11    semaglutide, weight loss, (WEGOVY) 1 mg/0.5 mL PnIj Inject 1 mg into the skin every 7 days. 2 mL 1    sertraline (ZOLOFT) 100 MG tablet TAKE ONE TABLET BY MOUTH ONCE DAILY (Patient taking differently: Take 100 mg by mouth. Pt takes 2 daily) 30 tablet 1    sulfamethoxazole-trimethoprim 400-80mg (BACTRIM,SEPTRA) 400-80 mg per tablet Take 1 tablet by mouth.      zinc gluconate 50 mg tablet Take 50 mg by mouth once daily.      acetylcysteine 600 mg Cap Take 1 capsule (600 mg total) by mouth once daily. (Patient not taking: Reported on 3/6/2025) 30 capsule 11     Current Facility-Administered Medications on File Prior to Visit   Medication Dose Route Frequency Provider Last Rate Last Admin    dextrose 50% injection 12.5 g  12.5 g Intravenous PRN Volpi-Abadie, Jacqueline, MD        dextrose 50% injection 25 g  25 g Intravenous PRN Volpi-Abadie, Jacqueline, MD        insulin regular injection 0-8 Units  0-8 Units Intravenous Continuous PRN Volpi-Abadie, Jacqueline, MD        lactated ringers infusion   Intravenous Continuous Volpi-Abadie, Jacqueline,  mL/hr at 03/25/24 0728 New Bag at 03/25/24 0728   [3]   Social History  Socioeconomic History    Marital status: Single   Tobacco  Use    Smoking status: Never    Smokeless tobacco: Never   Substance and Sexual Activity    Alcohol use: Never    Drug use: Never    Sexual activity: Never   Social History Narrative    Lives with his parents.  More dependent with ADLs.  Very active in the community.        New Opportunities wavier.     Social Drivers of Health     Financial Resource Strain: Low Risk  (7/21/2024)    Overall Financial Resource Strain (CARDIA)     Difficulty of Paying Living Expenses: Not hard at all   Food Insecurity: No Food Insecurity (7/21/2024)    Hunger Vital Sign     Worried About Running Out of Food in the Last Year: Never true     Ran Out of Food in the Last Year: Never true   Transportation Needs: No Transportation Needs (2/24/2024)    PRAPARE - Transportation     Lack of Transportation (Medical): No     Lack of Transportation (Non-Medical): No   Physical Activity: Insufficiently Active (7/21/2024)    Exercise Vital Sign     Days of Exercise per Week: 3 days     Minutes of Exercise per Session: 20 min   Stress: No Stress Concern Present (7/21/2024)    [employer] - Occupational Stress Questionnaire     Feeling of Stress : Only a little   Housing Stability: Low Risk  (2/24/2024)    Housing Stability Vital Sign     Unable to Pay for Housing in the Last Year: No     Number of Places Lived in the Last Year: 1     Unstable Housing in the Last Year: No

## 2025-03-06 NOTE — ASSESSMENT & PLAN NOTE
Weight is improving with semaglutide. Liver enzymes from December have improved. Continue semaglutide. Follow up with hepatology.    Orders:    pneumoc 20-saadia conj-dip cr(PF) (PREVNAR-20 (PF)) injection Syrg 0.5 mL

## 2025-03-06 NOTE — ASSESSMENT & PLAN NOTE
Improved with higher doses of sertraline. Continue this and oxcarbazepine. Follow up with psychiatry.

## 2025-03-10 ENCOUNTER — OFFICE VISIT (OUTPATIENT)
Dept: BARIATRICS | Facility: CLINIC | Age: 40
End: 2025-03-10
Payer: MEDICARE

## 2025-03-10 VITALS — HEIGHT: 71 IN | BODY MASS INDEX: 34.72 KG/M2 | WEIGHT: 248 LBS

## 2025-03-10 DIAGNOSIS — E66.9 OBESITY (BMI 30-39.9): Primary | ICD-10-CM

## 2025-03-10 DIAGNOSIS — Z91.89 AT INCREASED RISK FOR CARDIOVASCULAR DISEASE: ICD-10-CM

## 2025-03-10 DIAGNOSIS — K75.81 METABOLIC DYSFUNCTION-ASSOCIATED STEATOHEPATITIS (MASH): ICD-10-CM

## 2025-03-10 DIAGNOSIS — Z71.3 ENCOUNTER FOR DIETARY COUNSELING AND SURVEILLANCE: ICD-10-CM

## 2025-03-10 RX ORDER — SEMAGLUTIDE 2.4 MG/.75ML
2.4 INJECTION, SOLUTION SUBCUTANEOUS WEEKLY
Qty: 3 ML | Refills: 2 | Status: SHIPPED | OUTPATIENT
Start: 2025-07-12 | End: 2025-08-11

## 2025-03-10 RX ORDER — SEMAGLUTIDE 1.7 MG/.75ML
1.7 INJECTION, SOLUTION SUBCUTANEOUS WEEKLY
Qty: 3 ML | Refills: 2 | Status: SHIPPED | OUTPATIENT
Start: 2025-06-11 | End: 2025-07-11

## 2025-03-10 NOTE — PROGRESS NOTES
The patient/caregiver location is: home  The chief complaint leading to consultation is: obesity/medical weight loss    Visit type: audiovisual    Face to Face time with patient/Caregiver: 18 min  31 minutes of total time spent on the encounter, which includes face to face time and non-face to face time preparing to see the patient (eg, review of tests), Obtaining and/or reviewing separately obtained history, Documenting clinical information in the electronic or other health record, Independently interpreting results (not separately reported) and communicating results to the patient/family/caregiver, or Care coordination (not separately reported).         Each patient to whom he or she provides medical services by telemedicine is:  (1) informed of the relationship between the physician and patient and the respective role of any other health care provider with respect to management of the patient; and (2) notified that he or she may decline to receive medical services by telemedicine and may withdraw from such care at any time.    Notes:      Medically Supervised Weight Loss Documentation    Date of Visit: 03/10/2025    Patient: Hussein Doyle    Beginning Weight: 264    Last Weight: 250    Current Weight: 248    Current BMI: Body mass index is 35.07 kg/m².  Weight Change: -16          Vitals:   There were no vitals filed for this visit.        Comorbidities:   Past Medical History:   Diagnosis Date    Anxiety     Autism     Bowel obstruction     pt mother denies    GERD (gastroesophageal reflux disease)     Grand mal seizure     Hepatic encephalopathy     Mastoiditis     TRAYLOR (nonalcoholic steatohepatitis)     Otitis media     Paraplegia     Premature birth     6 weeks premature    Recurrent UTI     Renal cancer 2010    Right RCC    Retinopathy     Scoliosis     paraplegia temporarily after spine surgery-he can walk now    Scoliosis     has a frank in his back    Sepsis due to Escherichia coli without acute  "organ dysfunction 02/02/2023    Sleep apnea     uses CPAP    Stroke     one week old    Tardive dyskinesia        Medications:  Current Outpatient Medications on File Prior to Visit   Medication Sig Dispense Refill    acetylcarnitine 500 mg Cap Take 1 capsule by mouth 2 (two) times daily as needed (itching). 60 each 5    acetylcysteine 600 mg Cap Take 1 capsule (600 mg total) by mouth once daily. (Patient not taking: Reported on 3/6/2025) 30 capsule 11    allopurinoL (ZYLOPRIM) 100 MG tablet Take 1 tablet (100 mg total) by mouth 2 (two) times daily. 180 tablet 3    amLODIPine (NORVASC) 10 MG tablet Take 1 tablet (10 mg total) by mouth once daily. 30 tablet 11    ascorbic acid, vitamin C, 250 mg Chew Take by mouth once daily.      calcium carbonate-vitamin D3 500 mg(1,250mg) -400 unit Tab Take 1 tablet by mouth 2 (two) times a day.       catheter (POOLE CATHETER) 16 Fr Misc 1 each by Misc.(Non-Drug; Combo Route) route 3 (three) times daily as needed (urinary retention). 100 each 11    DENTA 5000 PLUS 1.1 % Crea       hydrOXYzine pamoate (VISTARIL) 25 MG Cap Take 1 capsule (25 mg total) by mouth as needed (anxiety). 60 capsule 2    insulin syringe-needle U-100 (BD INSULIN SYRINGE) 1 mL 29 gauge x 1/2" Syrg 1 each by Misc.(Non-Drug; Combo Route) route once a week. 10 each 11    inulin (FIBER GUMMIES ORAL) Take 2 tablets by mouth once daily.       L. ACIDOPHILUS/BIFID. ANIMALIS (CHEWABLE PROBIOTIC ORAL) Take 1 tablet by mouth 2 (two) times daily.       lactulose (CHRONULAC) 10 gram/15 mL solution Take 15 mLs (10 g total) by mouth 3 (three) times daily. 1892 mL 11    magnesium 30 mg Tab Take 1 tablet by mouth every evening.      MELATONIN ORAL Take 1 tablet by mouth every evening.      multivitamin capsule Take 1 capsule by mouth every morning.       mupirocin (BACTROBAN) 2 % ointment Apply topically 2 (two) times daily. Apply topically to open wounds once to twice daily and PRN, as instructed. 30 g 2    MYRBETRIQ 50 mg " Tb24 Take 1 tablet by mouth.      nystatin (MYCOSTATIN) powder Apply topically 2 (two) times daily. 60 g 11    omega-3 fatty acids/fish oil (FISH OIL-OMEGA-3 FATTY ACIDS) 300-1,000 mg capsule Take 1 capsule by mouth once daily.       OXcarbazepine (TRILEPTAL) 300 MG Tab Take 1 tablet (300 mg total) by mouth 2 (two) times daily. 180 tablet 3    pantoprazole (PROTONIX) 40 MG tablet TAKE ONE TABLET BY MOUTH ONCE DAILY 90 tablet 3    polyethylene glycol (GLYCOLAX) 17 gram PwPk Take 17 g by mouth once daily.       pramipexole (MIRAPEX) 0.25 MG tablet Take 1 tablet (0.25 mg total) by mouth 3 (three) times daily. 90 tablet 11    sertraline (ZOLOFT) 100 MG tablet TAKE ONE TABLET BY MOUTH ONCE DAILY (Patient taking differently: Take 100 mg by mouth. Pt takes 2 daily) 30 tablet 1    sulfamethoxazole-trimethoprim 400-80mg (BACTRIM,SEPTRA) 400-80 mg per tablet Take 1 tablet by mouth.      zinc gluconate 50 mg tablet Take 50 mg by mouth once daily.      [DISCONTINUED] semaglutide, weight loss, (WEGOVY) 1 mg/0.5 mL PnIj Inject 1 mg into the skin every 7 days. 2 mL 1     Current Facility-Administered Medications on File Prior to Visit   Medication Dose Route Frequency Provider Last Rate Last Admin    dextrose 50% injection 12.5 g  12.5 g Intravenous PRN Volpi-Abadie, Jacqueline, MD        dextrose 50% injection 25 g  25 g Intravenous PRN Volpi-Abadie, Jacqueline, MD        insulin regular injection 0-8 Units  0-8 Units Intravenous Continuous PRN Volpi-Abadie, Jacqueline, MD        lactated ringers infusion   Intravenous Continuous Volpi-Abadie, Jacqueline,  mL/hr at 03/25/24 0728 New Bag at 03/25/24 0728         Tanita Scale Body Composition: not calculated with virtual visit  Fat Percent:   %  Fat Mass:   lb  FFM:   lb  TBW:  lb  TBW %:   %  BMR: kcal    CURRENT DIET:  Regular diet.  Diet Recall: Food records are present.    Diet Includes:   Breakfast:  daily--prunes 2-3, applesauce for BM--- eating [employer] oatmeal 1/2 from  "before, protein mini muffins x1, turkey diana (Monday), 2 scrambled eggs, biscuit with SF jelly & yogurt spread (Tuesday), pancake with 1/2 bananas (made with 1 egg, PB, cinnamon- instead of banana) with SF syrup (Wednesday), cream of wheat packages & 2 blueberry muffins (Thursday), 2 eggs in toast "toad in a hole" (Friday), fresh grits- cut in half & cinnamon roll, only 1 now (Saturday), [place] Waffles & Turkey sausage (Sunday)  Lunch:  sandwich- ham/turkey, light harris with sandwich keto bread- SF Koolaid 1 cup--- plans to change to vegetable soup  Snack: - ranch with greek yogurt and vegetable (cream cheese, dressing), trail mix or protein shake at 3 pm  Dinner: , grilled meats/vegetables, salad every night with low fat dressing and croutons  No longer on cookies at lunch  Moved to 4 oz meat, 2-3 oz vegetables, 1-2 oz carbs  Decreased carbs to nearly none except dinner  Isopure Clear- Mixed Berry to breakfast meal    Meal Pattern: Improved.  Protein Supplements: 2 per day.  Snacking: Adequate. Snacks include healthy choices.    Vegetables: Likes a variety. Eats almost daily.  Fruits: Likes a variety. Eats daily.  Beverages:  gallon water, 1 glass of 1/2 SF juice and protein clear drink now  Dining out: limited now. Was Weekly- Mostly fast food and restaurants.  Cooking at home: Weekly. Mostly  airfrying minimal with mostly grilling and baking  meat, fish, starchy CHO, and vegetables.     Exercise:  Current exercise: Adequate, 3-5x/wk  [employer] 3x/wk- Treadmill/ Elliptical trade 10-20 each, then swap 20-30 min total, speed 2  Walk on [employer] ([facility])- Tuesday/Wedneday and doing machines/weights as they walk       Restrictions to exercise: None     Vitamins / Minerals / Herbs: Men's OTC daily, fish oil, CBD oil- anxiety, zinc, cranberry capsules for UTI  Miralax daily  Lactulose TID for constipation     Food Allergies: NKFA; no intolerances stated.      Social:  Disabled. Autistic with OCD.   Lives with mother. " Caregiver daily. Both attended appointment.   Grocery shopping and food prep Caregiver (Ashleigh) and mother (Eli).  Patient believes the household will be supportive after surgery.  Alcohol: None.  Smoking: None.      Behavior or Diet Goals for this patient:  Reviewed Falls with family/Caregiver  Continue prescribed home medications  Tanita Body Composition Scale  Decreased 4 lb fat  Increased 2 lb muscle  Increased 1 lb water  Discussed dietary changes  Do not skip meals- replace with protein shake  Increase Protein to 60 gm/day at minimum  Can remove small potatoe, have small wheat roll  Continue decreasing meals with the meat largest portion, then vegetables and if any carbs then be the smallest portion    Medical Weight Loss options discussed-  Discussed PO vs Injectables  PO Medication  Topiramate- contraindicated based on hepatic impairment, sz risks, change in mentation with suicide/self harm tendency   Phentermine- contraindicated based on increased agitation, constipation  Diethylpropion- contraindicated as decreases sz threshold  Contrave- contraindicated with sz disorders and hepatic impairment, with increased risk for suicide/self harm   Injectable Medications- Rx on 8/10/24  ASCVD Risk Calculator-  5.4% Current 10-Year ASCVD Risk, with lifetime risk at 69%, optimal risk 0.6%  Increase to fasting glucose over past 8-months, from 106 in 8/2023 & 107 in 12/2023, now increased to 119 in 6/2024- calculating his A1C in prediabetes at 5.8  Weight loss medications selection: Tirzepatide (Zepbound)  Aware that will need prior authorization for medication, which can take some times  Aware medication not formulary at primary pharmacy and might have to change pharmacy if they do not carry it   Denies history or family history of Medullary Thyroid Cancer or Multiple endocrine neoplasia syndrome  Discussion of manifestations of elevated calcitonin levels  Will start dosage at  2.5 mg subcutaneous for 4 weeks  (weekly injections) then increased to 5 mg. Can continue to increase every 4 weeks with max dose of 15 mg    11/12/24  Unable to Zepbound for Obesity covered  Sent in Wegovy with approved coverage on 10/10/24,   completed 4 injections without difficulty and family reporting tolerating well  Increase dose today for 0.5 mg, will contact me in 3 weeks for refill or dose increase    Refill Request on 1/2/25- increase in dosage to 1 mg     1/10/25  Discussion on increase in dose and how he is tolerating- today will be first dose  GERD reported by mother- continue with protonix daily, can add tums PRN- aware can worse with dose increase  F/U in 2 months, agreeable for virtual    Changes for today, 3/10/25  Went on Cruise from 2/8-15/2025- gained weight at 252, since returning has lost to 248 today  Wegovy 1mg on 2/10 was refill- has 3 injections left  Discussed and Plan for increase when refill is due, increase to 1.7 mg Wegovy x 3 months, then 2.4 mg x 3 months  F/U in 2-3 months with Virtual    : total time spent for visit: 31 minutes

## 2025-03-11 ENCOUNTER — PATIENT MESSAGE (OUTPATIENT)
Dept: PSYCHIATRY | Facility: CLINIC | Age: 40
End: 2025-03-11
Payer: MEDICARE

## 2025-03-11 ENCOUNTER — PATIENT MESSAGE (OUTPATIENT)
Dept: FAMILY MEDICINE | Facility: CLINIC | Age: 40
End: 2025-03-11
Payer: MEDICARE

## 2025-03-11 ENCOUNTER — TELEPHONE (OUTPATIENT)
Dept: PSYCHIATRY | Facility: CLINIC | Age: 40
End: 2025-03-11
Payer: MEDICARE

## 2025-03-11 ENCOUNTER — OFFICE VISIT (OUTPATIENT)
Dept: PSYCHIATRY | Facility: CLINIC | Age: 40
End: 2025-03-11
Payer: MEDICAID

## 2025-03-11 DIAGNOSIS — F42.9 OBSESSIVE-COMPULSIVE DISORDER, UNSPECIFIED TYPE: Primary | ICD-10-CM

## 2025-03-11 DIAGNOSIS — F84.9 PERVASIVE DEVELOPMENTAL DISORDER, ACTIVE: ICD-10-CM

## 2025-03-11 DIAGNOSIS — F41.9 ANXIETY DISORDER, UNSPECIFIED TYPE: ICD-10-CM

## 2025-03-11 DIAGNOSIS — F63.9 IMPULSE CONTROL DISORDER: ICD-10-CM

## 2025-03-11 RX ORDER — HYDROXYZINE PAMOATE 25 MG/1
25 CAPSULE ORAL
Qty: 60 CAPSULE | Refills: 2 | Status: SHIPPED | OUTPATIENT
Start: 2025-03-11

## 2025-03-11 RX ORDER — SERTRALINE HYDROCHLORIDE 100 MG/1
100 TABLET, FILM COATED ORAL DAILY
Qty: 90 TABLET | Refills: 0 | Status: SHIPPED | OUTPATIENT
Start: 2025-03-11

## 2025-03-11 NOTE — PROGRESS NOTES
The patient location is:  [place], [place]  The patient location [place] is: [place]  The patient phone number is: [phone]  Visit type: Virtual visit with synchronous audio and video  Each patient to whom he or she provides medical services by telemedicine is:  (1) informed of the relationship between the physician and patient and the respective role of any other health care provider with respect to management of the patient; and (2) notified that he or she may decline to receive medical services by telemedicine and may withdraw from such care at any time.     Outpatient Psychiatry Follow-Up Visit    Clinical Status of Patient: Outpatient (Ambulatory)  03/11/2025     Chief Complaint: OCD, anxiety, impulse control, PDD      Interval History and Content of Current Session:  Interim Events/Subjective Report/Content of Current Session:  follow-up appointment with mother.    Pt is a 37 y/o male with past psychiatric hx of OCD, anxiety, impulse control, PDD who presents for follow-up treatment. Pt's mother reported that Silvino enjoyed the cruise despite some anxiety the first night. Pt's mother denied that he displayed any concerning behavior. Pt's mother reported that he has been agitated the last few days. Started after pneumococal vaccine. Torso was red and itching.     Past Psychiatric hx: s/e's to mult prev meds to include zyprexa (wgt gain), lexapro 20mg po qam (anxiety and to suppress sex drive), buspar 10mg po BID, xanax 0.5mg po daily PRN anxiety    Past Medical hx:   Past Medical History:   Diagnosis Date    Anxiety     Autism     Bowel obstruction     pt mother denies    GERD (gastroesophageal reflux disease)     Grand mal seizure     Hepatic encephalopathy     Mastoiditis     TRAYLOR (nonalcoholic steatohepatitis)     Otitis media     Paraplegia     Premature birth     6 weeks premature    Recurrent UTI     Renal cancer 2010    Right RCC    Retinopathy     Scoliosis     paraplegia temporarily after spine  surgery-he can walk now    Scoliosis     has a frank in his back    Sepsis due to Escherichia coli without acute organ dysfunction 02/02/2023    Sleep apnea     uses CPAP    Stroke     one week old    Tardive dyskinesia         Interim hx:  Medication changes last visit: Increase sertraline to 100 mg  Anxiety: stable  Depression: stable     Denies suicidal/homicidal ideations.  Denies hopelessness/worthlessness.    Denies auditory/visual hallucinations      Alcohol: Pt denied  Drug: Pt denied  Caffeine: Not assessed  Tobacco: Pt denied      Review of Systems   PSYCHIATRIC: Pertinent items are noted in the narrative.        CONSTITUTIONAL: weight stable    Past Medical, Family and Social History: The patient's past medical, family and social history have been reviewed and updated as appropriate within the electronic medical record. See encounter notes.     Current Psychiatric Medication:  sertraline 100 mg, oxcarbazepine 300 mg BID (per neuro), hydroxyzine 25 mg PRN     Compliance: yes      Side effects: Pt denied     Risk Parameters:  Patient reports no suicidal ideation  Patient reports no homicidal ideation  Patient reports no self-injurious behavior  Patient reports no violent behavior     Exam (detailed: at least 9 elements; comprehensive: all 15 elements)   Constitutional  Vitals:  Most recent vital signs, dated less than 90 days prior to this appointment, were reviewed.        General:  unremarkable, age appropriate, casual attire, good eye contact, good rapport       Musculoskeletal  Muscle Strength/Tone:  no flaccidity, no tremor    Gait & Station:  normal      Psychiatric                       Speech:  normal tone, normal rate, rhythm, and volume   Mood & Affect:    Mildly anxious         Thought Process:   Goal directed; Linear    Associations:   intact   Thought Content:   No SI/HI, delusions, or paranoia, no AV/VH   Insight & Judgement:   Good, adequate to circumstances   Orientation:   grossly intact;  alert and oriented x 4    Memory:  intact for content of interview    Language:  grossly intact, can repeat    Attention Span  : Grossly intact for content of interview   Fund of Knowledge:   intact and appropriate to age and level of education        Assessment and Diagnosis   Status/Progress: Based on the examination today, the patient's problem(s) is/are adequately controlled.  New problems have not been presented today. Co-morbidities are not complicating management of the primary condition. There are no active rule-out diagnoses for this patient at this time.      Impression: Pt appears to be relatively stable at the moment. Some agitation after recent vaccine and pt's mother will reach out to PCP.  Will continue with medication plan as is for now and refill previous script of hydroxyzine.    Diagnosis:   1. Obsessive Compulsive Disorder   2. Anxiety Disorder  3. Impulse Control Disorder  4. Pervasive Developmental Disorder  Intervention/Counseling/Treatment Plan   Medication Management:      1. sertraline 100 mg    2. hydroxyzine 25 mg PRN    3. Oxcarbazepine 300 mg BID (managed by neuro)    4. Hydroxyzine 25 mg PRN     5. Call to report any worsening of symptoms or problems with the medication. Pt instructed to go to ER with thoughts of harming self, others     6. Patient given contact # for psychotherapists at [facility] and also instructed to check with insurance for list of providers.     Psychotherapy: 20 minutes  Target symptoms: sex drive behaviors, anxiety  Why chosen therapy is appropriate versus another modality: CBT used; relevant to diagnosis, patient responds to this modality  Outcome monitoring methods: self-report, observation  Therapeutic intervention type: psychoeducation techniques.  Topics discussed/themes: building skills sets for symptom management, symptom recognition, nutrition, exercise  The patient's response to the intervention is accepting  Patient's response to treatment is:  good.   The patient's progress toward treatment goals: stable     Return to clinic: 2 months    -Cognitive-Behavioral/Supportive therapy and psychoeducation provided  -R/B/SE's of medications discussed with the pt who expresses understanding and chooses to take medications as prescribed.   -Pt instructed to call clinic, 911 or go to nearest emergency room if sxs worsen or pt is in   crisis. The pt expresses understanding.    Praveen Brush, PhD, MP    Visit today included increased complexity associated with the care of the episodic problem developmental delays, anxiety addressed and managing the longitudinal care of the patient due to the serious and/or complex managed problem(s) developmental delays, anxiety.      Antidepressant/Antianxiety Medication Initiation:  Patient informed of risks, benefits, and potential side effects of medication and accepts informed consent.  Common side effects include nausea, fatigue, headache, insomnia., Specifically discussed the possibility of new or worsening suicidal thoughts/depression.  Patient instructed to stop the medication immediately and seek urgent treatment if this occurs. Patient instructed not to abruptly discontinue medication without physician guidance except in cases of sudden onset or worsening of SI.

## 2025-03-11 NOTE — TELEPHONE ENCOUNTER
Called patient to schedule a  virtual 2 month follow up appt. with Dr. Brush. Patient did not answer left voice mail and sent message.

## 2025-03-12 ENCOUNTER — TELEPHONE (OUTPATIENT)
Dept: BARIATRICS | Facility: CLINIC | Age: 40
End: 2025-03-12
Payer: MEDICARE

## 2025-03-17 ENCOUNTER — TELEPHONE (OUTPATIENT)
Dept: BARIATRICS | Facility: CLINIC | Age: 40
End: 2025-03-17
Payer: MEDICARE

## 2025-03-17 NOTE — TELEPHONE ENCOUNTER
----- Message from Home sent at 3/17/2025  1:12 PM CDT -----  Type: Needs Medical AdviceWho Called:  Pt's motherBest Call Back Number: [phone] Information: Pt's mother calling to schedule virtual catia. Please call back to advise, Thanks!

## 2025-03-19 ENCOUNTER — RESULTS FOLLOW-UP (OUTPATIENT)
Dept: NEUROLOGY | Facility: HOSPITAL | Age: 40
End: 2025-03-19

## 2025-03-19 ENCOUNTER — HOSPITAL ENCOUNTER (OUTPATIENT)
Dept: RADIOLOGY | Facility: HOSPITAL | Age: 40
Discharge: HOME | End: 2025-03-19
Attending: PSYCHIATRY & NEUROLOGY
Payer: MEDICARE

## 2025-03-19 DIAGNOSIS — Z79.818 LONG TERM (CURRENT) USE OF OTHER AGENTS AFFECTING ESTROGEN RECEPTORS AND ESTROGEN LEVELS: ICD-10-CM

## 2025-03-19 DIAGNOSIS — M85.80 OSTEOPENIA, UNSPECIFIED LOCATION: ICD-10-CM

## 2025-03-19 PROCEDURE — 77080 DXA BONE DENSITY AXIAL: CPT | Mod: TC,PO

## 2025-03-19 PROCEDURE — 77081 DXA BONE DENSITY APPENDICULR: CPT | Mod: 26,,, | Performed by: RADIOLOGY

## 2025-03-19 PROCEDURE — 77081 DXA BONE DENSITY APPENDICULR: CPT | Mod: TC,PO

## 2025-03-20 ENCOUNTER — TELEPHONE (OUTPATIENT)
Dept: BARIATRICS | Facility: CLINIC | Age: 40
End: 2025-03-20
Payer: MEDICARE

## 2025-03-20 ENCOUNTER — PATIENT MESSAGE (OUTPATIENT)
Dept: FAMILY MEDICINE | Facility: CLINIC | Age: 40
End: 2025-03-20
Payer: MEDICARE

## 2025-03-25 ENCOUNTER — TELEPHONE (OUTPATIENT)
Dept: BARIATRICS | Facility: CLINIC | Age: 40
End: 2025-03-25
Payer: MEDICARE

## 2025-03-25 NOTE — TELEPHONE ENCOUNTER
----- Message from Tila sent at 3/25/2025 11:34 AM CDT -----  Type: Appointment Request Caller is requesting appointment.   Name of Caller:Eli  Would the patient rather a call back or a response via [facility]? call Best Call Back Number:[phone] Additional Information: Mom is calling back to schedule an appt.      Please call Back to advise. Thanks!

## 2025-03-27 ENCOUNTER — PATIENT MESSAGE (OUTPATIENT)
Dept: BARIATRICS | Facility: CLINIC | Age: 40
End: 2025-03-27
Payer: MEDICARE

## 2025-04-02 RX ORDER — HYDROXYZINE PAMOATE 25 MG/1
CAPSULE ORAL
Qty: 60 CAPSULE | Refills: 2 | Status: SHIPPED | OUTPATIENT
Start: 2025-04-02

## 2025-05-11 ENCOUNTER — PATIENT MESSAGE (OUTPATIENT)
Dept: BARIATRICS | Facility: CLINIC | Age: 40
End: 2025-05-11
Payer: MEDICARE

## 2025-05-11 NOTE — PROGRESS NOTES
"Outpatient Psychiatry Follow-Up Visit    Clinical Status of Patient: Outpatient (Ambulatory)  05/12/2025     Chief Complaint: OCD, anxiety, impulse control, PDD      Interval History and Content of Current Session:  Interim Events/Subjective Report/Content of Current Session:  follow-up appointment with mother.    Pt is a 37 y/o male with past psychiatric hx of OCD, anxiety, impulse control, PDD who presents for follow-up treatment. Patient is currently taking Zoloft (sertraline) 100mg for anxiety management, with consideration to increase the dosage to 150mg due to ongoing anxiety and behavioral issues. He experiences anxiety in social situations and medical appointments, exhibiting compulsive chattering, especially during prayer meetings. Patient's caregiver (mother) reports that he can become "escalated" at times.    Patient has shown improvement in some problematic behaviors. Staring at others has decreased, though it still occurs occasionally. He demonstrates self-awareness about this behavior, often correcting himself. Previous "exploratory" behaviors have largely stopped.    Patient's sleep is reported to be "pretty good" overall. He uses a CPAP machine, to which he is well-adapted. However, there are occasional issues with mask fit that may affect sleep quality.    Patient's caregivers have differing approaches to his care, which may be causing some confusion and anxiety. One caregiver encourages more independence, while another tends to do more tasks for him. Patient denies experiencing hallucinations during the recent UTI episode.    Past Psychiatric hx: s/e's to mult prev meds to include zyprexa (wgt gain), lexapro 20mg po qam (anxiety and to suppress sex drive), buspar 10mg po BID, xanax 0.5mg po daily PRN anxiety    Past Medical hx:   Past Medical History:   Diagnosis Date    Anxiety     Autism     Bowel obstruction     pt mother denies    GERD (gastroesophageal reflux disease)     Grand mal seizure     " Hepatic encephalopathy     Mastoiditis     TRAYLOR (nonalcoholic steatohepatitis)     Otitis media     Paraplegia     Premature birth     6 weeks premature    Recurrent UTI     Renal cancer 2010    Right RCC    Retinopathy     Scoliosis     paraplegia temporarily after spine surgery-he can walk now    Scoliosis     has a frank in his back    Sepsis due to Escherichia coli without acute organ dysfunction 02/02/2023    Sleep apnea     uses CPAP    Stroke     one week old    Tardive dyskinesia         Interim hx:  Medication changes last visit: none     Denies suicidal/homicidal ideations.  Denies hopelessness/worthlessness.    Denies auditory/visual hallucinations      Alcohol: Pt denied  Drug: Pt denied  Caffeine: Not assessed  Tobacco: Pt denied      Review of Systems   PSYCHIATRIC: Pertinent items are noted in the narrative.        CONSTITUTIONAL: weight stable    Past Medical, Family and Social History: The patient's past medical, family and social history have been reviewed and updated as appropriate within the electronic medical record. See encounter notes.     Current Psychiatric Medication:  sertraline 100 mg, oxcarbazepine 300 mg BID (per neuro), hydroxyzine 25 mg PRN     Compliance: yes      Side effects: Pt denied     Risk Parameters:  Patient reports no suicidal ideation  Patient reports no homicidal ideation  Patient reports no self-injurious behavior  Patient reports no violent behavior     Exam (detailed: at least 9 elements; comprehensive: all 15 elements)   Constitutional  Vitals:  Most recent vital signs, dated less than 90 days prior to this appointment, were reviewed. Pulse:  [74]   BP: (137)/(82)       General:  unremarkable, age appropriate, casual attire, good eye contact, good rapport       Musculoskeletal  Muscle Strength/Tone:  no flaccidity, no tremor    Gait & Station:  normal      Psychiatric                       Speech:  normal tone, normal rate, rhythm, and volume   Mood & Affect:    Mildly  anxious         Thought Process:   Goal directed; Linear    Associations:   intact   Thought Content:   No SI/HI, delusions, or paranoia, no AV/VH   Insight & Judgement:   Good, adequate to circumstances   Orientation:   grossly intact; alert and oriented x 4    Memory:  intact for content of interview    Language:  grossly intact, can repeat    Attention Span  : Grossly intact for content of interview   Fund of Knowledge:   intact and appropriate to age and level of education        Assessment and Diagnosis   Status/Progress/Impression:    Assessment & Plan    IMPRESSION:  - Explained that sertraline increase aims to lower agitation threshold, but will not eliminate all anxiety-related behaviors.  - Discussed realistic expectations for medication efficacy, noting that environmental factors and routine changes may continue to pose challenges.    PLAN SUMMARY:  - Increase sertraline to 150 mg daily (1x100 mg + 1x50 mg)  - Continue hydroxyzine as needed for agitation  - Follow up in-person in about a month to assess response to increased sertraline dose    Diagnosis:   1. Obsessive Compulsive Disorder   2. Anxiety Disorder  3. Impulse Control Disorder  4. Pervasive Developmental Disorder  Intervention/Counseling/Treatment Plan   Medication Management:      1. Increase sertraline to 150 mg    2. hydroxyzine 25 mg PRN    3. Oxcarbazepine 300 mg BID (managed by neuro)    4. Call to report any worsening of symptoms or problems with the medication. Pt instructed to go to ER with thoughts of harming self, others     6. Patient given contact # for psychotherapists at [facility] and also instructed to check with insurance for list of providers.     Psychotherapy: 20 minutes  Target symptoms: sex drive behaviors, anxiety  Why chosen therapy is appropriate versus another modality: CBT used; relevant to diagnosis, patient responds to this modality  Outcome monitoring methods: self-report, observation  Therapeutic  intervention type: coping, supportive therapy  Topics discussed/themes: building skills sets for symptom management, symptom recognition, nutrition, exercise  The patient's response to the intervention is accepting  Patient's response to treatment is: good.   The patient's progress toward treatment goals: stable     Return to clinic: 1 months    -Cognitive-Behavioral/Supportive therapy and psychoeducation provided  -R/B/SE's of medications discussed with the pt who expresses understanding and chooses to take medications as prescribed.   -Pt instructed to call clinic, 911 or go to nearest emergency room if sxs worsen or pt is in   crisis. The pt expresses understanding.    Praveen Brush, PhD, MP    Visit today included increased complexity associated with the care of the episodic problem developmental delays, anxiety addressed and managing the longitudinal care of the patient due to the serious and/or complex managed problem(s) developmental delays, anxiety.      This note was generated with the assistance of ambient listening technology. Verbal consent was obtained by the patient and accompanying visitor(s) for the recording of patient appointment to facilitate this note. I attest to having reviewed and edited the generated note for accuracy, though some syntax or spelling errors may persist. Please contact the author of this note for any clarification.     Antidepressant/Antianxiety Medication Initiation:  Patient informed of risks, benefits, and potential side effects of medication and accepts informed consent.  Common side effects include nausea, fatigue, headache, insomnia., Specifically discussed the possibility of new or worsening suicidal thoughts/depression.  Patient instructed to stop the medication immediately and seek urgent treatment if this occurs. Patient instructed not to abruptly discontinue medication without physician guidance except in cases of sudden onset or worsening of SI.

## 2025-05-12 ENCOUNTER — OFFICE VISIT (OUTPATIENT)
Dept: PSYCHIATRY | Facility: CLINIC | Age: 40
End: 2025-05-12
Payer: MEDICARE

## 2025-05-12 VITALS
HEIGHT: 71 IN | HEART RATE: 74 BPM | SYSTOLIC BLOOD PRESSURE: 137 MMHG | BODY MASS INDEX: 34.45 KG/M2 | DIASTOLIC BLOOD PRESSURE: 82 MMHG | WEIGHT: 246.06 LBS

## 2025-05-12 DIAGNOSIS — F42.9 OBSESSIVE-COMPULSIVE DISORDER, UNSPECIFIED TYPE: ICD-10-CM

## 2025-05-12 DIAGNOSIS — F63.9 IMPULSE CONTROL DISORDER: ICD-10-CM

## 2025-05-12 DIAGNOSIS — F41.9 ANXIETY DISORDER, UNSPECIFIED TYPE: ICD-10-CM

## 2025-05-12 DIAGNOSIS — F84.9 PERVASIVE DEVELOPMENTAL DISORDER, ACTIVE: ICD-10-CM

## 2025-05-12 PROCEDURE — 1159F MED LIST DOCD IN RCRD: CPT | Mod: CPTII,S$GLB,, | Performed by: PSYCHOLOGIST

## 2025-05-12 PROCEDURE — 3075F SYST BP GE 130 - 139MM HG: CPT | Mod: CPTII,S$GLB,, | Performed by: PSYCHOLOGIST

## 2025-05-12 PROCEDURE — 99214 OFFICE O/P EST MOD 30 MIN: CPT | Mod: S$GLB,,, | Performed by: PSYCHOLOGIST

## 2025-05-12 PROCEDURE — 3079F DIAST BP 80-89 MM HG: CPT | Mod: CPTII,S$GLB,, | Performed by: PSYCHOLOGIST

## 2025-05-12 PROCEDURE — G2211 COMPLEX E/M VISIT ADD ON: HCPCS | Mod: S$GLB,,, | Performed by: PSYCHOLOGIST

## 2025-05-12 PROCEDURE — 99999 PR PBB SHADOW E&M-EST. PATIENT-LVL III: CPT | Mod: PBBFAC,,, | Performed by: PSYCHOLOGIST

## 2025-05-12 PROCEDURE — 3008F BODY MASS INDEX DOCD: CPT | Mod: CPTII,S$GLB,, | Performed by: PSYCHOLOGIST

## 2025-05-12 PROCEDURE — 90833 PSYTX W PT W E/M 30 MIN: CPT | Mod: S$GLB,,, | Performed by: PSYCHOLOGIST

## 2025-05-12 RX ORDER — SERTRALINE HYDROCHLORIDE 100 MG/1
100 TABLET, FILM COATED ORAL DAILY
Qty: 90 TABLET | Refills: 0 | Status: SHIPPED | OUTPATIENT
Start: 2025-05-12

## 2025-05-12 RX ORDER — SERTRALINE HYDROCHLORIDE 50 MG/1
50 TABLET, FILM COATED ORAL DAILY
Qty: 30 TABLET | Refills: 1 | Status: SHIPPED | OUTPATIENT
Start: 2025-05-12 | End: 2025-06-11

## 2025-05-12 RX ORDER — CLOTRIMAZOLE AND BETAMETHASONE DIPROPIONATE 10; .64 MG/G; MG/G
CREAM TOPICAL
COMMUNITY
Start: 2025-05-02

## 2025-05-19 DIAGNOSIS — I10 PRIMARY HYPERTENSION: ICD-10-CM

## 2025-05-19 NOTE — TELEPHONE ENCOUNTER
Care Due:                  Date            Visit Type   Department     Provider  --------------------------------------------------------------------------------                                EP [facility]  Last Visit: 03-      CARE ([facility])   ALIE Mazariegos                              EP -                              PRIMARY      [facility]  Next Visit: 09-      CARE ([facility])   ALIE Mazariegos                                                            Last  Test          Frequency    Reason                     Performed    Due Date  --------------------------------------------------------------------------------    Uric Acid...  12 months..  allopurinoL..............  Not Found    Overdue    [employer] Embedded Care Due Messages. Reference number: [id number].   5/19/2025 1:19:03 PM CDT

## 2025-05-20 RX ORDER — AMLODIPINE BESYLATE 10 MG/1
10 TABLET ORAL
Qty: 90 TABLET | Refills: 3 | Status: SHIPPED | OUTPATIENT
Start: 2025-05-20

## 2025-05-20 NOTE — TELEPHONE ENCOUNTER
Refill Routing Note   Medication(s) are not appropriate for processing by [facility] for the following reason(s):        Drug-disease interaction    [facility] action(s):  Defer   Requires labs : Yes      Medication Therapy Plan: FOVS;  High Drug-Disease: amLODIPine and Liver cirrhosis secondary to TRAYLOR; Metabolic dysfunction-associated steatohepatitis (MASH)    Pharmacist review requested: Yes     Appointments  past 12m or future 3m with PCP    Date Provider   Last Visit   3/6/2025 Rehan Mazariegos MD   Next Visit   9/15/2025 Rehan Mazariegos MD   ED visits in past 90 days: 0        Note composed:9:03 PM 05/19/2025

## 2025-05-20 NOTE — TELEPHONE ENCOUNTER
Refill Routing Note   Medication(s) are not appropriate for processing by [facility] for the following reason(s):        Drug-disease interaction    [facility] action(s):  Defer   Requires labs : Yes      Medication Therapy Plan: FOVS;  High Drug-Disease: amLODIPine and Liver cirrhosis secondary to TRAYLOR; Metabolic dysfunction-associated steatohepatitis (MASH)    Pharmacist review requested: Yes     Appointments  past 12m or future 3m with PCP    Date Provider   Last Visit   3/6/2025 Rehan Mazariegos MD   Next Visit   9/15/2025 Rehan Mazariegos MD   ED visits in past 90 days: 0        Note composed:10:49 PM 05/19/2025

## 2025-06-09 ENCOUNTER — OFFICE VISIT (OUTPATIENT)
Dept: BARIATRICS | Facility: CLINIC | Age: 40
End: 2025-06-09
Payer: MEDICARE

## 2025-06-09 VITALS — HEIGHT: 71 IN | BODY MASS INDEX: 34.44 KG/M2 | WEIGHT: 246 LBS

## 2025-06-09 DIAGNOSIS — F84.0 AUTISTIC DISORDER, ACTIVE: Chronic | ICD-10-CM

## 2025-06-09 DIAGNOSIS — I10 PRIMARY HYPERTENSION: ICD-10-CM

## 2025-06-09 DIAGNOSIS — K75.81 LIVER CIRRHOSIS SECONDARY TO NASH: ICD-10-CM

## 2025-06-09 DIAGNOSIS — E66.01 MORBID OBESITY: Primary | ICD-10-CM

## 2025-06-09 DIAGNOSIS — K74.60 LIVER CIRRHOSIS SECONDARY TO NASH: ICD-10-CM

## 2025-06-09 PROCEDURE — 1159F MED LIST DOCD IN RCRD: CPT | Mod: CPTII,95,, | Performed by: NURSE PRACTITIONER

## 2025-06-09 PROCEDURE — 98005 SYNCH AUDIO-VIDEO EST LOW 20: CPT | Mod: 95,,, | Performed by: NURSE PRACTITIONER

## 2025-06-09 PROCEDURE — 1160F RVW MEDS BY RX/DR IN RCRD: CPT | Mod: CPTII,95,, | Performed by: NURSE PRACTITIONER

## 2025-06-09 RX ORDER — SEMAGLUTIDE 1 MG/.5ML
1 INJECTION, SOLUTION SUBCUTANEOUS WEEKLY
Qty: 2 ML | Refills: 0 | Status: SHIPPED | OUTPATIENT
Start: 2025-08-06 | End: 2025-09-03

## 2025-06-09 RX ORDER — SEMAGLUTIDE 1.7 MG/.75ML
1.7 INJECTION, SOLUTION SUBCUTANEOUS WEEKLY
Qty: 3 ML | Refills: 1 | Status: SHIPPED | OUTPATIENT
Start: 2025-09-04 | End: 2025-10-02

## 2025-06-09 NOTE — PROGRESS NOTES
The patient location is: [place]  The chief complaint leading to consultation is: obesity, medication management    Visit type: audiovisual    Face to Face time with patient guardian (Eli): 12 minutes  26 minutes of total time spent on the encounter, which includes face to face time and non-face to face time preparing to see the patient (eg, review of tests), Obtaining and/or reviewing separately obtained history, Documenting clinical information in the electronic or other health record, Independently interpreting results (not separately reported) and communicating results to the patient/family/caregiver, or Care coordination (not separately reported).         Each patient to whom he or she provides medical services by telemedicine is:  (1) informed of the relationship between the physician and patient and the respective role of any other health care provider with respect to management of the patient; and (2) notified that he or she may decline to receive medical services by telemedicine and may withdraw from such care at any time.    Notes:      Medically Supervised Weight Loss Documentation    Date of Visit: 06/09/2025    Patient: Hussein Doyle    Beginning Weight: 264    Last Weight: 248    Current Weight: 246 (stated by mother)    Current BMI: Body mass index is 34.75 kg/m².  Weight Change: -16          Vitals:   There were no vitals filed for this visit.        Comorbidities:   Past Medical History:   Diagnosis Date    Anxiety     Autism     Bowel obstruction     pt mother denies    GERD (gastroesophageal reflux disease)     Grand mal seizure     Hepatic encephalopathy     Mastoiditis     TRAYLOR (nonalcoholic steatohepatitis)     Otitis media     Paraplegia     Premature birth     6 weeks premature    Recurrent UTI     Renal cancer 2010    Right RCC    Retinopathy     Scoliosis     paraplegia temporarily after spine surgery-he can walk now    Scoliosis     has a frank in his back    Sepsis due to  "Escherichia coli without acute organ dysfunction 02/02/2023    Sleep apnea     uses CPAP    Stroke     one week old    Tardive dyskinesia        Medications:  Current Outpatient Medications on File Prior to Visit   Medication Sig Dispense Refill    acetylcarnitine 500 mg Cap Take 1 capsule by mouth 2 (two) times daily as needed (itching). 60 each 5    acetylcysteine 600 mg Cap Take 1 capsule (600 mg total) by mouth once daily. (Patient not taking: Reported on 1/31/2025) 30 capsule 11    allopurinoL (ZYLOPRIM) 100 MG tablet Take 1 tablet (100 mg total) by mouth 2 (two) times daily. 180 tablet 3    amLODIPine (NORVASC) 10 MG tablet TAKE ONE TABLET BY MOUTH ONCE DAILY 90 tablet 3    ascorbic acid, vitamin C, 250 mg Chew Take by mouth once daily.      calcium carbonate-vitamin D3 500 mg(1,250mg) -400 unit Tab Take 1 tablet by mouth 2 (two) times a day.       catheter (POOLE CATHETER) 16 Fr Misc 1 each by Misc.(Non-Drug; Combo Route) route 3 (three) times daily as needed (urinary retention). 100 each 11    clotrimazole-betamethasone 1-0.05% (LOTRISONE) cream Apply topically.      DENTA 5000 PLUS 1.1 % Crea       hydrOXYzine pamoate (VISTARIL) 25 MG Cap TAKE ONE CAPSULE BY MOUTH EVERY 4 TO 6 HOURS AS NEEDED 60 capsule 2    insulin syringe-needle U-100 (BD INSULIN SYRINGE) 1 mL 29 gauge x 1/2" Syrg 1 each by Misc.(Non-Drug; Combo Route) route once a week. 10 each 11    inulin (FIBER GUMMIES ORAL) Take 2 tablets by mouth once daily.       L. ACIDOPHILUS/BIFID. ANIMALIS (CHEWABLE PROBIOTIC ORAL) Take 1 tablet by mouth 2 (two) times daily.       lactulose (CHRONULAC) 10 gram/15 mL solution Take 15 mLs (10 g total) by mouth 3 (three) times daily. 1892 mL 11    magnesium 30 mg Tab Take 1 tablet by mouth every evening.      MELATONIN ORAL Take 1 tablet by mouth every evening.      multivitamin capsule Take 1 capsule by mouth every morning.       mupirocin (BACTROBAN) 2 % ointment Apply topically 2 (two) times daily. Apply " topically to open wounds once to twice daily and PRN, as instructed. 30 g 2    MYRBETRIQ 50 mg Tb24 Take 1 tablet by mouth.      nystatin (MYCOSTATIN) powder Apply topically 2 (two) times daily. 60 g 11    omega-3 fatty acids/fish oil (FISH OIL-OMEGA-3 FATTY ACIDS) 300-1,000 mg capsule Take 1 capsule by mouth once daily.       OXcarbazepine (TRILEPTAL) 300 MG Tab Take 1 tablet (300 mg total) by mouth 2 (two) times daily. 180 tablet 3    pantoprazole (PROTONIX) 40 MG tablet TAKE ONE TABLET BY MOUTH ONCE DAILY 90 tablet 3    polyethylene glycol (GLYCOLAX) 17 gram PwPk Take 17 g by mouth once daily.       pramipexole (MIRAPEX) 0.25 MG tablet Take 1 tablet (0.25 mg total) by mouth 3 (three) times daily. 90 tablet 11    sertraline (ZOLOFT) 100 MG tablet Take 1 tablet (100 mg total) by mouth once daily. 90 tablet 0    sertraline (ZOLOFT) 50 MG tablet Take 1 tablet (50 mg total) by mouth once daily. 30 tablet 1    sulfamethoxazole-trimethoprim 400-80mg (BACTRIM,SEPTRA) 400-80 mg per tablet Take 1 tablet by mouth.      zinc gluconate 50 mg tablet Take 50 mg by mouth once daily.       Current Facility-Administered Medications on File Prior to Visit   Medication Dose Route Frequency Provider Last Rate Last Admin    dextrose 50% injection 12.5 g  12.5 g Intravenous PRN Volpi-Abadie, Jacqueline, MD        dextrose 50% injection 25 g  25 g Intravenous PRN Volpi-Abadie, Jacqueline, MD        insulin regular injection 0-8 Units  0-8 Units Intravenous Continuous PRN Volpi-Abadie, Jacqueline, MD        lactated ringers infusion   Intravenous Continuous Volpi-Abadie, Jacqueline,  mL/hr at 03/25/24 0728 New Bag at 03/25/24 0728         Tanita Scale Body Composition: not calculated with virtual visit  Fat Percent:   %  Fat Mass:   lb  FFM:   lb  TBW:  lb  TBW %:   %  BMR: kcal    CURRENT DIET:  Regular diet.  Diet Recall: Food records are present.    Diet Includes:   Breakfast:  daily--prunes 2-3, applesauce for BM--- eating  "Shinto oatmeal 1/2 from before, protein mini muffins x1, turkey diana (Monday), 2 scrambled eggs, biscuit with SF jelly & yogurt spread (Tuesday), pancake with 1/2 bananas (made with 1 egg, PB, cinnamon- instead of banana) with SF syrup (Wednesday), cream of wheat packages & 2 blueberry muffins (Thursday), 2 eggs in toast "toad in a hole" (Friday), fresh grits- cut in half & cinnamon roll, only 1 now (Saturday), [employer] Waffles & Turkey sausage (Sunday)  Lunch:  sandwich- ham/turkey, light harris with sandwich keto bread- SF Koolaid 1 cup--- plans to change to vegetable soup  Snack: - ranch with greek yogurt and vegetable (cream cheese, dressing), trail mix or protein shake at 3 pm  Dinner: , grilled meats/vegetables, salad every night with low fat dressing and croutons  No longer on cookies at lunch  Moved to 4 oz meat, 2-3 oz vegetables, 1-2 oz carbs  Decreased carbs to nearly none except dinner  Isopure Clear- Mixed Berry to breakfast meal    Meal Pattern: Improved.  Protein Supplements: 2 per day.  Snacking: Adequate. Snacks include healthy choices.    Vegetables: Likes a variety. Eats almost daily.  Fruits: Likes a variety. Eats daily.  Beverages:  gallon water, 1 glass of 1/2 SF juice and protein clear drink now  Dining out: limited now. Was Weekly- Mostly fast food and restaurants.  Cooking at home: Weekly. Mostly  airfrying minimal with mostly grilling and baking  meat, fish, starchy CHO, and vegetables.     Exercise:  Current exercise: Adequate, 3-5x/wk  [employer] 3x/wk- Treadmill/ Elliptical trade 10-20 each, then swap 20-30 min total, speed 2  Walk on [employer] ([facility] Tuesday/Wedneday and doing machines/weights as they walk       Restrictions to exercise: None     Vitamins / Minerals / Herbs: Men's OTC daily, fish oil, CBD oil- anxiety, zinc, cranberry capsules for UTI  Miralax daily  Lactulose TID for constipation     Food Allergies: NKFA; no intolerances stated.      Social:  Disabled. Autistic with " OCD.   Lives with mother. Caregiver daily. Both attended appointment.   Grocery shopping and food prep Caregiver (Ashleigh) and mother (Eli).  Patient believes the household will be supportive after surgery.  Alcohol: None.  Smoking: None.      Behavior or Diet Goals for this patient:  Reviewed Falls with family/Caregiver  Continue prescribed home medications  Tanita Body Composition Scale  Decreased 4 lb fat  Increased 2 lb muscle  Increased 1 lb water  Discussed dietary changes  Do not skip meals- replace with protein shake  Increase Protein to 60 gm/day at minimum  Can remove small potatoe, have small wheat roll  Continue decreasing meals with the meat largest portion, then vegetables and if any carbs then be the smallest portion    Medical Weight Loss options discussed-  Discussed PO vs Injectables  PO Medication  Topiramate- contraindicated based on hepatic impairment, sz risks, change in mentation with suicide/self harm tendency   Phentermine- contraindicated based on increased agitation, constipation  Diethylpropion- contraindicated as decreases sz threshold  Contrave- contraindicated with sz disorders and hepatic impairment, with increased risk for suicide/self harm   Injectable Medications- Rx on 8/10/24  ASCVD Risk Calculator-  5.4% Current 10-Year ASCVD Risk, with lifetime risk at 69%, optimal risk 0.6%  Increase to fasting glucose over past 8-months, from 106 in 8/2023 & 107 in 12/2023, now increased to 119 in 6/2024- calculating his A1C in prediabetes at 5.8  Weight loss medications selection: Tirzepatide (Zepbound)  Aware that will need prior authorization for medication, which can take some times  Aware medication not formulary at primary pharmacy and might have to change pharmacy if they do not carry it   Denies history or family history of Medullary Thyroid Cancer or Multiple endocrine neoplasia syndrome  Discussion of manifestations of elevated calcitonin levels  Will start dosage at  2.5 mg  subcutaneous for 4 weeks (weekly injections) then increased to 5 mg. Can continue to increase every 4 weeks with max dose of 15 mg    11/12/24  Unable to Zepbound for Obesity covered  Sent in Wegovy with approved coverage on 10/10/24,   completed 4 injections without difficulty and family reporting tolerating well  Increase dose today for 0.5 mg, will contact me in 3 weeks for refill or dose increase    Refill Request on 1/2/25- increase in dosage to 1 mg     1/10/25  Discussion on increase in dose and how he is tolerating- today will be first dose  GERD reported by mother- continue with protonix daily, can add tums PRN- aware can worse with dose increase  F/U in 2 months, agreeable for virtual     3/10/25  Went on Cruise from 2/8-15/2025- gained weight at 252, since returning has lost to 248 today  Wegovy 1mg on 2/10 was refill- has 3 injections left  Discussed and Plan for increase when refill is due, increase to 1.7 mg Wegovy x 3 months, then 2.4 mg x 3 months  F/U in 2-3 months with Virtual    Changes for today, 6/6/2025  Message on 5/21, stopped meds 1 week ago (approx 5/14) for procedure, but delayed with UTI dx-- did not restart- off medication since May 7-9, 2025   Plan for cystoscopy on 6/12/2025 with Dr Velasquez  Restart medication, lower dose, since off for > 4 wks  Wegovy 1 mg q4w, then increase to Wegovy 1.7 mg q4wks x 1 refill    : total time spent for visit: 26 minutes

## 2025-06-12 ENCOUNTER — PATIENT MESSAGE (OUTPATIENT)
Dept: BARIATRICS | Facility: CLINIC | Age: 40
End: 2025-06-12
Payer: MEDICARE

## 2025-06-12 DIAGNOSIS — K74.60 LIVER CIRRHOSIS SECONDARY TO NASH: ICD-10-CM

## 2025-06-12 DIAGNOSIS — E66.01 MORBID OBESITY: ICD-10-CM

## 2025-06-12 DIAGNOSIS — I10 PRIMARY HYPERTENSION: ICD-10-CM

## 2025-06-12 DIAGNOSIS — E66.9 OBESITY (BMI 30-39.9): ICD-10-CM

## 2025-06-12 DIAGNOSIS — K75.81 LIVER CIRRHOSIS SECONDARY TO NASH: ICD-10-CM

## 2025-06-12 DIAGNOSIS — K75.81 METABOLIC DYSFUNCTION-ASSOCIATED STEATOHEPATITIS (MASH): ICD-10-CM

## 2025-06-12 RX ORDER — SEMAGLUTIDE 1 MG/.5ML
1 INJECTION, SOLUTION SUBCUTANEOUS
Qty: 2 ML | Refills: 1 | Status: SHIPPED | OUTPATIENT
Start: 2025-06-12

## 2025-06-12 RX ORDER — SEMAGLUTIDE 1 MG/.5ML
1 INJECTION, SOLUTION SUBCUTANEOUS WEEKLY
Qty: 2 ML | Refills: 0 | OUTPATIENT
Start: 2025-08-06 | End: 2025-09-03

## 2025-06-13 ENCOUNTER — TELEPHONE (OUTPATIENT)
Dept: BARIATRICS | Facility: CLINIC | Age: 40
End: 2025-06-13
Payer: MEDICARE

## 2025-06-13 ENCOUNTER — PATIENT MESSAGE (OUTPATIENT)
Dept: BARIATRICS | Facility: CLINIC | Age: 40
End: 2025-06-13
Payer: MEDICARE

## 2025-06-17 ENCOUNTER — OFFICE VISIT (OUTPATIENT)
Dept: PSYCHIATRY | Facility: CLINIC | Age: 40
End: 2025-06-17
Payer: MEDICARE

## 2025-06-17 DIAGNOSIS — F63.9 IMPULSE CONTROL DISORDER: ICD-10-CM

## 2025-06-17 DIAGNOSIS — F41.9 ANXIETY DISORDER, UNSPECIFIED TYPE: ICD-10-CM

## 2025-06-17 DIAGNOSIS — F42.9 OBSESSIVE-COMPULSIVE DISORDER, UNSPECIFIED TYPE: Primary | ICD-10-CM

## 2025-06-17 DIAGNOSIS — F84.9 PERVASIVE DEVELOPMENTAL DISORDER, ACTIVE: ICD-10-CM

## 2025-06-17 PROCEDURE — G2211 COMPLEX E/M VISIT ADD ON: HCPCS | Mod: 95,,, | Performed by: PSYCHOLOGIST

## 2025-06-17 PROCEDURE — 90833 PSYTX W PT W E/M 30 MIN: CPT | Mod: 95,,, | Performed by: PSYCHOLOGIST

## 2025-06-17 PROCEDURE — 1159F MED LIST DOCD IN RCRD: CPT | Mod: CPTII,95,, | Performed by: PSYCHOLOGIST

## 2025-06-17 PROCEDURE — 98006 SYNCH AUDIO-VIDEO EST MOD 30: CPT | Mod: 95,,, | Performed by: PSYCHOLOGIST

## 2025-06-17 RX ORDER — SERTRALINE HYDROCHLORIDE 50 MG/1
50 TABLET, FILM COATED ORAL DAILY
Qty: 30 TABLET | Refills: 1 | Status: SHIPPED | OUTPATIENT
Start: 2025-06-17 | End: 2025-08-16

## 2025-06-17 NOTE — PROGRESS NOTES
"The patient location is: [place]  The chief complaint leading to consultation is: OCD, anxiety, impulse control, PDD     Visit type: audiovisual    30 minutes of total time spent on the encounter, which includes face to face time and non-face to face time preparing to see the patient (eg, review of tests), Obtaining and/or reviewing separately obtained history, Documenting clinical information in the electronic or other health record, Independently interpreting results (not separately reported) and communicating results to the patient/family/caregiver, or Care coordination (not separately reported).     Each patient to whom he or she provides medical services by telemedicine is:  (1) informed of the relationship between the physician and patient and the respective role of any other health care provider with respect to management of the patient; and (2) notified that he or she may decline to receive medical services by telemedicine and may withdraw from such care at any time.    Notes:     Outpatient Psychiatry Follow-Up Visit    Clinical Status of Patient: Outpatient (Ambulatory)  06/17/2025     Chief Complaint: OCD, anxiety, impulse control, PDD      Interval History and Content of Current Session:  Interim Events/Subjective Report/Content of Current Session:  follow-up appointment with mother.    Pt is a 35 y/o male with past psychiatric hx of OCD, anxiety, impulse control, PDD who presents for follow-up treatment. Patient's mother reports that the pt's caregiver, Ashleigh, has abruptly quit her job after discovering an AirTag in the pt's backpack. While the mother describes this as a relief, she acknowledges it will be difficult for the pt. She expresses concerns about finding a suitable replacement and the potential impact on the pt's routine and emotional state. Patient's mother notes that the patient has been "much calmer" since increasing the dose of sertraline.    Patient's mother mentions that the pt " recently had a Botox injection in his bladder, which, along with the increased sertraline dose, has contributed to improvements in his overall temperament. Patient's sister has begun providing temporary care for her brother on certain days until she returns to her teaching job at the end of July.    Past Psychiatric hx: s/e's to mult prev meds to include zyprexa (wgt gain), lexapro 20mg po qam (anxiety and to suppress sex drive), buspar 10mg po BID, xanax 0.5mg po daily PRN anxiety    Past Medical hx:   Past Medical History:   Diagnosis Date    Anxiety     Autism     Bowel obstruction     pt mother denies    GERD (gastroesophageal reflux disease)     Grand mal seizure     Hepatic encephalopathy     Mastoiditis     TRAYLOR (nonalcoholic steatohepatitis)     Otitis media     Paraplegia     Premature birth     6 weeks premature    Recurrent UTI     Renal cancer 2010    Right RCC    Retinopathy     Scoliosis     paraplegia temporarily after spine surgery-he can walk now    Scoliosis     has a frank in his back    Sepsis due to Escherichia coli without acute organ dysfunction 02/02/2023    Sleep apnea     uses CPAP    Stroke     one week old    Tardive dyskinesia         Interim hx:  Medication changes last visit: Increase sertraline to 150 mg     Denies suicidal/homicidal ideations.  Denies hopelessness/worthlessness.    Denies auditory/visual hallucinations      Alcohol: Pt denied  Drug: Pt denied  Caffeine: Not assessed  Tobacco: Pt denied      Review of Systems   PSYCHIATRIC: Pertinent items are noted in the narrative.        CONSTITUTIONAL: weight stable    Past Medical, Family and Social History: The patient's past medical, family and social history have been reviewed and updated as appropriate within the electronic medical record. See encounter notes.     Current Psychiatric Medication:  sertraline 150 mg, oxcarbazepine 300 mg BID (per neuro), hydroxyzine 25 mg PRN     Compliance: yes      Side effects: Pt denied      Risk Parameters:  Patient reports no suicidal ideation  Patient reports no homicidal ideation  Patient reports no self-injurious behavior  Patient reports no violent behavior     Exam (detailed: at least 9 elements; comprehensive: all 15 elements)   Constitutional  Vitals:  Most recent vital signs, dated less than 90 days prior to this appointment, were reviewed. BP: ()/()   Arterial Line BP: ()/()       General:  unremarkable, age appropriate, casual attire, good eye contact, good rapport       Musculoskeletal  Muscle Strength/Tone:  no flaccidity, no tremor    Gait & Station:  normal      Psychiatric                       Speech:  normal tone, normal rate, rhythm, and volume   Mood & Affect:    Mildly anxious         Thought Process:   Goal directed; Linear    Associations:   intact   Thought Content:   No SI/HI, delusions, or paranoia, no AV/VH   Insight & Judgement:   Good, adequate to circumstances   Orientation:   grossly intact; alert and oriented x 4    Memory:  intact for content of interview    Language:  grossly intact, can repeat    Attention Span  : Grossly intact for content of interview   Fund of Knowledge:   intact and appropriate to age and level of education        Assessment and Diagnosis   Status/Progress/Impression:    Assessment & Plan    IMPRESSION:  - Continued sertraline at increased dose from previous visit, noting patient has been calmer with fewer side effects.  - Assessed impact of caregiver changes on anxiety and behavior, recommending consistent support during transition.  - Determined need for gradual introduction of new caregiver to minimize disruption to routine.    PLAN SUMMARY:  - Follow-up appointment scheduled for July 17th at 5:30 PM to reassess status and medication efficacy.    Diagnosis:   1. Obsessive Compulsive Disorder   2. Anxiety Disorder  3. Impulse Control Disorder  4. Pervasive Developmental Disorder  Intervention/Counseling/Treatment Plan   Medication Management:       1. Sertraline 150 mg    2. hydroxyzine 25 mg PRN    3. Oxcarbazepine 300 mg BID (managed by neuro)    4. Call to report any worsening of symptoms or problems with the medication. Pt instructed to go to ER with thoughts of harming self, others     5. Patient given contact # for psychotherapists at [facility] and also instructed to check with insurance for list of providers.     Psychotherapy: 20 minutes  Target symptoms: anxiety  Why chosen therapy is appropriate versus another modality: CBT used; relevant to diagnosis, patient responds to this modality  Outcome monitoring methods: self-report, observation  Therapeutic intervention type: coping, supportive therapy  Topics discussed/themes: building skills sets for symptom management, symptom recognition, nutrition, exercise  The patient's response to the intervention is accepting  Patient's response to treatment is: good.   The patient's progress toward treatment goals: stable     Return to clinic: 1 month    -Cognitive-Behavioral/Supportive therapy and psychoeducation provided  -R/B/SE's of medications discussed with the pt who expresses understanding and chooses to take medications as prescribed.   -Pt instructed to call clinic, 911 or go to nearest emergency room if sxs worsen or pt is in   crisis. The pt expresses understanding.    Praveen Brush, PhD, MP    Visit today included increased complexity associated with the care of the episodic problem developmental delays, anxiety addressed and managing the longitudinal care of the patient due to the serious and/or complex managed problem(s) developmental delays, anxiety.      This note was generated with the assistance of ambient listening technology. Verbal consent was obtained by the patient and accompanying visitor(s) for the recording of patient appointment to facilitate this note. I attest to having reviewed and edited the generated note for accuracy, though some syntax or spelling errors may  persist. Please contact the author of this note for any clarification.     Antidepressant/Antianxiety Medication Initiation:  Patient informed of risks, benefits, and potential side effects of medication and accepts informed consent.  Common side effects include nausea, fatigue, headache, insomnia., Specifically discussed the possibility of new or worsening suicidal thoughts/depression.  Patient instructed to stop the medication immediately and seek urgent treatment if this occurs. Patient instructed not to abruptly discontinue medication without physician guidance except in cases of sudden onset or worsening of SI.

## 2025-06-19 DIAGNOSIS — R25.8 NOCTURNAL LEG MOVEMENTS: ICD-10-CM

## 2025-06-19 RX ORDER — PRAMIPEXOLE DIHYDROCHLORIDE 0.25 MG/1
0.25 TABLET ORAL 3 TIMES DAILY
Qty: 90 TABLET | Refills: 8 | Status: SHIPPED | OUTPATIENT
Start: 2025-06-19

## 2025-06-19 NOTE — TELEPHONE ENCOUNTER
No care due was identified.  [facility] Embedded Care Due Messages. Reference number: [id number].   6/19/2025 1:21:40 PM CDT

## 2025-07-02 ENCOUNTER — TELEPHONE (OUTPATIENT)
Dept: FAMILY MEDICINE | Facility: CLINIC | Age: 40
End: 2025-07-02
Payer: MEDICARE

## 2025-07-02 NOTE — TELEPHONE ENCOUNTER
Copied from CRM #[id number]. Topic: Medications - Pharmacy  >> Jul 2, 2025 12:27 PM Yenny wrote:  Type:  Pharmacy Calling to Clarify an RX    Name of Caller:COSME   Pharmacy Name:[facility]  PHARMACY   Prescription Name:JOSH Bush%  What do they need to clarify?:PHARMACY NEEDS THE 10/23/23 RX IN COMPLETE DETAIL ON A LETTERHEAD INCLUDING HOW MANY APPLICATION TO TOES. PHARMACY NEEDS THE INFO FOR A RX AUDIT   Best Call Back Number:  [facility] - [facility] [address] [address], [address] [facility]  [address] [address], [address] [place] [address]  CELL: [phone]  Phone: [phone] Fax: [phone]  Additional Information: THANK YOU

## 2025-07-02 NOTE — LETTER
July 2, 2025      [facility]  [address]  [place] [address]  Phone: [phone]  Fax: [phone]       Patient: Hussein Doyle   YOB: 1985  Date of Visit: 07/02/2025    To Whom It May Concern:    I had prescribed the following for Mr. Hussein Doyle on October 23, 2023:    efinaconazole (JUBLIA) 10 % Escobar; Apply 1 application topically once daily. Dispense: 8 mL; Refill: 1     Intention was to apply to toenail with fungal infection daily over the course of 48 weeks.     Sincerely,    Rehan Mazariegos MD

## 2025-07-07 ENCOUNTER — TELEPHONE (OUTPATIENT)
Dept: FAMILY MEDICINE | Facility: CLINIC | Age: 40
End: 2025-07-07
Payer: MEDICARE

## 2025-07-07 NOTE — TELEPHONE ENCOUNTER
Copied from CRM #[id number]. Topic: Medications - Pharmacy  >> Jul 7, 2025  3:15 PM Melissa wrote:  Type: Needs Medical Advice  Who Called:  [facility] [place]    Pharmacy name and phone #:    [facility] - [place], [place] - [address] [address] [facility] [address]  [address] [address] [facility] [address]  [facility] [address]  Phone: [phone] Fax: [phone]      Best Call Back Number: [phone]  Additional Information: Asking to have [facility] return her call regarding the letter that [facility] faxed over, they are needing  more information

## 2025-07-07 NOTE — TELEPHONE ENCOUNTER
"Spoke with pharmacy, letter was received. Judith will be changing from "affected toes" to "big toe".       "

## 2025-07-10 ENCOUNTER — HOSPITAL ENCOUNTER (OUTPATIENT)
Dept: RADIOLOGY | Facility: HOSPITAL | Age: 40
Discharge: HOME | End: 2025-07-10
Attending: NURSE PRACTITIONER
Payer: MEDICARE

## 2025-07-10 DIAGNOSIS — K74.60 CIRRHOSIS OF LIVER WITHOUT ASCITES, UNSPECIFIED HEPATIC CIRRHOSIS TYPE: ICD-10-CM

## 2025-07-10 PROCEDURE — 76705 ECHO EXAM OF ABDOMEN: CPT | Mod: TC,PO

## 2025-07-10 PROCEDURE — 76705 ECHO EXAM OF ABDOMEN: CPT | Mod: 26,,, | Performed by: STUDENT IN AN ORGANIZED HEALTH CARE EDUCATION/TRAINING PROGRAM

## 2025-07-10 NOTE — PROGRESS NOTES
"  Medically Supervised Weight Loss Documentation    Date of Visit: 07/11/2025    Patient: Hussein Doyle    Beginning Weight: 264    Last Weight: 246    Current Weight: 241    Current BMI: Body mass index is 34.2 kg/m².  Weight Change: -23          Vitals:   Vitals:    07/11/25 1016   BP: 112/70   Pulse: 80   Resp: 16   Temp: 97.2 °F (36.2 °C)           Comorbidities:   Past Medical History:   Diagnosis Date    Anxiety     Autism     Bowel obstruction     pt mother denies    GERD (gastroesophageal reflux disease)     Grand mal seizure     Hepatic encephalopathy     Mastoiditis     TRAYLOR (nonalcoholic steatohepatitis)     Otitis media     Paraplegia     Premature birth     6 weeks premature    Recurrent UTI     Renal cancer 2010    Right RCC    Retinopathy     Scoliosis     paraplegia temporarily after spine surgery-he can walk now    Scoliosis     has a frank in his back    Sepsis due to Escherichia coli without acute organ dysfunction 02/02/2023    Sleep apnea     uses CPAP    Stroke     one week old    Tardive dyskinesia        Medications:  Current Outpatient Medications on File Prior to Visit   Medication Sig Dispense Refill    allopurinoL (ZYLOPRIM) 100 MG tablet Take 1 tablet (100 mg total) by mouth 2 (two) times daily. 180 tablet 3    amLODIPine (NORVASC) 10 MG tablet TAKE ONE TABLET BY MOUTH ONCE DAILY 90 tablet 3    ascorbic acid, vitamin C, 250 mg Chew Take by mouth once daily.      calcium carbonate-vitamin D3 500 mg(1,250mg) -400 unit Tab Take 1 tablet by mouth 2 (two) times a day.       catheter (POOLE CATHETER) 16 Fr Misc 1 each by Misc.(Non-Drug; Combo Route) route 3 (three) times daily as needed (urinary retention). 100 each 11    clotrimazole-betamethasone 1-0.05% (LOTRISONE) cream Apply topically.      DENTA 5000 PLUS 1.1 % Crea       insulin syringe-needle U-100 (BD INSULIN SYRINGE) 1 mL 29 gauge x 1/2" Syrg 1 each by Misc.(Non-Drug; Combo Route) route once a week. 10 each 11    inulin (FIBER " GUMMIES ORAL) Take 2 tablets by mouth once daily.       L. ACIDOPHILUS/BIFID. ANIMALIS (CHEWABLE PROBIOTIC ORAL) Take 1 tablet by mouth 2 (two) times daily.      lactulose (CHRONULAC) 10 gram/15 mL solution Take 15 mLs (10 g total) by mouth 3 (three) times daily. 1892 mL 11    magnesium 30 mg Tab Take 1 tablet by mouth every evening.      MELATONIN ORAL Take 1 tablet by mouth every evening.      multivitamin capsule Take 1 capsule by mouth every morning.       mupirocin (BACTROBAN) 2 % ointment Apply topically 2 (two) times daily. Apply topically to open wounds once to twice daily and PRN, as instructed. 30 g 2    MYRBETRIQ 50 mg Tb24 Take 1 tablet by mouth.      nystatin (MYCOSTATIN) powder Apply topically 2 (two) times daily. 60 g 11    omega-3 fatty acids/fish oil (FISH OIL-OMEGA-3 FATTY ACIDS) 300-1,000 mg capsule Take 1 capsule by mouth once daily.       OXcarbazepine (TRILEPTAL) 300 MG Tab Take 1 tablet (300 mg total) by mouth 2 (two) times daily. 180 tablet 3    pantoprazole (PROTONIX) 40 MG tablet TAKE ONE TABLET BY MOUTH ONCE DAILY 90 tablet 3    polyethylene glycol (GLYCOLAX) 17 gram PwPk Take 17 g by mouth once daily.       pramipexole (MIRAPEX) 0.25 MG tablet TAKE ONE TABLET BY MOUTH THREE TIMES DAILY 90 tablet 8    [START ON 8/6/2025] semaglutide, weight loss, (WEGOVY) 1 mg/0.5 mL PnIj Inject 1 mg into the skin once a week. 2 mL 0    semaglutide, weight loss, (WEGOVY) 1 mg/0.5 mL PnIj Inject 1 mg into the skin every 7 days. 2 mL 1    [START ON 9/4/2025] semaglutide, weight loss, (WEGOVY) 1.7 mg/0.75 mL PnIj Inject 1.7 mg into the skin once a week. 3 mL 1    sertraline (ZOLOFT) 100 MG tablet Take 1 tablet (100 mg total) by mouth once daily. 90 tablet 0    sulfamethoxazole-trimethoprim 400-80mg (BACTRIM,SEPTRA) 400-80 mg per tablet Take 1 tablet by mouth.      zinc gluconate 50 mg tablet Take 50 mg by mouth once daily.      acetylcysteine 600 mg Cap Take 1 capsule (600 mg total) by mouth once daily.  "(Patient not taking: Reported on 7/11/2025) 30 capsule 11    hydrOXYzine pamoate (VISTARIL) 25 MG Cap TAKE ONE CAPSULE BY MOUTH EVERY 4 TO 6 HOURS AS NEEDED (Patient not taking: Reported on 7/11/2025) 60 capsule 2    [DISCONTINUED] acetylcarnitine 500 mg Cap Take 1 capsule by mouth 2 (two) times daily as needed (itching). 60 each 5    [DISCONTINUED] amoxicillin-clavulanate 1,000-62.5 mg (AUGMENTIN XR) 1,000-62.5 mg per tablet Take 2 tablets by mouth every 12 (twelve) hours.      [DISCONTINUED] sertraline (ZOLOFT) 50 MG tablet Take 1 tablet (50 mg total) by mouth once daily. 30 tablet 1     Current Facility-Administered Medications on File Prior to Visit   Medication Dose Route Frequency Provider Last Rate Last Admin    dextrose 50% injection 12.5 g  12.5 g Intravenous PRN Volpi-Abadie, Jacqueline, MD        dextrose 50% injection 25 g  25 g Intravenous PRN Volpi-Abadie, Jacqueline, MD        insulin regular injection 0-8 Units  0-8 Units Intravenous Continuous PRN Volpi-Abadie, Jacqueline, MD        lactated ringers infusion   Intravenous Continuous Volpi-Abadie, Jacqueline,  mL/hr at 03/25/24 0728 New Bag at 03/25/24 0728         Tanita Scale Body Composition: not calculated with virtual visit  Fat Percent:  32.9 %  Fat Mass: 79.6   lb  FFM: 162.2  lb  TBW: 116.6 lb  TBW %: 48.2  %  BMR: 2219 kcal    CURRENT DIET:  Regular diet.  Diet Recall: Food records are present.    Diet Includes:   Breakfast:  daily--prunes 2-3, applesauce for BM--- eating Buddhist oatmeal 1/2 from before, protein mini muffins x1, turkey diana (Monday), 2 scrambled eggs, biscuit with SF jelly & yogurt spread (Tuesday), pancake with 1/2 bananas (made with 1 egg, PB, cinnamon- instead of banana) with SF syrup (Wednesday), cream of wheat packages & 2 blueberry muffins (Thursday), 2 eggs in toast "toad in a hole" (Friday), fresh grits- cut in half & cinnamon roll, only 1 now (Saturday), Brandywine Waffles & Turkey sausage (Sunday)  Lunch:  " sandwich- ham/turkey, light harris with sandwich keto bread- SF Koolaid 1 cup--- plans to change to vegetable soup  Snack: - ranch with greek yogurt and vegetable (cream cheese, dressing), trail mix or protein shake at 3 pm  Dinner: , grilled meats/vegetables, salad every night with low fat dressing and croutons  No longer on cookies at lunch  Moved to 4 oz meat, 2-3 oz vegetables, 1-2 oz carbs  Decreased carbs to nearly none except dinner  Isopure Clear- Mixed Berry to breakfast meal    Meal Pattern: Improved.  Protein Supplements: 2 per day.  Snacking: Adequate. Snacks include healthy choices.    Vegetables: Likes a variety. Eats almost daily.  Fruits: Likes a variety. Eats daily.  Beverages:  gallon water, 1 glass of 1/2 SF juice and protein clear drink now  Dining out: limited now. Was Weekly- Mostly fast food and restaurants.  Cooking at home: Weekly. Mostly  airfrying minimal with mostly grilling and baking  meat, fish, starchy CHO, and vegetables.     Exercise:  Current exercise: Adequate, 3-5x/wk  [employer] 3x/wk- Treadmill/ Elliptical trade 10-20 each, then swap 20-30 min total, speed 2  Walk on [facility] Tuesday/Wedneday and doing machines/weights as they walk  Increased activity with sister helping     Restrictions to exercise: None     Vitamins / Minerals / Herbs: Men's OTC daily, fish oil, CBD oil- anxiety, zinc, cranberry capsules for UTI  Miralax daily  Lactulose TID for constipation     Food Allergies: NKFA; no intolerances stated.      Social:  Disabled. Autistic with OCD.   Lives with mother. Caregiver daily. Both attended appointment.   Grocery shopping and food prep Caregiver (Ashleigh) and mother (Eli).  Patient believes the household will be supportive after surgery.  Alcohol: None.  Smoking: None.      Behavior or Diet Goals for this patient:  Reviewed Falls with family/Caregiver  Continue prescribed home medications  Tanita Body Composition Scale  Decreased 1 lb fat  Decreased 7  lb muscle  No change lb water  Discussed dietary changes  Do not skip meals- replace with protein shake  Increase Protein to 60 gm/day at minimum  Can remove small potatoe, have small wheat roll  Continue decreasing meals with the meat largest portion, then vegetables and if any carbs then be the smallest portion    Medical Weight Loss options discussed-  Discussed PO vs Injectables  PO Medication  Topiramate- contraindicated based on hepatic impairment, sz risks, change in mentation with suicide/self harm tendency   Phentermine- contraindicated based on increased agitation, constipation  Diethylpropion- contraindicated as decreases sz threshold  Contrave- contraindicated with sz disorders and hepatic impairment, with increased risk for suicide/self harm   Injectable Medications- Rx on 8/10/24  ASCVD Risk Calculator-  5.4% Current 10-Year ASCVD Risk, with lifetime risk at 69%, optimal risk 0.6%  Increase to fasting glucose over past 8-months, from 106 in 8/2023 & 107 in 12/2023, now increased to 119 in 6/2024- calculating his A1C in prediabetes at 5.8  Weight loss medications selection: Tirzepatide (Zepbound)  Aware that will need prior authorization for medication, which can take some times  Aware medication not formulary at primary pharmacy and might have to change pharmacy if they do not carry it   Denies history or family history of Medullary Thyroid Cancer or Multiple endocrine neoplasia syndrome  Discussion of manifestations of elevated calcitonin levels  Will start dosage at  2.5 mg subcutaneous for 4 weeks (weekly injections) then increased to 5 mg. Can continue to increase every 4 weeks with max dose of 15 mg    11/12/24  Unable to Zepbound for Obesity covered  Sent in Wegovy with approved coverage on 10/10/24,   completed 4 injections without difficulty and family reporting tolerating well  Increase dose today for 0.5 mg, will contact me in 3 weeks for refill or dose increase    Refill Request on 1/2/25-  increase in dosage to 1 mg     1/10/25  Discussion on increase in dose and how he is tolerating- today will be first dose  GERD reported by mother- continue with protonix daily, can add tums PRN- aware can worse with dose increase  F/U in 2 months, agreeable for virtual     3/10/25  Went on Cruise from 2/8-15/2025- gained weight at 252, since returning has lost to 248 today  Wegovy 1mg on 2/10 was refill- has 3 injections left  Discussed and Plan for increase when refill is due, increase to 1.7 mg Wegovy x 3 months, then 2.4 mg x 3 months  F/U in 2-3 months with Virtual    6/6/2025  Message on 5/21, stopped meds 1 week ago (approx 5/14) for procedure, but delayed with UTI dx-- did not restart- off medication since May 7-9, 2025   Plan for cystoscopy on 6/12/2025 with Dr Velasquez  Restart medication, lower dose, since off for > 4 wks  Wegovy 1 mg q4w, then increase to Wegovy 1.7 mg q4wks x 1 refill    Changes for today, 7/10/25  Completed Wegovy 1 mg q4w (has 1 injection left) then increase to Wegovy 1.7 mg q4wks x 1 refill ([facility])  Increased belching- Rx given ([facility])    : total time spent for visit: 21 minutes

## 2025-07-11 ENCOUNTER — OFFICE VISIT (OUTPATIENT)
Dept: BARIATRICS | Facility: CLINIC | Age: 40
End: 2025-07-11
Payer: MEDICARE

## 2025-07-11 VITALS
HEART RATE: 80 BPM | TEMPERATURE: 97 F | DIASTOLIC BLOOD PRESSURE: 70 MMHG | BODY MASS INDEX: 33.85 KG/M2 | WEIGHT: 241.81 LBS | HEIGHT: 71 IN | RESPIRATION RATE: 16 BRPM | SYSTOLIC BLOOD PRESSURE: 112 MMHG

## 2025-07-11 DIAGNOSIS — K74.60 LIVER CIRRHOSIS SECONDARY TO NASH: ICD-10-CM

## 2025-07-11 DIAGNOSIS — I10 PRIMARY HYPERTENSION: ICD-10-CM

## 2025-07-11 DIAGNOSIS — E66.01 MORBID OBESITY: Primary | ICD-10-CM

## 2025-07-11 DIAGNOSIS — K21.00 GASTROESOPHAGEAL REFLUX DISEASE WITH ESOPHAGITIS WITHOUT HEMORRHAGE: ICD-10-CM

## 2025-07-11 DIAGNOSIS — K75.81 LIVER CIRRHOSIS SECONDARY TO NASH: ICD-10-CM

## 2025-07-11 PROCEDURE — 99999 PR PBB SHADOW E&M-EST. PATIENT-LVL V: CPT | Mod: PBBFAC,,, | Performed by: NURSE PRACTITIONER

## 2025-07-11 RX ORDER — SUCRALFATE 1 G/10ML
1 SUSPENSION ORAL
Qty: 1200 ML | Refills: 1 | Status: SHIPPED | OUTPATIENT
Start: 2025-07-11

## 2025-07-11 RX ORDER — SEMAGLUTIDE 1.7 MG/.75ML
1.7 INJECTION, SOLUTION SUBCUTANEOUS WEEKLY
Qty: 3 ML | Refills: 1 | Status: SHIPPED | OUTPATIENT
Start: 2025-07-11 | End: 2025-08-08

## 2025-07-16 ENCOUNTER — TELEPHONE (OUTPATIENT)
Dept: FAMILY MEDICINE | Facility: CLINIC | Age: 40
End: 2025-07-16
Payer: MEDICARE

## 2025-07-16 NOTE — TELEPHONE ENCOUNTER
Copied from CRM #[id number]. Topic: Medications - Medication Question  >> Jul 16, 2025 11:57 AM Daxa wrote:  Type:  Pharmacy Calling to Clarify an RX    [facility]armacy Name:  [facility]   Prescription Name:  karen   What do they need to clarify?:  directions , how many toes , for insurance purposes   Best Call Back Number:    [facility] - [place], [place] - [address] [address]  [address] [address]  [facility] [address]  Phone: [phone] Fax: [phone]      Additional Information:  please advise

## 2025-07-16 NOTE — PROGRESS NOTES
"The patient location is: [place]  The chief complaint leading to consultation is: OCD, anxiety, impulse control, PDD     Visit type: audiovisual    30 minutes of total time spent on the encounter, which includes face to face time and non-face to face time preparing to see the patient (eg, review of tests), Obtaining and/or reviewing separately obtained history, Documenting clinical information in the electronic or other health record, Independently interpreting results (not separately reported) and communicating results to the patient/family/caregiver, or Care coordination (not separately reported).     Each patient to whom he or she provides medical services by telemedicine is:  (1) informed of the relationship between the physician and patient and the respective role of any other health care provider with respect to management of the patient; and (2) notified that he or she may decline to receive medical services by telemedicine and may withdraw from such care at any time.    Notes:     Outpatient Psychiatry Follow-Up Visit    Clinical Status of Patient: Outpatient (Ambulatory)  07/16/2025     Chief Complaint: OCD, anxiety, impulse control, PDD      Interval History and Content of Current Session:  Interim Events/Subjective Report/Content of Current Session:  follow-up appointment with mother.    Pt is a 37 y/o male with past psychiatric hx of OCD, anxiety, impulse control, PDD who presents for follow-up treatment. The patient's mother reports that the patient has shown significant improvement in independence and self-care skills, now performing tasks such as preparing breakfast, doing laundry, and taking out the trash with minimal verbal prompts. His self-abuse behaviors have almost healed, indicating progress in emotional regulation.     The patient's mother notes that he is entering a time of year when the care recipient typically becomes more anxious and "amped up," beginning in September and continuing " through the holiday season, encompassing various family birthdays and celebrations. Given the recent calm behavior, the caregiver had considered reducing the Zoloft dosage. However, today's anxiety episode has led to reconsideration of this idea. Hydroxyzine has not been used recently but was contemplated during today's anxiety episode.    Past Psychiatric hx: s/e's to mult prev meds to include zyprexa (wgt gain), lexapro 20mg po qam (anxiety and to suppress sex drive), buspar 10mg po BID, xanax 0.5mg po daily PRN anxiety    Past Medical hx:   Past Medical History:   Diagnosis Date    Anxiety     Autism     Bowel obstruction     pt mother denies    GERD (gastroesophageal reflux disease)     Grand mal seizure     Hepatic encephalopathy     Mastoiditis     TRAYLOR (nonalcoholic steatohepatitis)     Otitis media     Paraplegia     Premature birth     6 weeks premature    Recurrent UTI     Renal cancer 2010    Right RCC    Retinopathy     Scoliosis     paraplegia temporarily after spine surgery-he can walk now    Scoliosis     has a frank in his back    Sepsis due to Escherichia coli without acute organ dysfunction 02/02/2023    Sleep apnea     uses CPAP    Stroke     one week old    Tardive dyskinesia         Interim hx:  Medication changes last visit: Increase sertraline to 150 mg     Denies suicidal/homicidal ideations.  Denies hopelessness/worthlessness.    Denies auditory/visual hallucinations      Alcohol: Pt denied  Drug: Pt denied  Caffeine: Not assessed  Tobacco: Pt denied      Review of Systems   PSYCHIATRIC: Pertinent items are noted in the narrative.        CONSTITUTIONAL: weight stable    Past Medical, Family and Social History: The patient's past medical, family and social history have been reviewed and updated as appropriate within the electronic medical record. See encounter notes.     Current Psychiatric Medication:  sertraline 150 mg, oxcarbazepine 300 mg BID (per neuro), hydroxyzine 25 mg PRN      Compliance: yes      Side effects: Pt denied     Risk Parameters:  Patient reports no suicidal ideation  Patient reports no homicidal ideation  Patient reports no self-injurious behavior  Patient reports no violent behavior     Exam (detailed: at least 9 elements; comprehensive: all 15 elements)   Constitutional  Vitals:  Most recent vital signs, dated less than 90 days prior to this appointment, were reviewed. BP: ()/()   Arterial Line BP: ()/()       General:  unremarkable, age appropriate, casual attire, good eye contact, good rapport       Musculoskeletal  Muscle Strength/Tone:  no flaccidity, no tremor    Gait & Station:  normal      Psychiatric                       Speech:  normal tone, normal rate, rhythm, and volume   Mood & Affect:    Mildly anxious         Thought Process:   Goal directed; Linear    Associations:   intact   Thought Content:   No SI/HI, delusions, or paranoia, no AV/VH   Insight & Judgement:   Good, adequate to circumstances   Orientation:   grossly intact; alert and oriented x 4    Memory:  intact for content of interview    Language:  grossly intact, can repeat    Attention Span  : Grossly intact for content of interview   Fund of Knowledge:   intact and appropriate to age and level of education        Assessment and Diagnosis   Status/Progress/Impression:    Assessment & Plan    IMPRESSION:  - Monitoring anxiety and self-abuse behaviors as patient enters a typically anxious season.  - Improved independence and social cues following caregiver transition.  - Reassess anxiety levels and medication needs at next visit.    PLAN SUMMARY:  - Maintain current medication plan  - Follow-up appointment on August 19th at 3:00 PM      Diagnosis:   1. Obsessive Compulsive Disorder   2. Anxiety Disorder  3. Impulse Control Disorder  4. Pervasive Developmental Disorder  Intervention/Counseling/Treatment Plan   Medication Management:      1. Sertraline 150 mg    2. hydroxyzine 25 mg PRN    3.  Oxcarbazepine 300 mg BID (managed by neuro)    4. Call to report any worsening of symptoms or problems with the medication. Pt instructed to go to ER with thoughts of harming self, others     5. Patient given contact # for psychotherapists at [facility] and also instructed to check with insurance for list of providers.     Psychotherapy: 20 minutes  Target symptoms: anxiety  Why chosen therapy is appropriate versus another modality: CBT used; relevant to diagnosis, patient responds to this modality  Outcome monitoring methods: self-report, observation  Therapeutic intervention type: coping, supportive therapy  Topics discussed/themes: building skills sets for symptom management, symptom recognition, nutrition, exercise  The patient's response to the intervention is accepting  Patient's response to treatment is: good.   The patient's progress toward treatment goals: stable     Return to clinic: 1 month    -Cognitive-Behavioral/Supportive therapy and psychoeducation provided  -R/B/SE's of medications discussed with the pt who expresses understanding and chooses to take medications as prescribed.   -Pt instructed to call clinic, 911 or go to nearest emergency room if sxs worsen or pt is in   crisis. The pt expresses understanding.    Praveen Brush, PhD, MP    Visit today included increased complexity associated with the care of the episodic problem developmental delays, anxiety addressed and managing the longitudinal care of the patient due to the serious and/or complex managed problem(s) developmental delays, anxiety.      This note was generated with the assistance of ambient listening technology. Verbal consent was obtained by the patient and accompanying visitor(s) for the recording of patient appointment to facilitate this note. I attest to having reviewed and edited the generated note for accuracy, though some syntax or spelling errors may persist. Please contact the author of this note for any  clarification.     Antidepressant/Antianxiety Medication Initiation:  Patient informed of risks, benefits, and potential side effects of medication and accepts informed consent.  Common side effects include nausea, fatigue, headache, insomnia., Specifically discussed the possibility of new or worsening suicidal thoughts/depression.  Patient instructed to stop the medication immediately and seek urgent treatment if this occurs. Patient instructed not to abruptly discontinue medication without physician guidance except in cases of sudden onset or worsening of SI.

## 2025-07-17 ENCOUNTER — OFFICE VISIT (OUTPATIENT)
Dept: PSYCHIATRY | Facility: CLINIC | Age: 40
End: 2025-07-17
Payer: MEDICARE

## 2025-07-17 ENCOUNTER — OFFICE VISIT (OUTPATIENT)
Facility: CLINIC | Age: 40
End: 2025-07-17
Payer: MEDICARE

## 2025-07-17 DIAGNOSIS — F42.9 OBSESSIVE-COMPULSIVE DISORDER, UNSPECIFIED TYPE: Primary | ICD-10-CM

## 2025-07-17 DIAGNOSIS — F84.9 PERVASIVE DEVELOPMENTAL DISORDER, ACTIVE: ICD-10-CM

## 2025-07-17 DIAGNOSIS — F41.9 ANXIETY DISORDER, UNSPECIFIED TYPE: ICD-10-CM

## 2025-07-17 DIAGNOSIS — F63.9 IMPULSE CONTROL DISORDER: ICD-10-CM

## 2025-07-17 DIAGNOSIS — K74.60 LIVER CIRRHOSIS SECONDARY TO NASH: Primary | ICD-10-CM

## 2025-07-17 DIAGNOSIS — E66.811 CLASS 1 OBESITY WITH SERIOUS COMORBIDITY AND BODY MASS INDEX (BMI) OF 34.0 TO 34.9 IN ADULT, UNSPECIFIED OBESITY TYPE: ICD-10-CM

## 2025-07-17 DIAGNOSIS — K75.81 METABOLIC DYSFUNCTION-ASSOCIATED STEATOHEPATITIS (MASH): ICD-10-CM

## 2025-07-17 DIAGNOSIS — R74.8 ELEVATED LIVER ENZYMES: ICD-10-CM

## 2025-07-17 DIAGNOSIS — K75.81 LIVER CIRRHOSIS SECONDARY TO NASH: Primary | ICD-10-CM

## 2025-07-17 PROCEDURE — G2211 COMPLEX E/M VISIT ADD ON: HCPCS | Mod: 95,,, | Performed by: PSYCHOLOGIST

## 2025-07-17 PROCEDURE — 1159F MED LIST DOCD IN RCRD: CPT | Mod: CPTII,95,, | Performed by: PSYCHOLOGIST

## 2025-07-17 PROCEDURE — 90833 PSYTX W PT W E/M 30 MIN: CPT | Mod: 95,,, | Performed by: PSYCHOLOGIST

## 2025-07-17 PROCEDURE — 98006 SYNCH AUDIO-VIDEO EST MOD 30: CPT | Mod: 95,,, | Performed by: PSYCHOLOGIST

## 2025-07-17 PROCEDURE — 98006 SYNCH AUDIO-VIDEO EST MOD 30: CPT | Mod: 95,,, | Performed by: NURSE PRACTITIONER

## 2025-07-17 NOTE — PATIENT INSTRUCTIONS
Labs + ultrasound every 6 months      CIRRHOSIS EDUCATION:  This is a helpful web site about cirrhosis: https://cirrhosiscare.ca/     Because you have cirrhosis, it is extremely important to obtain blood tests and an ultrasound every 6 months to screen for liver cancer (you are at risk for developing liver cancer due to increased scar tissue in the liver).     Signs and symptoms of worsening liver disease include jaundice (yellow skin/eyes), fluid in the abdomen (ascites), and confusion/disorientation/slowed thought processes due to hepatic encephalopathy (toxins building up when the liver is not working properly). You should seek medical attention if any of these things occur. Also, possible bleeding from esophageal varices (blood vessels in the stomach and foodpipe that can burst and cause bleeding). If you have symptoms of vomiting blood, blood in your stool, maroon or black stools, or coffee ground vomit, you should go to the emergency room immediately.     Cirrhosis can increase the risk of impaired liver function or liver failure; however, we will watch your liver function score (MELD score) closely with each clinic visit. A normal MELD score is 6, highest is 40. The MELD score helps to determine when we may need to consider evaluation for liver transplant.     Counseling  -- Strict abstinence from alcohol (includes beer, wine, and/or liquor)  -- Avoid non-steroidal anti-inflammatory drugs (NSAIDs) such as ibuprofen (Motrin, Advil), naproxen (Naprosyn, Aleve), meloxicam (Mobic)   -- Tylenol/acetaminophen is OKAY and should be used as needed for pain, no more than 2000 mg per day  -- Avoid raw shellfish due to the risk of Vibrio vulnificus infection  -- Low salt/sodium diet, less than 2000 mg per day   -- High protein diet to prevent muscle mass loss. Can drink protein shakes (Premier Protein is a great option because it is very high protein and low sugar). A bedtime snack with protein can also be helpful.  Example: peanut butter sandwich/crackers  -- Periodic upper endoscopy (EGD) to screen for varices (enlarged blood vessels) in the esophagus and stomach which can increase risk of bleeding  -- Increased risk of liver cancer associated with cirrhosis; therefore, continued screening with ultrasound (or CT / MRI) and AFP tumor marker every 6 months is recommended.

## 2025-07-17 NOTE — PROGRESS NOTES
The patient location is: [place]  The chief complaint leading to consultation is: F/u for cirrhosis    Visit type: audiovisual    Face to Face time with patient: 21 min  30 minutes of total time spent on the encounter, which includes face to face time and non-face to face time preparing to see the patient (eg, review of tests), Obtaining and/or reviewing separately obtained history, Documenting clinical information in the electronic or other health record, Independently interpreting results (not separately reported) and communicating results to the patient/family/caregiver, or Care coordination (not separately reported).     Each patient to whom he or she provides medical services by telemedicine is:  (1) informed of the relationship between the physician and patient and the respective role of any other health care provider with respect to management of the patient; and (2) notified that he or she may decline to receive medical services by telemedicine and may withdraw from such care at any time.      [facility] - Established Patient    Last Clinic Visit: 1/9/25      HISTORY     This is a 39 y.o. 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     Transaminases consistently elevated since 11/2015, AST>ALT.    Serologic workup has been negative for other etiologies of liver disease. Ferritin elevated, 800-1500 though iron sat normal. No copies of C282Y or H63D to suggest HH. IgG mildly elevated (1677) though other autoimmune markers negative.      Imaging has shown hepatic steatosis and hepatosplenomegaly.    Risk factors include-  BMI 40  HLD, pre-diabetes    Diagnosed with cirrhosis: Liver biopsy 7/2020  - Cirrhotic liver  - Steatosis with steatohepatitis   - Macrovesicular steatosis 20-30% and microvesicular steatosis 20%  - Trichrome: cirrhosis (Stage 4/4)  - Iron: No deposit (Grade 0/4)    He was previously able to lose ~50 lb and liver enzymes normalized.    Interval  history:  Mother present for visit.   No new concerns from liver standpoint.     Saw general surgery for 1 cm GB polyp- this has remained stable and plan for continued observation at this time.    In regard to CASEY-  Remains on Wegovy, has lost 25 lb.   Followed by bariatric medicine.   Family has made significant dietary changes.   HgbA1c 4.9.    Liver enzymes continue to improve as a result:    -- 51   -- 49    Current symptoms of hepatic decompensation:              Ascites: no              LE edema: no                Hepatic encephalopathy: difficult to assess given autism and  Takes lactulose, which helps with his constipation too. Mental status at baseline.              GI bleeding: no              Jaundice: no    Health Maintenance:  -- HCC screening: abd US 7/10/25 without focal hepatic lesion; AFP 2.1  -- Variceal screening: EGD 8/17/22 without varices  -- Hepatitis A & B vaccination: no        Past medical history, surgical history, problem list, family history, social history, allergies: Reviewed and updated in the appropriate section of the electronic medical record.      Current Outpatient Medications   Medication Sig Dispense Refill    acetylcysteine 600 mg Cap Take 1 capsule (600 mg total) by mouth once daily. (Patient not taking: Reported on 7/11/2025) 30 capsule 11    allopurinoL (ZYLOPRIM) 100 MG tablet Take 1 tablet (100 mg total) by mouth 2 (two) times daily. 180 tablet 3    amLODIPine (NORVASC) 10 MG tablet TAKE ONE TABLET BY MOUTH ONCE DAILY 90 tablet 3    ascorbic acid, vitamin C, 250 mg Chew Take by mouth once daily.      calcium carbonate-vitamin D3 500 mg(1,250mg) -400 unit Tab Take 1 tablet by mouth 2 (two) times a day.       catheter (POOLE CATHETER) 16 Fr Misc 1 each by Misc.(Non-Drug; Combo Route) route 3 (three) times daily as needed (urinary retention). 100 each 11    clotrimazole-betamethasone 1-0.05% (LOTRISONE) cream Apply topically.      DENTA 5000 PLUS 1.1 % Crea  "      hydrOXYzine pamoate (VISTARIL) 25 MG Cap TAKE ONE CAPSULE BY MOUTH EVERY 4 TO 6 HOURS AS NEEDED (Patient not taking: Reported on 7/11/2025) 60 capsule 2    insulin syringe-needle U-100 (BD INSULIN SYRINGE) 1 mL 29 gauge x 1/2" Syrg 1 each by Misc.(Non-Drug; Combo Route) route once a week. 10 each 11    inulin (FIBER GUMMIES ORAL) Take 2 tablets by mouth once daily.       L. ACIDOPHILUS/BIFID. ANIMALIS (CHEWABLE PROBIOTIC ORAL) Take 1 tablet by mouth 2 (two) times daily.      lactulose (CHRONULAC) 10 gram/15 mL solution Take 15 mLs (10 g total) by mouth 3 (three) times daily. 1892 mL 11    magnesium 30 mg Tab Take 1 tablet by mouth every evening.      MELATONIN ORAL Take 1 tablet by mouth every evening.      multivitamin capsule Take 1 capsule by mouth every morning.       mupirocin (BACTROBAN) 2 % ointment Apply topically 2 (two) times daily. Apply topically to open wounds once to twice daily and PRN, as instructed. 30 g 2    MYRBETRIQ 50 mg Tb24 Take 1 tablet by mouth.      nystatin (MYCOSTATIN) powder Apply topically 2 (two) times daily. 60 g 11    omega-3 fatty acids/fish oil (FISH OIL-OMEGA-3 FATTY ACIDS) 300-1,000 mg capsule Take 1 capsule by mouth once daily.       OXcarbazepine (TRILEPTAL) 300 MG Tab Take 1 tablet (300 mg total) by mouth 2 (two) times daily. 180 tablet 3    pantoprazole (PROTONIX) 40 MG tablet TAKE ONE TABLET BY MOUTH ONCE DAILY 90 tablet 3    polyethylene glycol (GLYCOLAX) 17 gram PwPk Take 17 g by mouth once daily.       pramipexole (MIRAPEX) 0.25 MG tablet TAKE ONE TABLET BY MOUTH THREE TIMES DAILY 90 tablet 8    semaglutide, weight loss, (WEGOVY) 1.7 mg/0.75 mL PnIj Inject 1.7 mg into the skin once a week. 3 mL 1    sertraline (ZOLOFT) 100 MG tablet Take 1 tablet (100 mg total) by mouth once daily. 90 tablet 0    sucralfate (CARAFATE) 100 mg/mL suspension Take 10 mLs (1 g total) by mouth 4 (four) times daily with meals and nightly. 1200 mL 1    sulfamethoxazole-trimethoprim 400-80mg " (BACTRIM,SEPTRA) 400-80 mg per tablet Take 1 tablet by mouth.      zinc gluconate 50 mg tablet Take 50 mg by mouth once daily.       No current facility-administered medications for this visit.     Facility-Administered Medications Ordered in Other Visits   Medication Dose Route Frequency Provider Last Rate Last Admin    dextrose 50% injection 12.5 g  12.5 g Intravenous PRN Volpi-Abadie, Jacqueline, MD        dextrose 50% injection 25 g  25 g Intravenous PRN Volpi-Abadie, Jacqueline, MD        insulin regular injection 0-8 Units  0-8 Units Intravenous Continuous PRN Volpi-Abadie, Jacqueline, MD        lactated ringers infusion   Intravenous Continuous Volpi-Abadie, Jacqueline,  mL/hr at 03/25/24 0728 New Bag at 03/25/24 0728     Medication list reviewed and updated.      Review of Systems - as per HPI  Constitutional: Negative for unexpected weight change.   Respiratory: Negative for shortness of breath.    Cardiovascular: Negative for leg swelling.  Gastrointestinal: Negative for abdominal distention or abdominal pain. Negative for melena or hematemesis.  Musculoskeletal: Negative for myalgias.    Skin: Negative for jaundice or itching.  Neurological: Baseline   Hematological: Does not bruise/bleed easily.       Physical Exam - limited by virtual visit  Constitutional: No distress. Alert and oriented.  Pulmonary/Chest: No respiratory distress.   Skin: No jaundice.   Psychiatric: Baseline          LABS & DIAGNOSTIC STUDIES     I have personally reviewed pertinent laboratory findings:    Lab Results   Component Value Date    ALT 49 (H) 07/10/2025    AST 51 (H) 07/10/2025    ALKPHOS 85 07/10/2025    BILITOT 0.5 07/10/2025    ALBUMIN 4.6 07/10/2025    INR 1.1 07/10/2025       Lab Results   Component Value Date    WBC 9.33 07/10/2025    HGB 15.2 07/10/2025    HCT 43.7 07/10/2025    MCV 93 07/10/2025     07/10/2025       Lab Results   Component Value Date     07/10/2025    K 4.2 07/10/2025    BUN 15  07/10/2025    CREATININE 0.7 07/10/2025    ESTGFRAFRICA >60 06/16/2022    EGFRNONAA >60 06/16/2022       Lab Results   Component Value Date    SMOOTHMUSCAB Negative 1:40 04/30/2018    AMAIFA Negative 1:40 04/30/2018    IGGSERUM 1677 (H) 02/29/2020    ANASCREEN Negative <1:160 04/30/2018    FERRITIN 893 (H) 02/29/2020    FESATURATED 26 04/30/2018    CERULOPLSM 30.0 04/30/2018    HEPBSAG Negative 04/30/2018    HEPBIGM Negative 04/30/2018    HEPBCAB Negative 02/02/2021    HEPCAB Negative 04/30/2018    HEPAIGM Negative 04/30/2018       Lab Results   Component Value Date    AFP 2.1 07/10/2025       I have personally reviewed the following result reports:  Abdominal US - 7/10/25  EGD - 8/17/22  Colonoscopy - 9/9/20  Liver biopsy - 7/20/20      ASSESSMENT & PLAN     39 y.o. male with:    1. Liver cirrhosis secondary to TRAYLOR    2. Metabolic dysfunction-associated steatohepatitis (MASH)    3. Elevated liver enzymes    4. Class 1 obesity with serious comorbidity and body mass index (BMI) of 34.0 to 34.9 in adult, unspecified obesity type          MELD 3.0: 7 at 7/10/2025  9:03 AM  MELD-Na: 7 at 7/10/2025  9:03 AM  Calculated from:  Serum Creatinine: 0.7 mg/dL (Using min of 1 mg/dL) at 7/10/2025  9:03 AM  Serum Sodium: 139 mmol/L (Using max of 137 mmol/L) at 7/10/2025  9:03 AM  Total Bilirubin: 0.5 mg/dL (Using min of 1 mg/dL) at 7/10/2025  9:03 AM  Serum Albumin: 4.6 g/dL (Using max of 3.5 g/dL) at 7/10/2025  9:03 AM  INR(ratio): 1.1 at 7/10/2025  9:03 AM  Age at listing (hypothetical): 39 years  Sex: Male at 7/10/2025  9:03 AM      Liver biopsy in 2020 confirmed MASH cirrhosis. Cirrhosis has been well compensated. Ammonia has been mildly elevated in the past though difficult to assess for hepatic encephalopathy given MR and autism. Using lactulose which has helped with constipation.     Liver enzymes currently improving with weight loss, on GLP-1.     Recommendations:  MELD <15, no indication for liver transplant evaluation  at this time. Reassured that liver function remains stable. Monitor LFTs every 6 months.   Continue HCC screening every 6 months with ultrasound and AFP, next due 1/2026  EGD 8/2022 without varices. Platelets are currently WNL though Fibroscan kPa 20, which could suggest risk of portal HTN. Would like to avoid further EGDs/anesthesia for him. Will plan to repeat Fibroscan next year. If there is concern for CSPH, can discuss adding coreg with PCP to avoid another endoscopy.   Recommend vaccines for hepatitis A and B  Mental status at baseline, continue lactulose  Commended on weight loss success and dietary changes, encouraged to continue. GLP-1s are also beneficial for MASH  Maintain good control of blood pressure, cholesterol, and blood sugar      Orders Placed This Encounter   Procedures    US Abdomen Limited    CBC Without Differential    Comprehensive Metabolic Panel    AFP Tumor Marker    Protime-INR       F/u in 6 months with labs/US.      Thank you for allowing me to participate in the care of Hussein Armendariz, FNP-C  Hepatology

## 2025-08-04 DIAGNOSIS — B37.89 OTHER SITES OF CANDIDIASIS: ICD-10-CM

## 2025-08-04 NOTE — TELEPHONE ENCOUNTER
Care Due:                  Date            Visit Type   Department     Provider  --------------------------------------------------------------------------------                                EP [facility]  Last Visit: 03-      CARE ([facility])   ALIE Mazariegos                              EP -                              PRIMARY      [facility]  Next Visit: 09-      CARE ([facility])   ALIE Mazariegos                                                            Last  Test          Frequency    Reason                     Performed    Due Date  --------------------------------------------------------------------------------    Uric Acid...  12 months..  allopurinoL..............  Not Found    Overdue    [employer] Embedded Care Due Messages. Reference number: [id number].   8/04/2025 4:46:04 PM CDT

## 2025-08-05 RX ORDER — NYSTATIN 100000 [USP'U]/G
POWDER TOPICAL
Qty: 60 G | Refills: 0 | Status: SHIPPED | OUTPATIENT
Start: 2025-08-05

## 2025-08-05 RX ORDER — LACTULOSE 10 G/15ML
10 SOLUTION ORAL; RECTAL 3 TIMES DAILY
Qty: 946 ML | Refills: 11 | Status: SHIPPED | OUTPATIENT
Start: 2025-08-05

## 2025-08-05 NOTE — TELEPHONE ENCOUNTER
Copied from CRM #[id number]. Topic: Medications - Medication Refill  >> Aug 4, 2025  4:56 PM Leonora wrote:  Type:  RX Refill Request    Who Called: pt caregiver  Refill or New Rx:refill  RX Name and Strength:nystatin (MYCOSTATIN) powder  lactulose (CHRONULAC) 10 gram/15 mL solution  Preferred Pharmacy with phone number:  [facility] [address], [address] [facility]  [address] [facility] [address], [address] [address]  Phone: [phone] Fax: [phone]  Local or Mail Order:local  Ordering Provider:marcus  Would the patient rather a call back or a response via [facility]? call  Best Call Back Number:Telephone Information:  Mobile          [phone]      Additional Information: please advise thank you

## 2025-08-18 ENCOUNTER — PATIENT MESSAGE (OUTPATIENT)
Dept: PSYCHIATRY | Facility: CLINIC | Age: 40
End: 2025-08-18
Payer: MEDICARE

## 2025-08-19 ENCOUNTER — OFFICE VISIT (OUTPATIENT)
Dept: PSYCHIATRY | Facility: CLINIC | Age: 40
End: 2025-08-19
Payer: MEDICARE

## 2025-08-19 DIAGNOSIS — F42.9 OBSESSIVE-COMPULSIVE DISORDER, UNSPECIFIED TYPE: Primary | ICD-10-CM

## 2025-08-19 DIAGNOSIS — F84.9 PERVASIVE DEVELOPMENTAL DISORDER, ACTIVE: ICD-10-CM

## 2025-08-19 DIAGNOSIS — F63.9 IMPULSE CONTROL DISORDER: ICD-10-CM

## 2025-08-19 DIAGNOSIS — F41.9 ANXIETY DISORDER, UNSPECIFIED TYPE: ICD-10-CM

## 2025-08-19 PROCEDURE — 90833 PSYTX W PT W E/M 30 MIN: CPT | Mod: 95,,, | Performed by: PSYCHOLOGIST

## 2025-08-19 PROCEDURE — 1159F MED LIST DOCD IN RCRD: CPT | Mod: CPTII,95,, | Performed by: PSYCHOLOGIST

## 2025-08-19 PROCEDURE — 98006 SYNCH AUDIO-VIDEO EST MOD 30: CPT | Mod: 95,,, | Performed by: PSYCHOLOGIST

## 2025-08-19 PROCEDURE — G2211 COMPLEX E/M VISIT ADD ON: HCPCS | Mod: 95,,, | Performed by: PSYCHOLOGIST
